# Patient Record
Sex: FEMALE | Race: BLACK OR AFRICAN AMERICAN | Employment: FULL TIME | URBAN - NONMETROPOLITAN AREA
[De-identification: names, ages, dates, MRNs, and addresses within clinical notes are randomized per-mention and may not be internally consistent; named-entity substitution may affect disease eponyms.]

---

## 2017-01-11 ENCOUNTER — TELEPHONE (OUTPATIENT)
Dept: SURGERY | Age: 52
End: 2017-01-11

## 2017-01-11 ENCOUNTER — OFFICE VISIT (OUTPATIENT)
Dept: ONCOLOGY | Facility: CLINIC | Age: 52
End: 2017-01-11

## 2017-01-11 ENCOUNTER — OFFICE VISIT (OUTPATIENT)
Dept: SURGERY | Age: 52
End: 2017-01-11
Payer: COMMERCIAL

## 2017-01-11 ENCOUNTER — INFUSION (OUTPATIENT)
Dept: ONCOLOGY | Facility: HOSPITAL | Age: 52
End: 2017-01-11
Attending: INTERNAL MEDICINE

## 2017-01-11 ENCOUNTER — LAB (OUTPATIENT)
Dept: ONCOLOGY | Facility: CLINIC | Age: 52
End: 2017-01-11

## 2017-01-11 VITALS
OXYGEN SATURATION: 100 % | SYSTOLIC BLOOD PRESSURE: 120 MMHG | TEMPERATURE: 96.9 F | HEIGHT: 63 IN | RESPIRATION RATE: 20 BRPM | HEART RATE: 85 BPM | DIASTOLIC BLOOD PRESSURE: 74 MMHG | BODY MASS INDEX: 30.3 KG/M2 | WEIGHT: 171 LBS

## 2017-01-11 VITALS
TEMPERATURE: 98.2 F | BODY MASS INDEX: 31.01 KG/M2 | HEIGHT: 63 IN | DIASTOLIC BLOOD PRESSURE: 72 MMHG | SYSTOLIC BLOOD PRESSURE: 108 MMHG | WEIGHT: 175 LBS

## 2017-01-11 VITALS
TEMPERATURE: 98 F | DIASTOLIC BLOOD PRESSURE: 74 MMHG | HEART RATE: 74 BPM | BODY MASS INDEX: 30.39 KG/M2 | HEIGHT: 63 IN | SYSTOLIC BLOOD PRESSURE: 124 MMHG | WEIGHT: 171.5 LBS | RESPIRATION RATE: 16 BRPM | OXYGEN SATURATION: 98 %

## 2017-01-11 DIAGNOSIS — Z85.3 PERSONAL HISTORY OF MALIGNANT NEOPLASM OF BREAST: ICD-10-CM

## 2017-01-11 DIAGNOSIS — N63.0 BREAST SWELLING: Primary | ICD-10-CM

## 2017-01-11 DIAGNOSIS — C50.012 MALIGNANT NEOPLASM INVOLVING BOTH NIPPLE AND AREOLA OF LEFT BREAST IN FEMALE (HCC): ICD-10-CM

## 2017-01-11 DIAGNOSIS — C50.012 MALIGNANT NEOPLASM INVOLVING BOTH NIPPLE AND AREOLA OF LEFT BREAST IN FEMALE (HCC): Primary | ICD-10-CM

## 2017-01-11 LAB
ALBUMIN SERPL-MCNC: 4.8 G/DL (ref 3.5–5)
ALBUMIN/GLOB SERPL: 1 G/DL
ALP SERPL-CCNC: 90 U/L (ref 38–126)
ALT SERPL W P-5'-P-CCNC: 36 U/L (ref 9–52)
ANION GAP SERPL CALCULATED.3IONS-SCNC: 16 MMOL/L
AST SERPL-CCNC: 36 U/L (ref 5–40)
AUTO MIXED CELLS #: 0.3 10*3/UL (ref 0.1–1.5)
AUTO MIXED CELLS %: 8.9 % (ref 0.2–15.1)
BILIRUB SERPL-MCNC: 0.6 MG/DL (ref 0.2–1.3)
BUN BLD-MCNC: 10 MG/DL (ref 7–26)
BUN/CREAT SERPL: 11.1 (ref 7–25)
CALCIUM SPEC-SCNC: 10.3 MG/DL (ref 8.4–10.2)
CHLORIDE SERPL-SCNC: 99 MMOL/L (ref 98–107)
CO2 SERPL-SCNC: 30 MMOL/L (ref 22–30)
CREAT BLD-MCNC: 0.9 MG/DL (ref 0.7–1.4)
ERYTHROCYTE [DISTWIDTH] IN BLOOD BY AUTOMATED COUNT: 15.2 % (ref 11.5–14.5)
GFR SERPL CREATININE-BSD FRML MDRD: 80 ML/MIN/1.73
GLOBULIN UR ELPH-MCNC: 4.9 GM/DL
GLUCOSE BLD-MCNC: 91 MG/DL (ref 75–110)
HCT VFR BLD AUTO: 42.5 % (ref 37–47)
HGB BLD-MCNC: 12.6 G/DL (ref 12–16)
LYMPHOCYTES # BLD AUTO: 1.8 10*3/MM3 (ref 0.8–7)
LYMPHOCYTES NFR BLD AUTO: 46.6 % (ref 10–58.5)
MCH RBC QN AUTO: 28.8 PG (ref 27–31)
MCHC RBC AUTO-ENTMCNC: 29.6 G/DL (ref 33–37)
MCV RBC AUTO: 97.3 FL (ref 81–99)
NEUTROPHILS # BLD AUTO: 1.7 10*3/MM3 (ref 2–7.8)
NEUTROPHILS NFR BLD AUTO: 44.5 % (ref 37–92)
PLATELET # BLD AUTO: 269 10*3/MM3 (ref 130–400)
PMV BLD AUTO: 8.3 FL (ref 6–12)
POTASSIUM BLD-SCNC: 4.1 MMOL/L (ref 3.6–5)
PROT SERPL-MCNC: 9.7 G/DL (ref 6.3–8.2)
RBC # BLD AUTO: 4.37 10*6/MM3 (ref 4.2–5.4)
SODIUM BLD-SCNC: 145 MMOL/L (ref 137–145)
WBC NRBC COR # BLD: 3.8 10*3/MM3 (ref 4.8–10.8)

## 2017-01-11 PROCEDURE — 99214 OFFICE O/P EST MOD 30 MIN: CPT | Performed by: INTERNAL MEDICINE

## 2017-01-11 PROCEDURE — 85025 COMPLETE CBC W/AUTO DIFF WBC: CPT | Performed by: INTERNAL MEDICINE

## 2017-01-11 PROCEDURE — 96413 CHEMO IV INFUSION 1 HR: CPT

## 2017-01-11 PROCEDURE — 80053 COMPREHEN METABOLIC PANEL: CPT | Performed by: INTERNAL MEDICINE

## 2017-01-11 PROCEDURE — 25010000002 HEPARIN FLUSH (PORCINE) 100 UNIT/ML SOLUTION: Performed by: INTERNAL MEDICINE

## 2017-01-11 PROCEDURE — 36415 COLL VENOUS BLD VENIPUNCTURE: CPT | Performed by: INTERNAL MEDICINE

## 2017-01-11 PROCEDURE — 25010000002 TRASTUZUMAB PER 10 MG: Performed by: INTERNAL MEDICINE

## 2017-01-11 PROCEDURE — 99212 OFFICE O/P EST SF 10 MIN: CPT | Performed by: PHYSICIAN ASSISTANT

## 2017-01-11 RX ORDER — SODIUM CHLORIDE 0.9 % (FLUSH) 0.9 %
10 SYRINGE (ML) INJECTION AS NEEDED
Status: DISCONTINUED | OUTPATIENT
Start: 2017-01-11 | End: 2017-01-11 | Stop reason: HOSPADM

## 2017-01-11 RX ORDER — SODIUM CHLORIDE 0.9 % (FLUSH) 0.9 %
10 SYRINGE (ML) INJECTION AS NEEDED
Status: CANCELLED | OUTPATIENT
Start: 2017-02-01

## 2017-01-11 RX ORDER — CEPHALEXIN 250 MG/1
CAPSULE ORAL
Qty: 28 CAPSULE | Refills: 0 | Status: SHIPPED | OUTPATIENT
Start: 2017-01-11 | End: 2017-09-12

## 2017-01-11 RX ORDER — PNV NO.95/FERROUS FUM/FOLIC AC 28MG-0.8MG
TABLET ORAL
COMMUNITY
End: 2020-06-12

## 2017-01-11 RX ORDER — HEPARIN SODIUM (PORCINE) LOCK FLUSH IV SOLN 100 UNIT/ML 100 UNIT/ML
500 SOLUTION INTRAVENOUS AS NEEDED
Status: CANCELLED | OUTPATIENT
Start: 2017-02-01

## 2017-01-11 RX ORDER — SODIUM CHLORIDE 9 MG/ML
250 INJECTION, SOLUTION INTRAVENOUS ONCE
Status: COMPLETED | OUTPATIENT
Start: 2017-01-11 | End: 2017-01-11

## 2017-01-11 RX ORDER — SODIUM CHLORIDE 0.9 % (FLUSH) 0.9 %
10 SYRINGE (ML) INJECTION AS NEEDED
Status: CANCELLED | OUTPATIENT
Start: 2017-01-11

## 2017-01-11 RX ADMIN — Medication 500 UNITS: at 15:51

## 2017-01-11 RX ADMIN — TRASTUZUMAB 480 MG: KIT at 14:42

## 2017-01-11 RX ADMIN — SODIUM CHLORIDE, PRESERVATIVE FREE 10 ML: 5 INJECTION INTRAVENOUS at 15:51

## 2017-01-11 RX ADMIN — Medication 10 ML: at 13:30

## 2017-01-11 RX ADMIN — SODIUM CHLORIDE 250 ML: 9 INJECTION, SOLUTION INTRAVENOUS at 14:21

## 2017-01-11 NOTE — MR AVS SNAPSHOT
[facility] HEMATOLOGY & ONCOLOGY  [phone]                    Ayla Echeverria   1/11/2017 1:00 PM   Office Visit    Dept Phone:  [phone]   Encounter #:  [id number]    Provider:  Ruth Robert MD   Department:  [facility] HEMATOLOGY & ONCOLOGY                Your Full Care Plan              Today's Medication Changes          These changes are accurate as of: 1/11/17  1:43 PM.  If you have any questions, ask your nurse or doctor.               New Medication(s)Ordered:     cephalexin 250 MG capsule   Commonly known as:  KEFLEX   One cap po 4 x per day x 2 weeks until finished   Started by:  Katy Ma LPN         Stop taking medication(s)listed here:     ESTROVEN MENOPAUSE RELIEF PO   Stopped by:  Ruth Robert MD                Where to Get Your Medications      These medications were sent to [facility] - [place], [place] - [address]  - [phone]  - [phone] FX  [address] [facility] [address]     Phone:  [phone]     cephalexin 250 MG capsule                  Your Updated Medication List          This list is accurate as of: 1/11/17  1:43 PM.  Always use your most recent med list.                cephalexin 250 MG capsule   Commonly known as:  KEFLEX   One cap po 4 x per day x 2 weeks until finished       fish oil 1000 MG capsule capsule       Iron 325 (65 FE) MG tablet       MULTI VITAMIN DAILY PO       omeprazole 40 MG capsule   Commonly known as:  priLOSEC       OSTEO BI-FLEX TRIPLE STRENGTH PO       tamoxifen 20 MG chemo tablet   Commonly known as:  NOLVADEX   Take 1 tablet by mouth Daily.               We Performed the Following     Clinic Appointment Request       You Were Diagnosed With        Codes Comments    Malignant neoplasm involving both nipple and areola of left breast in female     ICD-10-CM: C50.012  ICD-9-CM: 174.0       Instructions     None    Patient Instructions History      Upcoming Appointments     Visit  "Type Date Time Department    FOLLOW UP 1 UNIT 2017  1:00 PM [facility] [place]    LAB 2017 12:45 PM [facility] [place]    ONCOLOGY TREAT - [place] 2017  2:30 PM [facility] OP INFU ONC    FOLLOW UP 1 UNIT 2017  2:00 PM [facility] [place]    LAB 2017  1:45 PM [facility] [place]      MyChart Signup     [facility] [employer] allows you to send messages to your doctor, view your test results, renew your prescriptions, schedule appointments, and more. To sign up, go to [email or web address] and click on the Sign Up Now link in the New User? box. Enter your [employer] Activation Code exactly as it appears below along with the last four digits of your Social Security Number and your Date of Birth () to complete the sign-up process. If you do not sign up before the expiration date, you must request a new code.    [employer] Activation Code: [email or web address]  Expires: 2017  4:40 AM    If you have questions, you can email [email or web address] or call [phone] to talk to our [employer] staff. Remember, [employer] is NOT to be used for urgent needs. For medical emergencies, dial 911.               Other Info from Your Visit           Your Appointments     2017  2:30 PM CST   Infusion Treatment with -1 [facility] OP INFUS   [facility] OP INFU ONC– ONCOLOGY ([place])    [address] [place] [id number] [phone]            2017  1:45 PM CST   LAB with [facility]   [facility] HEMATOLOGY & ONCOLOGY ([place])    [address] [facility] [address] [phone]            2017  2:00 PM CST   FOLLOW UP with Abrahan Regan MD   [facility] HEMATOLOGY & ONCOLOGY ([place])    [address] [address] [phone]              Allergies     No Known Allergies      Vital Signs     Blood Pressure Pulse Temperature Respirations Height Weight    124/74 74 98 °F (36.7 °C) (Tympanic) 16 63\" (160 cm) 171 lb 8 oz " (77.8 kg)    Oxygen Saturation Body Mass Index Smoking Status             98% 30.38 kg/m2 Never Smoker         Problems and Diagnoses Noted     Malignant neoplasm involving both nipple and areola of left breast in female

## 2017-01-11 NOTE — PROGRESS NOTES
[facility]  HEMATOLOGY & ONCOLOGY        Subjective   Patient presents for follow-up today.  This is the first visit since her surgery.  She has not followed up with radiation oncology and is not interested in receiving radiation.  She is doing well.  No complaints.    VISIT DIAGNOSIS:   Encounter Diagnosis   Name Primary?   • Malignant neoplasm involving both nipple and areola of left breast in female        REASON FOR VISIT:   No chief complaint on file.       HEMATOLOGY / ONCOLOGY HISTORY:    No history exists.   Ms. Fletcher is a 50 year old female with no previous medical history. She was referred to breast surgeon for selfpalpated  left breast mass.Her ultrasound and mammogram are concerning for malignancy. She had a biopsy showing HER2/neupositive  large breast cancer.   She was referred here for consideration of neoadjuvant chemotherapy.  Diagnostic mammogram March 22, 2016 finding abnormal left breast mammogram.  April 18, 2016: MRI of the breast finding 11.6 x 3.5 cm region of nodular signal and suspicious enhancement in the left breast, along with  metastatic lymphadenopathy.  Pathology  April 4, 2016 finding grade 3 invasive ductal carcinoma. Tumor is estrogen receptor 1%, progesterone receptor 6%, and HER2/migel 3+ by  IHC and by FISH, strongly positive.  Patient received neoadjuvant chemotherapy with TCH and Perjeta. Dr. Peres has recommended that patient have mastectomy  with axillary node dissection after completion of neoadjuvant chemotherapy, that should be followed with radiation therapy and adjuvant  Herceptin for 1 year, and then a hormone blocker for 10 years. After cycle 3, ultrasound of the left breast indicated excellent response and  reduction in breast mass size. Ms. Fletcher' last 2D echocardiogram was performed on 05/27/2016 with ejection fraction of 55% to 60%.  After completing 6 cycles of neoadjuvant chemotherapy with TCH and Perjeta.    Patient had lumpectomy and lymph  node biopsy with surgeon Dr. Subhash Phan.  Surgery was on 10/04/2016.  Surgical pathology from the lumpectomy and sentinel lymph node specimen:-  10/04/2016 -  Invasive ductal carcinoma, single focus measuring 0.2 cm, margins were uninvolved.  There was evidence of lymphovascular invasion.  There was evidence of further good response to neoadjuvant therapy.  [place] lymph node biopsy revealed one out of 3 lymph nodes involved by tumor.  Pathological posttreatment staging: ypT1a, pN1a    After Surgery patient has not followed up with radiation oncology      Cancer Staging Information:  No matching staging information was found for the patient.      INTERVAL HISTORY  Patient ID: Ayla Echeverria is a 51 y.o. year old female     Past Medical History:   Past Medical History   Diagnosis Date   • Breast cancer      Past Surgical History:   Past Surgical History   Procedure Laterality Date   • Axillary lymph node biopsy/excision Left    • Breast biopsy Left 03/31/2016   • Breast lumpectomy with sentinel node biopsy Left 10/03/2016     residual invasive carcinoma, grade 3 (0.2cm); margins negative; extensive lymphvascular space invasion; 2 sentinel lymph nodes positive (2/3)     Social History:   Social History     Social History   • Marital status:      Spouse name: N/A   • Number of children: N/A   • Years of education: N/A     Occupational History   • Not on file.     Social History Main Topics   • Smoking status: Never Smoker   • Smokeless tobacco: Never Used   • Alcohol use No   • Drug use: No   • Sexual activity: Defer     Other Topics Concern   • Not on file     Social History Narrative     Family History:   Family History   Problem Relation Age of Onset   • Lung cancer Mother    • Cancer Father      prostate, lung, colon   • Hypertension Sister    • Lupus Sister        Review of Systems   Skin: Positive for color change and rash.   All other systems reviewed and are negative.       Performance  "Status:  Symptomatic; fully ambulatory    Medications:    Current Outpatient Prescriptions   Medication Sig Dispense Refill   • Ferrous Sulfate (IRON) 325 (65 FE) MG tablet Take  by mouth.     • Misc Natural Products (OSTEO BI-FLEX TRIPLE STRENGTH PO) Take  by mouth 2 (Two) Times a Day.     • Multiple Vitamin (MULTI VITAMIN DAILY PO) Take  by mouth.     • Omega-3 Fatty Acids (FISH OIL) 1000 MG capsule capsule Take 1,000 mg by mouth Daily With Breakfast.     • omeprazole (priLOSEC) 40 MG capsule Take 40 mg by mouth Daily.     • tamoxifen (NOLVADEX) 20 MG chemo tablet Take 1 tablet by mouth Daily. 30 tablet 3     No current facility-administered medications for this visit.        ALLERGIES:  No Known Allergies    Objective      Vitals:    01/11/17 1310   BP: 124/74   Pulse: 74   Resp: 16   Temp: 98 °F (36.7 °C)   TempSrc: Tympanic   SpO2: 98%   Weight: 171 lb 8 oz (77.8 kg)   Height: 63\" (160 cm)     Visit Vitals   • /74   • Pulse 74   • Temp 98 °F (36.7 °C) (Tympanic)   • Resp 16   • Ht 63\" (160 cm)   • Wt 171 lb 8 oz (77.8 kg)   • SpO2 98%   • BMI 30.38 kg/m2       Current Status 1/11/2017   ECOG score 0         General Appearance:    Alert, cooperative, no distress, appears stated age   Head:    Normocephalic, without obvious abnormality, atraumatic   Eyes:    PERRL, conjunctiva pink, sclera clear, EOM's intact   Ears:    Not examined   Nose:   Nares normal, septum midline, mucosa normal, no drainage     or sinus tenderness   Throat:   Lips, mucosa, and tongue normal; teeth and gums normal,     mucous membranes moist   Neck:   Supple, Trachea midline   Back:     Symmetric, no curvature, ROM normal, no CVA tenderness   Lungs:     Clear to auscultation bilaterally, respirations unlabored   Chest Wall:    well-healed surgical scar noted on the left breast.  Surgery scar extending to the left axilla.  There is no nipple inversion.  Slight slight induration due to some scar tissue can be felt under the left breast " surgical scar.      Heart:    Regular rate and rhythm, S1 and S2 normal, no murmur, rub    or gallop   Abdomen:     Soft, non-tender, bowel sounds active all four quadrants,     no masses, no organomegaly   Extremities:   Extremities normal, atraumatic, no cyanosis or edema       Skin:   Skin color, texture, turgor normal, no rashes or lesions   Lymph nodes:   Cervical, supraclavicular, and axillary nodes normal   Neurologic:   Grossly nonfocal, gait coordinated and smooth, cognition is   preserved.       RECENT LABS:  Results for orders placed or performed in visit on 01/11/17   CBC Auto Differential   Result Value Ref Range    WBC 3.80 (L) 4.80 - 10.80 10*3/mm3    RBC 4.37 4.20 - 5.40 10*6/mm3    Hemoglobin 12.6 12.0 - 16.0 g/dL    Hematocrit 42.5 37.0 - 47.0 %    MCV 97.3 81.0 - 99.0 fL    MCH 28.8 27.0 - 31.0 pg    MCHC 29.6 (L) 33.0 - 37.0 g/dL    RDW 15.2 (H) 11.5 - 14.5 %    MPV 8.3 6.0 - 12.0 fL    Platelets 269 130 - 400 10*3/mm3    Neutrophil % 44.5 37.0 - 92.0 %    Lymphocyte % 46.6 10.0 - 58.5 %    Auto Mixed Cells % 8.9 0.2 - 15.1 %    Neutrophils, Absolute 1.70 (L) 2.00 - 7.80 10*3/mm3    Lymphocytes, Absolute 1.80 0.80 - 7.00 10*3/mm3    Auto Mixed Cells # 0.30 0.10 - 1.50 10*3/uL             Assessment/Plan     Patient Active Problem List   Diagnosis   • Bilateral acute serous otitis media   • Malignant neoplasm involving both nipple and areola of left breast in female        Diagnoses and all orders for this visit:    Malignant neoplasm involving both nipple and areola of left breast in female  -     Clinic Appointment Request     1. Locally advanced left breast cancer along with left axillary lymphadenopathy. Clinical T3N2, stage III. Tumor strongly positive for  HER2/migel receptor.  Patient was treated with 6 cycles of TCHPerjeta as well as followed by lumpectomy and sentinel lymph node dissection performed by Dr. Delaney on October 4 2016  .  She had an excellent response to the neoadjuvant  chemotherapy.  Surgical pathology revealed only 0.2 cm focus of residual tumor.  Patient did not go to see the radiation oncologist after her surgery, strongly recommended follow-up to receive adjuvant radiation .    2.  Anemia: History of iron deficiency she is on iron supplements, improved.  3.  Perimenopausal status   -   her last menstrual cycle was in early 2016 and she started to have menstrual cycles following 2 cycles of chemotherapy.    Plan    1.  Strongly recommended the patient to receive adjuvant hormonal therapy for at least 5 years and also adjuvant HER-2 blocking therapy..  Recommended her to receive another 34 weeks of Herceptin as she has already received 18 weeks of anti Her 2 therapy. She was very hesitant but eventually convinced her to start post surgery Herceptin maintenance.  2.  Radiation oncology consultation.  I did my best to convince the patient to receive adjuvant breast radiation therapy, in order to decrease the chances of local recurrence.  3.  Ejection fraction 60% by echo on 12/30/16.  Next echocardiogram in April 2016  4.  Patient still perimenopausal and therefore should be on tamoxifen for good 2 years after which can be switched to aromatase inhibitor for a total of 10 years.  She was started tamoxifen today  6.  We will follow patient back in 3 weeks.  7.  Cellulitis of the left breast, clinically.  Keflex 500 mg every 6 hours trials for 2 weeks.  Patient instructed in a few days if rash on the left breast does not improve to come sooner and at which time we will obtain a mammogram and/or ultrasound, doubt this is a malignancy related.    :         Ruth Robert MD    1/11/2017    1:14 PM

## 2017-01-11 NOTE — PROGRESS NOTES
Pt c/o of feeling cold/shivering. Vss. Called md office and spoke with viridiana oneal. No orders received except to watch pt for a few minutes and let pt go home. Instruct pt if symptoms persist at home pt should call md office or go to er. Pt verbalized understanding. Pt will sit in tx room for awhile now.

## 2017-01-24 RX ORDER — OMEPRAZOLE 40 MG/1
CAPSULE, DELAYED RELEASE ORAL
Qty: 30 CAPSULE | Refills: 5 | Status: SHIPPED | OUTPATIENT
Start: 2017-01-24 | End: 2019-12-05

## 2017-01-26 ENCOUNTER — TELEPHONE (OUTPATIENT)
Dept: SURGERY | Age: 52
End: 2017-01-26

## 2017-01-30 ENCOUNTER — OFFICE VISIT (OUTPATIENT)
Dept: SURGERY | Age: 52
End: 2017-01-30
Payer: COMMERCIAL

## 2017-01-30 ENCOUNTER — TELEPHONE (OUTPATIENT)
Dept: SURGERY | Age: 52
End: 2017-01-30

## 2017-01-30 ENCOUNTER — HOSPITAL ENCOUNTER (OUTPATIENT)
Age: 52
Setting detail: SPECIMEN
Discharge: HOME | End: 2017-01-30
Payer: COMMERCIAL

## 2017-01-30 VITALS
HEIGHT: 63 IN | HEART RATE: 68 BPM | RESPIRATION RATE: 18 BRPM | SYSTOLIC BLOOD PRESSURE: 124 MMHG | WEIGHT: 173.2 LBS | DIASTOLIC BLOOD PRESSURE: 68 MMHG | BODY MASS INDEX: 30.69 KG/M2

## 2017-01-30 DIAGNOSIS — Z85.3 PERSONAL HISTORY OF BREAST CANCER: Primary | ICD-10-CM

## 2017-01-30 PROCEDURE — 11100 PR BIOPSY OF SKIN LESION: CPT | Performed by: SURGERY

## 2017-01-30 PROCEDURE — 88305 TISSUE EXAM BY PATHOLOGIST: CPT

## 2017-02-15 ENCOUNTER — OFFICE VISIT (OUTPATIENT)
Dept: SURGERY | Age: 52
End: 2017-02-15
Payer: COMMERCIAL

## 2017-02-15 ENCOUNTER — TELEPHONE (OUTPATIENT)
Dept: SURGERY | Age: 52
End: 2017-02-15

## 2017-02-15 VITALS
HEART RATE: 80 BPM | HEIGHT: 63 IN | DIASTOLIC BLOOD PRESSURE: 80 MMHG | BODY MASS INDEX: 31.01 KG/M2 | SYSTOLIC BLOOD PRESSURE: 138 MMHG | RESPIRATION RATE: 18 BRPM | WEIGHT: 175 LBS

## 2017-02-15 DIAGNOSIS — C77.3 SECONDARY MALIGNANT NEOPLASM OF AXILLARY LYMPH NODES (HCC): ICD-10-CM

## 2017-02-15 DIAGNOSIS — C50.412 BREAST CANCER OF UPPER-OUTER QUADRANT OF LEFT FEMALE BREAST (HCC): Primary | ICD-10-CM

## 2017-02-15 PROCEDURE — 99213 OFFICE O/P EST LOW 20 MIN: CPT | Performed by: SURGERY

## 2017-02-17 ENCOUNTER — TELEPHONE (OUTPATIENT)
Dept: FAMILY MEDICINE CLINIC | Facility: CLINIC | Age: 52
End: 2017-02-17

## 2017-02-17 RX ORDER — AZITHROMYCIN 250 MG/1
TABLET, FILM COATED ORAL
Qty: 6 TABLET | Refills: 0 | Status: SHIPPED | OUTPATIENT
Start: 2017-02-17 | End: 2017-09-12

## 2017-02-17 NOTE — TELEPHONE ENCOUNTER
She has a chest cold. Coughing and sneezing. Has yellow mucous. Wants an abx sent to [facility]

## 2017-04-05 ENCOUNTER — OFFICE VISIT (OUTPATIENT)
Dept: SURGERY | Age: 52
End: 2017-04-05
Payer: COMMERCIAL

## 2017-04-05 VITALS
HEART RATE: 78 BPM | DIASTOLIC BLOOD PRESSURE: 74 MMHG | BODY MASS INDEX: 32.11 KG/M2 | HEIGHT: 63 IN | SYSTOLIC BLOOD PRESSURE: 140 MMHG | WEIGHT: 181.2 LBS | RESPIRATION RATE: 18 BRPM

## 2017-04-05 DIAGNOSIS — C50.412 BREAST CANCER OF UPPER-OUTER QUADRANT OF LEFT FEMALE BREAST (HCC): ICD-10-CM

## 2017-04-05 DIAGNOSIS — C77.3 SECONDARY MALIGNANT NEOPLASM OF AXILLARY LYMPH NODES (HCC): Primary | ICD-10-CM

## 2017-04-05 PROCEDURE — 99213 OFFICE O/P EST LOW 20 MIN: CPT | Performed by: SURGERY

## 2017-04-21 ENCOUNTER — TELEPHONE (OUTPATIENT)
Dept: SURGERY | Age: 52
End: 2017-04-21

## 2017-04-25 DIAGNOSIS — C77.3 SECONDARY MALIGNANT NEOPLASM OF AXILLARY LYMPH NODES (HCC): ICD-10-CM

## 2017-04-25 DIAGNOSIS — C50.412 BREAST CANCER OF UPPER-OUTER QUADRANT OF LEFT FEMALE BREAST (HCC): Primary | ICD-10-CM

## 2017-05-04 ENCOUNTER — HOSPITAL ENCOUNTER (OUTPATIENT)
Dept: NUCLEAR MEDICINE | Age: 52
Discharge: HOME | End: 2017-05-04
Payer: COMMERCIAL

## 2017-05-04 ENCOUNTER — HOSPITAL ENCOUNTER (OUTPATIENT)
Dept: CT IMAGING | Age: 52
Discharge: HOME | End: 2017-05-04
Payer: COMMERCIAL

## 2017-05-04 DIAGNOSIS — C50.412 BREAST CANCER OF UPPER-OUTER QUADRANT OF LEFT FEMALE BREAST (HCC): ICD-10-CM

## 2017-05-04 DIAGNOSIS — C50.412 MALIGNANT NEOPLASM OF UPPER-OUTER QUADRANT OF LEFT FEMALE BREAST (HCC): ICD-10-CM

## 2017-05-04 LAB
GLUCOSE BLD-MCNC: 77 MG/DL (ref 70–99)
PERFORMED ON: NORMAL

## 2017-05-04 PROCEDURE — 6360000004 HC RX CONTRAST MEDICATION: Performed by: INTERNAL MEDICINE

## 2017-05-04 PROCEDURE — 3430000000 HC RX DIAGNOSTIC RADIOPHARMACEUTICAL: Performed by: INTERNAL MEDICINE

## 2017-05-04 PROCEDURE — 71260 CT THORAX DX C+: CPT

## 2017-05-04 PROCEDURE — 82948 REAGENT STRIP/BLOOD GLUCOSE: CPT

## 2017-05-04 PROCEDURE — A9552 F18 FDG: HCPCS | Performed by: INTERNAL MEDICINE

## 2017-05-04 PROCEDURE — 74177 CT ABD & PELVIS W/CONTRAST: CPT

## 2017-05-04 PROCEDURE — 78815 PET IMAGE W/CT SKULL-THIGH: CPT

## 2017-05-04 RX ORDER — FLUDEOXYGLUCOSE F 18 200 MCI/ML
10 INJECTION, SOLUTION INTRAVENOUS
Status: COMPLETED | OUTPATIENT
Start: 2017-05-04 | End: 2017-05-04

## 2017-05-04 RX ADMIN — FLUDEOXYGLUCOSE F 18 10 MILLICURIE: 200 INJECTION, SOLUTION INTRAVENOUS at 14:32

## 2017-05-04 RX ADMIN — IOVERSOL 90 ML: 741 INJECTION INTRA-ARTERIAL; INTRAVENOUS at 10:59

## 2017-05-22 ENCOUNTER — OFFICE VISIT (OUTPATIENT)
Dept: SURGERY | Age: 52
End: 2017-05-22
Payer: COMMERCIAL

## 2017-05-22 VITALS
DIASTOLIC BLOOD PRESSURE: 72 MMHG | SYSTOLIC BLOOD PRESSURE: 120 MMHG | RESPIRATION RATE: 18 BRPM | HEART RATE: 76 BPM | BODY MASS INDEX: 31.89 KG/M2 | WEIGHT: 180 LBS | HEIGHT: 63 IN

## 2017-05-22 DIAGNOSIS — C77.3 SECONDARY MALIGNANT NEOPLASM OF AXILLARY LYMPH NODES (HCC): ICD-10-CM

## 2017-05-22 DIAGNOSIS — C50.412 BREAST CANCER OF UPPER-OUTER QUADRANT OF LEFT FEMALE BREAST (HCC): Primary | ICD-10-CM

## 2017-05-22 PROCEDURE — 99213 OFFICE O/P EST LOW 20 MIN: CPT | Performed by: SURGERY

## 2017-08-23 ENCOUNTER — OFFICE VISIT (OUTPATIENT)
Dept: SURGERY | Age: 52
End: 2017-08-23
Payer: COMMERCIAL

## 2017-08-23 VITALS
SYSTOLIC BLOOD PRESSURE: 132 MMHG | HEIGHT: 63 IN | WEIGHT: 166 LBS | RESPIRATION RATE: 16 BRPM | HEART RATE: 78 BPM | DIASTOLIC BLOOD PRESSURE: 78 MMHG | BODY MASS INDEX: 29.41 KG/M2

## 2017-08-23 DIAGNOSIS — C77.3 SECONDARY MALIGNANT NEOPLASM OF AXILLARY LYMPH NODES (HCC): ICD-10-CM

## 2017-08-23 DIAGNOSIS — C50.412 BREAST CANCER OF UPPER-OUTER QUADRANT OF LEFT FEMALE BREAST (HCC): Primary | ICD-10-CM

## 2017-08-23 PROCEDURE — 99213 OFFICE O/P EST LOW 20 MIN: CPT | Performed by: SURGERY

## 2017-09-11 ENCOUNTER — TELEPHONE (OUTPATIENT)
Dept: FAMILY MEDICINE CLINIC | Facility: CLINIC | Age: 52
End: 2017-09-11

## 2017-09-11 RX ORDER — CLINDAMYCIN HYDROCHLORIDE 150 MG/1
150 CAPSULE ORAL 3 TIMES DAILY
Qty: 21 CAPSULE | Refills: 0 | Status: SHIPPED | OUTPATIENT
Start: 2017-09-11 | End: 2017-09-18 | Stop reason: ALTCHOICE

## 2017-09-11 NOTE — TELEPHONE ENCOUNTER
Says she thinks she has a spider bite under her left breast. It was about dime size last night. Now today its bigger and very painful. Has a pus pocket in it.It's red but no streaks. Wants to know what she needs to do?

## 2017-09-12 ENCOUNTER — OFFICE VISIT (OUTPATIENT)
Dept: FAMILY MEDICINE CLINIC | Facility: CLINIC | Age: 52
End: 2017-09-12

## 2017-09-12 VITALS
DIASTOLIC BLOOD PRESSURE: 78 MMHG | SYSTOLIC BLOOD PRESSURE: 118 MMHG | HEIGHT: 63 IN | RESPIRATION RATE: 16 BRPM | OXYGEN SATURATION: 97 % | WEIGHT: 167 LBS | HEART RATE: 67 BPM | BODY MASS INDEX: 29.59 KG/M2

## 2017-09-12 DIAGNOSIS — L03.311 CELLULITIS OF ABDOMINAL WALL: Primary | ICD-10-CM

## 2017-09-12 PROCEDURE — 99213 OFFICE O/P EST LOW 20 MIN: CPT | Performed by: FAMILY MEDICINE

## 2017-09-12 RX ORDER — MONTELUKAST SODIUM 10 MG/1
TABLET ORAL
Status: ON HOLD | COMMUNITY
Start: 2016-07-13 | End: 2021-01-01

## 2017-09-12 RX ORDER — MUPIROCIN CALCIUM 20 MG/G
CREAM TOPICAL 3 TIMES DAILY
Qty: 15 G | Refills: 0 | Status: SHIPPED | OUTPATIENT
Start: 2017-09-12 | End: 2018-03-20

## 2017-09-12 NOTE — PROGRESS NOTES
"Subjective   Ayla Echeverria is a 51 y.o. female.     Chief Complaint   Patient presents with   • Insect Bite     spider bite  under left breast  x 2 days        History of Present Illness     see phone message from yesterday--notes painful pustular lesion left upper abd--started on clinda yesterday--denies fever or chills      Current Outpatient Prescriptions:   •  montelukast (SINGULAIR) 10 MG tablet, Take  by mouth., Disp: , Rfl:   •  clindamycin (CLEOCIN) 150 MG capsule, Take 1 capsule by mouth 3 (Three) Times a Day., Disp: 21 capsule, Rfl: 0  •  Multiple Vitamin (MULTI VITAMIN DAILY PO), Take  by mouth., Disp: , Rfl:   •  mupirocin (BACTROBAN) 2 % cream, Apply  topically 3 (Three) Times a Day., Disp: 15 g, Rfl: 0  •  omeprazole (priLOSEC) 40 MG capsule, TAKE ONE CAPSULE DAILY, Disp: 30 capsule, Rfl: 5  No Known Allergies    Past Medical History:   Diagnosis Date   • Breast cancer      Past Surgical History:   Procedure Laterality Date   • AXILLARY LYMPH NODE BIOPSY/EXCISION Left    • BREAST BIOPSY Left 03/31/2016   • BREAST LUMPECTOMY WITH SENTINEL NODE BIOPSY Left 10/03/2016    residual invasive carcinoma, grade 3 (0.2cm); margins negative; extensive lymphvascular space invasion; 2 sentinel lymph nodes positive (2/3)       Review of Systems   Constitutional: Negative.    HENT: Negative.    Skin: Positive for rash.       Objective  /78  Pulse 67  Resp 16  Ht 63\" (160 cm)  Wt 167 lb (75.8 kg)  SpO2 97%  BMI 29.58 kg/m2  Physical Exam   Constitutional: She appears well-developed and well-nourished.   Skin: Skin is warm and dry. There is erythema (noted pustular lesion left upper abdomen--we unroofed it with 15 blade and pustular material was expressed--bacitracin and sterile dressing applied).   Nursing note and vitals reviewed.      Assessment/Plan   Ayla was seen today for insect bite.    Diagnoses and all orders for this visit:    Cellulitis of abdominal wall    Other orders  -     mupirocin " (BACTROBAN) 2 % cream; Apply  topically 3 (Three) Times a Day.        Continue clindamycin        No orders of the defined types were placed in this encounter.      Follow up: 3 days

## 2017-09-15 ENCOUNTER — OFFICE VISIT (OUTPATIENT)
Dept: FAMILY MEDICINE CLINIC | Facility: CLINIC | Age: 52
End: 2017-09-15

## 2017-09-15 VITALS
WEIGHT: 167 LBS | RESPIRATION RATE: 16 BRPM | OXYGEN SATURATION: 95 % | HEIGHT: 63 IN | DIASTOLIC BLOOD PRESSURE: 72 MMHG | SYSTOLIC BLOOD PRESSURE: 114 MMHG | HEART RATE: 103 BPM | BODY MASS INDEX: 29.59 KG/M2

## 2017-09-15 DIAGNOSIS — L01.00 IMPETIGO: Primary | ICD-10-CM

## 2017-09-15 PROCEDURE — 99213 OFFICE O/P EST LOW 20 MIN: CPT | Performed by: FAMILY MEDICINE

## 2017-09-15 NOTE — PROGRESS NOTES
"Subjective   Ayla Echeverria is a 51 y.o. female.     Chief Complaint   Patient presents with   • Follow-up     3 days   cellulitis        History of Present Illness     healing lesion on the left chest --no fever or chills--tolerating antx very well      Current Outpatient Prescriptions:   •  clindamycin (CLEOCIN) 150 MG capsule, Take 1 capsule by mouth 3 (Three) Times a Day., Disp: 21 capsule, Rfl: 0  •  montelukast (SINGULAIR) 10 MG tablet, Take  by mouth., Disp: , Rfl:   •  Multiple Vitamin (MULTI VITAMIN DAILY PO), Take  by mouth., Disp: , Rfl:   •  mupirocin (BACTROBAN) 2 % cream, Apply  topically 3 (Three) Times a Day., Disp: 15 g, Rfl: 0  •  omeprazole (priLOSEC) 40 MG capsule, TAKE ONE CAPSULE DAILY, Disp: 30 capsule, Rfl: 5  No Known Allergies    Past Medical History:   Diagnosis Date   • Breast cancer      Past Surgical History:   Procedure Laterality Date   • AXILLARY LYMPH NODE BIOPSY/EXCISION Left    • BREAST BIOPSY Left 03/31/2016   • BREAST LUMPECTOMY WITH SENTINEL NODE BIOPSY Left 10/03/2016    residual invasive carcinoma, grade 3 (0.2cm); margins negative; extensive lymphvascular space invasion; 2 sentinel lymph nodes positive (2/3)       Review of Systems   Constitutional: Negative.    HENT: Negative.    Skin: Positive for wound (healing lesion). Negative for rash.       Objective  /72  Pulse 103  Resp 16  Ht 63\" (160 cm)  Wt 167 lb (75.8 kg)  SpO2 95%  BMI 29.58 kg/m2  Physical Exam   Constitutional: She appears well-developed and well-nourished.   Skin: Skin is warm and dry. There is erythema (healing lesion left abd--no erythema or drainage).   Nursing note and vitals reviewed.      Assessment/Plan   Ayla was seen today for follow-up.    Diagnoses and all orders for this visit:    Impetigo      continyue antbx and ointment        No orders of the defined types were placed in this encounter.      Follow up: prn  "

## 2017-09-18 ENCOUNTER — TELEPHONE (OUTPATIENT)
Dept: FAMILY MEDICINE CLINIC | Facility: CLINIC | Age: 52
End: 2017-09-18

## 2017-09-18 LAB
BACTERIA SPEC AEROBE CULT: ABNORMAL
BACTERIA SPEC ANAEROBE CULT: ABNORMAL
BACTERIA SPEC CULT: ABNORMAL
BACTERIA SPEC CULT: ABNORMAL
OTHER ANTIBIOTIC SUSC ISLT: ABNORMAL

## 2017-09-18 RX ORDER — MINOCYCLINE HYDROCHLORIDE 100 MG/1
100 CAPSULE ORAL 2 TIMES DAILY
Qty: 28 CAPSULE | Refills: 0 | Status: SHIPPED | OUTPATIENT
Start: 2017-09-18 | End: 2017-10-02

## 2017-09-18 NOTE — TELEPHONE ENCOUNTER
Pt states that she was calling regarding staph and has had another spot come up near her breast that is not as big.  Has been applying meds. Has one day of abx left  And she didn't know if she needed more.  [phone]

## 2017-09-21 ENCOUNTER — OFFICE VISIT (OUTPATIENT)
Dept: FAMILY MEDICINE CLINIC | Facility: CLINIC | Age: 52
End: 2017-09-21

## 2017-09-21 VITALS
SYSTOLIC BLOOD PRESSURE: 116 MMHG | HEIGHT: 63 IN | DIASTOLIC BLOOD PRESSURE: 74 MMHG | RESPIRATION RATE: 16 BRPM | OXYGEN SATURATION: 96 % | HEART RATE: 88 BPM | WEIGHT: 167 LBS | BODY MASS INDEX: 29.59 KG/M2

## 2017-09-21 DIAGNOSIS — L73.9 FOLLICULITIS: Primary | ICD-10-CM

## 2017-09-21 PROCEDURE — 99213 OFFICE O/P EST LOW 20 MIN: CPT | Performed by: FAMILY MEDICINE

## 2017-09-21 NOTE — PROGRESS NOTES
"Subjective   Ayla Echeverria is a 51 y.o. female.     Chief Complaint   Patient presents with   • Follow-up       History of Present Illness     Ms Echeverria is feeling much better--no fever or chills--we note her wound culture did return positive for staphy but rsisitent to clindamycin--now donig much bettwer wtih the minocin      Current Outpatient Prescriptions:   •  minocycline (MINOCIN) 100 MG capsule, Take 1 capsule by mouth 2 (Two) Times a Day for 14 days., Disp: 28 capsule, Rfl: 0  •  montelukast (SINGULAIR) 10 MG tablet, Take  by mouth., Disp: , Rfl:   •  Multiple Vitamin (MULTI VITAMIN DAILY PO), Take  by mouth., Disp: , Rfl:   •  mupirocin (BACTROBAN) 2 % cream, Apply  topically 3 (Three) Times a Day., Disp: 15 g, Rfl: 0  •  omeprazole (priLOSEC) 40 MG capsule, TAKE ONE CAPSULE DAILY, Disp: 30 capsule, Rfl: 5  No Known Allergies    Past Medical History:   Diagnosis Date   • Breast cancer      Past Surgical History:   Procedure Laterality Date   • AXILLARY LYMPH NODE BIOPSY/EXCISION Left    • BREAST BIOPSY Left 03/31/2016   • BREAST LUMPECTOMY WITH SENTINEL NODE BIOPSY Left 10/03/2016    residual invasive carcinoma, grade 3 (0.2cm); margins negative; extensive lymphvascular space invasion; 2 sentinel lymph nodes positive (2/3)       Review of Systems   Constitutional: Negative.    HENT: Negative.    Skin: Positive for rash and wound.       Objective  /74  Pulse 88  Resp 16  Ht 63\" (160 cm)  Wt 167 lb (75.8 kg)  SpO2 96%  BMI 29.58 kg/m2  Physical Exam   Constitutional: She appears well-developed and well-nourished.   Skin: Rash (skin lesions on left upper abd and arm healing nicely--no drainage) noted. There is erythema.   Nursing note and vitals reviewed.      Assessment/Plan   Ayla was seen today for follow-up.    Diagnoses and all orders for this visit:    Folliculitis        Finish minocni and ointment--keep me informed       No orders of the defined types were placed in this " encounter.      Follow up: prn

## 2017-11-15 ENCOUNTER — OFFICE VISIT (OUTPATIENT)
Dept: SURGERY | Age: 52
End: 2017-11-15
Payer: COMMERCIAL

## 2017-11-15 VITALS
DIASTOLIC BLOOD PRESSURE: 80 MMHG | HEART RATE: 78 BPM | BODY MASS INDEX: 29.09 KG/M2 | SYSTOLIC BLOOD PRESSURE: 130 MMHG | WEIGHT: 164.2 LBS | RESPIRATION RATE: 18 BRPM | HEIGHT: 63 IN

## 2017-11-15 DIAGNOSIS — C50.412 MALIGNANT NEOPLASM OF UPPER-OUTER QUADRANT OF LEFT FEMALE BREAST, UNSPECIFIED ESTROGEN RECEPTOR STATUS (HCC): ICD-10-CM

## 2017-11-15 DIAGNOSIS — C77.3 SECONDARY MALIGNANT NEOPLASM OF AXILLARY LYMPH NODES (HCC): Primary | ICD-10-CM

## 2017-11-15 PROCEDURE — 99213 OFFICE O/P EST LOW 20 MIN: CPT | Performed by: SURGERY

## 2017-11-16 NOTE — PROGRESS NOTES
HISTORY OF PRESENT ILLNESS:      Ms. Romana App comes in today for 3 month follow-up breast check. José Miguel Peterson performed a punch biopsy of left breast mass on 01/30/17. Pathology of punch biopsy demonstrated positive for metastatic ductal carcinoma. ER Negative, IA Negative       She had an uncomplicated Left lumpectomy with SNB on 10/04/2016. She had a highly HER-2 positive tumor and did not complete her therapy with Herceptin. Pathology showed Residual Invasive Ductal Carcinoma, Grade 3. 2 Wayne Nodes were positive for Metastatic carcinoma.      Patient is seeing Dr. Rogelio Sharp. She has now had 3 months of chemotherapy      PHYSICAL EXAM:  She has stopped improving on her current regimen. She has just started TDM1  She does not look significantly worse than 3 months ago but she certainly is no better      IMPRESSION:   Recurrent breast cancer      PLAN:  I plan to have patient to return in 3 months exam only. She will call back if there are any changes.         Over 50% of this visit was spent counseling the patient. 15 minutes of face to face time was spent with patient.

## 2017-12-01 ENCOUNTER — TRANSCRIBE ORDERS (OUTPATIENT)
Dept: ADMINISTRATIVE | Facility: HOSPITAL | Age: 52
End: 2017-12-01

## 2017-12-01 DIAGNOSIS — Z51.81 ENCOUNTER FOR THERAPEUTIC DRUG MONITORING: Primary | ICD-10-CM

## 2017-12-12 ENCOUNTER — HOSPITAL ENCOUNTER (OUTPATIENT)
Dept: CARDIOLOGY | Facility: HOSPITAL | Age: 52
Discharge: HOME | End: 2017-12-12
Attending: INTERNAL MEDICINE | Admitting: INTERNAL MEDICINE

## 2017-12-12 VITALS
SYSTOLIC BLOOD PRESSURE: 118 MMHG | HEIGHT: 63 IN | BODY MASS INDEX: 29.41 KG/M2 | DIASTOLIC BLOOD PRESSURE: 58 MMHG | WEIGHT: 166 LBS

## 2017-12-12 DIAGNOSIS — Z51.81 ENCOUNTER FOR THERAPEUTIC DRUG MONITORING: ICD-10-CM

## 2017-12-12 LAB
BH CV ECHO MEAS - AO MAX PG (FULL): 4.3 MMHG
BH CV ECHO MEAS - AO MAX PG: 8.2 MMHG
BH CV ECHO MEAS - AO MEAN PG (FULL): 2 MMHG
BH CV ECHO MEAS - AO MEAN PG: 4 MMHG
BH CV ECHO MEAS - AO ROOT AREA (BSA CORRECTED): 1.5
BH CV ECHO MEAS - AO ROOT AREA: 5.7 CM^2
BH CV ECHO MEAS - AO ROOT DIAM: 2.7 CM
BH CV ECHO MEAS - AO V2 MAX: 143 CM/SEC
BH CV ECHO MEAS - AO V2 MEAN: 95.9 CM/SEC
BH CV ECHO MEAS - AO V2 VTI: 29.2 CM
BH CV ECHO MEAS - AVA(I,A): 2.5 CM^2
BH CV ECHO MEAS - AVA(I,D): 2.5 CM^2
BH CV ECHO MEAS - AVA(V,A): 2.2 CM^2
BH CV ECHO MEAS - AVA(V,D): 2.2 CM^2
BH CV ECHO MEAS - BSA(HAYCOCK): 1.9 M^2
BH CV ECHO MEAS - BSA: 1.8 M^2
BH CV ECHO MEAS - BZI_BMI: 29.4 KILOGRAMS/M^2
BH CV ECHO MEAS - BZI_METRIC_HEIGHT: 160 CM
BH CV ECHO MEAS - BZI_METRIC_WEIGHT: 75.3 KG
BH CV ECHO MEAS - CONTRAST EF 4CH: 63.2 ML/M^2
BH CV ECHO MEAS - EDV(CUBED): 46.3 ML
BH CV ECHO MEAS - EDV(MOD-SP4): 42.7 ML
BH CV ECHO MEAS - EDV(TEICH): 54.1 ML
BH CV ECHO MEAS - EF(CUBED): 72.3 %
BH CV ECHO MEAS - EF(MOD-SP4): 63.2 %
BH CV ECHO MEAS - EF(TEICH): 65 %
BH CV ECHO MEAS - ESV(CUBED): 12.8 ML
BH CV ECHO MEAS - ESV(MOD-SP4): 15.7 ML
BH CV ECHO MEAS - ESV(TEICH): 18.9 ML
BH CV ECHO MEAS - FS: 34.8 %
BH CV ECHO MEAS - IVS/LVPW: 0.95
BH CV ECHO MEAS - IVSD: 1.1 CM
BH CV ECHO MEAS - LA DIMENSION: 3.6 CM
BH CV ECHO MEAS - LA/AO: 1.3
BH CV ECHO MEAS - LV DIASTOLIC VOL/BSA (35-75): 23.9 ML/M^2
BH CV ECHO MEAS - LV MASS(C)D: 123.6 GRAMS
BH CV ECHO MEAS - LV MASS(C)DI: 69.2 GRAMS/M^2
BH CV ECHO MEAS - LV MAX PG: 3.9 MMHG
BH CV ECHO MEAS - LV MEAN PG: 2 MMHG
BH CV ECHO MEAS - LV SYSTOLIC VOL/BSA (12-30): 8.8 ML/M^2
BH CV ECHO MEAS - LV V1 MAX: 99 CM/SEC
BH CV ECHO MEAS - LV V1 MEAN: 72.2 CM/SEC
BH CV ECHO MEAS - LV V1 VTI: 23.1 CM
BH CV ECHO MEAS - LVIDD: 3.6 CM
BH CV ECHO MEAS - LVIDS: 2.3 CM
BH CV ECHO MEAS - LVLD AP4: 6.5 CM
BH CV ECHO MEAS - LVLS AP4: 5.8 CM
BH CV ECHO MEAS - LVOT AREA (M): 3.1 CM^2
BH CV ECHO MEAS - LVOT AREA: 3.1 CM^2
BH CV ECHO MEAS - LVOT DIAM: 2 CM
BH CV ECHO MEAS - LVPWD: 1.1 CM
BH CV ECHO MEAS - MV A MAX VEL: 86.8 CM/SEC
BH CV ECHO MEAS - MV DEC TIME: 0.22 SEC
BH CV ECHO MEAS - MV E MAX VEL: 66.9 CM/SEC
BH CV ECHO MEAS - MV E/A: 0.77
BH CV ECHO MEAS - PA MAX PG: 2.1 MMHG
BH CV ECHO MEAS - PA V2 MAX: 71.8 CM/SEC
BH CV ECHO MEAS - SI(AO): 93.6 ML/M^2
BH CV ECHO MEAS - SI(CUBED): 18.7 ML/M^2
BH CV ECHO MEAS - SI(LVOT): 40.6 ML/M^2
BH CV ECHO MEAS - SI(MOD-SP4): 15.1 ML/M^2
BH CV ECHO MEAS - SI(TEICH): 19.7 ML/M^2
BH CV ECHO MEAS - SV(AO): 167.2 ML
BH CV ECHO MEAS - SV(CUBED): 33.5 ML
BH CV ECHO MEAS - SV(LVOT): 72.6 ML
BH CV ECHO MEAS - SV(MOD-SP4): 27 ML
BH CV ECHO MEAS - SV(TEICH): 35.1 ML
MAXIMAL PREDICTED HEART RATE: 168 BPM
STRESS TARGET HR: 143 BPM

## 2017-12-12 PROCEDURE — 93306 TTE W/DOPPLER COMPLETE: CPT | Performed by: INTERNAL MEDICINE

## 2017-12-12 PROCEDURE — 93306 TTE W/DOPPLER COMPLETE: CPT

## 2017-12-12 PROCEDURE — 0399T HC MYOCARDL STRAIN IMAG QUAN ASSMT PER SESS: CPT

## 2017-12-12 PROCEDURE — 0399T ADULT TRANSTHORACIC ECHO COMPLETE W/ CONT IF NECESSARY PER PROTOCOL: CPT | Performed by: INTERNAL MEDICINE

## 2017-12-27 ENCOUNTER — HOSPITAL ENCOUNTER (OUTPATIENT)
Dept: CT IMAGING | Age: 52
Discharge: HOME | End: 2017-12-27
Payer: COMMERCIAL

## 2017-12-27 ENCOUNTER — HOSPITAL ENCOUNTER (OUTPATIENT)
Dept: NUCLEAR MEDICINE | Age: 52
Discharge: HOME | End: 2017-12-27
Payer: COMMERCIAL

## 2017-12-27 DIAGNOSIS — C50.412 BREAST CANCER OF UPPER-OUTER QUADRANT OF LEFT FEMALE BREAST (HCC): ICD-10-CM

## 2017-12-27 DIAGNOSIS — C50.412 MALIGNANT NEOPLASM OF UPPER-OUTER QUADRANT OF LEFT FEMALE BREAST, UNSPECIFIED ESTROGEN RECEPTOR STATUS (HCC): ICD-10-CM

## 2017-12-27 LAB
GFR NON-AFRICAN AMERICAN: >60
PERFORMED ON: NORMAL
POC CREATININE: 0.8 MG/DL (ref 0.3–1.3)
POC SAMPLE TYPE: NORMAL

## 2017-12-27 PROCEDURE — 78306 BONE IMAGING WHOLE BODY: CPT

## 2017-12-27 PROCEDURE — 82565 ASSAY OF CREATININE: CPT

## 2017-12-27 PROCEDURE — A9561 TC99M OXIDRONATE: HCPCS | Performed by: INTERNAL MEDICINE

## 2017-12-27 PROCEDURE — 6360000004 HC RX CONTRAST MEDICATION: Performed by: INTERNAL MEDICINE

## 2017-12-27 PROCEDURE — 3430000000 HC RX DIAGNOSTIC RADIOPHARMACEUTICAL: Performed by: INTERNAL MEDICINE

## 2017-12-27 PROCEDURE — 74177 CT ABD & PELVIS W/CONTRAST: CPT

## 2017-12-27 PROCEDURE — 71260 CT THORAX DX C+: CPT

## 2017-12-27 RX ADMIN — IOPAMIDOL 90 ML: 755 INJECTION, SOLUTION INTRAVENOUS at 10:01

## 2017-12-27 RX ADMIN — TECHNETIUM TC 99M OXIDRONATE 20 MILLICURIE: 3.15 INJECTION, POWDER, LYOPHILIZED, FOR SOLUTION INTRAVENOUS at 12:31

## 2018-03-14 ENCOUNTER — TELEPHONE (OUTPATIENT)
Dept: FAMILY MEDICINE CLINIC | Facility: CLINIC | Age: 53
End: 2018-03-14

## 2018-03-14 NOTE — TELEPHONE ENCOUNTER
Pt called she is needing a referral to dr mckeon at [facility],she has had breast ca for almost 2yrs now and she has been going thru chemo and she wants a 2nd opinion.. [phone]

## 2018-03-20 ENCOUNTER — OFFICE VISIT (OUTPATIENT)
Dept: FAMILY MEDICINE CLINIC | Facility: CLINIC | Age: 53
End: 2018-03-20

## 2018-03-20 VITALS
RESPIRATION RATE: 16 BRPM | SYSTOLIC BLOOD PRESSURE: 116 MMHG | HEIGHT: 63 IN | BODY MASS INDEX: 29.41 KG/M2 | WEIGHT: 166 LBS | OXYGEN SATURATION: 97 % | HEART RATE: 89 BPM | DIASTOLIC BLOOD PRESSURE: 80 MMHG

## 2018-03-20 DIAGNOSIS — C50.012 MALIGNANT NEOPLASM INVOLVING BOTH NIPPLE AND AREOLA OF LEFT BREAST IN FEMALE, UNSPECIFIED ESTROGEN RECEPTOR STATUS (HCC): Primary | ICD-10-CM

## 2018-03-20 PROCEDURE — 99212 OFFICE O/P EST SF 10 MIN: CPT | Performed by: FAMILY MEDICINE

## 2018-03-20 NOTE — PROGRESS NOTES
Subjective   Ayla Echeverria is a 52 y.o. female.     Chief Complaint   Patient presents with   • Consult     pt req referral to different oncologist       History of Present Illness     Ayla has breast cancer and is looking for a 2nd opinion      Current Outpatient Prescriptions:   •  montelukast (SINGULAIR) 10 MG tablet, Take  by mouth., Disp: , Rfl:   •  Multiple Vitamin (MULTI VITAMIN DAILY PO), Take  by mouth., Disp: , Rfl:   •  omeprazole (priLOSEC) 40 MG capsule, TAKE ONE CAPSULE DAILY, Disp: 30 capsule, Rfl: 5  No Known Allergies    Past Medical History:   Diagnosis Date   • Breast cancer      Past Surgical History:   Procedure Laterality Date   • AXILLARY LYMPH NODE BIOPSY/EXCISION Left    • BREAST BIOPSY Left 03/31/2016   • BREAST LUMPECTOMY WITH SENTINEL NODE BIOPSY Left 10/03/2016    residual invasive carcinoma, grade 3 (0.2cm); margins negative; extensive lymphvascular space invasion; 2 sentinel lymph nodes positive (2/3)       Review of Systems   Constitutional: Negative.    HENT: Negative.    Eyes: Negative.    Respiratory: Negative.    Cardiovascular: Negative.    Endocrine: Negative.    Genitourinary: Negative.    Allergic/Immunologic: Negative.    Neurological: Negative.    Hematological: Negative.    Psychiatric/Behavioral: Negative.        Objective   Physical Exam   Constitutional: She is oriented to person, place, and time. She appears well-developed and well-nourished.   HENT:   Head: Normocephalic.   Eyes: Conjunctivae and EOM are normal. Pupils are equal, round, and reactive to light.   Neck: Normal range of motion. Neck supple.   Cardiovascular: Normal rate and regular rhythm.    Pulmonary/Chest: Effort normal and breath sounds normal.   Abdominal: Soft. Bowel sounds are normal.   Musculoskeletal: Normal range of motion.   Neurological: She is alert and oriented to person, place, and time. She has normal reflexes.   Skin: Skin is warm and dry.   Nursing note and vitals  reviewed.      Assessment/Plan   Ayla was seen today for consult.    Diagnoses and all orders for this visit:    Malignant neoplasm involving both nipple and areola of left breast in female, unspecified estrogen receptor status  -     Ambulatory Referral to Oncology                 No orders of the defined types were placed in this encounter.      Follow up:prn

## 2018-03-29 ENCOUNTER — OFFICE VISIT (OUTPATIENT)
Dept: SURGERY | Age: 53
End: 2018-03-29
Payer: COMMERCIAL

## 2018-03-29 VITALS
RESPIRATION RATE: 18 BRPM | WEIGHT: 168.6 LBS | HEART RATE: 74 BPM | SYSTOLIC BLOOD PRESSURE: 130 MMHG | HEIGHT: 63 IN | DIASTOLIC BLOOD PRESSURE: 70 MMHG | BODY MASS INDEX: 29.88 KG/M2

## 2018-03-29 DIAGNOSIS — C77.3 SECONDARY MALIGNANT NEOPLASM OF AXILLARY LYMPH NODES (HCC): ICD-10-CM

## 2018-03-29 DIAGNOSIS — C50.412 MALIGNANT NEOPLASM OF UPPER-OUTER QUADRANT OF LEFT FEMALE BREAST, UNSPECIFIED ESTROGEN RECEPTOR STATUS (HCC): Primary | ICD-10-CM

## 2018-03-29 PROCEDURE — 99213 OFFICE O/P EST LOW 20 MIN: CPT | Performed by: SURGERY

## 2018-05-04 ENCOUNTER — TELEPHONE (OUTPATIENT)
Dept: FAMILY MEDICINE CLINIC | Facility: CLINIC | Age: 53
End: 2018-05-04

## 2018-05-04 RX ORDER — AZITHROMYCIN 250 MG/1
TABLET, FILM COATED ORAL
Qty: 6 TABLET | Refills: 0 | Status: SHIPPED | OUTPATIENT
Start: 2018-05-04 | End: 2019-03-15

## 2018-05-04 NOTE — TELEPHONE ENCOUNTER
Ayla called & c/o flu like symptoms, cough, sore throat and fever x 5 days.   She req med sent to [facility]  [phone]

## 2018-05-16 ENCOUNTER — OFFICE VISIT (OUTPATIENT)
Dept: SURGERY | Age: 53
End: 2018-05-16
Payer: COMMERCIAL

## 2018-05-16 VITALS
WEIGHT: 166 LBS | DIASTOLIC BLOOD PRESSURE: 68 MMHG | SYSTOLIC BLOOD PRESSURE: 110 MMHG | BODY MASS INDEX: 29.41 KG/M2 | HEIGHT: 63 IN | TEMPERATURE: 97.2 F

## 2018-05-16 DIAGNOSIS — C50.412 MALIGNANT NEOPLASM OF UPPER-OUTER QUADRANT OF LEFT FEMALE BREAST, UNSPECIFIED ESTROGEN RECEPTOR STATUS (HCC): Primary | ICD-10-CM

## 2018-05-16 DIAGNOSIS — C77.3 SECONDARY MALIGNANT NEOPLASM OF AXILLARY LYMPH NODES (HCC): ICD-10-CM

## 2018-05-16 DIAGNOSIS — C50.919 HER2-POSITIVE CARCINOMA OF BREAST (HCC): ICD-10-CM

## 2018-05-16 PROCEDURE — 99214 OFFICE O/P EST MOD 30 MIN: CPT | Performed by: SURGERY

## 2018-05-19 PROBLEM — C50.919 HER2-POSITIVE CARCINOMA OF BREAST (HCC): Status: ACTIVE | Noted: 2018-05-19

## 2018-05-31 DIAGNOSIS — C50.412 MALIGNANT NEOPLASM OF UPPER-OUTER QUADRANT OF LEFT FEMALE BREAST, UNSPECIFIED ESTROGEN RECEPTOR STATUS (HCC): Primary | ICD-10-CM

## 2018-06-07 ENCOUNTER — HOSPITAL ENCOUNTER (EMERGENCY)
Age: 53
Discharge: HOME | End: 2018-06-07
Payer: COMMERCIAL

## 2018-06-07 VITALS
HEIGHT: 62 IN | BODY MASS INDEX: 30.55 KG/M2 | SYSTOLIC BLOOD PRESSURE: 140 MMHG | DIASTOLIC BLOOD PRESSURE: 78 MMHG | RESPIRATION RATE: 18 BRPM | WEIGHT: 166 LBS | OXYGEN SATURATION: 98 % | HEART RATE: 63 BPM

## 2018-06-07 DIAGNOSIS — N30.00 ACUTE CYSTITIS WITHOUT HEMATURIA: ICD-10-CM

## 2018-06-07 DIAGNOSIS — K52.9 GASTROENTERITIS: ICD-10-CM

## 2018-06-07 DIAGNOSIS — D64.9 ANEMIA, UNSPECIFIED TYPE: ICD-10-CM

## 2018-06-07 DIAGNOSIS — R42 LIGHTHEADEDNESS: Primary | ICD-10-CM

## 2018-06-07 LAB
ALBUMIN SERPL-MCNC: 4.1 G/DL (ref 3.5–5.2)
ALP BLD-CCNC: 84 U/L (ref 35–104)
ALT SERPL-CCNC: 20 U/L (ref 5–33)
ANION GAP SERPL CALCULATED.3IONS-SCNC: 12 MMOL/L (ref 7–19)
AST SERPL-CCNC: 25 U/L (ref 5–32)
BACTERIA: ABNORMAL /HPF
BASOPHILS ABSOLUTE: 0 K/UL (ref 0–0.2)
BASOPHILS RELATIVE PERCENT: 0.3 % (ref 0–1)
BILIRUB SERPL-MCNC: <0.2 MG/DL (ref 0.2–1.2)
BILIRUBIN URINE: NEGATIVE
BLOOD, URINE: NEGATIVE
BUN BLDV-MCNC: 10 MG/DL (ref 6–20)
CALCIUM SERPL-MCNC: 8.8 MG/DL (ref 8.6–10)
CHLORIDE BLD-SCNC: 104 MMOL/L (ref 98–111)
CLARITY: ABNORMAL
CO2: 26 MMOL/L (ref 22–29)
COLOR: YELLOW
CREAT SERPL-MCNC: 0.8 MG/DL (ref 0.5–0.9)
EOSINOPHILS ABSOLUTE: 0 K/UL (ref 0–0.6)
EOSINOPHILS RELATIVE PERCENT: 1.3 % (ref 0–5)
EPITHELIAL CELLS, UA: ABNORMAL /HPF
GFR NON-AFRICAN AMERICAN: >60
GLUCOSE BLD-MCNC: 88 MG/DL (ref 74–109)
GLUCOSE URINE: NEGATIVE MG/DL
HCT VFR BLD CALC: 30.5 % (ref 37–47)
HEMOGLOBIN: 8.9 G/DL (ref 12–16)
KETONES, URINE: NEGATIVE MG/DL
LEUKOCYTE ESTERASE, URINE: ABNORMAL
LYMPHOCYTES ABSOLUTE: 1.6 K/UL (ref 1.1–4.5)
LYMPHOCYTES RELATIVE PERCENT: 49.4 % (ref 20–40)
MCH RBC QN AUTO: 23 PG (ref 27–31)
MCHC RBC AUTO-ENTMCNC: 29.2 G/DL (ref 33–37)
MCV RBC AUTO: 78.8 FL (ref 81–99)
MONOCYTES ABSOLUTE: 0.3 K/UL (ref 0–0.9)
MONOCYTES RELATIVE PERCENT: 10.4 % (ref 0–10)
NEUTROPHILS ABSOLUTE: 1.2 K/UL (ref 1.5–7.5)
NEUTROPHILS RELATIVE PERCENT: 38.6 % (ref 50–65)
NITRITE, URINE: NEGATIVE
PDW BLD-RTO: 18.6 % (ref 11.5–14.5)
PH UA: 6.5
PLATELET # BLD: 237 K/UL (ref 130–400)
PMV BLD AUTO: 9.3 FL (ref 9.4–12.3)
POTASSIUM SERPL-SCNC: 3.9 MMOL/L (ref 3.5–5)
PROTEIN UA: NEGATIVE MG/DL
RBC # BLD: 3.87 M/UL (ref 4.2–5.4)
RBC UA: ABNORMAL /HPF (ref 0–2)
SODIUM BLD-SCNC: 142 MMOL/L (ref 136–145)
SPECIFIC GRAVITY UA: 1.01
TOTAL PROTEIN: 8 G/DL (ref 6.6–8.7)
URINE REFLEX TO CULTURE: YES
UROBILINOGEN, URINE: 0.2 E.U./DL
WBC # BLD: 3.2 K/UL (ref 4.8–10.8)
WBC UA: ABNORMAL /HPF (ref 0–5)

## 2018-06-07 PROCEDURE — 87186 SC STD MICRODIL/AGAR DIL: CPT

## 2018-06-07 PROCEDURE — 80053 COMPREHEN METABOLIC PANEL: CPT

## 2018-06-07 PROCEDURE — 87086 URINE CULTURE/COLONY COUNT: CPT

## 2018-06-07 PROCEDURE — 87077 CULTURE AEROBIC IDENTIFY: CPT

## 2018-06-07 PROCEDURE — 85025 COMPLETE CBC W/AUTO DIFF WBC: CPT

## 2018-06-07 PROCEDURE — 2580000003 HC RX 258: Performed by: PHYSICIAN ASSISTANT

## 2018-06-07 PROCEDURE — 99283 EMERGENCY DEPT VISIT LOW MDM: CPT | Performed by: PHYSICIAN ASSISTANT

## 2018-06-07 PROCEDURE — 36415 COLL VENOUS BLD VENIPUNCTURE: CPT

## 2018-06-07 PROCEDURE — 93005 ELECTROCARDIOGRAM TRACING: CPT

## 2018-06-07 PROCEDURE — 81001 URINALYSIS AUTO W/SCOPE: CPT

## 2018-06-07 PROCEDURE — 99284 EMERGENCY DEPT VISIT MOD MDM: CPT

## 2018-06-07 RX ORDER — CEPHALEXIN 500 MG/1
500 CAPSULE ORAL 2 TIMES DAILY
Qty: 20 CAPSULE | Refills: 0 | Status: SHIPPED | OUTPATIENT
Start: 2018-06-07 | End: 2018-06-17

## 2018-06-07 RX ORDER — ONDANSETRON 4 MG/1
4 TABLET, ORALLY DISINTEGRATING ORAL EVERY 8 HOURS PRN
Qty: 15 TABLET | Refills: 0 | Status: SHIPPED | OUTPATIENT
Start: 2018-06-07 | End: 2020-01-10 | Stop reason: SDUPTHER

## 2018-06-07 RX ORDER — 0.9 % SODIUM CHLORIDE 0.9 %
1000 INTRAVENOUS SOLUTION INTRAVENOUS ONCE
Status: COMPLETED | OUTPATIENT
Start: 2018-06-07 | End: 2018-06-07

## 2018-06-07 RX ORDER — ONDANSETRON 2 MG/ML
4 INJECTION INTRAMUSCULAR; INTRAVENOUS ONCE
Status: DISCONTINUED | OUTPATIENT
Start: 2018-06-07 | End: 2018-06-07 | Stop reason: HOSPADM

## 2018-06-07 RX ORDER — LOPERAMIDE HYDROCHLORIDE 2 MG/1
2 CAPSULE ORAL 4 TIMES DAILY PRN
Qty: 15 CAPSULE | Refills: 0 | Status: SHIPPED | OUTPATIENT
Start: 2018-06-07 | End: 2018-06-17

## 2018-06-07 RX ADMIN — SODIUM CHLORIDE 1000 ML: 9 INJECTION, SOLUTION INTRAVENOUS at 11:54

## 2018-06-07 ASSESSMENT — ENCOUNTER SYMPTOMS
DIARRHEA: 1
ABDOMINAL DISTENTION: 0
COUGH: 0
EYE PAIN: 0
SORE THROAT: 0
RHINORRHEA: 0
CONSTIPATION: 0
CHEST TIGHTNESS: 0
NAUSEA: 1
WHEEZING: 0
SHORTNESS OF BREATH: 0
SINUS PRESSURE: 0
APNEA: 0
VOMITING: 1
ABDOMINAL PAIN: 0

## 2018-06-09 LAB
EKG P AXIS: 38 DEGREES
EKG P-R INTERVAL: 172 MS
EKG Q-T INTERVAL: 424 MS
EKG QRS DURATION: 70 MS
EKG QTC CALCULATION (BAZETT): 425 MS
EKG T AXIS: 39 DEGREES

## 2018-06-11 LAB
ORGANISM: ABNORMAL
URINE CULTURE, ROUTINE: ABNORMAL
URINE CULTURE, ROUTINE: ABNORMAL

## 2018-07-10 ENCOUNTER — TRANSCRIBE ORDERS (OUTPATIENT)
Dept: ADMINISTRATIVE | Facility: HOSPITAL | Age: 53
End: 2018-07-10

## 2018-07-10 DIAGNOSIS — C50.412 MALIGNANT NEOPLASM OF UPPER-OUTER QUADRANT OF LEFT FEMALE BREAST, UNSPECIFIED ESTROGEN RECEPTOR STATUS (HCC): Primary | ICD-10-CM

## 2018-07-13 ENCOUNTER — HOSPITAL ENCOUNTER (OUTPATIENT)
Dept: CT IMAGING | Facility: HOSPITAL | Age: 53
Discharge: HOME | End: 2018-07-13
Attending: INTERNAL MEDICINE | Admitting: INTERNAL MEDICINE

## 2018-07-13 DIAGNOSIS — C50.412 MALIGNANT NEOPLASM OF UPPER-OUTER QUADRANT OF LEFT FEMALE BREAST, UNSPECIFIED ESTROGEN RECEPTOR STATUS (HCC): ICD-10-CM

## 2018-07-13 LAB — CREAT BLDA-MCNC: 0.8 MG/DL (ref 0.6–1.3)

## 2018-07-13 PROCEDURE — 74177 CT ABD & PELVIS W/CONTRAST: CPT

## 2018-07-13 PROCEDURE — 0 IOHEXOL 300 MG/ML SOLUTION: Performed by: INTERNAL MEDICINE

## 2018-07-13 PROCEDURE — 71260 CT THORAX DX C+: CPT

## 2018-07-13 PROCEDURE — 25010000002 IOPAMIDOL 61 % SOLUTION: Performed by: INTERNAL MEDICINE

## 2018-07-13 PROCEDURE — 82565 ASSAY OF CREATININE: CPT

## 2018-07-13 RX ADMIN — IOHEXOL 50 ML: 300 INJECTION, SOLUTION INTRAVENOUS at 10:15

## 2018-07-13 RX ADMIN — IOPAMIDOL 100 ML: 612 INJECTION, SOLUTION INTRAVENOUS at 10:15

## 2018-07-16 ENCOUNTER — APPOINTMENT (OUTPATIENT)
Dept: CT IMAGING | Facility: HOSPITAL | Age: 53
End: 2018-07-16
Attending: INTERNAL MEDICINE

## 2018-07-16 ENCOUNTER — HOSPITAL ENCOUNTER (OUTPATIENT)
Dept: CT IMAGING | Facility: HOSPITAL | Age: 53
Discharge: HOME | End: 2018-07-16
Attending: INTERNAL MEDICINE | Admitting: INTERNAL MEDICINE

## 2018-07-16 ENCOUNTER — HOSPITAL ENCOUNTER (OUTPATIENT)
Dept: CT IMAGING | Facility: HOSPITAL | Age: 53
End: 2018-07-16
Attending: INTERNAL MEDICINE

## 2018-07-16 DIAGNOSIS — C50.412 MALIGNANT NEOPLASM OF UPPER-OUTER QUADRANT OF LEFT FEMALE BREAST, UNSPECIFIED ESTROGEN RECEPTOR STATUS (HCC): ICD-10-CM

## 2018-07-16 PROCEDURE — 78815 PET IMAGE W/CT SKULL-THIGH: CPT

## 2018-07-16 PROCEDURE — A9552 F18 FDG: HCPCS | Performed by: INTERNAL MEDICINE

## 2018-07-16 PROCEDURE — 0 FLUDEOXYGLUCOSE F18 SOLUTION: Performed by: INTERNAL MEDICINE

## 2018-07-16 RX ADMIN — FLUDEOXYGLUCOSE F18 1 DOSE: 300 INJECTION INTRAVENOUS at 10:30

## 2018-07-26 ENCOUNTER — HOSPITAL ENCOUNTER (OUTPATIENT)
Dept: HOSPITAL 58 - LAB | Age: 53
End: 2018-07-26

## 2018-07-26 DIAGNOSIS — Z02.89: Primary | ICD-10-CM

## 2018-07-26 PROCEDURE — 80061 LIPID PANEL: CPT

## 2018-07-26 PROCEDURE — 36415 COLL VENOUS BLD VENIPUNCTURE: CPT

## 2018-07-26 PROCEDURE — 85008 BL SMEAR W/O DIFF WBC COUNT: CPT

## 2018-07-26 PROCEDURE — 80053 COMPREHEN METABOLIC PANEL: CPT

## 2018-07-26 PROCEDURE — 84550 ASSAY OF BLOOD/URIC ACID: CPT

## 2018-07-26 PROCEDURE — 86140 C-REACTIVE PROTEIN: CPT

## 2018-07-26 PROCEDURE — 85025 COMPLETE CBC W/AUTO DIFF WBC: CPT

## 2018-07-26 PROCEDURE — 84100 ASSAY OF PHOSPHORUS: CPT

## 2018-07-26 PROCEDURE — 83540 ASSAY OF IRON: CPT

## 2018-07-26 PROCEDURE — 83036 HEMOGLOBIN GLYCOSYLATED A1C: CPT

## 2018-10-09 ENCOUNTER — APPOINTMENT (OUTPATIENT)
Dept: GENERAL RADIOLOGY | Facility: HOSPITAL | Age: 53
End: 2018-10-09

## 2018-10-09 ENCOUNTER — HOSPITAL ENCOUNTER (EMERGENCY)
Facility: HOSPITAL | Age: 53
Discharge: HOME | End: 2018-10-09
Attending: EMERGENCY MEDICINE | Admitting: EMERGENCY MEDICINE

## 2018-10-09 ENCOUNTER — TELEPHONE (OUTPATIENT)
Dept: FAMILY MEDICINE CLINIC | Facility: CLINIC | Age: 53
End: 2018-10-09

## 2018-10-09 VITALS
BODY MASS INDEX: 27.29 KG/M2 | WEIGHT: 154 LBS | DIASTOLIC BLOOD PRESSURE: 75 MMHG | SYSTOLIC BLOOD PRESSURE: 131 MMHG | HEART RATE: 75 BPM | RESPIRATION RATE: 14 BRPM | OXYGEN SATURATION: 96 % | HEIGHT: 63 IN | TEMPERATURE: 98.6 F

## 2018-10-09 DIAGNOSIS — B34.9 VIRAL SYNDROME: Primary | ICD-10-CM

## 2018-10-09 LAB
ALBUMIN SERPL-MCNC: 4.8 G/DL (ref 3.5–5)
ALBUMIN/GLOB SERPL: 0.9 G/DL (ref 1.1–2.5)
ALP SERPL-CCNC: 97 U/L (ref 24–120)
ALT SERPL W P-5'-P-CCNC: 38 U/L (ref 0–54)
ANION GAP SERPL CALCULATED.3IONS-SCNC: 11 MMOL/L (ref 4–13)
AST SERPL-CCNC: 79 U/L (ref 7–45)
BACTERIA UR QL AUTO: ABNORMAL /HPF
BASOPHILS # BLD AUTO: 0.03 10*3/MM3 (ref 0–0.2)
BASOPHILS NFR BLD AUTO: 0.2 % (ref 0–2)
BILIRUB SERPL-MCNC: 0.9 MG/DL (ref 0.1–1)
BILIRUB UR QL STRIP: NEGATIVE
BUN BLD-MCNC: 6 MG/DL (ref 5–21)
BUN/CREAT SERPL: 7.3 (ref 7–25)
CALCIUM SPEC-SCNC: 9.5 MG/DL (ref 8.4–10.4)
CHLORIDE SERPL-SCNC: 98 MMOL/L (ref 98–110)
CLARITY UR: CLEAR
CO2 SERPL-SCNC: 27 MMOL/L (ref 24–31)
COLOR UR: YELLOW
CREAT BLD-MCNC: 0.82 MG/DL (ref 0.5–1.4)
D-LACTATE SERPL-SCNC: 1.3 MMOL/L (ref 0.5–2)
DEPRECATED RDW RBC AUTO: 52 FL (ref 40–54)
EOSINOPHIL # BLD AUTO: 0 10*3/MM3 (ref 0–0.7)
EOSINOPHIL NFR BLD AUTO: 0 % (ref 0–4)
ERYTHROCYTE [DISTWIDTH] IN BLOOD BY AUTOMATED COUNT: 19.9 % (ref 12–15)
FLUAV AG NPH QL: NEGATIVE
FLUBV AG NPH QL IA: NEGATIVE
GFR SERPL CREATININE-BSD FRML MDRD: 89 ML/MIN/1.73
GLOBULIN UR ELPH-MCNC: 5.3 GM/DL
GLUCOSE BLD-MCNC: 106 MG/DL (ref 70–100)
GLUCOSE UR STRIP-MCNC: NEGATIVE MG/DL
HCT VFR BLD AUTO: 28.3 % (ref 37–47)
HGB BLD-MCNC: 8.8 G/DL (ref 12–16)
HGB UR QL STRIP.AUTO: ABNORMAL
HOLD SPECIMEN: NORMAL
HOLD SPECIMEN: NORMAL
HYALINE CASTS UR QL AUTO: ABNORMAL /LPF
IMM GRANULOCYTES # BLD: 0.1 10*3/MM3 (ref 0–0.03)
IMM GRANULOCYTES NFR BLD: 0.7 % (ref 0–5)
KETONES UR QL STRIP: ABNORMAL
LEUKOCYTE ESTERASE UR QL STRIP.AUTO: NEGATIVE
LYMPHOCYTES # BLD AUTO: 1.64 10*3/MM3 (ref 0.72–4.86)
LYMPHOCYTES NFR BLD AUTO: 11.7 % (ref 15–45)
MCH RBC QN AUTO: 23.1 PG (ref 28–32)
MCHC RBC AUTO-ENTMCNC: 31.1 G/DL (ref 33–36)
MCV RBC AUTO: 74.3 FL (ref 82–98)
MONOCYTES # BLD AUTO: 1.34 10*3/MM3 (ref 0.19–1.3)
MONOCYTES NFR BLD AUTO: 9.6 % (ref 4–12)
NEUTROPHILS # BLD AUTO: 10.86 10*3/MM3 (ref 1.87–8.4)
NEUTROPHILS NFR BLD AUTO: 77.8 % (ref 39–78)
NITRITE UR QL STRIP: NEGATIVE
NRBC BLD MANUAL-RTO: 0 /100 WBC (ref 0–0)
PH UR STRIP.AUTO: 6 [PH] (ref 5–8)
PLATELET # BLD AUTO: 298 10*3/MM3 (ref 130–400)
PMV BLD AUTO: 10.7 FL (ref 6–12)
POTASSIUM BLD-SCNC: 3.4 MMOL/L (ref 3.5–5.3)
PROT SERPL-MCNC: 10.1 G/DL (ref 6.3–8.7)
PROT UR QL STRIP: ABNORMAL
RBC # BLD AUTO: 3.81 10*6/MM3 (ref 4.2–5.4)
RBC # UR: ABNORMAL /HPF
REF LAB TEST METHOD: ABNORMAL
SODIUM BLD-SCNC: 136 MMOL/L (ref 135–145)
SP GR UR STRIP: 1.02 (ref 1–1.03)
SQUAMOUS #/AREA URNS HPF: ABNORMAL /HPF
UROBILINOGEN UR QL STRIP: ABNORMAL
WBC NRBC COR # BLD: 13.97 10*3/MM3 (ref 4.8–10.8)
WBC UR QL AUTO: ABNORMAL /HPF
WHOLE BLOOD HOLD SPECIMEN: NORMAL
WHOLE BLOOD HOLD SPECIMEN: NORMAL

## 2018-10-09 PROCEDURE — 93005 ELECTROCARDIOGRAM TRACING: CPT | Performed by: EMERGENCY MEDICINE

## 2018-10-09 PROCEDURE — 83605 ASSAY OF LACTIC ACID: CPT | Performed by: EMERGENCY MEDICINE

## 2018-10-09 PROCEDURE — 87804 INFLUENZA ASSAY W/OPTIC: CPT | Performed by: EMERGENCY MEDICINE

## 2018-10-09 PROCEDURE — 87040 BLOOD CULTURE FOR BACTERIA: CPT | Performed by: EMERGENCY MEDICINE

## 2018-10-09 PROCEDURE — 71046 X-RAY EXAM CHEST 2 VIEWS: CPT

## 2018-10-09 PROCEDURE — 81001 URINALYSIS AUTO W/SCOPE: CPT | Performed by: EMERGENCY MEDICINE

## 2018-10-09 PROCEDURE — 93010 ELECTROCARDIOGRAM REPORT: CPT | Performed by: INTERNAL MEDICINE

## 2018-10-09 PROCEDURE — 85025 COMPLETE CBC W/AUTO DIFF WBC: CPT | Performed by: EMERGENCY MEDICINE

## 2018-10-09 PROCEDURE — 99284 EMERGENCY DEPT VISIT MOD MDM: CPT

## 2018-10-09 PROCEDURE — 80053 COMPREHEN METABOLIC PANEL: CPT | Performed by: EMERGENCY MEDICINE

## 2018-10-09 RX ORDER — ACETAMINOPHEN 500 MG
1000 TABLET ORAL ONCE
Status: COMPLETED | OUTPATIENT
Start: 2018-10-09 | End: 2018-10-09

## 2018-10-09 RX ORDER — SODIUM CHLORIDE 0.9 % (FLUSH) 0.9 %
10 SYRINGE (ML) INJECTION AS NEEDED
Status: DISCONTINUED | OUTPATIENT
Start: 2018-10-09 | End: 2018-10-09 | Stop reason: HOSPADM

## 2018-10-09 RX ADMIN — SODIUM CHLORIDE 1000 ML: 9 INJECTION, SOLUTION INTRAVENOUS at 12:46

## 2018-10-09 RX ADMIN — ACETAMINOPHEN 1000 MG: 500 TABLET, FILM COATED ORAL at 12:45

## 2018-10-09 NOTE — TELEPHONE ENCOUNTER
Pt called said that since Sunday she has had sever headache fever nausea she called her oncologist he stated to let u know to see how u wanted to handle this she asked for meds or appt she thinks that she has the flu [phone]

## 2018-10-09 NOTE — DISCHARGE INSTRUCTIONS
Does not appear they have a bacterial infection at this time, this can change.  It is very important that he follow-up with her doctor the next 24-48 hours.  Return to the emergency department for any chest pain, trouble breathing, persistent nausea or vomiting, diarrhea, rash, fainting, or any other concerning symptoms.

## 2018-10-09 NOTE — TELEPHONE ENCOUNTER
Let steff know with her history she will need flu swabs and labs---I hate to do this but I think she needs to go to the er so she can get results right away--I hate to send her to the ER but dont know any way else to handle this as we need the results in a timely manner--can she go to the [facility] walk in ?

## 2018-10-14 LAB
BACTERIA SPEC AEROBE CULT: NORMAL
BACTERIA SPEC AEROBE CULT: NORMAL

## 2018-10-26 ENCOUNTER — HOSPITAL ENCOUNTER (OUTPATIENT)
Dept: CT IMAGING | Age: 53
Discharge: HOME | End: 2018-10-26
Payer: COMMERCIAL

## 2018-10-26 DIAGNOSIS — C50.412 MALIGNANT NEOPLASM OF UPPER-OUTER QUADRANT OF LEFT FEMALE BREAST, UNSPECIFIED ESTROGEN RECEPTOR STATUS (HCC): ICD-10-CM

## 2018-10-26 PROCEDURE — 71260 CT THORAX DX C+: CPT

## 2018-10-26 PROCEDURE — 6360000004 HC RX CONTRAST MEDICATION: Performed by: INTERNAL MEDICINE

## 2018-10-26 PROCEDURE — 74177 CT ABD & PELVIS W/CONTRAST: CPT

## 2018-10-26 RX ADMIN — IOPAMIDOL 75 ML: 755 INJECTION, SOLUTION INTRAVENOUS at 10:02

## 2018-11-02 ENCOUNTER — HOSPITAL ENCOUNTER (OUTPATIENT)
Age: 53
Discharge: HOME | End: 2018-11-02
Attending: INTERNAL MEDICINE | Admitting: INTERNAL MEDICINE
Payer: COMMERCIAL

## 2018-11-02 VITALS
HEART RATE: 83 BPM | TEMPERATURE: 99.8 F | RESPIRATION RATE: 6 BRPM | OXYGEN SATURATION: 100 % | SYSTOLIC BLOOD PRESSURE: 130 MMHG | DIASTOLIC BLOOD PRESSURE: 73 MMHG

## 2018-11-02 LAB
ABO/RH: NORMAL
ANTIBODY SCREEN: NORMAL
BLOOD BANK DISPENSE STATUS: NORMAL
BLOOD BANK PRODUCT CODE: NORMAL
BPU ID: NORMAL
DESCRIPTION BLOOD BANK: NORMAL
HCT VFR BLD CALC: 25.4 % (ref 37–47)
HEMOGLOBIN: 7.6 G/DL (ref 12–16)
MCH RBC QN AUTO: 23.2 PG (ref 27–31)
MCHC RBC AUTO-ENTMCNC: 29.9 G/DL (ref 33–37)
MCV RBC AUTO: 77.4 FL (ref 81–99)
PDW BLD-RTO: 19.5 % (ref 11.5–14.5)
PLATELET # BLD: 209 K/UL (ref 130–400)
PMV BLD AUTO: 9.5 FL (ref 9.4–12.3)
RBC # BLD: 3.28 M/UL (ref 4.2–5.4)
WBC # BLD: 4.7 K/UL (ref 4.8–10.8)

## 2018-11-02 PROCEDURE — 86900 BLOOD TYPING SEROLOGIC ABO: CPT

## 2018-11-02 PROCEDURE — 6360000002 HC RX W HCPCS: Performed by: INTERNAL MEDICINE

## 2018-11-02 PROCEDURE — 86850 RBC ANTIBODY SCREEN: CPT

## 2018-11-02 PROCEDURE — 36430 TRANSFUSION BLD/BLD COMPNT: CPT

## 2018-11-02 PROCEDURE — 86901 BLOOD TYPING SEROLOGIC RH(D): CPT

## 2018-11-02 PROCEDURE — P9016 RBC LEUKOCYTES REDUCED: HCPCS

## 2018-11-02 PROCEDURE — 85027 COMPLETE CBC AUTOMATED: CPT

## 2018-11-02 RX ORDER — 0.9 % SODIUM CHLORIDE 0.9 %
250 INTRAVENOUS SOLUTION INTRAVENOUS ONCE
Status: DISCONTINUED | OUTPATIENT
Start: 2018-11-02 | End: 2018-11-03 | Stop reason: HOSPADM

## 2018-11-02 RX ADMIN — SODIUM CHLORIDE, PRESERVATIVE FREE 300 UNITS: 5 INJECTION INTRAVENOUS at 22:18

## 2018-11-13 ENCOUNTER — ANESTHESIA EVENT (OUTPATIENT)
Dept: OPERATING ROOM | Age: 53
End: 2018-11-13

## 2018-11-14 ENCOUNTER — ANESTHESIA (OUTPATIENT)
Dept: OPERATING ROOM | Age: 53
End: 2018-11-14

## 2018-11-14 ENCOUNTER — HOSPITAL ENCOUNTER (OUTPATIENT)
Age: 53
Setting detail: OUTPATIENT SURGERY
Discharge: HOME | End: 2018-11-14
Attending: INTERNAL MEDICINE | Admitting: INTERNAL MEDICINE

## 2018-11-14 ENCOUNTER — OFFICE VISIT (OUTPATIENT)
Dept: OTOLARYNGOLOGY | Age: 53
End: 2018-11-14
Payer: COMMERCIAL

## 2018-11-14 VITALS
DIASTOLIC BLOOD PRESSURE: 64 MMHG | TEMPERATURE: 98.1 F | WEIGHT: 150 LBS | HEIGHT: 63 IN | SYSTOLIC BLOOD PRESSURE: 100 MMHG | HEART RATE: 84 BPM | OXYGEN SATURATION: 98 % | BODY MASS INDEX: 26.58 KG/M2 | RESPIRATION RATE: 16 BRPM

## 2018-11-14 VITALS — OXYGEN SATURATION: 98 % | SYSTOLIC BLOOD PRESSURE: 84 MMHG | DIASTOLIC BLOOD PRESSURE: 40 MMHG

## 2018-11-14 VITALS
SYSTOLIC BLOOD PRESSURE: 114 MMHG | TEMPERATURE: 98.5 F | HEART RATE: 84 BPM | OXYGEN SATURATION: 96 % | RESPIRATION RATE: 18 BRPM | DIASTOLIC BLOOD PRESSURE: 78 MMHG

## 2018-11-14 DIAGNOSIS — R04.0 LEFT-SIDED EPISTAXIS: Primary | ICD-10-CM

## 2018-11-14 PROCEDURE — G8907 PT DOC NO EVENTS ON DISCHARG: HCPCS

## 2018-11-14 PROCEDURE — 30901 CONTROL OF NOSEBLEED: CPT | Performed by: OTOLARYNGOLOGY

## 2018-11-14 PROCEDURE — 38221 DX BONE MARROW BIOPSIES: CPT

## 2018-11-14 PROCEDURE — 99203 OFFICE O/P NEW LOW 30 MIN: CPT | Performed by: OTOLARYNGOLOGY

## 2018-11-14 PROCEDURE — G8918 PT W/O PREOP ORDER IV AB PRO: HCPCS

## 2018-11-14 RX ORDER — METOCLOPRAMIDE HYDROCHLORIDE 5 MG/ML
10 INJECTION INTRAMUSCULAR; INTRAVENOUS
Status: DISCONTINUED | OUTPATIENT
Start: 2018-11-14 | End: 2018-11-14 | Stop reason: HOSPADM

## 2018-11-14 RX ORDER — LABETALOL HYDROCHLORIDE 5 MG/ML
5 INJECTION, SOLUTION INTRAVENOUS EVERY 10 MIN PRN
Status: DISCONTINUED | OUTPATIENT
Start: 2018-11-14 | End: 2018-11-14 | Stop reason: HOSPADM

## 2018-11-14 RX ORDER — MEPERIDINE HYDROCHLORIDE 25 MG/ML
12.5 INJECTION INTRAMUSCULAR; INTRAVENOUS; SUBCUTANEOUS EVERY 5 MIN PRN
Status: DISCONTINUED | OUTPATIENT
Start: 2018-11-14 | End: 2018-11-14 | Stop reason: HOSPADM

## 2018-11-14 RX ORDER — MORPHINE SULFATE 10 MG/ML
4 INJECTION, SOLUTION INTRAMUSCULAR; INTRAVENOUS EVERY 5 MIN PRN
Status: DISCONTINUED | OUTPATIENT
Start: 2018-11-14 | End: 2018-11-14 | Stop reason: HOSPADM

## 2018-11-14 RX ORDER — HYDROMORPHONE HCL 110MG/55ML
0.5 PATIENT CONTROLLED ANALGESIA SYRINGE INTRAVENOUS EVERY 5 MIN PRN
Status: DISCONTINUED | OUTPATIENT
Start: 2018-11-14 | End: 2018-11-14 | Stop reason: HOSPADM

## 2018-11-14 RX ORDER — LIDOCAINE HYDROCHLORIDE 10 MG/ML
INJECTION, SOLUTION INFILTRATION; PERINEURAL PRN
Status: DISCONTINUED | OUTPATIENT
Start: 2018-11-14 | End: 2018-11-14 | Stop reason: SDUPTHER

## 2018-11-14 RX ORDER — HYDROMORPHONE HCL 110MG/55ML
0.25 PATIENT CONTROLLED ANALGESIA SYRINGE INTRAVENOUS EVERY 5 MIN PRN
Status: DISCONTINUED | OUTPATIENT
Start: 2018-11-14 | End: 2018-11-14 | Stop reason: HOSPADM

## 2018-11-14 RX ORDER — PROPOFOL 10 MG/ML
INJECTION, EMULSION INTRAVENOUS PRN
Status: DISCONTINUED | OUTPATIENT
Start: 2018-11-14 | End: 2018-11-14 | Stop reason: SDUPTHER

## 2018-11-14 RX ORDER — DIPHENHYDRAMINE HYDROCHLORIDE 50 MG/ML
12.5 INJECTION INTRAMUSCULAR; INTRAVENOUS
Status: DISCONTINUED | OUTPATIENT
Start: 2018-11-14 | End: 2018-11-14 | Stop reason: HOSPADM

## 2018-11-14 RX ORDER — HYDRALAZINE HYDROCHLORIDE 20 MG/ML
5 INJECTION INTRAMUSCULAR; INTRAVENOUS EVERY 10 MIN PRN
Status: DISCONTINUED | OUTPATIENT
Start: 2018-11-14 | End: 2018-11-14 | Stop reason: HOSPADM

## 2018-11-14 RX ORDER — ENALAPRILAT 2.5 MG/2ML
1.25 INJECTION INTRAVENOUS
Status: DISCONTINUED | OUTPATIENT
Start: 2018-11-14 | End: 2018-11-14 | Stop reason: HOSPADM

## 2018-11-14 RX ORDER — PROMETHAZINE HYDROCHLORIDE 25 MG/ML
6.25 INJECTION, SOLUTION INTRAMUSCULAR; INTRAVENOUS
Status: DISCONTINUED | OUTPATIENT
Start: 2018-11-14 | End: 2018-11-14 | Stop reason: HOSPADM

## 2018-11-14 RX ORDER — SODIUM CHLORIDE, SODIUM LACTATE, POTASSIUM CHLORIDE, CALCIUM CHLORIDE 600; 310; 30; 20 MG/100ML; MG/100ML; MG/100ML; MG/100ML
INJECTION, SOLUTION INTRAVENOUS CONTINUOUS
Status: DISCONTINUED | OUTPATIENT
Start: 2018-11-14 | End: 2018-11-14 | Stop reason: HOSPADM

## 2018-11-14 RX ORDER — MORPHINE SULFATE 10 MG/ML
2 INJECTION, SOLUTION INTRAMUSCULAR; INTRAVENOUS EVERY 5 MIN PRN
Status: DISCONTINUED | OUTPATIENT
Start: 2018-11-14 | End: 2018-11-14 | Stop reason: HOSPADM

## 2018-11-14 RX ORDER — LIDOCAINE HYDROCHLORIDE 10 MG/ML
1 INJECTION, SOLUTION EPIDURAL; INFILTRATION; INTRACAUDAL; PERINEURAL
Status: DISCONTINUED | OUTPATIENT
Start: 2018-11-14 | End: 2018-11-14 | Stop reason: HOSPADM

## 2018-11-14 RX ADMIN — PROPOFOL 150 MG: 10 INJECTION, EMULSION INTRAVENOUS at 10:38

## 2018-11-14 RX ADMIN — SODIUM CHLORIDE, SODIUM LACTATE, POTASSIUM CHLORIDE, CALCIUM CHLORIDE: 600; 310; 30; 20 INJECTION, SOLUTION INTRAVENOUS at 10:35

## 2018-11-14 RX ADMIN — LIDOCAINE HYDROCHLORIDE 5 ML: 10 INJECTION, SOLUTION INFILTRATION; PERINEURAL at 10:38

## 2018-11-14 NOTE — PROGRESS NOTES
(1.6 m)   Wt 150 lb (68 kg)   SpO2 98%   BMI 26.57 kg/m²     Assessment:      Diagnosis Orders   1. Left-sided epistaxis           Plan:    Impression/plan: epistaxis with cauterization left controlled. RTO if refractory      I, Deya Cutler am scribing for and in the presence of Dr. Shailesh Palomo November 14, 2018/8:31 AM/Berna Palomo. Juanita Tellez MD,  I personally performed the services described in this documentation as scribed by Deya Cutler in my presence and it appears accurate and complete.

## 2018-11-14 NOTE — ANESTHESIA PRE PROCEDURE
Department of Anesthesiology  Preprocedure Note       Name:  Navya Oglesby   Age:  46 y.o.  :  1965                                          MRN:  [id number]         Date:  2018      Surgeon: Pk Smith): Mikal Fung MD    Procedure: BONE MARROW ASPIRATION BIOPSY (N/A )    Medications prior to admission:   Prior to Admission medications    Medication Sig Start Date End Date Taking?  Authorizing Provider   ondansetron (ZOFRAN ODT) 4 MG disintegrating tablet Take 1 tablet by mouth every 8 hours as needed for Nausea or Vomiting 18   SHAINA Alonzo   Ferrous Sulfate (IRON) 325 (65 FE) MG TABS Take by mouth    Historical Provider, MD   omeprazole (PRILOSEC) 40 MG capsule TK 1 C PO QD 16   Historical Provider, MD   cimetidine (TAGAMET) 400 MG tablet Take 400 mg by mouth 2 times daily    Historical Provider, MD   Multiple Vitamin (MULTI VITAMIN DAILY PO) Take by mouth    Historical Provider, MD   Aspirin-Acetaminophen-Caffeine (EXCEDRIN PO) Take by mouth    Historical Provider, MD       Current medications:    Current Facility-Administered Medications   Medication Dose Route Frequency Provider Last Rate Last Dose    lactated ringers infusion   Intravenous Continuous Aquiles Aldridge, APRN - CRNA        lidocaine PF 1 % injection 1 mL  1 mL Intradermal Once PRN Aquiles Aldridge APRN - CRNA        HYDROmorphone (DILAUDID) injection 0.25 mg  0.25 mg Intravenous Q5 Min PRN Sheridan Malling, APRN - CRNA        HYDROmorphone (DILAUDID) injection 0.5 mg  0.5 mg Intravenous Q5 Min PRN Sheridan Malling, APRN - CRNA        morphine injection 2 mg  2 mg Intravenous Q5 Min PRN Evans Malling, APRN - CRNA        morphine injection 4 mg  4 mg Intravenous Q5 Min PRN Evans Malling, APRN - CRNA        diphenhydrAMINE (BENADRYL) injection 12.5 mg  12.5 mg Intravenous Once PRN Sheridan Malling, APRN - CRNA        promethazine [facility]) injection 6.25 mg  6.25 mg Intravenous Once PRN Marvel Gaona liquid consumption: 2300                        Time of last solid consumption: 2300                        Date of last liquid consumption: 11/13/18                        Date of last solid food consumption: 11/13/18    BMI:   Wt Readings from Last 3 Encounters:   11/14/18 150 lb (68 kg)   06/07/18 166 lb (75.3 kg)   05/16/18 166 lb (75.3 kg)     There is no height or weight on file to calculate BMI.    CBC:   Lab Results   Component Value Date    WBC 4.7 11/02/2018    RBC 3.28 11/02/2018    HGB 7.6 11/02/2018    HCT 25.4 11/02/2018    MCV 77.4 11/02/2018    RDW 19.5 11/02/2018     11/02/2018       CMP:   Lab Results   Component Value Date     06/07/2018    K 3.9 06/07/2018     06/07/2018    CO2 26 06/07/2018    BUN 10 06/07/2018    CREATININE 0.8 06/07/2018    LABGLOM >60 06/07/2018    GLUCOSE 88 06/07/2018    PROT 8.0 06/07/2018    CALCIUM 8.8 06/07/2018    BILITOT <0.2 06/07/2018    ALKPHOS 84 06/07/2018    AST 25 06/07/2018    ALT 20 06/07/2018       POC Tests: No results for input(s): POCGLU, POCNA, POCK, POCCL, POCBUN, POCHEMO, POCHCT in the last 72 hours. Coags: No results found for: PROTIME, INR, APTT    HCG (If Applicable):   Lab Results   Component Value Date    PREGTESTUR Negative 10/04/2016        ABGs: No results found for: PHART, PO2ART, YHP7PVI, ORM2WIO, BEART, E5EJQPBM     Type & Screen (If Applicable):  No results found for: [facility]    Anesthesia Evaluation  Patient summary reviewed  Airway: Mallampati: II  TM distance: >3 FB     Mouth opening: > = 3 FB Dental: normal exam         Pulmonary:Negative Pulmonary ROS and normal exam                               Cardiovascular:Negative CV ROS             Beta Blocker:  Not on Beta Blocker         Neuro/Psych:   Negative Neuro/Psych ROS              GI/Hepatic/Renal:   (+) GERD: no interval change,           Endo/Other:    (+) malignancy/cancer.                   ROS comment: History breast CA Abdominal:           Vascular:

## 2018-11-14 NOTE — PROCEDURES
Procedure Note: Bone Marrow Aspirate and Biopsy performed at [facility] with MAC    Indication:  Plasma cell disease, breast cancer    Informed consent was signed by the patient. She was placed in the left lateral decubitus position. The patient was prepped and draped in sterile fashion. Lidocaine 1% was used for local anesthetic of the skin and periosteum. A bone marrow aspirate was obtained and sent for surgical pathology review and flow cytometry analysis. A bone marrow biopsy was then obtained on sent for surgical pathology review. The total blood loss was less than  2 mL. The skin was cleaned and a sterile bandage was placed on the entrance site. The patient tolerated the procedure without complication.

## 2018-12-10 ENCOUNTER — HOSPITAL ENCOUNTER (OUTPATIENT)
Dept: WOMENS IMAGING | Age: 53
Discharge: HOME | End: 2018-12-10
Payer: COMMERCIAL

## 2018-12-10 ENCOUNTER — TELEPHONE (OUTPATIENT)
Dept: WOMENS IMAGING | Age: 53
End: 2018-12-10

## 2018-12-10 DIAGNOSIS — N63.10 BREAST MASS, RIGHT: ICD-10-CM

## 2018-12-10 DIAGNOSIS — Z85.3 PERSONAL HISTORY OF BREAST CANCER: ICD-10-CM

## 2018-12-10 PROCEDURE — G0279 TOMOSYNTHESIS, MAMMO: HCPCS

## 2018-12-10 PROCEDURE — 76642 ULTRASOUND BREAST LIMITED: CPT

## 2018-12-17 ENCOUNTER — HOSPITAL ENCOUNTER (OUTPATIENT)
Dept: WOMENS IMAGING | Age: 53
Discharge: HOME | End: 2018-12-17
Payer: COMMERCIAL

## 2018-12-17 DIAGNOSIS — N63.15 BREAST LUMP ON RIGHT SIDE AT 12 O'CLOCK POSITION: ICD-10-CM

## 2018-12-17 DIAGNOSIS — R22.31 AXILLARY MASS, RIGHT: ICD-10-CM

## 2018-12-17 PROCEDURE — 88173 CYTOPATH EVAL FNA REPORT: CPT

## 2018-12-17 PROCEDURE — 2709999900 US BREAST BIOPSY W LOC DEVICE 1ST LESION RIGHT

## 2018-12-17 PROCEDURE — 88305 TISSUE EXAM BY PATHOLOGIST: CPT

## 2018-12-17 PROCEDURE — 88172 CYTP DX EVAL FNA 1ST EA SITE: CPT

## 2018-12-17 PROCEDURE — 77065 DX MAMMO INCL CAD UNI: CPT

## 2018-12-17 PROCEDURE — 10022 US FINE NEEDLE ASPIRATION: CPT

## 2019-01-10 ENCOUNTER — APPOINTMENT (OUTPATIENT)
Dept: CT IMAGING | Age: 54
End: 2019-01-10
Payer: COMMERCIAL

## 2019-01-10 ENCOUNTER — HOSPITAL ENCOUNTER (EMERGENCY)
Age: 54
Discharge: HOME | End: 2019-01-10
Attending: FAMILY MEDICINE
Payer: COMMERCIAL

## 2019-01-10 VITALS
SYSTOLIC BLOOD PRESSURE: 128 MMHG | RESPIRATION RATE: 17 BRPM | OXYGEN SATURATION: 97 % | TEMPERATURE: 98.1 F | BODY MASS INDEX: 24.99 KG/M2 | WEIGHT: 150 LBS | HEART RATE: 62 BPM | DIASTOLIC BLOOD PRESSURE: 72 MMHG | HEIGHT: 65 IN

## 2019-01-10 DIAGNOSIS — E87.6 HYPOKALEMIA: Primary | ICD-10-CM

## 2019-01-10 DIAGNOSIS — R74.8 LIVER ENZYME ELEVATION: ICD-10-CM

## 2019-01-10 DIAGNOSIS — D69.6 THROMBOCYTOPENIA (HCC): ICD-10-CM

## 2019-01-10 DIAGNOSIS — R10.84 GENERALIZED ABDOMINAL PAIN: ICD-10-CM

## 2019-01-10 DIAGNOSIS — R93.89 ABNORMAL CT SCAN: ICD-10-CM

## 2019-01-10 DIAGNOSIS — D72.819 LEUKOPENIA, UNSPECIFIED TYPE: ICD-10-CM

## 2019-01-10 LAB
ALBUMIN SERPL-MCNC: 4.1 G/DL (ref 3.5–5.2)
ALP BLD-CCNC: 90 U/L (ref 35–104)
ALT SERPL-CCNC: 78 U/L (ref 5–33)
AMYLASE: 42 U/L (ref 28–100)
ANION GAP SERPL CALCULATED.3IONS-SCNC: 9 MMOL/L (ref 7–19)
ANISOCYTOSIS: ABNORMAL
APTT: 51.5 SEC (ref 26–36.2)
AST SERPL-CCNC: 66 U/L (ref 5–32)
BACTERIA: NEGATIVE /HPF
BASOPHILS ABSOLUTE: 0 K/UL (ref 0–0.2)
BASOPHILS MANUAL: 1 %
BASOPHILS RELATIVE PERCENT: 1 % (ref 0–1)
BILIRUB SERPL-MCNC: 0.5 MG/DL (ref 0.2–1.2)
BILIRUBIN URINE: NEGATIVE
BLOOD, URINE: ABNORMAL
BUN BLDV-MCNC: 10 MG/DL (ref 6–20)
C-REACTIVE PROTEIN: 2.35 MG/DL (ref 0–0.5)
CALCIUM SERPL-MCNC: 9.4 MG/DL (ref 8.6–10)
CHLORIDE BLD-SCNC: 97 MMOL/L (ref 98–111)
CLARITY: ABNORMAL
CO2: 28 MMOL/L (ref 22–29)
COLOR: YELLOW
CREAT SERPL-MCNC: 0.6 MG/DL (ref 0.5–0.9)
EKG P AXIS: 41 DEGREES
EKG P-R INTERVAL: 156 MS
EKG Q-T INTERVAL: 422 MS
EKG QRS DURATION: 72 MS
EKG QTC CALCULATION (BAZETT): 432 MS
EKG T AXIS: 17 DEGREES
EOSINOPHILS ABSOLUTE: 0 K/UL (ref 0–0.6)
EOSINOPHILS RELATIVE PERCENT: 0 % (ref 0–5)
EPITHELIAL CELLS, UA: 1 /HPF (ref 0–5)
GFR NON-AFRICAN AMERICAN: >60
GLUCOSE BLD-MCNC: 111 MG/DL (ref 74–109)
GLUCOSE URINE: NEGATIVE MG/DL
HCT VFR BLD CALC: 33.9 % (ref 37–47)
HEMOGLOBIN: 11 G/DL (ref 12–16)
HYALINE CASTS: 1 /HPF (ref 0–8)
INR BLD: 1.04 (ref 0.88–1.18)
KETONES, URINE: NEGATIVE MG/DL
LACTIC ACID: 1.8 MMOL/L (ref 0.5–1.9)
LEUKOCYTE ESTERASE, URINE: NEGATIVE
LIPASE: 42 U/L (ref 13–60)
LYMPHOCYTES ABSOLUTE: 1.3 K/UL (ref 1.1–4.5)
LYMPHOCYTES RELATIVE PERCENT: 60 % (ref 20–40)
MCH RBC QN AUTO: 28.5 PG (ref 27–31)
MCHC RBC AUTO-ENTMCNC: 32.4 G/DL (ref 33–37)
MCV RBC AUTO: 87.8 FL (ref 81–99)
MONOCYTES ABSOLUTE: 0 K/UL (ref 0–0.9)
MONOCYTES RELATIVE PERCENT: 1 % (ref 0–10)
NEUTROPHILS ABSOLUTE: 0.8 K/UL (ref 1.5–7.5)
NEUTROPHILS MANUAL: 38 %
NEUTROPHILS RELATIVE PERCENT: 38 % (ref 50–65)
NITRITE, URINE: NEGATIVE
OVALOCYTES: ABNORMAL
PDW BLD-RTO: 21 % (ref 11.5–14.5)
PH UA: 7.5
PLATELET # BLD: 116 K/UL (ref 130–400)
PLATELET SLIDE REVIEW: ABNORMAL
PMV BLD AUTO: 10 FL (ref 9.4–12.3)
POIKILOCYTES: ABNORMAL
POTASSIUM SERPL-SCNC: 3.1 MMOL/L (ref 3.5–5)
PRO-BNP: 175 PG/ML (ref 0–900)
PROTEIN UA: NEGATIVE MG/DL
PROTHROMBIN TIME: 13 SEC (ref 12–14.6)
RBC # BLD: 3.86 M/UL (ref 4.2–5.4)
RBC UA: 5 /HPF (ref 0–4)
SODIUM BLD-SCNC: 134 MMOL/L (ref 136–145)
SPECIFIC GRAVITY UA: 1.03
TOTAL PROTEIN: 8.6 G/DL (ref 6.6–8.7)
TROPONIN: <0.01 NG/ML (ref 0–0.03)
TSH SERPL DL<=0.05 MIU/L-ACNC: 1.05 UIU/ML (ref 0.27–4.2)
URINE REFLEX TO CULTURE: ABNORMAL
UROBILINOGEN, URINE: 0.2 E.U./DL
WBC # BLD: 2.2 K/UL (ref 4.8–10.8)
WBC UA: 1 /HPF (ref 0–5)

## 2019-01-10 PROCEDURE — 93005 ELECTROCARDIOGRAM TRACING: CPT

## 2019-01-10 PROCEDURE — 6360000004 HC RX CONTRAST MEDICATION: Performed by: FAMILY MEDICINE

## 2019-01-10 PROCEDURE — 99284 EMERGENCY DEPT VISIT MOD MDM: CPT | Performed by: EMERGENCY MEDICINE

## 2019-01-10 PROCEDURE — 84443 ASSAY THYROID STIM HORMONE: CPT

## 2019-01-10 PROCEDURE — 6360000002 HC RX W HCPCS: Performed by: FAMILY MEDICINE

## 2019-01-10 PROCEDURE — 84484 ASSAY OF TROPONIN QUANT: CPT

## 2019-01-10 PROCEDURE — 81001 URINALYSIS AUTO W/SCOPE: CPT

## 2019-01-10 PROCEDURE — 87040 BLOOD CULTURE FOR BACTERIA: CPT

## 2019-01-10 PROCEDURE — 82150 ASSAY OF AMYLASE: CPT

## 2019-01-10 PROCEDURE — 83605 ASSAY OF LACTIC ACID: CPT

## 2019-01-10 PROCEDURE — 80053 COMPREHEN METABOLIC PANEL: CPT

## 2019-01-10 PROCEDURE — 6370000000 HC RX 637 (ALT 250 FOR IP): Performed by: EMERGENCY MEDICINE

## 2019-01-10 PROCEDURE — 2580000003 HC RX 258: Performed by: FAMILY MEDICINE

## 2019-01-10 PROCEDURE — 85730 THROMBOPLASTIN TIME PARTIAL: CPT

## 2019-01-10 PROCEDURE — 99284 EMERGENCY DEPT VISIT MOD MDM: CPT

## 2019-01-10 PROCEDURE — 36415 COLL VENOUS BLD VENIPUNCTURE: CPT

## 2019-01-10 PROCEDURE — 85610 PROTHROMBIN TIME: CPT

## 2019-01-10 PROCEDURE — 83880 ASSAY OF NATRIURETIC PEPTIDE: CPT

## 2019-01-10 PROCEDURE — 74177 CT ABD & PELVIS W/CONTRAST: CPT

## 2019-01-10 PROCEDURE — 96374 THER/PROPH/DIAG INJ IV PUSH: CPT

## 2019-01-10 PROCEDURE — 83690 ASSAY OF LIPASE: CPT

## 2019-01-10 PROCEDURE — 96375 TX/PRO/DX INJ NEW DRUG ADDON: CPT

## 2019-01-10 PROCEDURE — 86140 C-REACTIVE PROTEIN: CPT

## 2019-01-10 PROCEDURE — 85025 COMPLETE CBC W/AUTO DIFF WBC: CPT

## 2019-01-10 RX ORDER — HYDROCODONE BITARTRATE AND ACETAMINOPHEN 10; 325 MG/1; MG/1
1 TABLET ORAL EVERY 4 HOURS PRN
COMMUNITY
End: 2020-06-12

## 2019-01-10 RX ORDER — SODIUM CHLORIDE 9 MG/ML
INJECTION, SOLUTION INTRAVENOUS CONTINUOUS
Status: DISCONTINUED | OUTPATIENT
Start: 2019-01-10 | End: 2019-01-10 | Stop reason: HOSPADM

## 2019-01-10 RX ORDER — POTASSIUM CHLORIDE 20 MEQ/1
20 TABLET, EXTENDED RELEASE ORAL ONCE
Status: COMPLETED | OUTPATIENT
Start: 2019-01-10 | End: 2019-01-10

## 2019-01-10 RX ORDER — ONDANSETRON 2 MG/ML
4 INJECTION INTRAMUSCULAR; INTRAVENOUS ONCE
Status: COMPLETED | OUTPATIENT
Start: 2019-01-10 | End: 2019-01-10

## 2019-01-10 RX ORDER — MORPHINE SULFATE 15 MG/1
30 TABLET ORAL EVERY 4 HOURS PRN
COMMUNITY
End: 2020-01-08 | Stop reason: ALTCHOICE

## 2019-01-10 RX ADMIN — POTASSIUM CHLORIDE 20 MEQ: 20 TABLET, EXTENDED RELEASE ORAL at 08:24

## 2019-01-10 RX ADMIN — Medication 1 MG: at 06:42

## 2019-01-10 RX ADMIN — ONDANSETRON HYDROCHLORIDE 4 MG: 2 INJECTION, SOLUTION INTRAMUSCULAR; INTRAVENOUS at 06:42

## 2019-01-10 RX ADMIN — IOPAMIDOL 90 ML: 755 INJECTION, SOLUTION INTRAVENOUS at 06:57

## 2019-01-10 RX ADMIN — SODIUM CHLORIDE: 9 INJECTION, SOLUTION INTRAVENOUS at 06:42

## 2019-01-10 ASSESSMENT — ENCOUNTER SYMPTOMS
SINUS PAIN: 0
VOMITING: 0
ABDOMINAL PAIN: 1
COLOR CHANGE: 0
BACK PAIN: 0
CHEST TIGHTNESS: 0
WHEEZING: 0
NAUSEA: 1
COUGH: 0
SHORTNESS OF BREATH: 0
DIARRHEA: 0
RHINORRHEA: 0
CONSTIPATION: 0

## 2019-01-10 ASSESSMENT — PAIN SCALES - GENERAL
PAINLEVEL_OUTOF10: 8
PAINLEVEL_OUTOF10: 8
PAINLEVEL_OUTOF10: 2

## 2019-01-15 LAB
BLOOD CULTURE, ROUTINE: NORMAL
CULTURE, BLOOD 2: NORMAL

## 2019-01-30 ENCOUNTER — TELEPHONE (OUTPATIENT)
Dept: OTOLARYNGOLOGY | Age: 54
End: 2019-01-30

## 2019-02-04 ENCOUNTER — OFFICE VISIT (OUTPATIENT)
Dept: OTOLARYNGOLOGY | Age: 54
End: 2019-02-04
Payer: COMMERCIAL

## 2019-02-04 VITALS
BODY MASS INDEX: 26.57 KG/M2 | SYSTOLIC BLOOD PRESSURE: 108 MMHG | HEART RATE: 78 BPM | OXYGEN SATURATION: 100 % | RESPIRATION RATE: 16 BRPM | TEMPERATURE: 97.2 F | DIASTOLIC BLOOD PRESSURE: 64 MMHG | HEIGHT: 63 IN

## 2019-02-04 DIAGNOSIS — R04.0 LEFT-SIDED EPISTAXIS: Primary | ICD-10-CM

## 2019-02-04 PROCEDURE — 99214 OFFICE O/P EST MOD 30 MIN: CPT | Performed by: OTOLARYNGOLOGY

## 2019-02-04 RX ORDER — PANTOPRAZOLE SODIUM 40 MG/1
40 TABLET, DELAYED RELEASE ORAL DAILY
COMMUNITY
End: 2019-12-10 | Stop reason: SDUPTHER

## 2019-03-08 ENCOUNTER — HOSPITAL ENCOUNTER (OUTPATIENT)
Dept: RADIATION ONCOLOGY | Facility: HOSPITAL | Age: 54
Setting detail: RADIATION/ONCOLOGY SERIES
End: 2019-03-08

## 2019-03-14 PROBLEM — Z98.890 S/P LUMPECTOMY, LEFT BREAST: Status: ACTIVE | Noted: 2019-03-14

## 2019-03-14 PROBLEM — Z78.9 NON-SMOKER: Status: ACTIVE | Noted: 2019-03-14

## 2019-03-14 PROBLEM — Z98.890 S/P LYMPH NODE BIOPSY: Status: ACTIVE | Noted: 2019-03-14

## 2019-03-14 PROBLEM — Z71.9 ENCOUNTER FOR CONSULTATION: Status: ACTIVE | Noted: 2019-03-14

## 2019-03-14 NOTE — PROGRESS NOTES
[facility]  MD Gloria Cali BSN, PA-C  ___________________________________________________________  [facility]  Department of Radiation Oncology  [address] [place] [id number]  Office: [phone]  Fax: [phone]    DATE:  03/15/2019    PATIENT: Ayla Echeverria 1965                                   MEDICAL RECORD #: [id number]                                                       REASON FOR CONSULTATION:   Ayla Echeverria is a very pleasant 53 y.o. 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     She is here today in clinic with her family. Reports fatigue, nausea, color change/wound to left breast, and swelling to left breast. Denies appetite change, unexpected weight change, nausea/vomiting, diarrhea, light-headedness, weakness, and headaches. She follows .     History of Present Illness:  03/22/2016 - Bilateral mammogram:  • Palpable mass showed a left breast abnormal finding.    03/31/2016 - BREAST, LEFT 2:00 MASS, BIOPSY:  · INVASIVE DUCTAL CARCINOMA, FAVOR GRADE 3.  · TUMOR SIZE AT LEAST 10 MM IN GREATEST LINEAR DIMENSION.  · ALL SUBMITTED CORE BIOPSIES ARE POSITIVE FOR MALIGNANCY.    03/31/2016 - LYMPH NODE, LEFT AXILLA, FINE NEEDLE ASPIRATION:  · POSITIVE FOR METASTATIC MAMMARY CARCINOMA.    04/14/2016 - MRI Bilateral breast:  · An 11 x 6 x 3.5 cm region of nodular signal and suspicious enhancement in the outer portion of the left breast. This corresponds to the patient's known breast cancer.  · Metastatic lymphadenopathy in the left axillary region.  · No contralateral malignancy is identified.  · BI-RADS 6    04/2016 - 09/2016 - Chemotherapy course:  · TCH-P 6 cycles  • 18 weeks of  Trastuzumab    Declined to complete one year of Herceptin.  Declined radiation therapy.     07/13/2016 - US left breast limited:  · Poorly marginated relatively hypoechoic solid mass in the upper outer left breast is reidentified. It now measures 1 cm x 1.2 x 1.1 cm. It previously measured 1.5 x 1.8 x 2 cm. A hydro-Carmine is seen adjacent to the mass.  · A lymph node in the left axilla now measures 1.7 x 0.7 x 1.6 cm. It previously measured 3.1 x 2.9 x 1.7 cm.    09/15/2016 - Digital Diagnostic unilateral left mammogram:  · There is no significant residual left breast mass seen. However, the tissue is very dense and could obscure residual tumor. The patient has known left breast cancer. BI-RADS 6.  · The patient should have bilateral mammography in March of 2017.    09/15/2016 - US Left breast limited:  · There is no significant residual mass identified.   · On image 6, there may be a small residual area of isoechoic mass or scarring measuring about 9 x 10 mm. In the left axilla,   · the previously abnormal lymph node now measures 1.4 cm in length and previously measured 1.7 cm.    09/15/2016 - MRI bilateral breast:  · Dramatic response to neoadjuvant chemotherapy with no abnormal enhancement seen within the left breast on today's study. A biopsy clip is present within the mid depth of the left breast at the 9:00 to 10:00 position.   · Dramatic improvement in the left axillary lymphadenopathy. A 1.5 cm left axillary lymph node persists on today's exam.  · No evidence of contralateral malignancy.  · ACR BI-RADS category 6-biopsy-proven malignancy.     10/03/2016 - BREAST, LEFT, LUMPECTOMY WITH LEFT SENTINEL NODE BIOPSY:  · RESIDUAL INVASIVE DUCTAL CARCINOMA, GRADE 3 (0.2CM)  · MARGINS CLEAR OF INVASIVE CARCINOMA (1.1 CM FROM LATERAL)  · EXTENSIVE LYMPHVASCULAR SPACE INVASION.  · TWO SENTINEL NODES POSITIVE FOR METASTATIC CARCINOMA (2/3)    12/21/2016 - Hormonal Therapy:  · Tamoxifen    RECURRANCE:  01/30/2017 -  Appointment with  due to new complaints of erythema in the left breast.     01/30/2017 - Skin, left breast, biopsy:  · Positive for metastatic ductal carcinoma  · Extensive dermal lymphovascular invasion present  · ER -- AL --  · Her2 by FISH  Positive ratio: 4.5  · Ki-67  32%  · Foundation ONE: ERBB2 amplification   · AURKA amplification  · TP-53  ·     05/04/2017 - CT Chest with contrast:  • The lungs are clear.  • Skin thickening is present over the left breast, the left breast breast parenchyma is very dense most likely associated with patient's known neoplasm left axillary adenopathy is present.    05/04/2017 - CT Abdomen/Pelvis with contrast:  • There is no evidence of intra-abdominal or pelvic metastasis.    05/04/2017 - PET Scan:  • On this examination symmetric increased metabolic activity is present in the tonsils. The SUV is 5.6. This is symmetric.  • In the left supraclavicular region a cluster of lymph nodes is present one is an SUV of 3, one is an SUV of 2.9 a third has an SUV of 2.5.   • An additional axillary lymph node is present with an SUV of 2.8.  • Surgical clips are present in the upper-outer quadrant of the left breast is marked abnormal increased metabolic activity present there are multiple foci of abnormal metabolic activity throughout the left breast with SUVs of 8.9, 7.4 7.3. The skin of the left breast is thickened with an SUV of 3.3 and  • There is extensive abnormal metabolic activity throughout the left breast consistent with extensive multifocal disease. The SUV in the skin inferiorly is 4.    05/11/2017 - 05/11/2017 - Chemotherapy course:  · Weekly Paclitaxel/Carboplatin   · Herceptin Pertuzumab every 3 weeks  · Allergic reaction to Carboplatin/ so med stopped    07/21/2017 - 09/01/2017 -Chemotherapy course:  • Taxotere/ Herceptin/ Pertuzumab  • Discontinued due to progression of disease.     09/22/2017 - 03/02/2018 - Chemotherapy course:  · Kadcyla every 3 weeks    Completed 8 cycles    12/27/2017 - CT Abdomen/Pelvis with contrast:  • No evidence of intra-abdominal or pelvic metastasis.  • Superior left renal cyst.  • Apparent wall thickening of the rectosigmoid colon may be artifactual due to underdistention. No free air or abscess. No inflammatory changes of the mesentery.  • Mild hepatic steatosis.    12/27/2017 - CT Chest with contrast:  • No evidence of intrathoracic metastasis.  • Post therapeutic changes of the left breast and axilla.    12/27/2017 - Bone scan:  • No evidence of bony metastatic disease.    05/21/2018 - [facility] Path Review:  BREAST, LEFT, 2:00, CORE NEEDLE BIOPSY:   · INVASIVE MAMMARY CARCINOMA, NO SPECIAL TYPE WITH INVASIVE MICROPAPILLARY FEATURES, HIGH COMBINED HISTOLOGIC GRADE, HIGH PROLIFERATIVE RATE, MEASURING AT LEAST 10 MM AND INVOLVING 4 OF 4 CORES; LYMPHOVASCULAR INVASION; ASSOCIATED HIGH-GRADE DUCTAL CARCINOMA IN SITU, MICROPAPILLARY AND CRIBRIFORM TYPES WITH NECROSIS; SEE COMMENT #1.  BREAST, LEFT, LUMPECTOMY (A4, A9):   · RESIDUAL INVASIVE MAMMARY CARCINOMA PRESENT AS INTRALYMPHATIC CARCINOMA; BACKGROUND BREAST WITH POST NEOADJUVANT THERAPY TUMOR BED CHANGES AND NO DEFINITIVE RESIDUAL STROMALLY INVASIVE CARCINOMA IN EXAMINED SLIDES; FOCAL RESIDUAL HIGH-GRADE DUCTAL CARCINOMA IN SITU, MICROPAPILLARY TYPE PRESENT 2 MM FROM MEDIAL MARGIN; SEE COMMENT #2.  LYMPH NODE, LEFT SENTINEL, EXCISION (B):   · TWO OF THREE LYMPH NODES INVOLVED BY METASTATIC CARCINOMA, LARGEST DEPOSIT MEASURING 2.5 MM (1 MACROMETASTASIS, 1 MICROMETASTASIS), NEGATIVE FOR EXTRANODAL EXTENSION (2/3); TREATMENT EFFECT PRESENT.    Per report, HER-2 FISH performed on a left breast skin biopsy from 1/30/2017 (outside accession number [id number]; not received for review) is positive for amplification of HER-2 (average HER-2 signals/nucleus: 16.9; HER-2/CEN17 ratio: 4.5).    Comment #2: Representative slides were received for review which shows multifocal lymphovascular invasion and  focal ductal carcinoma in situ. Per report, a single 2.0 mm focus of parenchymal invasion was also present.  We did not receive that slide for review.    07/10/2018 - 11/30/2018 - Chemotherapy course:  · Kadcyla    07/13/2018 - CT Abdomen/Pelvis with contrast:  • No CT evidence of metastatic disease    07/13/2018 - CT Chest with contrast:  • History of left breast carcinoma with post therapy changes with skin thickening and postsurgical changes in the upper outer quadrant of the left breast and axilla.  • Right axillary lymph adenopathy.  • Otherwise, no CT evidence of thoracic metastatic disease.    07/16/2018 - PET Scan:  • Diffuse uptake throughout the left breast extending to the left chest wall, which may be related to radiation treatment or residual disease. Correlate clinically.  • Abnormal uptake within left axillary lymph nodes as well as a left internal mammary lymph node, concerning for metastatic disease.  • Enlarged hypermetabolic right axillary lymph node concerning for metastatic disease.  • Asymmetric uptake in the tonsils, with greater uptake in the right tonsil, etiology unclear but possibly inflammatory. Correlate clinically.  • Abnormal uptake within a right cervical lymph node, etiology unclear but possibly reactive.    10/26/2018 - CT Chest with contrast:  · No intrathoracic neoplastic process/metastatic disease.  · An increase in size of an abnormal lymph node in the right axilla. This may represent reactive or metastatic lymph node. This needs to be further evaluated.  · A small ill-defined nodule in the left axilla/left lateral breast.  · Increased infiltration of the left axillary fat. Increase thickening of the skin of the left breast. These may be postsurgical, post radiation evolving changes. This may be further evaluated.  ADDENDUM: The hypermetabolic enlarged right axillary lymph node seen on the PET exam is very similar in size compared to 10/26/2018 CT chest study. This lymph node  has increased in size compared to a CT chest of 12/27/2017. Percutaneous sampling should be considered as the finding is contralateral to the inflammatory left breast cancer.     10/26/2018 - CT Abdomen/Pelvis with contrast:  • No evidence of intra-abdominal or pelvic metastasis.  • Small left renal cyst.  • Skin thickening associated the left breast is again identified.    12/10/2018 - Bilateral Diagnostic Mammogram:  · New area of palpable concern in the right breast may be cystic in nature. Aspiration should be attempted. If aspiration is impossible, this should be converted to ultrasound-guided biopsy.   · Abnormal lymph node in the right axilla for which tissue sampling is recommended.  · Changes of the left breast compatible with known inflammatory breast cancer. BI-RADS Category 6-known biopsy-proven malignancy.    12/10/2018 - Right Breast Ultrasound:  · New area of palpable concern in the right breast may be cystic in nature. Aspiration should be attempted. If aspiration is impossible, this should be converted to ultrasound-guided biopsy.   · Abnormal lymph node in the right axilla for which tissue sampling is recommended.  · Changes of the left breast compatible with known inflammatory breast cancer.   · BI-RADS Category 6-known biopsy-proven malignancy.    12/17/2018 - Breast, right 12 o'clock, biopsy:  · Invasive ductal carcinoma, high grade  · Tumor measures at least 6 mm in greatest linear dimension    12/17/2018 - Lymph node, right axillary, fine needle aspiration:  · Positive for metastatic ductal carcinoma    01/04/2019 - 01/04/2019 - Chemotherapy Course:  · Navelbine x 1 cycle (poor tolerance with pain)    01/10/2019 - CT Abdomen/Pelvis:  · Extensive skin thickening and induration of the left breast with evidence of lumpectomy. This could represent an inflammatory type breast carcinoma but may also simply represent post therapy changes.  · Diffuse steatosis of the liver.  · There is some prominence  and enhancement of the mucosa of the body and antrum of the stomach suggesting that there may be an ongoing gastritis. No evidence of gastric outlet obstruction or focal mass.  · Moderate constipation. There is no obstruction or free air.  · Small fat-containing periumbilical hernia.  · Tiny cortical cyst upper pole left kidney.    01/25/2019 - Chemotherapy course:  • Gemzar/Herceptin    03/01/2019 - Appointment with :  • Proceed with cycle 2 day 1 Gemzar/Herceptin.  • Follow up in 3 weeks     History obtained from  PATIENT, FAMILY and CHART    PAST MEDICAL HISTORY   Past Medical History:   Diagnosis Date   • Breast cancer (CMS/HCC)       PAST SURGICAL HISTORY   Past Surgical History:   Procedure Laterality Date   • AXILLARY LYMPH NODE BIOPSY/EXCISION Left    • BREAST BIOPSY Left 03/31/2016   • BREAST LUMPECTOMY WITH SENTINEL NODE BIOPSY Left 10/03/2016    residual invasive carcinoma, grade 3 (0.2cm); margins negative; extensive lymphvascular space invasion; 2 sentinel lymph nodes positive (2/3)      FAMILY HISTORY  family history includes Colon cancer in her father; Diabetes in her sister; Hypertension in her sister; Lung cancer in her mother; Lupus in her sister; Prostate cancer in her father.     SOCIAL HISTORY   Social History     Socioeconomic History   • Marital status:      Spouse name: Not on file   • Number of children: Not on file   • Years of education: Not on file   • Highest education level: Not on file   Tobacco Use   • Smoking status: Never Smoker   • Smokeless tobacco: Never Used   Substance and Sexual Activity   • Alcohol use: No   • Drug use: No   • Sexual activity: Defer      ALLERGIES  Patient has no known allergies.     MEDICATIONS   Current Outpatient Medications   Medication Sig Dispense Refill   • montelukast (SINGULAIR) 10 MG tablet Take  by mouth.     • Multiple Vitamin (MULTI VITAMIN DAILY PO) Take  by mouth.     • omeprazole (priLOSEC) 40 MG capsule TAKE ONE CAPSULE  "DAILY 30 capsule 5     No current facility-administered medications for this visit.       The following portions of the patient's history were reviewed and updated as appropriate: allergies, current medications, past family history, past medical history, past social history, past surgical history and problem list.    REVIEW OF SYSTEMS  Review of Systems   Constitutional: Positive for fatigue. Negative for appetite change and unexpected weight change.   HENT: Negative.    Eyes: Negative.         Glasses   Respiratory: Negative.    Cardiovascular: Negative.    Gastrointestinal: Positive for nausea (after chemo).   Endocrine: Negative.    Genitourinary: Negative.    Musculoskeletal: Negative.    Skin: Positive for color change and wound (left breast, oozing/bleeding wound to top of left breast).        Left breast swollen     Allergic/Immunologic: Negative.    Neurological: Negative.    Hematological: Negative for adenopathy.   Psychiatric/Behavioral: Negative.      PHY  VITAL SIGNS:   Vitals:    03/15/19 1006   Weight: 67.1 kg (148 lb)   Height: 160 cm (63\")   PainSc:   5   PainLoc: Breast  Comment: left     General:  Alert and oriented, no acute distress, well developed, Vitals reviewed.  Head/Neck:  Normocephalic, without obvious abnormality, atraumatic.  The trachea is midline.   Nose/Sinuses:  Nares normal. Septum midline. Mucosa normal. No drainage or sinus tenderness.  Mouth/Throat:  Mucosa moist, no lesions; pharynx without erythema, edema or exudate.  Neck:  supple, no mass, non-tender  Eyes: No gross abnormalities,  no scleral jaundice or erythema.    Ears: Ears appear intact with no abnormalities noted, hearing grossly normal  Chest:  Respiratory efforts are normal and unlabored, chest is clear to auscultation. There are no wheezes, rhonchi, rales.  Cardiovascular: Regular rate and rhythm without murmurs, rubs, or gallops.   Abdomen:  Soft, non-tender, normal bowel sounds; no noted organomegaly or masses. no " CVA tenderness   Breast: right breast normal without mass, skin lesions noted. left breast with skin lesions oozing, requires a dressing, swollen,  risk and benefit of breast self-exam was discussed.    Extremities:  WONG well, warm to touch, no evidence of cyanosis or edema.  Lymphatics:  No evidence of adenopathy in the cervical or supraclavicular areas.  Skin: No suspicious lesions or rashes of concern, skin color, texture, turgor are normal  Neurologic:  Alert and oriented, gait normal, strength and sensation grossly normal  Psych: Mood and affect are appropriate for circumstance. Eye contact is appropriate.     Performance Status: ECOG (0) Fully active, able to carry on all predisease performance without restriction    Clinical Quality Measures  -Pain Documented by Standardized Tool, FPS  Pain severity quantified; pain present, followup plan documented.5 Plan: Pain medication per other physicians.   -Advanced Care Planning Care plan discussed, no care plan provided  -Body Mass Index Screening and Follow-Up Plan Patient's Body mass index is 26.22 kg/m². BMI is above normal parameters. Recommendations include: educational material.  -Tobacco Use: Screening and Cessation Intervention Social History    Tobacco Use      Smoking status: Never Smoker      Smokeless tobacco: Never Used    ASSESSMENT AND PLAN   1. Malignant neoplasm involving both nipple and areola of left breast in female, estrogen receptor positive (CMS/HCC)    2. Regional lymph node metastasis present (CMS/HCC)    3. S/P lumpectomy, left breast    4. S/P lymph node biopsy    5. On antineoplastic chemotherapy    6. Non-smoker    7. Encounter for consultation      Orders Placed This Encounter   Procedures   • Ambulatory Referral to Physical Therapy Evaluate and treat, Lymphedema, Bioimpedance     RECOMMENDATIONS:  Ayla Echeverria is a very pleasant 53 y.o. female with bilateral breast inflammatory carcinoma being treated with chemotherapy, the left  breast has local progression with weeping skin lesions and has been referred to our clinic by Donald Ott MD to discuss radiotherapy recommendations.  Indications and rationale of radiation therapy to maximize local tumor control according to the left breast has been discussed with the patient today. I have extensively reviewed the risks, benefits and alternatives of therapy and possible progression of disease in spite of therapy with either local or systemic failure. Side effects include but not limited to sunburn-type skin irritation of the targeted area which may range from mild to  Intense, red, dry, tender, or itchy skin, breast heaviness, discoloration, redness, or a bruised appearance and general fatigue.     Since the patient is at risk for lymphedema post surgery and radiation, I will ask physical therapy for lymphedema baseline evaluation.     The patient has verbalized understanding of the risk of radiation and agrees to proceed with therapy as recommended.  We will simulate treatment fields at this time, with plans to begin adjuvant radiation therapy in the near future.     Thank you for allowing me to assist in her care.    Todays appointment time was spent in counseling and coordination of care as follows: diagnosis, intent of treatment discussing radiation therapy specifics: logistics, possible and probable side effects and after effects, staging of cancer, standard of care in for this stage of this cancer and treatment options   Pipo Darby III, MD  03/15/2019

## 2019-03-15 ENCOUNTER — CONSULT (OUTPATIENT)
Dept: RADIATION ONCOLOGY | Facility: HOSPITAL | Age: 54
End: 2019-03-15

## 2019-03-15 ENCOUNTER — HOSPITAL ENCOUNTER (OUTPATIENT)
Dept: RADIATION ONCOLOGY | Facility: HOSPITAL | Age: 54
Discharge: HOME | End: 2019-03-15

## 2019-03-15 VITALS — BODY MASS INDEX: 26.22 KG/M2 | WEIGHT: 148 LBS | HEIGHT: 63 IN

## 2019-03-15 DIAGNOSIS — Z78.9 NON-SMOKER: ICD-10-CM

## 2019-03-15 DIAGNOSIS — Z71.9 ENCOUNTER FOR CONSULTATION: ICD-10-CM

## 2019-03-15 DIAGNOSIS — Z98.890 S/P LYMPH NODE BIOPSY: ICD-10-CM

## 2019-03-15 DIAGNOSIS — C77.9 REGIONAL LYMPH NODE METASTASIS PRESENT (HCC): ICD-10-CM

## 2019-03-15 DIAGNOSIS — C50.012 MALIGNANT NEOPLASM INVOLVING BOTH NIPPLE AND AREOLA OF LEFT BREAST IN FEMALE, ESTROGEN RECEPTOR POSITIVE (HCC): Primary | ICD-10-CM

## 2019-03-15 DIAGNOSIS — Z98.890 S/P LUMPECTOMY, LEFT BREAST: ICD-10-CM

## 2019-03-15 DIAGNOSIS — Z79.899 ON ANTINEOPLASTIC CHEMOTHERAPY: ICD-10-CM

## 2019-03-15 DIAGNOSIS — Z17.0 MALIGNANT NEOPLASM INVOLVING BOTH NIPPLE AND AREOLA OF LEFT BREAST IN FEMALE, ESTROGEN RECEPTOR POSITIVE (HCC): Primary | ICD-10-CM

## 2019-03-15 PROCEDURE — 77334 RADIATION TREATMENT AID(S): CPT | Performed by: RADIOLOGY

## 2019-03-15 PROCEDURE — 77290 THER RAD SIMULAJ FIELD CPLX: CPT | Performed by: RADIOLOGY

## 2019-03-15 NOTE — PATIENT INSTRUCTIONS
Breast Cancer, Female  Breast cancer is a malignant growth of tissue (tumor) in the breast. Unlike noncancerous (benign) tumors, malignant tumors are cancerous and can spread to other parts of the body. The two most common types of breast cancer start in the milk ducts (ductal carcinoma) or in the lobules where milk is made in the breast (lobular carcinoma). Breast cancer is one of the most common types of cancer in women.  What are the causes?  The exact cause of female breast cancer is unknown.  What increases the risk?  The following factors may make you more likely to develop this condition:  · Being older than 55 years of age.  · Race and ethnicity.  women generally have an increased risk, but -American women are more likely to develop the disease before age 45.  · Having a family history of breast cancer.  · Having had breast cancer in the past.  · Having certain noncancerous conditions of the breast, such as dense breast tissue.  · Having the BRCA1 and BRCA2 genes.  · Having a history of radiation exposure.  · Obesity.  · Starting menopause after age 55.  · Starting your menstrual periods before age 12.  · Having never been pregnant or having your first child after age 30.  · Having never .  · Using hormone therapy after menopause.  · Using birth control pills.  · Drinking more than one alcoholic drink a day.  · Exposure to the drug ERICA, which was given to pregnant women from the 1940s to the 1970s.    What are the signs or symptoms?  Symptoms of this condition include:  · A painless lump or thickening in your breast.  · Changes in the size or shape of your breast.  · Breast skin changes, such as puckering or dimpling.  · Nipple abnormalities, such as scaling, crustiness, redness, or pulling in (retraction).  · Nipple discharge that is bloody or clear.    How is this diagnosed?  This condition may be diagnosed by:  · Taking your medical history and doing a physical exam. During the  exam, your health care provider will feel the tissue around your breast and under your arms.  · Taking a sample of nipple discharge. The sample will be examined under a microscope.  · Performing imaging tests, such as breast X-rays (mammogram), breast ultrasound exams, or an MRI.  · Taking a tissue sample (biopsy) from the breast. The sample will be examined under a microscope to look for cancer cells.  · Taking a sample from the lymph nodes near the affected breast (sentinel node biopsy).    Your cancer will be staged to determine its severity and extent. Staging is a careful attempt to find out the size of the tumor, whether the cancer has spread, and if so, to what parts of the body. Staging also includes testing your tumor for certain receptors, such as estrogen, progesterone, and human epidermal growth factor receptor 2 (HER2). This will help your cancer care team decide on a treatment that will work best for you. You may need to have more tests to determine the stage of your cancer. Stages include the following:  · Stage 0--The tumor has not spread to other breast tissue.  · Stage I--The cancer is only found in the breast or may be in the lymph nodes. The tumor may be up to ¾ in (2 cm) wide.  · Stage II--The cancer has spread to nearby lymph nodes. The tumor may be up to 2 in (5 cm) wide.  · Stage III--The cancer has spread to more distant lymph nodes. The tumor may be larger than 2 in (5 cm) wide.  · Stage IV--The cancer has spread to other parts of the body, such as the bones, brain, liver, or lungs.    How is this treated?  Treatment for this condition depends on the type and stage of the breast cancer. It may be treated with:  · Surgery. This may involve breast-conserving surgery (lumpectomy or partial mastectomy) in which only the part of the breast containing the cancer is removed. Some normal tissue surrounding this area may also be removed. In some cases, surgery may be done to remove the entire breast  (mastectomy) and nipple. Lymph nodes may also be removed.  · Radiation therapy, which uses high-energy rays to kill cancer cells.  · Chemotherapy, which is the use of drugs to kill cancer cells.  · Hormone therapy, which involves taking medicine to adjust the hormone levels in your body. You may take medicine to decrease your estrogen levels. This can help stop cancer cells from growing.  · Targeted therapy, in which drugs are used to block the growth and spread of cancer cells. These drugs target a specific part of the cancer cell and usually cause fewer side effects than chemotherapy. Targeted therapy may be used alone or in combination with chemotherapy.  · A combination of surgery, radiation, chemotherapy, or hormone therapy may be needed to treat breast cancer.    Follow these instructions at home:  · Take over-the-counter and prescription medicines only as told by your health care provider.  · Eat a healthy diet. A healthy diet includes lots of fruits and vegetables, low-fat dairy products, lean meats, and fiber.  ? Make sure half your plate is filled with fruits or vegetables.  ? Choose high-fiber foods such as whole-grain breads and cereals.  · Consider joining a support group. This may help you learn to cope with the stress of having breast cancer.  · Talk to your health care team about exercise and physical activity. The right exercise program can:  ? Help prevent or reduce symptoms such as fatigue or depression.  ? Improve overall health and survival rates.  · Keep all follow-up visits as told by your health care provider. This is important.  Where to find more information  · American Cancer Society: www.cancer.org  · National Cancer [place]: www.cancer.gov  Contact a health care provider if:  · You have a sudden increase in pain.  · You have any symptoms or changes that concern you.  · You lose weight without trying.  · You notice a new lump in either breast or under your arm.  · You develop swelling  in either arm or hand.  · You have a fever.  · You notice new fatigue or weakness.  Get help right away if:  · You have chest pain or trouble breathing.  · You faint.  Summary  · Breast cancer is a malignant growth of tissue (tumor) in the breast.  · Your cancer will be staged to determine its severity and extent.  · Treatment for this condition depends on the type and stage of the breast cancer.  This information is not intended to replace advice given to you by your health care provider. Make sure you discuss any questions you have with your health care provider.  Document Released: 03/28/2007 Document Revised: 08/13/2018 Document Reviewed: 08/13/2018  [employer] Interactive Patient Education © 2019 [employer]  External Beam Radiation Therapy  External beam radiation therapy is a type of radiation treatment. This type of radiation therapy can deliver radiation to a fairly large area. This is the most common type of radiation therapy for cancer. This therapy may be done to:  · Treat cancer by:  ? Destroying cancer cells. Radiation delivered during the treatment damages cancer cells. It may also damage normal cells, but normal cells have the DNA to repair themselves while cancer cells do not.  ? Helping with symptoms of your cancer.  ? Stopping the growth of any remaining cancer cells after surgery.  ? Preventing cancer cells from growing in areas that do not have cancer (prophylactic radiation therapy).  · Treat or shrink a tumor.  · Reduce pain (palliative therapy).    The amount of radiation you will receive and the length of therapy depend on your medical condition. You should not feel the radiation being delivered or any pain during your therapy.  Let your health care provider know about:  · Any allergies you have.  · All medicines you are taking, including vitamins, herbs, eye drops, creams, and over-the-counter medicines.  · Any problems you or family members have had with anesthetic medicines.  · Any blood  disorders you have.  · Any surgeries you have had.  · Any medical conditions you have.  · Whether you are pregnant or may be pregnant.  What are the risks?  Generally, this is a safe procedure. However, radiation therapy can place a person at a higher risk for a second cancer later in life.  Most people experience side effects from the therapy. Side effects depend on the amount of radiation and the part of the body that was exposed to radiation. The most common side effects include:  · Skin changes.  · Hair loss.  · Fatigue.  · Nausea and vomiting.    What happens before the procedure?  · There will be a planning session (simulation). During the session:  ? Your health care provider will plan exactly where the radiation will be delivered (treatment field).  ? You will be positioned for your therapy. The goal is to have a position that can be reproduced for each therapy session.  ? Temporary marks may be drawn on your body. Permanent marks may also be drawn on your body in order for you to be positioned the same way for each therapy session.  ? A tool that holds a body part in place (immobilization device) may be used to keep the area of treatment in the correct position.  · Follow instructions from your health care provider about eating or drinking restrictions.  · Ask your health care provider about changing or stopping your regular medicines. This is especially important if you are taking diabetes medicines or blood thinners.  What happens during the procedure?  · You will either lie on a table or sit in a chair in the position determined for your therapy.  · You may have a heavy shield placed on you to protect tissues and organs that are not being treated.  · The radiation machine (linear accelerator) will move around you to deliver the radiation in exact doses from different angles. The machine will not touch you.  The procedure may vary among health care providers and hospitals.  What happens after the  procedure?  · You may return to your normal routine including diet, activities, and medicines as told by your health care provider.  · You may want to have someone take you home from the hospital or clinic.  Summary  · External beam radiation therapy is a type of radiation treatment for cancer.  · The amount of radiation you will receive and the length of therapy depend on your medical condition.  · Most people experience side effects from the therapy. Side effects depend on the amount of radiation and the part of the body that was exposed to radiation.  This information is not intended to replace advice given to you by your health care provider. Make sure you discuss any questions you have with your health care provider.  Document Released: 05/06/2010 Document Revised: 12/18/2017 Document Reviewed: 12/18/2017  [employer] Interactive Patient Education © 2019 [employer]

## 2019-03-25 PROBLEM — Z79.899 ON ANTINEOPLASTIC CHEMOTHERAPY: Status: ACTIVE | Noted: 2019-03-25

## 2019-03-25 PROBLEM — C77.9 REGIONAL LYMPH NODE STAGING CATEGORY N3 (HCC): Status: ACTIVE | Noted: 2019-03-25

## 2019-03-25 PROBLEM — C77.9 REGIONAL LYMPH NODE METASTASIS PRESENT (HCC): Status: ACTIVE | Noted: 2019-03-25

## 2019-03-29 ENCOUNTER — HOSPITAL ENCOUNTER (OUTPATIENT)
Dept: PHYSICAL THERAPY | Facility: HOSPITAL | Age: 54
Setting detail: THERAPIES SERIES
Discharge: HOME | End: 2019-03-29

## 2019-03-29 DIAGNOSIS — I89.0 LYMPHEDEMA: Primary | ICD-10-CM

## 2019-03-29 PROCEDURE — 97163 PT EVAL HIGH COMPLEX 45 MIN: CPT | Performed by: PHYSICAL THERAPIST

## 2019-03-29 NOTE — THERAPY EVALUATION
Physical Therapy Lymphedema Initial Evaluation  [facility]     Patient Name: Ayla Echeverria  : 1965  MRN: [id number]  Today's Date: 3/29/2019      Visit Date: 2019    Visit Dx:    ICD-10-CM ICD-9-CM   1. Lymphedema I89.0 457.1       Patient Active Problem List   Diagnosis   • Bilateral acute serous otitis media   • Malignant neoplasm involving both nipple and areola of left breast in female (CMS/HCC)   • Cellulitis of abdominal wall   • Impetigo   • Folliculitis   • Encounter for consultation   • Non-smoker   • S/P lumpectomy, left breast   • S/P lymph node biopsy   • Regional lymph node staging category N3 (CMS/HCC)   • On antineoplastic chemotherapy   • Regional lymph node metastasis present (CMS/HCC)        Past Medical History:   Diagnosis Date   • Breast cancer (CMS/HCC)         Past Surgical History:   Procedure Laterality Date   • AXILLARY LYMPH NODE BIOPSY/EXCISION Left    • BREAST BIOPSY Left 2016   • BREAST LUMPECTOMY WITH SENTINEL NODE BIOPSY Left 10/03/2016    residual invasive carcinoma, grade 3 (0.2cm); margins negative; extensive lymphvascular space invasion; 2 sentinel lymph nodes positive (2/3)       Visit Dx:    ICD-10-CM ICD-9-CM   1. Lymphedema I89.0 457.1       Patient History     Row Name 19 0800             History    Chief Complaint  Pain;Swelling;Tightness  -HR      Type of Pain  Shoulder pain;Upper Extremity / Arm L breast  -HR      Date Current Problem(s) Began  16  -HR      Brief Description of Current Complaint  Complicated history since original L breast cancer in . Currently has pain and swelling in L breast and shoulder but has active inflammatory breast cancer on large amount of external breast tissues.   -HR      Hand Dominance  right-handed  -HR         Pain     Pain Location  Breast;Arm;Shoulder  -HR      Pain at Present  6;7  -HR      Pain at Best  6  -HR      Pain at Worst  10  -HR      Pain Frequency  Constant/continuous  -HR      Pain  Description  Burning;Heaviness;Tightness;Sore  -HR         Fall Risk Assessment    Any falls in the past year:  No  -HR         Services    Prior Rehab/Home Health Experiences  No  -HR      Are you currently receiving Home Health services  No  -HR      Do you plan to receive Home Health services in the near future  No  -HR         Daily Activities    Primary Language  English  -HR      Are you able to read  Yes  -HR      Are you able to write  Yes  -HR         Safety    Are you being hurt, hit, or frightened by anyone at home or in your life?  No  -HR      Are you being neglected by a caregiver  No  -HR        User Key  (r) = Recorded By, (t) = Taken By, (c) = Cosigned By    Initials Name Provider Type    HR Keyla Bennett, PT, DPT, CLT-EVANGELINA Physical Therapist          Lymphedema     Row Name 03/29/19 0800             Subjective Pain    Able to rate subjective pain?  yes  -HR      Pre-Treatment Pain Level  7  -HR         Subjective Comments    Subjective Comments  She has pain all the time. She is about to get radiation on her R breast but her L breast is the one that is so angry and swollen.   -HR         Lymphedema Assessment    Lymphedema Classification  Trunk:;LUE:  -HR      Lymphedema Cancer Related Sx  left;lumpectomy;sentinel node biopsy  -HR      Lymphedema Surgery Comments  L lumpectomy and SNB by Dr. Delaney in October 2016.  -HR      Lymph Nodes Removed #  3  -HR      Positive Lymph Nodes #  2  -HR      Cancer Comments  3 months after her surgery, she developed lesions on her L breast that are still present and cover nearly the entire breast now which has been diagnosed as inflammatory breast cancer. She now has a lump in the R breast and axilla that is not responding to chemo and is going to have radiation for that.   -HR      Chemo Received  yes  -HR      Chemo Treatments #/Timeframe  mulitple. difficult for me to include all   -HR      Adverse Chemo Reactions/Complication  She had a bad reaction  to one earlier this year and had to stop.   -HR      Radiation Therapy Received  no  -HR      Lymphedema Assessment Comments  She doesn't have any edema noted in the arm. Her L breast is so consumed with external lesions that it is impossible to assess what all is going on with her tissues.  -HR         General ROM    GENERAL ROM COMMENTS  She can reach overhead but does have pain.  -HR         MMT (Manual Muscle Testing)    General MMT Comments  weakness in L elbow extension with resistance.   -HR         Hand  Strength     Strength Affected Side  Left weakness noted  -HR         Lymphedema Sensation    Lymphedema Sensation Reports  numbness  -HR      Lymphedema Sensation Comments  numbness along posterior upper arm  -HR         Lymphedema Measurements    Measurement Type(s)  Quick Girth  -HR      Quick Girth Areas  Upper extremities  -HR         LUE Quick Girth (cm)    Axilla  30.1 cm  -HR      Mid upper arm  27 cm  -HR      Elbow  25.2 cm  -HR      Mid forearm  17.7 cm  -HR      Wrist crease  15.2 cm  -HR      Web space  17.8 cm  -HR      LUE Quick Girth Total  133  -HR         RUE Quick Girth (cm)    Axilla  31 cm  -HR      Mid upper arm  27.7 cm  -HR      Elbow  24.2 cm  -HR      Mid forearm  17 cm  -HR      Wrist crease  14.7 cm  -HR      Web space  18.5 cm  -HR      RUE Quick Girth Total  133.1  -HR         Manual Lymphatic Drainage    Manual Lymphatic Drainage Comments  I am not comfortable with starting MLD due to her bilateral cancers that are not currently responding to treatment.   -HR         Compression/Skin Care    Compression/Skin Care Comments  Discussed options for compression bra or tank as she needs a garment that will support and contain the L breast  -HR         L-Dex Bioimpedence Screening    L-Dex Measurement Extremity  LUE The machine would not test.   -HR        User Key  (r) = Recorded By, (t) = Taken By, (c) = Cosigned By    Initials Name Provider Type    HR Keyla Bennett,  PT, SHANNON, IDALIA Physical Therapist                    [unfilled]      Therapy Education  Education Details: Gave lymphedema education folder. Attempted L-Dex but equipment wouldn't work. Will perform at a later date.   Given: HEP  Program: New  How Provided: Verbal, Written  Provided to: Patient, Caregiver  Level of Understanding: Verbalized      Exercises     Row Name 03/29/19 0800             Subjective Comments    Subjective Comments  She has pain all the time. She is about to get radiation on her R breast but her L breast is the one that is so angry and swollen.   -HR         Subjective Pain    Able to rate subjective pain?  yes  -HR      Pre-Treatment Pain Level  7  -HR        User Key  (r) = Recorded By, (t) = Taken By, (c) = Cosigned By    Initials Name Provider Type    HR Keyla Bennett, PT, DPT, IDALIA Physical Therapist                          PT Assessment/Plan     Row Name 03/29/19 0800          PT Assessment    Functional Limitations  Limitations in functional capacity and performance;Performance in leisure activities;Decreased safety during functional activities  -HR     Impairments  Integumentary integrity;Edema;Impaired lymphatic circulation  -HR     Assessment Comments  Ms. Echeverria was evaluated for her swelling and pain in the L breast. This is caused by the inflammatory breast cancer that she has of the L breast. I am not comfortable trying to reroute the lymph to other parts of her body due to the large amount of tumor load and the speed with which the cancer returned in the first place after her lumpectomy in 2016. She did not seem to be under the impression that the goal of her treatment was no longer curative. If this is verified, then for pain relief, we could do some MLD. I did attempt to get an L-Dex reading on her and the equipment was not working properly. I will try again when she begins radiation treatments. Other plans will be made after further MD information can be  obtained by me.   -HR     Rehab Potential  Other (comment)  -HR     Patient/caregiver participated in establishment of treatment plan and goals  Yes  -HR     Patient would benefit from skilled therapy intervention  -- Not appropriate at this point due to active cancer  -HR        PT Plan    PT Frequency  Other (comment) will attempt L-Dex again  -HR       User Key  (r) = Recorded By, (t) = Taken By, (c) = Cosigned By    Initials Name Provider Type    HR Keyla Benentt, PT, DPT, IDALIA Physical Therapist                       Time Calculation:       Therapy Charges for Today     Code Description Service Date Service Provider Modifiers Qty    [id number]  PT EVAL HIGH COMPLEXITY 4 3/29/2019 Keyla Bennett, PT, DPT, IDALIA GP 1                    Keyla Bennett PT, DPT, IDALIA  3/29/2019

## 2019-04-01 ENCOUNTER — DOCUMENTATION (OUTPATIENT)
Dept: RADIATION ONCOLOGY | Facility: HOSPITAL | Age: 54
End: 2019-04-01

## 2019-04-01 PROCEDURE — 77300 RADIATION THERAPY DOSE PLAN: CPT | Performed by: RADIOLOGY

## 2019-04-01 PROCEDURE — 77295 3-D RADIOTHERAPY PLAN: CPT | Performed by: RADIOLOGY

## 2019-04-01 PROCEDURE — 77334 RADIATION TREATMENT AID(S): CPT | Performed by: RADIOLOGY

## 2019-04-01 NOTE — PROGRESS NOTES
This patient is ready to start radiation to the breast and regional lymph nodes for inflammatory breast carcinoma.    However, she is on Gemzar based chemotherapy along with cis-platinum and Herceptin.  I have placed a call to Dr. Ott to see if it is possible to withhold the Gemzar chemotherapy during the time of radiation.    Combination of Gemzar and radiation is most frequently known for GI toxicity, but I have also seen significant soft tissue reaction particularly when treating a large portals which will be necessary in her case.    However, I also understand that she does require aggressive chemotherapy with this very aggressive malignancy.  We will discuss this with Dr. Ott and we will come to agreement whether to withhold the Gemzar or to observe for reaction and manage any increase in toxicity with his combined modality therapy.

## 2019-04-12 ENCOUNTER — HOSPITAL ENCOUNTER (OUTPATIENT)
Dept: RADIATION ONCOLOGY | Facility: HOSPITAL | Age: 54
Setting detail: RADIATION/ONCOLOGY SERIES
End: 2019-04-12

## 2019-04-16 ENCOUNTER — HOSPITAL ENCOUNTER (OUTPATIENT)
Dept: RADIATION ONCOLOGY | Facility: HOSPITAL | Age: 54
Discharge: HOME | End: 2019-04-16

## 2019-04-16 PROCEDURE — 77417 THER RADIOLOGY PORT IMAGE(S): CPT | Performed by: RADIOLOGY

## 2019-04-16 PROCEDURE — 77412 RADIATION TX DELIVERY LVL 3: CPT | Performed by: RADIOLOGY

## 2019-04-17 ENCOUNTER — HOSPITAL ENCOUNTER (OUTPATIENT)
Dept: RADIATION ONCOLOGY | Facility: HOSPITAL | Age: 54
Discharge: HOME | End: 2019-04-17

## 2019-04-17 PROCEDURE — 77412 RADIATION TX DELIVERY LVL 3: CPT | Performed by: RADIOLOGY

## 2019-04-18 ENCOUNTER — HOSPITAL ENCOUNTER (OUTPATIENT)
Dept: RADIATION ONCOLOGY | Facility: HOSPITAL | Age: 54
Discharge: HOME | End: 2019-04-18

## 2019-04-18 PROCEDURE — 77412 RADIATION TX DELIVERY LVL 3: CPT | Performed by: RADIOLOGY

## 2019-04-18 PROCEDURE — 77336 RADIATION PHYSICS CONSULT: CPT | Performed by: RADIOLOGY

## 2019-04-19 ENCOUNTER — DOCUMENTATION (OUTPATIENT)
Dept: ONCOLOGY | Facility: HOSPITAL | Age: 54
End: 2019-04-19

## 2019-04-19 ENCOUNTER — HOSPITAL ENCOUNTER (OUTPATIENT)
Dept: RADIATION ONCOLOGY | Facility: HOSPITAL | Age: 54
Discharge: HOME | End: 2019-04-19

## 2019-04-19 PROCEDURE — 77412 RADIATION TX DELIVERY LVL 3: CPT | Performed by: RADIOLOGY

## 2019-04-19 NOTE — PROGRESS NOTES
Social work consultation requested due to high distress score.  Patient is present for radiation therapy to treat reoccuring breast cancer.  She will also be doing chemotherapy as well.  She continues to work due to finances and is undecided if she should continue working or apply for disability.  Finances are difficult for her due to medical bills and discussed financial counseling program available here at hospital.   She has outside stressors such as work, adult children/conflict and divorce within the last three years that affect her.  She has a good support system through her sister, other family members and [employer].  She doesn't like to take medication but does at times for pain.  She has difficulty sleeping and her appetite has been affected.  No other needs noted at this time.  Patient to contact this worker for further needs.

## 2019-04-22 ENCOUNTER — HOSPITAL ENCOUNTER (OUTPATIENT)
Dept: RADIATION ONCOLOGY | Facility: HOSPITAL | Age: 54
Discharge: HOME | End: 2019-04-22

## 2019-04-22 PROCEDURE — 77412 RADIATION TX DELIVERY LVL 3: CPT | Performed by: RADIOLOGY

## 2019-04-23 ENCOUNTER — HOSPITAL ENCOUNTER (OUTPATIENT)
Dept: RADIATION ONCOLOGY | Facility: HOSPITAL | Age: 54
Discharge: HOME | End: 2019-04-23

## 2019-04-23 PROCEDURE — 77412 RADIATION TX DELIVERY LVL 3: CPT | Performed by: RADIOLOGY

## 2019-04-23 PROCEDURE — 77417 THER RADIOLOGY PORT IMAGE(S): CPT | Performed by: RADIOLOGY

## 2019-04-23 RX ORDER — HYDROCODONE BITARTRATE AND ACETAMINOPHEN 10; 325 MG/1; MG/1
1 TABLET ORAL EVERY 4 HOURS PRN
Qty: 40 TABLET | Refills: 0 | OUTPATIENT
Start: 2019-04-23 | End: 2020-01-05

## 2019-04-24 ENCOUNTER — HOSPITAL ENCOUNTER (OUTPATIENT)
Dept: RADIATION ONCOLOGY | Facility: HOSPITAL | Age: 54
Discharge: HOME | End: 2019-04-24

## 2019-04-24 PROCEDURE — 77412 RADIATION TX DELIVERY LVL 3: CPT | Performed by: RADIOLOGY

## 2019-04-25 ENCOUNTER — HOSPITAL ENCOUNTER (OUTPATIENT)
Dept: RADIATION ONCOLOGY | Facility: HOSPITAL | Age: 54
Discharge: HOME | End: 2019-04-25

## 2019-04-25 PROCEDURE — 77412 RADIATION TX DELIVERY LVL 3: CPT | Performed by: RADIOLOGY

## 2019-04-25 PROCEDURE — 77336 RADIATION PHYSICS CONSULT: CPT | Performed by: RADIOLOGY

## 2019-04-26 ENCOUNTER — HOSPITAL ENCOUNTER (OUTPATIENT)
Dept: RADIATION ONCOLOGY | Facility: HOSPITAL | Age: 54
Discharge: HOME | End: 2019-04-26

## 2019-04-26 PROCEDURE — 77412 RADIATION TX DELIVERY LVL 3: CPT | Performed by: RADIOLOGY

## 2019-04-29 ENCOUNTER — HOSPITAL ENCOUNTER (OUTPATIENT)
Dept: RADIATION ONCOLOGY | Facility: HOSPITAL | Age: 54
Discharge: HOME | End: 2019-04-29

## 2019-04-29 PROCEDURE — 77412 RADIATION TX DELIVERY LVL 3: CPT | Performed by: RADIOLOGY

## 2019-04-30 ENCOUNTER — HOSPITAL ENCOUNTER (OUTPATIENT)
Dept: RADIATION ONCOLOGY | Facility: HOSPITAL | Age: 54
Discharge: HOME | End: 2019-04-30

## 2019-04-30 PROCEDURE — 77417 THER RADIOLOGY PORT IMAGE(S): CPT | Performed by: RADIOLOGY

## 2019-04-30 PROCEDURE — 77412 RADIATION TX DELIVERY LVL 3: CPT | Performed by: RADIOLOGY

## 2019-05-01 ENCOUNTER — HOSPITAL ENCOUNTER (OUTPATIENT)
Dept: WOUND CARE | Age: 54
Discharge: HOME | End: 2019-05-01
Payer: COMMERCIAL

## 2019-05-01 ENCOUNTER — HOSPITAL ENCOUNTER (OUTPATIENT)
Dept: RADIATION ONCOLOGY | Facility: HOSPITAL | Age: 54
Setting detail: RADIATION/ONCOLOGY SERIES
End: 2019-05-01

## 2019-05-01 ENCOUNTER — HOSPITAL ENCOUNTER (OUTPATIENT)
Dept: RADIATION ONCOLOGY | Facility: HOSPITAL | Age: 54
Discharge: HOME | End: 2019-05-01

## 2019-05-01 VITALS
SYSTOLIC BLOOD PRESSURE: 112 MMHG | HEART RATE: 78 BPM | RESPIRATION RATE: 18 BRPM | DIASTOLIC BLOOD PRESSURE: 78 MMHG | TEMPERATURE: 97.5 F

## 2019-05-01 DIAGNOSIS — S21.002A BREAST WOUND, LEFT, INITIAL ENCOUNTER: ICD-10-CM

## 2019-05-01 PROCEDURE — 77412 RADIATION TX DELIVERY LVL 3: CPT | Performed by: RADIOLOGY

## 2019-05-01 PROCEDURE — 99214 OFFICE O/P EST MOD 30 MIN: CPT | Performed by: NURSE PRACTITIONER

## 2019-05-01 PROCEDURE — 99213 OFFICE O/P EST LOW 20 MIN: CPT

## 2019-05-01 NOTE — PROGRESS NOTES
Patient Care Team:  Marlan Galeazzi, MD as PCP - General (Family Medicine)  Hugo Astudillo MD as Consulting Physician (Hematology and Oncology)  Scott Thurman MD as Consulting Physician (Otolaryngology)    TODAY'S DATE:  5/1/2019     HISTORY of PRESENT ILLNESS HPI   Ramsey Holstein is a 48 y.o. female who presents today for wound evaluation. Patient has a history of left breast cancer. Patient had palpable mass to left breast in March 2016 and bx 4/1/2017 that was consistent with invasive ductal carcinoma. She received chemo therapy April 2016 - Sept 2016. October 2016 she had lumpectomy with sentinel lymph node to reveal invasive carcinoma grade 3, at that time patient declined radiation therapy and herceptin completion. Patient had new complaints of breast erythema and Dr. Dayanara Hinojosa performed skin bx that was consistent with metastatic ductal carcinoma and extensive lymphovascular invasion. She was followed up in February 2017 with Dr. Dayanara Hinojosa and patient declined further interventions. April 2017 she was referred to Dr. Moreno Hoskins for further evaluation. PET Scan 5/4/2017 showed abnormal metabolic activity present in multiple regions throughout the left breast and skin. Dr. Moreno Hoskins has been treating patient closely related to left breast cancer with consultation from [facility]. Patient last PET CT 7/18/2018 showed signs of metastatic disease. Patient has since had worsening of skin metastasis to left breast and diffuse erythema with new nodules. Patient is still under radiation treatment therapy daily at [facility] being followed by Dr. Whitley Nieves. She also received chemo therapy every 2 weeks under the supervision of Dr. Moreno Hoskins. Patient is here for left breast nodules to prevent further decline in skin. Patient states that \"Dr. Whitley Nieves wants to be notified before any debridements\".     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 This started 3 year(s) ago. She believes this is not healing. She has been applying nothing. She has not had  fever or chills. She has a history of left breast cancer. Sanna Winter is a 48 y.o. female with the following history reviewed and [facility] in [facility]:  Patient Active Problem List    Diagnosis Date Noted    Breast wound, left, initial encounter 05/01/2019    HER2-positive carcinoma of breast ([facility] 05/19/2018    Breast cancer of upper-outer quadrant of left female breast ([facility] 04/08/2016    Secondary malignant neoplasm of axillary lymph nodes (HCC) 04/08/2016     Current Outpatient Medications   Medication Sig Dispense Refill    pantoprazole (PROTONIX) 40 MG tablet Take 40 mg by mouth daily      HYDROcodone-acetaminophen (NORCO)  MG per tablet Take 1 tablet by mouth every 4 hours as needed for Pain. Chales Haste morphine (MSIR) 15 MG tablet Take 15 mg by mouth every 12 hours as needed for Pain. .      ondansetron (ZOFRAN ODT) 4 MG disintegrating tablet Take 1 tablet by mouth every 8 hours as needed for Nausea or Vomiting 15 tablet 0    Ferrous Sulfate (IRON) 325 (65 FE) MG TABS Take by mouth      Multiple Vitamin (MULTI VITAMIN DAILY PO) Take by mouth       No current facility-administered medications for this encounter. Allergies: Patient has no known allergies.     Past Medical History:   Diagnosis Date    Cancer [facility])     breast    GERD (gastroesophageal reflux disease)      Past Surgical History:   Procedure Laterality Date    BREAST LUMPECTOMY Left 10/4/2016    LUMPECTOMY WITH SENTINAL NODE BIOPSY AND INTRA OP US GUIDED NEEDLE LOCALIZATION performed by Marlin Welsh MD at [address] [place] [address]      biopsy left breast    COLONOSCOPY  2008    INSERTION / REMOVAL / REPLACEMENT VENOUS ACCESS CATHETER N/A 5/12/2016    SINGLE LUMEN PORT INSERTION performed by Marlin Welsh MD at [address] [facility] DIAGNOSTIC BONE MARROW BIOPSIES Right 11/14/2018    BONE MARROW ASPIRATION BIOPSY performed by Mi Patterson MD at [facility]     Family History   Problem Relation Age of Onset    Lung Cancer Father     Colon Cancer Father     Prostate Cancer Father     Hypertension Mother      Social History     Tobacco Use    Smoking status: Never Smoker    Smokeless tobacco: Never Used   Substance Use Topics    Alcohol use: No     Alcohol/week: 0.0 oz       Review of Systems  Constitutional / general:  No fever / chills / sweats  Head:  No headache / neck stiffness  Eyes:  No blurry vision / itching / redness  ENT: No sore throat / ear pain  CV:  No chest pain / palpitations   Respiratory:  No cough / shortness of breath  GI: No nausea / vomiting   Neuro: No seizure / headache  Musculoskeletal:  No muscle pain  Vascular: No thrombosis  Heme / endocrine: No easy bruising / bleeding / heat or cold intolerance  Psychiatric:  No depression / anxiety / insomnia / mood changes  Skin:  No skin hair or nail changes  Wound: left breast cancer  All other review of systems are negative.     Physical Exam    /78   Pulse 78   Temp 97.5 °F (36.4 °C) (Temporal)   Resp 18     General appearance: Appears stated age, cooperative and no distress  Head: Normocephalic, atraumatic  Eyes: conjunctivae/corneas clear  Ears: normal external ears, nares patent  Neck: no JVD,  trachea midline   Lungs: clear to auscultation bilaterally with symmetric expansion  Heart: regular rate rhythm   Abdomen: soft, non-tender; non-distended   Extremities: no sign of DVT  Lymphatic: No palpable lymph node enlargment  Neurologic: Alert and oriented X 3,  Psychiatric:  Thought content appropriate, normal insight, mood appropriate  Skin: left breast lesions    Post Debridement Measurements and Assessment:    Incision 05/12/16 Chest Left (Active)   Number of days: 1084       Incision 10/04/16 Breast Left (Active)   Number of days: 939       Incision 11/14/18 Hip Right (Active)   Number of days: 168       Wound 05/01/19 Breast Left;Mid Wound 1 - Left breast - radiation  (Active)   Wound Image   5/1/2019  2:12 PM   Wound Other 5/1/2019  2:12 PM   Dressing Status Old drainage 5/1/2019  2:12 PM   Dressing Changed Changed/New 5/1/2019  2:12 PM   Dressing/Treatment Xeroform 5/1/2019  2:12 PM   Wound Cleansed Rinsed/Irrigated with saline 5/1/2019  2:12 PM   Wound Length (cm) 9 cm 5/1/2019  2:12 PM   Wound Width (cm) 8 cm 5/1/2019  2:12 PM   Wound Depth (cm) 0.1 cm 5/1/2019  2:12 PM   Wound Surface Area (cm^2) 72 cm^2 5/1/2019  2:12 PM   Wound Volume (cm^3) 7.2 cm^3 5/1/2019  2:12 PM   Distance Tunneling (cm) 0 cm 5/1/2019  2:12 PM   Tunneling Position ___ O'Clock 0 5/1/2019  2:12 PM   Undermining Starts ___ O'Clock 0 5/1/2019  2:12 PM   Undermining Ends___ O'Clock 0 5/1/2019  2:12 PM   Undermining Maxium Distance (cm) 0 5/1/2019  2:12 PM   Wound Assessment Red;Pink 5/1/2019  2:12 PM   Drainage Amount Large 5/1/2019  2:12 PM   Drainage Description Clear 5/1/2019  2:12 PM   Odor None 5/1/2019  2:12 PM   Margins Attached edges 5/1/2019  2:12 PM   Exposed structure Fascia 5/1/2019  2:12 PM   Isabelle-wound Assessment Pink;Red 5/1/2019  2:12 PM   Non-staged Wound Description Full thickness 5/1/2019  2:12 PM   [place]%Wound Bed 80 5/1/2019  2:12 PM   Red%Wound Bed 20 5/1/2019  2:12 PM   Yellow%Wound Bed 0 5/1/2019  2:12 PM   Black%Wound Bed 0 5/1/2019  2:12 PM   Purple%Wound Bed 0 5/1/2019  2:12 PM   Other%Wound Bed 0 5/1/2019  2:12 PM   Culture Taken No 5/1/2019  2:12 PM   Number of days: 0      Please refer to nursing measurements and assessment regarding wound pre and post routine medical management with PCP     Discharge Instructions       Visit Discharge/Physician Orders    Discharge condition: Stable    Discharge to: Home    Left via:Private automobile    Accompanied by:  Self     ECF/HHA:     Dressing Orders:    Left breast wound:   Wash with soap and water. Apply Xeroform to wound bed. Cover with silicone border gauze. Change every 2-3 daily depending on drainage. If there is excessive drainage and soaking through silicone border gauze:  Wash with soap and water. Apply Xeroform to wound bed. Cover with ABD pad. Secure with medipore tape. Change twice daily . Treatment Orders: Follow up with Dr. Eric Lunsford and Dr. Lorena Forbes as directed     [facility] followup visit:      Monday May 13th at 3:45 pm Dr. Gina Napoles   (Please note your next appointment above and if you are unable to keep, kindly give a 24 hour notice. Thank you.)      If you experience any of the following, please call the [facility] during business hours:    * Increase in Pain  * Temperature over 101  * Increase in drainage from your wound  * Drainage with a foul odor  * Bleeding  * Increase in swelling  * Need for compression bandage changes due to slippage, breakthrough drainage. If you need medical attention outside of the business hours of the [facility] please contact your PCP or go to the nearest emergency room.

## 2019-05-02 ENCOUNTER — HOSPITAL ENCOUNTER (OUTPATIENT)
Dept: RADIATION ONCOLOGY | Facility: HOSPITAL | Age: 54
Discharge: HOME | End: 2019-05-02

## 2019-05-02 PROCEDURE — 77412 RADIATION TX DELIVERY LVL 3: CPT | Performed by: RADIOLOGY

## 2019-05-02 PROCEDURE — 77336 RADIATION PHYSICS CONSULT: CPT | Performed by: RADIOLOGY

## 2019-05-03 ENCOUNTER — HOSPITAL ENCOUNTER (OUTPATIENT)
Dept: RADIATION ONCOLOGY | Facility: HOSPITAL | Age: 54
Discharge: HOME | End: 2019-05-03

## 2019-05-03 PROCEDURE — 77412 RADIATION TX DELIVERY LVL 3: CPT | Performed by: RADIOLOGY

## 2019-05-06 ENCOUNTER — HOSPITAL ENCOUNTER (OUTPATIENT)
Dept: RADIATION ONCOLOGY | Facility: HOSPITAL | Age: 54
Discharge: HOME | End: 2019-05-06

## 2019-05-06 PROCEDURE — 77412 RADIATION TX DELIVERY LVL 3: CPT | Performed by: RADIOLOGY

## 2019-05-06 NOTE — PLAN OF CARE
Problem: Wound:  Goal: Will show signs of wound healing; wound closure and no evidence of infection  Description  Will show signs of wound healing; wound closure and no evidence of infection  Outcome: Ongoing

## 2019-05-07 ENCOUNTER — HOSPITAL ENCOUNTER (OUTPATIENT)
Dept: RADIATION ONCOLOGY | Facility: HOSPITAL | Age: 54
Discharge: HOME | End: 2019-05-07

## 2019-05-07 PROCEDURE — 77417 THER RADIOLOGY PORT IMAGE(S): CPT | Performed by: RADIOLOGY

## 2019-05-07 PROCEDURE — 77412 RADIATION TX DELIVERY LVL 3: CPT | Performed by: RADIOLOGY

## 2019-05-08 ENCOUNTER — HOSPITAL ENCOUNTER (OUTPATIENT)
Dept: RADIATION ONCOLOGY | Facility: HOSPITAL | Age: 54
Discharge: HOME | End: 2019-05-08

## 2019-05-08 ENCOUNTER — HOSPITAL ENCOUNTER (OUTPATIENT)
Dept: WOUND CARE | Age: 54
Discharge: HOME | End: 2019-05-08
Payer: COMMERCIAL

## 2019-05-08 VITALS
SYSTOLIC BLOOD PRESSURE: 121 MMHG | HEART RATE: 88 BPM | HEIGHT: 63 IN | RESPIRATION RATE: 16 BRPM | DIASTOLIC BLOOD PRESSURE: 81 MMHG | BODY MASS INDEX: 27.11 KG/M2 | TEMPERATURE: 98.1 F | WEIGHT: 153 LBS

## 2019-05-08 DIAGNOSIS — S21.002D BREAST WOUND, LEFT, SUBSEQUENT ENCOUNTER: Chronic | ICD-10-CM

## 2019-05-08 PROCEDURE — 99212 OFFICE O/P EST SF 10 MIN: CPT

## 2019-05-08 PROCEDURE — 99213 OFFICE O/P EST LOW 20 MIN: CPT | Performed by: SURGERY

## 2019-05-08 PROCEDURE — 77412 RADIATION TX DELIVERY LVL 3: CPT | Performed by: RADIOLOGY

## 2019-05-08 ASSESSMENT — PAIN SCALES - GENERAL: PAINLEVEL_OUTOF10: 5

## 2019-05-08 ASSESSMENT — PAIN DESCRIPTION - LOCATION: LOCATION: BREAST

## 2019-05-08 ASSESSMENT — PAIN DESCRIPTION - FREQUENCY: FREQUENCY: CONTINUOUS

## 2019-05-08 ASSESSMENT — PAIN DESCRIPTION - ORIENTATION: ORIENTATION: LEFT

## 2019-05-08 ASSESSMENT — PAIN DESCRIPTION - DESCRIPTORS: DESCRIPTORS: ACHING

## 2019-05-08 ASSESSMENT — PAIN DESCRIPTION - PAIN TYPE: TYPE: CHRONIC PAIN

## 2019-05-08 NOTE — PROGRESS NOTES
[facility] [address]   Progress Note and Procedure Note      Yenifer Hollingsworth RECORD NUMBER:  [id number]  AGE: 48 y.o. GENDER: female  : 1965  EPISODE DATE:  2019    Subjective:     Chief Complaint   Patient presents with    Wound Check     Wound Check         HISTORY of PRESENT ILLNESS CALISTA Angulo is a 48 y.o. female who presents today for wound/ulcer evaluation.    History of Wound Context: Pt with draining L breast cancer wounds here for eval/treat  Wound/Ulcer Pain Timing/Severity: none  Quality of pain: N/A  Severity:  0 / 10   Modifying Factors: None  Associated Signs/Symptoms: edema and drainage    Ulcer Identification:  Ulcer Type: non-healing/non-surgical and malignant wound  Contributing Factors: edema and lymphedema    Wound: malignancy wound        PAST MEDICAL HISTORY        Diagnosis Date    Cancer [facility])     breast    GERD (gastroesophageal reflux disease)        PAST SURGICAL HISTORY    Past Surgical History:   Procedure Laterality Date    BREAST LUMPECTOMY Left 10/4/2016    LUMPECTOMY WITH SENTINAL NODE BIOPSY AND INTRA OP US GUIDED NEEDLE LOCALIZATION performed by Floyd Evans MD at [address] [place] [address]      biopsy left breast    COLONOSCOPY      INSERTION / REMOVAL / REPLACEMENT VENOUS ACCESS CATHETER N/A 2016    SINGLE LUMEN PORT INSERTION performed by Floyd [facility] MD at [facility] Right 2018    BONE MARROW ASPIRATION BIOPSY performed by Yo Javed MD at [facility] OR       FAMILY HISTORY    Family History   Problem Relation Age of Onset    Lung Cancer Father     Colon Cancer Father     Prostate Cancer Father     Hypertension Mother        SOCIAL HISTORY    Social History     Tobacco Use    Smoking status: Never Smoker    Smokeless tobacco: Never Used   Substance Use Topics    Alcohol use: No     Alcohol/week: 0.0 oz    Drug use: No       ALLERGIES    No Known Allergies    MEDICATIONS    Current Outpatient Medications on File Prior to Encounter   Medication Sig Dispense Refill    pantoprazole (PROTONIX) 40 MG tablet Take 40 mg by mouth daily      HYDROcodone-acetaminophen (NORCO)  MG per tablet Take 1 tablet by mouth every 4 hours as needed for Pain. [place] Lung morphine (MSIR) 15 MG tablet Take 15 mg by mouth every 12 hours as needed for Pain. .      ondansetron (ZOFRAN ODT) 4 MG disintegrating tablet Take 1 tablet by mouth every 8 hours as needed for Nausea or Vomiting 15 tablet 0    Ferrous Sulfate (IRON) 325 (65 FE) MG TABS Take by mouth      Multiple Vitamin (MULTI VITAMIN DAILY PO) Take by mouth       No current facility-administered medications on file prior to encounter. REVIEW OF SYSTEMS    Pertinent items are noted in HPI.     Objective:      /81   Pulse 88   Temp 98.1 °F (36.7 °C) (Temporal)   Resp 16   Ht 5' 3\" (1.6 m)   Wt 153 lb (69.4 kg)   BMI 27.10 kg/m²     Wt Readings from Last 3 Encounters:   05/08/19 153 lb (69.4 kg)   01/10/19 150 lb (68 kg)   11/14/18 150 lb (68 kg)       PHYSICAL EXAM    General Appearance: alert and oriented to person, place and time, well developed and well- nourished, in no acute distress  Skin: warm and dry, no rash or erythema  Head: normocephalic and atraumatic  Eyes: pupils equal, round, and reactive to light, extraocular eye movements intact, conjunctivae normal  ENT: tympanic membrane, external ear and ear canal normal bilaterally, nose without deformity, nasal mucosa and turbinates normal without polyps  Neck: supple and non-tender without mass, no thyromegaly or thyroid nodules, no cervical lymphadenopathy  Pulmonary/Chest: clear to auscultation bilaterally- no wheezes, rales or rhonchi, normal air movement, no respiratory distress  Cardiovascular: normal rate, regular rhythm, normal S1 and S2, no murmurs, rubs, clicks, or gallops, distal pulses intact, no carotid bruits  Abdomen: soft, non-tender, non-distended, normal bowel sounds, no masses or organomegaly  Extremities: no cyanosis, clubbing or edema  Musculoskeletal: normal range of motion, no joint swelling, deformity or tenderness  Neurologic: reflexes normal and symmetric, no cranial nerve deficit, gait, coordination and speech normal      Assessment:      Patient Active Problem List   Diagnosis Code    Breast cancer of upper-outer quadrant of left female breast ([facility] C50.412    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    HER2-positive carcinoma of breast ([facility] C50.919    Breast wound, left, subsequent encounter S21.002D                 Incision 05/12/16 Chest Left (Active)   Number of days: 1091       Incision 10/04/16 Breast Left (Active)   Number of days: 946       Incision 11/14/18 Hip Right (Active)   Number of days: 174       Wound 05/01/19 Breast Left;Mid Wound 1 - Left breast - radiation  (Active)   Wound Image   5/1/2019  2:12 PM   Wound Other 5/8/2019 10:11 AM   Dressing Status Old drainage 5/8/2019 10:11 AM   Dressing Changed Changed/New 5/1/2019  2:12 PM   Dressing/Treatment Xeroform 5/1/2019  2:12 PM   Wound Cleansed Rinsed/Irrigated with saline 5/8/2019 10:11 AM   Wound Length (cm) 9 cm 5/8/2019 10:11 AM   Wound Width (cm) 8 cm 5/8/2019 10:11 AM   Wound Depth (cm) 0.1 cm 5/8/2019 10:11 AM   Wound Surface Area (cm^2) 72 cm^2 5/8/2019 10:11 AM   Change in Wound Size % (l*w) 0 5/8/2019 10:11 AM   Wound Volume (cm^3) 7.2 cm^3 5/8/2019 10:11 AM   Wound Healing % 0 5/8/2019 10:11 AM   Distance Tunneling (cm) 0 cm 5/1/2019  2:12 PM   Tunneling Position ___ O'Clock 0 5/1/2019  2:12 PM   Undermining Starts ___ O'Clock 0 5/1/2019  2:12 PM   Undermining Ends___ O'Clock 0 5/1/2019  2:12 PM   Undermining Maxium Distance (cm) 0 5/1/2019  2:12 PM   Wound Assessment Red;Pink 5/8/2019 10:11 AM   Drainage Amount Large 5/8/2019 10:11 AM   Drainage Description Serous 5/8/2019 10:11 AM   Odor None 5/8/2019 10:11 AM   Margins Attached edges 5/8/2019 10:11 AM   Exposed structure Fascia 5/1/2019  2:12 PM   Isabelle-wound Assessment Pink 5/8/2019 10:11 AM   Non-staged Wound Description Full thickness 5/8/2019 10:11 AM   [place]%Wound Bed 80 5/8/2019 10:11 AM   Red%Wound Bed 20 5/8/2019 10:11 AM   Yellow%Wound Bed 0 5/1/2019  2:12 PM   Black%Wound Bed 0 5/1/2019  2:12 PM   Purple%Wound Bed 0 5/1/2019  2:12 PM   Other%Wound Bed 0 5/1/2019  2:12 PM   Culture Taken No 5/1/2019  2:12 PM   Number of days: 6       Plan:     Problem List Items Addressed This Visit     * (Principal) Breast wound, left, subsequent encounter (Chronic)        Pt wanting drainage control at this point of breast lesion      Treatment Note please see attached Discharge Instructions    In my professional opinion this patient would benefit from HBO Therapy: No    Written patient dismissal instructions given to patient and signed by patient or POA. Discharge Instructions       Visit Discharge/Physician Orders    Discharge condition: Stable    Discharge to: Home    Left via:Private automobile    Accompanied by: Self    [facility]    Dressing Orders: Left breast wound:   Wash with soap and water. Use optilock dressing daily, Hold in place with Bra    Alternative dressing    If there is excessive drainage and soaking through silicone border gauze:  Wash with soap and water. Apply Xeroform to wound bed. Cover with ABD pad. Secure with medipore tape. Change twice daily .     Treatment Orders: Follow up with Dr. Gisel Adkins and Dr. Corrie Bloom as directed     [facility] followup visit ____________4 weeks________________  (Please note your next appointment above and if you are unable to keep, kindly give a 24 hour notice.  Thank you.)    If you experience any of the following, please call the [address] [facility] [address] during business hours:    * Increase in Pain  * Temperature over 101  * Increase in drainage from your wound  * Drainage with a foul odor  * Bleeding  * Increase in swelling  * Need for compression bandage changes due to slippage, breakthrough drainage. If you need medical attention outside of the business hours of the [address] [place] [address] please contact your PCP or go to the nearest emergency room.         Electronically signed by Pavel Pepe MD on 5/8/2019 at 10:43 AM

## 2019-05-09 ENCOUNTER — HOSPITAL ENCOUNTER (OUTPATIENT)
Dept: RADIATION ONCOLOGY | Facility: HOSPITAL | Age: 54
Discharge: HOME | End: 2019-05-09

## 2019-05-09 PROCEDURE — 77412 RADIATION TX DELIVERY LVL 3: CPT | Performed by: RADIOLOGY

## 2019-05-09 PROCEDURE — 77336 RADIATION PHYSICS CONSULT: CPT | Performed by: RADIOLOGY

## 2019-05-10 ENCOUNTER — HOSPITAL ENCOUNTER (OUTPATIENT)
Dept: RADIATION ONCOLOGY | Facility: HOSPITAL | Age: 54
Discharge: HOME | End: 2019-05-10

## 2019-05-10 PROCEDURE — 77412 RADIATION TX DELIVERY LVL 3: CPT | Performed by: RADIOLOGY

## 2019-05-13 ENCOUNTER — HOSPITAL ENCOUNTER (OUTPATIENT)
Dept: RADIATION ONCOLOGY | Facility: HOSPITAL | Age: 54
Discharge: HOME | End: 2019-05-13

## 2019-05-13 PROCEDURE — 77412 RADIATION TX DELIVERY LVL 3: CPT | Performed by: RADIOLOGY

## 2019-05-14 ENCOUNTER — HOSPITAL ENCOUNTER (OUTPATIENT)
Dept: RADIATION ONCOLOGY | Facility: HOSPITAL | Age: 54
Discharge: HOME | End: 2019-05-14

## 2019-05-14 PROCEDURE — 77412 RADIATION TX DELIVERY LVL 3: CPT | Performed by: RADIOLOGY

## 2019-05-14 PROCEDURE — 77417 THER RADIOLOGY PORT IMAGE(S): CPT | Performed by: RADIOLOGY

## 2019-05-15 ENCOUNTER — HOSPITAL ENCOUNTER (OUTPATIENT)
Dept: RADIATION ONCOLOGY | Facility: HOSPITAL | Age: 54
Discharge: HOME | End: 2019-05-15

## 2019-05-15 PROCEDURE — 77412 RADIATION TX DELIVERY LVL 3: CPT | Performed by: RADIOLOGY

## 2019-05-16 ENCOUNTER — HOSPITAL ENCOUNTER (OUTPATIENT)
Dept: RADIATION ONCOLOGY | Facility: HOSPITAL | Age: 54
Discharge: HOME | End: 2019-05-16

## 2019-05-16 PROCEDURE — 77412 RADIATION TX DELIVERY LVL 3: CPT | Performed by: RADIOLOGY

## 2019-05-16 PROCEDURE — 77336 RADIATION PHYSICS CONSULT: CPT | Performed by: RADIOLOGY

## 2019-05-17 ENCOUNTER — HOSPITAL ENCOUNTER (OUTPATIENT)
Dept: RADIATION ONCOLOGY | Facility: HOSPITAL | Age: 54
Discharge: HOME | End: 2019-05-17

## 2019-05-17 PROCEDURE — 77412 RADIATION TX DELIVERY LVL 3: CPT | Performed by: RADIOLOGY

## 2019-05-20 ENCOUNTER — HOSPITAL ENCOUNTER (OUTPATIENT)
Dept: RADIATION ONCOLOGY | Facility: HOSPITAL | Age: 54
Discharge: HOME | End: 2019-05-20

## 2019-05-20 PROCEDURE — 77412 RADIATION TX DELIVERY LVL 3: CPT | Performed by: RADIOLOGY

## 2019-05-21 ENCOUNTER — HOSPITAL ENCOUNTER (OUTPATIENT)
Dept: RADIATION ONCOLOGY | Facility: HOSPITAL | Age: 54
Discharge: HOME | End: 2019-05-21

## 2019-05-21 PROCEDURE — 77417 THER RADIOLOGY PORT IMAGE(S): CPT | Performed by: RADIOLOGY

## 2019-05-21 PROCEDURE — 77412 RADIATION TX DELIVERY LVL 3: CPT | Performed by: RADIOLOGY

## 2019-05-22 ENCOUNTER — HOSPITAL ENCOUNTER (OUTPATIENT)
Dept: RADIATION ONCOLOGY | Facility: HOSPITAL | Age: 54
Discharge: HOME | End: 2019-05-22

## 2019-05-22 PROCEDURE — 77412 RADIATION TX DELIVERY LVL 3: CPT | Performed by: RADIOLOGY

## 2019-05-23 ENCOUNTER — HOSPITAL ENCOUNTER (OUTPATIENT)
Dept: RADIATION ONCOLOGY | Facility: HOSPITAL | Age: 54
Discharge: HOME | End: 2019-05-23

## 2019-05-23 PROCEDURE — 77412 RADIATION TX DELIVERY LVL 3: CPT | Performed by: RADIOLOGY

## 2019-05-23 PROCEDURE — 77336 RADIATION PHYSICS CONSULT: CPT | Performed by: RADIOLOGY

## 2019-05-30 ENCOUNTER — HOSPITAL ENCOUNTER (OUTPATIENT)
Dept: NUCLEAR MEDICINE | Age: 54
Discharge: HOME | End: 2019-06-01
Payer: COMMERCIAL

## 2019-05-30 ENCOUNTER — HOSPITAL ENCOUNTER (OUTPATIENT)
Dept: CT IMAGING | Age: 54
Discharge: HOME | End: 2019-05-30
Payer: COMMERCIAL

## 2019-05-30 DIAGNOSIS — C50.412 MALIGNANT NEOPLASM OF UPPER-OUTER QUADRANT OF LEFT FEMALE BREAST, UNSPECIFIED ESTROGEN RECEPTOR STATUS (HCC): ICD-10-CM

## 2019-05-30 DIAGNOSIS — C50.912 MALIGNANT NEOPLASM OF LEFT FEMALE BREAST, UNSPECIFIED ESTROGEN RECEPTOR STATUS, UNSPECIFIED SITE OF BREAST (HCC): ICD-10-CM

## 2019-05-30 PROCEDURE — 3430000000 HC RX DIAGNOSTIC RADIOPHARMACEUTICAL: Performed by: INTERNAL MEDICINE

## 2019-05-30 PROCEDURE — 74177 CT ABD & PELVIS W/CONTRAST: CPT

## 2019-05-30 PROCEDURE — 6360000004 HC RX CONTRAST MEDICATION: Performed by: INTERNAL MEDICINE

## 2019-05-30 PROCEDURE — A9561 TC99M OXIDRONATE: HCPCS | Performed by: INTERNAL MEDICINE

## 2019-05-30 PROCEDURE — 71260 CT THORAX DX C+: CPT

## 2019-05-30 PROCEDURE — 78306 BONE IMAGING WHOLE BODY: CPT

## 2019-05-30 RX ADMIN — TECHNETIUM TC 99M OXIDRONATE 20 MILLICURIE: 3.15 INJECTION, POWDER, LYOPHILIZED, FOR SOLUTION INTRAVENOUS at 13:11

## 2019-05-30 RX ADMIN — IOPAMIDOL 75 ML: 755 INJECTION, SOLUTION INTRAVENOUS at 10:25

## 2019-05-31 ENCOUNTER — HOSPITAL ENCOUNTER (OUTPATIENT)
Dept: INFUSION THERAPY | Age: 54
Setting detail: INFUSION SERIES
End: 2019-05-31
Payer: COMMERCIAL

## 2019-06-04 ENCOUNTER — OFFICE VISIT (OUTPATIENT)
Dept: RADIATION ONCOLOGY | Facility: HOSPITAL | Age: 54
End: 2019-06-04

## 2019-06-04 ENCOUNTER — HOSPITAL ENCOUNTER (OUTPATIENT)
Dept: RADIATION ONCOLOGY | Facility: HOSPITAL | Age: 54
Setting detail: RADIATION/ONCOLOGY SERIES
End: 2019-06-04

## 2019-06-04 VITALS — WEIGHT: 149 LBS | BODY MASS INDEX: 26.4 KG/M2 | HEIGHT: 63 IN

## 2019-06-04 DIAGNOSIS — C50.012 MALIGNANT NEOPLASM INVOLVING BOTH NIPPLE AND AREOLA OF LEFT BREAST IN FEMALE, ESTROGEN RECEPTOR POSITIVE (HCC): Primary | ICD-10-CM

## 2019-06-04 DIAGNOSIS — Z17.0 MALIGNANT NEOPLASM INVOLVING BOTH NIPPLE AND AREOLA OF LEFT BREAST IN FEMALE, ESTROGEN RECEPTOR POSITIVE (HCC): Primary | ICD-10-CM

## 2019-06-04 DIAGNOSIS — Z78.9 NON-SMOKER: ICD-10-CM

## 2019-06-04 DIAGNOSIS — C77.9 REGIONAL LYMPH NODE STAGING CATEGORY N3 (HCC): ICD-10-CM

## 2019-06-04 DIAGNOSIS — Z98.890 S/P LUMPECTOMY, LEFT BREAST: ICD-10-CM

## 2019-06-04 DIAGNOSIS — Z98.890 S/P LYMPH NODE BIOPSY: ICD-10-CM

## 2019-06-07 ENCOUNTER — HOSPITAL ENCOUNTER (OUTPATIENT)
Dept: INFUSION THERAPY | Age: 54
Setting detail: INFUSION SERIES
End: 2019-06-07
Payer: COMMERCIAL

## 2019-06-07 ENCOUNTER — HOSPITAL ENCOUNTER (OUTPATIENT)
Dept: INFUSION THERAPY | Age: 54
Discharge: HOME | End: 2019-06-07
Payer: COMMERCIAL

## 2019-06-07 ENCOUNTER — HOSPITAL ENCOUNTER (OUTPATIENT)
Dept: PHYSICAL THERAPY | Facility: HOSPITAL | Age: 54
Setting detail: THERAPIES SERIES
Discharge: HOME | End: 2019-06-07

## 2019-06-07 DIAGNOSIS — D70.9 NEUTROPENIA, UNSPECIFIED TYPE (HCC): ICD-10-CM

## 2019-06-07 DIAGNOSIS — I89.0 LYMPHEDEMA: Primary | ICD-10-CM

## 2019-06-07 PROCEDURE — 97140 MANUAL THERAPY 1/> REGIONS: CPT

## 2019-06-07 PROCEDURE — 97110 THERAPEUTIC EXERCISES: CPT

## 2019-06-07 PROCEDURE — 85025 COMPLETE CBC W/AUTO DIFF WBC: CPT

## 2019-06-07 NOTE — THERAPY TREATMENT NOTE
Outpatient Physical Therapy Lymphedema Treatment Note  [facility]     Patient Name: Ayla Echeverria  : 1965  MRN: [id number]  Today's Date: 2019        Visit Date: 2019    Visit Dx:    ICD-10-CM ICD-9-CM   1. Lymphedema I89.0 457.1       Patient Active Problem List   Diagnosis   • Bilateral acute serous otitis media   • Malignant neoplasm involving both nipple and areola of left breast in female (CMS/HCC)   • Cellulitis of abdominal wall   • Impetigo   • Folliculitis   • Encounter for consultation   • Non-smoker   • S/P lumpectomy, left breast   • S/P lymph node biopsy   • Regional lymph node staging category N3 (CMS/HCC)   • On antineoplastic chemotherapy   • Regional lymph node metastasis present (CMS/[facility])        Lymphedema     Row Name 19 1146             Subjective Pain    Able to rate subjective pain?  yes  -AL      Pre-Treatment Pain Level  5  -AL         Subjective Comments    Subjective Comments  She has not been able to do the exercises much because she was having so much pain under the L arm from radiation.   She states she has an appointment for her chemo in 20 min, but the Dr's office knows she will be late.   -AL         Manual Lymphatic Drainage    Manual Lymphatic Drainage  --  -AL      Manual Therapy  Spoke with Keyla Bennett PT, DPT, IDALIA and wants to start the MLD.  Since patient had to leave for her chemo, I only had time to instruct patient on self MLD and the lymphedema HEP.  -AL        User Key  (r) = Recorded By, (t) = Taken By, (c) = Cosigned By    Initials Name Provider Type    Jaycee Farris PTA, IDALIA Physical Therapy Assistant                        PT Assessment/Plan     Row Name 19 [id number]          PT Assessment    Assessment Comments  Patient has completed radiation and is not as sore now.  She will start working on the MLD and HEP.  She will also start working on her stretchtes.  Will have Keyla Bennett PT, DPT, IDALIA update the goals and  POC.   -AL        PT Plan    PT Plan Comments  Patient will call us if she needs to schedule more appointments.  -AL       User Key  (r) = Recorded By, (t) = Taken By, (c) = Cosigned By    Initials Name Provider Type    Jaycee Farris PTA, CLT-LANA Physical Therapy Assistant             Exercises     Row Name 06/07/19 1140             Subjective Comments    Subjective Comments  She has not been able to do the exercises much because she was having so much pain under the L arm from radiation.   She states she has an appointment for her chemo in 20 min, but the Dr's office knows she will be late.   -AL         Subjective Pain    Able to rate subjective pain?  yes  -AL      Pre-Treatment Pain Level  5  -AL        User Key  (r) = Recorded By, (t) = Taken By, (c) = Cosigned By    Initials Name Provider Type    Jaycee Farris PTA, CLT-LANA Physical Therapy Assistant                          Therapy Education  Education Details: HEP and MLD  Given: Edema management  Program: New  How Provided: Verbal, Demonstration, Written  Provided to: Patient  Level of Understanding: Teach back education performed, Verbalized, Demonstrated              Time Calculation:   Start Time: 1140  Stop Time: 1207  Time Calculation (min): 27 min  Total Timed Code Minutes- PT: 27 minute(s)   Therapy Charges for Today     Code Description Service Date Service Provider Modifiers Qty    [id number]  PT THER PROC EA 15 MIN 6/7/2019 Jaycee Mohan PTA, CLT-LANA GP 1    [id number]  PT MANUAL THERAPY EA 15 MIN 6/7/2019 Jaycee Mohan PTA, CLT-LANA GP 1                    Jaycee Mohan PTA, CLT-LANA  6/7/2019

## 2019-06-10 ENCOUNTER — HOSPITAL ENCOUNTER (OUTPATIENT)
Dept: INFUSION THERAPY | Age: 54
Discharge: HOME | End: 2019-06-10
Payer: COMMERCIAL

## 2019-06-10 PROCEDURE — 96372 THER/PROPH/DIAG INJ SC/IM: CPT

## 2019-06-10 PROCEDURE — 6360000002 HC RX W HCPCS

## 2019-06-10 PROCEDURE — 85025 COMPLETE CBC W/AUTO DIFF WBC: CPT

## 2019-06-11 ENCOUNTER — HOSPITAL ENCOUNTER (OUTPATIENT)
Dept: INFUSION THERAPY | Age: 54
Discharge: HOME | End: 2019-06-11
Payer: COMMERCIAL

## 2019-06-11 PROCEDURE — 96372 THER/PROPH/DIAG INJ SC/IM: CPT

## 2019-06-11 PROCEDURE — 6360000002 HC RX W HCPCS

## 2019-06-12 ENCOUNTER — HOSPITAL ENCOUNTER (OUTPATIENT)
Dept: INFUSION THERAPY | Age: 54
Discharge: HOME | End: 2019-06-12
Payer: COMMERCIAL

## 2019-06-12 PROCEDURE — 85025 COMPLETE CBC W/AUTO DIFF WBC: CPT

## 2019-06-13 ENCOUNTER — HOSPITAL ENCOUNTER (OUTPATIENT)
Dept: WOUND CARE | Age: 54
Discharge: HOME | End: 2019-06-13
Payer: COMMERCIAL

## 2019-06-13 VITALS
WEIGHT: 153 LBS | RESPIRATION RATE: 16 BRPM | HEIGHT: 63 IN | DIASTOLIC BLOOD PRESSURE: 71 MMHG | BODY MASS INDEX: 27.11 KG/M2 | TEMPERATURE: 97.2 F | HEART RATE: 72 BPM | SYSTOLIC BLOOD PRESSURE: 114 MMHG

## 2019-06-13 DIAGNOSIS — S21.002D BREAST WOUND, LEFT, SUBSEQUENT ENCOUNTER: ICD-10-CM

## 2019-06-13 PROCEDURE — 99213 OFFICE O/P EST LOW 20 MIN: CPT | Performed by: SURGERY

## 2019-06-13 PROCEDURE — 99212 OFFICE O/P EST SF 10 MIN: CPT

## 2019-06-13 ASSESSMENT — PAIN DESCRIPTION - LOCATION: LOCATION: BREAST

## 2019-06-13 ASSESSMENT — PAIN SCALES - GENERAL: PAINLEVEL_OUTOF10: 5

## 2019-06-13 ASSESSMENT — PAIN DESCRIPTION - DESCRIPTORS: DESCRIPTORS: ACHING

## 2019-06-13 ASSESSMENT — PAIN DESCRIPTION - PAIN TYPE: TYPE: CHRONIC PAIN

## 2019-06-13 ASSESSMENT — PAIN DESCRIPTION - FREQUENCY: FREQUENCY: INTERMITTENT

## 2019-06-13 ASSESSMENT — PAIN DESCRIPTION - ORIENTATION: ORIENTATION: LEFT

## 2019-06-13 NOTE — PROGRESS NOTES
[address]   Progress Note and Procedure Note      Ottoniel Mueller  MEDICAL RECORD NUMBER:  [id number]  AGE: 48 y.o. GENDER: female  : 1965  EPISODE DATE:  2019    Subjective:     No chief complaint on file. HISTORY of PRESENT ILLNESS HPI     Ottoniel Mueller is a 48 y.o. female who presents today for wound/ulcer evaluation.    History of Wound Context: Pt with L breast cancer here for eval/treat  Wound/Ulcer Pain Timing/Severity: none  Quality of pain: N/A  Severity:  0 / 10   Modifying Factors: None  Associated Signs/Symptoms: none    Ulcer Identification:  Ulcer Type: malignant wound  Contributing Factors: immunosuppression    Wound: malignant wound        PAST MEDICAL HISTORY        Diagnosis Date    Cancer [facility])     breast    GERD (gastroesophageal reflux disease)        PAST SURGICAL HISTORY    Past Surgical History:   Procedure Laterality Date    BREAST LUMPECTOMY Left 10/4/2016    LUMPECTOMY WITH SENTINAL NODE BIOPSY AND INTRA OP US GUIDED NEEDLE LOCALIZATION performed by Anuj Albarran MD at [address] [place] [address]      biopsy left breast    COLONOSCOPY      INSERTION / REMOVAL / REPLACEMENT VENOUS ACCESS CATHETER N/A 2016    SINGLE LUMEN PORT INSERTION performed by Anuj Albarran MD at [address] [place] [address] DIAGNOSTIC BONE MARROW BIOPSIES Right 2018    BONE MARROW ASPIRATION BIOPSY performed by Jayden Patton MD at [facility] OR       FAMILY HISTORY    Family History   Problem Relation Age of Onset    Lung Cancer Father     Colon Cancer Father     Prostate Cancer Father     Hypertension Mother        SOCIAL HISTORY    Social History     Tobacco Use    Smoking status: Never Smoker    Smokeless tobacco: Never Used   Substance Use Topics    Alcohol use: No     Alcohol/week: 0.0 oz    Drug use: No       ALLERGIES    No Known Allergies    MEDICATIONS    Current Outpatient Medications on File Prior to Encounter   Medication structure Fascia 5/1/2019  2:12 PM   Isabelle-wound Assessment Pink 6/13/2019  4:06 PM   Non-staged Wound Description Full thickness 6/13/2019  4:06 PM   [place]%Wound Bed 80 6/13/2019  4:06 PM   Red%Wound Bed 20 6/13/2019  4:06 PM   Yellow%Wound Bed 0 5/1/2019  2:12 PM   Black%Wound Bed 0 5/1/2019  2:12 PM   Purple%Wound Bed 0 5/1/2019  2:12 PM   Other%Wound Bed 0 5/1/2019  2:12 PM   Culture Taken No 5/1/2019  2:12 PM   Number of days: 43       Plan:     Problem List Items Addressed This Visit     * (Principal) Breast wound, left, subsequent encounter (Chronic)        Cont current care with radiation. Working well      Treatment Note please see attached Discharge Instructions    In my professional opinion this patient would benefit from HBO Therapy: No    Written patient dismissal instructions given to patient and signed by patient or POA. Discharge Instructions       Visit Discharge/Physician Orders    Discharge condition: Stable    Discharge to: Home    Left via:Private automobile    Accompanied by: Self     Dressing Orders: Left breast wound:   Wash with soap and water. Use optilock dressing daily to collect drainage  Hold in place with Bra     Alternative dressing    If there is excessive drainage and soaking through silicone border gauze:  Wash with soap and water. Apply Xeroform to wound bed. Cover with ABD pad. Secure with medipore tape. Change twice daily .     Treatment Orders: Follow up with Dr. Dara Mendosa and Dr. Juana Ortega as directed     [facility] followup visit _____________4 weeks________________  (Please note your next appointment above and if you are unable to keep, kindly give a 24 hour notice.  Thank you.)    If you experience any of the following, please call the [address] during business hours:    * Increase in Pain  * Temperature over 101  * Increase in drainage from your wound  * Drainage with a foul odor  * Bleeding  * Increase in swelling  * Need for compression bandage changes due to slippage, breakthrough drainage. If you need medical attention outside of the business hours of the [address] [place] [address] please contact your PCP or go to the nearest emergency room.         Electronically signed by Jolly Smith MD on 6/13/2019 at 4:18 PM

## 2019-06-21 ENCOUNTER — HOSPITAL ENCOUNTER (OUTPATIENT)
Dept: INFUSION THERAPY | Age: 54
Discharge: HOME | End: 2019-06-21
Payer: COMMERCIAL

## 2019-06-21 PROCEDURE — 85025 COMPLETE CBC W/AUTO DIFF WBC: CPT

## 2019-06-24 ENCOUNTER — TELEPHONE (OUTPATIENT)
Dept: FAMILY MEDICINE CLINIC | Facility: CLINIC | Age: 54
End: 2019-06-24

## 2019-06-24 DIAGNOSIS — R79.9 ABNORMAL BLOOD CHEMISTRY: Primary | ICD-10-CM

## 2019-06-24 NOTE — TELEPHONE ENCOUNTER
Pt called and wants to know if u will order a cbc and then when we get it back send to dr hilario so she will know if she needs to cont treatment or not [phone]

## 2019-06-28 LAB
BASOPHILS # BLD AUTO: 0.02 10*3/MM3 (ref 0–0.2)
BASOPHILS NFR BLD AUTO: 1.1 % (ref 0–1.5)
EOSINOPHIL # BLD AUTO: 0.06 10*3/MM3 (ref 0–0.4)
EOSINOPHIL NFR BLD AUTO: 3.4 % (ref 0.3–6.2)
ERYTHROCYTE [DISTWIDTH] IN BLOOD BY AUTOMATED COUNT: 17.3 % (ref 12.3–15.4)
HCT VFR BLD AUTO: 35.8 % (ref 34–46.6)
HGB BLD-MCNC: 10.9 G/DL (ref 12–15.9)
IMM GRANULOCYTES # BLD AUTO: 0 10*3/MM3 (ref 0–0.05)
IMM GRANULOCYTES NFR BLD AUTO: 0 % (ref 0–0.5)
LYMPHOCYTES # BLD AUTO: 0.77 10*3/MM3 (ref 0.7–3.1)
LYMPHOCYTES NFR BLD AUTO: 43.3 % (ref 19.6–45.3)
MCH RBC QN AUTO: 28.8 PG (ref 26.6–33)
MCHC RBC AUTO-ENTMCNC: 30.4 G/DL (ref 31.5–35.7)
MCV RBC AUTO: 94.7 FL (ref 79–97)
MONOCYTES # BLD AUTO: 0.34 10*3/MM3 (ref 0.1–0.9)
MONOCYTES NFR BLD AUTO: 19.1 % (ref 5–12)
NEUTROPHILS # BLD AUTO: 0.59 10*3/MM3 (ref 1.7–7)
NEUTROPHILS NFR BLD AUTO: 33.1 % (ref 42.7–76)
NRBC BLD AUTO-RTO: 0.6 /100 WBC (ref 0–0.2)
PLATELET # BLD AUTO: 200 10*3/MM3 (ref 140–450)
RBC # BLD AUTO: 3.78 10*6/MM3 (ref 3.77–5.28)
WBC # BLD AUTO: 1.78 10*3/MM3 (ref 3.4–10.8)

## 2019-07-05 ENCOUNTER — HOSPITAL ENCOUNTER (OUTPATIENT)
Dept: INFUSION THERAPY | Age: 54
Discharge: HOME | End: 2019-07-05
Payer: COMMERCIAL

## 2019-07-05 PROCEDURE — 85025 COMPLETE CBC W/AUTO DIFF WBC: CPT

## 2019-07-07 PROBLEM — Z92.3 HISTORY OF RADIATION THERAPY: Status: ACTIVE | Noted: 2019-07-07

## 2019-07-08 ENCOUNTER — HOSPITAL ENCOUNTER (OUTPATIENT)
Dept: RADIATION ONCOLOGY | Facility: HOSPITAL | Age: 54
Setting detail: RADIATION/ONCOLOGY SERIES
End: 2019-07-08

## 2019-07-08 ENCOUNTER — HOSPITAL ENCOUNTER (OUTPATIENT)
Dept: INFUSION THERAPY | Age: 54
Discharge: HOME | End: 2019-07-08
Payer: COMMERCIAL

## 2019-07-08 PROCEDURE — 85025 COMPLETE CBC W/AUTO DIFF WBC: CPT

## 2019-07-12 ENCOUNTER — HOSPITAL ENCOUNTER (OUTPATIENT)
Dept: INFUSION THERAPY | Age: 54
Discharge: HOME | End: 2019-07-12
Payer: COMMERCIAL

## 2019-07-12 PROCEDURE — 85025 COMPLETE CBC W/AUTO DIFF WBC: CPT

## 2019-07-17 ENCOUNTER — HOSPITAL ENCOUNTER (OUTPATIENT)
Dept: INFUSION THERAPY | Age: 54
Discharge: HOME | End: 2019-07-17
Payer: COMMERCIAL

## 2019-07-17 PROCEDURE — 85025 COMPLETE CBC W/AUTO DIFF WBC: CPT

## 2019-07-20 VITALS
HEART RATE: 81 BPM | SYSTOLIC BLOOD PRESSURE: 126 MMHG | BODY MASS INDEX: 27.11 KG/M2 | HEIGHT: 63 IN | WEIGHT: 153 LBS | DIASTOLIC BLOOD PRESSURE: 70 MMHG

## 2019-07-20 RX ORDER — HYDROXYZINE HYDROCHLORIDE 25 MG/1
25 TABLET, FILM COATED ORAL 3 TIMES DAILY PRN
COMMUNITY
End: 2019-11-08 | Stop reason: SDUPTHER

## 2019-07-20 RX ORDER — MINOCYCLINE HYDROCHLORIDE 100 MG/1
100 CAPSULE ORAL DAILY
COMMUNITY
End: 2020-06-12

## 2019-07-20 RX ORDER — MONTELUKAST SODIUM 10 MG/1
10 TABLET ORAL NIGHTLY
COMMUNITY
End: 2020-06-12

## 2019-07-20 RX ORDER — LEVOFLOXACIN 500 MG/1
500 TABLET, FILM COATED ORAL DAILY
COMMUNITY
End: 2020-06-12

## 2019-07-20 RX ORDER — LAPATINIB 250 MG/1
250 TABLET ORAL DAILY
COMMUNITY
End: 2019-10-14 | Stop reason: SDUPTHER

## 2019-07-20 RX ORDER — GABAPENTIN 300 MG/1
300 CAPSULE ORAL 2 TIMES DAILY
COMMUNITY
End: 2020-06-12

## 2019-07-20 RX ORDER — TRAMADOL HYDROCHLORIDE 50 MG/1
50 TABLET ORAL EVERY 6 HOURS PRN
COMMUNITY
End: 2020-06-12

## 2019-07-26 ENCOUNTER — HOSPITAL ENCOUNTER (OUTPATIENT)
Dept: INFUSION THERAPY | Age: 54
Discharge: HOME | End: 2019-07-26
Payer: COMMERCIAL

## 2019-07-26 PROCEDURE — 85025 COMPLETE CBC W/AUTO DIFF WBC: CPT

## 2019-07-29 ENCOUNTER — HOSPITAL ENCOUNTER (OUTPATIENT)
Dept: PHYSICAL THERAPY | Facility: HOSPITAL | Age: 54
Setting detail: THERAPIES SERIES
Discharge: HOME | End: 2019-07-29

## 2019-07-29 DIAGNOSIS — I89.0 LYMPHEDEMA: Primary | ICD-10-CM

## 2019-07-29 PROCEDURE — 93702 BIS XTRACELL FLUID ANALYSIS: CPT | Performed by: PHYSICAL THERAPIST

## 2019-07-29 PROCEDURE — 97110 THERAPEUTIC EXERCISES: CPT | Performed by: PHYSICAL THERAPIST

## 2019-07-29 NOTE — THERAPY RE-EVALUATION
Physical Therapy Lymphedema Re-Assessment/L-Dex testing  [facility]     Patient Name: Ayla Echeverria  : 1965  MRN: [id number]  Today's Date: 2019      Visit Date: 2019    Visit Dx:    ICD-10-CM ICD-9-CM   1. Lymphedema I89.0 457.1       Patient Active Problem List   Diagnosis   • Bilateral acute serous otitis media   • Malignant neoplasm involving both nipple and areola of left breast in female (CMS/HCC)   • Cellulitis of abdominal wall   • Impetigo   • Folliculitis   • Encounter for consultation   • Non-smoker   • S/P lumpectomy, left breast   • S/P lymph node biopsy   • Regional lymph node staging category N3 (CMS/HCC)   • On antineoplastic chemotherapy   • Regional lymph node metastasis present (CMS/HCC)   • History of radiation therapy        Past Medical History:   Diagnosis Date   • Breast cancer (CMS/HCC)         Past Surgical History:   Procedure Laterality Date   • AXILLARY LYMPH NODE BIOPSY/EXCISION Left    • BREAST BIOPSY Left 2016   • BREAST LUMPECTOMY WITH SENTINEL NODE BIOPSY Left 10/03/2016    residual invasive carcinoma, grade 3 (0.2cm); margins negative; extensive lymphvascular space invasion; 2 sentinel lymph nodes positive (2/3)       Visit Dx:    ICD-10-CM ICD-9-CM   1. Lymphedema I89.0 457.1           Lymphedema     Row Name 19 1600             L-Dex Bioimpedence Screening    L-Dex Measurement Extremity  LUE;RUE  -HR      L-Dex Patient Position  Standing  -HR      L-Dex UE Dominate Side  Right  -HR      L-Dex UE At Risk Side  -- bilateral  -HR      L-Dex UE Pre Surgical Value  No  -HR      L-Dex UE Score  -3.3 0.2 for RUE  -HR      L-Dex UE Baseline Score  -2.4 -1.4 on RUE  -HR      L-Dex UE Value Change  -0.9  -HR      $ L-Dex Charge  yes  -HR        User Key  (r) = Recorded By, (t) = Taken By, (c) = Cosigned By    Initials Name Provider Type    HR Keyla Bennett, PT, DPT, CLT-EVANGELINA Physical Therapist                          Therapy  "Education  Education Details: report any signs/symptoms of lymphedema in either UE or breast. L-Dex score can be tracked to allow early intervention before clinical signs are present which gives us a better chance to reverse and return to baseline.                       PT OP Goals     Row Name 07/29/19 1600          PT Short Term Goals    STG 1  Patient will be independent with remedial LUE HEP for lymphedema.  -HR     STG 1 Progress  Ongoing  -HR     STG 2  Patient will understand self MLD for lymphedema and be able to perform on her own.   -HR     STG 2 Progress  Ongoing  -HR     STG 2 Progress Comments  Not performing at this time.  -HR     STG 3  Patient will have a basic HEP for gradual strengthening of UEs.   -HR     STG 3 Progress  Ongoing  -HR     STG 3 Progress Comments  She verbalized understanding of gradual progression of activity and any strengthening activities.   -HR        Time Calculation    PT Goal Re-Cert Due Date  10/27/19  -HR       User Key  (r) = Recorded By, (t) = Taken By, (c) = Cosigned By    Initials Name Provider Type    HR Keyla Bennett, PT, DPT, CLT-EVANGELINA Physical Therapist          PT Assessment/Plan     Row Name 07/29/19 1600          PT Assessment    Assessment Comments  She had R breast edema while undergoing radiation to the L breast and does have known malignancies to the R ALN so I tested her as having Bilateral UEs \"at risk\" of lymphedema on the SOZO and the scores were WNL. Questioned her about any symptoms which she denies. She shows good ROM and no longer having the pain in the L breast. May follow up again in about 3 months.   -HR        PT Plan    Planned CPT's?  PT BIS XTRACELL FLUID ANALYSIS: 32801  -HR     PT Plan Comments  Quarterly screening for lymphedema.   -HR       User Key  (r) = Recorded By, (t) = Taken By, (c) = Cosigned By    Initials Name Provider Type    Keyla Bianchi, PT, DPT, CLT-EVANGELINA Physical Therapist                       Time " Calculation:       Therapy Charges for Today     Code Description Service Date Service Provider Modifiers Qty    67425196714 HC PT BIS XTRACELL FLUID ANALYSIS 7/29/2019 Keyla Bennett, PT, DPT, CLT-EVANGELINA  1    64259960770 HC PT THER PROC EA 15 MIN 7/29/2019 Keyla Bennett, PT, DPT, CLT-EVANGELINA GP 1                    Keyla Bennett, PT, DPT, CLT-EVANGELINA  7/29/2019

## 2019-08-01 ENCOUNTER — HOSPITAL ENCOUNTER (OUTPATIENT)
Dept: INFUSION THERAPY | Age: 54
Discharge: HOME | End: 2019-08-01
Payer: COMMERCIAL

## 2019-08-01 PROCEDURE — 85025 COMPLETE CBC W/AUTO DIFF WBC: CPT

## 2019-08-09 ENCOUNTER — HOSPITAL ENCOUNTER (OUTPATIENT)
Dept: INFUSION THERAPY | Age: 54
Discharge: HOME | End: 2019-08-09
Payer: COMMERCIAL

## 2019-08-09 ENCOUNTER — OFFICE VISIT (OUTPATIENT)
Dept: HEMATOLOGY | Age: 54
End: 2019-08-09
Payer: COMMERCIAL

## 2019-08-09 VITALS
HEIGHT: 63 IN | OXYGEN SATURATION: 99 % | BODY MASS INDEX: 27.12 KG/M2 | DIASTOLIC BLOOD PRESSURE: 66 MMHG | WEIGHT: 153.1 LBS | SYSTOLIC BLOOD PRESSURE: 114 MMHG | HEART RATE: 72 BPM

## 2019-08-09 DIAGNOSIS — C50.412 MALIGNANT NEOPLASM OF UPPER-OUTER QUADRANT OF LEFT FEMALE BREAST, UNSPECIFIED ESTROGEN RECEPTOR STATUS (HCC): Primary | ICD-10-CM

## 2019-08-09 DIAGNOSIS — Z51.11 CHEMOTHERAPY MANAGEMENT, ENCOUNTER FOR: ICD-10-CM

## 2019-08-09 DIAGNOSIS — T45.1X5D ADVERSE EFFECT OF CHEMOTHERAPY, SUBSEQUENT ENCOUNTER: ICD-10-CM

## 2019-08-09 PROCEDURE — 80053 COMPREHEN METABOLIC PANEL: CPT

## 2019-08-09 PROCEDURE — 85025 COMPLETE CBC W/AUTO DIFF WBC: CPT

## 2019-08-09 PROCEDURE — 96523 IRRIG DRUG DELIVERY DEVICE: CPT

## 2019-08-09 PROCEDURE — 99214 OFFICE O/P EST MOD 30 MIN: CPT | Performed by: INTERNAL MEDICINE

## 2019-08-09 PROCEDURE — 82378 CARCINOEMBRYONIC ANTIGEN: CPT

## 2019-08-09 PROCEDURE — 86300 IMMUNOASSAY TUMOR CA 15-3: CPT

## 2019-08-09 NOTE — PROGRESS NOTES
10/26/2018 - CT scan of the chest was completed at [facility] and compared to CT scan of 2017 versus her last CT scan completed at \[facility]\"" on 2018. The CT scan identified a right axillary lymph node measuring 1.2 cm, otherwise no intrathoracic neoplastic process/metastatic disease. 10/26/2018 -CT scan of the abdomen and pelvis was completed at [facility] and compared to CT scan of 2017 versus her last CT scan completed at \[facility]\"" on 2018. No evidence of intra-abdominal or pelvic metastasis was identified. 2018-interval worsening of skin metastasis in the left breast and diffuse erythema with new nodules. 2018-skin biopsy of overlying right breast consistent invasive ductal carcinoma, high-grade. ER 20%, KY 0%, HER-2/oseas 3+.  2018-right axillary FNA biopsy consistent invasive ductal carcinoma. 2018-recommended clinical trial at [facility]. 2019-started on Navelbine/Herceptin, due to delays on the clinical trial at [facility]. Unfortunately, she had severe chest wall pain and generalized pain related to Navelbine  2019-2019 she was switched to cisplatin/Gemzar/Herceptin, but had disease progression  2019-CT chest, abdomen, pelvis and bone scan was unremarkable for right axillary adenopathy measuring 2 cm. Lymph node located in the upper chest wall under the pectoralis muscle measuring 1.2 cm. Right breast with 2 areas with masslike enhancement. No evidence of metastatic disease in the abdomen pelvis. Bone scan was unremarkable for metastatic bone disease. 2019-very poor tolerance to cisplatin, Gemzar/Herceptin. She was recommended Xeloda/ Lapatinib  2019-she was recommended and started on Xeloda 850 mg/m2 PO BID days 1 through 14 every 21 days and Lapatinib 1250 mg daily continuously.       ECO    Treatment related toxicity: Bone marrow toxicity, fatigue diarrhea 1    Past medical history:  Past Medical History:   Diagnosis Date breath, no wheeze;   CVS: no palpitation, no chest pain, no shortness of breath;  GI: no abdominal pain, no nausea , no vomiting, no constipation; diarrhea grade 1  ORLIN: no dysuria, frequency and urgency, no hematuria, no kidney stones;   Musculoskeletal: no joint pain, swelling , stiffness;   Endocrine: no polyuria, polydypsia, no cold or heat intolerence; Hematology/lymphatic: no easy brusing or bleeding, no hx of clotting disorder; no peripheral adenopathy. Dermatology: no skin rash, no eczema, no pruritis;   Psychiatry: no depression, no anxiety,no panic attacks, no suicide ideation; Neurology: no syncope, no seizures, no numbness or tingling of hands, no numbness or tingling of feet, no paresis;     PHYSICAL EXAM:    Vitals signs:  /66   Pulse 72   Ht 5' 3\" (1.6 m)   Wt 153 lb 1.6 oz (69.4 kg)   SpO2 99%   BMI 27.12 kg/m²    Pain scale:  Pain Score:   2 mild    CONSTITUTIONAL: Alert, appropriate, no acute distress,   EYES: Non icteric, EOM intact, pupils equal round and reactive to light and accommodation. ENT: Oral mucus membranes moist, no oral pharyngeal lesions. External inspection of ears and nose are normal.   NECK: Supple, no masses. No palpable thyroid mass    CHEST/LUNGS: CTA bilaterally, normal respiratory effort   CARDIOVASCULAR: RRR, no murmurs. No lower extremity edema   ABDOMEN: soft non-tender, active bowel sounds, no hepatosplenomegaly. No palpable masses. EXTREMITIES: warm, Full ROM of all fours extremities. No focal weakness. SKIN: warm, dry with no rashes or lesions  LYMPH: No cervical, clavicular, axillary, or inguinal lymphadenopathy  NEUROLOGIC: follows commands, non focal.   PSYCH: mood and affect appropriate. Alert and oriented to time and place and person. Relevant Lab findings: Labs reviewed with the patient.   CBC 2.9, ANC 1.26, hemoglobin 12.8, platelets 148,857    Relevant Imaging studies: Not applicable      ASSESSMENT:    Orders Placed This Encounter

## 2019-08-16 ENCOUNTER — TELEPHONE (OUTPATIENT)
Dept: HEMATOLOGY | Age: 54
End: 2019-08-16

## 2019-08-16 ENCOUNTER — HOSPITAL ENCOUNTER (OUTPATIENT)
Dept: INFUSION THERAPY | Age: 54
Discharge: HOME | End: 2019-08-16
Payer: COMMERCIAL

## 2019-08-16 DIAGNOSIS — C50.412 MALIGNANT NEOPLASM OF UPPER-OUTER QUADRANT OF LEFT FEMALE BREAST, UNSPECIFIED ESTROGEN RECEPTOR STATUS (HCC): ICD-10-CM

## 2019-08-16 DIAGNOSIS — C50.412 MALIGNANT NEOPLASM OF UPPER-OUTER QUADRANT OF LEFT FEMALE BREAST, UNSPECIFIED ESTROGEN RECEPTOR STATUS (HCC): Primary | ICD-10-CM

## 2019-08-16 PROCEDURE — 85025 COMPLETE CBC W/AUTO DIFF WBC: CPT

## 2019-08-21 ENCOUNTER — APPOINTMENT (OUTPATIENT)
Dept: PHYSICAL THERAPY | Facility: HOSPITAL | Age: 54
End: 2019-08-21

## 2019-08-22 ENCOUNTER — HOSPITAL ENCOUNTER (OUTPATIENT)
Dept: PHYSICAL THERAPY | Facility: HOSPITAL | Age: 54
Setting detail: THERAPIES SERIES
Discharge: HOME | End: 2019-08-22

## 2019-08-22 DIAGNOSIS — I89.0 LYMPHEDEMA: Primary | ICD-10-CM

## 2019-08-22 PROCEDURE — 97140 MANUAL THERAPY 1/> REGIONS: CPT

## 2019-08-22 NOTE — THERAPY PROGRESS REPORT/RE-CERT
Outpatient Physical Therapy Lymphedema Progress Note   [place]     Patient Name: Ayla Echeverria  : 1965  MRN: [id number]  Today's Date: 2019        Visit Date: 2019    Visit Dx:    ICD-10-CM ICD-9-CM   1. Lymphedema I89.0 457.1       Patient Active Problem List   Diagnosis   • Bilateral acute serous otitis media   • Malignant neoplasm involving both nipple and areola of left breast in female (CMS/HCC)   • Cellulitis of abdominal wall   • Impetigo   • Folliculitis   • Encounter for consultation   • Non-smoker   • S/P lumpectomy, left breast   • S/P lymph node biopsy   • Regional lymph node staging category N3 (CMS/HCC)   • On antineoplastic chemotherapy   • Regional lymph node metastasis present (CMS/HCC)   • History of radiation therapy        Lymphedema     Row Name 19 1420             Subjective Pain    Able to rate subjective pain?  yes  -AL      Pre-Treatment Pain Level  1  -AL      Subjective Pain Comment  R breast and chest  -AL         Subjective Comments    Subjective Comments  She has a little tenderness under the L arm, she is trying to stretch the L arm more.  She has some discomfort R chest and breast.  Her bra rubs the R chest area.  She wants to know how to re-direct the fluid so it's not going to the R side.   -AL         LUE Quick Girth (cm)    Axilla  31.4 cm  -AL      Mid upper arm  28.1 cm  -AL      Elbow  25 cm  -AL      Mid forearm  17.7 cm  -AL      Wrist crease  15.1 cm  -AL      Web space  17.5 cm  -AL      LUE Quick Girth Total  134.8  -AL         RUE Quick Girth (cm)    Axilla  32.1 cm  -AL      Mid upper arm  28.1 cm  -AL      Elbow  24.1 cm  -AL      Mid forearm  17.2 cm  -AL      Wrist crease  14.4 cm  -AL      Web space  18.2 cm  -AL      RUE Quick Girth Total  134.1  -AL         Manual Lymphatic Drainage    Manual Lymphatic Drainage  initial sequence;opened regional lymph nodes;extremity treatment  -AL      Initial Sequence  short neck;diaphragmatic  breathing  -AL      Diaphragmatic Breathing  x5  -AL      Opened Regional Lymph Nodes  inguinal  -AL      Inguinal  right;left  -AL      Extremity Treatment  simple/brief MLD  -AL      Simple/Brief MLD  L UE  -AL      Manual Lymphatic Drainage Comments  Also did brief MLD to the L breast  -AL      Manual Therapy  Went over self MLD to both UE's.   -AL         Compression/Skin Care    Compression/Skin Care Comments  Made a chip bag to go over the L lateral truncal edema, alternate putting the chip bag also over L breast where dense tissue is present.  Also used stockinette to wrap around the R bra strap so it would not rub on her skin.  Instructed patient to wear her compression bra with the chip bag, and to wear bras that are not tight under the breast.   -AL         L-Dex Bioimpedence Screening    L-Dex Measurement Extremity  LUE;RUE  -AL      L-Dex Patient Position  Standing  -AL      L-Dex UE Dominate Side  Right  -AL      L-Dex UE At Risk Side  -- Bilateral  -AL      L-Dex UE Pre Surgical Value  No  -AL      L-Dex UE Score  -2.9  -AL      L-Dex UE Baseline Score  -2.4  -AL      L-Dex UE Value Change  -0.5  -AL        User Key  (r) = Recorded By, (t) = Taken By, (c) = Cosigned By    Initials Name Provider Type    Jaycee Farris, VAUGHN, IDALIA Physical Therapy Assistant                        PT Assessment/Plan     Row Name 08/22/19 8246          PT Assessment    Assessment Comments  She comes in today with a complaint of incrase L lateral truncal edema and L breast swelling.  Went over MLD for both UE's, redirect fluid to each groin.  She is going to work on this as well as the HEP and using the chip bag over the L lateral trunk and L breast. Patient is still in normal range for her L-Dex score.   -AL        PT Plan    PT Plan Comments  Will plan to see in October for lymphedema screening.   -AL       User Key  (r) = Recorded By, (t) = Taken By, (c) = Cosigned By    Initials Name Provider Type    GWYN Mohan  Jaycee DICK PTA, CLT-EVANGELINA Physical Therapy Assistant             Exercises     Row Name 08/22/19 1420             Subjective Comments    Subjective Comments  She has a little tenderness under the L arm, she is trying to stretch the L arm more.  She has some discomfort R chest and breast.  Her bra rubs the R chest area.  She wants to know how to re-direct the fluid so it's not going to the R side.   -AL         Subjective Pain    Able to rate subjective pain?  yes  -AL      Pre-Treatment Pain Level  1  -AL      Subjective Pain Comment  R breast and chest  -AL         Total Minutes    60642 - PT Manual Therapy Minutes  60  -AL        User Key  (r) = Recorded By, (t) = Taken By, (c) = Cosigned By    Initials Name Provider Type    Jaycee Farris PTA, IDALIA Physical Therapy Assistant                     Manual Rx (last 36 hours)      Manual Treatments     Row Name 08/22/19 1420             Total Minutes    65877 - PT Manual Therapy Minutes  60  -AL        User Key  (r) = Recorded By, (t) = Taken By, (c) = Cosigned By    Initials Name Provider Type    Jaycee Farris PTA, CLJUDSON-EVANGELINA Physical Therapy Assistant          PT OP Goals     Row Name 08/22/19 1420          PT Short Term Goals    STG 1  Patient will be independent with remedial LUE HEP for lymphedema.  -AL     STG 1 Progress  Ongoing  -AL     STG 1 Progress Comments  She will work on the L UE HEP  -AL     STG 2  Patient will understand self MLD for lymphedema and be able to perform on her own.   -AL     STG 2 Progress  Ongoing  -AL     STG 2 Progress Comments  Went over MLD  -AL     STG 3  Patient will have a basic HEP for gradual strengthening of UEs.   -AL     STG 3 Progress  Ongoing  -AL     STG 3 Progress Comments  She will work on the HEP for the L UE.  -AL        Time Calculation    PT Goal Re-Cert Due Date  10/27/19  -AL       User Key  (r) = Recorded By, (t) = Taken By, (c) = Cosigned By    Initials Name Provider Type    Jaycee Farris PTA,  IDALIA Physical Therapy Assistant          Therapy Education  Education Details: Work on MLD and use chip bag over L truncal edema and L breast.  Given: Edema management  Program: Reinforced, Progressed  How Provided: Verbal, Demonstration, Written  Provided to: Patient, Caregiver  Level of Understanding: Teach back education performed, Verbalized, Demonstrated              Time Calculation:   Start Time: 1420  Stop Time: 1520  Time Calculation (min): 60 min  Total Timed Code Minutes- PT: 60 minute(s)   Therapy Charges for Today     Code Description Service Date Service Provider Modifiers Qty    85727459861 HC PT MANUAL THERAPY EA 15 MIN 8/22/2019 Jaycee Mohan PTA, CLT-LANA GP 4                    Jaycee Mohan PTA, CLT-LANA  8/22/2019

## 2019-08-23 ENCOUNTER — HOSPITAL ENCOUNTER (OUTPATIENT)
Dept: INFUSION THERAPY | Age: 54
Discharge: HOME | End: 2019-08-23
Payer: COMMERCIAL

## 2019-08-23 DIAGNOSIS — C50.412 MALIGNANT NEOPLASM OF UPPER-OUTER QUADRANT OF LEFT FEMALE BREAST, UNSPECIFIED ESTROGEN RECEPTOR STATUS (HCC): Primary | ICD-10-CM

## 2019-08-23 PROCEDURE — 85025 COMPLETE CBC W/AUTO DIFF WBC: CPT

## 2019-08-30 ENCOUNTER — HOSPITAL ENCOUNTER (OUTPATIENT)
Dept: INFUSION THERAPY | Age: 54
Discharge: HOME | End: 2019-08-30
Payer: COMMERCIAL

## 2019-08-30 DIAGNOSIS — C50.412 MALIGNANT NEOPLASM OF UPPER-OUTER QUADRANT OF LEFT FEMALE BREAST, UNSPECIFIED ESTROGEN RECEPTOR STATUS (HCC): ICD-10-CM

## 2019-08-30 DIAGNOSIS — C50.412 MALIGNANT NEOPLASM OF UPPER-OUTER QUADRANT OF LEFT FEMALE BREAST, UNSPECIFIED ESTROGEN RECEPTOR STATUS (HCC): Primary | ICD-10-CM

## 2019-08-30 PROCEDURE — 85025 COMPLETE CBC W/AUTO DIFF WBC: CPT

## 2019-09-06 ENCOUNTER — HOSPITAL ENCOUNTER (OUTPATIENT)
Dept: INFUSION THERAPY | Age: 54
Discharge: HOME | End: 2019-09-06
Payer: COMMERCIAL

## 2019-09-06 DIAGNOSIS — C50.412 MALIGNANT NEOPLASM OF UPPER-OUTER QUADRANT OF LEFT FEMALE BREAST, UNSPECIFIED ESTROGEN RECEPTOR STATUS (HCC): Primary | ICD-10-CM

## 2019-09-12 NOTE — PROGRESS NOTES
lesions in the left breast and new onset diffuse skin thickening in the right breast and a right axillary lymphadenopathy, biopsy-proven breast cancer HER-2 positive. She has been seen by medical oncology at [facility] for consideration of clinical trials with anti-HER-2 therapy combined with chemotherapy. Unfortunately, the clinical trial was not readily available and therefore she was started on Navelbine/Herceptin. She had one dose of Navelbine/herceptin with complains of excruciating chest wall pain. Therefore, she was switched to low-dose cisplatin, Gemzar and Herceptin for which she had no response. She she was referred to palliative radiation therapy to the left breast initiated on 4/17/2019 and received full dose treatment with excellent local response. She was then started on xeloda/Tykerb on 6/22/2019. She tolerated treatment with severe hematologic toxicity and therefore Xeloda was dose reduced. She is now taking Tykerb 1250 mg daily and Xeloda 1150 mg twice daily with good tolerance. She has mild diarrhea. She has mild asymptomatic neutropenia. She reports improvement of the skin lesions in the right breast also. Cancer history- Left breast recurrence with Inflammatory Breast Cancer ER 8%/MS negative &HER-2 positive  [facility] ANNELIESE was seen in initial oncology consultation on 4/20/2017 referred for a diagnosis of a left breast recurrence with inflammatory breast cancer. She was initially treated by Dr. Mallika Celis at [facility]. Prior breast cancer history is as follows:  3/22/2016-a diagnostic mammogram for a palpable mass showed a left breast abnormal findings. 4/1/2017- ultrasound-guided core needle biopsy was consistent with invasive ductal carcinoma, grade 2. ER 1%, MS 6%, HER-2/oseas IHC 3+/FISH amplified ratio 6.7., AR 95%. She underwent 6 cycles of TCH-P from April 2016 through September 2016 with G-CSF support. She had a total of 18 weeks of trastuzumab treatment. 10/3/2016-she underwent a left lumpectomy with left sentinel lymph node revealing an invasive ductal carcinoma, grade 3. Margins clear of invasive carcinoma (1.1 cm from the lateral margin), extensive lymphovascular space invasion. 2 out of 3 sentinel lymph nodes positive for metastatic carcinoma. Final pathologic staging [id number](sn)M0. She declined adjuvant radiation therapy and adjuvant Herceptin completion.   -------------------In-breast Recurrence [id number]---------------------------  1/30/2017- she was seen by Dr. Jose Elias Ballard with new complaints of erythema in the left breast. A skin punch biopsy was consistent with metastatic ductal carcinoma with extensive dermal lymphovascular invasion. ER negative, SC negative, HER-2/oseas IHC 3+/FISH amplified ratio 4.5, Ki-67 32%. Foundation one showed ERBB2 amplification, AURKA amplification, TP53 and .   2/15/2017- she was again seen by Dr. Ja Keyes and explained about her tumor recurrence. Unfortunately, she declined any further intervention at that time. 4/21/2017- she asked Dr. Ja Keyes for a referral to us.   4/28/2017-referred to me for further evaluation. I recommended a PET scan and neoadjuvant chemotherapy with carboplatin/paclitaxel and Perjeta and Herceptin. 5/4/2017-PET/CT scan revealed abnormal metabolic activity present in multiple regions throughout the left breast and in the skin( SUV 8.9, 7.4, 7.3). In the left supraclavicular region a cluster of lymph nodes present (SUV of 3). Axillary lymph nodes with SUV of 2.8. Tonsils bilaterally with SUV 5.6 (symmetric). No abnormal metabolic activity in the intrathoracic or intra-abdominal pelvic region. No abnormal skeletal metabolic activity. I recommended weekly carboplatin/weekly Taxol/Herceptin and Pertuzumab.   5/26/2017-she had a severe allergic reaction after 2 doses of carboplatin and therefore this was discontinued. She was continued only on Taxol weekly/Herceptin and Pertuzumab. diarrhea grade 1    Past medical history:  Past Medical History:   Diagnosis Date    Cancer ([address]     breast    GERD (gastroesophageal reflux disease)         Past surgical history:  Past Surgical History:   Procedure Laterality Date    BREAST LUMPECTOMY Left 10/4/2016    LUMPECTOMY WITH SENTINAL NODE BIOPSY AND INTRA OP US GUIDED NEEDLE LOCALIZATION performed by Lenora Salas MD at [address] [place] [address]      biopsy left breast    COLONOSCOPY  2008    INSERTION / REMOVAL / REPLACEMENT VENOUS ACCESS CATHETER N/A 5/12/2016    SINGLE LUMEN PORT INSERTION performed by Lenora Salas MD at [address] [place] [address] DIAGNOSTIC BONE MARROW BIOPSIES Right 11/14/2018    BONE MARROW ASPIRATION BIOPSY performed by Maxi Montalvo MD at [facility]        Social history:  Social History     Socioeconomic History    Marital status:      Spouse name: Not on file    Number of children: Not on file    Years of education: Not on file    Highest education level: Not on file   Occupational History    Not on file   Social Needs    Financial resource strain: Not on file    Food insecurity:     Worry: Not on file     Inability: Not on file    Transportation needs:     Medical: Not on file     Non-medical: Not on file   Tobacco Use    Smoking status: Never Smoker    Smokeless tobacco: Never Used   Substance and Sexual Activity    Alcohol use: No     Alcohol/week: 0.0 standard drinks    Drug use: No    Sexual activity: Not on file   Lifestyle    Physical activity:     Days per week: Not on file     Minutes per session: Not on file    Stress: Not on file   Relationships    Social connections:     Talks on phone: Not on file     Gets together: Not on file     Attends [employer] service: Not on file     Active member of club or organization: Not on file     Attends meetings of clubs or organizations: Not on file     Relationship status: Not on file    Intimate partner violence:     Fear of current or ex

## 2019-09-13 ENCOUNTER — OFFICE VISIT (OUTPATIENT)
Dept: HEMATOLOGY | Age: 54
End: 2019-09-13
Payer: COMMERCIAL

## 2019-09-13 ENCOUNTER — HOSPITAL ENCOUNTER (OUTPATIENT)
Dept: INFUSION THERAPY | Age: 54
Discharge: HOME | End: 2019-09-13
Payer: COMMERCIAL

## 2019-09-13 VITALS
OXYGEN SATURATION: 99 % | WEIGHT: 156.5 LBS | HEART RATE: 77 BPM | SYSTOLIC BLOOD PRESSURE: 125 MMHG | BODY MASS INDEX: 27.73 KG/M2 | DIASTOLIC BLOOD PRESSURE: 70 MMHG | HEIGHT: 63 IN

## 2019-09-13 DIAGNOSIS — C50.412 MALIGNANT NEOPLASM OF UPPER-OUTER QUADRANT OF LEFT FEMALE BREAST, UNSPECIFIED ESTROGEN RECEPTOR STATUS (HCC): Primary | ICD-10-CM

## 2019-09-13 DIAGNOSIS — Z51.11 CHEMOTHERAPY MANAGEMENT, ENCOUNTER FOR: ICD-10-CM

## 2019-09-13 DIAGNOSIS — C50.412 MALIGNANT NEOPLASM OF UPPER-OUTER QUADRANT OF LEFT FEMALE BREAST, UNSPECIFIED ESTROGEN RECEPTOR STATUS (HCC): ICD-10-CM

## 2019-09-13 DIAGNOSIS — T50.905S TOXICITY, LATE EFFECT, DUE TO DRUG, MEDICINE, OR BIOLOGICAL SUBSTANCE: ICD-10-CM

## 2019-09-13 DIAGNOSIS — T45.1X5D ADVERSE EFFECT OF CHEMOTHERAPY, SUBSEQUENT ENCOUNTER: ICD-10-CM

## 2019-09-13 PROCEDURE — 99214 OFFICE O/P EST MOD 30 MIN: CPT | Performed by: INTERNAL MEDICINE

## 2019-09-13 PROCEDURE — 6360000002 HC RX W HCPCS: Performed by: PHYSICIAN ASSISTANT

## 2019-09-13 PROCEDURE — 85025 COMPLETE CBC W/AUTO DIFF WBC: CPT

## 2019-09-13 PROCEDURE — 2580000003 HC RX 258: Performed by: PHYSICIAN ASSISTANT

## 2019-09-13 PROCEDURE — 96523 IRRIG DRUG DELIVERY DEVICE: CPT

## 2019-09-13 RX ORDER — 0.9 % SODIUM CHLORIDE 0.9 %
10 VIAL (ML) INJECTION ONCE
Status: DISCONTINUED | OUTPATIENT
Start: 2019-09-13 | End: 2019-09-15 | Stop reason: HOSPADM

## 2019-09-13 RX ORDER — HEPARIN SODIUM (PORCINE) LOCK FLUSH IV SOLN 100 UNIT/ML 100 UNIT/ML
500 SOLUTION INTRAVENOUS ONCE
Status: DISCONTINUED | OUTPATIENT
Start: 2019-09-13 | End: 2019-09-15 | Stop reason: HOSPADM

## 2019-09-27 ENCOUNTER — HOSPITAL ENCOUNTER (OUTPATIENT)
Dept: INFUSION THERAPY | Age: 54
Discharge: HOME | End: 2019-09-27
Payer: COMMERCIAL

## 2019-09-27 DIAGNOSIS — C50.412 MALIGNANT NEOPLASM OF UPPER-OUTER QUADRANT OF LEFT FEMALE BREAST, UNSPECIFIED ESTROGEN RECEPTOR STATUS (HCC): ICD-10-CM

## 2019-09-27 DIAGNOSIS — C50.412 MALIGNANT NEOPLASM OF UPPER-OUTER QUADRANT OF LEFT FEMALE BREAST, UNSPECIFIED ESTROGEN RECEPTOR STATUS (HCC): Primary | ICD-10-CM

## 2019-09-27 PROCEDURE — 85025 COMPLETE CBC W/AUTO DIFF WBC: CPT

## 2019-09-27 RX ORDER — CAPECITABINE 500 MG/1
TABLET, FILM COATED ORAL
Qty: 56 TABLET | Refills: 5 | Status: SHIPPED | OUTPATIENT
Start: 2019-09-27 | End: 2019-10-15 | Stop reason: SDUPTHER

## 2019-09-30 RX ORDER — CAPECITABINE 150 MG/1
TABLET, FILM COATED ORAL
Qty: 28 TABLET | Refills: 5 | Status: SHIPPED | OUTPATIENT
Start: 2019-09-30 | End: 2019-11-23 | Stop reason: ALTCHOICE

## 2019-10-11 ENCOUNTER — HOSPITAL ENCOUNTER (OUTPATIENT)
Dept: INFUSION THERAPY | Age: 54
Discharge: HOME | End: 2019-10-11
Payer: COMMERCIAL

## 2019-10-11 DIAGNOSIS — C50.412 MALIGNANT NEOPLASM OF UPPER-OUTER QUADRANT OF LEFT FEMALE BREAST, UNSPECIFIED ESTROGEN RECEPTOR STATUS (HCC): ICD-10-CM

## 2019-10-11 DIAGNOSIS — C50.412 MALIGNANT NEOPLASM OF UPPER-OUTER QUADRANT OF LEFT FEMALE BREAST, UNSPECIFIED ESTROGEN RECEPTOR STATUS (HCC): Primary | ICD-10-CM

## 2019-10-11 PROCEDURE — 85025 COMPLETE CBC W/AUTO DIFF WBC: CPT

## 2019-10-12 RX ORDER — LAPATINIB 250 MG/1
1250 TABLET ORAL DAILY
Qty: 150 TABLET | Refills: 5 | Status: CANCELLED | OUTPATIENT
Start: 2019-10-12

## 2019-10-14 RX ORDER — LAPATINIB 250 MG/1
1250 TABLET ORAL DAILY
Qty: 150 TABLET | Refills: 5 | Status: SHIPPED | OUTPATIENT
Start: 2019-10-14 | End: 2019-11-23 | Stop reason: ALTCHOICE

## 2019-10-15 RX ORDER — CAPECITABINE 500 MG/1
TABLET, FILM COATED ORAL
Qty: 56 TABLET | Refills: 5 | Status: SHIPPED | OUTPATIENT
Start: 2019-10-15 | End: 2019-11-23 | Stop reason: ALTCHOICE

## 2019-10-15 RX ORDER — CAPECITABINE 150 MG/1
TABLET, FILM COATED ORAL
Qty: 28 TABLET | Refills: 5 | Status: SHIPPED | OUTPATIENT
Start: 2019-10-15 | End: 2020-06-12

## 2019-10-25 ENCOUNTER — HOSPITAL ENCOUNTER (OUTPATIENT)
Dept: INFUSION THERAPY | Age: 54
Discharge: HOME | End: 2019-10-25
Payer: COMMERCIAL

## 2019-10-25 PROCEDURE — 85025 COMPLETE CBC W/AUTO DIFF WBC: CPT

## 2019-11-08 ENCOUNTER — HOSPITAL ENCOUNTER (OUTPATIENT)
Dept: INFUSION THERAPY | Age: 54
Discharge: HOME | End: 2019-11-08
Payer: COMMERCIAL

## 2019-11-08 ENCOUNTER — OFFICE VISIT (OUTPATIENT)
Dept: HEMATOLOGY | Age: 54
End: 2019-11-08
Payer: COMMERCIAL

## 2019-11-08 VITALS
HEART RATE: 72 BPM | HEIGHT: 63 IN | SYSTOLIC BLOOD PRESSURE: 138 MMHG | DIASTOLIC BLOOD PRESSURE: 74 MMHG | WEIGHT: 160.9 LBS | OXYGEN SATURATION: 97 % | BODY MASS INDEX: 28.51 KG/M2

## 2019-11-08 DIAGNOSIS — C50.412 MALIGNANT NEOPLASM OF UPPER-OUTER QUADRANT OF LEFT FEMALE BREAST, UNSPECIFIED ESTROGEN RECEPTOR STATUS (HCC): ICD-10-CM

## 2019-11-08 DIAGNOSIS — L29.9 ITCHING: ICD-10-CM

## 2019-11-08 DIAGNOSIS — T45.1X5D ADVERSE EFFECT OF CHEMOTHERAPY, SUBSEQUENT ENCOUNTER: ICD-10-CM

## 2019-11-08 DIAGNOSIS — Z51.11 CHEMOTHERAPY MANAGEMENT, ENCOUNTER FOR: ICD-10-CM

## 2019-11-08 DIAGNOSIS — T50.905S TOXICITY, LATE EFFECT, DUE TO DRUG, MEDICINE, OR BIOLOGICAL SUBSTANCE: ICD-10-CM

## 2019-11-08 DIAGNOSIS — C50.412 MALIGNANT NEOPLASM OF UPPER-OUTER QUADRANT OF LEFT FEMALE BREAST, UNSPECIFIED ESTROGEN RECEPTOR STATUS (HCC): Primary | ICD-10-CM

## 2019-11-08 PROCEDURE — 99214 OFFICE O/P EST MOD 30 MIN: CPT | Performed by: INTERNAL MEDICINE

## 2019-11-08 PROCEDURE — 85025 COMPLETE CBC W/AUTO DIFF WBC: CPT

## 2019-11-08 RX ORDER — VIT C/B6/B5/MAGNESIUM/HERB 173 50-5-6-5MG
CAPSULE ORAL
COMMUNITY
End: 2021-08-30

## 2019-11-08 RX ORDER — HYDROXYZINE HYDROCHLORIDE 25 MG/1
25 TABLET, FILM COATED ORAL 3 TIMES DAILY PRN
Qty: 90 TABLET | Refills: 3 | Status: SHIPPED | OUTPATIENT
Start: 2019-11-08 | End: 2020-06-12

## 2019-11-22 ENCOUNTER — HOSPITAL ENCOUNTER (OUTPATIENT)
Dept: INFUSION THERAPY | Age: 54
Discharge: HOME | End: 2019-11-22
Payer: COMMERCIAL

## 2019-11-22 ENCOUNTER — OFFICE VISIT (OUTPATIENT)
Dept: HEMATOLOGY | Age: 54
End: 2019-11-22
Payer: COMMERCIAL

## 2019-11-22 VITALS
BODY MASS INDEX: 28.17 KG/M2 | WEIGHT: 159 LBS | OXYGEN SATURATION: 99 % | SYSTOLIC BLOOD PRESSURE: 122 MMHG | DIASTOLIC BLOOD PRESSURE: 70 MMHG | HEIGHT: 63 IN | HEART RATE: 76 BPM

## 2019-11-22 DIAGNOSIS — Z71.89 COORDINATION OF COMPLEX CARE: ICD-10-CM

## 2019-11-22 DIAGNOSIS — T50.905S TOXICITY, LATE EFFECT, DUE TO DRUG, MEDICINE, OR BIOLOGICAL SUBSTANCE: ICD-10-CM

## 2019-11-22 DIAGNOSIS — L29.9 ITCHING: ICD-10-CM

## 2019-11-22 DIAGNOSIS — C50.412 MALIGNANT NEOPLASM OF UPPER-OUTER QUADRANT OF LEFT FEMALE BREAST, UNSPECIFIED ESTROGEN RECEPTOR STATUS (HCC): Primary | ICD-10-CM

## 2019-11-22 DIAGNOSIS — T45.1X5D ADVERSE EFFECT OF CHEMOTHERAPY, SUBSEQUENT ENCOUNTER: ICD-10-CM

## 2019-11-22 DIAGNOSIS — G89.3 CANCER-RELATED PAIN: ICD-10-CM

## 2019-11-22 PROCEDURE — 99214 OFFICE O/P EST MOD 30 MIN: CPT | Performed by: INTERNAL MEDICINE

## 2019-11-22 PROCEDURE — 85025 COMPLETE CBC W/AUTO DIFF WBC: CPT

## 2019-11-22 RX ORDER — MORPHINE SULFATE 15 MG/1
15 TABLET ORAL EVERY 4 HOURS PRN
Qty: 120 TABLET | Refills: 0 | Status: SHIPPED | OUTPATIENT
Start: 2019-11-22 | End: 2019-12-22

## 2019-11-25 ENCOUNTER — TELEPHONE (OUTPATIENT)
Dept: HEMATOLOGY | Age: 54
End: 2019-11-25

## 2019-12-02 ENCOUNTER — HOSPITAL ENCOUNTER (OUTPATIENT)
Dept: RADIATION ONCOLOGY | Facility: HOSPITAL | Age: 54
Setting detail: RADIATION/ONCOLOGY SERIES
End: 2019-12-02

## 2019-12-05 ENCOUNTER — HOSPITAL ENCOUNTER (OUTPATIENT)
Dept: RADIATION ONCOLOGY | Facility: HOSPITAL | Age: 54
Discharge: HOME | End: 2019-12-05

## 2019-12-05 ENCOUNTER — CONSULT (OUTPATIENT)
Dept: RADIATION ONCOLOGY | Facility: HOSPITAL | Age: 54
End: 2019-12-05

## 2019-12-05 VITALS
WEIGHT: 158 LBS | DIASTOLIC BLOOD PRESSURE: 79 MMHG | SYSTOLIC BLOOD PRESSURE: 149 MMHG | HEIGHT: 63 IN | BODY MASS INDEX: 28 KG/M2

## 2019-12-05 DIAGNOSIS — L98.9 PAINFUL SKIN LESION: ICD-10-CM

## 2019-12-05 DIAGNOSIS — Z92.3 HISTORY OF RADIATION THERAPY: ICD-10-CM

## 2019-12-05 DIAGNOSIS — C77.9 REGIONAL LYMPH NODE STAGING CATEGORY N3 (HCC): ICD-10-CM

## 2019-12-05 DIAGNOSIS — C50.012 MALIGNANT NEOPLASM INVOLVING BOTH NIPPLE AND AREOLA OF LEFT BREAST IN FEMALE, ESTROGEN RECEPTOR POSITIVE (HCC): Primary | ICD-10-CM

## 2019-12-05 DIAGNOSIS — G89.3 CANCER RELATED PAIN: ICD-10-CM

## 2019-12-05 DIAGNOSIS — Z17.0 MALIGNANT NEOPLASM INVOLVING BOTH NIPPLE AND AREOLA OF LEFT BREAST IN FEMALE, ESTROGEN RECEPTOR POSITIVE (HCC): Primary | ICD-10-CM

## 2019-12-05 DIAGNOSIS — Z78.9 NON-SMOKER: ICD-10-CM

## 2019-12-05 DIAGNOSIS — Z71.9 ENCOUNTER FOR CONSULTATION: ICD-10-CM

## 2019-12-05 PROCEDURE — 77290 THER RAD SIMULAJ FIELD CPLX: CPT | Performed by: RADIOLOGY

## 2019-12-05 PROCEDURE — 77334 RADIATION TREATMENT AID(S): CPT | Performed by: RADIOLOGY

## 2019-12-05 RX ORDER — MORPHINE SULFATE 30 MG/1
30 TABLET ORAL EVERY 8 HOURS PRN
Status: ON HOLD | COMMUNITY
End: 2021-01-01

## 2019-12-05 RX ORDER — PANTOPRAZOLE SODIUM 40 MG/1
40 TABLET, DELAYED RELEASE ORAL DAILY
Status: ON HOLD | COMMUNITY
End: 2021-01-01

## 2019-12-05 RX ORDER — MORPHINE SULFATE 15 MG/1
30 TABLET, FILM COATED, EXTENDED RELEASE ORAL 3 TIMES DAILY
COMMUNITY
End: 2020-01-16

## 2019-12-05 NOTE — PATIENT INSTRUCTIONS
Breast Cancer, Female    Breast cancer is a malignant growth of tissue (tumor) in the breast. Unlike noncancerous (benign) tumors, malignant tumors are cancerous and can spread to other parts of the body. The two most common types of breast cancer start in the milk ducts (ductal carcinoma) or in the lobules where milk is made in the breast (lobular carcinoma). Breast cancer is one of the most common types of cancer in women.  What are the causes?  The exact cause of female breast cancer is unknown.  What increases the risk?  The following factors may make you more likely to develop this condition:  · Being older than 55 years of age.  · Race and ethnicity.  women generally have an increased risk, but -American women are more likely to develop the disease before age 45.  · Having a family history of breast cancer.  · Having had breast cancer in the past.  · Having certain noncancerous conditions of the breast, such as dense breast tissue.  · Having the BRCA1 and BRCA2 genes.  · Having a history of radiation exposure.  · Obesity.  · Starting menopause after age 55.  · Starting your menstrual periods before age 12.  · Having never been pregnant or having your first child after age 30.  · Having never .  · Using hormone therapy after menopause.  · Using birth control pills.  · Drinking more than one alcoholic drink a day.  · Exposure to the drug ERICA, which was given to pregnant women from the 1940s to the 1970s.  What are the signs or symptoms?  Symptoms of this condition include:  · A painless lump or thickening in your breast.  · Changes in the size or shape of your breast.  · Breast skin changes, such as puckering or dimpling.  · Nipple abnormalities, such as scaling, crustiness, redness, or pulling in (retraction).  · Nipple discharge that is bloody or clear.  How is this diagnosed?  This condition may be diagnosed by:  · Taking your medical history and doing a physical exam. During the  exam, your health care provider will feel the tissue around your breast and under your arms.  · Taking a sample of nipple discharge. The sample will be examined under a microscope.  · Performing imaging tests, such as breast X-rays (mammogram), breast ultrasound exams, or an MRI.  · Taking a tissue sample (biopsy) from the breast. The sample will be examined under a microscope to look for cancer cells.  · Taking a sample from the lymph nodes near the affected breast (sentinel node biopsy).  Your cancer will be staged to determine its severity and extent. Staging is a careful attempt to find out the size of the tumor, whether the cancer has spread, and if so, to what parts of the body. Staging also includes testing your tumor for certain receptors, such as estrogen, progesterone, and human epidermal growth factor receptor 2 (HER2). This will help your cancer care team decide on a treatment that will work best for you. You may need to have more tests to determine the stage of your cancer. Stages include the following:  · Stage 0--The tumor has not spread to other breast tissue.  · Stage I--The cancer is only found in the breast or may be in the lymph nodes. The tumor may be up to ¾ in (2 cm) wide.  · Stage II--The cancer has spread to nearby lymph nodes. The tumor may be up to 2 in (5 cm) wide.  · Stage III--The cancer has spread to more distant lymph nodes. The tumor may be larger than 2 in (5 cm) wide.  · Stage IV--The cancer has spread to other parts of the body, such as the bones, brain, liver, or lungs.  How is this treated?  Treatment for this condition depends on the type and stage of the breast cancer. It may be treated with:  · Surgery. This may involve breast-conserving surgery (lumpectomy or partial mastectomy) in which only the part of the breast containing the cancer is removed. Some normal tissue surrounding this area may also be removed. In some cases, surgery may be done to remove the entire breast  (mastectomy) and nipple. Lymph nodes may also be removed.  · Radiation therapy, which uses high-energy rays to kill cancer cells.  · Chemotherapy, which is the use of drugs to kill cancer cells.  · Hormone therapy, which involves taking medicine to adjust the hormone levels in your body. You may take medicine to decrease your estrogen levels. This can help stop cancer cells from growing.  · Targeted therapy, in which drugs are used to block the growth and spread of cancer cells. These drugs target a specific part of the cancer cell and usually cause fewer side effects than chemotherapy. Targeted therapy may be used alone or in combination with chemotherapy.  · A combination of surgery, radiation, chemotherapy, or hormone therapy may be needed to treat breast cancer.  Follow these instructions at home:  · Take over-the-counter and prescription medicines only as told by your health care provider.  · Eat a healthy diet. A healthy diet includes lots of fruits and vegetables, low-fat dairy products, lean meats, and fiber.  ? Make sure half your plate is filled with fruits or vegetables.  ? Choose high-fiber foods such as whole-grain breads and cereals.  · Consider joining a support group. This may help you learn to cope with the stress of having breast cancer.  · Talk to your health care team about exercise and physical activity. The right exercise program can:  ? Help prevent or reduce symptoms such as fatigue or depression.  ? Improve overall health and survival rates.  · Keep all follow-up visits as told by your health care provider. This is important.  Where to find more information  · American Cancer Society: www.cancer.org  · National Cancer [place]: www.cancer.gov  Contact a health care provider if:  · You have a sudden increase in pain.  · You have any symptoms or changes that concern you.  · You lose weight without trying.  · You notice a new lump in either breast or under your arm.  · You develop swelling in  either arm or hand.  · You have a fever.  · You notice new fatigue or weakness.  Get help right away if:  · You have chest pain or trouble breathing.  · You faint.  Summary  · Breast cancer is a malignant growth of tissue (tumor) in the breast.  · Your cancer will be staged to determine its severity and extent.  · Treatment for this condition depends on the type and stage of the breast cancer.  This information is not intended to replace advice given to you by your health care provider. Make sure you discuss any questions you have with your health care provider.  Document Released: 03/28/2007 Document Revised: 08/13/2018 Document Reviewed: 08/13/2018  [employer] Interactive Patient Education © 2019 [employer]    External Beam Radiation Therapy  External beam radiation therapy is a type of radiation treatment. This type of radiation therapy can deliver radiation to a fairly large area. This is the most common type of radiation therapy for cancer. This therapy may be done to:  · Treat cancer by:  ? Destroying cancer cells. Radiation delivered during the treatment damages cancer cells. It may also damage normal cells, but normal cells have the DNA to repair themselves while cancer cells do not.  ? Helping with symptoms of your cancer.  ? Stopping the growth of any remaining cancer cells after surgery.  ? Preventing cancer cells from growing in areas that do not have cancer (prophylactic radiation therapy).  · Treat or shrink a tumor.  · Reduce pain (palliative therapy).  The amount of radiation you will receive and the length of therapy depend on your medical condition. You should not feel the radiation being delivered or any pain during your therapy.  Let your health care provider know about:  · Any allergies you have.  · All medicines you are taking, including vitamins, herbs, eye drops, creams, and over-the-counter medicines.  · Any problems you or family members have had with anesthetic medicines.  · Any blood  disorders you have.  · Any surgeries you have had.  · Any medical conditions you have.  · Whether you are pregnant or may be pregnant.  What are the risks?  Generally, this is a safe procedure. However, radiation therapy can place a person at a higher risk for a second cancer later in life.  Most people experience side effects from the therapy. Side effects depend on the amount of radiation and the part of the body that was exposed to radiation. The most common side effects include:  · Skin changes.  · Hair loss.  · Fatigue.  · Nausea and vomiting.  What happens before the procedure?  · There will be a planning session (simulation). During the session:  ? Your health care provider will plan exactly where the radiation will be delivered (treatment field).  ? You will be positioned for your therapy. The goal is to have a position that can be reproduced for each therapy session.  ? Temporary marks may be drawn on your body. Permanent marks may also be drawn on your body in order for you to be positioned the same way for each therapy session.  ? A tool that holds a body part in place (immobilization device) may be used to keep the area of treatment in the correct position.  · Follow instructions from your health care provider about eating or drinking restrictions.  · Ask your health care provider about changing or stopping your regular medicines. This is especially important if you are taking diabetes medicines or blood thinners.  What happens during the procedure?    · You will either lie on a table or sit in a chair in the position determined for your therapy.  · You may have a heavy shield placed on you to protect tissues and organs that are not being treated.  · The radiation machine (linear accelerator) will move around you to deliver the radiation in exact doses from different angles. The machine will not touch you.  The procedure may vary among health care providers and hospitals.  What happens after the  procedure?  · You may return to your normal routine including diet, activities, and medicines as told by your health care provider.  · You may want to have someone take you home from the hospital or clinic.  Summary  · External beam radiation therapy is a type of radiation treatment for cancer.  · The amount of radiation you will receive and the length of therapy depend on your medical condition.  · Most people experience side effects from the therapy. Side effects depend on the amount of radiation and the part of the body that was exposed to radiation.  This information is not intended to replace advice given to you by your health care provider. Make sure you discuss any questions you have with your health care provider.  Document Released: 05/06/2010 Document Revised: 12/18/2017 Document Reviewed: 12/18/2017  [employer] Interactive Patient Education © 2019 [employer]

## 2019-12-05 NOTE — PROGRESS NOTES
[facility]  MD Gloria Cali BSN, PA-C  ___________________________________________________________  [facility]  Department of Radiation Oncology  [address] [place] [id number]  Office: [phone]  Fax: [phone]    DATE:  12/05/2019    PATIENT:   Ayla Echeverria 1965                                   MEDICAL RECORD #:  [id number]                                                       REASON FOR CONSULTATION:   Ayla Echeverria is a very pleasant 54 y.o. female history of metastatic left breast inflammatory breast cancer that has been referred to our clinic by Donald Ott MD to discuss radiotherapy recommendations for symptomatic right breast spread of inflammatory breast cancer. Reports pain to right breast. Denies activity change, appetite change, unexpected weight change, nausea/vomiting, diarrhea, light-headedness, weakness, and headaches. She follows .     History of Present Illness:  03/22/2016 - Bilateral mammogram:  • Palpable mass showed a left breast abnormal finding.    03/31/2016 - BREAST, LEFT 2:00 MASS, BIOPSY:  • INVASIVE DUCTAL CARCINOMA, FAVOR GRADE 3.  • TUMOR SIZE AT LEAST 10 MM IN GREATEST LINEAR DIMENSION.  • ALL SUBMITTED CORE BIOPSIES ARE POSITIVE FOR MALIGNANCY.    03/31/2016 - LYMPH NODE, LEFT AXILLA, FINE NEEDLE ASPIRATION:  • POSITIVE FOR METASTATIC MAMMARY CARCINOMA.    04/14/2016 - MRI Bilateral breast:  • An 11 x 6 x 3.5 cm region of nodular signal and suspicious enhancement in the outer portion of the left breast. This corresponds to the patient's known breast cancer.  • Metastatic lymphadenopathy in the left axillary region.  • No contralateral malignancy is identified.  • BI-RADS 6    04/2016 - 09/2016 - Chemotherapy course:  • TCH-P 6 cycles  • 18 weeks of Trastuzumab    Declined to complete one year of Herceptin.  Declined radiation therapy.     07/13/2016 - US left breast  limited:  • Poorly marginated relatively hypoechoic solid mass in the upper outer left breast is reidentified. It now measures 1 cm x 1.2 x 1.1 cm. It previously measured 1.5 x 1.8 x 2 cm. A hydro-Carmine is seen adjacent to the mass.  • A lymph node in the left axilla now measures 1.7 x 0.7 x 1.6 cm. It previously measured 3.1 x 2.9 x 1.7 cm.    09/15/2016 - Digital Diagnostic unilateral left mammogram:  • There is no significant residual left breast mass seen. However, the tissue is very dense and could obscure residual tumor. The patient has known left breast cancer. BI-RADS 6.  • The patient should have bilateral mammography in March of 2017.    09/15/2016 - US Left breast limited:  • There is no significant residual mass identified.   • On image 6, there may be a small residual area of isoechoic mass or scarring measuring about 9 x 10 mm. In the left axilla,   • the previously abnormal lymph node now measures 1.4 cm in length and previously measured 1.7 cm.    09/15/2016 - MRI bilateral breast:  • Dramatic response to neoadjuvant chemotherapy with no abnormal enhancement seen within the left breast on today's study. A biopsy clip is present within the mid depth of the left breast at the 9:00 to 10:00 position.   • Dramatic improvement in the left axillary lymphadenopathy. A 1.5 cm left axillary lymph node persists on today's exam.  • No evidence of contralateral malignancy.  • ACR BI-RADS category 6-biopsy-proven malignancy.     10/03/2016 - BREAST, LEFT, LUMPECTOMY WITH LEFT SENTINEL NODE BIOPSY:  • RESIDUAL INVASIVE DUCTAL CARCINOMA, GRADE 3 (0.2CM)  • MARGINS CLEAR OF INVASIVE CARCINOMA (1.1 CM FROM LATERAL)  • EXTENSIVE LYMPHVASCULAR SPACE INVASION.  • TWO SENTINEL NODES POSITIVE FOR METASTATIC CARCINOMA (2/3)    12/21/2016 - Hormonal Therapy:  • Tamoxifen    RECURRANCE:  01/30/2017 - Appointment with  due to new complaints of erythema in the left breast.     01/30/2017 - Skin, left breast,  biopsy:  • Positive for metastatic ductal carcinoma  • Extensive dermal lymphovascular invasion present  • ER -- [place] --  • Her2 by FISH  Positive ratio: 4.5  • Ki-67  32%  • Foundation ONE: ERBB2 amplification   • AURKA amplification  • TP-53  •     05/04/2017 - CT Chest with contrast:  • The lungs are clear.  • Skin thickening is present over the left breast, the left breast breast parenchyma is very dense most likely associated with patient's known neoplasm left axillary adenopathy is present.    05/04/2017 - CT Abdomen/Pelvis with contrast:  • There is no evidence of intra-abdominal or pelvic metastasis.    05/04/2017 - PET Scan:  • On this examination symmetric increased metabolic activity is present in the tonsils. The SUV is 5.6. This is symmetric.  • In the left supraclavicular region a cluster of lymph nodes is present one is an SUV of 3, one is an SUV of 2.9 a third has an SUV of 2.5.   • An additional axillary lymph node is present with an SUV of 2.8.  • Surgical clips are present in the upper-outer quadrant of the left breast is marked abnormal increased metabolic activity present there are multiple foci of abnormal metabolic activity throughout the left breast with SUVs of 8.9, 7.4 7.3. The skin of the left breast is thickened with an SUV of 3.3 and  • There is extensive abnormal metabolic activity throughout the left breast consistent with extensive multifocal disease. The SUV in the skin inferiorly is 4.    05/11/2017 - 05/11/2017 - Chemotherapy course:  • Weekly Paclitaxel/Carboplatin   • Herceptin Pertuzumab every 3 weeks  • Allergic reaction to Carboplatin/ so med stopped    07/21/2017 - 09/01/2017 -Chemotherapy course:  • Taxotere/ Herceptin/ Pertuzumab  • Discontinued due to progression of disease.     09/22/2017 - 03/02/2018 - Chemotherapy course:  • Kadcyla every 3 weeks   Completed 8 cycles    12/27/2017 - CT Abdomen/Pelvis with contrast:  • No evidence of intra-abdominal or pelvic  metastasis.  • Superior left renal cyst.  • Apparent wall thickening of the rectosigmoid colon may be artifactual due to underdistention. No free air or abscess. No inflammatory changes of the mesentery.  • Mild hepatic steatosis.    12/27/2017 - CT Chest with contrast:  • No evidence of intrathoracic metastasis.  • Post therapeutic changes of the left breast and axilla.    12/27/2017 - Bone scan:  • No evidence of bony metastatic disease.    05/21/2018 - [facility] Path Review:  BREAST, LEFT, 2:00, CORE NEEDLE BIOPSY:   • INVASIVE MAMMARY CARCINOMA, NO SPECIAL TYPE WITH INVASIVE MICROPAPILLARY FEATURES, HIGH COMBINED HISTOLOGIC GRADE, HIGH PROLIFERATIVE RATE, MEASURING AT LEAST 10 MM AND INVOLVING 4 OF 4 CORES; LYMPHOVASCULAR INVASION; ASSOCIATED HIGH-GRADE DUCTAL CARCINOMA IN SITU, MICROPAPILLARY AND CRIBRIFORM TYPES WITH NECROSIS; SEE COMMENT #1.  BREAST, LEFT, LUMPECTOMY (A4, A9):   • RESIDUAL INVASIVE MAMMARY CARCINOMA PRESENT AS INTRALYMPHATIC CARCINOMA; BACKGROUND BREAST WITH POST NEOADJUVANT THERAPY TUMOR BED CHANGES AND NO DEFINITIVE RESIDUAL STROMALLY INVASIVE CARCINOMA IN EXAMINED SLIDES; FOCAL RESIDUAL HIGH-GRADE DUCTAL CARCINOMA IN SITU, MICROPAPILLARY TYPE PRESENT 2 MM FROM MEDIAL MARGIN; SEE COMMENT #2.  LYMPH NODE, LEFT SENTINEL, EXCISION (B):   • TWO OF THREE LYMPH NODES INVOLVED BY METASTATIC CARCINOMA, LARGEST DEPOSIT MEASURING 2.5 MM (1 MACROMETASTASIS, 1 MICROMETASTASIS), NEGATIVE FOR EXTRANODAL EXTENSION (2/3); TREATMENT EFFECT PRESENT.    Per report, HER-2 FISH performed on a left breast skin biopsy from 1/30/2017 (outside accession number [id number]; not received for review) is positive for amplification of HER-2 (average HER-2 signals/nucleus: 16.9; HER-2/CEN17 ratio: 4.5).    Comment #2: Representative slides were received for review which shows multifocal lymphovascular invasion and focal ductal carcinoma in situ. Per report, a single 2.0 mm focus of parenchymal invasion was also present.  We  did not receive that slide for review.    07/10/2018 - 11/30/2018 - Chemotherapy course:  • Kadcyla    07/13/2018 - CT Abdomen/Pelvis with contrast:  • No CT evidence of metastatic disease    07/13/2018 - CT Chest with contrast:  • History of left breast carcinoma with post therapy changes with skin thickening and postsurgical changes in the upper outer quadrant of the left breast and axilla.  • Right axillary lymph adenopathy.  • Otherwise, no CT evidence of thoracic metastatic disease.    07/16/2018 - PET Scan:  • Diffuse uptake throughout the left breast extending to the left chest wall, which may be related to radiation treatment or residual disease. Correlate clinically.  • Abnormal uptake within left axillary lymph nodes as well as a left internal mammary lymph node, concerning for metastatic disease.  • Enlarged hypermetabolic right axillary lymph node concerning for metastatic disease.  • Asymmetric uptake in the tonsils, with greater uptake in the right tonsil, etiology unclear but possibly inflammatory. Correlate clinically.  • Abnormal uptake within a right cervical lymph node, etiology unclear but possibly reactive.    10/26/2018 - CT Chest with contrast:  • No intrathoracic neoplastic process/metastatic disease.  • An increase in size of an abnormal lymph node in the right axilla. This may represent reactive or metastatic lymph node. This needs to be further evaluated.  • A small ill-defined nodule in the left axilla/left lateral breast.  • Increased infiltration of the left axillary fat. Increase thickening of the skin of the left breast. These may be postsurgical, post radiation evolving changes. This may be further evaluated.  ADDENDUM: The hypermetabolic enlarged right axillary lymph node seen on the PET exam is very similar in size compared to 10/26/2018 CT chest study. This lymph node has increased in size compared to a CT chest of 12/27/2017. Percutaneous sampling should be considered as the  finding is contralateral to the inflammatory left breast cancer.     10/26/2018 - CT Abdomen/Pelvis with contrast:  • No evidence of intra-abdominal or pelvic metastasis.  • Small left renal cyst.  • Skin thickening associated the left breast is again identified.    12/10/2018 - Bilateral Diagnostic Mammogram:  • New area of palpable concern in the right breast may be cystic in nature. Aspiration should be attempted. If aspiration is impossible, this should be converted to ultrasound-guided biopsy.   • Abnormal lymph node in the right axilla for which tissue sampling is recommended.  • Changes of the left breast compatible with known inflammatory breast cancer. BI-RADS Category 6-known biopsy-proven malignancy.    12/10/2018 - Right Breast Ultrasound:  • New area of palpable concern in the right breast may be cystic in nature. Aspiration should be attempted. If aspiration is impossible, this should be converted to ultrasound-guided biopsy.   • Abnormal lymph node in the right axilla for which tissue sampling is recommended.  • Changes of the left breast compatible with known inflammatory breast cancer.   • BI-RADS Category 6-known biopsy-proven malignancy.    12/17/2018 - Breast, right 12 o'clock, biopsy:  • Invasive ductal carcinoma, high grade  • Tumor measures at least 6 mm in greatest linear dimension    12/17/2018 - Lymph node, right axillary, fine needle aspiration:  • Positive for metastatic ductal carcinoma    01/04/2019 - 01/04/2019 - Chemotherapy Course:  • Navelbine x 1 cycle (poor tolerance with pain)    01/10/2019 - CT Abdomen/Pelvis:  • Extensive skin thickening and induration of the left breast with evidence of lumpectomy. This could represent an inflammatory type breast carcinoma but may also simply represent post therapy changes.  • Diffuse steatosis of the liver.  • There is some prominence and enhancement of the mucosa of the body and antrum of the stomach suggesting that there may be an ongoing  gastritis. No evidence of gastric outlet obstruction or focal mass.  • Moderate constipation. There is no obstruction or free air.  • Small fat-containing periumbilical hernia.  • Tiny cortical cyst upper pole left kidney.    01/25/2019 - Chemotherapy course:  • Gemzar/Herceptin    03/01/2019 - Appointment with :  • Proceed with cycle 2 day 1 Gemzar/Herceptin.  • Follow up in 3 weeks     03/15/2019 - Consult with :  • The patient has verbalized understanding of the risk of radiation and agrees to proceed with therapy as recommended.  We will simulate treatment fields at this time, with plans to begin adjuvant radiation therapy in the near future.     04/01/2019 - Documentation per :  • This patient is ready to start radiation to the breast and regional lymph nodes for inflammatory breast carcinoma.  • However, she is on Gemzar based chemotherapy along with cis-platinum and Herceptin.  I have placed a call to Dr. Ott to see if it is possible to withhold the Gemzar chemotherapy during the time of radiation.  • Combination of Gemzar and radiation is most frequently known for GI toxicity, but I have also seen significant soft tissue reaction particularly when treating a large portals which will be necessary in her case.  • However, I also understand that she does require aggressive chemotherapy with this very aggressive malignancy.  We will discuss this with Dr. Ott and we will come to agreement whether to withhold the Gemzar or to observe for reaction and manage any increase in toxicity with his combined modality therapy.    05/23/2019 --completed palliative left breast/general lymphatics lesion therapy to a dose of 5040 cGy in 28 fractions.    05/30/2019 - CT Abdomen/Pelvis with contrast:  • Right breast with 2 areas which more masslike and enhancing when compared to the prior CT exams. Recommend diagnostic mammogram and possible ultrasound for further  evaluation.  • Posttreatment changes to the left breast.  • No evidence of metastatic disease within abdomen or pelvis.  • Fatty liver.    05/30/2019 - CT Chest with contrast:  • Enlarged abnormal lymph node in the right axilla and the upper chest wall as detailed above.  • There is a dramatic difference in the right breast parenchyma with nodularity, increased density and skin thickening which was not noted in the previous study. This may be correlated with history and mammography.    05/30/2019 - Bone Scan:  • No evidence of metastatic bone disease.  • Bilateral breast activity could be inflammatory/post radiation or related to malignancy. Correlate clinically.    06/04/2019 - Appointment with :  • Start chemotherapy with Dr. Ott on 06/07/2019 as scheduled  • Return to Dr. Darby in one month  • Call anytime if lesions on breast are progressing    06/22/2019 - Appointment with :  • Very poor tolerance to cisplatin, Gemzar/Herceptin.  • Recommended and started on Xeloda 850 mg/m2 PO BID days 1 through 14 every 21 days and Lapatinib 1250 mg daily continuously.    06/22/2019 - November 2019- Chemotherapy course:  • Xeloda/Lapatinib - disease progression in November, 2019.     June 2019-per Dr. Ott note: very poor tolerance to cisplatin, Gemzar/Herceptin. She was recommended Xeloda/ Lapatinib, she was recommended and started on Xeloda 850 mg/m2 PO BID days 1 through 14 every 21 days and Lapatinib 1250 mg daily continuously.    November 2019- per Dr. Ott note: progression on prior Xeloda and lapatinib.    11/22/2019 - Appointment with :  • Discussed with [facility] regarding clinical trial today.   • Unfortunately, she was not eligible due to had received one-time dose of Navelbine.  • I contacted [facility] and I am waiting for a call back regarding availability of clinical trials.  • If she is not a candidate for clinical trial we will offer Navelbine and  Herceptin.  • Painful skin metastasis - referred to palliative RT. We will prescribe analgesic cream compound from [facility] pharmacy  • Follow up in 2 weeks     11/23/2019-per Dr. Ott note:  recommended rechallenge with Herceptin and Navelbine.    History obtained from  PATIENT, FAMILY and CHART    PAST MEDICAL HISTORY   Past Medical History:   Diagnosis Date   • Breast cancer (CMS/HCC)       PAST SURGICAL HISTORY  Past Surgical History:   Procedure Laterality Date   • AXILLARY LYMPH NODE BIOPSY/EXCISION Left    • BREAST BIOPSY Left 03/31/2016   • BREAST LUMPECTOMY WITH SENTINEL NODE BIOPSY Left 10/03/2016    residual invasive carcinoma, grade 3 (0.2cm); margins negative; extensive lymphvascular space invasion; 2 sentinel lymph nodes positive (2/3)      FAMILY HISTORY  family history includes Colon cancer in her father; Diabetes in her sister; Hypertension in her sister; Lung cancer in her mother; Lupus in her sister; Prostate cancer in her father.     SOCIAL HISTORY   Social History     Socioeconomic History   • Marital status:      Spouse name: Not on file   • Number of children: Not on file   • Years of education: Not on file   • Highest education level: Not on file   Tobacco Use   • Smoking status: Never Smoker   • Smokeless tobacco: Never Used   Substance and Sexual Activity   • Alcohol use: No   • Drug use: No   • Sexual activity: Defer      ALLERGIES  Patient has no known allergies.     MEDICATIONS   Current Outpatient Medications   Medication Sig Dispense Refill   • HYDROcodone-acetaminophen (NORCO)  MG per tablet Take 1 tablet by mouth Every 4 (Four) Hours As Needed for Moderate Pain . 40 tablet 0   • Morphine (MS CONTIN) 15 MG 12 hr tablet Take 15 mg by mouth 3 (Three) Times a Day.     • Morphine (MSIR) 15 MG tablet Take 15 mg by mouth Every 4 (Four) Hours As Needed for Severe Pain .     • Multiple Vitamin (MULTI VITAMIN DAILY PO) Take  by mouth.     • pantoprazole (PROTONIX) 40  "MG EC tablet Take 40 mg by mouth Daily.     • montelukast (SINGULAIR) 10 MG tablet Take  by mouth.       No current facility-administered medications for this visit.       The following portions of the patient's history were reviewed and updated as appropriate: allergies, current medications, past family history, past medical history, past social history, past surgical history and problem list.    REVIEW OF SYSTEMS  Review of Systems  Constitutional: no fever, no night sweats, fatigue; weight loss  HEENT: no blurring of vision, no double vision, no hearing difficulty, no tinnitus,no ulceration, no dental caries, no dysphagia  Lungs: no cough, no shortness of breath, no wheeze;   CVS: no palpitation, no chest pain, no shortness of breath;  GI: no abdominal pain, no nausea , no vomiting, no constipation; diarrhea grade 1  STIVEN: no dysuria, frequency and urgency,   Musculoskeletal: no joint pain, swelling , stiffness;  Breast/chest wall pain requiring continued pain meds  Endocrine: no polyuria, polydypsia, no cold or heat intolerence;   Hematology/lymphatic: no easy brusing or bleeding  Dermatology: skin lesions right and left breast, no eczema, no pruritis;   Psychiatry: no depression, no anxiety,no panic attacks, no suicide ideation;   Neurology: no syncope, no seizures, no numbness or tingling of hands, numbness and tingling of feet, no paresis;     PHYSICAL EXAM  VITAL SIGNS:   Vitals:    12/05/19 1009   BP: 149/79   Weight: 71.7 kg (158 lb)   Height: 160 cm (63\")   PainSc: 10-Worst pain ever   PainLoc: Breast  Comment: Right     General:  Alert and oriented, no acute distress, well developed, Vitals reviewed.  Head/Neck:  Normocephalic, without obvious abnormality, atraumatic.  The trachea is midline.   Nose/Sinuses:  Nares normal. Septum midline. Mucosa normal. No drainage or sinus tenderness.  Mouth/Throat:  Mucosa moist, no lesions; pharynx without erythema, edema or exudate.  Neck:  supple, no mass, " non-tender  Eyes: No gross abnormalities,  no scleral jaundice or erythema.    Ears: Ears appear intact with no abnormalities noted, hearing grossly normal  Chest:  Respiratory efforts are normal and unlabored, chest is clear to auscultation. There are no wheezes, rhonchi, rales.  Cardiovascular: Regular rate and rhythm without murmurs, rubs, or gallops.   Abdomen:  Soft, non-tender, normal bowel sounds; no noted organomegaly or masses. no CVA tenderness   Breast: bilateral multiple skin nodules, largest 4 cm, not painful to touch, left lateral chest wall nodules.  In the right breast, there is significant induration of the majority of the breast with overlying red/purplish shiny nodules of varying sizes without skin ulceration or oozing.  A right axillary mass is also palpated.    Extremities:  WONG well, warm to touch, no evidence of cyanosis or edema.  Lymphatics:  No evidence of adenopathy in the cervical or supraclavicular areas.  Skin: No suspicious lesions or rashes of concern, skin color, texture, turgor are normal  Neurologic:  Alert and oriented, gait normal, strength and sensation grossly normal  Psych: Mood and affect are appropriate for circumstance. Eye contact is appropriate.     Performance Status: ECOG (0) Fully active, able to carry on all predisease performance without restriction    Clinical Quality Measures  -Pain Documented by Standardized Tool, FPS Ayla J Juwan reports a pain score of 10. Given her pain assessment as noted, treatment options were discussed and the following options were decided upon as a follow-up plan to address the patient's pain: continuation of current treatment plan for pain and use of non-medical modalities (ice, heat, stretching and/or behavior modifications). Pain managed by Dr. Ott    Pain Medications             HYDROcodone-acetaminophen (NORCO)  MG per tablet Take 1 tablet by mouth Every 4 (Four) Hours As Needed for Moderate Pain .    Morphine (MS  CONTIN) 15 MG 12 hr tablet Take 15 mg by mouth 3 (Three) Times a Day.    Morphine (MSIR) 15 MG tablet Take 15 mg by mouth Every 4 (Four) Hours As Needed for Severe Pain .        -Advanced Care Planning Care plan discussed, no care plan provided  -Body Mass Index Screening and Follow-Up Plan Patient's Body mass index is 27.99 kg/m². BMI is above normal parameters. Recommendations include: educational material.  -Tobacco Use: Screening and Cessation Intervention Social History    Tobacco Use      Smoking status: Never Smoker      Smokeless tobacco: Never Used    ASSESSMENT AND PLAN   1. Malignant neoplasm involving both nipple and areola of left breast in female, estrogen receptor positive (CMS/HCC)    2. Regional lymph node staging category N3 (CMS/HCC)    3. Cancer related pain    4. Painful skin lesion    5. History of radiation therapy    6. Non-smoker    7. Encounter for consultation        RECOMMENDATIONS: Ayla Echeverria is a very pleasant 54 y.o. female that has been referred to our clinic by Donald Ott MD to discuss palliative radiotherapy recommendations for painful bilateral chest wall and breast skin nodules, metastatic inflammatory breast cancer. Follows Dr. Ott who plans to change her course of systemic therapy.     We have discussed the role of radiation therapy with this diagnosis as well as the iIndications and rationale of adjuvant radiation therapy according to the NCCN Guidelines. I have extensively reviewed the risks, benefits and alternatives of therapy for cancer of the breast.  Risks of radiation therapy includes but is not limited to radiation induced red, dry, tender, or itchy sunburn-type skin irritation of the targeted area, which may range from mild to intense, skin, breast heaviness,  redness, bruised appearance or discoloration of the skin, cumulative general fatigue and the risk of progression of disease in spite of therapy with either local or systemic failure.      I  have seen, examined and reviewed this patient's medication list, appropriate labs and imaging studies as well as other physician notes. We discussed the goals and plans of care with the patient and family and answered all questions.     Since the patient is at risk for lymphedema post surgery and radiation, I will ask physical therapy for lymphedema baseline evaluation.     She has received the left breast area radiation therapy in the past with moderately good results and resolution of most aggressive appearing skin nodules.  She does still have left breast area nodules and some that are developing in the left chest wall laterally (likely outside of treatment field) but these are not symptomatic at this time and did not appear at risk for eminent skin ulceration.  I believe they can be watched for the time being particularly as new systemic therapy is being planned for.  However, her right breast disease is quite tender and painful with appearance of eminent likelihood of skin ulceration.  I believe a course of palliative right breast and draining lymphatics radiation therapy may help with alleviating her pain and hopefully provide local disease control in hopes of averting fungating skin ulcerations.  Discussed the increased risk of overlapping treatment fields due to her prior radiation therapy and resulting side effects from it.  The patient has verbalized understanding of the risk of therapy and agrees to the treatment recommendations.  We will simulate treatment fields with dose and final number of treatment fractions to be determined during the treatment planning process, however, I anticipate a dose of 5040 cGy in 28 daily fractions similar to her previous palliative dose that she had a good response to.  We will also stay vigilant for the possibility of treating additional sites that may become symptomatic or at eminent risk of skin ulceration.    Todays appointment time was spent in counseling and  coordination of care as follows: diagnosis, intent of treatment discussing radiation therapy specifics: logistics, possible and probable side effects and after effects, staging of cancer, standard of care in for this stage of this cancer and treatment options.  I saw this patient in consultation with Gloria Parikh PA-C while covering for Dr. Pipo Darby, radiation oncologist.  Saeid Lew MD  12/05/2019

## 2019-12-06 ENCOUNTER — OFFICE VISIT (OUTPATIENT)
Dept: HEMATOLOGY | Age: 54
End: 2019-12-06
Payer: COMMERCIAL

## 2019-12-06 ENCOUNTER — HOSPITAL ENCOUNTER (OUTPATIENT)
Dept: INFUSION THERAPY | Age: 54
Discharge: HOME | End: 2019-12-06
Payer: COMMERCIAL

## 2019-12-06 VITALS
HEART RATE: 81 BPM | DIASTOLIC BLOOD PRESSURE: 76 MMHG | HEIGHT: 63 IN | WEIGHT: 156.4 LBS | SYSTOLIC BLOOD PRESSURE: 122 MMHG | OXYGEN SATURATION: 98 % | BODY MASS INDEX: 27.71 KG/M2

## 2019-12-06 DIAGNOSIS — C50.412 MALIGNANT NEOPLASM OF UPPER-OUTER QUADRANT OF LEFT FEMALE BREAST, UNSPECIFIED ESTROGEN RECEPTOR STATUS (HCC): ICD-10-CM

## 2019-12-06 DIAGNOSIS — Z71.89 CARE PLAN DISCUSSED WITH PATIENT: ICD-10-CM

## 2019-12-06 DIAGNOSIS — C50.412 MALIGNANT NEOPLASM OF UPPER-OUTER QUADRANT OF LEFT FEMALE BREAST, UNSPECIFIED ESTROGEN RECEPTOR STATUS (HCC): Primary | ICD-10-CM

## 2019-12-06 DIAGNOSIS — Z71.89 COORDINATION OF COMPLEX CARE: ICD-10-CM

## 2019-12-06 DIAGNOSIS — T50.905S TOXICITY, LATE EFFECT, DUE TO DRUG, MEDICINE, OR BIOLOGICAL SUBSTANCE: ICD-10-CM

## 2019-12-06 PROCEDURE — 99214 OFFICE O/P EST MOD 30 MIN: CPT | Performed by: INTERNAL MEDICINE

## 2019-12-06 PROCEDURE — 77300 RADIATION THERAPY DOSE PLAN: CPT | Performed by: RADIOLOGY

## 2019-12-06 PROCEDURE — 77338 DESIGN MLC DEVICE FOR IMRT: CPT | Performed by: RADIOLOGY

## 2019-12-06 PROCEDURE — 85025 COMPLETE CBC W/AUTO DIFF WBC: CPT

## 2019-12-06 PROCEDURE — 77301 RADIOTHERAPY DOSE PLAN IMRT: CPT | Performed by: RADIOLOGY

## 2019-12-10 RX ORDER — PANTOPRAZOLE SODIUM 40 MG/1
40 TABLET, DELAYED RELEASE ORAL DAILY
Qty: 30 TABLET | Refills: 2 | Status: SHIPPED | OUTPATIENT
Start: 2019-12-10 | End: 2020-06-12

## 2019-12-20 DIAGNOSIS — C50.412 MALIGNANT NEOPLASM OF UPPER-OUTER QUADRANT OF LEFT FEMALE BREAST, UNSPECIFIED ESTROGEN RECEPTOR STATUS (HCC): Primary | ICD-10-CM

## 2019-12-27 ENCOUNTER — HOSPITAL ENCOUNTER (OUTPATIENT)
Dept: RADIATION ONCOLOGY | Facility: HOSPITAL | Age: 54
Setting detail: RADIATION/ONCOLOGY SERIES
End: 2019-12-27

## 2019-12-27 ENCOUNTER — DOCUMENTATION (OUTPATIENT)
Dept: RADIATION ONCOLOGY | Facility: HOSPITAL | Age: 54
End: 2019-12-27

## 2019-12-27 PROCEDURE — 77385: CPT | Performed by: RADIOLOGY

## 2019-12-27 PROCEDURE — 77332 RADIATION TREATMENT AID(S): CPT | Performed by: RADIOLOGY

## 2019-12-27 NOTE — PROGRESS NOTES
Dr. Ott called the me this morning about this patient.  She was last seen in our department on 12/5/2019 for consultation.  At that time she was scheduled go to [facility] and we were to defer any plans locally until their evaluation recommendations.    At this time Dr. Ott notes that the patient called him last night approximately 10 PM quite tearful and anxious to proceed with radiation as the lesions on her chest wall have burst and are oozing and ruptured.    He reports Danielle Yogi will not be necessary as there is new drug released by the FDA just 4 days ago that she is a prime candidate to receive with a good chance of response.    He asked how quickly we can get the patient in and I advised him we would see her today and expedite initiation of therapy which will likely start today as I have obtained approval for her plan from insurance authorization.    He will send the patient and we will see her today and initiate therapy for palliation of her symptoms.

## 2019-12-28 ENCOUNTER — HOSPITAL ENCOUNTER (OUTPATIENT)
Dept: RADIATION ONCOLOGY | Facility: HOSPITAL | Age: 54
Discharge: HOME | End: 2019-12-28

## 2019-12-28 PROCEDURE — 77412 RADIATION TX DELIVERY LVL 3: CPT | Performed by: RADIOLOGY

## 2019-12-28 PROCEDURE — 77385: CPT | Performed by: RADIOLOGY

## 2019-12-30 ENCOUNTER — LAB REQUISITION (OUTPATIENT)
Dept: LAB | Facility: HOSPITAL | Age: 54
End: 2019-12-30

## 2019-12-30 ENCOUNTER — OFFICE VISIT (OUTPATIENT)
Dept: WOUND CARE | Facility: HOSPITAL | Age: 54
End: 2019-12-30

## 2019-12-30 ENCOUNTER — HOSPITAL ENCOUNTER (OUTPATIENT)
Dept: RADIATION ONCOLOGY | Facility: HOSPITAL | Age: 54
Discharge: HOME | End: 2019-12-30

## 2019-12-30 DIAGNOSIS — Z00.00 ENCOUNTER FOR GENERAL ADULT MEDICAL EXAMINATION WITHOUT ABNORMAL FINDINGS: ICD-10-CM

## 2019-12-30 PROCEDURE — G0463 HOSPITAL OUTPT CLINIC VISIT: HCPCS

## 2019-12-30 PROCEDURE — 87070 CULTURE OTHR SPECIMN AEROBIC: CPT | Performed by: NURSE PRACTITIONER

## 2019-12-30 PROCEDURE — 87205 SMEAR GRAM STAIN: CPT | Performed by: NURSE PRACTITIONER

## 2019-12-30 PROCEDURE — 77385: CPT | Performed by: RADIOLOGY

## 2019-12-30 PROCEDURE — 97602 WOUND(S) CARE NON-SELECTIVE: CPT

## 2019-12-31 ENCOUNTER — HOSPITAL ENCOUNTER (OUTPATIENT)
Dept: RADIATION ONCOLOGY | Facility: HOSPITAL | Age: 54
Setting detail: RADIATION/ONCOLOGY SERIES
Discharge: HOME | End: 2019-12-31

## 2019-12-31 PROCEDURE — 77385: CPT | Performed by: RADIOLOGY

## 2020-01-01 ENCOUNTER — TREATMENT (OUTPATIENT)
Dept: PHYSICAL THERAPY | Facility: CLINIC | Age: 55
End: 2020-01-01

## 2020-01-01 DIAGNOSIS — I89.0 LYMPHEDEMA: ICD-10-CM

## 2020-01-01 DIAGNOSIS — M25.612 DECREASED ROM OF LEFT SHOULDER: Primary | ICD-10-CM

## 2020-01-01 DIAGNOSIS — M25.611 DECREASED ROM OF RIGHT SHOULDER: ICD-10-CM

## 2020-01-01 PROCEDURE — 97140 MANUAL THERAPY 1/> REGIONS: CPT | Performed by: PHYSICAL THERAPIST

## 2020-01-01 PROCEDURE — 97110 THERAPEUTIC EXERCISES: CPT | Performed by: PHYSICAL THERAPIST

## 2020-01-02 ENCOUNTER — HOSPITAL ENCOUNTER (OUTPATIENT)
Dept: RADIATION ONCOLOGY | Facility: HOSPITAL | Age: 55
Setting detail: RADIATION/ONCOLOGY SERIES
End: 2020-01-02

## 2020-01-02 ENCOUNTER — HOSPITAL ENCOUNTER (OUTPATIENT)
Dept: RADIATION ONCOLOGY | Facility: HOSPITAL | Age: 55
Setting detail: RADIATION/ONCOLOGY SERIES
Discharge: HOME | End: 2020-01-02

## 2020-01-02 PROCEDURE — 77385: CPT | Performed by: RADIOLOGY

## 2020-01-02 RX ORDER — AMOXICILLIN AND CLAVULANATE POTASSIUM 875; 125 MG/1; MG/1
1 TABLET, FILM COATED ORAL 2 TIMES DAILY
Qty: 20 TABLET | Refills: 0 | Status: SHIPPED | OUTPATIENT
Start: 2020-01-02 | End: 2020-01-05

## 2020-01-02 NOTE — PROGRESS NOTES
She has been exposed to several anti-HER-2 therapies in the past.  The most recent regiment was Xeloda and lapatinib. She has been at [facility] and screnned for clinical trials. Tivis Meigs has tried treatment with Navelbine/Herceptin the past but had severe pain related to this treatment and therefore quit. He had CT is scan chest abdomen pelvis that showed disease primarily localized in the right axillary lymph nodes and diffuse skin involvement in both breasts. 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 She was initially treated by Dr. Elba Dunbar at [facility]. Prior breast cancer history is as follows:  3/22/2016-a diagnostic mammogram for a palpable mass showed a left breast abnormal findings. 4/1/2017- ultrasound-guided core needle biopsy was consistent with invasive ductal carcinoma, grade 2. ER 1%, MA 6%, HER-2/oseas IHC 3+/FISH amplified ratio 6.7., AR 95%. She underwent 6 cycles of TCH-P from April 2016 through September 2016 with G-CSF support. She had a total of 18 weeks of trastuzumab treatment. 10/3/2016-she underwent a left lumpectomy with left sentinel lymph node revealing an invasive ductal carcinoma, grade 3. Margins clear of invasive carcinoma (1.1 cm from the lateral margin), extensive lymphovascular space invasion. 2 out of 3 sentinel lymph nodes positive for metastatic carcinoma. Final pathologic staging [id number])M0. She declined adjuvant radiation therapy and adjuvant Herceptin completion.   -------------------In-breast Recurrence [id number]---------------------------  1/30/2017- she was seen by Dr. Michelle Torres with new complaints of erythema in the left breast. A skin punch biopsy was consistent with metastatic ductal carcinoma with extensive dermal lymphovascular invasion. hours as needed for Nausea or Vomiting (Patient not taking: Reported on 1/3/2020) 15 tablet 0    Ferrous Sulfate (IRON) 325 (65 FE) MG TABS Take by mouth       No current facility-administered medications for this visit. REVIEW OF SYSTEMS:    Constitutional: no fever, no night sweats, fatigue; weight loss  HEENT: no blurring of vision, no double vision, no hearing difficulty, no tinnitus,no ulceration, no dental caries, no dysphagia  Lungs: no cough, no shortness of breath, no wheeze;   CVS: no palpitation, no chest pain, no shortness of breath;  GI: no abdominal pain, no nausea , no vomiting, no constipation; diarrhea grade 1  ORLIN: no dysuria, frequency and urgency, no hematuria, no kidney stones;   Musculoskeletal: no joint pain, swelling , stiffness;   Endocrine: no polyuria, polydypsia, no cold or heat intolerence; Hematology/lymphatic: no easy brusing or bleeding, no hx of clotting disorder; no peripheral adenopathy. Dermatology: skin lesions right and left breast, no eczema, no pruritis;   Psychiatry: no depression, no anxiety,no panic attacks, no suicide ideation; Neurology: no syncope, no seizures, no numbness or tingling of hands,  numbness and tingling of feet, no paresis;     PHYSICAL EXAM:    Vitals signs:  BP (!) 102/58   Pulse 95   Ht 5' 3\" (1.6 m)   Wt 153 lb 4.8 oz (69.5 kg)   SpO2 98%   BMI 27.16 kg/m²    Pain scale:  Pain Score:   5 mild    CONSTITUTIONAL: Alert, appropriate, no acute distress, chronically ill-appearing  EYES: Non icteric, EOM intact, pupils equal round and reactive to light and accommodation. ENT: Oral mucus membranes moist, no oral pharyngeal lesions. External inspection of ears and nose are normal.   NECK: Supple, no masses. No palpable thyroid mass    CHEST/LUNGS: CTA bilaterally, normal respiratory effort   CARDIOVASCULAR: RRR, no murmurs. No lower extremity edema   ABDOMEN: soft non-tender, active bowel sounds, no hepatosplenomegaly. No palpable masses. radiation oncology for palliative right breast radiation  2. CBC today  3. Increase morphine ER 60 mg every 12 hours        I have seen, examined and reviewed this patient medication list, appropriate labs and imaging studies. I reviewed relevant medical records and others physicians notes. I discussed the plans of care with the patient. I answered all the questions to the patients satisfaction  The selection, dosing administration of anticancer agents in the management of associated toxicities are complex. Modifications of drug dose and schedule and the initiation of supportive care interventions are often necessary because of expected toxicities individual patient variability, prior treatment and comorbidity. The optimal delivery of anticancer agents therefore requires a healthcare delivery team experienced in the use of anticancer agents and the management of associated toxicities in patients with cancer. Follow Up:     No follow-ups on file. Data Robert Dad Shanice Prieto am scribing for Ranjeet Pérez MD. Electronically signed by Shanice Prieto on 1/4/2020 at 8:30 AM.     I, Dr Irvin Garcia, personally performed the services described in this documentation as scribed by Shanice Prieto MA in my presence and is both accurate and complete. Over 50% of the total visit time of 25 minutes in face to face encounter with the patient, out of which more than 50% of the time was spent in counseling patient or family and coordination of care. Counseling included but was not limited to time spent reviewing labs, imaging studies/ treatment plan and answering questions.

## 2020-01-03 ENCOUNTER — HOSPITAL ENCOUNTER (OUTPATIENT)
Dept: INFUSION THERAPY | Age: 55
Discharge: HOME | End: 2020-01-03
Payer: COMMERCIAL

## 2020-01-03 ENCOUNTER — OFFICE VISIT (OUTPATIENT)
Dept: HEMATOLOGY | Age: 55
End: 2020-01-03
Payer: COMMERCIAL

## 2020-01-03 ENCOUNTER — HOSPITAL ENCOUNTER (OUTPATIENT)
Dept: RADIATION ONCOLOGY | Facility: HOSPITAL | Age: 55
Setting detail: RADIATION/ONCOLOGY SERIES
Discharge: HOME | End: 2020-01-03

## 2020-01-03 VITALS
HEIGHT: 63 IN | DIASTOLIC BLOOD PRESSURE: 58 MMHG | OXYGEN SATURATION: 98 % | HEART RATE: 95 BPM | SYSTOLIC BLOOD PRESSURE: 102 MMHG | BODY MASS INDEX: 27.16 KG/M2 | WEIGHT: 153.3 LBS

## 2020-01-03 DIAGNOSIS — C50.412 MALIGNANT NEOPLASM OF UPPER-OUTER QUADRANT OF LEFT FEMALE BREAST, UNSPECIFIED ESTROGEN RECEPTOR STATUS (HCC): ICD-10-CM

## 2020-01-03 LAB
BACTERIA SPEC AEROBE CULT: NORMAL
GRAM STN SPEC: NORMAL
GRAM STN SPEC: NORMAL

## 2020-01-03 PROCEDURE — 77336 RADIATION PHYSICS CONSULT: CPT | Performed by: RADIOLOGY

## 2020-01-03 PROCEDURE — 85025 COMPLETE CBC W/AUTO DIFF WBC: CPT

## 2020-01-03 PROCEDURE — 99214 OFFICE O/P EST MOD 30 MIN: CPT | Performed by: INTERNAL MEDICINE

## 2020-01-03 PROCEDURE — 99211 OFF/OP EST MAY X REQ PHY/QHP: CPT

## 2020-01-03 PROCEDURE — 77385: CPT | Performed by: RADIOLOGY

## 2020-01-05 ENCOUNTER — HOSPITAL ENCOUNTER (EMERGENCY)
Facility: HOSPITAL | Age: 55
Discharge: HOME | End: 2020-01-05
Attending: EMERGENCY MEDICINE | Admitting: EMERGENCY MEDICINE

## 2020-01-05 ENCOUNTER — APPOINTMENT (OUTPATIENT)
Dept: CT IMAGING | Facility: HOSPITAL | Age: 55
End: 2020-01-05

## 2020-01-05 VITALS
RESPIRATION RATE: 16 BRPM | WEIGHT: 148 LBS | HEART RATE: 86 BPM | DIASTOLIC BLOOD PRESSURE: 75 MMHG | HEIGHT: 63 IN | OXYGEN SATURATION: 96 % | SYSTOLIC BLOOD PRESSURE: 132 MMHG | TEMPERATURE: 98.4 F | BODY MASS INDEX: 26.22 KG/M2

## 2020-01-05 DIAGNOSIS — C50.911 BILATERAL MALIGNANT NEOPLASM OF BREAST IN FEMALE, UNSPECIFIED ESTROGEN RECEPTOR STATUS, UNSPECIFIED SITE OF BREAST (HCC): Primary | ICD-10-CM

## 2020-01-05 DIAGNOSIS — C50.912 BILATERAL MALIGNANT NEOPLASM OF BREAST IN FEMALE, UNSPECIFIED ESTROGEN RECEPTOR STATUS, UNSPECIFIED SITE OF BREAST (HCC): Primary | ICD-10-CM

## 2020-01-05 DIAGNOSIS — N61.0 CELLULITIS OF BREAST: ICD-10-CM

## 2020-01-05 LAB
ALBUMIN SERPL-MCNC: 3.8 G/DL (ref 3.5–5.2)
ALBUMIN/GLOB SERPL: 1.1 G/DL
ALP SERPL-CCNC: 43 U/L (ref 39–117)
ALT SERPL W P-5'-P-CCNC: 7 U/L (ref 1–33)
ANION GAP SERPL CALCULATED.3IONS-SCNC: 11 MMOL/L (ref 5–15)
APTT PPP: 27.1 SECONDS (ref 24.1–35)
AST SERPL-CCNC: 30 U/L (ref 1–32)
BASOPHILS # BLD AUTO: 0.02 10*3/MM3 (ref 0–0.2)
BASOPHILS NFR BLD AUTO: 0.6 % (ref 0–1.5)
BILIRUB SERPL-MCNC: 0.4 MG/DL (ref 0.2–1.2)
BUN BLD-MCNC: 6 MG/DL (ref 6–20)
BUN/CREAT SERPL: 11.1 (ref 7–25)
CALCIUM SPEC-SCNC: 9 MG/DL (ref 8.6–10.5)
CHLORIDE SERPL-SCNC: 98 MMOL/L (ref 98–107)
CO2 SERPL-SCNC: 28 MMOL/L (ref 22–29)
CREAT BLD-MCNC: 0.54 MG/DL (ref 0.57–1)
D-LACTATE SERPL-SCNC: 1.2 MMOL/L (ref 0.5–2)
DEPRECATED RDW RBC AUTO: 46.7 FL (ref 37–54)
EOSINOPHIL # BLD AUTO: 0.01 10*3/MM3 (ref 0–0.4)
EOSINOPHIL NFR BLD AUTO: 0.3 % (ref 0.3–6.2)
ERYTHROCYTE [DISTWIDTH] IN BLOOD BY AUTOMATED COUNT: 13.7 % (ref 12.3–15.4)
GFR SERPL CREATININE-BSD FRML MDRD: 143 ML/MIN/1.73
GLOBULIN UR ELPH-MCNC: 3.6 GM/DL
GLUCOSE BLD-MCNC: 98 MG/DL (ref 65–99)
HCT VFR BLD AUTO: 36.6 % (ref 34–46.6)
HGB BLD-MCNC: 12.1 G/DL (ref 12–15.9)
IMM GRANULOCYTES # BLD AUTO: 0.01 10*3/MM3 (ref 0–0.05)
IMM GRANULOCYTES NFR BLD AUTO: 0.3 % (ref 0–0.5)
INR PPP: 0.97 (ref 0.91–1.09)
LYMPHOCYTES # BLD AUTO: 0.23 10*3/MM3 (ref 0.7–3.1)
LYMPHOCYTES NFR BLD AUTO: 6.6 % (ref 19.6–45.3)
MCH RBC QN AUTO: 30.6 PG (ref 26.6–33)
MCHC RBC AUTO-ENTMCNC: 33.1 G/DL (ref 31.5–35.7)
MCV RBC AUTO: 92.7 FL (ref 79–97)
MONOCYTES # BLD AUTO: 0.37 10*3/MM3 (ref 0.1–0.9)
MONOCYTES NFR BLD AUTO: 10.6 % (ref 5–12)
NEUTROPHILS # BLD AUTO: 2.84 10*3/MM3 (ref 1.7–7)
NEUTROPHILS NFR BLD AUTO: 81.6 % (ref 42.7–76)
NRBC BLD AUTO-RTO: 0 /100 WBC (ref 0–0.2)
PLATELET # BLD AUTO: 274 10*3/MM3 (ref 140–450)
PMV BLD AUTO: 10.1 FL (ref 6–12)
POTASSIUM BLD-SCNC: 4 MMOL/L (ref 3.5–5.2)
PROT SERPL-MCNC: 7.4 G/DL (ref 6–8.5)
PROTHROMBIN TIME: 13.2 SECONDS (ref 11.9–14.6)
RBC # BLD AUTO: 3.95 10*6/MM3 (ref 3.77–5.28)
SODIUM BLD-SCNC: 137 MMOL/L (ref 136–145)
WBC NRBC COR # BLD: 3.48 10*3/MM3 (ref 3.4–10.8)

## 2020-01-05 PROCEDURE — 85730 THROMBOPLASTIN TIME PARTIAL: CPT | Performed by: EMERGENCY MEDICINE

## 2020-01-05 PROCEDURE — 85025 COMPLETE CBC W/AUTO DIFF WBC: CPT | Performed by: EMERGENCY MEDICINE

## 2020-01-05 PROCEDURE — 96375 TX/PRO/DX INJ NEW DRUG ADDON: CPT

## 2020-01-05 PROCEDURE — 85610 PROTHROMBIN TIME: CPT | Performed by: EMERGENCY MEDICINE

## 2020-01-05 PROCEDURE — 87205 SMEAR GRAM STAIN: CPT | Performed by: EMERGENCY MEDICINE

## 2020-01-05 PROCEDURE — 99283 EMERGENCY DEPT VISIT LOW MDM: CPT

## 2020-01-05 PROCEDURE — 96376 TX/PRO/DX INJ SAME DRUG ADON: CPT

## 2020-01-05 PROCEDURE — 87040 BLOOD CULTURE FOR BACTERIA: CPT | Performed by: EMERGENCY MEDICINE

## 2020-01-05 PROCEDURE — 71260 CT THORAX DX C+: CPT

## 2020-01-05 PROCEDURE — 25010000002 ONDANSETRON PER 1 MG: Performed by: EMERGENCY MEDICINE

## 2020-01-05 PROCEDURE — 25010000002 IOPAMIDOL 61 % SOLUTION: Performed by: EMERGENCY MEDICINE

## 2020-01-05 PROCEDURE — 80053 COMPREHEN METABOLIC PANEL: CPT | Performed by: EMERGENCY MEDICINE

## 2020-01-05 PROCEDURE — 83605 ASSAY OF LACTIC ACID: CPT | Performed by: EMERGENCY MEDICINE

## 2020-01-05 PROCEDURE — 87070 CULTURE OTHR SPECIMN AEROBIC: CPT | Performed by: EMERGENCY MEDICINE

## 2020-01-05 PROCEDURE — 25010000002 MORPHINE PER 10 MG: Performed by: EMERGENCY MEDICINE

## 2020-01-05 PROCEDURE — 96365 THER/PROPH/DIAG IV INF INIT: CPT

## 2020-01-05 RX ORDER — CLINDAMYCIN PHOSPHATE 600 MG/50ML
600 INJECTION INTRAVENOUS ONCE
Status: COMPLETED | OUTPATIENT
Start: 2020-01-05 | End: 2020-01-05

## 2020-01-05 RX ORDER — HYDROCODONE BITARTRATE AND ACETAMINOPHEN 10; 325 MG/1; MG/1
1 TABLET ORAL EVERY 6 HOURS PRN
Qty: 16 TABLET | Refills: 0 | Status: SHIPPED | OUTPATIENT
Start: 2020-01-05 | End: 2021-01-01 | Stop reason: HOSPADM

## 2020-01-05 RX ORDER — SODIUM CHLORIDE 0.9 % (FLUSH) 0.9 %
10 SYRINGE (ML) INJECTION AS NEEDED
Status: DISCONTINUED | OUTPATIENT
Start: 2020-01-05 | End: 2020-01-05 | Stop reason: HOSPADM

## 2020-01-05 RX ORDER — ONDANSETRON 2 MG/ML
4 INJECTION INTRAMUSCULAR; INTRAVENOUS ONCE
Status: COMPLETED | OUTPATIENT
Start: 2020-01-05 | End: 2020-01-05

## 2020-01-05 RX ORDER — CLINDAMYCIN HYDROCHLORIDE 150 MG/1
150 CAPSULE ORAL 3 TIMES DAILY
Qty: 30 CAPSULE | Refills: 0 | Status: SHIPPED | OUTPATIENT
Start: 2020-01-05 | End: 2020-07-29

## 2020-01-05 RX ADMIN — MORPHINE SULFATE 4 MG: 4 INJECTION, SOLUTION INTRAMUSCULAR; INTRAVENOUS at 19:35

## 2020-01-05 RX ADMIN — IOPAMIDOL 100 ML: 612 INJECTION, SOLUTION INTRAVENOUS at 18:15

## 2020-01-05 RX ADMIN — MORPHINE SULFATE 4 MG: 4 INJECTION, SOLUTION INTRAMUSCULAR; INTRAVENOUS at 17:21

## 2020-01-05 RX ADMIN — CLINDAMYCIN PHOSPHATE 600 MG: 600 INJECTION, SOLUTION INTRAVENOUS at 18:54

## 2020-01-05 RX ADMIN — ONDANSETRON HYDROCHLORIDE 4 MG: 2 SOLUTION INTRAMUSCULAR; INTRAVENOUS at 17:21

## 2020-01-06 ENCOUNTER — HOSPITAL ENCOUNTER (OUTPATIENT)
Dept: INFUSION THERAPY | Age: 55
Discharge: HOME | End: 2020-01-06
Payer: COMMERCIAL

## 2020-01-06 ENCOUNTER — HOSPITAL ENCOUNTER (OUTPATIENT)
Dept: RADIATION ONCOLOGY | Facility: HOSPITAL | Age: 55
Setting detail: RADIATION/ONCOLOGY SERIES
Discharge: HOME | End: 2020-01-06

## 2020-01-06 VITALS — WEIGHT: 158.3 LBS | HEART RATE: 87 BPM | BODY MASS INDEX: 28.05 KG/M2 | HEIGHT: 63 IN | OXYGEN SATURATION: 93 %

## 2020-01-06 DIAGNOSIS — C50.412 MALIGNANT NEOPLASM OF UPPER-OUTER QUADRANT OF LEFT FEMALE BREAST, UNSPECIFIED ESTROGEN RECEPTOR STATUS (HCC): ICD-10-CM

## 2020-01-06 PROCEDURE — 77385: CPT | Performed by: RADIOLOGY

## 2020-01-06 PROCEDURE — 85025 COMPLETE CBC W/AUTO DIFF WBC: CPT

## 2020-01-06 NOTE — ED NOTES
Pt is a 54 year old female who presents with right breast bleeding and oozing. Pt reports that she has been receiving radiation tx for her breast cancer. She reports her last tx was Friday. She states her oncologist Dr. Campoverde is aware of the condition of her right breast and did place her on Amoxicillin. Upon assessment of patients breast the left breast is hard on palpation but all skin is intact. Pt states she does have scarring present but no open areas. Patients right breast is very hard on palpation and has three areas that appear to be open and serosanguineous drainage is present.      Lolis Erickson, RN  01/05/20 [id number]

## 2020-01-06 NOTE — ED PROVIDER NOTES
Subjective   This 54-year-old female patient has been under care for breast cancer since 2016.  Originally she had a diagnosis in the left breast and a lumpectomy was treated with chemo and radiation in her liver that seemed to be fine but then came back in 2017 and she had lumps on the and she now she has been having problems with the right side and is she also has been under treatment with radiation recently.  She had radiation every day last week as is beginning of her current radiation regimen.  Her breasts are very swollen and tender in the skin has areas of breakdown and on the right breast there is areas where it has bled slightly.  She feels like her breasts are basically very very tense.  Her primary care doctor started her on amoxicillin yesterday for possible secondary infection.          Review of Systems   Constitutional: Positive for appetite change. Negative for chills and fever.   HENT: Negative.    Respiratory: Negative for cough, chest tightness and shortness of breath.    Cardiovascular: Negative for chest pain.   Gastrointestinal: Negative for diarrhea, nausea and vomiting.   Genitourinary: Negative.    Musculoskeletal: Negative.    Neurological: Negative.        Past Medical History:   Diagnosis Date   • Breast cancer (CMS/HCC)        No Known Allergies    Past Surgical History:   Procedure Laterality Date   • AXILLARY LYMPH NODE BIOPSY/EXCISION Left    • BREAST BIOPSY Left 03/31/2016   • BREAST LUMPECTOMY WITH SENTINEL NODE BIOPSY Left 10/03/2016    residual invasive carcinoma, grade 3 (0.2cm); margins negative; extensive lymphvascular space invasion; 2 sentinel lymph nodes positive (2/3)       Family History   Problem Relation Age of Onset   • Lung cancer Mother    • Prostate cancer Father    • Colon cancer Father    • Lupus Sister    • Diabetes Sister    • Hypertension Sister        Social History     Socioeconomic History   • Marital status:      Spouse name: Not on file   • Number  of children: Not on file   • Years of education: Not on file   • Highest education level: Not on file   Tobacco Use   • Smoking status: Never Smoker   • Smokeless tobacco: Never Used   Substance and Sexual Activity   • Alcohol use: No   • Drug use: No   • Sexual activity: Defer           Objective   Physical Exam   Constitutional: She appears well-developed and well-nourished. She appears distressed.   HENT:   Head: Normocephalic and atraumatic.   Eyes: Pupils are equal, round, and reactive to light. EOM are normal.   Neck: Normal range of motion. Neck supple.   Cardiovascular: Normal rate and normal heart sounds.   Pulmonary/Chest: Effort normal and breath sounds normal.   Abdominal: Soft. Bowel sounds are normal.   Musculoskeletal: Normal range of motion.   Skin:   The patient's breasts were examined with the nurse as a chaperone.  Both of her breasts are basically consumed with cancerous changes.  There is thickening and there are areas of breakdown in the skin especially on the right breast and some areas of a little bit of oozing of blood.  They feel very very hard in this areas of the skin are thickened consistent with [place] changes.  There is some redness going into the right shoulder.  The area of skin thickening seems to wrap around the patient's thorax.   Psychiatric: She has a normal mood and affect. Her behavior is normal.   Nursing note and vitals reviewed.      Procedures           ED Course                      Paula Coma Scale Score: 15                          MDM  Number of Diagnoses or Management Options  Diagnosis management comments: IMPRESSION:  1. CT findings most suspicious for extensive breast cancer involving the  right breast with skin involvement and bulky right-sided  lymphadenopathy.  2. Diffuse left-sided skin thickening extending into the posterior  lateral body wall and with nodular enhancement of the anterior lateral  latissimus muscle group, worrisome for neoplastic  breast cancer  involvement  3. No mediastinal or hilar lymphadenopathy or pulmonary lesions.  This report was finalized on 01/05/2020 18:44 by Dr. Ion Ramos MD.      Likely the patient's lab work does not reflect any acute sepsis.  It does appear likely that she does have secondary infection especially where some areas of skin breakdown has occurred.  Patient is to resume radiation treatments tomorrow.  We will go ahead and treat her with IV clindamycin here and also put her on oral clindamycin to take at home as well as pain medications.  I do not see any reason to keep her admitted to the hospital.      Final diagnoses:   Bilateral malignant neoplasm of breast in female, unspecified estrogen receptor status, unspecified site of breast (CMS/[facility])   Cellulitis of breast            Jack De La Cruz DO  01/05/20 1917

## 2020-01-07 ENCOUNTER — HOSPITAL ENCOUNTER (OUTPATIENT)
Dept: RADIATION ONCOLOGY | Facility: HOSPITAL | Age: 55
Setting detail: RADIATION/ONCOLOGY SERIES
Discharge: HOME | End: 2020-01-07

## 2020-01-07 LAB
BACTERIA SPEC AEROBE CULT: ABNORMAL
GRAM STN SPEC: ABNORMAL
GRAM STN SPEC: ABNORMAL

## 2020-01-07 PROCEDURE — 77385: CPT | Performed by: RADIOLOGY

## 2020-01-08 ENCOUNTER — HOSPITAL ENCOUNTER (OUTPATIENT)
Dept: RADIATION ONCOLOGY | Facility: HOSPITAL | Age: 55
Setting detail: RADIATION/ONCOLOGY SERIES
Discharge: HOME | End: 2020-01-08

## 2020-01-08 ENCOUNTER — CLINICAL DOCUMENTATION (OUTPATIENT)
Dept: HEMATOLOGY | Age: 55
End: 2020-01-08

## 2020-01-08 ENCOUNTER — TELEPHONE (OUTPATIENT)
Dept: INFUSION THERAPY | Age: 55
End: 2020-01-08

## 2020-01-08 PROCEDURE — 77336 RADIATION PHYSICS CONSULT: CPT | Performed by: RADIOLOGY

## 2020-01-08 PROCEDURE — 77385: CPT | Performed by: RADIOLOGY

## 2020-01-08 RX ORDER — MORPHINE SULFATE 30 MG/1
30 TABLET ORAL EVERY 4 HOURS PRN
Qty: 180 TABLET | Refills: 0 | Status: SHIPPED | OUTPATIENT
Start: 2020-01-08 | End: 2020-02-17 | Stop reason: SDUPTHER

## 2020-01-08 NOTE — TELEPHONE ENCOUNTER
Pt presents for refill on her MSIR. Went over the directions for MSIR and MSER. She will take MSER 60mg every 8 hours.   And MSIR 30mg every 4 hours prn pain,

## 2020-01-08 NOTE — PROGRESS NOTES
Recommended palliative treatment with:    Fam-trastuzumab deruxtecan-nxki (Enhertu)    5.4 mg/kg of fam-trastuzumab deruxtecan-nxki by intravenous infusion every 3 weeks until unacceptable toxicity or disease progression

## 2020-01-09 ENCOUNTER — HOSPITAL ENCOUNTER (OUTPATIENT)
Dept: RADIATION ONCOLOGY | Facility: HOSPITAL | Age: 55
Setting detail: RADIATION/ONCOLOGY SERIES
Discharge: HOME | End: 2020-01-09

## 2020-01-09 ENCOUNTER — TELEPHONE (OUTPATIENT)
Dept: INFUSION THERAPY | Age: 55
End: 2020-01-09

## 2020-01-09 NOTE — TELEPHONE ENCOUNTER
Pt called, I spoke with patient and daughter. She woke up late today and had missed her 5:00am dose of MSER 60 extended release. At about 8:30am she took 2 of the 60mg ER and is now calling concerned. Advised per Dr. Cris Mijares to watch very closely and to go to ER with any change in mental status or respiratory changes.

## 2020-01-10 ENCOUNTER — HOSPITAL ENCOUNTER (OUTPATIENT)
Dept: INFUSION THERAPY | Age: 55
Discharge: HOME | End: 2020-01-10
Payer: COMMERCIAL

## 2020-01-10 ENCOUNTER — APPOINTMENT (OUTPATIENT)
Dept: RADIATION ONCOLOGY | Facility: HOSPITAL | Age: 55
End: 2020-01-10

## 2020-01-10 VITALS
HEART RATE: 80 BPM | HEIGHT: 63 IN | TEMPERATURE: 99 F | SYSTOLIC BLOOD PRESSURE: 110 MMHG | DIASTOLIC BLOOD PRESSURE: 76 MMHG | BODY MASS INDEX: 27.29 KG/M2 | OXYGEN SATURATION: 98 % | WEIGHT: 154 LBS

## 2020-01-10 DIAGNOSIS — C50.919 HER2-POSITIVE CARCINOMA OF BREAST (HCC): Primary | ICD-10-CM

## 2020-01-10 DIAGNOSIS — C50.412 MALIGNANT NEOPLASM OF UPPER-OUTER QUADRANT OF LEFT FEMALE BREAST, UNSPECIFIED ESTROGEN RECEPTOR STATUS (HCC): ICD-10-CM

## 2020-01-10 LAB
BACTERIA SPEC AEROBE CULT: NORMAL
BACTERIA SPEC AEROBE CULT: NORMAL

## 2020-01-10 PROCEDURE — 6360000002 HC RX W HCPCS: Performed by: INTERNAL MEDICINE

## 2020-01-10 PROCEDURE — 2580000003 HC RX 258: Performed by: INTERNAL MEDICINE

## 2020-01-10 PROCEDURE — 36415 COLL VENOUS BLD VENIPUNCTURE: CPT

## 2020-01-10 PROCEDURE — 80053 COMPREHEN METABOLIC PANEL: CPT

## 2020-01-10 PROCEDURE — 86301 IMMUNOASSAY TUMOR CA 19-9: CPT

## 2020-01-10 PROCEDURE — 96415 CHEMO IV INFUSION ADDL HR: CPT

## 2020-01-10 PROCEDURE — 86300 IMMUNOASSAY TUMOR CA 15-3: CPT

## 2020-01-10 PROCEDURE — 85025 COMPLETE CBC W/AUTO DIFF WBC: CPT

## 2020-01-10 PROCEDURE — 96413 CHEMO IV INFUSION 1 HR: CPT

## 2020-01-10 PROCEDURE — 6370000000 HC RX 637 (ALT 250 FOR IP): Performed by: INTERNAL MEDICINE

## 2020-01-10 PROCEDURE — 96375 TX/PRO/DX INJ NEW DRUG ADDON: CPT

## 2020-01-10 PROCEDURE — 82378 CARCINOEMBRYONIC ANTIGEN: CPT

## 2020-01-10 RX ORDER — DIPHENHYDRAMINE HYDROCHLORIDE 50 MG/ML
25 INJECTION INTRAMUSCULAR; INTRAVENOUS ONCE
Status: COMPLETED | OUTPATIENT
Start: 2020-01-10 | End: 2020-01-10

## 2020-01-10 RX ORDER — EPINEPHRINE 1 MG/ML
0.3 INJECTION, SOLUTION, CONCENTRATE INTRAVENOUS PRN
Status: CANCELLED | OUTPATIENT
Start: 2020-01-10

## 2020-01-10 RX ORDER — SODIUM CHLORIDE 0.9 % (FLUSH) 0.9 %
10 SYRINGE (ML) INJECTION PRN
Status: CANCELLED | OUTPATIENT
Start: 2020-01-10

## 2020-01-10 RX ORDER — ACETAMINOPHEN 500 MG
1000 TABLET ORAL ONCE
Status: COMPLETED | OUTPATIENT
Start: 2020-01-10 | End: 2020-01-10

## 2020-01-10 RX ORDER — SODIUM CHLORIDE 0.9 % (FLUSH) 0.9 %
10 SYRINGE (ML) INJECTION PRN
Status: DISCONTINUED | OUTPATIENT
Start: 2020-01-10 | End: 2020-01-11 | Stop reason: HOSPADM

## 2020-01-10 RX ORDER — DIPHENHYDRAMINE HYDROCHLORIDE 50 MG/ML
50 INJECTION INTRAMUSCULAR; INTRAVENOUS ONCE
Status: CANCELLED | OUTPATIENT
Start: 2020-01-10

## 2020-01-10 RX ORDER — SODIUM CHLORIDE 9 MG/ML
INJECTION, SOLUTION INTRAVENOUS CONTINUOUS
Status: CANCELLED | OUTPATIENT
Start: 2020-01-10

## 2020-01-10 RX ORDER — METHYLPREDNISOLONE SODIUM SUCCINATE 125 MG/2ML
125 INJECTION, POWDER, LYOPHILIZED, FOR SOLUTION INTRAMUSCULAR; INTRAVENOUS ONCE
Status: CANCELLED | OUTPATIENT
Start: 2020-01-10

## 2020-01-10 RX ORDER — HEPARIN SODIUM (PORCINE) LOCK FLUSH IV SOLN 100 UNIT/ML 100 UNIT/ML
300 SOLUTION INTRAVENOUS PRN
Status: DISCONTINUED | OUTPATIENT
Start: 2020-01-10 | End: 2020-01-11 | Stop reason: HOSPADM

## 2020-01-10 RX ORDER — SODIUM CHLORIDE 9 MG/ML
INJECTION, SOLUTION INTRAVENOUS ONCE
Status: CANCELLED | OUTPATIENT
Start: 2020-01-10

## 2020-01-10 RX ORDER — DEXAMETHASONE SODIUM PHOSPHATE 10 MG/ML
10 INJECTION, SOLUTION INTRAMUSCULAR; INTRAVENOUS ONCE
Status: COMPLETED | OUTPATIENT
Start: 2020-01-10 | End: 2020-01-10

## 2020-01-10 RX ORDER — SODIUM CHLORIDE 0.9 % (FLUSH) 0.9 %
5 SYRINGE (ML) INJECTION PRN
Status: CANCELLED | OUTPATIENT
Start: 2020-01-10

## 2020-01-10 RX ORDER — PALONOSETRON 0.05 MG/ML
0.25 INJECTION, SOLUTION INTRAVENOUS ONCE
Status: COMPLETED | OUTPATIENT
Start: 2020-01-10 | End: 2020-01-10

## 2020-01-10 RX ORDER — ONDANSETRON 4 MG/1
8 TABLET, ORALLY DISINTEGRATING ORAL EVERY 8 HOURS PRN
Qty: 30 TABLET | Refills: 3 | Status: SHIPPED | OUTPATIENT
Start: 2020-01-10 | End: 2020-06-12

## 2020-01-10 RX ORDER — HEPARIN SODIUM (PORCINE) LOCK FLUSH IV SOLN 100 UNIT/ML 100 UNIT/ML
300 SOLUTION INTRAVENOUS PRN
Status: CANCELLED | OUTPATIENT
Start: 2020-01-10

## 2020-01-10 RX ADMIN — HEPARIN 300 UNITS: 100 SYRINGE at 17:36

## 2020-01-10 RX ADMIN — Medication 10 ML: at 17:36

## 2020-01-10 RX ADMIN — DEXAMETHASONE SODIUM PHOSPHATE 10 MG: 10 INJECTION, SOLUTION INTRAMUSCULAR; INTRAVENOUS at 14:47

## 2020-01-10 RX ADMIN — ACETAMINOPHEN 1000 MG: 500 TABLET ORAL at 14:48

## 2020-01-10 RX ADMIN — DIPHENHYDRAMINE HYDROCHLORIDE 25 MG: 50 INJECTION, SOLUTION INTRAMUSCULAR; INTRAVENOUS at 14:48

## 2020-01-10 RX ADMIN — DEXTROSE MONOHYDRATE: 50 INJECTION, SOLUTION INTRAVENOUS at 15:46

## 2020-01-10 RX ADMIN — PALONOSETRON HYDROCHLORIDE 0.25 MG: 0.25 INJECTION, SOLUTION INTRAVENOUS at 14:47

## 2020-01-10 ASSESSMENT — PAIN SCALES - GENERAL
PAINLEVEL_OUTOF10: 10
PAINLEVEL_OUTOF10: 10

## 2020-01-10 ASSESSMENT — PAIN DESCRIPTION - LOCATION: LOCATION: BREAST

## 2020-01-16 ENCOUNTER — HOSPITAL ENCOUNTER (OUTPATIENT)
Dept: RADIATION ONCOLOGY | Facility: HOSPITAL | Age: 55
Setting detail: RADIATION/ONCOLOGY SERIES
Discharge: HOME | End: 2020-01-16

## 2020-01-16 DIAGNOSIS — Z17.0 MALIGNANT NEOPLASM INVOLVING BOTH NIPPLE AND AREOLA OF LEFT BREAST IN FEMALE, ESTROGEN RECEPTOR POSITIVE (HCC): Primary | ICD-10-CM

## 2020-01-16 DIAGNOSIS — C50.012 MALIGNANT NEOPLASM INVOLVING BOTH NIPPLE AND AREOLA OF LEFT BREAST IN FEMALE, ESTROGEN RECEPTOR POSITIVE (HCC): Primary | ICD-10-CM

## 2020-01-16 DIAGNOSIS — G89.3 CANCER RELATED PAIN: ICD-10-CM

## 2020-01-16 DIAGNOSIS — L98.9 PAINFUL SKIN LESION: ICD-10-CM

## 2020-01-16 PROCEDURE — 77385: CPT | Performed by: RADIOLOGY

## 2020-01-16 RX ORDER — FENTANYL 75 UG/H
1 PATCH TRANSDERMAL
Qty: 5 PATCH | Refills: 0 | Status: SHIPPED | OUTPATIENT
Start: 2020-01-16 | End: 2020-01-27 | Stop reason: SDUPTHER

## 2020-01-16 RX ORDER — CEPHALEXIN 500 MG/1
500 CAPSULE ORAL 2 TIMES DAILY
Qty: 20 CAPSULE | Refills: 0 | Status: SHIPPED | OUTPATIENT
Start: 2020-01-16 | End: 2020-07-29

## 2020-01-17 ENCOUNTER — HOSPITAL ENCOUNTER (OUTPATIENT)
Dept: RADIATION ONCOLOGY | Facility: HOSPITAL | Age: 55
Setting detail: RADIATION/ONCOLOGY SERIES
Discharge: HOME | End: 2020-01-17

## 2020-01-17 PROCEDURE — 77385: CPT | Performed by: RADIOLOGY

## 2020-01-20 ENCOUNTER — HOSPITAL ENCOUNTER (OUTPATIENT)
Dept: RADIATION ONCOLOGY | Facility: HOSPITAL | Age: 55
Setting detail: RADIATION/ONCOLOGY SERIES
Discharge: HOME | End: 2020-01-20

## 2020-01-20 ENCOUNTER — TELEPHONE (OUTPATIENT)
Dept: RADIATION ONCOLOGY | Facility: HOSPITAL | Age: 55
End: 2020-01-20

## 2020-01-20 DIAGNOSIS — G89.3 CANCER RELATED PAIN: ICD-10-CM

## 2020-01-20 DIAGNOSIS — L98.9 PAINFUL SKIN LESION: Primary | ICD-10-CM

## 2020-01-20 PROCEDURE — 77385: CPT | Performed by: RADIOLOGY

## 2020-01-20 RX ORDER — FENTANYL 50 UG/H
1 PATCH TRANSDERMAL
Qty: 5 PATCH | Refills: 0 | Status: SHIPPED | OUTPATIENT
Start: 2020-01-20 | End: 2020-01-27 | Stop reason: SDUPTHER

## 2020-01-20 NOTE — TELEPHONE ENCOUNTER
"Patient called and left message that pain patch was not working.    I called patient to check on patient.  She said \"the pain patch is not helping.  I wanted to use that so that I don't have to take the morphine so much but it is not working.\"    She said she is going to be here this afternoon for treatment.  She will discuss with Dr. Darby.  "

## 2020-01-21 ENCOUNTER — HOSPITAL ENCOUNTER (OUTPATIENT)
Dept: RADIATION ONCOLOGY | Facility: HOSPITAL | Age: 55
Setting detail: RADIATION/ONCOLOGY SERIES
Discharge: HOME | End: 2020-01-21

## 2020-01-21 PROCEDURE — 77385: CPT | Performed by: RADIOLOGY

## 2020-01-22 ENCOUNTER — HOSPITAL ENCOUNTER (OUTPATIENT)
Dept: RADIATION ONCOLOGY | Facility: HOSPITAL | Age: 55
Setting detail: RADIATION/ONCOLOGY SERIES
Discharge: HOME | End: 2020-01-22

## 2020-01-22 PROCEDURE — 77385: CPT | Performed by: RADIOLOGY

## 2020-01-22 PROCEDURE — 77336 RADIATION PHYSICS CONSULT: CPT | Performed by: RADIOLOGY

## 2020-01-23 ENCOUNTER — HOSPITAL ENCOUNTER (OUTPATIENT)
Dept: RADIATION ONCOLOGY | Facility: HOSPITAL | Age: 55
Setting detail: RADIATION/ONCOLOGY SERIES
Discharge: HOME | End: 2020-01-23

## 2020-01-23 PROCEDURE — 77385: CPT | Performed by: RADIOLOGY

## 2020-01-24 ENCOUNTER — TELEPHONE (OUTPATIENT)
Dept: INFUSION THERAPY | Age: 55
End: 2020-01-24

## 2020-01-24 ENCOUNTER — HOSPITAL ENCOUNTER (OUTPATIENT)
Dept: RADIATION ONCOLOGY | Facility: HOSPITAL | Age: 55
Setting detail: RADIATION/ONCOLOGY SERIES
Discharge: HOME | End: 2020-01-24

## 2020-01-24 PROCEDURE — 77385: CPT | Performed by: RADIOLOGY

## 2020-01-27 ENCOUNTER — HOSPITAL ENCOUNTER (OUTPATIENT)
Dept: RADIATION ONCOLOGY | Facility: HOSPITAL | Age: 55
Setting detail: RADIATION/ONCOLOGY SERIES
Discharge: HOME | End: 2020-01-27

## 2020-01-27 ENCOUNTER — APPOINTMENT (OUTPATIENT)
Dept: WOUND CARE | Facility: HOSPITAL | Age: 55
End: 2020-01-27

## 2020-01-27 DIAGNOSIS — G89.3 CANCER RELATED PAIN: ICD-10-CM

## 2020-01-27 DIAGNOSIS — C50.012 MALIGNANT NEOPLASM INVOLVING BOTH NIPPLE AND AREOLA OF LEFT BREAST IN FEMALE, ESTROGEN RECEPTOR POSITIVE (HCC): ICD-10-CM

## 2020-01-27 DIAGNOSIS — Z17.0 MALIGNANT NEOPLASM INVOLVING BOTH NIPPLE AND AREOLA OF LEFT BREAST IN FEMALE, ESTROGEN RECEPTOR POSITIVE (HCC): ICD-10-CM

## 2020-01-27 DIAGNOSIS — L98.9 PAINFUL SKIN LESION: ICD-10-CM

## 2020-01-27 PROCEDURE — 77385: CPT | Performed by: RADIOLOGY

## 2020-01-27 RX ORDER — FENTANYL 50 UG/H
1 PATCH TRANSDERMAL
Qty: 5 PATCH | Refills: 0 | Status: SHIPPED | OUTPATIENT
Start: 2020-01-27 | End: 2020-02-14 | Stop reason: SDUPTHER

## 2020-01-27 RX ORDER — FENTANYL 75 UG/H
1 PATCH TRANSDERMAL
Qty: 5 PATCH | Refills: 0 | Status: SHIPPED | OUTPATIENT
Start: 2020-01-27 | End: 2020-02-14 | Stop reason: SDUPTHER

## 2020-01-28 ENCOUNTER — HOSPITAL ENCOUNTER (OUTPATIENT)
Dept: RADIATION ONCOLOGY | Facility: HOSPITAL | Age: 55
Setting detail: RADIATION/ONCOLOGY SERIES
Discharge: HOME | End: 2020-01-28

## 2020-01-29 ENCOUNTER — APPOINTMENT (OUTPATIENT)
Dept: RADIATION ONCOLOGY | Facility: HOSPITAL | Age: 55
End: 2020-01-29

## 2020-01-29 ENCOUNTER — OFFICE VISIT (OUTPATIENT)
Dept: WOUND CARE | Facility: HOSPITAL | Age: 55
End: 2020-01-29

## 2020-01-29 DIAGNOSIS — Z17.0 MALIGNANT NEOPLASM INVOLVING BOTH NIPPLE AND AREOLA OF LEFT BREAST IN FEMALE, ESTROGEN RECEPTOR POSITIVE (HCC): ICD-10-CM

## 2020-01-29 DIAGNOSIS — G89.3 CANCER RELATED PAIN: Primary | ICD-10-CM

## 2020-01-29 DIAGNOSIS — C50.012 MALIGNANT NEOPLASM INVOLVING BOTH NIPPLE AND AREOLA OF LEFT BREAST IN FEMALE, ESTROGEN RECEPTOR POSITIVE (HCC): ICD-10-CM

## 2020-01-29 DIAGNOSIS — L98.9 PAINFUL SKIN LESION: ICD-10-CM

## 2020-01-29 PROCEDURE — G0463 HOSPITAL OUTPT CLINIC VISIT: HCPCS

## 2020-01-29 RX ORDER — FENTANYL 75 UG/H
1 PATCH TRANSDERMAL
Qty: 4 PATCH | Refills: 0 | Status: SHIPPED | OUTPATIENT
Start: 2020-01-29 | End: 2020-02-14 | Stop reason: SDUPTHER

## 2020-01-30 RX ORDER — MEPERIDINE HYDROCHLORIDE 50 MG/ML
12.5 INJECTION INTRAMUSCULAR; INTRAVENOUS; SUBCUTANEOUS ONCE
Status: CANCELLED | OUTPATIENT
Start: 2020-02-21

## 2020-01-30 RX ORDER — EPINEPHRINE 1 MG/ML
0.3 INJECTION, SOLUTION, CONCENTRATE INTRAVENOUS PRN
Status: CANCELLED | OUTPATIENT
Start: 2020-02-21

## 2020-01-30 RX ORDER — PALONOSETRON 0.05 MG/ML
0.25 INJECTION, SOLUTION INTRAVENOUS ONCE
Status: CANCELLED | OUTPATIENT
Start: 2020-02-21

## 2020-01-30 RX ORDER — SODIUM CHLORIDE 0.9 % (FLUSH) 0.9 %
10 SYRINGE (ML) INJECTION PRN
Status: CANCELLED | OUTPATIENT
Start: 2020-02-21

## 2020-01-30 RX ORDER — METHYLPREDNISOLONE SODIUM SUCCINATE 125 MG/2ML
125 INJECTION, POWDER, LYOPHILIZED, FOR SOLUTION INTRAMUSCULAR; INTRAVENOUS ONCE
Status: CANCELLED | OUTPATIENT
Start: 2020-02-21

## 2020-01-30 RX ORDER — SODIUM CHLORIDE 0.9 % (FLUSH) 0.9 %
5 SYRINGE (ML) INJECTION PRN
Status: CANCELLED | OUTPATIENT
Start: 2020-02-21

## 2020-01-30 RX ORDER — DIPHENHYDRAMINE HYDROCHLORIDE 50 MG/ML
50 INJECTION INTRAMUSCULAR; INTRAVENOUS ONCE
Status: CANCELLED | OUTPATIENT
Start: 2020-02-21

## 2020-01-30 RX ORDER — HEPARIN SODIUM (PORCINE) LOCK FLUSH IV SOLN 100 UNIT/ML 100 UNIT/ML
300 SOLUTION INTRAVENOUS PRN
Status: CANCELLED | OUTPATIENT
Start: 2020-02-14

## 2020-01-30 RX ORDER — SODIUM CHLORIDE 9 MG/ML
INJECTION, SOLUTION INTRAVENOUS CONTINUOUS
Status: CANCELLED | OUTPATIENT
Start: 2020-02-21

## 2020-01-30 RX ORDER — SODIUM CHLORIDE 9 MG/ML
20 INJECTION, SOLUTION INTRAVENOUS ONCE
Status: CANCELLED | OUTPATIENT
Start: 2020-02-21

## 2020-01-31 ENCOUNTER — HOSPITAL ENCOUNTER (OUTPATIENT)
Dept: INFUSION THERAPY | Age: 55
Discharge: HOME | End: 2020-01-31
Payer: COMMERCIAL

## 2020-01-31 DIAGNOSIS — C50.412 MALIGNANT NEOPLASM OF UPPER-OUTER QUADRANT OF LEFT FEMALE BREAST, UNSPECIFIED ESTROGEN RECEPTOR STATUS (HCC): ICD-10-CM

## 2020-01-31 PROCEDURE — 85025 COMPLETE CBC W/AUTO DIFF WBC: CPT

## 2020-02-03 ENCOUNTER — HOSPITAL ENCOUNTER (OUTPATIENT)
Dept: RADIATION ONCOLOGY | Facility: HOSPITAL | Age: 55
Setting detail: RADIATION/ONCOLOGY SERIES
End: 2020-02-03

## 2020-02-06 NOTE — PROGRESS NOTES
Jie Mueller   1965 2/7/2020     Chief Complaint   Patient presents with    Breast Cancer      Interval history/history of present illness:  Diagnosis   IDC left breast, March 2016   Grade 2. ER 1%, DE 6%, HER-2/oseas IHC 3+/FISH amplified ratio 6.7, AR 95%. 01/30/2017- Biopsy-proven in breast relapse/Inflammatory breast cancer. ER negative, DE negative, HER-2/oseas IHC 3+/FISH amplified ratio 4.5, Ki-67 32%. [facility] - Revealed no clinically significant mutation. PTEN VUS. Treatment summary  Neoadjuvant adjuvant chemotherapy TCH-P ×6 cycles. She declined to complete 1 year of Herceptin. She declined radiation therapy. 10/3/2016-left lumpectomy/sentinel lymph node   01/30/2017- Biopsy-proven in breast relapse/Inflammatory breast cancer. 5/11/2017-Chemotherapy with paclitaxel/carboplatin weekly. Herceptin/Pertuzumab every 3 weeks started 05/11/2017. She had a allergic reaction to carboplatin and this was discontinued. 7/21/2017- 9/1/2017 Neuropathy grade 2 with Taxol. Therefore, switched to Taxotere/Herceptin/Pertuzumab. Discontinued due to progression of disease. 9/22/2017 through 3/2/2018Hassie Sauger every 3 weeks. (stopped as per patient request, neuropathy grade 1)   7/10/2018 through 11/30/2018- Kadcyla. 1/4/2019-Navelbine x 1 cycle with very poor tolerance (pain)   1/25/2018-Gemzar/Herceptin   3/15/2019- low-dose Cisplatin added to Gemzar and Herceptin regimen, regimen changed to every other week   4/17/2019- palliative radiation to left clavicle/chest wall for anticipate total dosing of 5040 cGy   6/22/2019- Xeloda/apatinib  January 2020-palliative radiation to the right breast.  Tumor target  Skin chest wall right upper chest/right breast       Interval history  Festus Oquendo is a pleasant 47years old female who has a diagnosis of recurrent HER-2 positive/DE negative breast cancer with extensive skin involvement/chest wall involvement.   She has been exposed to several anti-HER-2 see her soon. Examination of her chest wall showed recurrent disease in the left upper chest. Residual neuropathy grade 1. She could not tolerate gabapentin, and therefore has stopped it. She was not interested in any other medication for neuropathy at this time. 7/10/2018-after discussion at [facility] she was recommended to reinitiate Kadcyla. 7/13/2018-CT of chest, abdomen, pelvis showed no evidence of intra-abdominal disease. There is an enlarged right-sided level 1 lymph node that was not appreciated previously, measuring 2.5 x 1.4 cm, metastatic disease considered. 7/16/2018-PET CT scan showed diffuse uptake throughout the left breast extending to the left chest wall. Abnormal uptake within the left axilla lymph nodes and left internal mammary lymph nodes concerning for metastatic disease. Enlarged hypermetabolic right axilla lymph node concerning for metastatic disease with SUV 8.0. Abnormal uptake within a right cervical lymph node, etiology unclear, but could be reactive. 10/26/2018 - CT scan of the chest was completed at [facility] and compared to CT scan of 12/27/2017 versus her last CT scan completed at [facility] on 7/13/2018. The CT scan identified a right axillary lymph node measuring 1.2 cm, otherwise no intrathoracic neoplastic process/metastatic disease. 10/26/2018 -CT scan of the abdomen and pelvis was completed at [facility] and compared to CT scan of 12/27/2017 versus her last CT scan completed at [facility] on 7/13/2018. No evidence of intra-abdominal or pelvic metastasis was identified. December 2018-interval worsening of skin metastasis in the left breast and diffuse erythema with new nodules. 12/17/2018-skin biopsy of overlying right breast consistent invasive ductal carcinoma, high-grade. ER 20%, MT 0%, HER-2/oseas 3+.  12/17/2018-right axillary FNA biopsy consistent invasive ductal carcinoma. 12/26/2018-recommended clinical trial at [facility].   1/4/2019-started on REMOVAL / REPLACEMENT VENOUS ACCESS CATHETER N/A 5/12/2016    SINGLE LUMEN PORT INSERTION performed by Joceline Guardado MD at [address] DIAGNOSTIC BONE MARROW BIOPSIES Right 11/14/2018    BONE MARROW ASPIRATION BIOPSY performed by Dilma Smith MD at [address] history:  Social History     Socioeconomic History    Marital status:      Spouse name: Not on file    Number of children: Not on file    Years of education: Not on file    Highest education level: Not on file   Occupational History    Not on file   Social Needs    Financial resource strain: Not on file    Food insecurity:     Worry: Not on file     Inability: Not on file    Transportation needs:     Medical: Not on file     Non-medical: Not on file   Tobacco Use    Smoking status: Never Smoker    Smokeless tobacco: Never Used   Substance and Sexual Activity    Alcohol use: No     Alcohol/week: 0.0 standard drinks    Drug use: No    Sexual activity: Not on file   Lifestyle    Physical activity:     Days per week: Not on file     Minutes per session: Not on file    Stress: Not on file   Relationships    Social connections:     Talks on phone: Not on file     Gets together: Not on file     Attends Voodoo service: Not on file     Active member of club or organization: Not on file     Attends meetings of clubs or organizations: Not on file     Relationship status: Not on file    Intimate partner violence:     Fear of current or ex partner: Not on file     Emotionally abused: Not on file     Physically abused: Not on file     Forced sexual activity: Not on file   Other Topics Concern    Not on file   Social History Narrative    Not on file        Family history:   Family History   Problem Relation Age of Onset    Lung Cancer Father     Colon Cancer Father     Prostate Cancer Father     Hypertension Mother         Current Outpatient Medications   Medication Sig Dispense Refill    fluconazole (DIFLUCAN) fever, no night sweats, fatigue; weight loss  HEENT: no blurring of vision, no double vision, no hearing difficulty, no tinnitus,no ulceration, no dental caries, no dysphagia  Lungs: no cough, no shortness of breath, no wheeze;   CVS: no palpitation, no chest pain, no shortness of breath;  GI: no abdominal pain, no nausea , no vomiting, no constipation; diarrhea grade 1  ORLIN: no dysuria, frequency and urgency, no hematuria, no kidney stones;   Musculoskeletal: no joint pain, swelling , stiffness;   Endocrine: no polyuria, polydypsia, no cold or heat intolerence; Hematology/lymphatic: no easy brusing or bleeding, no hx of clotting disorder; no peripheral adenopathy. Dermatology: skin lesions right and left breast, no eczema, no pruritis;   Psychiatry: no depression, no anxiety,no panic attacks, no suicide ideation; Neurology: no syncope, no seizures, no numbness or tingling of hands,  numbness and tingling of feet, no paresis;     PHYSICAL EXAM:    Vitals signs:  /64   Pulse 64   Temp 98.3 °F (36.8 °C) (Oral)   Wt 141 lb (64 kg)   SpO2 98%   BMI 24.98 kg/m²    Pain scale:    mild    CONSTITUTIONAL: Alert, appropriate, no acute distress, chronically ill-appearing  EYES: Non icteric, EOM intact, pupils equal round and reactive to light and accommodation. ENT: Oral mucus membranes moist, no oral pharyngeal lesions. External inspection of ears and nose are normal.   NECK: Supple, no masses. No palpable thyroid mass    CHEST/LUNGS: CTA bilaterally, normal respiratory effort   CARDIOVASCULAR: RRR, no murmurs. No lower extremity edema   ABDOMEN: soft non-tender, active bowel sounds, no hepatosplenomegaly. No palpable masses. EXTREMITIES: warm, Full ROM of all fours extremities. No focal weakness.   SKIN: warm, widespread cutaneous metastasis right breast and left upper chest  LYMPH: No cervical, clavicular, axillary, or inguinal lymphadenopathy  NEUROLOGIC: follows commands, non focal.   PSYCH: mood and affect appropriate. Alert and oriented to time and place and person. Relevant Lab findings: Labs reviewed with the patient. CBC:2/7/2020  WBC-2.21  HGB-13.4  PLT-93,000  Neut-0.76    Relevant Imaging studies: Not applicable  No results found. ASSESSMENT:    No orders of the defined types were placed in this encounter. Starr Sierra was seen today for breast cancer. Diagnoses and all orders for this visit:    Vaginal candidiasis  -     fluconazole (DIFLUCAN) 150 MG tablet; Take 1 tablet every 72 hours x 3 doses    HER2-positive carcinoma of breast [facility])    Care plan discussed with patient    Adverse effect of chemotherapy, subsequent encounter    Chemotherapy induced neutropenia ([address]         Metastatic HER-2 oseas breast cancer   Essentially disease progression on Xeloda and [place]. Fam-trastuzumab deruxtecan 5.4 mg/kg every 3 weeks until disease progression or intolerable side effects. Hold treatment today due to neutropenia. ANC 0 75    Chemotherapy-induced neutropenia-we will hold treatment for 1 week. Painful skin metastasis - continue with palliative RT    Cancer related pain- continue morphine extended release 60 mg every 12hours. Morphine IR every 4 hours as needed. 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 She denies any significant change from previous evaluation. Summary of plan   1. Hold chemotherapy for 1 week until neutropenia resolves  2. RTC next week  3. Fluconazole 150 mg p.o. every 72 hours x3 doses  4. Continue opioids for pain control      I have seen, examined and reviewed this patient medication list, appropriate labs and imaging studies. I reviewed relevant medical records and others physicians notes. I discussed the plans of care with the patient. I answered all the questions to the patients satisfaction  The selection, dosing administration of anticancer agents in the management of associated toxicities are complex.  Modifications of drug

## 2020-02-07 ENCOUNTER — HOSPITAL ENCOUNTER (OUTPATIENT)
Dept: INFUSION THERAPY | Age: 55
Discharge: HOME | End: 2020-02-07
Payer: COMMERCIAL

## 2020-02-07 ENCOUNTER — OFFICE VISIT (OUTPATIENT)
Dept: HEMATOLOGY | Age: 55
End: 2020-02-07
Payer: COMMERCIAL

## 2020-02-07 VITALS
OXYGEN SATURATION: 98 % | BODY MASS INDEX: 24.98 KG/M2 | TEMPERATURE: 98.3 F | HEART RATE: 64 BPM | WEIGHT: 141 LBS | SYSTOLIC BLOOD PRESSURE: 120 MMHG | DIASTOLIC BLOOD PRESSURE: 64 MMHG

## 2020-02-07 DIAGNOSIS — C50.412 MALIGNANT NEOPLASM OF UPPER-OUTER QUADRANT OF LEFT FEMALE BREAST, UNSPECIFIED ESTROGEN RECEPTOR STATUS (HCC): ICD-10-CM

## 2020-02-07 PROCEDURE — 77338 DESIGN MLC DEVICE FOR IMRT: CPT | Performed by: RADIOLOGY

## 2020-02-07 PROCEDURE — 85025 COMPLETE CBC W/AUTO DIFF WBC: CPT

## 2020-02-07 PROCEDURE — 99214 OFFICE O/P EST MOD 30 MIN: CPT | Performed by: INTERNAL MEDICINE

## 2020-02-07 PROCEDURE — 77300 RADIATION THERAPY DOSE PLAN: CPT | Performed by: RADIOLOGY

## 2020-02-07 PROCEDURE — 77301 RADIOTHERAPY DOSE PLAN IMRT: CPT | Performed by: RADIOLOGY

## 2020-02-07 RX ORDER — FLUCONAZOLE 150 MG/1
TABLET ORAL
Qty: 3 TABLET | Refills: 0 | Status: SHIPPED | OUTPATIENT
Start: 2020-02-07 | End: 2020-04-20

## 2020-02-07 ASSESSMENT — PAIN DESCRIPTION - LOCATION: LOCATION: BREAST

## 2020-02-07 ASSESSMENT — PAIN SCALES - GENERAL: PAINLEVEL_OUTOF10: 7

## 2020-02-10 ENCOUNTER — HOSPITAL ENCOUNTER (OUTPATIENT)
Dept: RADIATION ONCOLOGY | Facility: HOSPITAL | Age: 55
Setting detail: RADIATION/ONCOLOGY SERIES
Discharge: HOME | End: 2020-02-10

## 2020-02-10 PROCEDURE — 77385: CPT | Performed by: RADIOLOGY

## 2020-02-11 ENCOUNTER — HOSPITAL ENCOUNTER (OUTPATIENT)
Dept: RADIATION ONCOLOGY | Facility: HOSPITAL | Age: 55
Setting detail: RADIATION/ONCOLOGY SERIES
Discharge: HOME | End: 2020-02-11

## 2020-02-11 PROCEDURE — 77385: CPT | Performed by: RADIOLOGY

## 2020-02-13 DIAGNOSIS — C50.012 MALIGNANT NEOPLASM INVOLVING BOTH NIPPLE AND AREOLA OF LEFT BREAST IN FEMALE, ESTROGEN RECEPTOR POSITIVE (HCC): ICD-10-CM

## 2020-02-13 DIAGNOSIS — Z17.0 MALIGNANT NEOPLASM INVOLVING BOTH NIPPLE AND AREOLA OF LEFT BREAST IN FEMALE, ESTROGEN RECEPTOR POSITIVE (HCC): ICD-10-CM

## 2020-02-13 DIAGNOSIS — G89.3 CANCER RELATED PAIN: ICD-10-CM

## 2020-02-13 DIAGNOSIS — L98.9 PAINFUL SKIN LESION: ICD-10-CM

## 2020-02-14 ENCOUNTER — TELEPHONE (OUTPATIENT)
Dept: RADIATION ONCOLOGY | Facility: HOSPITAL | Age: 55
End: 2020-02-14

## 2020-02-14 ENCOUNTER — HOSPITAL ENCOUNTER (OUTPATIENT)
Dept: INFUSION THERAPY | Age: 55
Discharge: HOME | End: 2020-02-14
Payer: COMMERCIAL

## 2020-02-14 VITALS
WEIGHT: 142.3 LBS | DIASTOLIC BLOOD PRESSURE: 90 MMHG | HEART RATE: 78 BPM | TEMPERATURE: 97.8 F | OXYGEN SATURATION: 96 % | BODY MASS INDEX: 25.21 KG/M2 | SYSTOLIC BLOOD PRESSURE: 142 MMHG

## 2020-02-14 DIAGNOSIS — C50.412 MALIGNANT NEOPLASM OF UPPER-OUTER QUADRANT OF LEFT FEMALE BREAST, UNSPECIFIED ESTROGEN RECEPTOR STATUS (HCC): ICD-10-CM

## 2020-02-14 DIAGNOSIS — Z17.0 MALIGNANT NEOPLASM INVOLVING BOTH NIPPLE AND AREOLA OF LEFT BREAST IN FEMALE, ESTROGEN RECEPTOR POSITIVE (HCC): ICD-10-CM

## 2020-02-14 DIAGNOSIS — G89.3 CANCER RELATED PAIN: ICD-10-CM

## 2020-02-14 DIAGNOSIS — C50.012 MALIGNANT NEOPLASM INVOLVING BOTH NIPPLE AND AREOLA OF LEFT BREAST IN FEMALE, ESTROGEN RECEPTOR POSITIVE (HCC): ICD-10-CM

## 2020-02-14 DIAGNOSIS — L98.9 PAINFUL SKIN LESION: ICD-10-CM

## 2020-02-14 PROCEDURE — 85025 COMPLETE CBC W/AUTO DIFF WBC: CPT

## 2020-02-14 RX ORDER — FENTANYL 75 UG/H
PATCH TRANSDERMAL
Qty: 4 EACH | OUTPATIENT
Start: 2020-02-14

## 2020-02-14 RX ORDER — FENTANYL 75 UG/H
1 PATCH TRANSDERMAL
Qty: 5 PATCH | Refills: 0 | Status: SHIPPED | OUTPATIENT
Start: 2020-02-14 | End: 2020-07-29 | Stop reason: ALTCHOICE

## 2020-02-14 NOTE — TELEPHONE ENCOUNTER
Called patient at home to inform her that her Fentanyl 75 mcg patch prescription was sent to her pharmacy by Dr. Darby. Also instructed her that she would need to get her future prescriptions from Dr. Ott.  She verbalized understanding.  Also spoke with nurse at Dr. Ott's office re: need for patient to be getting pain meds prescriptions from Dr. Ott.        ----- Message from Pipo Darby III, MD sent at 2/14/2020  1:09 PM CST -----  Regarding: RE: Fentanyl patch 75 mcg refill request  Done, please let her know she will need to get pain meds from Dr. Ott since he will be seeing her.  ----- Message -----  From: Edith Fraser RN  Sent: 2/14/2020  11:20 AM CST  To: Pipo Darby III, MD  Subject: RE: Fentanyl patch 75 mcg refill request         75 mcg  ----- Message -----  From: Pipo Darby III, MD  Sent: 2/14/2020  10:56 AM CST  To: Edith Fraser RN  Subject: RE: Fentanyl patch 75 mcg refill request         The 50 or 75 mcg?  ----- Message -----  From: Edith Fraser RN  Sent: 2/14/2020  10:20 AM CST  To: Pipo Darby III, MD  Subject: Fentanyl patch 75 mcg refill request             There has been a request sent electronically to Clinical pool  I do not think these are visible to you.    So it was last filled on 01- for 4 patches to offset the box opened when she cut one of her patches in half.    It was filled on 1/27/20 for 5 patches

## 2020-02-17 ENCOUNTER — HOSPITAL ENCOUNTER (OUTPATIENT)
Dept: INFUSION THERAPY | Age: 55
Setting detail: INFUSION SERIES
Discharge: HOME | End: 2020-02-17
Payer: COMMERCIAL

## 2020-02-17 VITALS
OXYGEN SATURATION: 96 % | DIASTOLIC BLOOD PRESSURE: 82 MMHG | SYSTOLIC BLOOD PRESSURE: 131 MMHG | HEART RATE: 78 BPM | TEMPERATURE: 97.6 F | RESPIRATION RATE: 17 BRPM

## 2020-02-17 DIAGNOSIS — D70.9 NEUTROPENIA, UNSPECIFIED TYPE (HCC): Primary | ICD-10-CM

## 2020-02-17 PROCEDURE — 96372 THER/PROPH/DIAG INJ SC/IM: CPT

## 2020-02-17 PROCEDURE — 6360000002 HC RX W HCPCS: Performed by: INTERNAL MEDICINE

## 2020-02-17 RX ORDER — MORPHINE SULFATE 30 MG/1
30 TABLET ORAL EVERY 4 HOURS PRN
Qty: 180 TABLET | Refills: 0 | Status: SHIPPED | OUTPATIENT
Start: 2020-02-17 | End: 2020-03-18

## 2020-02-17 RX ADMIN — TBO-FILGRASTIM 480 MCG: 480 INJECTION, SOLUTION SUBCUTANEOUS at 13:42

## 2020-02-20 RX ORDER — FENTANYL 50 UG/H
1 PATCH TRANSDERMAL
Qty: 10 PATCH | Refills: 0 | Status: SHIPPED | OUTPATIENT
Start: 2020-02-20 | End: 2020-03-21

## 2020-02-20 RX ORDER — FENTANYL 75 UG/H
1 PATCH TRANSDERMAL
Qty: 10 PATCH | Refills: 0 | Status: SHIPPED | OUTPATIENT
Start: 2020-02-20 | End: 2020-03-21

## 2020-02-21 ENCOUNTER — HOSPITAL ENCOUNTER (OUTPATIENT)
Dept: INFUSION THERAPY | Age: 55
Discharge: HOME | End: 2020-02-21
Payer: COMMERCIAL

## 2020-02-21 VITALS
HEART RATE: 89 BPM | BODY MASS INDEX: 23.9 KG/M2 | SYSTOLIC BLOOD PRESSURE: 122 MMHG | WEIGHT: 134.9 LBS | OXYGEN SATURATION: 97 % | DIASTOLIC BLOOD PRESSURE: 74 MMHG | HEIGHT: 63 IN

## 2020-02-21 DIAGNOSIS — C50.919 HER2-POSITIVE CARCINOMA OF BREAST (HCC): Primary | ICD-10-CM

## 2020-02-21 DIAGNOSIS — C50.412 MALIGNANT NEOPLASM OF UPPER-OUTER QUADRANT OF LEFT FEMALE BREAST, UNSPECIFIED ESTROGEN RECEPTOR STATUS (HCC): ICD-10-CM

## 2020-02-21 PROCEDURE — 96413 CHEMO IV INFUSION 1 HR: CPT

## 2020-02-21 PROCEDURE — 86300 IMMUNOASSAY TUMOR CA 15-3: CPT

## 2020-02-21 PROCEDURE — 2580000003 HC RX 258: Performed by: NURSE PRACTITIONER

## 2020-02-21 PROCEDURE — 6360000002 HC RX W HCPCS

## 2020-02-21 PROCEDURE — 86301 IMMUNOASSAY TUMOR CA 19-9: CPT

## 2020-02-21 PROCEDURE — 6360000002 HC RX W HCPCS: Performed by: NURSE PRACTITIONER

## 2020-02-21 PROCEDURE — 36415 COLL VENOUS BLD VENIPUNCTURE: CPT

## 2020-02-21 PROCEDURE — 96375 TX/PRO/DX INJ NEW DRUG ADDON: CPT

## 2020-02-21 PROCEDURE — 2580000003 HC RX 258

## 2020-02-21 PROCEDURE — 2500000003 HC RX 250 WO HCPCS: Performed by: NURSE PRACTITIONER

## 2020-02-21 PROCEDURE — 82378 CARCINOEMBRYONIC ANTIGEN: CPT

## 2020-02-21 PROCEDURE — 85025 COMPLETE CBC W/AUTO DIFF WBC: CPT

## 2020-02-21 PROCEDURE — 80053 COMPREHEN METABOLIC PANEL: CPT

## 2020-02-21 PROCEDURE — 6360000002 HC RX W HCPCS: Performed by: INTERNAL MEDICINE

## 2020-02-21 RX ORDER — PALONOSETRON 0.05 MG/ML
0.25 INJECTION, SOLUTION INTRAVENOUS ONCE
Status: COMPLETED | OUTPATIENT
Start: 2020-02-21 | End: 2020-02-21

## 2020-02-21 RX ORDER — DEXAMETHASONE SODIUM PHOSPHATE 10 MG/ML
10 INJECTION, SOLUTION INTRAMUSCULAR; INTRAVENOUS ONCE
Status: COMPLETED | OUTPATIENT
Start: 2020-02-21 | End: 2020-02-21

## 2020-02-21 RX ORDER — SODIUM CHLORIDE 0.9 % (FLUSH) 0.9 %
10 SYRINGE (ML) INJECTION PRN
Status: DISCONTINUED | OUTPATIENT
Start: 2020-02-21 | End: 2020-02-22 | Stop reason: HOSPADM

## 2020-02-21 RX ORDER — HEPARIN SODIUM (PORCINE) LOCK FLUSH IV SOLN 100 UNIT/ML 100 UNIT/ML
500 SOLUTION INTRAVENOUS PRN
Status: DISCONTINUED | OUTPATIENT
Start: 2020-02-21 | End: 2020-02-23 | Stop reason: HOSPADM

## 2020-02-21 RX ADMIN — FAM-TRASTUZUMAB DERUXTECAN-NXKI 330 MG: 100 INJECTION, POWDER, LYOPHILIZED, FOR SOLUTION INTRAVENOUS at 15:20

## 2020-02-21 RX ADMIN — HEPARIN 500 UNITS: 100 SYRINGE at 15:55

## 2020-02-21 RX ADMIN — SODIUM CHLORIDE, PRESERVATIVE FREE 10 ML: 5 INJECTION INTRAVENOUS at 15:55

## 2020-02-21 RX ADMIN — DEXAMETHASONE SODIUM PHOSPHATE 10 MG: 10 INJECTION, SOLUTION INTRAMUSCULAR; INTRAVENOUS at 14:42

## 2020-02-21 RX ADMIN — PALONOSETRON HYDROCHLORIDE 0.25 MG: 0.25 INJECTION, SOLUTION INTRAVENOUS at 14:42

## 2020-02-26 ENCOUNTER — OFFICE VISIT (OUTPATIENT)
Dept: WOUND CARE | Facility: HOSPITAL | Age: 55
End: 2020-02-26

## 2020-02-26 PROCEDURE — G0463 HOSPITAL OUTPT CLINIC VISIT: HCPCS

## 2020-03-06 ENCOUNTER — HOSPITAL ENCOUNTER (EMERGENCY)
Age: 55
Discharge: HOME | End: 2020-03-06
Payer: COMMERCIAL

## 2020-03-06 VITALS
RESPIRATION RATE: 18 BRPM | WEIGHT: 140 LBS | DIASTOLIC BLOOD PRESSURE: 88 MMHG | OXYGEN SATURATION: 95 % | TEMPERATURE: 99.2 F | HEART RATE: 122 BPM | BODY MASS INDEX: 24.8 KG/M2 | HEIGHT: 63 IN | SYSTOLIC BLOOD PRESSURE: 139 MMHG

## 2020-03-06 PROCEDURE — 6370000000 HC RX 637 (ALT 250 FOR IP): Performed by: PHYSICIAN ASSISTANT

## 2020-03-06 PROCEDURE — 99283 EMERGENCY DEPT VISIT LOW MDM: CPT

## 2020-03-06 RX ORDER — OXYMETAZOLINE HYDROCHLORIDE 0.05 G/100ML
2 SPRAY NASAL ONCE
Status: COMPLETED | OUTPATIENT
Start: 2020-03-06 | End: 2020-03-06

## 2020-03-06 RX ADMIN — OXYMETAZOLINE HCL 2 SPRAY: 0.05 SPRAY NASAL at 13:40

## 2020-03-06 ASSESSMENT — ENCOUNTER SYMPTOMS
EYE PAIN: 0
BACK PAIN: 0
NAUSEA: 0
EYE DISCHARGE: 0
ABDOMINAL DISTENTION: 0
COLOR CHANGE: 0
SORE THROAT: 0
COUGH: 0
ABDOMINAL PAIN: 0
APNEA: 0
RHINORRHEA: 0
PHOTOPHOBIA: 0
SHORTNESS OF BREATH: 0

## 2020-03-06 NOTE — ED PROVIDER NOTES
[facility]  eMERGENCYdEPARTMENT eNCOUnter      Pt Name: Elaina Gar  MRN: [id number]  [place] 1965  Date of evaluation: 3/6/2020  Provider:SHAINA Milner    CHIEF COMPLAINT       Chief Complaint   Patient presents with    Epistaxis     x30 minutes, hx of nosebleeds due to chemo         HISTORY OF PRESENT ILLNESS  (Location/Symptom, Timing/Onset, Context/Setting, Quality, Duration, Modifying Factors, Severity.)   Elaina Gar is a 47 y.o. female who presents to the emergency department with complaints of anterior nosebleed left side ongoing now for 30 minutes she and her daughter think it may correlate with recent chemo treatment she is being treated by Dr. Kathleen Sheets for known breast mass in her left breast.  Patient denies any trouble swallowing she has had a little bit go down the back of her throat she denies any injury. She is not on blood thinners. HPI    Nursing Notes were reviewed and I agree. REVIEW OF SYSTEMS    (2-9 systems for level 4, 10 or more for level 5)     Review of Systems   Constitutional: Negative for activity change, appetite change, chills and fever. HENT: Positive for nosebleeds. Negative for congestion, postnasal drip, rhinorrhea and sore throat. Eyes: Negative for photophobia, pain, discharge and visual disturbance. Respiratory: Negative for apnea, cough and shortness of breath. Cardiovascular: Negative for chest pain and leg swelling. Gastrointestinal: Negative for abdominal distention, abdominal pain and nausea. Genitourinary: Negative for vaginal bleeding. Musculoskeletal: Negative for arthralgias, back pain, joint swelling, neck pain and neck stiffness. Skin: Negative for color change and rash. Neurological: Negative for dizziness, syncope, facial asymmetry and headaches. Hematological: Negative for adenopathy. Does not bruise/bleed easily.    Psychiatric/Behavioral: Negative for agitation, behavioral problems and resource strain: None    Food insecurity:     Worry: None     Inability: None    Transportation needs:     Medical: None     Non-medical: None   Tobacco Use    Smoking status: Never Smoker    Smokeless tobacco: Never Used   Substance and Sexual Activity    Alcohol use: No     Alcohol/week: 0.0 standard drinks    Drug use: No    Sexual activity: None   Lifestyle    Physical activity:     Days per week: None     Minutes per session: None    Stress: None   Relationships    Social connections:     Talks on phone: None     Gets together: None     Attends Baptism service: None     Active member of club or organization: None     Attends meetings of clubs or organizations: None     Relationship status: None    Intimate partner violence:     Fear of current or ex partner: None     Emotionally abused: None     Physically abused: None     Forced sexual activity: None   Other Topics Concern    None   Social History Narrative    None       SCREENINGS           PHYSICAL EXAM    (up to 7 forlevel 4, 8 or more for level 5)     ED Triage Vitals [03/06/20 1326]   BP Temp Temp Source Pulse Resp SpO2 Height Weight   139/88 99.2 °F (37.3 °C) Tympanic 122 18 95 % 5' 3\" (1.6 m) 140 lb (63.5 kg)       Physical Exam  Vitals signs and nursing note reviewed. Constitutional:       General: She is not in acute distress. Appearance: She is well-developed. She is not diaphoretic. HENT:      Head: Normocephalic and atraumatic. Right Ear: Tympanic membrane, ear canal and external ear normal.      Left Ear: Ear canal and external ear normal.      Nose: Nose normal.      Comments: Left-sided anterior bleed cannot isolate source does not appear posterior when assessing oropharynx. Mouth/Throat:      Mouth: Mucous membranes are moist.      Pharynx: No oropharyngeal exudate. Eyes:      General:         Right eye: No discharge. Left eye: No discharge. Pupils: Pupils are equal, round, and reactive to light. Neck:      Musculoskeletal: Normal range of motion and neck supple. Thyroid: No thyromegaly. Cardiovascular:      Rate and Rhythm: Normal rate and regular rhythm. Pulses: Normal pulses. Heart sounds: Normal heart sounds. No murmur. No friction rub. Pulmonary:      Effort: Pulmonary effort is normal. No respiratory distress. Breath sounds: Normal breath sounds. No stridor. No wheezing. Abdominal:      General: Bowel sounds are normal. There is no distension. Palpations: Abdomen is soft. Tenderness: There is no abdominal tenderness. Musculoskeletal: Normal range of motion. Skin:     General: Skin is warm and dry. Capillary Refill: Capillary refill takes less than 2 seconds. Findings: No rash. Neurological:      Mental Status: She is alert and oriented to person, place, and time. Cranial Nerves: No cranial nerve deficit. Sensory: No sensory deficit. Coordination: Coordination normal.   Psychiatric:         Mood and Affect: Mood normal.         Behavior: Behavior normal.         Thought Content: Thought content normal.         Judgment: Judgment normal.           DIAGNOSTIC RESULTS     RADIOLOGY:   Non-plain film images such as CT, Ultrasound and MRI are read by the radiologist. Plain radiographic images are visualized and preliminarilyinterpreted by No att. providers found with the below findings:      Interpretation per the Radiologist below, if available at the time of this note:    No orders to display       LABS:  Labs Reviewed - No data to display    All other labs were within normal range or notreturned as of this dictation.     RE-ASSESSMENT        EMERGENCY DEPARTMENT COURSE and DIFFERENTIAL DIAGNOSIS/MDM:   Vitals:    Vitals:    03/06/20 1326   BP: 139/88   Pulse: 122   Resp: 18   Temp: 99.2 °F (37.3 °C)   TempSrc: Tympanic   SpO2: 95%   Weight: 140 lb (63.5 kg)   Height: 5' 3\" (1.6 m)       MDM  This patient is overall asymptomatic I did not

## 2020-03-06 NOTE — ED NOTES
Pt nose has stopped bleeding and maintaining without any clamps. Pt ambulatory independently at this time approximately 200 feet and return to chair without any bleeding. Notified Josefina MERRITT of pt status.      Abrazo Arrowhead Campusedia , [facility]  03/06/20 [id number]

## 2020-03-08 ENCOUNTER — HOSPITAL ENCOUNTER (EMERGENCY)
Age: 55
Discharge: HOME | End: 2020-03-08
Payer: COMMERCIAL

## 2020-03-08 VITALS
HEART RATE: 93 BPM | SYSTOLIC BLOOD PRESSURE: 160 MMHG | WEIGHT: 140 LBS | BODY MASS INDEX: 24.8 KG/M2 | RESPIRATION RATE: 17 BRPM | TEMPERATURE: 97 F | HEIGHT: 63 IN | OXYGEN SATURATION: 98 % | DIASTOLIC BLOOD PRESSURE: 88 MMHG

## 2020-03-08 PROCEDURE — 99283 EMERGENCY DEPT VISIT LOW MDM: CPT

## 2020-03-08 RX ORDER — OXYMETAZOLINE HYDROCHLORIDE 5 G/100ML
2 SPRAY NASAL 2 TIMES DAILY PRN
Qty: 1 BOTTLE | Refills: 3 | Status: SHIPPED | OUTPATIENT
Start: 2020-03-08 | End: 2020-04-07

## 2020-03-09 NOTE — ED PROVIDER NOTES
Lido-Capsaicin-Men-Methyl Sal (MEDI-PATCH RX) 0.5-0.035-5-20 % PTCH Apply 1 patch topically daily, Disp-5 patch, R-0Normal      capecitabine (XELODA) 150 MG chemo tablet Take 1 tablet twice a day for 14 days, 7 days off as directed with 500 mg. A total of 1150 mg twice a day, Disp-28 tablet, R-5Normal      gabapentin (NEURONTIN) 300 MG capsule Take 300 mg by mouth 2 times daily. Historical Med      levofloxacin (LEVAQUIN) 500 MG tablet Take 500 mg by mouth dailyHistorical Med      minocycline (MINOCIN;DYNACIN) 100 MG capsule Take 100 mg by mouth dailyHistorical Med      montelukast (SINGULAIR) 10 MG tablet Take 10 mg by mouth nightlyHistorical Med      traMADol (ULTRAM) 50 MG tablet Take 50 mg by mouth every 6 hours as needed for Pain. Historical Med      HYDROcodone-acetaminophen (NORCO)  MG per tablet Take 1 tablet by mouth every 4 hours as needed for Pain. Kojo Burrows Historical Med      Ferrous Sulfate (IRON) 325 (65 FE) MG TABS Take by mouth      Multiple Vitamin (MULTI VITAMIN DAILY PO) Take by mouth             ALLERGIES     Carboplatin and Platinum-containing compounds    FAMILY HISTORY       Family History   Problem Relation Age of Onset    Lung Cancer Father     Colon Cancer Father     Prostate Cancer Father     Hypertension Mother           SOCIAL HISTORY       Social History     Socioeconomic History    Marital status:      Spouse name: Not on file    Number of children: Not on file    Years of education: Not on file    Highest education level: Not on file   Occupational History    Not on file   Social Needs    Financial resource strain: Not on file    Food insecurity     Worry: Not on file     Inability: Not on file    Transportation needs     Medical: Not on file     Non-medical: Not on file   Tobacco Use    Smoking status: Never Smoker    Smokeless tobacco: Never Used   Substance and Sexual Activity    Alcohol use: No     Alcohol/week: 0.0 standard drinks    Drug use: No    Sexual activity: Not on file   Lifestyle    Physical activity     Days per week: Not on file     Minutes per session: Not on file    Stress: Not on file   Relationships    Social connections     Talks on phone: Not on file     Gets together: Not on file     Attends Advent service: Not on file     Active member of club or organization: Not on file     Attends meetings of clubs or organizations: Not on file     Relationship status: Not on file    Intimate partner violence     Fear of current or ex partner: Not on file     Emotionally abused: Not on file     Physically abused: Not on file     Forced sexual activity: Not on file   Other Topics Concern    Not on file   Social History Narrative    Not on file       SCREENINGS    @[facility]([id number])@        PHYSICAL EXAM  (up to 7 for level 4, 8 or more for level 5)     ED Triage Vitals   BP Temp Temp Source Pulse Resp SpO2 Height Weight   03/08/20 1825 03/08/20 1825 03/08/20 1825 03/08/20 1824 03/08/20 1824 03/08/20 1824 03/08/20 1824 03/08/20 1824   (!) 160/88 97 °F (36.1 °C) Oral 93 17 98 % 5' 3\" (1.6 m) 140 lb (63.5 kg)       Physical Exam  Vitals signs and nursing note reviewed. Constitutional:       Appearance: She is well-developed. HENT:      Head: Normocephalic and atraumatic. Nose: Mucosal edema present. No nasal deformity or septal deviation. Right Nostril: No septal hematoma. Left Nostril: No septal hematoma. Eyes:      General: No scleral icterus. Right eye: No discharge. Left eye: No discharge. Neck:      Musculoskeletal: Normal range of motion and neck supple. Cardiovascular:      Rate and Rhythm: Normal rate. Pulmonary:      Effort: No respiratory distress. Neurological:      Mental Status: She is alert.    Psychiatric:         Behavior: Behavior normal.         DIAGNOSTIC RESULTS     No orders to display        Labs Reviewed - No data to display      [address] [place] [address] and DIFFERENTIAL DIAGNOSIS/MDM:   Vitals:

## 2020-03-12 NOTE — PROGRESS NOTES
involvement. She has been exposed to several anti-HER-2 therapies in the past.  She is currently receiving treatment with Fam-trastuzumab. She has noticed  some improvement of her lesions. They have become darker and seems that they are drying up. Unfortunately, she has had delays with treatment due to prolonged chemotherapy-induced neutropenia. She has received G-CSF in the past with prompt recovery of her counts after 1 dose of Granix. Cancer history- Left breast recurrence with Inflammatory Breast Cancer ER 8%/VT negative &HER-2 positive  Ms Vera Kasper was seen in initial oncology consultation on 4/20/2017 referred for a diagnosis of a left breast recurrence with inflammatory breast cancer. She was initially treated by Dr. Ryan Dsouza at [facility]. Prior breast cancer history is as follows:  3/22/2016-a diagnostic mammogram for a palpable mass showed a left breast abnormal findings. 4/1/2017- ultrasound-guided core needle biopsy was consistent with invasive ductal carcinoma, grade 2. ER 1%, VT 6%, HER-2/oseas IHC 3+/FISH amplified ratio 6.7., AR 95%. She underwent 6 cycles of TCH-P from April 2016 through September 2016 with G-CSF support. She had a total of 18 weeks of trastuzumab treatment. 10/3/2016-she underwent a left lumpectomy with left sentinel lymph node revealing an invasive ductal carcinoma, grade 3. Margins clear of invasive carcinoma (1.1 cm from the lateral margin), extensive lymphovascular space invasion. 2 out of 3 sentinel lymph nodes positive for metastatic carcinoma. Final pathologic staging [id number])M0. She declined adjuvant radiation therapy and adjuvant Herceptin completion.   -------------------In-breast Recurrence [id number]---------------------------  1/30/2017- she was seen by Dr. Ector Allen with new complaints of erythema in the left breast. A skin punch biopsy was consistent with metastatic ductal carcinoma with extensive dermal lymphovascular invasion.  ER negative, VT hold. She has requested her PCP a referral to [facility] for a second opinion. She has neuropathy grade 1.   5/2/2018- 2 months off treatment (Kadcyla). She has not yet been seen at [facility]. Apparently, [facility] is gathering her records and will see her soon. Examination of her chest wall showed recurrent disease in the left upper chest. Residual neuropathy grade 1. She could not tolerate gabapentin, and therefore has stopped it. She was not interested in any other medication for neuropathy at this time. 7/10/2018-after discussion at [facility] she was recommended to reinitiate Kadcyla. 7/13/2018-CT of chest, abdomen, pelvis showed no evidence of intra-abdominal disease. There is an enlarged right-sided level 1 lymph node that was not appreciated previously, measuring 2.5 x 1.4 cm, metastatic disease considered. 7/16/2018-PET CT scan showed diffuse uptake throughout the left breast extending to the left chest wall. Abnormal uptake within the left axilla lymph nodes and left internal mammary lymph nodes concerning for metastatic disease. Enlarged hypermetabolic right axilla lymph node concerning for metastatic disease with SUV 8.0. Abnormal uptake within a right cervical lymph node, etiology unclear, but could be reactive. 10/26/2018 - CT scan of the chest was completed at [facility] and compared to CT scan of 12/27/2017 versus her last CT scan completed at [facility] on 7/13/2018. The CT scan identified a right axillary lymph node measuring 1.2 cm, otherwise no intrathoracic neoplastic process/metastatic disease. 10/26/2018 -CT scan of the abdomen and pelvis was completed at [facility] and compared to CT scan of 12/27/2017 versus her last CT scan completed at [facility] on 7/13/2018. No evidence of intra-abdominal or pelvic metastasis was identified. December 2018-interval worsening of skin metastasis in the left breast and diffuse erythema with new nodules.   12/17/2018-skin biopsy of overlying right breast consistent invasive ductal carcinoma, high-grade. ER 20%, ME 0%, HER-2/oseas 3+.  12/17/2018-right axillary FNA biopsy consistent invasive ductal carcinoma. 12/26/2018-recommended clinical trial at [facility]. 1/4/2019-started on Navelbine/Herceptin, due to delays on the clinical trial at [facility]. Unfortunately, she had severe chest wall pain and generalized pain related to Navelbine. 1/25/2019-4/19/2019 she was switched to cisplatin/Gemzar/Herceptin, but had disease progression. 5/30/2019-CT chest, abdomen, pelvis and bone scan was unremarkable for right axillary adenopathy measuring 2 cm. Lymph node located in the upper chest wall under the pectoralis muscle measuring 1.2 cm. Right breast with 2 areas with masslike enhancement. No evidence of metastatic disease in the abdomen pelvis. Bone scan was unremarkable for metastatic bone disease. June 2019-very poor tolerance to cisplatin, Gemzar/Herceptin. She was recommended Xeloda/ Lapatinib.   6/22/2019-she was recommended and started on Xeloda 850 mg/m2 PO BID days 1 through 14 every 21 days and Lapatinib 1250 mg daily continuously. November 2019- progression on prior Xeloda and lapatinib.  11/23/2019- recommended rechallenge with Herceptin and Navelbine vs clinical trial at [facility]. 12/23/2019-CT chest abdomen pelvis showed metastatic disease in the right axillary lymph node and diffuse bilateral skin involvement of both breasts with several nodules. No evidence of pulmonary, liver or bone metastasis. CT of the head with contrast was unremarkable. 1/3/2020- the patient was unable to follow-up at [facility] for clinical trial.  Therefore recommended Fam-trastuzumab deruxtecan every 3 weeks. 1/10/2020-she was started on palliative treatment with Fam-trastuzumab.         Past medical history:  Past Medical History:   Diagnosis Date    Cancer [facility])     breast    GERD (gastroesophageal reflux disease) file   Social History Narrative    Not on file        Family history:   Family History   Problem Relation Age of Onset    Lung Cancer Father     Colon Cancer Father     Prostate Cancer Father     Hypertension Mother         Current Outpatient Medications   Medication Sig Dispense Refill    oxymetazoline (12 HOUR NASAL SPRAY) 0.05 % nasal spray 2 sprays by Nasal route 2 times daily as needed for Congestion 1 Bottle 3    fentaNYL (DURAGESIC) 75 MCG/HR Place 1 patch onto the skin every 72 hours for 30 days. 10 patch 0    fentaNYL (DURAGESIC) 50 MCG/HR Place 1 patch onto the skin every 72 hours for 30 days. 10 patch 0    morphine (MSIR) 30 MG tablet Take 1 tablet by mouth every 4 hours as needed for Pain for up to 30 days. 180 tablet 0    fluconazole (DIFLUCAN) 150 MG tablet Take 1 tablet every 72 hours x 3 doses 3 tablet 0    Magic Mouthwash (MIRACLE MOUTHWASH) Swish and spit 5 mLs 4 times daily as needed for Irritation 240 mL 3    ondansetron (ZOFRAN ODT) 4 MG disintegrating tablet Take 2 tablets by mouth every 8 hours as needed for Nausea or Vomiting 30 tablet 3    pantoprazole (PROTONIX) 40 MG tablet Take 1 tablet by mouth daily 30 tablet 2    APPLE CIDER VINEGAR PO Take by mouth      Probiotic Product (PROBIOTIC-10 PO) Take by mouth      Turmeric 500 MG CAPS Take by mouth      SELENIUM ER PO Take by mouth      Misc Natural Products (OSTEO BI-FLEX JOINT SHIELD PO) Take by mouth      hydrOXYzine (ATARAX) 25 MG tablet Take 1 tablet by mouth 3 times daily as needed for Itching 90 tablet 3    Lido-Capsaicin-Men-Methyl Sal (MEDI-PATCH RX) 0.5-0.035-5-20 % PTCH Apply 1 patch topically daily (Patient not taking: Reported on 11/22/2019) 5 patch 0    capecitabine (XELODA) 150 MG chemo tablet Take 1 tablet twice a day for 14 days, 7 days off as directed with 500 mg. A total of 1150 mg twice a day 28 tablet 5    gabapentin (NEURONTIN) 300 MG capsule Take 300 mg by mouth 2 times daily.       levofloxacin (LEVAQUIN) 500 MG tablet Take 500 mg by mouth daily      minocycline (MINOCIN;DYNACIN) 100 MG capsule Take 100 mg by mouth daily      montelukast (SINGULAIR) 10 MG tablet Take 10 mg by mouth nightly      traMADol (ULTRAM) 50 MG tablet Take 50 mg by mouth every 6 hours as needed for Pain.  HYDROcodone-acetaminophen (NORCO)  MG per tablet Take 1 tablet by mouth every 4 hours as needed for Pain. Kenyetta Arnt Ferrous Sulfate (IRON) 325 (65 FE) MG TABS Take by mouth      Multiple Vitamin (MULTI VITAMIN DAILY PO) Take by mouth       No current facility-administered medications for this visit. Facility-Administered Medications Ordered in Other Visits   Medication Dose Route Frequency Provider Last Rate Last Dose    sodium chloride flush 0.9 % injection 10 mL  10 mL Intravenous PRN Christo Sellers MD   10 mL at 03/13/20 1704    sodium chloride flush 0.9 % injection 20 mL  20 mL Intravenous PRN Christo Sellers MD        heparin flush 100 UNIT/ML injection 500 Units  500 Units Intracatheter PRN Christo Sellers MD   500 Units at 03/13/20 1704        REVIEW OF SYSTEMS:    Constitutional: no fever, no night sweats, fatigue; weight loss  HEENT: no blurring of vision, no double vision, no hearing difficulty, no tinnitus,no ulceration, no dental caries, no dysphagia  Lungs: no cough, no shortness of breath, no wheeze;   CVS: no palpitation, no chest pain, no shortness of breath;  GI: no abdominal pain, no nausea , no vomiting, no constipation; diarrhea grade 1  ORLIN: no dysuria, frequency and urgency, no hematuria, no kidney stones;   Musculoskeletal: no joint pain, swelling , stiffness;   Endocrine: no polyuria, polydypsia, no cold or heat intolerence; Hematology/lymphatic: no easy brusing or bleeding, no hx of clotting disorder; no peripheral adenopathy.   Dermatology: skin lesions right and left breast, no eczema, no pruritis;   Psychiatry: no depression, no anxiety,no panic attacks, no suicide

## 2020-03-13 ENCOUNTER — OFFICE VISIT (OUTPATIENT)
Dept: HEMATOLOGY | Age: 55
End: 2020-03-13
Payer: COMMERCIAL

## 2020-03-13 ENCOUNTER — HOSPITAL ENCOUNTER (OUTPATIENT)
Dept: INFUSION THERAPY | Age: 55
Discharge: HOME | End: 2020-03-13
Payer: COMMERCIAL

## 2020-03-13 VITALS
RESPIRATION RATE: 16 BRPM | BODY MASS INDEX: 24.76 KG/M2 | DIASTOLIC BLOOD PRESSURE: 68 MMHG | WEIGHT: 139.8 LBS | SYSTOLIC BLOOD PRESSURE: 122 MMHG | TEMPERATURE: 97.8 F | OXYGEN SATURATION: 96 %

## 2020-03-13 DIAGNOSIS — C50.412 MALIGNANT NEOPLASM OF UPPER-OUTER QUADRANT OF LEFT FEMALE BREAST, UNSPECIFIED ESTROGEN RECEPTOR STATUS (HCC): Primary | ICD-10-CM

## 2020-03-13 DIAGNOSIS — Z45.2 ENCOUNTER FOR CENTRAL LINE CARE: ICD-10-CM

## 2020-03-13 PROCEDURE — 36415 COLL VENOUS BLD VENIPUNCTURE: CPT

## 2020-03-13 PROCEDURE — 86300 IMMUNOASSAY TUMOR CA 15-3: CPT

## 2020-03-13 PROCEDURE — 6360000002 HC RX W HCPCS: Performed by: INTERNAL MEDICINE

## 2020-03-13 PROCEDURE — 96523 IRRIG DRUG DELIVERY DEVICE: CPT

## 2020-03-13 PROCEDURE — 86301 IMMUNOASSAY TUMOR CA 19-9: CPT

## 2020-03-13 PROCEDURE — 80053 COMPREHEN METABOLIC PANEL: CPT

## 2020-03-13 PROCEDURE — 2580000003 HC RX 258: Performed by: INTERNAL MEDICINE

## 2020-03-13 PROCEDURE — 99214 OFFICE O/P EST MOD 30 MIN: CPT | Performed by: INTERNAL MEDICINE

## 2020-03-13 PROCEDURE — 85025 COMPLETE CBC W/AUTO DIFF WBC: CPT

## 2020-03-13 PROCEDURE — 82378 CARCINOEMBRYONIC ANTIGEN: CPT

## 2020-03-13 RX ORDER — SODIUM CHLORIDE 0.9 % (FLUSH) 0.9 %
20 SYRINGE (ML) INJECTION PRN
Status: DISCONTINUED | OUTPATIENT
Start: 2020-03-13 | End: 2020-03-14 | Stop reason: HOSPADM

## 2020-03-13 RX ORDER — SODIUM CHLORIDE 0.9 % (FLUSH) 0.9 %
20 SYRINGE (ML) INJECTION PRN
Status: CANCELLED | OUTPATIENT
Start: 2020-03-13

## 2020-03-13 RX ORDER — SODIUM CHLORIDE 0.9 % (FLUSH) 0.9 %
10 SYRINGE (ML) INJECTION PRN
Status: CANCELLED | OUTPATIENT
Start: 2020-03-13

## 2020-03-13 RX ORDER — SODIUM CHLORIDE 0.9 % (FLUSH) 0.9 %
10 SYRINGE (ML) INJECTION PRN
Status: DISCONTINUED | OUTPATIENT
Start: 2020-03-13 | End: 2020-03-14 | Stop reason: HOSPADM

## 2020-03-13 RX ORDER — HEPARIN SODIUM (PORCINE) LOCK FLUSH IV SOLN 100 UNIT/ML 100 UNIT/ML
500 SOLUTION INTRAVENOUS PRN
Status: CANCELLED | OUTPATIENT
Start: 2020-03-13

## 2020-03-13 RX ORDER — HEPARIN SODIUM (PORCINE) LOCK FLUSH IV SOLN 100 UNIT/ML 100 UNIT/ML
500 SOLUTION INTRAVENOUS PRN
Status: DISCONTINUED | OUTPATIENT
Start: 2020-03-13 | End: 2020-03-14 | Stop reason: HOSPADM

## 2020-03-13 RX ADMIN — SODIUM CHLORIDE, PRESERVATIVE FREE 10 ML: 5 INJECTION INTRAVENOUS at 17:04

## 2020-03-13 RX ADMIN — HEPARIN 500 UNITS: 100 SYRINGE at 17:04

## 2020-03-16 ENCOUNTER — HOSPITAL ENCOUNTER (OUTPATIENT)
Dept: INFUSION THERAPY | Age: 55
Discharge: HOME | End: 2020-03-16
Payer: COMMERCIAL

## 2020-03-16 VITALS
HEART RATE: 85 BPM | OXYGEN SATURATION: 96 % | WEIGHT: 137.1 LBS | SYSTOLIC BLOOD PRESSURE: 120 MMHG | TEMPERATURE: 98.1 F | DIASTOLIC BLOOD PRESSURE: 76 MMHG | BODY MASS INDEX: 24.29 KG/M2 | HEIGHT: 63 IN

## 2020-03-16 PROCEDURE — 85025 COMPLETE CBC W/AUTO DIFF WBC: CPT

## 2020-03-17 ENCOUNTER — TELEPHONE (OUTPATIENT)
Dept: RADIATION ONCOLOGY | Facility: HOSPITAL | Age: 55
End: 2020-03-17

## 2020-03-17 NOTE — TELEPHONE ENCOUNTER
Called patient to check on her.  She is currently receiving immunotherapy.  She is seeing Dr. Ott frequently.  Does not want to see us right now.  I asked her to call us if she needed to see us.  She verbalized understanding.

## 2020-03-18 ENCOUNTER — OFFICE VISIT (OUTPATIENT)
Dept: HEMATOLOGY | Age: 55
End: 2020-03-18
Payer: COMMERCIAL

## 2020-03-18 ENCOUNTER — CLINICAL DOCUMENTATION (OUTPATIENT)
Dept: HEMATOLOGY | Age: 55
End: 2020-03-18

## 2020-03-18 ENCOUNTER — HOSPITAL ENCOUNTER (OUTPATIENT)
Dept: INFUSION THERAPY | Age: 55
Discharge: HOME | End: 2020-03-18
Payer: COMMERCIAL

## 2020-03-18 VITALS
TEMPERATURE: 97.8 F | HEIGHT: 63 IN | WEIGHT: 135.6 LBS | DIASTOLIC BLOOD PRESSURE: 66 MMHG | HEART RATE: 96 BPM | OXYGEN SATURATION: 99 % | BODY MASS INDEX: 24.03 KG/M2 | SYSTOLIC BLOOD PRESSURE: 120 MMHG

## 2020-03-18 DIAGNOSIS — C50.412 MALIGNANT NEOPLASM OF UPPER-OUTER QUADRANT OF LEFT FEMALE BREAST, UNSPECIFIED ESTROGEN RECEPTOR STATUS (HCC): ICD-10-CM

## 2020-03-18 PROCEDURE — 85025 COMPLETE CBC W/AUTO DIFF WBC: CPT

## 2020-03-18 PROCEDURE — 99214 OFFICE O/P EST MOD 30 MIN: CPT | Performed by: INTERNAL MEDICINE

## 2020-03-18 PROCEDURE — 99212 OFFICE O/P EST SF 10 MIN: CPT

## 2020-03-18 NOTE — PROGRESS NOTES
Brittany Mueller   1965   3/18/2020     Chief Complaint   Patient presents with    Cancer      Interval history/history of present illness:  Diagnosis   IDC left breast, March 2016   Grade 2. ER 1%, WV 6%, HER-2/oseas IHC 3+/FISH amplified ratio 6.7, AR 95%. 01/30/2017- Biopsy-proven in breast relapse/Inflammatory breast cancer. ER negative, WV negative, HER-2/oseas IHC 3+/FISH amplified ratio 4.5, Ki-67 32%. [facility] - Revealed no clinically significant mutation. PTEN VUS. Treatment summary  Neoadjuvant adjuvant chemotherapy TCH-P ×6 cycles. She declined to complete 1 year of Herceptin. She declined radiation therapy. 10/3/2016-left lumpectomy/sentinel lymph node   01/30/2017- Biopsy-proven in breast relapse/Inflammatory breast cancer. 5/11/2017-Chemotherapy with paclitaxel/carboplatin weekly. Herceptin/Pertuzumab every 3 weeks started 05/11/2017. She had a allergic reaction to carboplatin and this was discontinued. 7/21/2017- 9/1/2017 Neuropathy grade 2 with Taxol. Therefore, switched to Taxotere/Herceptin/Pertuzumab. Discontinued due to progression of disease. 9/22/2017 through 3/2/2018Zonia Silk every 3 weeks. (stopped as per patient request, neuropathy grade 1)   7/10/2018 through 11/30/2018- Kadcyla. 1/4/2019-Navelbine x 1 cycle with very poor tolerance (pain)   1/25/2018-Gemzar/Herceptin   3/15/2019- low-dose Cisplatin added to Gemzar and Herceptin regimen, regimen changed to every other week   4/17/2019- palliative radiation to left clavicle/chest wall for anticipate total dosing of 5040 cGy   6/22/2019- Xeloda/apatinib  January 2020-palliative radiation to the right breast.  1/10/2020- palliative treatment with Fam-Trastuzumab  Tumor target  Skin chest wall right upper chest/right breast       Interval history  Claudell Drain is a pleasant 47years old female who has a diagnosis of recurrent HER-2 positive/WV negative breast cancer with extensive skin involvement/chest wall involvement.   She CT scan completed at [facility] on 7/13/2018. No evidence of intra-abdominal or pelvic metastasis was identified. December 2018-interval worsening of skin metastasis in the left breast and diffuse erythema with new nodules. 12/17/2018-skin biopsy of overlying right breast consistent invasive ductal carcinoma, high-grade. ER 20%, AL 0%, HER-2/oseas 3+.  12/17/2018-right axillary FNA biopsy consistent invasive ductal carcinoma. 12/26/2018-recommended clinical trial at [facility]. 1/4/2019-started on Navelbine/Herceptin, due to delays on the clinical trial at [facility]. Unfortunately, she had severe chest wall pain and generalized pain related to Navelbine. 1/25/2019-4/19/2019 she was switched to cisplatin/Gemzar/Herceptin, but had disease progression. 5/30/2019-CT chest, abdomen, pelvis and bone scan was unremarkable for right axillary adenopathy measuring 2 cm. Lymph node located in the upper chest wall under the pectoralis muscle measuring 1.2 cm. Right breast with 2 areas with masslike enhancement. No evidence of metastatic disease in the abdomen pelvis. Bone scan was unremarkable for metastatic bone disease. June 2019-very poor tolerance to cisplatin, Gemzar/Herceptin. She was recommended Xeloda/ Lapatinib.   6/22/2019-she was recommended and started on Xeloda 850 mg/m2 PO BID days 1 through 14 every 21 days and Lapatinib 1250 mg daily continuously. November 2019- progression on prior Xeloda and lapatinib.  11/23/2019- recommended rechallenge with Herceptin and Navelbine vs clinical trial at [facility]. 12/23/2019-CT chest abdomen pelvis showed metastatic disease in the right axillary lymph node and diffuse bilateral skin involvement of both breasts with several nodules. No evidence of pulmonary, liver or bone metastasis. CT of the head with contrast was unremarkable.   1/3/2020- the patient was unable to follow-up at [facility] for clinical trial.  Therefore recommended Fam-trastuzumab deruxtecan every 3 weeks. 1/10/2020-she was started on palliative treatment with Fam-trastuzumab.         Past medical history:  Past Medical History:   Diagnosis Date    Cancer ([address]     breast    GERD (gastroesophageal reflux disease)         Past surgical history:  Past Surgical History:   Procedure Laterality Date    BREAST LUMPECTOMY Left 10/4/2016    LUMPECTOMY WITH SENTINAL NODE BIOPSY AND INTRA OP US GUIDED NEEDLE LOCALIZATION performed by Shashank Leonard MD at [address] [place] [address]      biopsy left breast    COLONOSCOPY  2008    INSERTION / REMOVAL / REPLACEMENT VENOUS ACCESS CATHETER N/A 5/12/2016    SINGLE LUMEN PORT INSERTION performed by Shashank Leonard MD at [address] [place] [address] DIAGNOSTIC BONE MARROW BIOPSIES Right 11/14/2018    BONE MARROW ASPIRATION BIOPSY performed by Olaf Oneal MD at [facility]        Social history:  Social History     Socioeconomic History    Marital status:      Spouse name: Not on file    Number of children: Not on file    Years of education: Not on file    Highest education level: Not on file   Occupational History    Not on file   Social Needs    Financial resource strain: Not on file    Food insecurity     Worry: Not on file     Inability: Not on file    Transportation needs     Medical: Not on file     Non-medical: Not on file   Tobacco Use    Smoking status: Never Smoker    Smokeless tobacco: Never Used   Substance and Sexual Activity    Alcohol use: No     Alcohol/week: 0.0 standard drinks    Drug use: No    Sexual activity: Not on file   Lifestyle    Physical activity     Days per week: Not on file     Minutes per session: Not on file    Stress: Not on file   Relationships    Social connections     Talks on phone: Not on file     Gets together: Not on file     Attends [employer] service: Not on file     Active member of club or organization: Not on file     Attends meetings of clubs or organizations: Not on file Relationship status: Not on file    Intimate partner violence     Fear of current or ex partner: Not on file     Emotionally abused: Not on file     Physically abused: Not on file     Forced sexual activity: Not on file   Other Topics Concern    Not on file   Social History Narrative    Not on file        Family history:   Family History   Problem Relation Age of Onset    Lung Cancer Father     Colon Cancer Father     Prostate Cancer Father     Hypertension Mother         Current Outpatient Medications   Medication Sig Dispense Refill    oxymetazoline (12 HOUR NASAL SPRAY) 0.05 % nasal spray 2 sprays by Nasal route 2 times daily as needed for Congestion 1 Bottle 3    fentaNYL (DURAGESIC) 75 MCG/HR Place 1 patch onto the skin every 72 hours for 30 days. 10 patch 0    fentaNYL (DURAGESIC) 50 MCG/HR Place 1 patch onto the skin every 72 hours for 30 days. 10 patch 0    morphine (MSIR) 30 MG tablet Take 1 tablet by mouth every 4 hours as needed for Pain for up to 30 days.  180 tablet 0    fluconazole (DIFLUCAN) 150 MG tablet Take 1 tablet every 72 hours x 3 doses 3 tablet 0    Magic Mouthwash (MIRACLE MOUTHWASH) Swish and spit 5 mLs 4 times daily as needed for Irritation 240 mL 3    ondansetron (ZOFRAN ODT) 4 MG disintegrating tablet Take 2 tablets by mouth every 8 hours as needed for Nausea or Vomiting 30 tablet 3    pantoprazole (PROTONIX) 40 MG tablet Take 1 tablet by mouth daily 30 tablet 2    APPLE CIDER VINEGAR PO Take by mouth      Probiotic Product (PROBIOTIC-10 PO) Take by mouth      Turmeric 500 MG CAPS Take by mouth      SELENIUM ER PO Take by mouth      Misc Natural Products (OSTEO BI-FLEX JOINT SHIELD PO) Take by mouth      hydrOXYzine (ATARAX) 25 MG tablet Take 1 tablet by mouth 3 times daily as needed for Itching 90 tablet 3    Lido-Capsaicin-Men-Methyl Sal (MEDI-PATCH RX) 0.5-0.035-5-20 % PTCH Apply 1 patch topically daily (Patient not taking: Reported on 11/22/2019) 5 patch 0    capecitabine (XELODA) 150 MG chemo tablet Take 1 tablet twice a day for 14 days, 7 days off as directed with 500 mg. A total of 1150 mg twice a day 28 tablet 5    gabapentin (NEURONTIN) 300 MG capsule Take 300 mg by mouth 2 times daily.  levofloxacin (LEVAQUIN) 500 MG tablet Take 500 mg by mouth daily      minocycline (MINOCIN;DYNACIN) 100 MG capsule Take 100 mg by mouth daily      montelukast (SINGULAIR) 10 MG tablet Take 10 mg by mouth nightly      traMADol (ULTRAM) 50 MG tablet Take 50 mg by mouth every 6 hours as needed for Pain.  HYDROcodone-acetaminophen (NORCO)  MG per tablet Take 1 tablet by mouth every 4 hours as needed for Pain. Doloris Chanute Ferrous Sulfate (IRON) 325 (65 FE) MG TABS Take by mouth      Multiple Vitamin (MULTI VITAMIN DAILY PO) Take by mouth       No current facility-administered medications for this visit. REVIEW OF SYSTEMS:    Constitutional: no fever, no night sweats, fatigue; weight loss  HEENT: no blurring of vision, no double vision, no hearing difficulty, no tinnitus,no ulceration, no dental caries, no dysphagia, epistaxis  Lungs: no cough, no shortness of breath, no wheeze;   CVS: no palpitation, no chest pain, no shortness of breath;  GI: no abdominal pain, no nausea , no vomiting, no constipation; diarrhea grade 1  ORLIN: no dysuria, frequency and urgency, no hematuria, no kidney stones;   Musculoskeletal: no joint pain, swelling , stiffness;   Endocrine: no polyuria, polydypsia, no cold or heat intolerence; Hematology/lymphatic: no easy brusing or bleeding, no hx of clotting disorder; no peripheral adenopathy. Dermatology: skin lesions right and left breast, no eczema, no pruritis;   Psychiatry: no depression, no anxiety,no panic attacks, no suicide ideation;    Neurology: no syncope, no seizures, no numbness or tingling of hands,  numbness and tingling of feet, no paresis;     PHYSICAL EXAM:    [facility] 02/29/2016 (Approximate)   BP: 120/66 mmhg, heart rate 96, temperature 97.8    Pain scale:    CONSTITUTIONAL: Alert, appropriate, no acute distress, chronically ill-appearing  EYES: Non icteric, EOM intact, pupils equal round and reactive to light and accommodation. ENT: Oral mucus membranes moist, no oral pharyngeal lesions. External inspection of ears and nose are normal.   NECK: Supple, no masses. No palpable thyroid mass    CHEST/LUNGS: CTA bilaterally, normal respiratory effort   CARDIOVASCULAR: RRR, no murmurs. No lower extremity edema   ABDOMEN: soft non-tender, active bowel sounds, no hepatosplenomegaly. No palpable masses. EXTREMITIES: warm, Full ROM of all fours extremities. No focal weakness. SKIN: warm, widespread cutaneous metastasis right breast and left upper chest  LYMPH: No cervical, clavicular, axillary, or inguinal lymphadenopathy  NEUROLOGIC: follows commands, non focal.   PSYCH: mood and affect appropriate. Alert and oriented to time and place and person. Relevant Lab findings: Labs reviewed with the patient. CBC:  WBC-1.47  HGB-8.8  PLT-194k  Neut-0.69      3/13/2020  CEA- 3.2  CA 19.9- 13.3  CA 15.3-189.1      Relevant Imaging studies: Not applicable  No results found. ASSESSMENT:    No orders of the defined types were placed in this encounter. [employer] was seen today for cancer. Diagnoses and all orders for this visit:    Malignant neoplasm of upper-outer quadrant of left female breast, unspecified estrogen receptor status ([address]    Cancer associated pain    HER2-positive carcinoma of breast [facility])    Care plan discussed with patient    Adverse effect of chemotherapy, subsequent encounter    Chemotherapy induced neutropenia ([address]    Toxicity, late effect, due to drug, medicine, or biological substance    Encounter for antineoplastic immunotherapy    Epistaxis         Metastatic HER-2 oseas breast cancer   Essentially disease progression on Xeloda and [place].     Fam-trastuzumab deruxtecan 5.4 mg/kg every 3 weeks until disease this documentation as scribed by Rheba Habermann, MA in my presence and is both accurate and complete. Over 50% of the total visit time of 25 minutes in face to face encounter with the patient, out of which more than 50% of the time was spent in counseling patient or family and coordination of care. Counseling included but was not limited to time spent reviewing labs, imaging studies/ treatment plan and answering questions.

## 2020-03-23 RX ORDER — FENTANYL 100 UG/H
1 PATCH TRANSDERMAL
Qty: 10 PATCH | Refills: 0 | Status: SHIPPED | OUTPATIENT
Start: 2020-03-23 | End: 2020-04-20

## 2020-03-23 RX ORDER — FENTANYL 50 UG/H
1 PATCH TRANSDERMAL
Qty: 10 PATCH | Refills: 0 | Status: SHIPPED | OUTPATIENT
Start: 2020-03-23 | End: 2020-04-20

## 2020-03-26 NOTE — PROGRESS NOTES
Laura Mueller   1965   3/27/2020     Chief Complaint   Patient presents with    Cancer     breast cancer      Interval history/history of present illness:  Diagnosis   IDC left breast, March 2016   Grade 2. ER 1%, TN 6%, HER-2/oseas IHC 3+/FISH amplified ratio 6.7, AR 95%. 01/30/2017- Biopsy-proven in breast relapse/Inflammatory breast cancer. ER negative, TN negative, HER-2/oseas IHC 3+/FISH amplified ratio 4.5, Ki-67 32%. [facility] - Revealed no clinically significant mutation. PTEN VUS. Treatment summary  Neoadjuvant adjuvant chemotherapy TCH-P ×6 cycles. She declined to complete 1 year of Herceptin. She declined radiation therapy. 10/3/2016-left lumpectomy/sentinel lymph node   01/30/2017- Biopsy-proven in breast relapse/Inflammatory breast cancer. 5/11/2017-Chemotherapy with paclitaxel/carboplatin weekly. Herceptin/Pertuzumab every 3 weeks started 05/11/2017. She had a allergic reaction to carboplatin and this was discontinued. 7/21/2017- 9/1/2017 Neuropathy grade 2 with Taxol. Therefore, switched to Taxotere/Herceptin/Pertuzumab. Discontinued due to progression of disease. 9/22/2017 through 3/2/2018Bennye Lola every 3 weeks. (stopped as per patient request, neuropathy grade 1)   7/10/2018 through 11/30/2018- Kadcyla.    1/4/2019-Navelbine x 1 cycle with very poor tolerance (pain)   1/25/2018-Gemzar/Herceptin   3/15/2019- low-dose Cisplatin added to Gemzar and Herceptin regimen, regimen changed to every other week   4/17/2019- palliative radiation to left clavicle/chest wall for anticipate total dosing of 5040 cGy   6/22/2019- Xeloda/apatinib  January 2020-palliative radiation to the right breast.  1/10/2020- palliative treatment with Fam-Trastuzumab  Tumor target  Skin chest wall right upper chest/right breast       Interval history  Kari Casas is a pleasant 47years old female who has a diagnosis of recurrent HER-2 positive/TN negative breast cancer with extensive skin involvement/chest wall Alexandr Carlos and explained about her tumor recurrence. Unfortunately, she declined any further intervention at that time. 4/21/2017- she asked Dr. Alexandr Carlos for a referral to us.   4/28/2017-referred to me for further evaluation. I recommended a PET scan and neoadjuvant chemotherapy with carboplatin/paclitaxel and Perjeta and Herceptin. 5/4/2017-PET/CT scan revealed abnormal metabolic activity present in multiple regions throughout the left breast and in the skin( SUV 8.9, 7.4, 7.3). In the left supraclavicular region a cluster of lymph nodes present (SUV of 3). Axillary lymph nodes with SUV of 2.8. Tonsils bilaterally with SUV 5.6 (symmetric). No abnormal metabolic activity in the intrathoracic or intra-abdominal pelvic region. No abnormal skeletal metabolic activity. I recommended weekly carboplatin/weekly Taxol/Herceptin and Pertuzumab.   5/26/2017-she had a severe allergic reaction after 2 doses of carboplatin and therefore this was discontinued. She was continued only on Taxol weekly/Herceptin and Pertuzumab.   7/21/2017-after cycle #2 and 8 doses of Taxol she developed neuropathy related to chemotherapy and therefore chemotherapy was switched from Taxol to Taxotere. Genetic assessment- [facility] - revealed no clinically significant mutation. PTEN VUS.   9/1/2017-local in breast disease progression with increasing breast erythema. Treatment switched to Kadcyla. 12/27/2017- repeat re-staging CT scan of the abdomen and pelvis documented no evidence of intra-abdominal pelvic metastasis. Superior left renal cyst. Mild hepatic steatosis. CT scan of the chest documented no evidence of intrathoracic metastasis. Bone scan documented no evidence of bony metastatic disease. 3/2/2018-after 8 cycles of Kadcyla she requested treatment to be on hold. She has requested her PCP a referral to [facility] for a second opinion. She has neuropathy grade 1.   5/2/2018- 2 months off treatment (Kadcyla).  She has not yet been seen at Father     Hypertension Mother         Current Outpatient Medications   Medication Sig Dispense Refill    fentaNYL (DURAGESIC) 50 MCG/HR Place 1 patch onto the skin every 72 hours for 30 days. 10 patch 0    fentaNYL (DURAGESIC) 100 MCG/HR Place 1 patch onto the skin every 72 hours for 30 days. 10 patch 0    oxymetazoline (12 HOUR NASAL SPRAY) 0.05 % nasal spray 2 sprays by Nasal route 2 times daily as needed for Congestion 1 Bottle 3    fluconazole (DIFLUCAN) 150 MG tablet Take 1 tablet every 72 hours x 3 doses 3 tablet 0    Magic Mouthwash (MIRACLE MOUTHWASH) Swish and spit 5 mLs 4 times daily as needed for Irritation 240 mL 3    ondansetron (ZOFRAN ODT) 4 MG disintegrating tablet Take 2 tablets by mouth every 8 hours as needed for Nausea or Vomiting 30 tablet 3    pantoprazole (PROTONIX) 40 MG tablet Take 1 tablet by mouth daily 30 tablet 2    APPLE CIDER VINEGAR PO Take by mouth      Probiotic Product (PROBIOTIC-10 PO) Take by mouth      Turmeric 500 MG CAPS Take by mouth      SELENIUM ER PO Take by mouth      Misc Natural Products (OSTEO BI-FLEX JOINT SHIELD PO) Take by mouth      hydrOXYzine (ATARAX) 25 MG tablet Take 1 tablet by mouth 3 times daily as needed for Itching 90 tablet 3    Lido-Capsaicin-Men-Methyl Sal (MEDI-PATCH RX) 0.5-0.035-5-20 % PTCH Apply 1 patch topically daily (Patient not taking: Reported on 11/22/2019) 5 patch 0    capecitabine (XELODA) 150 MG chemo tablet Take 1 tablet twice a day for 14 days, 7 days off as directed with 500 mg. A total of 1150 mg twice a day 28 tablet 5    gabapentin (NEURONTIN) 300 MG capsule Take 300 mg by mouth 2 times daily.  levofloxacin (LEVAQUIN) 500 MG tablet Take 500 mg by mouth daily      minocycline (MINOCIN;DYNACIN) 100 MG capsule Take 100 mg by mouth daily      montelukast (SINGULAIR) 10 MG tablet Take 10 mg by mouth nightly      traMADol (ULTRAM) 50 MG tablet Take 50 mg by mouth every 6 hours as needed for Pain.       HYDROcodone-acetaminophen (NORCO)  MG per tablet Take 1 tablet by mouth every 4 hours as needed for Pain. [place] [employer] Ferrous Sulfate (IRON) 325 (65 FE) MG TABS Take by mouth      Multiple Vitamin (MULTI VITAMIN DAILY PO) Take by mouth       No current facility-administered medications for this visit. REVIEW OF SYSTEMS:    Constitutional: no fever, no night sweats, fatigue; weight loss, pain chest wall. HEENT: no blurring of vision, no double vision, no hearing difficulty, no tinnitus,no ulceration, no dental caries, no dysphagia, epistaxis  Lungs: no cough, no shortness of breath, no wheeze;   CVS: no palpitation, no chest pain, no shortness of breath;  GI: no abdominal pain, no nausea , no vomiting, no constipation; diarrhea grade 1  ORLIN: no dysuria, frequency and urgency, no hematuria, no kidney stones;   Musculoskeletal: no joint pain, swelling , stiffness;   Endocrine: no polyuria, polydypsia, no cold or heat intolerence; Hematology/lymphatic: no easy brusing or bleeding, no hx of clotting disorder; no peripheral adenopathy. Dermatology: skin lesions right and left breast, no eczema, no pruritis;   Psychiatry: no depression, no anxiety,no panic attacks, no suicide ideation; Neurology: no syncope, no seizures, no numbness or tingling of hands,  numbness and tingling of feet, no paresis;     PHYSICAL EXAM:    LMP 02/29/2016 (Approximate)   BP: 120/66 mmhg, heart rate 96, temperature 97.8  LMP 02/29/2016 (Approximate)    BP (!) 163/94   Pulse 78   Temp 98.4 °F (36.9 °C)   Wt 131 lb (59.4 kg)   LMP 02/29/2016 (Approximate)   BMI 23.21 kg/m²     Pain scale:4    CONSTITUTIONAL: Alert, appropriate, no acute distress, chronically ill-appearing  EYES: Non icteric, EOM intact, pupils equal round and reactive to light and accommodation. ENT: Oral mucus membranes moist, no oral pharyngeal lesions. External inspection of ears and nose are normal.   NECK: Supple, no masses.  No palpable thyroid mass CHEST/LUNGS: CTA bilaterally, normal respiratory effort   CARDIOVASCULAR: RRR, no murmurs. No lower extremity edema   ABDOMEN: soft non-tender, active bowel sounds, no hepatosplenomegaly. No palpable masses. EXTREMITIES: warm, Full ROM of all fours extremities. No focal weakness. SKIN: warm, widespread cutaneous metastasis right breast and left upper chest  LYMPH: No cervical, clavicular, axillary, or inguinal lymphadenopathy  NEUROLOGIC: follows commands, non focal.   PSYCH: mood and affect appropriate. Alert and oriented to time and place and person. Relevant Lab findings: Labs reviewed with the patient. CBC:  WBC-2.03  HGB-9.8  PLT-194K  Neut-1.15      Relevant Imaging studies: Not applicable  No results found. ASSESSMENT:    No orders of the defined types were placed in this encounter. Gab Canseco was seen today for cancer. Diagnoses and all orders for this visit:    Malignant neoplasm of upper-outer quadrant of left female breast, unspecified estrogen receptor status ([address]    Cancer associated pain    HER2-positive carcinoma of breast ([address] [address] [address]    Care plan discussed with patient    Adverse effect of chemotherapy, subsequent encounter    Chemotherapy induced neutropenia ([address]    Toxicity, late effect, due to drug, medicine, or biological substance         Metastatic HER-2 oseas breast cancer   Essentially disease progression on Xeloda and Tykerb. Fam-trastuzumab deruxtecan 5.4 mg/kg every 3 weeks until disease progression or intolerable side effects. 20% dose reduction Fam-trastuzumab deruxtecan 4.3 mg/kg today. Chemotherapy-induced neutropenia-ANC 1.15. Proceed with treatement today. Painful skin metastasis - status post palliative RT with significant improvement. Cancer related pain-  Continue Morphine IR prn and Fentanyl 150mcg q 72h. 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 She denies any significant change from previous evaluation.     Summary of

## 2020-03-27 ENCOUNTER — HOSPITAL ENCOUNTER (OUTPATIENT)
Dept: INFUSION THERAPY | Age: 55
Discharge: HOME | End: 2020-03-27
Payer: COMMERCIAL

## 2020-03-27 ENCOUNTER — OFFICE VISIT (OUTPATIENT)
Dept: HEMATOLOGY | Age: 55
End: 2020-03-27
Payer: COMMERCIAL

## 2020-03-27 VITALS
DIASTOLIC BLOOD PRESSURE: 94 MMHG | SYSTOLIC BLOOD PRESSURE: 163 MMHG | WEIGHT: 131 LBS | TEMPERATURE: 98.4 F | BODY MASS INDEX: 23.21 KG/M2 | HEART RATE: 78 BPM

## 2020-03-27 VITALS — WEIGHT: 131.3 LBS | BODY MASS INDEX: 23.27 KG/M2 | HEART RATE: 83 BPM | OXYGEN SATURATION: 98 % | HEIGHT: 63 IN

## 2020-03-27 DIAGNOSIS — C50.412 MALIGNANT NEOPLASM OF UPPER-OUTER QUADRANT OF LEFT FEMALE BREAST, UNSPECIFIED ESTROGEN RECEPTOR STATUS (HCC): ICD-10-CM

## 2020-03-27 DIAGNOSIS — C50.919 HER2-POSITIVE CARCINOMA OF BREAST (HCC): Primary | ICD-10-CM

## 2020-03-27 PROCEDURE — 99211 OFF/OP EST MAY X REQ PHY/QHP: CPT

## 2020-03-27 PROCEDURE — 2580000003 HC RX 258

## 2020-03-27 PROCEDURE — 99214 OFFICE O/P EST MOD 30 MIN: CPT | Performed by: INTERNAL MEDICINE

## 2020-03-27 PROCEDURE — 2580000003 HC RX 258: Performed by: INTERNAL MEDICINE

## 2020-03-27 PROCEDURE — 2500000003 HC RX 250 WO HCPCS: Performed by: INTERNAL MEDICINE

## 2020-03-27 PROCEDURE — 96375 TX/PRO/DX INJ NEW DRUG ADDON: CPT

## 2020-03-27 PROCEDURE — 96374 THER/PROPH/DIAG INJ IV PUSH: CPT

## 2020-03-27 PROCEDURE — 85025 COMPLETE CBC W/AUTO DIFF WBC: CPT

## 2020-03-27 PROCEDURE — 6360000002 HC RX W HCPCS: Performed by: INTERNAL MEDICINE

## 2020-03-27 PROCEDURE — 6360000002 HC RX W HCPCS

## 2020-03-27 PROCEDURE — 96413 CHEMO IV INFUSION 1 HR: CPT

## 2020-03-27 RX ORDER — PALONOSETRON 0.05 MG/ML
0.25 INJECTION, SOLUTION INTRAVENOUS ONCE
Status: COMPLETED | OUTPATIENT
Start: 2020-03-27 | End: 2020-03-27

## 2020-03-27 RX ORDER — MEPERIDINE HYDROCHLORIDE 50 MG/ML
12.5 INJECTION INTRAMUSCULAR; INTRAVENOUS; SUBCUTANEOUS ONCE
Status: CANCELLED | OUTPATIENT
Start: 2020-03-27

## 2020-03-27 RX ORDER — HEPARIN SODIUM (PORCINE) LOCK FLUSH IV SOLN 100 UNIT/ML 100 UNIT/ML
500 SOLUTION INTRAVENOUS PRN
Status: CANCELLED
Start: 2020-03-27

## 2020-03-27 RX ORDER — SODIUM CHLORIDE 0.9 % (FLUSH) 0.9 %
10 SYRINGE (ML) INJECTION PRN
Status: DISCONTINUED | OUTPATIENT
Start: 2020-03-27 | End: 2020-03-28 | Stop reason: HOSPADM

## 2020-03-27 RX ORDER — SODIUM CHLORIDE 9 MG/ML
20 INJECTION, SOLUTION INTRAVENOUS ONCE
Status: CANCELLED | OUTPATIENT
Start: 2020-03-27

## 2020-03-27 RX ORDER — DEXAMETHASONE SODIUM PHOSPHATE 10 MG/ML
10 INJECTION, SOLUTION INTRAMUSCULAR; INTRAVENOUS ONCE
Status: COMPLETED | OUTPATIENT
Start: 2020-03-27 | End: 2020-03-27

## 2020-03-27 RX ORDER — SODIUM CHLORIDE 9 MG/ML
INJECTION, SOLUTION INTRAVENOUS CONTINUOUS
Status: CANCELLED | OUTPATIENT
Start: 2020-03-27

## 2020-03-27 RX ORDER — SODIUM CHLORIDE 0.9 % (FLUSH) 0.9 %
5 SYRINGE (ML) INJECTION PRN
Status: CANCELLED | OUTPATIENT
Start: 2020-03-27

## 2020-03-27 RX ORDER — HEPARIN SODIUM (PORCINE) LOCK FLUSH IV SOLN 100 UNIT/ML 100 UNIT/ML
500 SOLUTION INTRAVENOUS PRN
Status: DISCONTINUED | OUTPATIENT
Start: 2020-03-27 | End: 2020-03-29 | Stop reason: HOSPADM

## 2020-03-27 RX ORDER — PALONOSETRON 0.05 MG/ML
0.25 INJECTION, SOLUTION INTRAVENOUS ONCE
Status: CANCELLED | OUTPATIENT
Start: 2020-03-27

## 2020-03-27 RX ORDER — DIPHENHYDRAMINE HYDROCHLORIDE 50 MG/ML
50 INJECTION INTRAMUSCULAR; INTRAVENOUS ONCE
Status: CANCELLED | OUTPATIENT
Start: 2020-03-27

## 2020-03-27 RX ORDER — METHYLPREDNISOLONE SODIUM SUCCINATE 125 MG/2ML
125 INJECTION, POWDER, LYOPHILIZED, FOR SOLUTION INTRAMUSCULAR; INTRAVENOUS ONCE
Status: CANCELLED | OUTPATIENT
Start: 2020-03-27

## 2020-03-27 RX ORDER — SODIUM CHLORIDE 0.9 % (FLUSH) 0.9 %
5 SYRINGE (ML) INJECTION PRN
Status: DISCONTINUED | OUTPATIENT
Start: 2020-03-27 | End: 2020-03-28 | Stop reason: HOSPADM

## 2020-03-27 RX ORDER — EPINEPHRINE 1 MG/ML
0.3 INJECTION, SOLUTION, CONCENTRATE INTRAVENOUS PRN
Status: CANCELLED | OUTPATIENT
Start: 2020-03-27

## 2020-03-27 RX ORDER — SODIUM CHLORIDE 0.9 % (FLUSH) 0.9 %
10 SYRINGE (ML) INJECTION PRN
Status: CANCELLED | OUTPATIENT
Start: 2020-03-27

## 2020-03-27 RX ADMIN — DEXAMETHASONE SODIUM PHOSPHATE 10 MG: 10 INJECTION, SOLUTION INTRAMUSCULAR; INTRAVENOUS at 13:50

## 2020-03-27 RX ADMIN — PALONOSETRON 0.25 MG: 0.05 INJECTION, SOLUTION INTRAVENOUS at 13:50

## 2020-03-27 RX ADMIN — FAM-TRASTUZUMAB DERUXTECAN-NXKI 256 MG: 100 INJECTION, POWDER, LYOPHILIZED, FOR SOLUTION INTRAVENOUS at 14:31

## 2020-03-27 RX ADMIN — SODIUM CHLORIDE, PRESERVATIVE FREE 10 ML: 5 INJECTION INTRAVENOUS at 15:03

## 2020-03-27 RX ADMIN — HEPARIN 500 UNITS: 100 SYRINGE at 15:03

## 2020-04-06 ENCOUNTER — HOSPITAL ENCOUNTER (OUTPATIENT)
Dept: INFUSION THERAPY | Age: 55
End: 2020-04-06

## 2020-04-10 ENCOUNTER — HOSPITAL ENCOUNTER (OUTPATIENT)
Dept: INFUSION THERAPY | Age: 55
Discharge: HOME | End: 2020-04-10
Payer: COMMERCIAL

## 2020-04-10 DIAGNOSIS — C50.412 MALIGNANT NEOPLASM OF UPPER-OUTER QUADRANT OF LEFT FEMALE BREAST, UNSPECIFIED ESTROGEN RECEPTOR STATUS (HCC): ICD-10-CM

## 2020-04-10 PROCEDURE — 85025 COMPLETE CBC W/AUTO DIFF WBC: CPT

## 2020-04-17 ENCOUNTER — HOSPITAL ENCOUNTER (OUTPATIENT)
Dept: INFUSION THERAPY | Age: 55
Discharge: HOME | End: 2020-04-17
Payer: COMMERCIAL

## 2020-04-17 PROCEDURE — 85025 COMPLETE CBC W/AUTO DIFF WBC: CPT

## 2020-04-20 RX ORDER — FENTANYL 25 UG/H
1 PATCH TRANSDERMAL
Qty: 10 PATCH | Refills: 0 | Status: SHIPPED | OUTPATIENT
Start: 2020-04-20 | End: 2020-04-28 | Stop reason: ALTCHOICE

## 2020-04-23 NOTE — PROGRESS NOTES
Rodrigo Mueller   1965 4/28/2020     Chief Complaint   Patient presents with    Breast Cancer     Malignant neoplasm of upper-outer quadrant of left female breast, unspecified estrogen receptor status      Interval history/history of present illness:  Diagnosis   IDC left breast, March 2016   Grade 2. ER 1%, WV 6%, HER-2/oseas IHC 3+/FISH amplified ratio 6.7, AR 95%. 01/30/2017- Biopsy-proven in breast relapse/Inflammatory breast cancer. ER negative, WV negative, HER-2/oseas IHC 3+/FISH amplified ratio 4.5, Ki-67 32%. [facility] - Revealed no clinically significant mutation. PTEN VUS. Treatment summary  Neoadjuvant adjuvant chemotherapy TCH-P ×6 cycles. She declined to complete 1 year of Herceptin. She declined radiation therapy. 10/3/2016-left lumpectomy/sentinel lymph node   01/30/2017- Biopsy-proven in breast relapse/Inflammatory breast cancer. 5/11/2017-Chemotherapy with paclitaxel/carboplatin weekly. Herceptin/Pertuzumab every 3 weeks started 05/11/2017. She had a allergic reaction to carboplatin and this was discontinued. 7/21/2017- 9/1/2017 Neuropathy grade 2 with Taxol. Therefore, switched to Taxotere/Herceptin/Pertuzumab. Discontinued due to progression of disease. 9/22/2017 through 3/2/2018Idelia Aspen every 3 weeks. (stopped as per patient request, neuropathy grade 1)   7/10/2018 through 11/30/2018- Kadcyla.    1/4/2019-Navelbine x 1 cycle with very poor tolerance (pain)   1/25/2018-Gemzar/Herceptin   3/15/2019- low-dose Cisplatin added to Gemzar and Herceptin regimen, regimen changed to every other week   4/17/2019- palliative radiation to left clavicle/chest wall for anticipate total dosing of 5040 cGy   6/22/2019- Xeloda/apatinib  January 2020-palliative radiation to the right breast.  1/10/2020- palliative treatment with Fam-Trastuzumab  Tumor target  Skin chest wall right upper chest/right breast     Interval history  The patient is a 47years old female who has a diagnosis of recurrent HER-2 positive/ID negative breast cancer with extensive skin involvement/chest wall involvement. The patient is currently receiving therapy with Fam-trastuzumab with poor tolerance due to chemotherapy-induced neutropenia and severe fatigue that lasted for about 2 weeks. Her dose was reduced by 20% last visit. The patient she has noticed a significant improvement of her skin lesions. She is overall very upset and frustrated with treatment. She says today that \"her body cannot take another hit from chemotherapy \". Cancer history- Left breast recurrence with Inflammatory Breast Cancer ER 8%/ID negative &HER-2 positive  Ms Pratt was seen in initial oncology consultation on 4/20/2017 referred for a diagnosis of a left breast recurrence with inflammatory breast cancer. She was initially treated by Dr. John Hardy at [facility]. Prior breast cancer history is as follows:  3/22/2016-a diagnostic mammogram for a palpable mass showed a left breast abnormal findings. 4/1/2017- ultrasound-guided core needle biopsy was consistent with invasive ductal carcinoma, grade 2. ER 1%, ID 6%, HER-2/oseas IHC 3+/FISH amplified ratio 6.7., AR 95%. She underwent 6 cycles of TCH-P from April 2016 through September 2016 with G-CSF support. She had a total of 18 weeks of trastuzumab treatment. 10/3/2016-she underwent a left lumpectomy with left sentinel lymph node revealing an invasive ductal carcinoma, grade 3. Margins clear of invasive carcinoma (1.1 cm from the lateral margin), extensive lymphovascular space invasion. 2 out of 3 sentinel lymph nodes positive for metastatic carcinoma. Final pathologic staging [id number])M0.    She declined adjuvant radiation therapy and adjuvant Herceptin completion.   -------------------In-breast Recurrence [id number]---------------------------  1/30/2017- she was seen by Dr. Charli Mora with new complaints of erythema in the left breast. A skin punch biopsy was consistent with Diagnosis Date    Cancer [facility])     breast    GERD (gastroesophageal reflux disease)         Past surgical history:  Past Surgical History:   Procedure Laterality Date    BREAST LUMPECTOMY Left 10/4/2016    LUMPECTOMY WITH SENTINAL NODE BIOPSY AND INTRA OP US GUIDED NEEDLE LOCALIZATION performed by Rhianna Quintero MD at [address] [place] [address]      biopsy left breast    COLONOSCOPY  2008    INSERTION / REMOVAL / REPLACEMENT VENOUS ACCESS CATHETER N/A 5/12/2016    SINGLE LUMEN PORT INSERTION performed by Rhianna Quintero MD at [address] [place] [address] DIAGNOSTIC BONE MARROW BIOPSIES Right 11/14/2018    BONE MARROW ASPIRATION BIOPSY performed by Deedee Preston MD at [facility]        Social history:  Social History     Socioeconomic History    Marital status:      Spouse name: Not on file    Number of children: Not on file    Years of education: Not on file    Highest education level: Not on file   Occupational History    Not on file   Social Needs    Financial resource strain: Not on file    Food insecurity     Worry: Not on file     Inability: Not on file    Transportation needs     Medical: Not on file     Non-medical: Not on file   Tobacco Use    Smoking status: Never Smoker    Smokeless tobacco: Never Used   Substance and Sexual Activity    Alcohol use: No     Alcohol/week: 0.0 standard drinks    Drug use: No    Sexual activity: Not on file   Lifestyle    Physical activity     Days per week: Not on file     Minutes per session: Not on file    Stress: Not on file   Relationships    Social connections     Talks on phone: Not on file     Gets together: Not on file     Attends Scientologist service: Not on file     Active member of club or organization: Not on file     Attends meetings of clubs or organizations: Not on file     Relationship status: Not on file    Intimate partner violence     Fear of current or ex partner: Not on file     Emotionally abused: Not on file     Physically abused: Not on file     Forced sexual activity: Not on file   Other Topics Concern    Not on file   Social History Narrative    Not on file        Family history:   Family History   Problem Relation Age of Onset    Lung Cancer Father     Colon Cancer Father     Prostate Cancer Father     Hypertension Mother         Current Outpatient Medications   Medication Sig Dispense Refill    Magic Mouthwash (MIRACLE MOUTHWASH) Swish and spit 5 mLs 4 times daily as needed for Irritation 240 mL 3    ondansetron (ZOFRAN ODT) 4 MG disintegrating tablet Take 2 tablets by mouth every 8 hours as needed for Nausea or Vomiting 30 tablet 3    pantoprazole (PROTONIX) 40 MG tablet Take 1 tablet by mouth daily 30 tablet 2    APPLE CIDER VINEGAR PO Take by mouth      Probiotic Product (PROBIOTIC-10 PO) Take by mouth      Turmeric 500 MG CAPS Take by mouth      SELENIUM ER PO Take by mouth      Misc Natural Products (OSTEO BI-FLEX JOINT SHIELD PO) Take by mouth      hydrOXYzine (ATARAX) 25 MG tablet Take 1 tablet by mouth 3 times daily as needed for Itching 90 tablet 3    capecitabine (XELODA) 150 MG chemo tablet Take 1 tablet twice a day for 14 days, 7 days off as directed with 500 mg. A total of 1150 mg twice a day 28 tablet 5    gabapentin (NEURONTIN) 300 MG capsule Take 300 mg by mouth 2 times daily.  levofloxacin (LEVAQUIN) 500 MG tablet Take 500 mg by mouth daily      minocycline (MINOCIN;DYNACIN) 100 MG capsule Take 100 mg by mouth daily      montelukast (SINGULAIR) 10 MG tablet Take 10 mg by mouth nightly      traMADol (ULTRAM) 50 MG tablet Take 50 mg by mouth every 6 hours as needed for Pain.  HYDROcodone-acetaminophen (NORCO)  MG per tablet Take 1 tablet by mouth every 4 hours as needed for Pain. Marleni Zarco Ferrous Sulfate (IRON) 325 (65 FE) MG TABS Take by mouth      Multiple Vitamin (MULTI VITAMIN DAILY PO) Take by mouth       No current facility-administered medications for this visit. status ([facility] [address]  -     Cancer Antigen 15-3; Future  -     Cancer Antigen 27.29; Future  -     CEA; Future  -     Comprehensive Metabolic Panel; Future  -     CT ABDOMEN W CONTRAST; Future  -     CT Chest W Contrast; Future    Cancer associated pain    HER2-positive carcinoma of breast [facility])    Care plan discussed with patient    Adverse effect of chemotherapy, subsequent encounter    Chemotherapy induced neutropenia (HCC)    Toxicity, late effect, due to drug, medicine, or biological substance    Encounter for antineoplastic immunotherapy    Chemotherapy-induced neutropenia (HCC)    Chronic fatigue         Metastatic HER-2 oseas breast cancer   Essentially disease progression on Xeloda and Tykerb. Fam-trastuzumab deruxtecan 5.4 mg/kg every 3 weeks until disease progression or intolerable side effects. 20% dose reduction Fam-trastuzumab deruxtecan 4.3 mg/kg today. Due to increased toxicity and poor quality of life the patient request to stop treatment at this time. We will repeat CT scans in 5 weeks and see her back in 6 weeks. Chemotherapy-induced neutropenia-ANC 1.5. Painful skin metastasis - status post palliative RT with significant improvement. Fatigue-related to chemotherapy. Cancer related pain-  Continue Morphine IR prn and Fentanyl 150mcg q 72h. 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 She denies any significant change from previous evaluation. Summary of plan   1. Will hold treatment as per patient request.  2. RTC MD in 6 weeks chest abdomen pelvis. 3. CT chest abdomen pelvis. 4. Baseline cancer markers today  5. CMP  6. Continue opioids for pain control  7. Continue Fentanyl patch  8. [place]      I have seen, examined and reviewed this patient medication list, appropriate labs and imaging studies. I reviewed relevant medical records and others physicians notes. I discussed the plans of care with the patient.  I answered all the questions to

## 2020-04-24 ENCOUNTER — TELEPHONE (OUTPATIENT)
Dept: HEMATOLOGY | Age: 55
End: 2020-04-24

## 2020-04-24 NOTE — TELEPHONE ENCOUNTER
HAD TO RESCHEDULE APPOINTMENT DID NOT HAVE A RIDE TO COME IN TODAY.  RESCHEDULED FOR 04/28/2020 @ 1:00 FOR TX

## 2020-04-28 ENCOUNTER — OFFICE VISIT (OUTPATIENT)
Dept: HEMATOLOGY | Age: 55
End: 2020-04-28
Payer: COMMERCIAL

## 2020-04-28 ENCOUNTER — HOSPITAL ENCOUNTER (OUTPATIENT)
Dept: INFUSION THERAPY | Age: 55
Discharge: HOME | End: 2020-04-28
Payer: COMMERCIAL

## 2020-04-28 VITALS
DIASTOLIC BLOOD PRESSURE: 74 MMHG | WEIGHT: 125 LBS | BODY MASS INDEX: 22.14 KG/M2 | OXYGEN SATURATION: 99 % | TEMPERATURE: 98.6 F | SYSTOLIC BLOOD PRESSURE: 126 MMHG | HEART RATE: 86 BPM

## 2020-04-28 DIAGNOSIS — Z45.2 ENCOUNTER FOR CENTRAL LINE CARE: Primary | ICD-10-CM

## 2020-04-28 DIAGNOSIS — C50.412 MALIGNANT NEOPLASM OF UPPER-OUTER QUADRANT OF LEFT FEMALE BREAST, UNSPECIFIED ESTROGEN RECEPTOR STATUS (HCC): ICD-10-CM

## 2020-04-28 PROCEDURE — 6360000002 HC RX W HCPCS: Performed by: INTERNAL MEDICINE

## 2020-04-28 PROCEDURE — 96523 IRRIG DRUG DELIVERY DEVICE: CPT

## 2020-04-28 PROCEDURE — 2580000003 HC RX 258: Performed by: INTERNAL MEDICINE

## 2020-04-28 PROCEDURE — 99215 OFFICE O/P EST HI 40 MIN: CPT | Performed by: INTERNAL MEDICINE

## 2020-04-28 RX ORDER — HEPARIN SODIUM (PORCINE) LOCK FLUSH IV SOLN 100 UNIT/ML 100 UNIT/ML
500 SOLUTION INTRAVENOUS PRN
Status: CANCELLED | OUTPATIENT
Start: 2020-04-28

## 2020-04-28 RX ORDER — SODIUM CHLORIDE 0.9 % (FLUSH) 0.9 %
20 SYRINGE (ML) INJECTION PRN
Status: CANCELLED | OUTPATIENT
Start: 2020-04-28

## 2020-04-28 RX ORDER — SODIUM CHLORIDE 0.9 % (FLUSH) 0.9 %
10 SYRINGE (ML) INJECTION PRN
Status: DISCONTINUED | OUTPATIENT
Start: 2020-04-28 | End: 2020-04-29 | Stop reason: HOSPADM

## 2020-04-28 RX ORDER — SODIUM CHLORIDE 0.9 % (FLUSH) 0.9 %
10 SYRINGE (ML) INJECTION PRN
Status: CANCELLED | OUTPATIENT
Start: 2020-04-28

## 2020-04-28 RX ORDER — HEPARIN SODIUM (PORCINE) LOCK FLUSH IV SOLN 100 UNIT/ML 100 UNIT/ML
500 SOLUTION INTRAVENOUS PRN
Status: DISCONTINUED | OUTPATIENT
Start: 2020-04-28 | End: 2020-04-29 | Stop reason: HOSPADM

## 2020-04-28 RX ADMIN — SODIUM CHLORIDE, PRESERVATIVE FREE 10 ML: 5 INJECTION INTRAVENOUS at 14:58

## 2020-04-28 RX ADMIN — HEPARIN 500 UNITS: 100 SYRINGE at 14:58

## 2020-06-01 ENCOUNTER — TELEPHONE (OUTPATIENT)
Dept: INFUSION THERAPY | Age: 55
End: 2020-06-01

## 2020-06-01 ENCOUNTER — HOSPITAL ENCOUNTER (OUTPATIENT)
Dept: INFUSION THERAPY | Age: 55
Discharge: HOME | End: 2020-06-01
Payer: COMMERCIAL

## 2020-06-01 DIAGNOSIS — C50.412 MALIGNANT NEOPLASM OF UPPER-OUTER QUADRANT OF LEFT FEMALE BREAST, UNSPECIFIED ESTROGEN RECEPTOR STATUS (HCC): ICD-10-CM

## 2020-06-01 LAB
BASOPHILS ABSOLUTE: 0.02 K/UL (ref 0.01–0.08)
BASOPHILS RELATIVE PERCENT: 0.6 % (ref 0.1–1.2)
EOSINOPHILS ABSOLUTE: 0.05 K/UL (ref 0.04–0.54)
EOSINOPHILS RELATIVE PERCENT: 1.6 % (ref 0.7–7)
HCT VFR BLD CALC: 36.7 % (ref 34.1–44.9)
HEMOGLOBIN: 11.1 G/DL (ref 11.2–15.7)
LYMPHOCYTES ABSOLUTE: 0.83 K/UL (ref 1.18–3.74)
LYMPHOCYTES RELATIVE PERCENT: 26.2 % (ref 19.3–53.1)
MCH RBC QN AUTO: 26.6 PG (ref 25.6–32.2)
MCHC RBC AUTO-ENTMCNC: 30.2 G/DL (ref 32.3–35.5)
MCV RBC AUTO: 88 FL (ref 79.4–94.8)
MONOCYTES ABSOLUTE: 0.77 K/UL (ref 0.24–0.82)
MONOCYTES RELATIVE PERCENT: 24.3 % (ref 4.7–12.5)
NEUTROPHILS ABSOLUTE: 1.5 K/UL (ref 1.56–6.13)
NEUTROPHILS RELATIVE PERCENT: 47.3 % (ref 34–71.1)
PDW BLD-RTO: 18.2 % (ref 11.7–14.4)
PLATELET # BLD: 224 K/UL (ref 182–369)
PMV BLD AUTO: 9.8 FL (ref 7.4–10.4)
RBC # BLD: 4.17 M/UL (ref 3.93–5.22)
WBC # BLD: 3.17 K/UL (ref 3.98–10.04)

## 2020-06-01 PROCEDURE — 85025 COMPLETE CBC W/AUTO DIFF WBC: CPT

## 2020-06-05 ENCOUNTER — HOSPITAL ENCOUNTER (OUTPATIENT)
Dept: CT IMAGING | Age: 55
Discharge: HOME | End: 2020-06-05
Payer: COMMERCIAL

## 2020-06-05 PROCEDURE — 71260 CT THORAX DX C+: CPT

## 2020-06-05 PROCEDURE — 6360000004 HC RX CONTRAST MEDICATION: Performed by: INTERNAL MEDICINE

## 2020-06-05 PROCEDURE — 74177 CT ABD & PELVIS W/CONTRAST: CPT

## 2020-06-05 RX ADMIN — IOPAMIDOL 90 ML: 755 INJECTION, SOLUTION INTRAVENOUS at 11:55

## 2020-06-12 ENCOUNTER — OFFICE VISIT (OUTPATIENT)
Dept: HEMATOLOGY | Age: 55
End: 2020-06-12
Payer: COMMERCIAL

## 2020-06-12 ENCOUNTER — HOSPITAL ENCOUNTER (OUTPATIENT)
Dept: INFUSION THERAPY | Age: 55
Discharge: HOME | End: 2020-06-12
Payer: COMMERCIAL

## 2020-06-12 VITALS
SYSTOLIC BLOOD PRESSURE: 102 MMHG | BODY MASS INDEX: 22.39 KG/M2 | OXYGEN SATURATION: 98 % | DIASTOLIC BLOOD PRESSURE: 68 MMHG | HEART RATE: 74 BPM | TEMPERATURE: 98.9 F | WEIGHT: 126.4 LBS | HEIGHT: 63 IN

## 2020-06-12 DIAGNOSIS — C50.412 MALIGNANT NEOPLASM OF UPPER-OUTER QUADRANT OF LEFT FEMALE BREAST, UNSPECIFIED ESTROGEN RECEPTOR STATUS (HCC): ICD-10-CM

## 2020-06-12 LAB
BASOPHILS ABSOLUTE: 0.01 K/UL (ref 0.01–0.08)
BASOPHILS RELATIVE PERCENT: 0.4 % (ref 0.1–1.2)
EOSINOPHILS ABSOLUTE: 0.05 K/UL (ref 0.04–0.54)
EOSINOPHILS RELATIVE PERCENT: 1.8 % (ref 0.7–7)
HCT VFR BLD CALC: 33 % (ref 34.1–44.9)
HEMOGLOBIN: 9.9 G/DL (ref 11.2–15.7)
LYMPHOCYTES ABSOLUTE: 0.65 K/UL (ref 1.18–3.74)
LYMPHOCYTES RELATIVE PERCENT: 23.8 % (ref 19.3–53.1)
MCH RBC QN AUTO: 26.3 PG (ref 25.6–32.2)
MCHC RBC AUTO-ENTMCNC: 30 G/DL (ref 32.3–35.5)
MCV RBC AUTO: 87.8 FL (ref 79.4–94.8)
MONOCYTES ABSOLUTE: 0.5 K/UL (ref 0.24–0.82)
MONOCYTES RELATIVE PERCENT: 18.3 % (ref 4.7–12.5)
NEUTROPHILS ABSOLUTE: 1.52 K/UL (ref 1.56–6.13)
NEUTROPHILS RELATIVE PERCENT: 55.7 % (ref 34–71.1)
PDW BLD-RTO: 19.1 % (ref 11.7–14.4)
PLATELET # BLD: 207 K/UL (ref 182–369)
PMV BLD AUTO: 10.3 FL (ref 7.4–10.4)
RBC # BLD: 3.76 M/UL (ref 3.93–5.22)
WBC # BLD: 2.73 K/UL (ref 3.98–10.04)

## 2020-06-12 PROCEDURE — 99211 OFF/OP EST MAY X REQ PHY/QHP: CPT

## 2020-06-12 PROCEDURE — 99214 OFFICE O/P EST MOD 30 MIN: CPT | Performed by: INTERNAL MEDICINE

## 2020-06-12 PROCEDURE — 85025 COMPLETE CBC W/AUTO DIFF WBC: CPT

## 2020-06-12 RX ORDER — CHOLECALCIFEROL (VITAMIN D3) 1250 MCG
CAPSULE ORAL DAILY
COMMUNITY
End: 2021-08-30

## 2020-06-12 NOTE — LETTER
[facility] [place], [address]  [address] [address]  Phone: [phone]  Fax: [phone]    Jadon Del Rosario MD        June 12, 2020     Patient: Betty Mueller   YOB: 1965   Date of Visit: 6/12/2020       To Whom It May Concern: It is my medical opinion that [address] [place] [address] may return to full duty immediately with no restrictions. If you have any questions or concerns, please don't hesitate to call.     Sincerely,        Jadon Del Rosario MD

## 2020-06-12 NOTE — PROGRESS NOTES
metastatic carcinoma. Final pathologic staging [id number])M0. She declined adjuvant radiation therapy and adjuvant Herceptin completion.   -------------------In-breast Recurrence [id number]---------------------------  1/30/2017- she was seen by Dr. Andriy Castillo with new complaints of erythema in the left breast. A skin punch biopsy was consistent with metastatic ductal carcinoma with extensive dermal lymphovascular invasion. ER negative, NE negative, HER-2/oseas IHC 3+/FISH amplified ratio 4.5, Ki-67 32%. Foundation one showed ERBB2 amplification, AURKA amplification, TP53 and .   2/15/2017- she was again seen by Dr. Hanna Wagoner and explained about her tumor recurrence. Unfortunately, she declined any further intervention at that time. 4/21/2017- she asked Dr. Hanna Wagoner for a referral to us.   4/28/2017-referred to me for further evaluation. I recommended a PET scan and neoadjuvant chemotherapy with carboplatin/paclitaxel and Perjeta and Herceptin. 5/4/2017-PET/CT scan revealed abnormal metabolic activity present in multiple regions throughout the left breast and in the skin( SUV 8.9, 7.4, 7.3). In the left supraclavicular region a cluster of lymph nodes present (SUV of 3). Axillary lymph nodes with SUV of 2.8. Tonsils bilaterally with SUV 5.6 (symmetric). No abnormal metabolic activity in the intrathoracic or intra-abdominal pelvic region. No abnormal skeletal metabolic activity. I recommended weekly carboplatin/weekly Taxol/Herceptin and Pertuzumab.   5/26/2017-she had a severe allergic reaction after 2 doses of carboplatin and therefore this was discontinued. She was continued only on Taxol weekly/Herceptin and Pertuzumab.   7/21/2017-after cycle #2 and 8 doses of Taxol she developed neuropathy related to chemotherapy and therefore chemotherapy was switched from Taxol to Taxotere. Genetic assessment- [place] [id number] revealed no clinically significant mutation.  PTEN VUS.   9/1/2017-local in breast disease progression with increasing breast erythema. Treatment switched to Kadcyla. 12/27/2017- repeat re-staging CT scan of the abdomen and pelvis documented no evidence of intra-abdominal pelvic metastasis. Superior left renal cyst. Mild hepatic steatosis. CT scan of the chest documented no evidence of intrathoracic metastasis. Bone scan documented no evidence of bony metastatic disease. 3/2/2018-after 8 cycles of Kadcyla she requested treatment to be on hold. She has requested her PCP a referral to [address] [place] [address] for a second opinion. She has neuropathy grade 1.   5/2/2018- 2 months off treatment (Kadcyla). She has not yet been seen at [address] [place] [address]. Apparently, [facility] is gathering her records and will see her soon. Examination of her chest wall showed recurrent disease in the left upper chest. Residual neuropathy grade 1. She could not tolerate gabapentin, and therefore has stopped it. She was not interested in any other medication for neuropathy at this time. 7/10/2018-after discussion at [facility] she was recommended to reinitiate Kadcyla. 7/13/2018-CT of chest, abdomen, pelvis showed no evidence of intra-abdominal disease. There is an enlarged right-sided level 1 lymph node that was not appreciated previously, measuring 2.5 x 1.4 cm, metastatic disease considered. 7/16/2018-PET CT scan showed diffuse uptake throughout the left breast extending to the left chest wall. Abnormal uptake within the left axilla lymph nodes and left internal mammary lymph nodes concerning for metastatic disease. Enlarged hypermetabolic right axilla lymph node concerning for metastatic disease with SUV 8.0. Abnormal uptake within a right cervical lymph node, etiology unclear, but could be reactive. 10/26/2018 - CT scan of the chest was completed at [facility] and compared to CT scan of 12/27/2017 versus her last CT scan completed at [facility] on 7/13/2018.  The CT scan identified a right axillary lymph node measuring 1.2 cm, otherwise no liver or bone metastasis. CT of the head with contrast was unremarkable. 1/3/2020- the patient was unable to follow-up at [facility] for clinical trial.  Therefore recommended Fam-trastuzumab deruxtecan every 3 weeks. 1/10/2020-she was started on palliative treatment with Fam-trastuzumab.  6/5/2020- Ct Chest/Abdomen/Pelvis W Contrast No lymphadenopathy. A previously seen enlarged right axillary lymph node is now small in size. There is some stranding of the fat around the lymph node. Bilateral breast skin thickening left greater than right. Prior lumpectomy on the left with left axillary lymph node sampling. Previously seen nodularity in the right breast on CT is not visualized today. There may be minimal radiation change in the periphery of the left lung. There is no dense consolidation or effusion. No definite metastatic lung nodules. No evidence of abdominal or pelvic neoplastic process or metastatic disease. The previously seen right breast nodule is not visualized in this study. Postsurgical changes of the left breast. No significant change in the previous study. Lobulated skin thickening of the left lower lateral chest and upper abdominal wall which was not noted in the previous study. The etiology is not certain. This data be clinically correlated and further evaluated.       Past medical history:  Past Medical History:   Diagnosis Date    Cancer [facility])     breast    GERD (gastroesophageal reflux disease)         Past surgical history:  Past Surgical History:   Procedure Laterality Date    BREAST LUMPECTOMY Left 10/4/2016    LUMPECTOMY WITH SENTINAL NODE BIOPSY AND INTRA OP US GUIDED NEEDLE LOCALIZATION performed by Zachariah Tenorio MD at [address] [place] [address]      biopsy left breast    COLONOSCOPY  2008    INSERTION / REMOVAL / REPLACEMENT VENOUS ACCESS CATHETER N/A 5/12/2016    SINGLE LUMEN PORT INSERTION performed by Zachariah Tenorio MD at [facility] Right discussed with patient    Adverse effect of chemotherapy, subsequent encounter    Toxicity, late effect, due to drug, medicine, or biological substance         Metastatic HER-2 oseas breast cancer   Essentially disease progression on Xeloda and Tykerb. Fam-trastuzumab deruxtecan 5.4 mg/kg every 3 weeks until disease progression or intolerable side effects. 20% dose reduction Fam-trastuzumab deruxtecan 4.3 mg/kg today. Due to increased toxicity and poor quality of life the patient request to stop treatment at this time. 4/28/2020  CEA- 2.2  CA 15-3- 35.4  CA 27.29- 87.6    CT chest abdomen pelvis showed no evidence of intrathoracic or intra-abdominal metastatic disease    Her skin lesions are much worse. There is increase in size and number of prior skin lesions in the left lateral chest wall. This was compared to prior picture from March 2020. I discussed at length with her the need for treatment. At this time, she has refused. She is praying that God will make her lesions go away. I offered her referral to radiation oncology for palliative RT but she refused as well. We discussed about palliative care and hospice but she would like to continue to follow-up with me at this time. Chemotherapy-induced neutropenia-ANC 1.52. Painful skin metastasis - status post palliative RT with significant improvement. However, new lesions the left chest wall. Fatigue-related to chemotherapy. Cancer related pain-  Continue Morphine IR prn and Fentanyl patch. 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 She denies any significant change from previous evaluation. Summary of plan   1. Will hold treatment as per patient request.  2. RTC MD in 8 weeks  3. CBC   4. continue opioids for pain control  5. Continue Fentanyl patch  6. [place]      I have seen, examined and reviewed this patient medication list, appropriate labs and imaging studies.  I reviewed relevant medical

## 2020-07-10 RX ORDER — HYDROCODONE BITARTRATE AND ACETAMINOPHEN 10; 325 MG/1; MG/1
1 TABLET ORAL EVERY 6 HOURS PRN
Qty: 120 TABLET | Refills: 0 | Status: SHIPPED | OUTPATIENT
Start: 2020-07-10 | End: 2020-07-21 | Stop reason: SDUPTHER

## 2020-07-10 NOTE — TELEPHONE ENCOUNTER
Clara Mehta phoned to report worsening pain. She is requesting to have previous Rx Norco refilled. She does not wish to take any long acting pain control, but tylenol is not helping at this time. She also requests referral to Dr Gilbert Riley for possible palliative xrt as previously recommended by Dr Prabhakar Arthur. Both prescription and referral provided as requested.

## 2020-07-14 ENCOUNTER — HOSPITAL ENCOUNTER (OUTPATIENT)
Dept: INFUSION THERAPY | Age: 55
Discharge: HOME | End: 2020-07-14
Payer: COMMERCIAL

## 2020-07-14 ENCOUNTER — OFFICE VISIT (OUTPATIENT)
Dept: HEMATOLOGY | Age: 55
End: 2020-07-14
Payer: COMMERCIAL

## 2020-07-14 ENCOUNTER — TELEPHONE (OUTPATIENT)
Dept: INFUSION THERAPY | Age: 55
End: 2020-07-14

## 2020-07-14 ENCOUNTER — TELEPHONE (OUTPATIENT)
Dept: RADIATION ONCOLOGY | Facility: HOSPITAL | Age: 55
End: 2020-07-14

## 2020-07-14 PROCEDURE — 99211 OFF/OP EST MAY X REQ PHY/QHP: CPT

## 2020-07-14 PROCEDURE — 99214 OFFICE O/P EST MOD 30 MIN: CPT | Performed by: INTERNAL MEDICINE

## 2020-07-14 NOTE — TELEPHONE ENCOUNTER
Rcv'd referral from Dr. Ott.  I called patient to schedule a consult with Dr. Pipo Darby.  Patient states she will check her work schedule and call us back to schedule.

## 2020-07-14 NOTE — PROGRESS NOTES
Patrick Mueller   1965 7/14/2020     Chief Complaint   Patient presents with    Other     skin rash      Interval history/history of present illness:  Diagnosis   IDC left breast, March 2016   Grade 2. ER 1%, MT 6%, HER-2/oseas IHC 3+/FISH amplified ratio 6.7, AR 95%. 01/30/2017- Biopsy-proven in breast relapse/Inflammatory breast cancer. ER negative, MT negative, HER-2/oseas IHC 3+/FISH amplified ratio 4.5, Ki-67 32%. MyRisk - Revealed no clinically significant mutation. PTEN VUS. Treatment summary  Neoadjuvant adjuvant chemotherapy TCH-P ×6 cycles. She declined to complete 1 year of Herceptin. She declined radiation therapy. 10/3/2016-left lumpectomy/sentinel lymph node   01/30/2017- Biopsy-proven in breast relapse/Inflammatory breast cancer. 5/11/2017-Chemotherapy with paclitaxel/carboplatin weekly. Herceptin/Pertuzumab every 3 weeks started 05/11/2017. She had a allergic reaction to carboplatin and this was discontinued. 7/21/2017- 9/1/2017 Neuropathy grade 2 with Taxol. Therefore, switched to Taxotere/Herceptin/Pertuzumab. Discontinued due to progression of disease. 9/22/2017 through 3/2/2018[place] every 3 weeks. (stopped as per patient request, neuropathy grade 1)   7/10/2018 through 11/30/2018- Kadcyla.    1/4/2019-Navelbine x 1 cycle with very poor tolerance (pain)   1/25/2018-Gemzar/Herceptin   3/15/2019- low-dose Cisplatin added to Gemzar and Herceptin regimen, regimen changed to every other week   4/17/2019- palliative radiation to left clavicle/chest wall for anticipate total dosing of 5040 cGy   6/22/2019- Xeloda/apatinib  January 2020-palliative radiation to the right breast.  1/10/2020- palliative treatment with Fam-Trastuzumab- stopped Feb due to severe toxicity  Tumor target  Skin chest wall left  upper chest/left breast     Interval history  The patient is a 47years old female who has a diagnosis of recurrent HER-2 positive/MT negative breast cancer with extensive skin involvement/chest wall involvement. She received treatment with Fam-trastuzumab but had poor tolerance due to chemotherapy-induced neutropenia and severe fatigue. Therefore she elects to discontinue treatment. She also opted for no further treatment and hoping that \"Paul will cure me\". She presents today with concerns of a heat rash versus shingles. She had a small erythematous papular nodules close to the left chest wall and extending into the abdomen. It is extremely itchy. She denies any fever chills. Cancer history- Left breast recurrence with Inflammatory Breast Cancer ER 8%/AR negative &HER-2 positive. Ms Rahcel Hernandez was seen in initial oncology consultation on 4/20/2017 referred for a diagnosis of a left breast recurrence with inflammatory breast cancer. She was initially treated by Dr. Aria Phelps at [facility]. Prior breast cancer history is as follows:  3/22/2016-a diagnostic mammogram for a palpable mass showed a left breast abnormal findings. 4/1/2017- ultrasound-guided core needle biopsy was consistent with invasive ductal carcinoma, grade 2. ER 1%, AR 6%, HER-2/oseas IHC 3+/FISH amplified ratio 6.7., AR 95%. She underwent 6 cycles of TCH-P from April 2016 through September 2016 with G-CSF support. She had a total of 18 weeks of trastuzumab treatment. 10/3/2016-she underwent a left lumpectomy with left sentinel lymph node revealing an invasive ductal carcinoma, grade 3. Margins clear of invasive carcinoma (1.1 cm from the lateral margin), extensive lymphovascular space invasion. 2 out of 3 sentinel lymph nodes positive for metastatic carcinoma. Final pathologic staging [id number])M0.    She declined adjuvant radiation therapy and adjuvant Herceptin completion.   -------------------In-breast Recurrence [id number]---------------------------  1/30/2017- she was seen by Dr. Dixon Saini with new complaints of erythema in the left breast. A skin punch biopsy was consistent with metastatic ductal carcinoma with extensive dermal lymphovascular invasion. ER negative, WI negative, HER-2/oseas IHC 3+/FISH amplified ratio 4.5, Ki-67 32%. Foundation one showed ERBB2 amplification, AURKA amplification, TP53 and .   2/15/2017- she was again seen by Dr. Katie Zapien and explained about her tumor recurrence. Unfortunately, she declined any further intervention at that time. 4/21/2017- she asked Dr. Katie Zapien for a referral to us.   4/28/2017-referred to me for further evaluation. I recommended a PET scan and neoadjuvant chemotherapy with carboplatin/paclitaxel and Perjeta and Herceptin. 5/4/2017-PET/CT scan revealed abnormal metabolic activity present in multiple regions throughout the left breast and in the skin( SUV 8.9, 7.4, 7.3). In the left supraclavicular region a cluster of lymph nodes present (SUV of 3). Axillary lymph nodes with SUV of 2.8. Tonsils bilaterally with SUV 5.6 (symmetric). No abnormal metabolic activity in the intrathoracic or intra-abdominal pelvic region. No abnormal skeletal metabolic activity. I recommended weekly carboplatin/weekly Taxol/Herceptin and Pertuzumab.   5/26/2017-she had a severe allergic reaction after 2 doses of carboplatin and therefore this was discontinued. She was continued only on Taxol weekly/Herceptin and Pertuzumab.   7/21/2017-after cycle #2 and 8 doses of Taxol she developed neuropathy related to chemotherapy and therefore chemotherapy was switched from Taxol to Taxotere. Genetic assessment- [facility] -25 revealed no clinically significant mutation. PTEN VUS.   9/1/2017-local in breast disease progression with increasing breast erythema. Treatment switched to Kadcyla. 12/27/2017- repeat re-staging CT scan of the abdomen and pelvis documented no evidence of intra-abdominal pelvic metastasis. Superior left renal cyst. Mild hepatic steatosis. CT scan of the chest documented no evidence of intrathoracic metastasis.  Bone scan documented no evidence of bony metastatic diffuse erythema with new nodules. 12/17/2018-skin biopsy of overlying right breast consistent invasive ductal carcinoma, high-grade. ER 20%, FL 0%, HER-2/oseas 3+.  12/17/2018-right axillary FNA biopsy consistent invasive ductal carcinoma. 12/26/2018-recommended clinical trial at [facility]. 1/4/2019-started on Navelbine/Herceptin, due to delays on the clinical trial at [facility]. Unfortunately, she had severe chest wall pain and generalized pain related to Navelbine. 1/25/2019-4/19/2019 she was switched to cisplatin/Gemzar/Herceptin, but had disease progression. 5/30/2019-CT chest, abdomen, pelvis and bone scan was unremarkable for right axillary adenopathy measuring 2 cm. Lymph node located in the upper chest wall under the pectoralis muscle measuring 1.2 cm. Right breast with 2 areas with masslike enhancement. No evidence of metastatic disease in the abdomen pelvis. Bone scan was unremarkable for metastatic bone disease. June 2019-very poor tolerance to cisplatin, Gemzar/Herceptin. She was recommended Xeloda/ Lapatinib.   6/22/2019-she was recommended and started on Xeloda 850 mg/m2 PO BID days 1 through 14 every 21 days and Lapatinib 1250 mg daily continuously. November 2019- progression on prior Xeloda and lapatinib.  11/23/2019- recommended rechallenge with Herceptin and Navelbine vs clinical trial at [facility]. 12/23/2019-CT chest abdomen pelvis showed metastatic disease in the right axillary lymph node and diffuse bilateral skin involvement of both breasts with several nodules. No evidence of pulmonary, liver or bone metastasis. CT of the head with contrast was unremarkable. 1/3/2020- the patient was unable to follow-up at [facility] for clinical trial.  Therefore recommended Fam-trastuzumab deruxtecan every 3 weeks. 1/10/2020-she was started on palliative treatment with Fam-trastuzumab.  6/5/2020- Ct Chest/Abdomen/Pelvis W Contrast No lymphadenopathy.  A previously seen enlarged right axillary lymph node is now small in size. There is some stranding of the fat around the lymph node. Bilateral breast skin thickening left greater than right. Prior lumpectomy on the left with left axillary lymph node sampling. Previously seen nodularity in the right breast on CT is not visualized today. There may be minimal radiation change in the periphery of the left lung. There is no dense consolidation or effusion. No definite metastatic lung nodules. No evidence of abdominal or pelvic neoplastic process or metastatic disease. The previously seen right breast nodule is not visualized in this study. Postsurgical changes of the left breast. No significant change in the previous study. Lobulated skin thickening of the left lower lateral chest and upper abdominal wall which was not noted in the previous study. The etiology is not certain. This data be clinically correlated and further evaluated.       Past medical history:  Past Medical History:   Diagnosis Date    Cancer [facility])     breast    GERD (gastroesophageal reflux disease)         Past surgical history:  Past Surgical History:   Procedure Laterality Date    BREAST LUMPECTOMY Left 10/4/2016    LUMPECTOMY WITH SENTINAL NODE BIOPSY AND INTRA OP US GUIDED NEEDLE LOCALIZATION performed by Ramonita Rios MD at [address] [place] [address]      biopsy left breast    COLONOSCOPY  2008    INSERTION / REMOVAL / REPLACEMENT VENOUS ACCESS CATHETER N/A 5/12/2016    SINGLE LUMEN PORT INSERTION performed by Ramonita Rios MD at [facility] DIAGNOSTIC BONE MARROW BIOPSIES Right 11/14/2018    BONE MARROW ASPIRATION BIOPSY performed by Niki Stevens MD at [facility]        Social history:  Social History     Socioeconomic History    Marital status:      Spouse name: Not on file    Number of children: Not on file    Years of education: Not on file    Highest education level: Not on file   Occupational History    Not on file   Social Needs    Financial resource strain: Not on file    Food insecurity     Worry: Not on file     Inability: Not on file    Transportation needs     Medical: Not on file     Non-medical: Not on file   Tobacco Use    Smoking status: Never Smoker    Smokeless tobacco: Never Used   Substance and Sexual Activity    Alcohol use: No     Alcohol/week: 0.0 standard drinks    Drug use: No    Sexual activity: Not on file   Lifestyle    Physical activity     Days per week: Not on file     Minutes per session: Not on file    Stress: Not on file   Relationships    Social connections     Talks on phone: Not on file     Gets together: Not on file     Attends [employer]ism service: Not on file     Active member of club or organization: Not on file     Attends meetings of clubs or organizations: Not on file     Relationship status: Not on file    Intimate partner violence     Fear of current or ex partner: Not on file     Emotionally abused: Not on file     Physically abused: Not on file     Forced sexual activity: Not on file   Other Topics Concern    Not on file   Social History Narrative    Not on file        Family history:   Family History   Problem Relation Age of Onset    Lung Cancer Father     Colon Cancer Father     Prostate Cancer Father     Hypertension Mother         Current Outpatient Medications   Medication Sig Dispense Refill    HYDROcodone-acetaminophen (NORCO)  MG per tablet Take 1 tablet by mouth every 6 hours as needed for Pain for up to 30 days.  120 tablet 0    Zinc Sulfate (ZINC 15 PO) Take by mouth daily      Cholecalciferol (VITAMIN D3) 1.25 MG (77704 UT) CAPS Take by mouth daily Unknown amount of Vitamins D3      APPLE CIDER VINEGAR PO Take by mouth      Probiotic Product (PROBIOTIC-10 PO) Take by mouth      Turmeric 500 MG CAPS Take by mouth      SELENIUM ER PO Take by mouth      Multiple Vitamin (MULTI VITAMIN DAILY PO) Take by mouth       No current facility-administered medications for this visit. REVIEW OF SYSTEMS:    Constitutional: no fever, no night sweats, no fatigue;   HEENT: no blurring of vision, no double vision, no hearing difficulty, no tinnitus,no ulceration, no dental caries, no dysphagia, no epistaxis  Lungs: no cough, no shortness of breath, no wheeze;   CVS: no palpitation, no chest pain, no shortness of breath;  GI: no abdominal pain, no nausea , no vomiting, no constipation; diarrhea grade 1  ORLIN: no dysuria, frequency and urgency, no hematuria, no kidney stones;   Musculoskeletal: no joint pain, swelling , stiffness;   Endocrine: no polyuria, polydypsia, no cold or heat intolerence; Hematology/lymphatic: no easy brusing or bleeding, no hx of clotting disorder; no peripheral adenopathy. Dermatology: Skin rashes, no eczema, no pruritis;   Psychiatry: no depression, no anxiety,no panic attacks, no suicide ideation; Neurology: no syncope, no seizures, no numbness or tingling of hands, no numbness and tingling of feet, no paresis;     PHYSICAL EXAM:    [facility] 02/29/2016 (Approximate)   Heart rate 86, respiratory rate 13, O2 sat 96%. Afebrile. Pain scale:4    CONSTITUTIONAL: Alert, appropriate, no acute distress,   EYES: Non icteric, EOM intact, pupils equal round and reactive to light and accommodation. ENT: Oral mucus membranes moist, no oral pharyngeal lesions. External inspection of ears and nose are normal.   NECK: Supple, no masses. No palpable thyroid mass    CHEST/LUNGS: CTA bilaterally, normal respiratory effort   CARDIOVASCULAR: RRR, no murmurs. No lower extremity edema   ABDOMEN: soft non-tender, active bowel sounds, no hepatosplenomegaly. No palpable masses. EXTREMITIES: warm, Full ROM of all fours extremities. No focal weakness. SKIN: Skin rash  LYMPH: No cervical, clavicular, axillary, or inguinal lymphadenopathy  NEUROLOGIC: follows commands, non focal.   PSYCH: mood and affect appropriate.  Alert and oriented to time and place and person. Relevant Lab findings: Labs reviewed with the patient. None applicable- patient here for wound assessment    Relevant Imaging studies: Not applicable  No results found. ASSESSMENT:    No orders of the defined types were placed in this encounter. June Nielson was seen today for other. Diagnoses and all orders for this visit:    Care plan discussed with patient    Skin rash    Malignant neoplasm of upper-outer quadrant of left female breast, unspecified estrogen receptor status (HCC)    Cancer associated pain    HER2-positive carcinoma of breast (HCC)    Toxicity, late effect, due to drug, medicine, or biological substance         Metastatic HER-2 oseas breast cancer   Essentially disease progression on Xeloda and [place]. She received 2 cycles of Fam-trastuzumab deruxtecan 5.4 mg/kg every 3 weeks until disease progression or intolerable side effects. She was going to receive third cycle with 20% dose reduction Fam-trastuzumab deruxtecan 4.3 mg/kg but the patient opted to discontinue chemotherapy. Due to increased toxicity and poor quality of life the patient request to stop treatment at this time. 4/28/2020  CEA- 2.2  CA 15-3- 35.4  CA 27.29- 87.6    CT chest abdomen pelvis showed no evidence of intrathoracic or intra-abdominal metastatic disease    The skin lesions are much worse. There are new skin rashes that are mostly consistent with progression of her metastatic HER-2 positive lesions in the skin. Recommended Herceptin, Xeloda and Tucatinib. Patient is contemplative. Painful skin metastasis - status post palliative RT with significant improvement. However, new lesions the left chest wall. Recommended further RT to the skin. Fatigue-related to chemotherapy. Cancer related pain-  Continue Morphine IR prn and Fentanyl patch. 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  She denies any significant change from previous evaluation. Summary of plan   1. Discussed at length and recommended Herceptin, Xeloda and Tucatinib. The patient is contemplative. She was referred to radiation oncology and will follow-up next week. I have seen, examined and reviewed this patient medication list, appropriate labs and imaging studies. I reviewed relevant medical records and others physicians notes. I discussed the plans of care with the patient. I answered all the questions to the patients satisfaction  The selection, dosing administration of anticancer agents in the management of associated toxicities are complex. Modifications of drug dose and schedule and the initiation of supportive care interventions are often necessary because of expected toxicities individual patient variability, prior treatment and comorbidity. The optimal delivery of anticancer agents therefore requires a healthcare delivery team experienced in the use of anticancer agents and the management of associated toxicities in patients with cancer. Follow Up:     No follow-ups on file. Data Baldo Valdez am scribing for Danielle Graham MD. Electronically signed by Pola Valdez on 7/14/2020 at 8:30 AM.     I, Dr Charlotte Barraza, personally performed the services described in this documentation as scribed by Pola Valdez MA in my presence and is both accurate and complete. Over 50% of the total visit time of 25 minutes in face to face encounter with the patient, out of which more than 50% of the time was spent in counseling patient or family and coordination of care. Counseling included but was not limited to time spent reviewing labs, imaging studies/ treatment plan and answering questions.

## 2020-07-14 NOTE — TELEPHONE ENCOUNTER
Pt called stating she has a rash around her port area and around her waist all on the left side that she thinks may be shingles. She says it looks like blisters, that itch, burn and hurt. She says her Norco pain pills don't seem to be helping. Discussed with Dr. Richar Monreal who recommends for pt to come in for a nurse to look at the rash to determine if this is shingles or poss cutaneous mets. Return call to pt to explain above. Pt voiced understanding and agreed to come in after work.

## 2020-07-21 ENCOUNTER — TELEPHONE (OUTPATIENT)
Dept: INFUSION THERAPY | Age: 55
End: 2020-07-21

## 2020-07-21 ENCOUNTER — OFFICE VISIT (OUTPATIENT)
Dept: WOUND CARE | Facility: HOSPITAL | Age: 55
End: 2020-07-21

## 2020-07-21 PROCEDURE — G0463 HOSPITAL OUTPT CLINIC VISIT: HCPCS

## 2020-07-21 PROCEDURE — 99214 OFFICE O/P EST MOD 30 MIN: CPT | Performed by: NURSE PRACTITIONER

## 2020-07-21 NOTE — TELEPHONE ENCOUNTER
Pt called stating her pharmacy only filled 7 days for her script for Hydrocodone and she needs another script. Pharmacy called stating they need PA for any more pain meds as well as a new script due to insurance only allowing 7 days to be filled. Pt informed she will be notified when new script is available. Per Andrew Zhang, [place] has been obtained for Hydrocodone.

## 2020-07-22 RX ORDER — HYDROCODONE BITARTRATE AND ACETAMINOPHEN 10; 325 MG/1; MG/1
1 TABLET ORAL EVERY 6 HOURS PRN
Qty: 120 TABLET | Refills: 0 | Status: SHIPPED | OUTPATIENT
Start: 2020-07-22 | End: 2020-08-07 | Stop reason: ALTCHOICE

## 2020-07-28 ENCOUNTER — HOSPITAL ENCOUNTER (OUTPATIENT)
Dept: RADIATION ONCOLOGY | Facility: HOSPITAL | Age: 55
Setting detail: RADIATION/ONCOLOGY SERIES
End: 2020-07-28

## 2020-07-29 ENCOUNTER — CONSULT (OUTPATIENT)
Dept: RADIATION ONCOLOGY | Facility: HOSPITAL | Age: 55
End: 2020-07-29

## 2020-07-29 ENCOUNTER — TELEPHONE (OUTPATIENT)
Dept: INFUSION THERAPY | Age: 55
End: 2020-07-29

## 2020-07-29 ENCOUNTER — APPOINTMENT (OUTPATIENT)
Dept: RADIATION ONCOLOGY | Facility: HOSPITAL | Age: 55
End: 2020-07-29

## 2020-07-29 VITALS
BODY MASS INDEX: 23.57 KG/M2 | HEIGHT: 63 IN | WEIGHT: 133 LBS | SYSTOLIC BLOOD PRESSURE: 119 MMHG | DIASTOLIC BLOOD PRESSURE: 66 MMHG

## 2020-07-29 DIAGNOSIS — C77.9 REGIONAL LYMPH NODE STAGING CATEGORY N3 (HCC): ICD-10-CM

## 2020-07-29 DIAGNOSIS — Z98.890 S/P LUMPECTOMY, LEFT BREAST: ICD-10-CM

## 2020-07-29 DIAGNOSIS — C50.012 MALIGNANT NEOPLASM INVOLVING BOTH NIPPLE AND AREOLA OF LEFT BREAST IN FEMALE, ESTROGEN RECEPTOR POSITIVE (HCC): Primary | ICD-10-CM

## 2020-07-29 DIAGNOSIS — Z17.0 MALIGNANT NEOPLASM INVOLVING BOTH NIPPLE AND AREOLA OF LEFT BREAST IN FEMALE, ESTROGEN RECEPTOR POSITIVE (HCC): Primary | ICD-10-CM

## 2020-07-29 DIAGNOSIS — Z92.3 HISTORY OF RADIATION THERAPY: ICD-10-CM

## 2020-07-29 DIAGNOSIS — Z98.890 S/P LYMPH NODE BIOPSY: ICD-10-CM

## 2020-07-29 DIAGNOSIS — Z78.9 NON-SMOKER: ICD-10-CM

## 2020-07-29 PROCEDURE — 77334 RADIATION TREATMENT AID(S): CPT | Performed by: RADIOLOGY

## 2020-07-29 RX ORDER — OXYCODONE AND ACETAMINOPHEN 7.5; 325 MG/1; MG/1
1 TABLET ORAL EVERY 6 HOURS PRN
Qty: 120 TABLET | Refills: 0 | Status: SHIPPED | OUTPATIENT
Start: 2020-07-29 | End: 2020-09-03 | Stop reason: SDUPTHER

## 2020-07-29 RX ORDER — HYDROXYZINE 50 MG/1
50 TABLET, FILM COATED ORAL EVERY 6 HOURS PRN
Status: ON HOLD | COMMUNITY
End: 2021-01-01

## 2020-07-29 NOTE — TELEPHONE ENCOUNTER
Pt called stating she is itching all over and it started after she had taken the Hydrocodone every 6 hrs. She is requesting something different for pain. Discussed with Dr. Yuli Kapadia who recommended to try Percocet 7.5 Q6 p.r.n. Return call to pt with above info who agreed.

## 2020-07-30 NOTE — PROGRESS NOTES
Cheyanne Mueller   1965 8/7/2020     Chief Complaint   Patient presents with    Follow-up     Care plan discussed with patient       Interval history/history of present illness:  Diagnosis   IDC left breast, March 2016   Grade 2. ER 1%, KS 6%, HER-2/oseas IHC 3+/FISH amplified ratio 6.7, AR 95%. 01/30/2017- Biopsy-proven in breast relapse/Inflammatory breast cancer. ER negative, KS negative, HER-2/oseas IHC 3+/FISH amplified ratio 4.5, Ki-67 32%. MyRisk - Revealed no clinically significant mutation. PTEN VUS. Treatment summary  Neoadjuvant adjuvant chemotherapy TCH-P ×6 cycles. She declined to complete 1 year of Herceptin. She declined radiation therapy. 10/3/2016-left lumpectomy/sentinel lymph node   01/30/2017- Biopsy-proven in breast relapse/Inflammatory breast cancer. 5/11/2017-Chemotherapy with paclitaxel/carboplatin weekly. Herceptin/Pertuzumab every 3 weeks started 05/11/2017. She had a allergic reaction to carboplatin and this was discontinued. 7/21/2017- 9/1/2017 Neuropathy grade 2 with Taxol. Therefore, switched to Taxotere/Herceptin/Pertuzumab. Discontinued due to progression of disease. 9/22/2017 through 3/2/2018Ventura Neve every 3 weeks. (stopped as per patient request, neuropathy grade 1)   7/10/2018 through 11/30/2018- Kadcyla.    1/4/2019-Navelbine x 1 cycle with very poor tolerance (pain)   1/25/2018-Gemzar/Herceptin   3/15/2019- low-dose Cisplatin added to Gemzar and Herceptin regimen, regimen changed to every other week   4/17/2019- palliative radiation to left clavicle/chest wall for anticipate total dosing of 5040 cGy   6/22/2019- Xeloda/apatinib  January 2020-palliative radiation to the right breast.  1/10/2020- palliative treatment with Fam-Trastuzumab- stopped Feb due to severe toxicity  Anticipated Herceptin, Xeloda and Tucatinib  Tumor target  Skin chest wall left  upper chest/left breast     Interval history  The patient is a 47years old female who has a diagnosis of recurrent HER-2 positive/DC negative breast cancer with extensive skin involvement/chest wall involvement. She received treatment with Fam-trastuzumab but had poor tolerance due to chemotherapy-induced neutropenia and severe fatigue. Therefore she elected to discontinue treatment. Unfortunately, she had significant disease recurrence in the left chest wall with a several nodules in the left lateral chest.  She was referred to radiation for consideration of palliative radiation. This has worked very well for her in the past.  We have also discussed about a new treatment option with Herceptin, Xeloda and Tucatinib. The patient is interested in starting this treatment. Cancer history- Left breast recurrence with Inflammatory Breast Cancer ER 8%/DC negative &HER-2 positive. Ms Vero Marquez was seen in initial oncology consultation on 4/20/2017 referred for a diagnosis of a left breast recurrence with inflammatory breast cancer. She was initially treated by Dr. Raquel Canas at [facility]. Prior breast cancer history is as follows:  3/22/2016-a diagnostic mammogram for a palpable mass showed a left breast abnormal findings. 4/1/2017- ultrasound-guided core needle biopsy was consistent with invasive ductal carcinoma, grade 2. ER 1%, DC 6%, HER-2/oseas IHC 3+/FISH amplified ratio 6.7., AR 95%. She underwent 6 cycles of TCH-P from April 2016 through September 2016 with G-CSF support. She had a total of 18 weeks of trastuzumab treatment. 10/3/2016-she underwent a left lumpectomy with left sentinel lymph node revealing an invasive ductal carcinoma, grade 3. Margins clear of invasive carcinoma (1.1 cm from the lateral margin), extensive lymphovascular space invasion. 2 out of 3 sentinel lymph nodes positive for metastatic carcinoma. Final pathologic staging [id number].    She declined adjuvant radiation therapy and adjuvant Herceptin completion.   -------------------In-breast Recurrence 11/14/2018    BONE MARROW ASPIRATION BIOPSY performed by Kelley Morales MD at [address] history:  Social History     Socioeconomic History    Marital status:      Spouse name: Not on file    Number of children: Not on file    Years of education: Not on file    Highest education level: Not on file   Occupational History    Not on file   Social Needs    Financial resource strain: Not on file    Food insecurity     Worry: Not on file     Inability: Not on file    Transportation needs     Medical: Not on file     Non-medical: Not on file   Tobacco Use    Smoking status: Never Smoker    Smokeless tobacco: Never Used   Substance and Sexual Activity    Alcohol use: No     Alcohol/week: 0.0 standard drinks    Drug use: No    Sexual activity: Not on file   Lifestyle    Physical activity     Days per week: Not on file     Minutes per session: Not on file    Stress: Not on file   Relationships    Social connections     Talks on phone: Not on file     Gets together: Not on file     Attends Christianity service: Not on file     Active member of club or organization: Not on file     Attends meetings of clubs or organizations: Not on file     Relationship status: Not on file    Intimate partner violence     Fear of current or ex partner: Not on file     Emotionally abused: Not on file     Physically abused: Not on file     Forced sexual activity: Not on file   Other Topics Concern    Not on file   Social History Narrative    Not on file        Family history:   Family History   Problem Relation Age of Onset    Lung Cancer Father     Colon Cancer Father     Prostate Cancer Father     Hypertension Mother         Current Outpatient Medications   Medication Sig Dispense Refill    oxyCODONE-acetaminophen (PERCOCET) 7.5-325 MG per tablet Take 1 tablet by mouth every 6 hours as needed for Pain for up to 30 days.  Intended supply: 30 days 120 tablet 0    Zinc Sulfate (ZINC 15 PO) Take by mouth daily      Cholecalciferol (VITAMIN D3) 1.25 MG (71436 UT) CAPS Take by mouth daily Unknown amount of Vitamins D3      APPLE CIDER VINEGAR PO Take by mouth      Probiotic Product (PROBIOTIC-10 PO) Take by mouth      Turmeric 500 MG CAPS Take by mouth      SELENIUM ER PO Take by mouth      Multiple Vitamin (MULTI VITAMIN DAILY PO) Take by mouth       No current facility-administered medications for this visit. REVIEW OF SYSTEMS:    Constitutional: no fever, no night sweats,  fatigue;   HEENT: no blurring of vision, no double vision, no hearing difficulty, no tinnitus,no ulceration, no dental caries, no dysphagia, no epistaxis  Lungs: no cough, no shortness of breath, no wheeze;   CVS: no palpitation, no chest pain, no shortness of breath;  GI: no abdominal pain, no nausea , no vomiting, no constipation; no diarrhea  ORLIN: no dysuria, frequency and urgency, no hematuria, no kidney stones;   Musculoskeletal: no joint pain, swelling , stiffness;   Endocrine: no polyuria, polydypsia, no cold or heat intolerence; Hematology/lymphatic: no easy brusing or bleeding, no hx of clotting disorder; no peripheral adenopathy. Dermatology: Skin nodules consistent with metastatic disease, no eczema, no pruritis;   Psychiatry: no depression, no anxiety,no panic attacks, no suicide ideation; Neurology: no syncope, no seizures, no numbness or tingling of hands, no numbness and tingling of feet, no paresis;     PHYSICAL EXAM:    /70   Pulse 68   Temp 97.3 °F (36.3 °C)   Ht 5' 3\" (1.6 m)   Wt 132 lb 1.6 oz (59.9 kg)   LMP 02/29/2016 (Approximate)   SpO2 98%   BMI 23.40 kg/m²   Heart rate 86, respiratory rate 13, O2 sat 96%. Afebrile. Pain scale:4    CONSTITUTIONAL: Alert, appropriate, no acute distress,   EYES: Non icteric, EOM intact, pupils equal round and reactive to light and accommodation. ENT: Oral mucus membranes moist, no oral pharyngeal lesions.  External inspection of ears and nose are normal.   NECK: Supple, no masses. No palpable thyroid mass    CHEST/LUNGS: CTA bilaterally, normal respiratory effort   CARDIOVASCULAR: RRR, no murmurs. No lower extremity edema   ABDOMEN: soft non-tender, active bowel sounds, no hepatosplenomegaly. No palpable masses. EXTREMITIES: warm, Full ROM of all fours extremities. No focal weakness. SKIN: Skin nodules his metastatic disease in the chest wall  LYMPH: No cervical, clavicular, axillary, or inguinal lymphadenopathy  NEUROLOGIC: follows commands, non focal.   PSYCH: mood and affect appropriate. Alert and oriented to time and place and person. Relevant Lab findings: Labs reviewed with the patient. CBC: 8/7/2020  WBC-2.55  Hgb-9.9  Plt-209,000  Neut-1.13    Relevant Imaging studies:   No results found. ASSESSMENT:    No orders of the defined types were placed in this encounter. Lorenzo Suero was seen today for follow-up. Diagnoses and all orders for this visit:    Malignant neoplasm of upper-outer quadrant of left female breast, unspecified estrogen receptor status [facility])    Care plan discussed with patient    Secondary malignant neoplasm of axillary lymph nodes ([facility] [address]    Chemotherapy management, encounter for         Metastatic HER-2 oseas breast cancer   Essentially disease progression on Xeloda and [place]ker[place]. She received 2 cycles of Fam-trastuzumab deruxtecan 5.4 mg/kg every 3 weeks She was going to receive third cycle with 20% dose reduction Fam-trastuzumab deruxtecan 4.3 mg/kg but the patient opted to discontinue chemotherapy. Due to increased toxicity and poor quality of life the patient request to stop treatment at this time. 4/28/2020  CEA- 2.2  CA 15-3- 35.4  CA 27.29- 87.6    CT chest abdomen pelvis showed no evidence of intrathoracic or intra-abdominal metastatic disease    The skin lesions are much worse. There are new skin rashes that are mostly consistent with progression of her metastatic HER-2 positive lesions in the skin.     She is currently awaiting palliative RT initiation. Recommended Herceptin, Xeloda and Tucatinib. Painful skin metastasis - status post palliative RT with significant improvement. However, new lesions the left chest wall. Recommended further RT to the skin. Fatigue-related to chemotherapy. Cancer related pain-  Continue Morphine IR prn and Fentanyl patch. 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 She denies any significant change from previous evaluation. Summary of plan   1. Start Herceptin, Xeloda and Tucatinib. She was referred to radiation oncology and will follow-up next week. I have seen, examined and reviewed this patient medication list, appropriate labs and imaging studies. I reviewed relevant medical records and others physicians notes. I discussed the plans of care with the patient. I answered all the questions to the patients satisfaction  The selection, dosing administration of anticancer agents in the management of associated toxicities are complex. Modifications of drug dose and schedule and the initiation of supportive care interventions are often necessary because of expected toxicities individual patient variability, prior treatment and comorbidity. The optimal delivery of anticancer agents therefore requires a healthcare delivery team experienced in the use of anticancer agents and the management of associated toxicities in patients with cancer. Follow Up:     Return in about 2 weeks (around 8/21/2020) for treatment and see [facility] Espinal in [address] [place] [address].   heceptin     Donavan MCARTHUR am scribing for Jolly Bernabe MD. Electronically signed by Donavan Ackerman on 8/7/2020 at 8:30 AM.     I, Dr Mauri Soto, personally performed the services described in this documentation as scribed by Donavan Ackerman MA in my presence and is both accurate and complete.   Over 50% of the total visit time of 25 minutes in face to face encounter with the patient, out of which more than 50% of the time was spent in counseling patient or family and coordination of care. Counseling included but was not limited to time spent reviewing labs, imaging studies/ treatment plan and answering questions.

## 2020-08-03 ENCOUNTER — HOSPITAL ENCOUNTER (OUTPATIENT)
Dept: RADIATION ONCOLOGY | Facility: HOSPITAL | Age: 55
Setting detail: RADIATION/ONCOLOGY SERIES
End: 2020-08-03

## 2020-08-07 ENCOUNTER — CLINICAL DOCUMENTATION (OUTPATIENT)
Dept: HEMATOLOGY | Age: 55
End: 2020-08-07

## 2020-08-07 ENCOUNTER — HOSPITAL ENCOUNTER (OUTPATIENT)
Dept: INFUSION THERAPY | Age: 55
Discharge: HOME | End: 2020-08-07
Payer: COMMERCIAL

## 2020-08-07 ENCOUNTER — OFFICE VISIT (OUTPATIENT)
Dept: HEMATOLOGY | Age: 55
End: 2020-08-07
Payer: COMMERCIAL

## 2020-08-07 VITALS
OXYGEN SATURATION: 98 % | HEART RATE: 68 BPM | WEIGHT: 132.1 LBS | TEMPERATURE: 97.3 F | DIASTOLIC BLOOD PRESSURE: 70 MMHG | SYSTOLIC BLOOD PRESSURE: 122 MMHG | HEIGHT: 63 IN | BODY MASS INDEX: 23.41 KG/M2

## 2020-08-07 DIAGNOSIS — C50.412 MALIGNANT NEOPLASM OF UPPER-OUTER QUADRANT OF LEFT FEMALE BREAST, UNSPECIFIED ESTROGEN RECEPTOR STATUS (HCC): ICD-10-CM

## 2020-08-07 LAB
BASOPHILS ABSOLUTE: 0.02 K/UL (ref 0.01–0.08)
BASOPHILS RELATIVE PERCENT: 0.8 % (ref 0.1–1.2)
EOSINOPHILS ABSOLUTE: 0.11 K/UL (ref 0.04–0.54)
EOSINOPHILS RELATIVE PERCENT: 4.3 % (ref 0.7–7)
HCT VFR BLD CALC: 32.8 % (ref 34.1–44.9)
HEMOGLOBIN: 9.9 G/DL (ref 11.2–15.7)
LYMPHOCYTES ABSOLUTE: 0.75 K/UL (ref 1.18–3.74)
LYMPHOCYTES RELATIVE PERCENT: 29.4 % (ref 19.3–53.1)
MCH RBC QN AUTO: 26.4 PG (ref 25.6–32.2)
MCHC RBC AUTO-ENTMCNC: 30.2 G/DL (ref 32.3–35.5)
MCV RBC AUTO: 87.5 FL (ref 79.4–94.8)
MONOCYTES ABSOLUTE: 0.54 K/UL (ref 0.24–0.82)
MONOCYTES RELATIVE PERCENT: 21.2 % (ref 4.7–12.5)
NEUTROPHILS ABSOLUTE: 1.13 K/UL (ref 1.56–6.13)
NEUTROPHILS RELATIVE PERCENT: 44.3 % (ref 34–71.1)
PDW BLD-RTO: 20.9 % (ref 11.7–14.4)
PLATELET # BLD: 209 K/UL (ref 182–369)
PMV BLD AUTO: 9.8 FL (ref 7.4–10.4)
RBC # BLD: 3.75 M/UL (ref 3.93–5.22)
WBC # BLD: 2.55 K/UL (ref 3.98–10.04)

## 2020-08-07 PROCEDURE — 99214 OFFICE O/P EST MOD 30 MIN: CPT | Performed by: INTERNAL MEDICINE

## 2020-08-07 PROCEDURE — 99211 OFF/OP EST MAY X REQ PHY/QHP: CPT

## 2020-08-07 PROCEDURE — 85025 COMPLETE CBC W/AUTO DIFF WBC: CPT

## 2020-08-10 RX ORDER — CAPECITABINE 500 MG/1
1000 TABLET, FILM COATED ORAL 2 TIMES DAILY
Qty: 56 TABLET | Refills: 0 | Status: SHIPPED | OUTPATIENT
Start: 2020-08-10 | End: 2020-08-24

## 2020-08-10 RX ORDER — TUCATINIB 150 MG/1
300 TABLET ORAL 2 TIMES DAILY
Qty: 120 TABLET | Refills: 5 | Status: SHIPPED | OUTPATIENT
Start: 2020-08-10 | End: 2020-08-17 | Stop reason: SDUPTHER

## 2020-08-10 RX ORDER — CAPECITABINE 150 MG/1
300 TABLET, FILM COATED ORAL 2 TIMES DAILY
Qty: 56 TABLET | Refills: 0 | Status: SHIPPED | OUTPATIENT
Start: 2020-08-10 | End: 2020-08-24

## 2020-08-12 PROCEDURE — 77301 RADIOTHERAPY DOSE PLAN IMRT: CPT | Performed by: RADIOLOGY

## 2020-08-12 PROCEDURE — 77300 RADIATION THERAPY DOSE PLAN: CPT | Performed by: RADIOLOGY

## 2020-08-12 PROCEDURE — 77338 DESIGN MLC DEVICE FOR IMRT: CPT | Performed by: RADIOLOGY

## 2020-08-17 RX ORDER — TUCATINIB 150 MG/1
300 TABLET ORAL 2 TIMES DAILY
Qty: 120 TABLET | Refills: 5 | Status: SHIPPED | OUTPATIENT
Start: 2020-08-17 | End: 2020-10-20 | Stop reason: SDUPTHER

## 2020-08-18 ENCOUNTER — OFFICE VISIT (OUTPATIENT)
Dept: WOUND CARE | Facility: HOSPITAL | Age: 55
End: 2020-08-18

## 2020-08-18 PROCEDURE — 99213 OFFICE O/P EST LOW 20 MIN: CPT | Performed by: NURSE PRACTITIONER

## 2020-08-18 PROCEDURE — G0463 HOSPITAL OUTPT CLINIC VISIT: HCPCS

## 2020-08-19 ENCOUNTER — TELEPHONE (OUTPATIENT)
Dept: INFUSION THERAPY | Age: 55
End: 2020-08-19

## 2020-08-19 NOTE — TELEPHONE ENCOUNTER
Rec'd call from [address] [place] [address] stating they have found a potential interaction between [facility] and want to clarify if Dr. Berny Alvarez wants to keep both meds or make adjustments to treatment plan. Discussed with Dr. Berny Alvarez who stated to continue tx as ordered and he will adjust pain meds according to pt's pain. Return call to [id number] to inform of above. They stated they will send med to pt as ordered.

## 2020-08-20 ENCOUNTER — TELEPHONE (OUTPATIENT)
Dept: INFUSION THERAPY | Age: 55
End: 2020-08-20

## 2020-08-21 ENCOUNTER — HOSPITAL ENCOUNTER (OUTPATIENT)
Dept: INFUSION THERAPY | Age: 55
Discharge: HOME | End: 2020-08-21
Payer: COMMERCIAL

## 2020-08-21 ENCOUNTER — OFFICE VISIT (OUTPATIENT)
Dept: HEMATOLOGY | Age: 55
End: 2020-08-21
Payer: COMMERCIAL

## 2020-08-21 VITALS
BODY MASS INDEX: 23.57 KG/M2 | TEMPERATURE: 97.5 F | HEART RATE: 90 BPM | DIASTOLIC BLOOD PRESSURE: 62 MMHG | SYSTOLIC BLOOD PRESSURE: 118 MMHG | HEIGHT: 63 IN | OXYGEN SATURATION: 98 % | WEIGHT: 133 LBS

## 2020-08-21 VITALS
DIASTOLIC BLOOD PRESSURE: 62 MMHG | HEART RATE: 90 BPM | SYSTOLIC BLOOD PRESSURE: 118 MMHG | OXYGEN SATURATION: 98 % | TEMPERATURE: 97.5 F | BODY MASS INDEX: 23.73 KG/M2 | WEIGHT: 133.9 LBS | HEIGHT: 63 IN

## 2020-08-21 DIAGNOSIS — C50.919 HER2-POSITIVE CARCINOMA OF BREAST (HCC): ICD-10-CM

## 2020-08-21 DIAGNOSIS — C50.412 MALIGNANT NEOPLASM OF UPPER-OUTER QUADRANT OF LEFT FEMALE BREAST, UNSPECIFIED ESTROGEN RECEPTOR STATUS (HCC): Primary | ICD-10-CM

## 2020-08-21 LAB
ALBUMIN SERPL-MCNC: 4.2 G/DL (ref 3.5–5.2)
ALP BLD-CCNC: 91 U/L (ref 35–104)
ALT SERPL-CCNC: 35 U/L (ref 9–52)
ANION GAP SERPL CALCULATED.3IONS-SCNC: 10 MMOL/L (ref 7–19)
AST SERPL-CCNC: 50 U/L (ref 14–36)
BASOPHILS ABSOLUTE: 0.02 K/UL (ref 0.01–0.08)
BASOPHILS RELATIVE PERCENT: 0.7 % (ref 0.1–1.2)
BILIRUB SERPL-MCNC: 0.3 MG/DL (ref 0.2–1.3)
BUN BLDV-MCNC: 13 MG/DL (ref 7–17)
CA 15-3: 32.19 U/ML (ref 0–35)
CALCIUM SERPL-MCNC: 9.1 MG/DL (ref 8.4–10.2)
CHLORIDE BLD-SCNC: 102 MMOL/L (ref 98–111)
CO2: 30 MMOL/L (ref 22–29)
CREAT SERPL-MCNC: 0.8 MG/DL (ref 0.5–1)
EOSINOPHILS ABSOLUTE: 0.1 K/UL (ref 0.04–0.54)
EOSINOPHILS RELATIVE PERCENT: 3.3 % (ref 0.7–7)
GFR NON-AFRICAN AMERICAN: >60
GLOBULIN: 4 G/DL
GLUCOSE BLD-MCNC: 115 MG/DL (ref 74–106)
HCT VFR BLD CALC: 34.1 % (ref 34.1–44.9)
HEMOGLOBIN: 10.3 G/DL (ref 11.2–15.7)
LYMPHOCYTES ABSOLUTE: 0.87 K/UL (ref 1.18–3.74)
LYMPHOCYTES RELATIVE PERCENT: 28.3 % (ref 19.3–53.1)
MCH RBC QN AUTO: 26.8 PG (ref 25.6–32.2)
MCHC RBC AUTO-ENTMCNC: 30.2 G/DL (ref 32.3–35.5)
MCV RBC AUTO: 88.6 FL (ref 79.4–94.8)
MONOCYTES ABSOLUTE: 0.61 K/UL (ref 0.24–0.82)
MONOCYTES RELATIVE PERCENT: 19.9 % (ref 4.7–12.5)
NEUTROPHILS ABSOLUTE: 1.47 K/UL (ref 1.56–6.13)
NEUTROPHILS RELATIVE PERCENT: 47.8 % (ref 34–71.1)
PDW BLD-RTO: 20.8 % (ref 11.7–14.4)
PLATELET # BLD: 199 K/UL (ref 182–369)
PMV BLD AUTO: 9.4 FL (ref 7.4–10.4)
POTASSIUM SERPL-SCNC: 3.8 MMOL/L (ref 3.5–5.1)
RBC # BLD: 3.85 M/UL (ref 3.93–5.22)
SODIUM BLD-SCNC: 142 MMOL/L (ref 137–145)
TOTAL PROTEIN: 8.2 G/DL (ref 6.3–8.2)
WBC # BLD: 3.07 K/UL (ref 3.98–10.04)

## 2020-08-21 PROCEDURE — 85025 COMPLETE CBC W/AUTO DIFF WBC: CPT

## 2020-08-21 PROCEDURE — 6360000002 HC RX W HCPCS: Performed by: INTERNAL MEDICINE

## 2020-08-21 PROCEDURE — 2580000003 HC RX 258: Performed by: INTERNAL MEDICINE

## 2020-08-21 PROCEDURE — 99214 OFFICE O/P EST MOD 30 MIN: CPT | Performed by: INTERNAL MEDICINE

## 2020-08-21 PROCEDURE — 96413 CHEMO IV INFUSION 1 HR: CPT

## 2020-08-21 PROCEDURE — 86300 IMMUNOASSAY TUMOR CA 15-3: CPT

## 2020-08-21 PROCEDURE — 80053 COMPREHEN METABOLIC PANEL: CPT

## 2020-08-21 PROCEDURE — 96375 TX/PRO/DX INJ NEW DRUG ADDON: CPT

## 2020-08-21 RX ORDER — HEPARIN SODIUM (PORCINE) LOCK FLUSH IV SOLN 100 UNIT/ML 100 UNIT/ML
500 SOLUTION INTRAVENOUS PRN
Status: DISCONTINUED | OUTPATIENT
Start: 2020-08-21 | End: 2020-08-21 | Stop reason: HOSPADM

## 2020-08-21 RX ORDER — EPINEPHRINE 1 MG/ML
0.3 INJECTION, SOLUTION, CONCENTRATE INTRAVENOUS PRN
Status: CANCELLED | OUTPATIENT
Start: 2020-08-21

## 2020-08-21 RX ORDER — METHYLPREDNISOLONE SODIUM SUCCINATE 125 MG/2ML
125 INJECTION, POWDER, LYOPHILIZED, FOR SOLUTION INTRAMUSCULAR; INTRAVENOUS ONCE
Status: CANCELLED | OUTPATIENT
Start: 2020-08-21

## 2020-08-21 RX ORDER — ACETAMINOPHEN 325 MG/1
1000 TABLET ORAL ONCE
Status: DISCONTINUED | OUTPATIENT
Start: 2020-08-21 | End: 2020-08-21 | Stop reason: HOSPADM

## 2020-08-21 RX ORDER — DIPHENHYDRAMINE HYDROCHLORIDE 50 MG/ML
50 INJECTION INTRAMUSCULAR; INTRAVENOUS ONCE
Status: CANCELLED | OUTPATIENT
Start: 2020-08-21

## 2020-08-21 RX ORDER — HEPARIN SODIUM (PORCINE) LOCK FLUSH IV SOLN 100 UNIT/ML 100 UNIT/ML
500 SOLUTION INTRAVENOUS PRN
Status: DISCONTINUED | OUTPATIENT
Start: 2020-08-21 | End: 2020-08-22 | Stop reason: HOSPADM

## 2020-08-21 RX ORDER — SODIUM CHLORIDE 0.9 % (FLUSH) 0.9 %
10 SYRINGE (ML) INJECTION PRN
Status: DISCONTINUED | OUTPATIENT
Start: 2020-08-21 | End: 2020-08-22 | Stop reason: HOSPADM

## 2020-08-21 RX ORDER — SODIUM CHLORIDE 0.9 % (FLUSH) 0.9 %
5 SYRINGE (ML) INJECTION PRN
Status: CANCELLED | OUTPATIENT
Start: 2020-08-21

## 2020-08-21 RX ORDER — MEPERIDINE HYDROCHLORIDE 50 MG/ML
12.5 INJECTION INTRAMUSCULAR; INTRAVENOUS; SUBCUTANEOUS ONCE
Status: CANCELLED | OUTPATIENT
Start: 2020-08-21

## 2020-08-21 RX ORDER — ACETAMINOPHEN 500 MG
1000 TABLET ORAL ONCE
Status: DISCONTINUED | OUTPATIENT
Start: 2020-08-21 | End: 2020-08-23 | Stop reason: HOSPADM

## 2020-08-21 RX ORDER — SODIUM CHLORIDE 9 MG/ML
20 INJECTION, SOLUTION INTRAVENOUS ONCE
Status: CANCELLED | OUTPATIENT
Start: 2020-08-21

## 2020-08-21 RX ORDER — DIPHENHYDRAMINE HYDROCHLORIDE 50 MG/ML
25 INJECTION INTRAMUSCULAR; INTRAVENOUS ONCE
Status: DISCONTINUED | OUTPATIENT
Start: 2020-08-21 | End: 2020-08-21 | Stop reason: HOSPADM

## 2020-08-21 RX ORDER — SODIUM CHLORIDE 9 MG/ML
INJECTION, SOLUTION INTRAVENOUS CONTINUOUS
Status: CANCELLED | OUTPATIENT
Start: 2020-08-21

## 2020-08-21 RX ORDER — SODIUM CHLORIDE 0.9 % (FLUSH) 0.9 %
10 SYRINGE (ML) INJECTION PRN
Status: DISCONTINUED | OUTPATIENT
Start: 2020-08-21 | End: 2020-08-21 | Stop reason: HOSPADM

## 2020-08-21 RX ORDER — DIPHENHYDRAMINE HYDROCHLORIDE 50 MG/ML
25 INJECTION INTRAMUSCULAR; INTRAVENOUS ONCE
Status: COMPLETED | OUTPATIENT
Start: 2020-08-21 | End: 2020-08-21

## 2020-08-21 RX ADMIN — TRASTUZUMAB 359 MG: 150 INJECTION, POWDER, LYOPHILIZED, FOR SOLUTION INTRAVENOUS at 15:39

## 2020-08-21 RX ADMIN — SODIUM CHLORIDE, PRESERVATIVE FREE 10 ML: 5 INJECTION INTRAVENOUS at 16:12

## 2020-08-21 RX ADMIN — DIPHENHYDRAMINE HYDROCHLORIDE 25 MG: 50 INJECTION, SOLUTION INTRAMUSCULAR; INTRAVENOUS at 15:24

## 2020-08-21 RX ADMIN — HEPARIN 500 UNITS: 100 SYRINGE at 16:13

## 2020-08-21 NOTE — PROGRESS NOTES
Keren Mueller   1965 8/21/2020     Chief Complaint   Patient presents with    Cancer      Interval history/history of present illness:  Diagnosis   IDC left breast, March 2016   Grade 2. ER 1%, IL 6%, HER-2/oseas IHC 3+/FISH amplified ratio 6.7, AR 95%. 01/30/2017- Biopsy-proven in breast relapse/Inflammatory breast cancer. ER negative, IL negative, HER-2/oseas IHC 3+/FISH amplified ratio 4.5, Ki-67 32%. MyRisk - Revealed no clinically significant mutation. PTEN VUS. Treatment summary  Neoadjuvant adjuvant chemotherapy TCH-P ×6 cycles. She declined to complete 1 year of Herceptin. She declined radiation therapy. 10/3/2016-left lumpectomy/sentinel lymph node   01/30/2017- Biopsy-proven in breast relapse/Inflammatory breast cancer. 5/11/2017-Chemotherapy with paclitaxel/carboplatin weekly. Herceptin/Pertuzumab every 3 weeks started 05/11/2017. She had a allergic reaction to carboplatin and this was discontinued. 7/21/2017- 9/1/2017 Neuropathy grade 2 with Taxol. Therefore, switched to Taxotere/Herceptin/Pertuzumab. Discontinued due to progression of disease. 9/22/2017 through 3/2/2018Gorge Abdias every 3 weeks. (stopped as per patient request, neuropathy grade 1)   7/10/2018 through 11/30/2018- Kadcyla.    1/4/2019-Navelbine x 1 cycle with very poor tolerance (pain)   1/25/2018-Gemzar/Herceptin   3/15/2019- low-dose Cisplatin added to Gemzar and Herceptin regimen, regimen changed to every other week   4/17/2019- palliative radiation to left clavicle/chest wall for anticipate total dosing of 5040 cGy   6/22/2019- Xeloda/apatinib  January 2020-palliative radiation to the right breast.  1/10/2020- palliative treatment with Fam-Trastuzumab- stopped Feb due to severe toxicity  Anticipated Herceptin, Xeloda and Tucatinib  Tumor target  Skin chest wall left  upper chest/left breast     Interval history  The patient is a 47years old female who has a diagnosis of recurrent HER-2 positive/IL negative breast cancer with extensive skin involvement/chest wall involvement. She received treatment with Fam-trastuzumab but had poor tolerance due to chemotherapy-induced neutropenia and severe fatigue. Therefore she elected to discontinue treatment. Unfortunately, she had significant disease recurrence in the left chest wall with a several nodules in the left lateral chest.  She was referred to radiation for consideration of palliative radiation and will start treatment next week. .  Him earlierWe have also discussed about a new treatment option with Herceptin, Xeloda and Tucatinib. The patient is interested in starting this treatment. Cancer history- Left breast recurrence with Inflammatory Breast Cancer ER 8%/NC negative &HER-2 positive. Ms Pratt was seen in initial oncology consultation on 4/20/2017 referred for a diagnosis of a left breast recurrence with inflammatory breast cancer. She was initially treated by Dr. Aria Phelps at [facility]. Prior breast cancer history is as follows:  3/22/2016-a diagnostic mammogram for a palpable mass showed a left breast abnormal findings. 4/1/2017- ultrasound-guided core needle biopsy was consistent with invasive ductal carcinoma, grade 2. ER 1%, NC 6%, HER-2/oseas IHC 3+/FISH amplified ratio 6.7., AR 95%. She underwent 6 cycles of TCH-P from April 2016 through September 2016 with G-CSF support. She had a total of 18 weeks of trastuzumab treatment. 10/3/2016-she underwent a left lumpectomy with left sentinel lymph node revealing an invasive ductal carcinoma, grade 3. Margins clear of invasive carcinoma (1.1 cm from the lateral margin), extensive lymphovascular space invasion. 2 out of 3 sentinel lymph nodes positive for metastatic carcinoma. Final pathologic staging [id number])M0.    She declined adjuvant radiation therapy and adjuvant Herceptin completion.   -------------------In-breast Recurrence [id number]---------------------------  1/30/2017- she was seen by  Jose Ash with new complaints of erythema in the left breast. A skin punch biopsy was consistent with metastatic ductal carcinoma with extensive dermal lymphovascular invasion. ER negative, OR negative, HER-2/oseas IHC 3+/FISH amplified ratio 4.5, Ki-67 32%. Foundation one showed ERBB2 amplification, AURKA amplification, TP53 and .   2/15/2017- she was again seen by Dr. Octavia Persaud and explained about her tumor recurrence. Unfortunately, she declined any further intervention at that time. 4/21/2017- she asked Dr. Octavia Persaud for a referral to us.   4/28/2017-referred to me for further evaluation. I recommended a PET scan and neoadjuvant chemotherapy with carboplatin/paclitaxel and Perjeta and Herceptin. 5/4/2017-PET/CT scan revealed abnormal metabolic activity present in multiple regions throughout the left breast and in the skin( SUV 8.9, 7.4, 7.3). In the left supraclavicular region a cluster of lymph nodes present (SUV of 3). Axillary lymph nodes with SUV of 2.8. Tonsils bilaterally with SUV 5.6 (symmetric). No abnormal metabolic activity in the intrathoracic or intra-abdominal pelvic region. No abnormal skeletal metabolic activity. I recommended weekly carboplatin/weekly Taxol/Herceptin and Pertuzumab.   5/26/2017-she had a severe allergic reaction after 2 doses of carboplatin and therefore this was discontinued. She was continued only on Taxol weekly/Herceptin and Pertuzumab.   7/21/2017-after cycle #2 and 8 doses of Taxol she developed neuropathy related to chemotherapy and therefore chemotherapy was switched from Taxol to Taxotere. Genetic assessment- [facility] - revealed no clinically significant mutation. PTEN VUS.   9/1/2017-local in breast disease progression with increasing breast erythema. Treatment switched to Kadcyla. 12/27/2017- repeat re-staging CT scan of the abdomen and pelvis documented no evidence of intra-abdominal pelvic metastasis. Superior left renal cyst. Mild hepatic steatosis.  CT scan of the chest documented no evidence of intrathoracic metastasis. Bone scan documented no evidence of bony metastatic disease. 3/2/2018-after 8 cycles of Kadcyla she requested treatment to be on hold. She has requested her PCP a referral to [facility] for a second opinion. She has neuropathy grade 1.   5/2/2018- 2 months off treatment (Kadcyla). She has not yet been seen at [facility]. Apparently, [place] is gathering her records and will see her soon. Examination of her chest wall showed recurrent disease in the left upper chest. Residual neuropathy grade 1. She could not tolerate gabapentin, and therefore has stopped it. She was not interested in any other medication for neuropathy at this time. 7/10/2018-after discussion at [facility] she was recommended to reinitiate Kadcyla. 7/13/2018-CT of chest, abdomen, pelvis showed no evidence of intra-abdominal disease. There is an enlarged right-sided level 1 lymph node that was not appreciated previously, measuring 2.5 x 1.4 cm, metastatic disease considered. 7/16/2018-PET CT scan showed diffuse uptake throughout the left breast extending to the left chest wall. Abnormal uptake within the left axilla lymph nodes and left internal mammary lymph nodes concerning for metastatic disease. Enlarged hypermetabolic right axilla lymph node concerning for metastatic disease with SUV 8.0. Abnormal uptake within a right cervical lymph node, etiology unclear, but could be reactive. 10/26/2018 - CT scan of the chest was completed at [facility] and compared to CT scan of 12/27/2017 versus her last CT scan completed at [facility] on 7/13/2018. The CT scan identified a right axillary lymph node measuring 1.2 cm, otherwise no intrathoracic neoplastic process/metastatic disease. 10/26/2018 -CT scan of the abdomen and pelvis was completed at [facility] and compared to CT scan of 12/27/2017 versus her last CT scan completed at [facility] on 7/13/2018.  No evidence of intra-abdominal or pelvic metastasis was identified. December 2018-interval worsening of skin metastasis in the left breast and diffuse erythema with new nodules. 12/17/2018-skin biopsy of overlying right breast consistent invasive ductal carcinoma, high-grade. ER 20%, AR 0%, HER-2/oseas 3+.  12/17/2018-right axillary FNA biopsy consistent invasive ductal carcinoma. 12/26/2018-recommended clinical trial at [facility]. 1/4/2019-started on Navelbine/Herceptin, due to delays on the clinical trial at [facility]. Unfortunately, she had severe chest wall pain and generalized pain related to Navelbine. 1/25/2019-4/19/2019 she was switched to cisplatin/Gemzar/Herceptin, but had disease progression. 5/30/2019-CT chest, abdomen, pelvis and bone scan was unremarkable for right axillary adenopathy measuring 2 cm. Lymph node located in the upper chest wall under the pectoralis muscle measuring 1.2 cm. Right breast with 2 areas with masslike enhancement. No evidence of metastatic disease in the abdomen pelvis. Bone scan was unremarkable for metastatic bone disease. June 2019-very poor tolerance to cisplatin, Gemzar/Herceptin. She was recommended Xeloda/ Lapatinib.   6/22/2019-she was recommended and started on Xeloda 850 mg/m2 PO BID days 1 through 14 every 21 days and Lapatinib 1250 mg daily continuously. November 2019- progression on prior Xeloda and lapatinib.  11/23/2019- recommended rechallenge with Herceptin and Navelbine vs clinical trial at [facility]. 12/23/2019-CT chest abdomen pelvis showed metastatic disease in the right axillary lymph node and diffuse bilateral skin involvement of both breasts with several nodules. No evidence of pulmonary, liver or bone metastasis. CT of the head with contrast was unremarkable. 1/3/2020- the patient was unable to follow-up at [facility] for clinical trial.  Therefore recommended Fam-trastuzumab deruxtecan every 3 weeks.   1/10/2020-she was started Meryle Sat, MD at [place] [id number] history:  Social History     Socioeconomic History    Marital status:      Spouse name: Not on file    Number of children: Not on file    Years of education: Not on file    Highest education level: Not on file   Occupational History    Not on file   Social Needs    Financial resource strain: Not on file    Food insecurity     Worry: Not on file     Inability: Not on file    Transportation needs     Medical: Not on file     Non-medical: Not on file   Tobacco Use    Smoking status: Never Smoker    Smokeless tobacco: Never Used   Substance and Sexual Activity    Alcohol use: No     Alcohol/week: 0.0 standard drinks    Drug use: No    Sexual activity: Not on file   Lifestyle    Physical activity     Days per week: Not on file     Minutes per session: Not on file    Stress: Not on file   Relationships    Social connections     Talks on phone: Not on file     Gets together: Not on file     Attends Zoroastrianism service: Not on file     Active member of club or organization: Not on file     Attends meetings of clubs or organizations: Not on file     Relationship status: Not on file    Intimate partner violence     Fear of current or ex partner: Not on file     Emotionally abused: Not on file     Physically abused: Not on file     Forced sexual activity: Not on file   Other Topics Concern    Not on file   Social History Narrative    Not on file        Family history:   Family History   Problem Relation Age of Onset    Lung Cancer Father     Colon Cancer Father     Prostate Cancer Father     Hypertension Mother         Current Outpatient Medications   Medication Sig Dispense Refill    Morphine Sulfate ER 30 MG T12A Take 30 mg by mouth 2 times daily for 30 days.  60 tablet 0    Tucatinib (TUKYSA) 150 MG TABS Take 300 mg by mouth 2 times daily 120 tablet 5    capecitabine (XELODA) 150 MG chemo tablet Take 2 tablets by mouth 2 times daily for 14 days Of a  21 day cycle 56 tablet 0    capecitabine (XELODA) 500 MG chemo tablet Take 2 tablets by mouth 2 times daily for 14 days Of a  21 day cycle 56 tablet 0    oxyCODONE-acetaminophen (PERCOCET) 7.5-325 MG per tablet Take 1 tablet by mouth every 6 hours as needed for Pain for up to 30 days.  Intended supply: 30 days 120 tablet 0    Zinc Sulfate (ZINC 15 PO) Take by mouth daily      Cholecalciferol (VITAMIN D3) 1.25 MG (69240 UT) CAPS Take by mouth daily Unknown amount of Vitamins D3      APPLE CIDER VINEGAR PO Take by mouth      Probiotic Product (PROBIOTIC-10 PO) Take by mouth      Turmeric 500 MG CAPS Take by mouth      SELENIUM ER PO Take by mouth      Multiple Vitamin (MULTI VITAMIN DAILY PO) Take by mouth       Current Facility-Administered Medications   Medication Dose Route Frequency Provider Last Rate Last Dose    acetaminophen (TYLENOL) tablet 975 mg  975 mg Oral Once Khurram Bailey MD        diphenhydrAMINE (BENADRYL) injection 25 mg  25 mg Intravenous Once Khurram Bailey MD        trastuzumab (HERCEPTIN) 359 mg in sodium chloride 0.9 % 250 mL chemo IVPB  6 mg/kg (Treatment Plan Recorded) Intravenous Once Khurram Bailey MD        sodium chloride flush 0.9 % injection 10 mL  10 mL Intravenous PRN Khurram Bailey MD        heparin flush 100 UNIT/ML injection 500 Units  500 Units Intracatheter PRN Khurram Bailey MD            REVIEW OF SYSTEMS:    Constitutional: no fever, no night sweats, fatigue;   HEENT: no blurring of vision, no double vision, no hearing difficulty, no tinnitus,no ulceration, no dental caries, no dysphagia, no epistaxis  Lungs: no cough, no shortness of breath, no wheeze;   CVS: no palpitation, no chest pain, no shortness of breath;  GI: no abdominal pain, no nausea , no vomiting, no constipation; no diarrhea  ORLIN: no dysuria, frequency and urgency, no hematuria, no kidney stones;   Musculoskeletal: no joint pain, swelling , stiffness;   Endocrine: no polyuria, polydypsia, no cold or heat intolerence; Hematology/lymphatic: no easy brusing or bleeding, no hx of clotting disorder; no peripheral adenopathy. Dermatology: Skin nodules consistent with metastatic disease, no eczema, no pruritis;   Psychiatry: no depression, no anxiety,no panic attacks, no suicide ideation; Neurology: no syncope, no seizures, no numbness or tingling of hands, no numbness and tingling of feet, no paresis;     PHYSICAL EXAM:    /62   Pulse 90   Temp 97.5 °F (36.4 °C)   Ht 5' 3\" (1.6 m)   Wt 133 lb (60.3 kg)   LMP 02/29/2016 (Approximate)   SpO2 98%   BMI 23.56 kg/m²   /62   Pulse 90   Temp 97.5 °F (36.4 °C)   Ht 5' 3\" (1.6 m)   Wt 133 lb (60.3 kg)   LMP 02/29/2016 (Approximate)   SpO2 98%   BMI 23.56 kg/m²     Pain scale:5    CONSTITUTIONAL: Alert, appropriate, no acute distress,   EYES: Non icteric, EOM intact, pupils equal round and reactive to light and accommodation. ENT: Oral mucus membranes moist, no oral pharyngeal lesions. External inspection of ears and nose are normal.   NECK: Supple, no masses. No palpable thyroid mass    CHEST/LUNGS: CTA bilaterally, normal respiratory effort   CARDIOVASCULAR: RRR, no murmurs. No lower extremity edema   ABDOMEN: soft non-tender, active bowel sounds, no hepatosplenomegaly. No palpable masses. EXTREMITIES: warm, Full ROM of all fours extremities. No focal weakness. SKIN: Skin nodules his metastatic disease in the chest wall  LYMPH: No cervical, clavicular, axillary, or inguinal lymphadenopathy  NEUROLOGIC: follows commands, non focal.   PSYCH: mood and affect appropriate. Alert and oriented to time and place and person. Relevant Lab findings: Labs reviewed with the patient. Relevant Imaging studies:   No results found.     ASSESSMENT:    Orders Placed This Encounter   Procedures    Comprehensive Metabolic Panel     Standing Status:   Future     Number of Occurrences:   1     Standing Expiration Date: 8/21/2021    Cancer Antigen 15-3     Standing Status:   Future     Number of Occurrences:   1     Standing Expiration Date:   8/21/2021    Cancer Antigen 27.29     Standing Status:   Future     Number of Occurrences:   1     Standing Expiration Date:   8/21/2021   [facility] Provider Information     21-day or Weekly cycle until disease progression or unacceptable toxicity. For trastuzumab: Ejection fraction should be assessed prior to initiation of therapy, approximately every 3 months during treatment, and upon completion of therapy. This drug is considered a teratogen and severe life-threatening human birth defects (including fetal death) may occur. Standing Status:   Standing     Number of Occurrences:   1    Care order/instruction     For trastuzumab: This agent may cause pulmonary toxicity. Monitor for nonspecific respiratory signs and symptoms, including hypoxia, pleural effusion, cough, or dyspnea. Standing Status:   Standing     Number of Occurrences:   1    Treatment conditions     Notify provider prior to releasing chemotherapy if any of the following are true:      LVEF is LESS than 50%     Standing Status:   Standing     Number of Occurrences:   1    Proceed with chemo administration     I have verified that:    1) The Cycle/Course/Week/Day are correct;    2) All treatment conditions have been met;    3) Lab values are within specified parameters;    4) Treatment start time has been adjusted on the [facility] to account for treatment timing, if necessary     Standing Status:   Standing     Number of Occurrences:   1      Elia Funk was seen today for cancer.     Diagnoses and all orders for this visit:    Chemotherapy management, encounter for    Encounter for antineoplastic immunotherapy    Adverse effect of chemotherapy, initial encounter    Care plan discussed with patient    Malignant neoplasm of upper-outer quadrant of left female breast, unspecified estrogen receptor status ([facility] [address]  - 35.4  CA 27.29- 87.6    CT chest abdomen pelvis showed no evidence of intrathoracic or intra-abdominal metastatic disease    The skin lesions are much worse. There are new skin rashes that are mostly consistent with progression of her metastatic HER-2 positive lesions in the skin. She is currently awaiting palliative RT initiation. Apparently, this will initiate next week. Recommended Herceptin, Xeloda and Tucatinib started 8/21/2020    Painful skin metastasis - status post palliative RT with significant improvement. However, new lesions the left chest wall. Recommended further RT to the skin. Fatigue-related to chemotherapy. Cancer related pain- start morphine extended release 30 mg every 12 hours and continue Percocet. 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 She denies any significant change from previous evaluation. Plan:  1. Start Herceptin, Xeloda and Tucatinib. 2. She was referred to radiation oncology and will follow-up next week. 3. RTC 3 weeks MD      I have seen, examined and reviewed this patient medication list, appropriate labs and imaging studies. I reviewed relevant medical records and others physicians notes. I discussed the plans of care with the patient. I answered all the questions to the patients satisfaction  The selection, dosing administration of anticancer agents in the management of associated toxicities are complex. Modifications of drug dose and schedule and the initiation of supportive care interventions are often necessary because of expected toxicities individual patient variability, prior treatment and comorbidity. The optimal delivery of anticancer agents therefore requires a healthcare delivery team experienced in the use of anticancer agents and the management of associated toxicities in patients with cancer. Follow Up:     Return in about 3 weeks (around 9/11/2020) for treatment and see Dr. Priyanka Gillette.    Data Unavailable Svitlana Card am scribing for Marizol Morgan MD. Electronically signed by Crescencio Garcia on 8/21/2020 at 8:30 AM.     I, Dr Alen Howell, personally performed the services described in this documentation as scribed by Crescencio Garcia MA in my presence and is both accurate and complete. Over 50% of the total visit time of 25 minutes in face to face encounter with the patient, out of which more than 50% of the time was spent in counseling patient or family and coordination of care. Counseling included but was not limited to time spent reviewing labs, imaging studies/ treatment plan and answering questions.

## 2020-08-24 ENCOUNTER — HOSPITAL ENCOUNTER (OUTPATIENT)
Dept: RADIATION ONCOLOGY | Facility: HOSPITAL | Age: 55
Setting detail: RADIATION/ONCOLOGY SERIES
Discharge: HOME | End: 2020-08-24

## 2020-08-24 PROCEDURE — 77385: CPT | Performed by: RADIOLOGY

## 2020-08-24 PROCEDURE — 77332 RADIATION TREATMENT AID(S): CPT | Performed by: RADIOLOGY

## 2020-08-25 ENCOUNTER — HOSPITAL ENCOUNTER (OUTPATIENT)
Dept: RADIATION ONCOLOGY | Facility: HOSPITAL | Age: 55
Setting detail: RADIATION/ONCOLOGY SERIES
Discharge: HOME | End: 2020-08-25

## 2020-08-25 PROCEDURE — 77385: CPT | Performed by: RADIOLOGY

## 2020-08-26 ENCOUNTER — HOSPITAL ENCOUNTER (OUTPATIENT)
Dept: RADIATION ONCOLOGY | Facility: HOSPITAL | Age: 55
Setting detail: RADIATION/ONCOLOGY SERIES
Discharge: HOME | End: 2020-08-26

## 2020-08-26 PROCEDURE — 77385: CPT | Performed by: RADIOLOGY

## 2020-08-27 ENCOUNTER — HOSPITAL ENCOUNTER (OUTPATIENT)
Dept: RADIATION ONCOLOGY | Facility: HOSPITAL | Age: 55
Setting detail: RADIATION/ONCOLOGY SERIES
Discharge: HOME | End: 2020-08-27

## 2020-08-27 PROCEDURE — 77385: CPT | Performed by: RADIOLOGY

## 2020-08-27 PROCEDURE — 77336 RADIATION PHYSICS CONSULT: CPT | Performed by: RADIOLOGY

## 2020-08-28 ENCOUNTER — HOSPITAL ENCOUNTER (OUTPATIENT)
Dept: RADIATION ONCOLOGY | Facility: HOSPITAL | Age: 55
Setting detail: RADIATION/ONCOLOGY SERIES
Discharge: HOME | End: 2020-08-28

## 2020-08-28 ENCOUNTER — HOSPITAL ENCOUNTER (OUTPATIENT)
Dept: INFUSION THERAPY | Age: 55
End: 2020-08-28
Payer: COMMERCIAL

## 2020-08-28 PROCEDURE — 77385: CPT | Performed by: RADIOLOGY

## 2020-08-31 ENCOUNTER — HOSPITAL ENCOUNTER (OUTPATIENT)
Dept: RADIATION ONCOLOGY | Facility: HOSPITAL | Age: 55
Setting detail: RADIATION/ONCOLOGY SERIES
Discharge: HOME | End: 2020-08-31

## 2020-08-31 ENCOUNTER — HOSPITAL ENCOUNTER (OUTPATIENT)
Dept: INFUSION THERAPY | Age: 55
Discharge: HOME | End: 2020-08-31
Payer: COMMERCIAL

## 2020-08-31 DIAGNOSIS — C50.412 MALIGNANT NEOPLASM OF UPPER-OUTER QUADRANT OF LEFT FEMALE BREAST, UNSPECIFIED ESTROGEN RECEPTOR STATUS (HCC): ICD-10-CM

## 2020-08-31 PROCEDURE — 85025 COMPLETE CBC W/AUTO DIFF WBC: CPT

## 2020-08-31 PROCEDURE — 77385: CPT | Performed by: RADIOLOGY

## 2020-09-01 ENCOUNTER — HOSPITAL ENCOUNTER (OUTPATIENT)
Dept: RADIATION ONCOLOGY | Facility: HOSPITAL | Age: 55
Setting detail: RADIATION/ONCOLOGY SERIES
End: 2020-09-01

## 2020-09-01 ENCOUNTER — HOSPITAL ENCOUNTER (OUTPATIENT)
Dept: RADIATION ONCOLOGY | Facility: HOSPITAL | Age: 55
Setting detail: RADIATION/ONCOLOGY SERIES
Discharge: HOME | End: 2020-09-01

## 2020-09-01 LAB
BASOPHILS ABSOLUTE: 0.01 K/UL (ref 0.01–0.08)
BASOPHILS RELATIVE PERCENT: 0.5 % (ref 0.1–1.2)
EOSINOPHILS ABSOLUTE: 0.05 K/UL (ref 0.04–0.54)
EOSINOPHILS RELATIVE PERCENT: 2.6 % (ref 0.7–7)
HCT VFR BLD CALC: 33.6 % (ref 34.1–44.9)
HEMOGLOBIN: 10.4 G/DL (ref 11.2–15.7)
LYMPHOCYTES ABSOLUTE: 0.21 K/UL (ref 1.18–3.74)
LYMPHOCYTES RELATIVE PERCENT: 10.9 % (ref 19.3–53.1)
MCH RBC QN AUTO: 28 PG (ref 25.6–32.2)
MCHC RBC AUTO-ENTMCNC: 31 G/DL (ref 32.3–35.5)
MCV RBC AUTO: 90.3 FL (ref 79.4–94.8)
MONOCYTES ABSOLUTE: 0.59 K/UL (ref 0.24–0.82)
MONOCYTES RELATIVE PERCENT: 30.6 % (ref 4.7–12.5)
NEUTROPHILS ABSOLUTE: 1.07 K/UL (ref 1.56–6.13)
NEUTROPHILS RELATIVE PERCENT: 55.4 % (ref 34–71.1)
PDW BLD-RTO: 20.2 % (ref 11.7–14.4)
PLATELET # BLD: 277 K/UL (ref 182–369)
PMV BLD AUTO: 9 FL (ref 7.4–10.4)
RBC # BLD: 3.72 M/UL (ref 3.93–5.22)
WBC # BLD: 1.93 K/UL (ref 3.98–10.04)

## 2020-09-01 PROCEDURE — 77385: CPT | Performed by: RADIOLOGY

## 2020-09-03 RX ORDER — OXYCODONE AND ACETAMINOPHEN 7.5; 325 MG/1; MG/1
1 TABLET ORAL EVERY 6 HOURS PRN
Qty: 120 TABLET | Refills: 0 | Status: SHIPPED | OUTPATIENT
Start: 2020-09-03 | End: 2020-10-30 | Stop reason: SDUPTHER

## 2020-09-04 ENCOUNTER — HOSPITAL ENCOUNTER (OUTPATIENT)
Dept: INFUSION THERAPY | Age: 55
Discharge: HOME | End: 2020-09-04
Payer: COMMERCIAL

## 2020-09-04 DIAGNOSIS — C50.412 MALIGNANT NEOPLASM OF UPPER-OUTER QUADRANT OF LEFT FEMALE BREAST, UNSPECIFIED ESTROGEN RECEPTOR STATUS (HCC): ICD-10-CM

## 2020-09-04 PROCEDURE — 85025 COMPLETE CBC W/AUTO DIFF WBC: CPT

## 2020-09-08 ENCOUNTER — HOSPITAL ENCOUNTER (OUTPATIENT)
Dept: RADIATION ONCOLOGY | Facility: HOSPITAL | Age: 55
Setting detail: RADIATION/ONCOLOGY SERIES
Discharge: HOME | End: 2020-09-08

## 2020-09-08 LAB
BASOPHILS ABSOLUTE: 0.01 K/UL (ref 0.01–0.08)
BASOPHILS RELATIVE PERCENT: 0.5 % (ref 0.1–1.2)
EOSINOPHILS ABSOLUTE: 0.06 K/UL (ref 0.04–0.54)
EOSINOPHILS RELATIVE PERCENT: 3.3 % (ref 0.7–7)
HCT VFR BLD CALC: 33.9 % (ref 34.1–44.9)
HEMOGLOBIN: 10.5 G/DL (ref 11.2–15.7)
LYMPHOCYTES ABSOLUTE: 0.21 K/UL (ref 1.18–3.74)
LYMPHOCYTES RELATIVE PERCENT: 11.5 % (ref 19.3–53.1)
MCH RBC QN AUTO: 27.7 PG (ref 25.6–32.2)
MCHC RBC AUTO-ENTMCNC: 31 G/DL (ref 32.3–35.5)
MCV RBC AUTO: 89.4 FL (ref 79.4–94.8)
MONOCYTES ABSOLUTE: 0.41 K/UL (ref 0.24–0.82)
MONOCYTES RELATIVE PERCENT: 22.4 % (ref 4.7–12.5)
NEUTROPHILS ABSOLUTE: 1.14 K/UL (ref 1.56–6.13)
NEUTROPHILS RELATIVE PERCENT: 62.3 % (ref 34–71.1)
PDW BLD-RTO: 20.6 % (ref 11.7–14.4)
PLATELET # BLD: 177 K/UL (ref 182–369)
PMV BLD AUTO: 9.6 FL (ref 7.4–10.4)
RBC # BLD: 3.79 M/UL (ref 3.93–5.22)
WBC # BLD: 1.83 K/UL (ref 3.98–10.04)

## 2020-09-08 PROCEDURE — 77385: CPT | Performed by: RADIOLOGY

## 2020-09-09 ENCOUNTER — HOSPITAL ENCOUNTER (OUTPATIENT)
Dept: RADIATION ONCOLOGY | Facility: HOSPITAL | Age: 55
Setting detail: RADIATION/ONCOLOGY SERIES
Discharge: HOME | End: 2020-09-09

## 2020-09-09 PROCEDURE — 77385: CPT | Performed by: RADIOLOGY

## 2020-09-10 ENCOUNTER — HOSPITAL ENCOUNTER (OUTPATIENT)
Dept: RADIATION ONCOLOGY | Facility: HOSPITAL | Age: 55
Setting detail: RADIATION/ONCOLOGY SERIES
Discharge: HOME | End: 2020-09-10

## 2020-09-10 PROCEDURE — 77336 RADIATION PHYSICS CONSULT: CPT | Performed by: RADIOLOGY

## 2020-09-10 PROCEDURE — 77385: CPT | Performed by: RADIOLOGY

## 2020-09-10 RX ORDER — CAPECITABINE 150 MG/1
TABLET, FILM COATED ORAL
Qty: 56 TABLET | Refills: 5 | Status: SHIPPED | OUTPATIENT
Start: 2020-09-10 | End: 2021-02-19 | Stop reason: SDUPTHER

## 2020-09-11 ENCOUNTER — HOSPITAL ENCOUNTER (OUTPATIENT)
Dept: INFUSION THERAPY | Age: 55
Discharge: HOME | End: 2020-09-11
Payer: COMMERCIAL

## 2020-09-11 ENCOUNTER — HOSPITAL ENCOUNTER (OUTPATIENT)
Dept: RADIATION ONCOLOGY | Facility: HOSPITAL | Age: 55
Setting detail: RADIATION/ONCOLOGY SERIES
Discharge: HOME | End: 2020-09-11

## 2020-09-11 DIAGNOSIS — C50.412 MALIGNANT NEOPLASM OF UPPER-OUTER QUADRANT OF LEFT FEMALE BREAST, UNSPECIFIED ESTROGEN RECEPTOR STATUS (HCC): ICD-10-CM

## 2020-09-11 LAB
BASOPHILS ABSOLUTE: 0.01 K/UL (ref 0.01–0.08)
BASOPHILS RELATIVE PERCENT: 0.6 % (ref 0.1–1.2)
EOSINOPHILS ABSOLUTE: 0.04 K/UL (ref 0.04–0.54)
EOSINOPHILS RELATIVE PERCENT: 2.4 % (ref 0.7–7)
HCT VFR BLD CALC: 35 % (ref 34.1–44.9)
HEMOGLOBIN: 10.8 G/DL (ref 11.2–15.7)
LYMPHOCYTES ABSOLUTE: 0.31 K/UL (ref 1.18–3.74)
LYMPHOCYTES RELATIVE PERCENT: 18.6 % (ref 19.3–53.1)
MCH RBC QN AUTO: 28.2 PG (ref 25.6–32.2)
MCHC RBC AUTO-ENTMCNC: 30.9 G/DL (ref 32.3–35.5)
MCV RBC AUTO: 91.4 FL (ref 79.4–94.8)
MONOCYTES ABSOLUTE: 0.59 K/UL (ref 0.24–0.82)
MONOCYTES RELATIVE PERCENT: 35.3 % (ref 4.7–12.5)
NEUTROPHILS ABSOLUTE: 0.72 K/UL (ref 1.56–6.13)
NEUTROPHILS RELATIVE PERCENT: 43.1 % (ref 34–71.1)
PDW BLD-RTO: 21.5 % (ref 11.7–14.4)
PLATELET # BLD: 81 K/UL (ref 182–369)
PMV BLD AUTO: 10 FL (ref 7.4–10.4)
RBC # BLD: 3.83 M/UL (ref 3.93–5.22)
WBC # BLD: 1.67 K/UL (ref 3.98–10.04)

## 2020-09-11 PROCEDURE — 77385: CPT | Performed by: RADIOLOGY

## 2020-09-11 PROCEDURE — 85025 COMPLETE CBC W/AUTO DIFF WBC: CPT

## 2020-09-15 ENCOUNTER — OFFICE VISIT (OUTPATIENT)
Dept: WOUND CARE | Facility: HOSPITAL | Age: 55
End: 2020-09-15

## 2020-09-15 ENCOUNTER — APPOINTMENT (OUTPATIENT)
Dept: RADIATION ONCOLOGY | Facility: HOSPITAL | Age: 55
End: 2020-09-15

## 2020-09-15 PROCEDURE — G0463 HOSPITAL OUTPT CLINIC VISIT: HCPCS

## 2020-09-15 RX ORDER — CAPECITABINE 500 MG/1
TABLET, FILM COATED ORAL
Qty: 56 TABLET | Refills: 5 | Status: SHIPPED | OUTPATIENT
Start: 2020-09-15 | End: 2021-02-19 | Stop reason: SDUPTHER

## 2020-09-16 ENCOUNTER — TELEPHONE (OUTPATIENT)
Dept: RADIATION ONCOLOGY | Facility: HOSPITAL | Age: 55
End: 2020-09-16

## 2020-09-16 ENCOUNTER — APPOINTMENT (OUTPATIENT)
Dept: RADIATION ONCOLOGY | Facility: HOSPITAL | Age: 55
End: 2020-09-16

## 2020-09-16 NOTE — TELEPHONE ENCOUNTER
Patient called and states that she is in too much pain to come in for radiation treatment today.  She states that she doesn't want to complete her last 2 treatments.  She states that she has some skin breakdown r/t her dressings and tape.  She states that she saw wound care yesterday and they gave her 2 different creams to put on these areas.  I offered for her to come in tomorrow to be evaluated by Dr. Lew prior to her treatment, but she states that she would prefer to wait until Monday to see Dr. Darby when he comes back.  She states that she has been really tired and that she is due to see Dr. Ott on Friday for possible chemo treatment.  Patient states that she will notify us if anything new comes up.

## 2020-09-18 ENCOUNTER — OFFICE VISIT (OUTPATIENT)
Dept: HEMATOLOGY | Age: 55
End: 2020-09-18
Payer: COMMERCIAL

## 2020-09-18 ENCOUNTER — HOSPITAL ENCOUNTER (OUTPATIENT)
Dept: INFUSION THERAPY | Age: 55
Discharge: HOME | End: 2020-09-18
Payer: COMMERCIAL

## 2020-09-18 VITALS
SYSTOLIC BLOOD PRESSURE: 120 MMHG | DIASTOLIC BLOOD PRESSURE: 70 MMHG | BODY MASS INDEX: 22.32 KG/M2 | TEMPERATURE: 97.1 F | HEART RATE: 79 BPM | OXYGEN SATURATION: 97 % | HEIGHT: 63 IN | WEIGHT: 126 LBS

## 2020-09-18 DIAGNOSIS — C50.412 MALIGNANT NEOPLASM OF UPPER-OUTER QUADRANT OF LEFT FEMALE BREAST, UNSPECIFIED ESTROGEN RECEPTOR STATUS (HCC): Primary | ICD-10-CM

## 2020-09-18 DIAGNOSIS — C50.919 HER2-POSITIVE CARCINOMA OF BREAST (HCC): ICD-10-CM

## 2020-09-18 LAB
ALBUMIN SERPL-MCNC: 4.2 G/DL (ref 3.5–5.2)
ALP BLD-CCNC: 90 U/L (ref 35–104)
ALT SERPL-CCNC: 44 U/L (ref 9–52)
ANION GAP SERPL CALCULATED.3IONS-SCNC: 8 MMOL/L (ref 7–19)
AST SERPL-CCNC: 69 U/L (ref 14–36)
BASOPHILS ABSOLUTE: 0.02 K/UL (ref 0.01–0.08)
BASOPHILS RELATIVE PERCENT: 1.2 % (ref 0.1–1.2)
BILIRUB SERPL-MCNC: 0.3 MG/DL (ref 0.2–1.3)
BUN BLDV-MCNC: 17 MG/DL (ref 7–17)
CA 15-3: 81.7 U/ML (ref 0–35)
CALCIUM SERPL-MCNC: 9.3 MG/DL (ref 8.4–10.2)
CHLORIDE BLD-SCNC: 101 MMOL/L (ref 98–111)
CO2: 30 MMOL/L (ref 22–29)
CREAT SERPL-MCNC: 0.7 MG/DL (ref 0.5–1)
EOSINOPHILS ABSOLUTE: 0.08 K/UL (ref 0.04–0.54)
EOSINOPHILS RELATIVE PERCENT: 4.8 % (ref 0.7–7)
GFR NON-AFRICAN AMERICAN: >60
GLOBULIN: 3.9 G/DL
GLUCOSE BLD-MCNC: 92 MG/DL (ref 74–106)
HCT VFR BLD CALC: 34.7 % (ref 34.1–44.9)
HEMOGLOBIN: 10.9 G/DL (ref 11.2–15.7)
LYMPHOCYTES ABSOLUTE: 0.35 K/UL (ref 1.18–3.74)
LYMPHOCYTES RELATIVE PERCENT: 21.2 % (ref 19.3–53.1)
MCH RBC QN AUTO: 29 PG (ref 25.6–32.2)
MCHC RBC AUTO-ENTMCNC: 31.4 G/DL (ref 32.3–35.5)
MCV RBC AUTO: 92.3 FL (ref 79.4–94.8)
MONOCYTES ABSOLUTE: 0.51 K/UL (ref 0.24–0.82)
MONOCYTES RELATIVE PERCENT: 30.9 % (ref 4.7–12.5)
NEUTROPHILS ABSOLUTE: 0.69 K/UL (ref 1.56–6.13)
NEUTROPHILS RELATIVE PERCENT: 41.9 % (ref 34–71.1)
PDW BLD-RTO: 21.6 % (ref 11.7–14.4)
PLATELET # BLD: 92 K/UL (ref 182–369)
PMV BLD AUTO: 9.7 FL (ref 7.4–10.4)
POTASSIUM SERPL-SCNC: 4.3 MMOL/L (ref 3.5–5.1)
RBC # BLD: 3.76 M/UL (ref 3.93–5.22)
SODIUM BLD-SCNC: 139 MMOL/L (ref 137–145)
TOTAL PROTEIN: 8.2 G/DL (ref 6.3–8.2)
WBC # BLD: 1.65 K/UL (ref 3.98–10.04)

## 2020-09-18 PROCEDURE — 86300 IMMUNOASSAY TUMOR CA 15-3: CPT

## 2020-09-18 PROCEDURE — 2580000003 HC RX 258: Performed by: INTERNAL MEDICINE

## 2020-09-18 PROCEDURE — 96375 TX/PRO/DX INJ NEW DRUG ADDON: CPT

## 2020-09-18 PROCEDURE — 85025 COMPLETE CBC W/AUTO DIFF WBC: CPT

## 2020-09-18 PROCEDURE — 6360000002 HC RX W HCPCS: Performed by: INTERNAL MEDICINE

## 2020-09-18 PROCEDURE — 99214 OFFICE O/P EST MOD 30 MIN: CPT | Performed by: INTERNAL MEDICINE

## 2020-09-18 PROCEDURE — 80053 COMPREHEN METABOLIC PANEL: CPT

## 2020-09-18 PROCEDURE — 96413 CHEMO IV INFUSION 1 HR: CPT

## 2020-09-18 RX ORDER — SODIUM CHLORIDE 0.9 % (FLUSH) 0.9 %
10 SYRINGE (ML) INJECTION PRN
Status: DISCONTINUED | OUTPATIENT
Start: 2020-09-18 | End: 2020-09-19 | Stop reason: HOSPADM

## 2020-09-18 RX ORDER — ACETAMINOPHEN 500 MG
1000 TABLET ORAL ONCE
Status: DISCONTINUED | OUTPATIENT
Start: 2020-09-18 | End: 2020-09-20 | Stop reason: HOSPADM

## 2020-09-18 RX ORDER — HEPARIN SODIUM (PORCINE) LOCK FLUSH IV SOLN 100 UNIT/ML 100 UNIT/ML
500 SOLUTION INTRAVENOUS PRN
Status: DISCONTINUED | OUTPATIENT
Start: 2020-09-18 | End: 2020-09-19 | Stop reason: HOSPADM

## 2020-09-18 RX ORDER — DIPHENHYDRAMINE HYDROCHLORIDE 50 MG/ML
25 INJECTION INTRAMUSCULAR; INTRAVENOUS ONCE
Status: COMPLETED | OUTPATIENT
Start: 2020-09-18 | End: 2020-09-18

## 2020-09-18 RX ADMIN — TRASTUZUMAB 359 MG: 150 INJECTION, POWDER, LYOPHILIZED, FOR SOLUTION INTRAVENOUS at 14:42

## 2020-09-18 RX ADMIN — DIPHENHYDRAMINE HYDROCHLORIDE 25 MG: 50 INJECTION, SOLUTION INTRAMUSCULAR; INTRAVENOUS at 14:21

## 2020-09-18 NOTE — PROGRESS NOTES
Luis Mueller   1965 9/18/2020     Chief Complaint   Patient presents with    Follow-up     Chemotherapy management, encounter for       Interval history/history of present illness:  Diagnosis   IDC left breast, March 2016   Grade 2. ER 1%, NE 6%, HER-2/oseas IHC 3+/FISH amplified ratio 6.7, AR 95%. 01/30/2017- Biopsy-proven in breast relapse/Inflammatory breast cancer. ER negative, NE negative, HER-2/oseas IHC 3+/FISH amplified ratio 4.5, Ki-67 32%. MyRisk - Revealed no clinically significant mutation. PTEN VUS. Treatment summary  Neoadjuvant adjuvant chemotherapy TCH-P ×6 cycles. She declined to complete 1 year of Herceptin. She declined radiation therapy. 10/3/2016-left lumpectomy/sentinel lymph node   01/30/2017- Biopsy-proven in breast relapse/Inflammatory breast cancer. 5/11/2017-Chemotherapy with paclitaxel/carboplatin weekly. Herceptin/Pertuzumab every 3 weeks started 05/11/2017. She had a allergic reaction to carboplatin and this was discontinued. 7/21/2017- 9/1/2017 Neuropathy grade 2 with Taxol. Therefore, switched to Taxotere/Herceptin/Pertuzumab. Discontinued due to progression of disease. 9/22/2017 through 3/2/2018Salley Jerry every 3 weeks. (stopped as per patient request, neuropathy grade 1)   7/10/2018 through 11/30/2018- Kadcyla.    1/4/2019-Navelbine x 1 cycle with very poor tolerance (pain)   1/25/2018-Gemzar/Herceptin   3/15/2019- low-dose Cisplatin added to Gemzar and Herceptin regimen, regimen changed to every other week   4/17/2019- palliative radiation to left clavicle/chest wall for anticipate total dosing of 5040 cGy   6/22/2019- Xeloda/apatinib  January 2020-palliative radiation to the right breast.  1/10/2020- palliative treatment with Fam-Trastuzumab- stopped Feb due to severe toxicity  Anticipated Herceptin, Xeloda and Tucatinib  Tumor target  Skin chest wall left  upper chest/left breast     Interval history  The patient is a 47years old female who has a with new complaints of erythema in the left breast. A skin punch biopsy was consistent with metastatic ductal carcinoma with extensive dermal lymphovascular invasion. ER negative, SC negative, HER-2/oseas IHC 3+/FISH amplified ratio 4.5, Ki-67 32%. Foundation one showed ERBB2 amplification, AURKA amplification, TP53 and .   2/15/2017- she was again seen by Dr. Zen Aguirre and explained about her tumor recurrence. Unfortunately, she declined any further intervention at that time. 4/21/2017- she asked Dr. Zen Aguirre for a referral to us.   4/28/2017-referred to me for further evaluation. I recommended a PET scan and neoadjuvant chemotherapy with carboplatin/paclitaxel and Perjeta and Herceptin. 5/4/2017-PET/CT scan revealed abnormal metabolic activity present in multiple regions throughout the left breast and in the skin( SUV 8.9, 7.4, 7.3). In the left supraclavicular region a cluster of lymph nodes present (SUV of 3). Axillary lymph nodes with SUV of 2.8. Tonsils bilaterally with SUV 5.6 (symmetric). No abnormal metabolic activity in the intrathoracic or intra-abdominal pelvic region. No abnormal skeletal metabolic activity. I recommended weekly carboplatin/weekly Taxol/Herceptin and Pertuzumab.   5/26/2017-she had a severe allergic reaction after 2 doses of carboplatin and therefore this was discontinued. She was continued only on Taxol weekly/Herceptin and Pertuzumab.   7/21/2017-after cycle #2 and 8 doses of Taxol she developed neuropathy related to chemotherapy and therefore chemotherapy was switched from Taxol to Taxotere. Genetic assessment- [place] [id number] revealed no clinically significant mutation. PTEN VUS.   9/1/2017-local in breast disease progression with increasing breast erythema. Treatment switched to Kadcyla. 12/27/2017- repeat re-staging CT scan of the abdomen and pelvis documented no evidence of intra-abdominal pelvic metastasis. Superior left renal cyst. Mild hepatic steatosis.  CT scan of the chest documented no evidence of intrathoracic metastasis. Bone scan documented no evidence of bony metastatic disease. 3/2/2018-after 8 cycles of Kadcyla she requested treatment to be on hold. She has requested her PCP a referral to [place] for a second opinion. She has neuropathy grade 1.   5/2/2018- 2 months off treatment (Kadcyla). She has not yet been seen at [place]. Apparently, [place] is gathering her records and will see her soon. Examination of her chest wall showed recurrent disease in the left upper chest. Residual neuropathy grade 1. She could not tolerate gabapentin, and therefore has stopped it. She was not interested in any other medication for neuropathy at this time. 7/10/2018-after discussion at [facility] she was recommended to reinitiate Kadcyla. 7/13/2018-CT of chest, abdomen, pelvis showed no evidence of intra-abdominal disease. There is an enlarged right-sided level 1 lymph node that was not appreciated previously, measuring 2.5 x 1.4 cm, metastatic disease considered. 7/16/2018-PET CT scan showed diffuse uptake throughout the left breast extending to the left chest wall. Abnormal uptake within the left axilla lymph nodes and left internal mammary lymph nodes concerning for metastatic disease. Enlarged hypermetabolic right axilla lymph node concerning for metastatic disease with SUV 8.0. Abnormal uptake within a right cervical lymph node, etiology unclear, but could be reactive. 10/26/2018 - CT scan of the chest was completed at [facility] and compared to CT scan of 12/27/2017 versus her last CT scan completed at [facility] on 7/13/2018. The CT scan identified a right axillary lymph node measuring 1.2 cm, otherwise no intrathoracic neoplastic process/metastatic disease. 10/26/2018 -CT scan of the abdomen and pelvis was completed at [facility] and compared to CT scan of 12/27/2017 versus her last CT scan completed at [facility] on 7/13/2018.  No evidence of intra-abdominal or pelvic metastasis was identified. December 2018-interval worsening of skin metastasis in the left breast and diffuse erythema with new nodules. 12/17/2018-skin biopsy of overlying right breast consistent invasive ductal carcinoma, high-grade. ER 20%, SD 0%, HER-2/oseas 3+.  12/17/2018-right axillary FNA biopsy consistent invasive ductal carcinoma. 12/26/2018-recommended clinical trial at [facility]. 1/4/2019-started on Navelbine/Herceptin, due to delays on the clinical trial at [facility]. Unfortunately, she had severe chest wall pain and generalized pain related to Navelbine. 1/25/2019-4/19/2019 she was switched to cisplatin/Gemzar/Herceptin, but had disease progression. 5/30/2019-CT chest, abdomen, pelvis and bone scan was unremarkable for right axillary adenopathy measuring 2 cm. Lymph node located in the upper chest wall under the pectoralis muscle measuring 1.2 cm. Right breast with 2 areas with masslike enhancement. No evidence of metastatic disease in the abdomen pelvis. Bone scan was unremarkable for metastatic bone disease. June 2019-very poor tolerance to cisplatin, Gemzar/Herceptin. She was recommended Xeloda/ Lapatinib.   6/22/2019-she was recommended and started on Xeloda 850 mg/m2 PO BID days 1 through 14 every 21 days and Lapatinib 1250 mg daily continuously. November 2019- progression on prior Xeloda and lapatinib.  11/23/2019- recommended rechallenge with Herceptin and Navelbine vs clinical trial at "[facility]. 12/23/2019-CT chest abdomen pelvis showed metastatic disease in the right axillary lymph node and diffuse bilateral skin involvement of both breasts with several nodules. No evidence of pulmonary, liver or bone metastasis. CT of the head with contrast was unremarkable. 1/3/2020- the patient was unable to follow-up at [facility] for clinical trial.  Therefore recommended Fam-trastuzumab deruxtecan every 3 weeks.   1/10/2020-she was started on palliative treatment with Fam-trastuzumab.  6/5/2020- Ct Chest/Abdomen/Pelvis W Contrast No lymphadenopathy. A previously seen enlarged right axillary lymph node is now small in size. There is some stranding of the fat around the lymph node. Bilateral breast skin thickening left greater than right. Prior lumpectomy on the left with left axillary lymph node sampling. Previously seen nodularity in the right breast on CT is not visualized today. There may be minimal radiation change in the periphery of the left lung. There is no dense consolidation or effusion. No definite metastatic lung nodules. No evidence of abdominal or pelvic neoplastic process or metastatic disease. The previously seen right breast nodule is not visualized in this study. Postsurgical changes of the left breast. No significant change in the previous study. Lobulated skin thickening of the left lower lateral chest and upper abdominal wall which was not noted in the previous study. The etiology is not certain. This data be clinically correlated and further evaluated. 8/7/2020- the patient is currently awaiting for palliative radiation to the chest wall for the skin margins.   She has agreed and want to start on Herceptin, Xeloda and Tucatinib.  8/21/2020 Tumor Marker- CA 15.3- 32.19      Past medical history:  Past Medical History:   Diagnosis Date    Cancer ([address]     breast    GERD (gastroesophageal reflux disease)         Past surgical history:  Past Surgical History:   Procedure Laterality Date    BREAST LUMPECTOMY Left 10/4/2016    LUMPECTOMY WITH SENTINAL NODE BIOPSY AND INTRA OP US GUIDED NEEDLE LOCALIZATION performed by Deven Eng MD at [address] [place] [address]      biopsy left breast    COLONOSCOPY  2008    INSERTION / REMOVAL / REPLACEMENT VENOUS ACCESS CATHETER N/A 5/12/2016    SINGLE LUMEN PORT INSERTION performed by Deven Eng MD at [address] [facility] Right 11/14/2018    BONE MARROW ASPIRATION BIOPSY performed by Susan Wheat MD at [place] [address] history:  Social History     Socioeconomic History    Marital status:      Spouse name: Not on file    Number of children: Not on file    Years of education: Not on file    Highest education level: Not on file   Occupational History    Not on file   Social Needs    Financial resource strain: Not on file    Food insecurity     Worry: Not on file     Inability: Not on file    Transportation needs     Medical: Not on file     Non-medical: Not on file   Tobacco Use    Smoking status: Never Smoker    Smokeless tobacco: Never Used   Substance and Sexual Activity    Alcohol use: No     Alcohol/week: 0.0 standard drinks    Drug use: No    Sexual activity: Not on file   Lifestyle    Physical activity     Days per week: Not on file     Minutes per session: Not on file    Stress: Not on file   Relationships    Social connections     Talks on phone: Not on file     Gets together: Not on file     Attends Evangelical service: Not on file     Active member of club or organization: Not on file     Attends meetings of clubs or organizations: Not on file     Relationship status: Not on file    Intimate partner violence     Fear of current or ex partner: Not on file     Emotionally abused: Not on file     Physically abused: Not on file     Forced sexual activity: Not on file   Other Topics Concern    Not on file   Social History Narrative    Not on file        Family history:   Family History   Problem Relation Age of Onset    Lung Cancer Father     Colon Cancer Father     Prostate Cancer Father     Hypertension Mother         Current Outpatient Medications   Medication Sig Dispense Refill    capecitabine (XELODA) 500 MG chemo tablet Take 2 tablets by mouth twice daily for 14 days of a 21 day cycle.  56 tablet 5    capecitabine (XELODA) 150 MG chemo tablet Take 2 tablets by mouth 2 times a daily for 14 days of a 21 day cycle 56 tablet 5    oxyCODONE-acetaminophen (PERCOCET) 7.5-325 MG per tablet Take 1 tablet by mouth every 6 hours as needed for Pain for up to 30 days. Intended supply: 30 days 120 tablet 0    Morphine Sulfate ER 30 MG T12A Take 30 mg by mouth 2 times daily for 30 days. 60 tablet 0    Tucatinib (TUKYSA) 150 MG TABS Take 300 mg by mouth 2 times daily 120 tablet 5    Zinc Sulfate (ZINC 15 PO) Take by mouth daily      Cholecalciferol (VITAMIN D3) 1.25 MG (52554 UT) CAPS Take by mouth daily Unknown amount of Vitamins D3      APPLE CIDER VINEGAR PO Take by mouth      Probiotic Product (PROBIOTIC-10 PO) Take by mouth      Turmeric 500 MG CAPS Take by mouth      SELENIUM ER PO Take by mouth      Multiple Vitamin (MULTI VITAMIN DAILY PO) Take by mouth       No current facility-administered medications for this visit. Facility-Administered Medications Ordered in Other Visits   Medication Dose Route Frequency Provider Last Rate Last Dose    acetaminophen (TYLENOL) tablet 1,000 mg  1,000 mg Oral Once Cherrie Peabody, MD            REVIEW OF SYSTEMS:    Constitutional: no fever, no night sweats, fatigue;   HEENT: no blurring of vision, no double vision, no hearing difficulty, no tinnitus,no ulceration, no dental caries, no dysphagia, no epistaxis  Lungs: no cough, no shortness of breath, no wheeze;   CVS: no palpitation, no chest pain, no shortness of breath;  GI: no abdominal pain, no nausea , no vomiting, no constipation; no diarrhea  ORLIN: no dysuria, frequency and urgency, no hematuria, no kidney stones;   Musculoskeletal: no joint pain, swelling , stiffness;   Endocrine: no polyuria, polydypsia, no cold or heat intolerence; Hematology/lymphatic: no easy brusing or bleeding, no hx of clotting disorder; no peripheral adenopathy. Dermatology: Skin nodules consistent with metastatic disease, no eczema, no pruritis;   Psychiatry: no depression, no anxiety,no panic attacks, no suicide ideation;    Neurology: no of Occurrences:   1     Standing Expiration Date:   9/18/2021      Carter Caballero was seen today for follow-up. 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 She received 2 cycles of Fam-trastuzumab deruxtecan 5.4 mg/kg every 3 weeks She was going to receive third cycle with 20% dose reduction Fam-trastuzumab deruxtecan 4.3 mg/kg but the patient opted to discontinue chemotherapy. Due to increased toxicity and poor quality of life the patient request to stop treatment at this time. 4/28/2020  CEA- 2.2  CA 15-3- 35.4  CA 27.29- 87.6    CT chest abdomen pelvis showed no evidence of intrathoracic or intra-abdominal metastatic disease    The skin lesions are much worse. There are new skin rashes that are mostly consistent with progression of her metastatic HER-2 positive lesions in the skin. She is currently awaiting palliative RT initiation. Apparently, this will initiate next week. Recommended Herceptin, Xeloda and Tucatinib started 8/21/2020    Painful skin metastasis - status post palliative RT with significant improvement. However, new lesions the left chest wall. Complete palliative radiation.     Fatigue-related to chemotherapy. Cancer related pain- start morphine extended release 30 mg every 12 hours and continue Percocet. 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 She denies any significant change from previous evaluation. Plan:  1. Restart Tucatinib  2. Continue Herceptin  3. Continue to hold Xeloda. 4. She was referred to radiation oncology and will follow-up next week. 5. RTC 3 weeks MD      I have seen, examined and reviewed this patient medication list, appropriate labs and imaging studies. I reviewed relevant medical records and others physicians notes. I discussed the plans of care with the patient. I answered all the questions to the patients satisfaction  The selection, dosing administration of anticancer agents in the management of associated toxicities are complex. Modifications of drug dose and schedule and the initiation of supportive care interventions are often necessary because of expected toxicities individual patient variability, prior treatment and comorbidity. The optimal delivery of anticancer agents therefore requires a healthcare delivery team experienced in the use of anticancer agents and the management of associated toxicities in patients with cancer. Follow Up:     Return in about 3 weeks (around 10/9/2020) for see Dr. Isacc Kumar in tx room for Herceptin. Data Hemant Loo am scribing for Arelis Matthew MD. Electronically signed by Evy Loo on 9/19/2020 at 8:30 AM.     I, Dr Mary Kate David, personally performed the services described in this documentation as scribed by Evy Loo MA in my presence and is both accurate and complete. Over 50% of the total visit time of 25 minutes in face to face encounter with the patient, out of which more than 50% of the time was spent in counseling patient or family and coordination of care.  Counseling included but was not limited to time spent reviewing labs, imaging studies/ treatment plan and answering questions.

## 2020-09-21 ENCOUNTER — HOSPITAL ENCOUNTER (OUTPATIENT)
Dept: RADIATION ONCOLOGY | Facility: HOSPITAL | Age: 55
Setting detail: RADIATION/ONCOLOGY SERIES
Discharge: HOME | End: 2020-09-21

## 2020-09-22 ENCOUNTER — HOSPITAL ENCOUNTER (OUTPATIENT)
Dept: RADIATION ONCOLOGY | Facility: HOSPITAL | Age: 55
Setting detail: RADIATION/ONCOLOGY SERIES
Discharge: HOME | End: 2020-09-22

## 2020-09-22 PROCEDURE — 77385: CPT | Performed by: RADIOLOGY

## 2020-09-23 ENCOUNTER — HOSPITAL ENCOUNTER (OUTPATIENT)
Dept: RADIATION ONCOLOGY | Facility: HOSPITAL | Age: 55
Setting detail: RADIATION/ONCOLOGY SERIES
Discharge: HOME | End: 2020-09-23

## 2020-09-23 PROCEDURE — 77385: CPT | Performed by: RADIOLOGY

## 2020-09-23 PROCEDURE — 77336 RADIATION PHYSICS CONSULT: CPT | Performed by: RADIOLOGY

## 2020-09-25 ENCOUNTER — HOSPITAL ENCOUNTER (OUTPATIENT)
Dept: INFUSION THERAPY | Age: 55
Discharge: HOME | End: 2020-09-25
Payer: COMMERCIAL

## 2020-09-25 DIAGNOSIS — C50.412 MALIGNANT NEOPLASM OF UPPER-OUTER QUADRANT OF LEFT FEMALE BREAST, UNSPECIFIED ESTROGEN RECEPTOR STATUS (HCC): ICD-10-CM

## 2020-09-25 LAB
BASOPHILS ABSOLUTE: 0.02 K/UL (ref 0.01–0.08)
BASOPHILS RELATIVE PERCENT: 1.1 % (ref 0.1–1.2)
EOSINOPHILS ABSOLUTE: 0.07 K/UL (ref 0.04–0.54)
EOSINOPHILS RELATIVE PERCENT: 3.8 % (ref 0.7–7)
HCT VFR BLD CALC: 34.7 % (ref 34.1–44.9)
HEMOGLOBIN: 10.9 G/DL (ref 11.2–15.7)
LYMPHOCYTES ABSOLUTE: 0.28 K/UL (ref 1.18–3.74)
LYMPHOCYTES RELATIVE PERCENT: 15.4 % (ref 19.3–53.1)
MCH RBC QN AUTO: 28.8 PG (ref 25.6–32.2)
MCHC RBC AUTO-ENTMCNC: 31.4 G/DL (ref 32.3–35.5)
MCV RBC AUTO: 91.8 FL (ref 79.4–94.8)
MONOCYTES ABSOLUTE: 0.57 K/UL (ref 0.24–0.82)
MONOCYTES RELATIVE PERCENT: 31.3 % (ref 4.7–12.5)
NEUTROPHILS ABSOLUTE: 0.88 K/UL (ref 1.56–6.13)
NEUTROPHILS RELATIVE PERCENT: 48.4 % (ref 34–71.1)
PDW BLD-RTO: 21.1 % (ref 11.7–14.4)
PLATELET # BLD: 121 K/UL (ref 182–369)
PMV BLD AUTO: 10.3 FL (ref 7.4–10.4)
RBC # BLD: 3.78 M/UL (ref 3.93–5.22)
WBC # BLD: 1.82 K/UL (ref 3.98–10.04)

## 2020-09-25 PROCEDURE — 85025 COMPLETE CBC W/AUTO DIFF WBC: CPT

## 2020-10-02 ENCOUNTER — HOSPITAL ENCOUNTER (OUTPATIENT)
Dept: INFUSION THERAPY | Age: 55
Discharge: HOME | End: 2020-10-02
Payer: COMMERCIAL

## 2020-10-02 DIAGNOSIS — C50.412 MALIGNANT NEOPLASM OF UPPER-OUTER QUADRANT OF LEFT FEMALE BREAST, UNSPECIFIED ESTROGEN RECEPTOR STATUS (HCC): ICD-10-CM

## 2020-10-05 DIAGNOSIS — C77.9 REGIONAL LYMPH NODE STAGING CATEGORY N3 (HCC): ICD-10-CM

## 2020-10-05 DIAGNOSIS — M25.619 DECREASED RANGE OF MOTION OF SHOULDER, UNSPECIFIED LATERALITY: ICD-10-CM

## 2020-10-05 DIAGNOSIS — Z17.0 MALIGNANT NEOPLASM INVOLVING BOTH NIPPLE AND AREOLA OF LEFT BREAST IN FEMALE, ESTROGEN RECEPTOR POSITIVE (HCC): ICD-10-CM

## 2020-10-05 DIAGNOSIS — C50.012 MALIGNANT NEOPLASM INVOLVING BOTH NIPPLE AND AREOLA OF LEFT BREAST IN FEMALE, ESTROGEN RECEPTOR POSITIVE (HCC): ICD-10-CM

## 2020-10-05 DIAGNOSIS — C77.9 REGIONAL LYMPH NODE METASTASIS PRESENT (HCC): ICD-10-CM

## 2020-10-05 DIAGNOSIS — Z91.89 AT RISK FOR LYMPHEDEMA: Primary | ICD-10-CM

## 2020-10-09 ENCOUNTER — HOSPITAL ENCOUNTER (OUTPATIENT)
Dept: INFUSION THERAPY | Age: 55
Discharge: HOME | End: 2020-10-09
Payer: COMMERCIAL

## 2020-10-09 ENCOUNTER — OFFICE VISIT (OUTPATIENT)
Dept: HEMATOLOGY | Age: 55
End: 2020-10-09
Payer: COMMERCIAL

## 2020-10-09 VITALS
TEMPERATURE: 98 F | SYSTOLIC BLOOD PRESSURE: 110 MMHG | HEART RATE: 73 BPM | WEIGHT: 126.3 LBS | DIASTOLIC BLOOD PRESSURE: 62 MMHG | OXYGEN SATURATION: 94 % | BODY MASS INDEX: 22.37 KG/M2 | RESPIRATION RATE: 16 BRPM

## 2020-10-09 DIAGNOSIS — C50.919 HER2-POSITIVE CARCINOMA OF BREAST (HCC): ICD-10-CM

## 2020-10-09 DIAGNOSIS — C50.412 MALIGNANT NEOPLASM OF UPPER-OUTER QUADRANT OF LEFT FEMALE BREAST, UNSPECIFIED ESTROGEN RECEPTOR STATUS (HCC): Primary | ICD-10-CM

## 2020-10-09 LAB
ALBUMIN SERPL-MCNC: 4.2 G/DL (ref 3.5–5.2)
ALP BLD-CCNC: 98 U/L (ref 35–104)
ALT SERPL-CCNC: 35 U/L (ref 9–52)
ANION GAP SERPL CALCULATED.3IONS-SCNC: 6 MMOL/L (ref 7–19)
AST SERPL-CCNC: 58 U/L (ref 14–36)
BASOPHILS ABSOLUTE: 0.01 K/UL (ref 0.01–0.08)
BASOPHILS RELATIVE PERCENT: 0.5 % (ref 0.1–1.2)
BILIRUB SERPL-MCNC: 0.3 MG/DL (ref 0.2–1.3)
BUN BLDV-MCNC: 8 MG/DL (ref 7–17)
CALCIUM SERPL-MCNC: 9.3 MG/DL (ref 8.4–10.2)
CHLORIDE BLD-SCNC: 103 MMOL/L (ref 98–111)
CO2: 31 MMOL/L (ref 22–29)
CREAT SERPL-MCNC: 0.6 MG/DL (ref 0.5–1)
EOSINOPHILS ABSOLUTE: 0.14 K/UL (ref 0.04–0.54)
EOSINOPHILS RELATIVE PERCENT: 7.7 % (ref 0.7–7)
GFR NON-AFRICAN AMERICAN: >60
GLOBULIN: 3.3 G/DL
GLUCOSE BLD-MCNC: 104 MG/DL (ref 74–106)
HCT VFR BLD CALC: 28.5 % (ref 34.1–44.9)
HEMOGLOBIN: 9.6 G/DL (ref 11.2–15.7)
LYMPHOCYTES ABSOLUTE: 0.45 K/UL (ref 1.18–3.74)
LYMPHOCYTES RELATIVE PERCENT: 24.6 % (ref 19.3–53.1)
MCH RBC QN AUTO: 29.2 PG (ref 25.6–32.2)
MCHC RBC AUTO-ENTMCNC: 33.7 G/DL (ref 32.3–35.5)
MCV RBC AUTO: 86.6 FL (ref 79.4–94.8)
MONOCYTES ABSOLUTE: 0.41 K/UL (ref 0.24–0.82)
MONOCYTES RELATIVE PERCENT: 22.4 % (ref 4.7–12.5)
NEUTROPHILS ABSOLUTE: 0.82 K/UL (ref 1.56–6.13)
NEUTROPHILS RELATIVE PERCENT: 44.8 % (ref 34–71.1)
PDW BLD-RTO: 19.6 % (ref 11.7–14.4)
PLATELET # BLD: 114 K/UL (ref 182–369)
PMV BLD AUTO: 9.4 FL (ref 7.4–10.4)
POTASSIUM SERPL-SCNC: 4 MMOL/L (ref 3.5–5.1)
RBC # BLD: 3.29 M/UL (ref 3.93–5.22)
SODIUM BLD-SCNC: 140 MMOL/L (ref 137–145)
TOTAL PROTEIN: 7.6 G/DL (ref 6.3–8.2)
WBC # BLD: 1.83 K/UL (ref 3.98–10.04)

## 2020-10-09 PROCEDURE — 96413 CHEMO IV INFUSION 1 HR: CPT

## 2020-10-09 PROCEDURE — 6360000002 HC RX W HCPCS: Performed by: INTERNAL MEDICINE

## 2020-10-09 PROCEDURE — 99214 OFFICE O/P EST MOD 30 MIN: CPT | Performed by: INTERNAL MEDICINE

## 2020-10-09 PROCEDURE — 85025 COMPLETE CBC W/AUTO DIFF WBC: CPT

## 2020-10-09 PROCEDURE — 80053 COMPREHEN METABOLIC PANEL: CPT

## 2020-10-09 PROCEDURE — 96375 TX/PRO/DX INJ NEW DRUG ADDON: CPT

## 2020-10-09 PROCEDURE — 6370000000 HC RX 637 (ALT 250 FOR IP): Performed by: INTERNAL MEDICINE

## 2020-10-09 PROCEDURE — 2580000003 HC RX 258: Performed by: INTERNAL MEDICINE

## 2020-10-09 RX ORDER — SODIUM CHLORIDE 0.9 % (FLUSH) 0.9 %
10 SYRINGE (ML) INJECTION PRN
Status: DISCONTINUED | OUTPATIENT
Start: 2020-10-09 | End: 2020-10-10 | Stop reason: HOSPADM

## 2020-10-09 RX ORDER — SODIUM CHLORIDE 9 MG/ML
INJECTION, SOLUTION INTRAVENOUS CONTINUOUS
Status: CANCELLED | OUTPATIENT
Start: 2020-10-09

## 2020-10-09 RX ORDER — MEPERIDINE HYDROCHLORIDE 50 MG/ML
12.5 INJECTION INTRAMUSCULAR; INTRAVENOUS; SUBCUTANEOUS ONCE
Status: CANCELLED | OUTPATIENT
Start: 2020-10-09

## 2020-10-09 RX ORDER — SODIUM CHLORIDE 9 MG/ML
20 INJECTION, SOLUTION INTRAVENOUS ONCE
Status: CANCELLED | OUTPATIENT
Start: 2020-10-09

## 2020-10-09 RX ORDER — ACETAMINOPHEN 500 MG
1000 TABLET ORAL ONCE
Status: COMPLETED | OUTPATIENT
Start: 2020-10-09 | End: 2020-10-09

## 2020-10-09 RX ORDER — SODIUM CHLORIDE 0.9 % (FLUSH) 0.9 %
10 SYRINGE (ML) INJECTION PRN
Status: CANCELLED | OUTPATIENT
Start: 2020-10-09

## 2020-10-09 RX ORDER — DIPHENHYDRAMINE HYDROCHLORIDE 50 MG/ML
25 INJECTION INTRAMUSCULAR; INTRAVENOUS ONCE
Status: COMPLETED | OUTPATIENT
Start: 2020-10-09 | End: 2020-10-09

## 2020-10-09 RX ORDER — HEPARIN SODIUM (PORCINE) LOCK FLUSH IV SOLN 100 UNIT/ML 100 UNIT/ML
500 SOLUTION INTRAVENOUS PRN
Status: CANCELLED | OUTPATIENT
Start: 2020-10-09

## 2020-10-09 RX ORDER — DIPHENHYDRAMINE HYDROCHLORIDE 50 MG/ML
25 INJECTION INTRAMUSCULAR; INTRAVENOUS ONCE
Status: CANCELLED
Start: 2020-10-09

## 2020-10-09 RX ORDER — EPINEPHRINE 1 MG/ML
0.3 INJECTION, SOLUTION, CONCENTRATE INTRAVENOUS PRN
Status: CANCELLED | OUTPATIENT
Start: 2020-10-09

## 2020-10-09 RX ORDER — ACETAMINOPHEN 325 MG/1
1000 TABLET ORAL ONCE
Status: CANCELLED
Start: 2020-10-09

## 2020-10-09 RX ORDER — DIPHENHYDRAMINE HYDROCHLORIDE 50 MG/ML
50 INJECTION INTRAMUSCULAR; INTRAVENOUS ONCE
Status: CANCELLED | OUTPATIENT
Start: 2020-10-09

## 2020-10-09 RX ORDER — HEPARIN SODIUM (PORCINE) LOCK FLUSH IV SOLN 100 UNIT/ML 100 UNIT/ML
500 SOLUTION INTRAVENOUS PRN
Status: DISCONTINUED | OUTPATIENT
Start: 2020-10-09 | End: 2020-10-10 | Stop reason: HOSPADM

## 2020-10-09 RX ORDER — SODIUM CHLORIDE 0.9 % (FLUSH) 0.9 %
5 SYRINGE (ML) INJECTION PRN
Status: CANCELLED | OUTPATIENT
Start: 2020-10-09

## 2020-10-09 RX ORDER — METHYLPREDNISOLONE SODIUM SUCCINATE 125 MG/2ML
125 INJECTION, POWDER, LYOPHILIZED, FOR SOLUTION INTRAMUSCULAR; INTRAVENOUS ONCE
Status: CANCELLED | OUTPATIENT
Start: 2020-10-09

## 2020-10-09 RX ADMIN — ACETAMINOPHEN 1000 MG: 500 TABLET ORAL at 15:08

## 2020-10-09 RX ADMIN — HEPARIN 500 UNITS: 100 SYRINGE at 16:05

## 2020-10-09 RX ADMIN — DIPHENHYDRAMINE HYDROCHLORIDE 25 MG: 50 INJECTION, SOLUTION INTRAMUSCULAR; INTRAVENOUS at 15:08

## 2020-10-09 RX ADMIN — SODIUM CHLORIDE, PRESERVATIVE FREE 10 ML: 5 INJECTION INTRAVENOUS at 16:05

## 2020-10-09 RX ADMIN — TRASTUZUMAB 359 MG: 150 INJECTION, POWDER, LYOPHILIZED, FOR SOLUTION INTRAVENOUS at 15:29

## 2020-10-09 ASSESSMENT — PAIN SCALES - GENERAL: PAINLEVEL_OUTOF10: 0

## 2020-10-09 NOTE — PROGRESS NOTES
Abelardo Abiliokimberly Mueller   1965   10/9/2020     Chief Complaint   Patient presents with    Follow-up    Breast Cancer      Interval history/history of present illness:  Diagnosis   IDC left breast, March 2016   Grade 2. ER 1%, SD 6%, HER-2/oseas IHC 3+/FISH amplified ratio 6.7, AR 95%. 01/30/2017- Biopsy-proven in breast relapse/Inflammatory breast cancer. ER negative, SD negative, HER-2/oseas IHC 3+/FISH amplified ratio 4.5, Ki-67 32%. MyRisk - Revealed no clinically significant mutation. PTEN VUS. Treatment summary  Neoadjuvant adjuvant chemotherapy TCH-P ×6 cycles. She declined to complete 1 year of Herceptin. She declined radiation therapy. 10/3/2016-left lumpectomy/sentinel lymph node   01/30/2017- Biopsy-proven in breast relapse/Inflammatory breast cancer. 5/11/2017-Chemotherapy with paclitaxel/carboplatin weekly. Herceptin/Pertuzumab every 3 weeks started 05/11/2017. She had a allergic reaction to carboplatin and this was discontinued. 7/21/2017- 9/1/2017 Neuropathy grade 2 with Taxol. Therefore, switched to Taxotere/Herceptin/Pertuzumab. Discontinued due to progression of disease. 9/22/2017 through 3/2/2018[facility] Meet every 3 weeks. (stopped as per patient request, neuropathy grade 1)   7/10/2018 through 11/30/2018- Kadcyla. 1/4/2019-Navelbine x 1 cycle with very poor tolerance (pain)   1/25/2018-Gemzar/Herceptin   3/15/2019- low-dose Cisplatin added to Gemzar and Herceptin regimen, regimen changed to every other week   4/17/2019- palliative radiation to left clavicle/chest wall for anticipate total dosing of 5040 cGy   6/22/2019- Xeloda/apatinib  January 2020-palliative radiation to the right breast.  1/10/2020- palliative treatment with Fam-Trastuzumab- stopped Feb due to severe toxicity  September 2020- palliative radiation left chest wall skin metastasis.   8/21/2020-Herceptin, Xeloda and Tucatinib  Tumor target  Skin chest wall left  upper chest/left breast     Interval history  The patient is a 47years old female who has a diagnosis of recurrent HER-2 positive/IA negative breast cancer with extensive skin involvement/chest wall involvement. She received treatment with Fam-trastuzumab but had poor tolerance due to chemotherapy-induced neutropenia and severe fatigue. Therefore she elected to discontinue treatment. Unfortunately, she had significant disease recurrence in the left chest wall with a several nodules in the left lateral chest.  She is currently post completion of receiving palliative radiation to the skin lesions with significant and impressive improvement. This was completed September 2020. She is here today for treatment with Herceptin. Xeloda has been on hold due to neutropenia. CBC reviewed today she continues to be neutropenic. In general, she has reported a significant improvement of the skin metastasis. She complains of itching and that the right armpit. Cancer history- Left breast recurrence with Inflammatory Breast Cancer ER 8%/IA negative &HER-2 positive. Ms Shannan Shelton was seen in initial oncology consultation on 4/20/2017 referred for a diagnosis of a left breast recurrence with inflammatory breast cancer. She was initially treated by Dr. Geeta Edmondson at [facility]. Prior breast cancer history is as follows:  3/22/2016-a diagnostic mammogram for a palpable mass showed a left breast abnormal findings. 4/1/2017- ultrasound-guided core needle biopsy was consistent with invasive ductal carcinoma, grade 2. ER 1%, IA 6%, HER-2/oseas IHC 3+/FISH amplified ratio 6.7., AR 95%. She underwent 6 cycles of TCH-P from April 2016 through September 2016 with G-CSF support. She had a total of 18 weeks of trastuzumab treatment. 10/3/2016-she underwent a left lumpectomy with left sentinel lymph node revealing an invasive ductal carcinoma, grade 3. Margins clear of invasive carcinoma (1.1 cm from the lateral margin), extensive lymphovascular space invasion.  2 out of 3 sentinel lymph nodes positive for metastatic carcinoma. Final pathologic staging [id number])M0. She declined adjuvant radiation therapy and adjuvant Herceptin completion.   -------------------In-breast Recurrence [id number]---------------------------  1/30/2017- she was seen by Dr. Eladio Cote with new complaints of erythema in the left breast. A skin punch biopsy was consistent with metastatic ductal carcinoma with extensive dermal lymphovascular invasion. ER negative, KY negative, HER-2/oseas IHC 3+/FISH amplified ratio 4.5, Ki-67 32%. Foundation one showed ERBB2 amplification, AURKA amplification, TP53 and .   2/15/2017- she was again seen by Dr. Kana Chaney and explained about her tumor recurrence. Unfortunately, she declined any further intervention at that time. 4/21/2017- she asked Dr. Kana Chaney for a referral to us.   4/28/2017-referred to me for further evaluation. I recommended a PET scan and neoadjuvant chemotherapy with carboplatin/paclitaxel and Perjeta and Herceptin. 5/4/2017-PET/CT scan revealed abnormal metabolic activity present in multiple regions throughout the left breast and in the skin( SUV 8.9, 7.4, 7.3). In the left supraclavicular region a cluster of lymph nodes present (SUV of 3). Axillary lymph nodes with SUV of 2.8. Tonsils bilaterally with SUV 5.6 (symmetric). No abnormal metabolic activity in the intrathoracic or intra-abdominal pelvic region. No abnormal skeletal metabolic activity. I recommended weekly carboplatin/weekly Taxol/Herceptin and Pertuzumab.   5/26/2017-she had a severe allergic reaction after 2 doses of carboplatin and therefore this was discontinued. She was continued only on Taxol weekly/Herceptin and Pertuzumab.   7/21/2017-after cycle #2 and 8 doses of Taxol she developed neuropathy related to chemotherapy and therefore chemotherapy was switched from Taxol to Taxotere. Genetic assessment- [place] [id number] revealed no clinically significant mutation.  PTEN VUS.   9/1/2017-local in measuring 1.2 cm, otherwise no intrathoracic neoplastic process/metastatic disease. 10/26/2018 -CT scan of the abdomen and pelvis was completed at [facility] and compared to CT scan of 12/27/2017 versus her last CT scan [facility] 7[facility]13[facility]2018. No evidence of intra-abdominal or pelvic metastasis was identified. December 2018-interval worsening of skin metastasis in the left breast and diffuse erythema with new nodules. 12/17/2018-skin biopsy of overlying right breast consistent invasive ductal carcinoma, high-grade. ER 20%, NJ 0%, HER-2/oseas 3+.  12/17/2018-right axillary FNA biopsy consistent invasive ductal carcinoma. 12/26/2018-recommended clinical trial at [facility]. 1/4/2019-started on Navelbine/Herceptin, due to delays on the clinical trial at [facility]. Unfortunately, she had severe chest wall pain and generalized pain related to Navelbine. 1/25/2019-4/19/2019 she was switched to cisplatin/Gemzar/Herceptin, but had disease progression. 5/30/2019-CT chest, abdomen, pelvis and bone scan was unremarkable for right axillary adenopathy measuring 2 cm. Lymph node located in the upper chest wall under the pectoralis muscle measuring 1.2 cm. Right breast with 2 areas with masslike enhancement. No evidence of metastatic disease in the abdomen pelvis. Bone scan was unremarkable for metastatic bone disease. June 2019-very poor tolerance to cisplatin, Gemzar/Herceptin. She was recommended Xeloda/ Lapatinib.   6/22/2019-she was recommended and started on Xeloda 850 mg/m2 PO BID days 1 through 14 every 21 days and Lapatinib 1250 mg daily continuously. November 2019- progression on prior Xeloda and lapatinib.  11/23/2019- recommended rechallenge with Herceptin and Navelbine vs clinical trial at [facility].   12/23/2019-CT chest abdomen pelvis showed metastatic disease in the right axillary lymph node and diffuse bilateral skin involvement of both breasts with several nodules. No evidence of pulmonary, liver or bone metastasis. CT of the head with contrast was unremarkable. 1/3/2020- the patient was unable to follow-up at [facility] for clinical trial.  Therefore recommended Fam-trastuzumab deruxtecan every 3 weeks. 1/10/2020-she was started on palliative treatment with Fam-trastuzumab.  6/5/2020- Ct Chest/Abdomen/Pelvis W Contrast No lymphadenopathy. A previously seen enlarged right axillary lymph node is now small in size. There is some stranding of the fat around the lymph node. Bilateral breast skin thickening left greater than right. Prior lumpectomy on the left with left axillary lymph node sampling. Previously seen nodularity in the right breast on CT is not visualized today. There may be minimal radiation change in the periphery of the left lung. There is no dense consolidation or effusion. No definite metastatic lung nodules. No evidence of abdominal or pelvic neoplastic process or metastatic disease. The previously seen right breast nodule is not visualized in this study. Postsurgical changes of the left breast. No significant change in the previous study. Lobulated skin thickening of the left lower lateral chest and upper abdominal wall which was not noted in the previous study. The etiology is not certain. This data be clinically correlated and further evaluated. 8/7/2020- the patient is currently awaiting for palliative radiation to the chest wall for the skin margins. She has agreed and want to start on Herceptin, Xeloda and Tucatinib.  8/21/2020 Tumor Marker- CA 15.3- 32.19  10/9/2020-continue Herceptin and tucatinib.   Hold Xeloda due to neutropenia      Past medical history:  Past Medical History:   Diagnosis Date    Cancer [facility])     breast    GERD (gastroesophageal reflux disease)         Past surgical history:  Past Surgical History:   Procedure Laterality Date    BREAST LUMPECTOMY Left 10/4/2016    LUMPECTOMY WITH SENTINAL NODE BIOPSY AND INTRA OP US GUIDED NEEDLE LOCALIZATION performed by Joshua Mejía MD at [address] [place] [address]      biopsy left breast    COLONOSCOPY  2008    INSERTION / REMOVAL / REPLACEMENT VENOUS ACCESS CATHETER N/A 5/12/2016    SINGLE LUMEN PORT INSERTION performed by Joshua Mejía MD at [facility] DIAGNOSTIC BONE MARROW BIOPSIES Right 11/14/2018    BONE MARROW ASPIRATION BIOPSY performed by Deepa Du MD at [facility]      Social history:  Social History     Socioeconomic History    Marital status:      Spouse name: Not on file    Number of children: Not on file    Years of education: Not on file    Highest education level: Not on file   Occupational History    Not on file   Social Needs    Financial resource strain: Not on file    Food insecurity     Worry: Not on file     Inability: Not on file    Transportation needs     Medical: Not on file     Non-medical: Not on file   Tobacco Use    Smoking status: Never Smoker    Smokeless tobacco: Never Used   Substance and Sexual Activity    Alcohol use: No     Alcohol/week: 0.0 standard drinks    Drug use: No    Sexual activity: Not on file   Lifestyle    Physical activity     Days per week: Not on file     Minutes per session: Not on file    Stress: Not on file   Relationships    Social connections     Talks on phone: Not on file     Gets together: Not on file     Attends Protestant service: Not on file     Active member of club or organization: Not on file     Attends meetings of clubs or organizations: Not on file     Relationship status: Not on file    Intimate partner violence     Fear of current or ex partner: Not on file     Emotionally abused: Not on file     Physically abused: Not on file     Forced sexual activity: Not on file   Other Topics Concern    Not on file   Social History Narrative    Not on file        Family history:   Family History   Problem Relation Age of Onset    Lung Cancer Father     Colon Cancer Father     Prostate Cancer Father     Hypertension Mother         Current Outpatient Medications   Medication Sig Dispense Refill    Morphine Sulfate ER 30 MG T12A Take 30 mg by mouth 2 times daily for 30 days. 60 tablet 0    capecitabine (XELODA) 500 MG chemo tablet Take 2 tablets by mouth twice daily for 14 days of a 21 day cycle. 56 tablet 5    capecitabine (XELODA) 150 MG chemo tablet Take 2 tablets by mouth 2 times a daily for 14 days of a 21 day cycle 56 tablet 5    Tucatinib (TUKYSA) 150 MG TABS Take 300 mg by mouth 2 times daily 120 tablet 5    Zinc Sulfate (ZINC 15 PO) Take by mouth daily      Cholecalciferol (VITAMIN D3) 1.25 MG (05808 UT) CAPS Take by mouth daily Unknown amount of Vitamins D3      APPLE CIDER VINEGAR PO Take by mouth      Probiotic Product (PROBIOTIC-10 PO) Take by mouth      Turmeric 500 MG CAPS Take by mouth      SELENIUM ER PO Take by mouth      Multiple Vitamin (MULTI VITAMIN DAILY PO) Take by mouth       No current facility-administered medications for this visit.       Facility-Administered Medications Ordered in Other Visits   Medication Dose Route Frequency Provider Last Rate Last Dose    sodium chloride flush 0.9 % injection 10 mL  10 mL Intravenous PRN Kamala Arnett MD   10 mL at 10/09/20 1605    heparin flush 100 UNIT/ML injection 500 Units  500 Units Intracatheter PRN Kamala Arnett MD   500 Units at 10/09/20 1605        REVIEW OF SYSTEMS:    Constitutional: no fever, no night sweats, fatigue;   HEENT: no blurring of vision, no double vision, no hearing difficulty, no tinnitus,no ulceration, no dental caries, no dysphagia, no epistaxis  Lungs: no cough, no shortness of breath, no wheeze;   CVS: no palpitation, no chest pain, no shortness of breath;  GI: no abdominal pain, no nausea , no vomiting, no constipation; no diarrhea  ORLIN: no dysuria, frequency and urgency, no hematuria, no kidney stones;   Musculoskeletal: no joint pain, swelling , stiffness; RDW 19.6 (H) 10/09/2020       Relevant Imaging studies:   No results found. ASSESSMENT:    No orders of the defined types were placed in this encounter. William Franklin was seen today for follow-up and breast cancer. Diagnoses and all orders for this visit:    Malignant neoplasm of upper-outer quadrant of left female breast, unspecified estrogen receptor status ([address]    Carcinomatous metastasis in skin ([address]    HER2-positive carcinoma of breast ([facility]    Chemotherapy management, encounter for    Adverse effect of chemotherapy, subsequent encounter    Care plan discussed with patient    Dry skin dermatitis       Metastatic HER-2 oseas breast cancer   She received 2 cycles of Fam-trastuzumab deruxtecan 5.4 mg/kg every 3 weeks She was going to receive third cycle with 20% dose reduction Fam-trastuzumab deruxtecan 4.3 mg/kg but the patient opted to discontinue chemotherapy. Due to increased toxicity and poor quality of life the patient request to stop treatment at this time. CT chest abdomen pelvis showed no evidence of intrathoracic or intra-abdominal metastatic disease  Status post completion of palliative radiation to the skin. Recommended Xeloda, Herceptin and Tucatinib. She has prolonged neutropenia with Xeloda. Recommend to continue treatment without Xeloda at this time. Continue Herceptin and Tucatinib    Skin metastasis - status post palliative RT with significant improvement. Fatigue-related to chemotherapy. Cancer related pain- start morphine extended release 30 mg every 12 hours and continue Percocet. 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 She denies any significant change from previous evaluation. Prolonged neutropenia- the patient had a bone marrow in 2018 that showed an unremarkable bone marrow. She has had prolonged neutropenia in the past.  ANC 0.8 today. No fever chills. Continue to watch. We will continue to hold Xeloda.   Continue Herceptin/Tukyza only    Plan:  1. Restart Tucatinib  2. Restart Herceptin  3. Continue to hold Xeloda. 4. RTC 3 weeks MD  5. Continue clinical/laboratory monitoring        I have seen, examined and reviewed this patient medication list, appropriate labs and imaging studies. I reviewed relevant medical records and others physicians notes. I discussed the plans of care with the patient. I answered all the questions to the patients satisfaction  The selection, dosing administration of anticancer agents in the management of associated toxicities are complex. Modifications of drug dose and schedule and the initiation of supportive care interventions are often necessary because of expected toxicities individual patient variability, prior treatment and comorbidity. The optimal delivery of anticancer agents therefore requires a healthcare delivery team experienced in the use of anticancer agents and the management of associated toxicities in patients with cancer. Follow Up:     Return in about 3 weeks (around 10/30/2020) for Appointent with Dr. Derek Perez in treatment room. RTC 3 weeks in treatment room for Herceptin     I, Dr Jenna Teran, personally performed the services described in this documentation as scribed by Michael Javier RN in my presence and is both accurate and complete. Over 50% of the total visit time of 25 minutes in face to face encounter with the patient, out of which more than 50% of the time was spent in counseling patient or family and coordination of care. Counseling included but was not limited to time spent reviewing labs, imaging studies/ treatment plan and answering questions.

## 2020-10-13 ENCOUNTER — OFFICE VISIT (OUTPATIENT)
Dept: WOUND CARE | Facility: HOSPITAL | Age: 55
End: 2020-10-13

## 2020-10-13 PROCEDURE — 99213 OFFICE O/P EST LOW 20 MIN: CPT | Performed by: NURSE PRACTITIONER

## 2020-10-13 PROCEDURE — G0463 HOSPITAL OUTPT CLINIC VISIT: HCPCS

## 2020-10-15 ENCOUNTER — TREATMENT (OUTPATIENT)
Dept: PHYSICAL THERAPY | Facility: CLINIC | Age: 55
End: 2020-10-15

## 2020-10-15 DIAGNOSIS — C77.9 REGIONAL LYMPH NODE METASTASIS PRESENT (HCC): ICD-10-CM

## 2020-10-15 DIAGNOSIS — M25.612 DECREASED ROM OF LEFT SHOULDER: ICD-10-CM

## 2020-10-15 DIAGNOSIS — M25.611 DECREASED ROM OF RIGHT SHOULDER: ICD-10-CM

## 2020-10-15 DIAGNOSIS — Z91.89 AT RISK FOR LYMPHEDEMA: ICD-10-CM

## 2020-10-15 DIAGNOSIS — C50.012 MALIGNANT NEOPLASM INVOLVING BOTH NIPPLE AND AREOLA OF LEFT BREAST IN FEMALE, ESTROGEN RECEPTOR POSITIVE (HCC): Primary | ICD-10-CM

## 2020-10-15 DIAGNOSIS — Z17.0 MALIGNANT NEOPLASM INVOLVING BOTH NIPPLE AND AREOLA OF LEFT BREAST IN FEMALE, ESTROGEN RECEPTOR POSITIVE (HCC): Primary | ICD-10-CM

## 2020-10-15 PROCEDURE — 97161 PT EVAL LOW COMPLEX 20 MIN: CPT | Performed by: PHYSICAL THERAPIST

## 2020-10-15 PROCEDURE — 97110 THERAPEUTIC EXERCISES: CPT | Performed by: PHYSICAL THERAPIST

## 2020-10-15 NOTE — PROGRESS NOTES
Physical Therapy Lymphedema Initial Evaluation       Patient Name: Ayla Echeverria  : 1965  MRN: [id number]  Today's Date: 10/15/2020      Visit Date: 10/15/2020    Visit Dx:    ICD-10-CM ICD-9-CM   1. 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 Regional lymph node metastasis present (CMS/HCC)  C77.9 196.9   3. Decreased ROM of left shoulder  M25.612 719.51   4. At risk for lymphedema  Z91.89 V49.89   5. Decreased ROM of right shoulder  M25.611 719.51       Patient Active Problem List   Diagnosis   • Bilateral acute serous otitis media   • Malignant neoplasm involving both nipple and areola of left breast in female (CMS/HCC)   • Cellulitis of abdominal wall   • Impetigo   • Folliculitis   • Encounter for consultation   • Non-smoker   • S/P lumpectomy, left breast   • S/P lymph node biopsy   • Regional lymph node staging category N3 (CMS/HCC)   • On antineoplastic chemotherapy   • Regional lymph node metastasis present (CMS/HCC)   • History of radiation therapy   • Painful skin lesion        Past Medical History:   Diagnosis Date   • Breast cancer (CMS/HCC)         Past Surgical History:   Procedure Laterality Date   • AXILLARY LYMPH NODE BIOPSY/EXCISION Left    • BREAST BIOPSY Left 2016   • BREAST LUMPECTOMY WITH SENTINEL NODE BIOPSY Left 10/03/2016    residual invasive carcinoma, grade 3 (0.2cm); margins negative; extensive lymphvascular space invasion; 2 sentinel lymph nodes positive (2/3)       Visit Dx:    ICD-10-CM ICD-9-CM   1. 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 Regional lymph node metastasis present (CMS/HCC)  C77.9 196.9   3. Decreased ROM of left shoulder  M25.612 719.51   4. At risk for lymphedema  Z91.89 V49.89   5. Decreased ROM of right shoulder  M25.611 719.51        Patient History     Row Name 10/15/20 1300             History    Chief Complaint  Pain;Tightness;Fatigue/poor endurance;Other 1 (comment) decreased ROM both shoulders  -KR      Type of Pain  Chest pain;Shoulder pain;Other pain thoracic  -KR      Date Current Problem(s) Began  12/15/19  -KR      Brief Description of Current Complaint  Patient was originally diagnosed with breast cancer in 2016 undergoing a lumpectomy of left breast with sentinel node biopsy.  She has undergone several rounds of radiation and has been on various forms of chemotherapy reporting use of  Herceptin presently.  She has inflammatory breast cancer which has covered both breasts.  -KR      Previous treatment for THIS PROBLEM  Rehabilitation;Medication;Surgery Chemo and radiation  -KR      Surgery Date:  10/04/16  -KR      Patient/Caregiver Goals  Relieve pain;Improve mobility;Improve strength;Know what to do to help the symptoms  -KR      Current Tobacco Use  No  -KR      Smoking Status  Never  -KR      Patient's Rating of General Health  Good  -KR      Hand Dominance  right-handed  -KR      Occupation/sports/leisure activities  [employer] as  full time  -KR      Patient seeing anyone else for problem(s)?  Yes  -KR      Related/Recent Hospitalizations  No  -KR         Pain     Pain at Present  5  -KR      Pain at Best  5  -KR      Pain at Worst  9  -KR      Pain Frequency  Constant/continuous  -KR      Pain Description  Tightness;Pressure  -KR      What Performance Factors Make the Current Problem(s) WORSE?  movement after a night in bed  -KR      What Performance Factors Make the Current Problem(s) BETTER?  movement  -KR      Is your sleep disturbed?  Yes  -KR      Is medication used to assist with sleep?  No  -KR      Total hours of sleep per night  5   -KR      What position do you sleep in?  Supine  -KR      Difficulties at work?  Fatigue  -KR      Difficulties with ADL's?  Yes  -KR      Difficulties with  recreational activities?  YEs  -KR         Fall Risk Assessment    Any falls in the past year:  Yes  -KR      Number of falls reported in the last 12 months  1  -KR      Factors that contributed to the fall:  Tripped  -KR         Services    Prior Rehab/Home Health Experiences  No  -KR      Are you currently receiving Home Health services  No  -KR      Do you plan to receive Home Health services in the near future  No  -KR         Daily Activities    Primary Language  English  -KR      Are you able to read  Yes  -KR      Are you able to write  Yes  -KR      How does patient learn best?  Listening;Reading;Demonstration;Pictures/Video  -KR      Teaching needs identified  Home Exercise Program;Management of Condition  -KR      Does patient have problems with the following?  Depression;Anxiety;Panic Attack  -KR      Barriers to learning  None  -KR      Pt Participated in POC and Goals  Yes  -KR         Safety    Are you being hurt, hit, or frightened by anyone at home or in your life?  No  -KR      Are you being neglected by a caregiver  No  -KR        User Key  (r) = Recorded By, (t) = Taken By, (c) = Cosigned By    Initials Name Provider Type    KR Jo Quinonez, PT DPT Physical Therapist          Lymphedema     Row Name 10/15/20 1300             Lymphedema Assessment    Lymphedema Classification  at risk/stage 0  -KR      Lymphedema Surgery Comments  L lumpectomy 2016 w/ sentinel node biopsy  -KR      Lymph Nodes Removed #  1  -KR      Stage of Cancer  Stage II  -KR      Chemo Received  yes  -KR      Chemo Treatments #/Timeframe  7 treatments/4 months oral chemo now  -KR      Adverse Chemo Reactions/Complication  yes very ill  -KR      Radiation Therapy Received  yes  -KR      Radiation Treatments #/Timeframe  4/19 16 L breast; 12/19 8 R breast; 9.20 13 L lateral trunk  -KR      Adverse Radiation Reactions/Complication  painful, excessive tightness across chest and both shoulders   -KR      Infections or Cellulitis?   "yes  -KR      Infection/Cellulitis Treatment  antibiotics  -KR      Lymphedema Precautions  anemia  -KR         Lymphedema Edema Assessment    Edema Assessment Comment  no edema  -KR         Skin Changes/Observations    Skin Observations Comment  Breasts are dry with cracking skin into chest. Scarring from blisters are present along left lateral trunk wall and breast. Right breast is [place]  -KR         Lymphedema Measurements    Measurement Type(s)  Circumferential  -KR      Circumferential Areas  Upper extremities  -KR         BUE Circumferential (cm)    Measurement Location 1  MCP's  -KR      Left 1  16.5 cm  -KR      Right 1  17 cm  -KR      Measurement Location 2  WEb space  -KR      Left 2  17.2 cm  -KR      Right 2  17.1 cm  -KR      Measurement Location 3  Wrist  -KR      Left 3  14 cm  -KR      Right 3  13 cm  -KR      Measurement Location 4  10 cm  -KR      Left 4  15.6 cm  -KR      Right 4  16.5 cm  -KR      Measurement Location 5  10cm  -KR      Left 5  21 cm  -KR      Right 5  21.2 cm  -KR      Measurement Location 6  10 cm  -KR      Left 6  24.3 cm  -KR      Right 6  25.3 cm  -KR      Measurement Location 7  10 cm  -KR      Left 7  26.7 cm  -KR      Right 7  26.5 cm  -KR      LUE Circumferential Total  135.3 cm  -KR      RUE Circumferential Total  136.6 cm  -KR        User Key  (r) = Recorded By, (t) = Taken By, (c) = Cosigned By    Initials Name Provider Type    Jo Boles PT DPT Physical Therapist            PT Ortho     Row Name 10/15/20 1300       Subjective Pain    Able to rate subjective pain?  yes  -KR    Pre-Treatment Pain Level  5  -KR       Posture/Observations    Posture/Observations Comments  MIld forward head, symmetrical scapula and sacral base, posterior tilt of pelivis with slight extension in thoracolumbar region.  -KR       MMT (Manual Muscle Testing)    General MMT Comments  Symmetrical UE strength bilaterally demonstrating a 4+/5 throughout   -KR      User Key  (r) = " Recorded By, (t) = Taken By, (c) = Cosigned By    Initials Name Provider Type    Jo Boles, PT DPT Physical Therapist                    Therapy Education  Education Details: Lymphedema risk factors; supine wand flexion and wall walks  Given: HEP, Symptoms/condition management, Edema management  Program: New  How Provided: Verbal, Demonstration, Written  Provided to: Patient  Level of Understanding: Verbalized, Demonstrated      OP Exercises     Row Name 10/15/20 1300             Subjective Pain    Able to rate subjective pain?  yes  -KR      Pre-Treatment Pain Level  5  -KR         Total Minutes    22502 - PT Therapeutic Exercise Minutes  15  -KR         Exercise 1    Exercise Name 1  Supine flex w/wand  -KR      Cueing 1  Verbal  -KR      Sets 1  1  -KR      Reps 1  10  -KR      Time 1  10 sec hold  -KR         Exercise 2    Exercise Name 2  Wall walks  -KR      Cueing 2  Verbal;Demo  -KR      Sets 2  1  -KR      Reps 2  3  -KR      Time 2  30 sec holds each  -KR         Exercise 3    Exercise Name 3  Education Lymphedema risk factors  -KR        User Key  (r) = Recorded By, (t) = Taken By, (c) = Cosigned By    Initials Name Provider Type    Jo Boles, PT DPT Physical Therapist                      PT OP Goals     Row Name 10/15/20 1320          PT Short Term Goals    STG Date to Achieve  11/14/20  -KR     STG 1  Patient will begin a home exercise program to accelerate the recovery process  -KR     STG 1 Progress  New  -KR     STG 2  Patient will have an increase in bilateral shoulder flexion to 160 deg in supine  -KR     STG 2 Progress  New  -KR     STG 3  Patient will have a 25% reduction in chest and shoulder pain  -KR     STG 3 Progress  New  -KR     STG 4  Patient will gain an understanding of Lymphedema and risk factors  -KR     STG 4 Progress  New  -KR        Long Term Goals    LTG Date to Achieve  12/14/20  -KR     LTG 1  Patient will be able to reach up into an [employer] cabinet to retrieve a 5  lb dish safely  -KR     LTG 1 Progress  New  -KR     LTG 2  Patient will regain ROM to reach behind her back as if she was undoing a bra   -KR     LTG 2 Progress  New  -KR     LTG 3  Patient will have a 75 - 90% reduction in tightness/pain across chest and shoulders  -KR     LTG 3 Progress  New  -KR     LTG 4  Patient will be able to perform household chores without exacerbation of symptoms  -KR     LTG 4 Progress  New  -KR     LTG 5  Patient will regain functional ROM of both shoulders to perform activities overhead, reaching out to retrieve food from a drive-thru, reaching behind her back to fasten/unfasten bras,clothing  -KR     LTG 5 Progress  New  -KR     LTG 6  Patient will be instructed in a long term exercise program to continue to make gains following discharge  -KR     LTG 6 Progress  New  -KR        Time Calculation    PT Goal Re-Cert Due Date  11/14/20  -KR       User Key  (r) = Recorded By, (t) = Taken By, (c) = Cosigned By    Initials Name Provider Type    Jo Boles, PT DPT Physical Therapist          PT Assessment/Plan     Row Name 10/15/20 1320          PT Assessment    Functional Limitations  Limitation in home management;Limitations in community activities;Limitations in functional capacity and performance;Performance in leisure activities;Performance in self-care ADL  -KR     Impairments  Endurance;Impaired postural alignment;Muscle strength;Pain;Range of motion  -KR     Assessment Comments  Mrs. Echeverria was diagnosed with malignant neoplasm involving both nipple and areola of left breast in 2016.  She underwent a lumpectomy of left breast with SNB.  She has undergone radiation on both breasts with tightening of chest wall, fibrosis of breast tissue and ongoing problems with blister formations left breast and left lateral trunk wall.  She has received several forms of Chemotherapy and is continuing Chemotherapy presently.  Today she presents with decreased ROM of both shoulders, radiation  skin across chest and both breasts and ongoing pain from the resticted tisse elasticity.  Circumferential measurements were taken of both UE's today and she does not have lymphedema at this time.  She was educated on Lymphedema, risk factors and skin care and provided written materials to take home. She will continued to be monitored for any abnormal swelling and skin condition while in our care.  As the elasticity of chest and shoulder tissue improves, ROM should increase and pain decrease.  Thank you for the referral!  -KR     Please refer to paper survey for additional self-reported information  Yes  -KR     Rehab Potential  Good  -KR     Patient/caregiver participated in establishment of treatment plan and goals  Yes  -KR     Patient would benefit from skilled therapy intervention  Yes  -KR        PT Plan    PT Frequency  2x/week  -KR     Predicted Duration of Therapy Intervention (PT)  8 - 10 weeks  -KR     Planned CPT's?  PT EVAL LOW COMPLEXITY: 13025;PT RE-EVAL: 14985;PT THER PROC EA 15 MIN: 55693;PT THER ACT EA 15 MIN: 62266;PT MANUAL THERAPY EA 15 MIN: 72867;PT NEUROMUSC RE-EDUCATION EA 15 MIN: 53908  -KR     PT Plan Comments  Will provide education and monitoring of lymphedema throughout this episode of care.  Manual therapy including soft tissue mobilization, joint mobilization and stretching jacque be performed initially then progressed  to strengthening to ensure function within full ROM.  -KR       User Key  (r) = Recorded By, (t) = Taken By, (c) = Cosigned By    Initials Name Provider Type    Jo Boles, PT DPT Physical Therapist                       Time Calculation:                     Jo Quinonez PT DPT  10/15/2020

## 2020-10-16 ENCOUNTER — HOSPITAL ENCOUNTER (OUTPATIENT)
Dept: INFUSION THERAPY | Age: 55
Discharge: HOME | End: 2020-10-16
Payer: COMMERCIAL

## 2020-10-16 DIAGNOSIS — C50.412 MALIGNANT NEOPLASM OF UPPER-OUTER QUADRANT OF LEFT FEMALE BREAST, UNSPECIFIED ESTROGEN RECEPTOR STATUS (HCC): ICD-10-CM

## 2020-10-19 RX ORDER — ONDANSETRON HYDROCHLORIDE 8 MG/1
8 TABLET, FILM COATED ORAL EVERY 8 HOURS PRN
Qty: 30 TABLET | Refills: 5 | Status: SHIPPED | OUTPATIENT
Start: 2020-10-19

## 2020-10-20 RX ORDER — TUCATINIB 150 MG/1
300 TABLET ORAL 2 TIMES DAILY
Qty: 120 TABLET | Refills: 5 | Status: SHIPPED | OUTPATIENT
Start: 2020-10-20 | End: 2021-07-09 | Stop reason: ALTCHOICE

## 2020-10-21 ENCOUNTER — TREATMENT (OUTPATIENT)
Dept: PHYSICAL THERAPY | Facility: CLINIC | Age: 55
End: 2020-10-21

## 2020-10-21 DIAGNOSIS — M25.612 DECREASED ROM OF LEFT SHOULDER: Primary | ICD-10-CM

## 2020-10-21 PROCEDURE — 97110 THERAPEUTIC EXERCISES: CPT | Performed by: PHYSICAL THERAPIST

## 2020-10-21 NOTE — PROGRESS NOTES
Outpatient Physical Therapy Ortho Treatment Note       Patient Name: Ayla Echeverria  : 1965  MRN: [id number]  Today's Date: 10/21/2020      Visit Date: 10/21/2020    Visit Dx:    ICD-10-CM ICD-9-CM   1. Decreased ROM of left shoulder  M25.612 719.51       Patient Active Problem List   Diagnosis   • Bilateral acute serous otitis media   • Malignant neoplasm involving both nipple and areola of left breast in female (CMS/HCC)   • Cellulitis of abdominal wall   • Impetigo   • Folliculitis   • Encounter for consultation   • Non-smoker   • S/P lumpectomy, left breast   • S/P lymph node biopsy   • Regional lymph node staging category N3 (CMS/HCC)   • On antineoplastic chemotherapy   • Regional lymph node metastasis present (CMS/HCC)   • History of radiation therapy   • Painful skin lesion        Past Medical History:   Diagnosis Date   • Breast cancer (CMS/HCC)         Past Surgical History:   Procedure Laterality Date   • AXILLARY LYMPH NODE BIOPSY/EXCISION Left    • BREAST BIOPSY Left 2016   • BREAST LUMPECTOMY WITH SENTINEL NODE BIOPSY Left 10/03/2016    residual invasive carcinoma, grade 3 (0.2cm); margins negative; extensive lymphvascular space invasion; 2 sentinel lymph nodes positive (2/3)               Lymphedema     Row Name 10/21/20 1610             Subjective Pain    Able to rate subjective pain?  yes  -AL        User Key  (r) = Recorded By, (t) = Taken By, (c) = Cosigned By    Initials Name Provider Type    Jaycee Farris, PTA, CLT-EVANGELINA Physical Therapy Assistant              PT Assessment/Plan     Row Name 10/21/20 1617          PT Assessment    Assessment Comments  Patient was 25 min late for her appointment so I was not able to work on stretching today.  I did give her an HEP for stetching and posture.  She will work on these at home, will go over them again next treatment if needed.  Will plan to work on stretching next visit.   -AL        PT Plan    PT Plan Comments  Continue to wok on  increasing ROM both shoulders.   -AL       User Key  (r) = Recorded By, (t) = Taken By, (c) = Cosigned By    Initials Name Provider Type    Jaycee Farris, VAUGHN, ÁNGELA-EVANGELINA Physical Therapy Assistant            OP Exercises     Row Name 10/21/20 1610             Subjective Comments    Subjective Comments  Patient states she has a lot of tightness both shoulders, more so on the L.  She was late for her appointment becuase she missed the turn to our building.   -AL         Subjective Pain    Able to rate subjective pain?  yes  -AL      Pre-Treatment Pain Level  5  -AL      Subjective Pain Comment  Under the L arm   -AL         Total Minutes    37640 - PT Therapeutic Exercise Minutes  30  -AL         Exercise 1    Exercise Name 1  Diaphragmatic breathing  -AL      Cueing 1  Verbal  -AL      Sets 1  1  -AL      Reps 1  5  -AL      Additional Comments  Added to HEP  -AL         Exercise 2    Exercise Name 2  Supine flex w/wand  -AL      Cueing 2  Verbal  -AL      Sets 2  1  -AL      Reps 2  5  -AL      Time 2  10 Second hold  -AL         Exercise 3    Exercise Name 3  Supine B shoulder abduction with want  -AL      Cueing 3  Verbal  -AL      Sets 3  1  -AL      Reps 3  5  -AL      Additional Comments  Added to HEP  -AL         Exercise 4    Exercise Name 4  Scapular retraction   -AL      Cueing 4  Verbal  -AL      Sets 4  1  -AL      Reps 4  5  -AL      Additional Comments  Added to HEP  -AL         Exercise 5    Exercise Name 5  Shoulder rolls  -AL      Cueing 5  Verbal  -AL      Sets 5  1  -AL      Reps 5  5  -AL      Time 5  Added to HEP  -AL         Exercise 6    Exercise Name 6  Wall walks  -AL      Cueing 6  Verbal  -AL      Sets 6  1  -AL      Reps 6  3  -AL      Additional Comments  Added to HEP  -AL         Exercise 7    Exercise Name 7  shoulder IR with hand behind back  -AL      Cueing 7  Verbal  -AL      Sets 7  1  -AL      Reps 7  3  -AL      Additional Comments  Added to HEP  -AL         Exercise 8     Exercise Name 8  Shoulder Er with abduction hands behind head  -AL      Cueing 8  Verbal  -AL      Sets 8  1  -AL      Reps 8  3  -AL      Additional Comments  Added to HEP  -AL         Exercise 9    Exercise Name 9  Stressed posture  -AL      Cueing 9  Verbal  -AL      Additional Comments  Added to HEP  -AL        User Key  (r) = Recorded By, (t) = Taken By, (c) = Cosigned By    Initials Name Provider Type    Jaycee Farris, PTA, CLT-EVANGELINA Physical Therapy Assistant                       PT OP Goals     Row Name 10/21/20 1610          PT Short Term Goals    STG Date to Achieve  11/14/20  -AL     STG 1  Patient will begin a home exercise program to accelerate the recovery process  -AL     STG 1 Progress  Ongoing  -AL     STG 1 Progress Comments  Patient was given HEP today  -AL     STG 2  Patient will have an increase in bilateral shoulder flexion to 160 deg in supine  -AL     STG 2 Progress  New  -AL     STG 3  Patient will have a 25% reduction in chest and shoulder pain  -AL     STG 3 Progress  New  -AL     STG 4  Patient will gain an understanding of Lymphedema and risk factors  -AL     STG 4 Progress  New  -AL        Long Term Goals    LTG Date to Achieve  12/14/20  -AL     LTG 1  Patient will be able to reach up into an overead cabinet to retrieve a 5 lb dish safely  -AL     LTG 1 Progress  New  -AL     LTG 2  Patient will regain ROM to reach behind her back as if she was undoing a bra   -AL     LTG 2 Progress  New  -AL     LTG 3  Patient will have a 75 - 90% reduction in tightness/pain across chest and shoulders  -AL     LTG 3 Progress  New  -AL     LTG 4  Patient will be able to perform household chores without exacerbation of symptoms  -AL     LTG 4 Progress  New  -AL     LTG 5  Patient will regain functional ROM of both shoulders to perform activities overhead, reaching out to retrieve food from a drive-thru, reaching behind her back to fasten/unfasten bras,clothing  -AL     LTG 5 Progress  New  -AL      LTG 6  Patient will be instructed in a long term exercise program to continue to make gains following discharge  -AL     LTG 6 Progress  New  -AL        Time Calculation    PT Goal Re-Cert Due Date  11/14/20  -AL       User Key  (r) = Recorded By, (t) = Taken By, (c) = Cosigned By    Initials Name Provider Type    Jaycee Farris PTA, CLT-LANA Physical Therapy Assistant          Therapy Education  Education Details: See exercise flowsheet for HEP  Given: HEP, Symptoms/condition management, Posture/body mechanics  Program: New  How Provided: Verbal, Demonstration, Written  Provided to: Patient  Level of Understanding: Verbalized, Demonstrated              Time Calculation:                    Jaycee Mohan PTA, CLT-LANA  10/21/2020

## 2020-10-26 ENCOUNTER — TREATMENT (OUTPATIENT)
Dept: PHYSICAL THERAPY | Facility: CLINIC | Age: 55
End: 2020-10-26

## 2020-10-26 DIAGNOSIS — C50.012 MALIGNANT NEOPLASM INVOLVING BOTH NIPPLE AND AREOLA OF LEFT BREAST IN FEMALE, ESTROGEN RECEPTOR POSITIVE (HCC): ICD-10-CM

## 2020-10-26 DIAGNOSIS — M25.612 DECREASED ROM OF LEFT SHOULDER: Primary | ICD-10-CM

## 2020-10-26 DIAGNOSIS — C77.9 REGIONAL LYMPH NODE METASTASIS PRESENT (HCC): ICD-10-CM

## 2020-10-26 DIAGNOSIS — Z17.0 MALIGNANT NEOPLASM INVOLVING BOTH NIPPLE AND AREOLA OF LEFT BREAST IN FEMALE, ESTROGEN RECEPTOR POSITIVE (HCC): ICD-10-CM

## 2020-10-26 PROCEDURE — 97530 THERAPEUTIC ACTIVITIES: CPT | Performed by: PHYSICAL THERAPIST

## 2020-10-26 PROCEDURE — 97110 THERAPEUTIC EXERCISES: CPT | Performed by: PHYSICAL THERAPIST

## 2020-10-26 NOTE — PROGRESS NOTES
Outpatient Physical Therapy Ortho Treatment Note       Patient Name: Ayla Echeverria  : 1965  MRN: [id number]  Today's Date: 10/26/2020      Visit Date: 10/26/2020    Visit Dx:    ICD-10-CM ICD-9-CM   1. Decreased ROM of left shoulder  M25.612 719.51   2. Malignant neoplasm involving both nipple and areola of left breast in female, estrogen receptor positive (CMS/HCC)  C50.012 174.0    Z17.0 V86.0   3. Regional lymph node metastasis present (CMS/HCC)  C77.9 196.9       Patient Active Problem List   Diagnosis   • Bilateral acute serous otitis media   • Malignant neoplasm involving both nipple and areola of left breast in female (CMS/HCC)   • Cellulitis of abdominal wall   • Impetigo   • Folliculitis   • Encounter for consultation   • Non-smoker   • S/P lumpectomy, left breast   • S/P lymph node biopsy   • Regional lymph node staging category N3 (CMS/HCC)   • On antineoplastic chemotherapy   • Regional lymph node metastasis present (CMS/HCC)   • History of radiation therapy   • Painful skin lesion        Past Medical History:   Diagnosis Date   • Breast cancer (CMS/HCC)         Past Surgical History:   Procedure Laterality Date   • AXILLARY LYMPH NODE BIOPSY/EXCISION Left    • BREAST BIOPSY Left 2016   • BREAST LUMPECTOMY WITH SENTINEL NODE BIOPSY Left 10/03/2016    residual invasive carcinoma, grade 3 (0.2cm); margins negative; extensive lymphvascular space invasion; 2 sentinel lymph nodes positive (2/3)               Lymphedema     Row Name 10/26/20 5052             Subjective Pain    Able to rate subjective pain?  yes  -AL      Pre-Treatment Pain Level  4  -AL      Subjective Pain Comment  Under both arms  -AL        User Key  (r) = Recorded By, (t) = Taken By, (c) = Cosigned By    Initials Name Provider Type    AL Jaycee Mohan P, PTA, CLT-EVANGELINA Physical Therapy Assistant              PT Assessment/Plan     Row Name 10/26/20 8754          PT Assessment    Assessment Comments  Patient has less  tightness in both shoulders with shoulder flexion after stretching.  She is having to take less pain pills.  She exhibits weakness in the scapular area.  Patient needs cues for good posture, she will work on posture awareness at home, work, and when driving.   -AL        PT Plan    PT Plan Comments  Continue to work on increasing ROM for both shoulders as well as improving posture.   -AL       User Key  (r) = Recorded By, (t) = Taken By, (c) = Cosigned By    Initials Name Provider Type    AL Jaycee Mohan, PTA, IDALIA Physical Therapy Assistant            OP Exercises     Row Name 10/26/20 0979             Subjective Comments    Subjective Comments  She states she has been working on the exercises, she did not have to take as many pain pills.  She was not able to do the stretch with the bar.   -AL         Subjective Pain    Able to rate subjective pain?  yes  -AL      Pre-Treatment Pain Level  4  -AL      Subjective Pain Comment  Under both arms  -AL         Total Minutes    06036 - PT Therapeutic Exercise Minutes  30  -AL      68861 - PT Therapeutic Activity Minutes  15  -AL         Exercise 1    Exercise Name 1  Supine with towel roll along spine, also do in sitting along spine or to lumbar area  -AL      Cueing 1  Verbal  -AL      Sets 1  1  -AL      Time 1  10min  -AL      Additional Comments  Add to HEP  -AL         Exercise 2    Exercise Name 2  Supine flex w/wand  -AL      Cueing 2  Verbal  -AL      Sets 2  2  -AL      Reps 2  5  -AL      Time 2  10 Second hold  -AL         Exercise 3    Exercise Name 3  B posterior shoulder mobs  -AL      Cueing 3  Verbal  -AL      Sets 3  1  -AL      Reps 3  6  -AL         Exercise 4    Exercise Name 4  PROM stretch B shoulders into flexion  -AL      Cueing 4  Verbal  -AL      Sets 4  1  -AL      Reps 4  10  -AL         Exercise 5    Exercise Name 5  PROM stretch B shoulder abduction  -AL      Cueing 5  Verbal  -AL      Sets 5  1  -AL      Reps 5  5  -AL         Exercise  6    Exercise Name 6  UE phasic  -AL      Cueing 6  Verbal  -AL      Sets 6  2  -AL      Reps 6  10  -AL         Exercise 7    Exercise Name 7  Posture awareness when at work, driving, and when in sitting  -AL      Cueing 7  Verbal  -AL      Additional Comments  Added to HEP  -AL        User Key  (r) = Recorded By, (t) = Taken By, (c) = Cosigned By    Initials Name Provider Type    Jaycee Farris, PTA, CLT-EVANGELINA Physical Therapy Assistant                       PT OP Goals     Row Name 10/26/20 1550          PT Short Term Goals    STG Date to Achieve  11/14/20  -AL     STG 1  Patient will begin a home exercise program to accelerate the recovery process  -AL     STG 1 Progress  Ongoing  -AL     STG 1 Progress Comments  She is working on the HEP  -AL     STG 2  Patient will have an increase in bilateral shoulder flexion to 160 deg in supine  -AL     STG 2 Progress  Ongoing  -AL     STG 2 Progress Comments  Increased after stretching  -AL     STG 3  Patient will have a 25% reduction in chest and shoulder pain  -AL     STG 3 Progress  New  -AL     STG 4  Patient will gain an understanding of Lymphedema and risk factors  -AL     STG 4 Progress  New  -AL        Long Term Goals    LTG Date to Achieve  12/14/20  -AL     LTG 1  Patient will be able to reach up into an overead cabinet to retrieve a 5 lb dish safely  -AL     LTG 1 Progress  New  -AL     LTG 2  Patient will regain ROM to reach behind her back as if she was undoing a bra   -AL     LTG 2 Progress  New  -AL     LTG 3  Patient will have a 75 - 90% reduction in tightness/pain across chest and shoulders  -AL     LTG 3 Progress  New  -AL     LTG 4  Patient will be able to perform household chores without exacerbation of symptoms  -AL     LTG 4 Progress  New  -AL     LTG 5  Patient will regain functional ROM of both shoulders to perform activities overhead, reaching out to retrieve food from a drive-thru, reaching behind her back to fasten/unfasten bras,clothing  -AL      LTG 5 Progress  New  -AL     LTG 6  Patient will be instructed in a long term exercise program to continue to make gains following discharge  -AL     LTG 6 Progress  New  -AL        Time Calculation    PT Goal Re-Cert Due Date  11/14/20  -AL       User Key  (r) = Recorded By, (t) = Taken By, (c) = Cosigned By    Initials Name Provider Type    Jaycee Farris PTA, IDALIA Physical Therapy Assistant          Therapy Education  Education Details: Supine with towel roll along spine, also do in sitting along spine or to lumbar area.  Posture awareness  Given: HEP, Posture/body mechanics  Program: New  How Provided: Verbal, Demonstration  Provided to: Patient  Level of Understanding: Verbalized, Demonstrated              Time Calculation:                    Jaycee Mohan PTA, CLT-LANA  10/26/2020

## 2020-10-27 ENCOUNTER — TREATMENT (OUTPATIENT)
Dept: PHYSICAL THERAPY | Facility: CLINIC | Age: 55
End: 2020-10-27

## 2020-10-27 DIAGNOSIS — M25.611 DECREASED ROM OF RIGHT SHOULDER: ICD-10-CM

## 2020-10-27 DIAGNOSIS — M25.612 DECREASED ROM OF LEFT SHOULDER: Primary | ICD-10-CM

## 2020-10-27 DIAGNOSIS — I89.0 LYMPHEDEMA: ICD-10-CM

## 2020-10-27 PROCEDURE — 97110 THERAPEUTIC EXERCISES: CPT | Performed by: PHYSICAL THERAPIST

## 2020-10-27 PROCEDURE — 97530 THERAPEUTIC ACTIVITIES: CPT | Performed by: PHYSICAL THERAPIST

## 2020-10-27 PROCEDURE — 97140 MANUAL THERAPY 1/> REGIONS: CPT | Performed by: PHYSICAL THERAPIST

## 2020-10-27 NOTE — PROGRESS NOTES
Outpatient Physical Therapy Ortho Treatment Note       Patient Name: Ayla Echeverria  : 1965  MRN: [id number]  Today's Date: 10/27/2020      Visit Date: 10/27/2020    Visit Dx:    ICD-10-CM ICD-9-CM   1. Decreased ROM of left shoulder  M25.612 719.51   2. Decreased ROM of right shoulder  M25.611 719.51   3. Lymphedema  I89.0 457.1       Patient Active Problem List   Diagnosis   • Bilateral acute serous otitis media   • Malignant neoplasm involving both nipple and areola of left breast in female (CMS/HCC)   • Cellulitis of abdominal wall   • Impetigo   • Folliculitis   • Encounter for consultation   • Non-smoker   • S/P lumpectomy, left breast   • S/P lymph node biopsy   • Regional lymph node staging category N3 (CMS/HCC)   • On antineoplastic chemotherapy   • Regional lymph node metastasis present (CMS/HCC)   • History of radiation therapy   • Painful skin lesion        Past Medical History:   Diagnosis Date   • Breast cancer (CMS/HCC)         Past Surgical History:   Procedure Laterality Date   • AXILLARY LYMPH NODE BIOPSY/EXCISION Left    • BREAST BIOPSY Left 2016   • BREAST LUMPECTOMY WITH SENTINEL NODE BIOPSY Left 10/03/2016    residual invasive carcinoma, grade 3 (0.2cm); margins negative; extensive lymphvascular space invasion; 2 sentinel lymph nodes positive (2/3)               Lymphedema     Row Name 10/27/20 3993             Subjective Pain    Able to rate subjective pain?  yes  -AL      Pre-Treatment Pain Level  2  -AL      Subjective Pain Comment  across the chest  -AL         Subjective Comments    Subjective Comments  She woke up early in the morning with pain across the chest and under both arms down her chest.  She rates the pain she had this morning an 8/10.  She was busy at work and was not able to work on her HEP today.   -AL         Manual Lymphatic Drainage    Manual Lymphatic Drainage  initial sequence;opened regional lymph nodes;opened anastamoses  -AL      Initial Sequence   short neck;abdomen;diaphragmatic breathing  -AL      Abdomen  superficial  -AL      Diaphragmatic Breathing  x 10 with superficial abdominals  -AL      Opened Regional Lymph Nodes  inguinal  -AL      Inguinal  right;left  -AL      Opened Anastamoses  axillo-inguinal  -AL      Axillo-Inguinal  right;left  -AL      Manual Lymphatic Drainage Comments  Worked on B lateral trunk and chest.  -AL      Manual Therapy  Went over MLD  -AL        User Key  (r) = Recorded By, (t) = Taken By, (c) = Cosigned By    Initials Name Provider Type    Jaycee Farris PTA, CLT-LANA Physical Therapy Assistant              PT Assessment/Plan     Row Name 10/27/20 1555          PT Assessment    Assessment Comments  Patient had increase tightness early this am that woke her, but she is better today.  She had no pain after treatment.  She will continue to work on the HEP as well as working on the MLD.  We did go over self MLD today.  -AL        PT Plan    PT Plan Comments  Continue to work on increasing ROM both shoulders as well as decreasing pain.   -AL       User Key  (r) = Recorded By, (t) = Taken By, (c) = Cosigned By    Initials Name Provider Type    Jaycee Farris PTA, CLT-LANA Physical Therapy Assistant            OP Exercises     Row Name 10/27/20 1555 10/27/20 1500          Subjective Comments    Subjective Comments  She woke up early in the morning with pain across the chest and under both arms down her chest.  She rates the pain she had this morning an 8/10.  She was busy at work and was not able to work on her HEP today.   -AL  --        Subjective Pain    Able to rate subjective pain?  yes  -AL  --     Pre-Treatment Pain Level  2  -AL  --     Post-Treatment Pain Level  0  -AL  --     Subjective Pain Comment  across the chest  -AL  --        Total Minutes    90745 - PT Therapeutic Exercise Minutes  15  -AL  --     27863 - PT Therapeutic Activity Minutes  10  -AL  --     27086 - PT Manual Therapy Minutes  20  -AL  --  -AL         Exercise 1    Exercise Name 1  Supine B shoulder flexion PROM  -AL  --        Exercise 2    Exercise Name 2  Supine B shoulder abduction PROM  -AL  --        Exercise 3    Exercise Name 3  Inferior and posterior joint mobs both shoulders  -AL  --       User Key  (r) = Recorded By, (t) = Taken By, (c) = Cosigned By    Initials Name Provider Type    AL MohanJaycee, PTA, CLT-EVANGELINA Physical Therapy Assistant                      Manual Rx (last 36 hours)      Manual Treatments     Row Name 10/27/20 1555 10/27/20 1500          Total Minutes    40062 - PT Manual Therapy Minutes  20  -AL  --  -AL        Manual Rx 1    Manual Rx 1 Location  MLD to the upper chest and trunk, see lymphedema flow sheet  -AL  --  -AL       User Key  (r) = Recorded By, (t) = Taken By, (c) = Cosigned By    Initials Name Provider Type    Jaycee Farris, PTA, CLT-EVANGELINA Physical Therapy Assistant          PT OP Goals     Row Name 10/27/20 1555          PT Short Term Goals    STG Date to Achieve  11/14/20  -AL     STG 1  Patient will begin a home exercise program to accelerate the recovery process  -AL     STG 1 Progress  Ongoing  -AL     STG 1 Progress Comments  She is working on the HPE  -AL     STG 2  Patient will have an increase in bilateral shoulder flexion to 160 deg in supine  -AL     STG 2 Progress  Ongoing  -AL     STG 2 Progress Comments  Improving  -AL     STG 3  Patient will have a 25% reduction in chest and shoulder pain  -AL     STG 3 Progress  New  -AL     STG 4  Patient will gain an understanding of Lymphedema and risk factors  -AL     STG 4 Progress  New  -AL        Long Term Goals    LTG Date to Achieve  12/14/20  -AL     LTG 1  Patient will be able to reach up into an overead cabinet to retrieve a 5 lb dish safely  -AL     LTG 1 Progress  New  -AL     LTG 2  Patient will regain ROM to reach behind her back as if she was undoing a bra   -AL     LTG 2 Progress  New  -AL     LTG 3  Patient will have a 75 - 90% reduction in  tightness/pain across chest and shoulders  -AL     LTG 3 Progress  New  -AL     LTG 4  Patient will be able to perform household chores without exacerbation of symptoms  -AL     LTG 4 Progress  New  -AL     LTG 5  Patient will regain functional ROM of both shoulders to perform activities overhead, reaching out to retrieve food from a drive-thru, reaching behind her back to fasten/unfasten bras,clothing  -AL     LTG 5 Progress  New  -AL     LTG 6  Patient will be instructed in a long term exercise program to continue to make gains following discharge  -AL     LTG 6 Progress  New  -AL        Time Calculation    PT Goal Re-Cert Due Date  11/14/20  -AL       User Key  (r) = Recorded By, (t) = Taken By, (c) = Cosigned By    Initials Name Provider Type    Jaycee Farris PTA, CLT-LANA Physical Therapy Assistant          Therapy Education  Education Details: MLD  Given: Edema management  Program: New  How Provided: Verbal, Demonstration  Provided to: Patient  Level of Understanding: Verbalized, Demonstrated              Time Calculation:                    Jaycee Mohan PTA, CLT-LANA  10/27/2020

## 2020-10-28 NOTE — PROGRESS NOTES
years old female who has a diagnosis of recurrent HER-2 positive/NV negative breast cancer with extensive skin involvement/chest wall involvement. She received treatment with Fam-trastuzumab but had poor tolerance due to chemotherapy-induced neutropenia and severe fatigue. Therefore she elected to discontinue treatment. Unfortunately, she had significant disease recurrence in the left chest wall with a several nodules in the left lateral chest.  She is currently post completion of receiving palliative radiation to the skin lesions with significant and impressive improvement. She is here today for treatment with Herceptin. Xeloda has been on hold due to neutropenia. CBC reviewed today she continues to be neutropenic, though somewhat improved today. In general, she has reported a significant improvement of the skin metastasis. Cancer history- Left breast recurrence with Inflammatory Breast Cancer ER 8%/NV negative &HER-2 positive. Ms Jose Castillo was seen in initial oncology consultation on 4/20/2017 referred for a diagnosis of a left breast recurrence with inflammatory breast cancer. She was initially treated by Dr. Long Smiley at [facility]. Prior breast cancer history is as follows:  3/22/2016-a diagnostic mammogram for a palpable mass showed a left breast abnormal findings. 4/1/2017- ultrasound-guided core needle biopsy was consistent with invasive ductal carcinoma, grade 2. ER 1%, NV 6%, HER-2/oseas IHC 3+/FISH amplified ratio 6.7., AR 95%. She underwent 6 cycles of TCH-P from April 2016 through September 2016 with G-CSF support. She had a total of 18 weeks of trastuzumab treatment. 10/3/2016-she underwent a left lumpectomy with left sentinel lymph node revealing an invasive ductal carcinoma, grade 3. Margins clear of invasive carcinoma (1.1 cm from the lateral margin), extensive lymphovascular space invasion. 2 out of 3 sentinel lymph nodes positive for metastatic carcinoma.  Final pathologic staging switched to Kadcyla. 12/27/2017- repeat re-staging CT scan of the abdomen and pelvis documented no evidence of intra-abdominal pelvic metastasis. Superior left renal cyst. Mild hepatic steatosis. CT scan of the chest documented no evidence of intrathoracic metastasis. Bone scan documented no evidence of bony metastatic disease. 3/2/2018-after 8 cycles of Kadcyla she requested treatment to be on hold. She has requested her PCP a referral to [facility] for a second opinion. She has neuropathy grade 1.   5/2/2018- 2 months off treatment (Kadcyla). She has not yet been seen at [facility]. Apparently, [place] is gathering her records and will see her soon. Examination of her chest wall showed recurrent disease in the left upper chest. Residual neuropathy grade 1. She could not tolerate gabapentin, and therefore has stopped it. She was not interested in any other medication for neuropathy at this time. 7/10/2018-after discussion at [facility] she was recommended to reinitiate Kadcyla. 7/13/2018-CT of chest, abdomen, pelvis showed no evidence of intra-abdominal disease. There is an enlarged right-sided level 1 lymph node that was not appreciated previously, measuring 2.5 x 1.4 cm, metastatic disease considered. 7/16/2018-PET CT scan showed diffuse uptake throughout the left breast extending to the left chest wall. Abnormal uptake within the left axilla lymph nodes and left internal mammary lymph nodes concerning for metastatic disease. Enlarged hypermetabolic right axilla lymph node concerning for metastatic disease with SUV 8.0. Abnormal uptake within a right cervical lymph node, etiology unclear, but could be reactive. 10/26/2018 - CT scan of the chest was completed at [facility] and compared to CT scan of 12/27/2017 versus her last CT scan completed at [facility] on 7/13/2018.  The CT scan identified a right axillary lymph node measuring 1.2 cm, otherwise no intrathoracic neoplastic process/metastatic disease. 10/26/2018 -CT scan of the abdomen and pelvis was completed at [facility] and compared to CT scan of 12/27/2017 versus her last CT scan completed at [facility] on 7/13/2018. No evidence of intra-abdominal or pelvic metastasis was identified. December 2018-interval worsening of skin metastasis in the left breast and diffuse erythema with new nodules. 12/17/2018-skin biopsy of overlying right breast consistent invasive ductal carcinoma, high-grade. ER 20%, ME 0%, HER-2/oseas 3+.  12/17/2018-right axillary FNA biopsy consistent invasive ductal carcinoma. 12/26/2018-recommended clinical trial at [facility]. 1/4/2019-started on Navelbine/Herceptin, due to delays on the clinical trial at [facility]. Unfortunately, she had severe chest wall pain and generalized pain related to Navelbine. 1/25/2019-4/19/2019 she was switched to cisplatin/Gemzar/Herceptin, but had disease progression. 5/30/2019-CT chest, abdomen, pelvis and bone scan was unremarkable for right axillary adenopathy measuring 2 cm. Lymph node located in the upper chest wall under the pectoralis muscle measuring 1.2 cm. Right breast with 2 areas with masslike enhancement. No evidence of metastatic disease in the abdomen pelvis. Bone scan was unremarkable for metastatic bone disease. June 2019-very poor tolerance to cisplatin, Gemzar/Herceptin. She was recommended Xeloda/ Lapatinib.   6/22/2019-she was recommended and started on Xeloda 850 mg/m2 PO BID days 1 through 14 every 21 days and Lapatinib 1250 mg daily continuously. November 2019- progression on prior Xeloda and lapatinib.  11/23/2019- recommended rechallenge with Herceptin and Navelbine vs clinical trial at [facility]. 12/23/2019-CT chest abdomen pelvis showed metastatic disease in the right axillary lymph node and diffuse bilateral skin involvement of both breasts with several nodules. No evidence of pulmonary, liver or bone metastasis.   CT of the head with contrast was unremarkable. 1/3/2020- the patient was unable to follow-up at [facility] for clinical trial.  Therefore recommended Fam-trastuzumab deruxtecan every 3 weeks. 1/10/2020-she was started on palliative treatment with Fam-trastuzumab.  6/5/2020- Ct Chest/Abdomen/Pelvis W Contrast No lymphadenopathy. A previously seen enlarged right axillary lymph node is now small in size. There is some stranding of the fat around the lymph node. Bilateral breast skin thickening left greater than right. Prior lumpectomy on the left with left axillary lymph node sampling. Previously seen nodularity in the right breast on CT is not visualized today. There may be minimal radiation change in the periphery of the left lung. There is no dense consolidation or effusion. No definite metastatic lung nodules. No evidence of abdominal or pelvic neoplastic process or metastatic disease. The previously seen right breast nodule is not visualized in this study. Postsurgical changes of the left breast. No significant change in the previous study. Lobulated skin thickening of the left lower lateral chest and upper abdominal wall which was not noted in the previous study. The etiology is not certain. This data be clinically correlated and further evaluated. 8/7/2020- the patient is currently awaiting for palliative radiation to the chest wall for the skin margins. She has agreed and want to start on Herceptin, Xeloda and Tucatinib.  8/21/2020 Tumor Marker- CA 15.3- 32.19  10/9/2020-continue Herceptin and tucatinib.   Hold Xeloda due to neutropenia      Past medical history:  Past Medical History:   Diagnosis Date    Cancer ([address]     breast    GERD (gastroesophageal reflux disease)         Past surgical history:  Past Surgical History:   Procedure Laterality Date    BREAST LUMPECTOMY Left 10/4/2016    LUMPECTOMY WITH SENTINAL NODE BIOPSY AND INTRA OP US GUIDED NEEDLE LOCALIZATION performed by Esther Stallings MD at [address] [place] [address] BREAST SURGERY      biopsy left breast    COLONOSCOPY  2008    INSERTION / REMOVAL / REPLACEMENT VENOUS ACCESS CATHETER N/A 5/12/2016    SINGLE LUMEN PORT INSERTION performed by Julia Aviles MD at [facility] DIAGNOSTIC BONE MARROW BIOPSIES Right 11/14/2018    BONE MARROW ASPIRATION BIOPSY performed by Breanne Titus MD at [facility]      Social history:  Social History     Socioeconomic History    Marital status:      Spouse name: Not on file    Number of children: Not on file    Years of education: Not on file    Highest education level: Not on file   Occupational History    Not on file   Social Needs    Financial resource strain: Not on file    Food insecurity     Worry: Not on file     Inability: Not on file    Transportation needs     Medical: Not on file     Non-medical: Not on file   Tobacco Use    Smoking status: Never Smoker    Smokeless tobacco: Never Used   Substance and Sexual Activity    Alcohol use: No     Alcohol/week: 0.0 standard drinks    Drug use: No    Sexual activity: Not on file   Lifestyle    Physical activity     Days per week: Not on file     Minutes per session: Not on file    Stress: Not on file   Relationships    Social connections     Talks on phone: Not on file     Gets together: Not on file     Attends Advent service: Not on file     Active member of club or organization: Not on file     Attends meetings of clubs or organizations: Not on file     Relationship status: Not on file    Intimate partner violence     Fear of current or ex partner: Not on file     Emotionally abused: Not on file     Physically abused: Not on file     Forced sexual activity: Not on file   Other Topics Concern    Not on file   Social History Narrative    Not on file        Family history:   Family History   Problem Relation Age of Onset    Lung Cancer Father     Colon Cancer Father     Prostate Cancer Father     Hypertension Mother         Current Outpatient Medications   Medication Sig Dispense Refill    oxyCODONE-acetaminophen (PERCOCET) 7.5-325 MG per tablet Take 1 tablet by mouth every 6 hours as needed for Pain for up to 30 days. Intended supply: 30 days 120 tablet 0    Tucatinib (TUKYSA) 150 MG TABS Take 300 mg by mouth 2 times daily 120 tablet 5    ondansetron (ZOFRAN) 8 MG tablet Take 1 tablet by mouth every 8 hours as needed for Nausea or Vomiting 30 tablet 5    Morphine Sulfate ER 30 MG T12A Take 30 mg by mouth 2 times daily for 30 days. 60 tablet 0    capecitabine (XELODA) 500 MG chemo tablet Take 2 tablets by mouth twice daily for 14 days of a 21 day cycle. 56 tablet 5    capecitabine (XELODA) 150 MG chemo tablet Take 2 tablets by mouth 2 times a daily for 14 days of a 21 day cycle 56 tablet 5    Zinc Sulfate (ZINC 15 PO) Take by mouth daily      Cholecalciferol (VITAMIN D3) 1.25 MG (99871 UT) CAPS Take by mouth daily Unknown amount of Vitamins D3      APPLE CIDER VINEGAR PO Take by mouth      Probiotic Product (PROBIOTIC-10 PO) Take by mouth      Turmeric 500 MG CAPS Take by mouth      SELENIUM ER PO Take by mouth      Multiple Vitamin (MULTI VITAMIN DAILY PO) Take by mouth       No current facility-administered medications for this visit.       Facility-Administered Medications Ordered in Other Visits   Medication Dose Route Frequency Provider Last Rate Last Dose    trastuzumab (HERCEPTIN) 359 mg in sodium chloride 0.9 % 250 mL chemo IVPB  6 mg/kg (Treatment Plan Recorded) Intravenous Once Ambrose Mays .2 mL/hr at 10/30/20 1429 359 mg at 10/30/20 1429    heparin flush 100 UNIT/ML injection 500 Units  500 Units Intracatheter PRN Ambrose Mays MD        sodium chloride flush 0.9 % injection 10 mL  10 mL Intravenous PRN Ambrose Mays MD            REVIEW OF SYSTEMS:    Constitutional: no fever, no night sweats, fatigue;   HEENT: no blurring of vision, no double vision, no hearing difficulty, no tinnitus,no ulceration, no dental caries, no dysphagia, no epistaxis  Lungs: no cough, no shortness of breath, no wheeze;   CVS: no palpitation, no chest pain, no shortness of breath;  GI: no abdominal pain, no nausea , no vomiting, no constipation; no diarrhea  ORLIN: no dysuria, frequency and urgency, no hematuria, no kidney stones;   Musculoskeletal: no joint pain, swelling , stiffness;   Endocrine: no polyuria, polydypsia, no cold or heat intolerence; Hematology/lymphatic: no easy brusing or bleeding, no hx of clotting disorder; no peripheral adenopathy. Dermatology: Skin nodules resolving, dry skin, itching ,no eczema, no pruritis;   Psychiatry: no depression, no anxiety,no panic attacks, no suicide ideation; Neurology: no syncope, no seizures, no numbness or tingling of hands, no numbness and tingling of feet, no paresis;     PHYSICAL EXAM:    /60   Pulse 69   Temp 97.7 °F (36.5 °C)   Resp 18   Wt 121 lb 3.2 oz (55 kg)   LMP 02/29/2016 (Approximate)   SpO2 94%   BMI 21.47 kg/m²   /60   Pulse 69   Temp 97.7 °F (36.5 °C)   Resp 18   Wt 121 lb 3.2 oz (55 kg)   LMP 02/29/2016 (Approximate)   SpO2 94%   BMI 21.47 kg/m²     Pain scale:5    CONSTITUTIONAL: Alert, appropriate, no acute distress,   EYES: Non icteric, EOM intact, pupils equal round and reactive to light and accommodation. ENT: Oral mucus membranes moist, no oral pharyngeal lesions. External inspection of ears and nose are normal.   NECK: Supple, no masses. No palpable thyroid mass    CHEST/LUNGS: CTA bilaterally, normal respiratory effort   CARDIOVASCULAR: RRR, no murmurs. No lower extremity edema   ABDOMEN: soft non-tender, active bowel sounds, no hepatosplenomegaly. No palpable masses. EXTREMITIES: warm, Full ROM of all fours extremities. No focal weakness. SKIN: Dry skin on the right armpit. Mild erythema.   LYMPH: No cervical, clavicular, axillary, or inguinal lymphadenopathy  NEUROLOGIC: follows commands, non focal.   PSYCH: mood and affect appropriate. Alert and oriented to time and place and person. Relevant Lab findings:   Lab Results   Component Value Date    WBC 2.10 (L) 10/30/2020    HGB 10.1 (L) 10/30/2020    HCT 30.4 (L) 10/30/2020    MCV 88.4 10/30/2020     (L) 10/30/2020     Lab Results   Component Value Date    NEUTROABS 1.06 (L) 10/30/2020     Lab Results   Component Value Date     10/30/2020    K 3.9 10/30/2020     10/30/2020    CO2 28 10/30/2020    BUN 11 10/30/2020    CREATININE 0.7 10/30/2020    GLUCOSE 117 (H) 10/30/2020    CALCIUM 9.4 10/30/2020    PROT 8.4 (H) 10/30/2020    LABALBU 4.4 10/30/2020    BILITOT 0.3 10/30/2020    ALKPHOS 79 10/30/2020    AST 64 (H) 10/30/2020    ALT 50 10/30/2020    LABGLOM >60 10/30/2020    GFRAA 114 04/28/2020    AGRATIO 1.3 04/28/2020    GLOB 4.0 10/30/2020       Relevant Imaging studies:   No results found    ASSESSMENT:    Orders Placed This Encounter   Procedures    Cancer Antigen 15-3     Standing Status:   Future     Number of Occurrences:   1     Standing Expiration Date:   10/30/2021    Cancer Antigen 27.29     Standing Status:   Future     Number of Occurrences:   1     Standing Expiration Date:   10/30/2021    CEA     Standing Status:   Future     Number of Occurrences:   1     Standing Expiration Date:   10/30/2021      Fazal Griffin was seen today for breast cancer. Diagnoses and all orders for this visit:    Carcinomatous metastasis in skin ([facility] [address]  -     Cancer Antigen 15-3; Future  -     Cancer Antigen 27.29; Future  -     CEA; Future    HER2-positive carcinoma of breast (HCC)  -     Cancer Antigen 15-3; Future  -     Cancer Antigen 27.29; Future  -     CEA;  Future    Care plan discussed with patient    Encounter for antineoplastic immunotherapy    Adverse effect of antineoplastic and immunosuppressive drugs, subsequent encounter       Metastatic HER-2 oseas breast cancer   She received 2 cycles of Fam-trastuzumab deruxtecan 5.4 mg/kg every 3 weeks She was patient variability, prior treatment and comorbidity. The optimal delivery of anticancer agents therefore requires a healthcare delivery team experienced in the use of anticancer agents and the management of associated toxicities in patients with cancer. Follow Up:     No follow-ups on file. Data Unavailable     I, Dr Catalina Betancur, personally performed the services described in this documentation as scribed by Gamal Farrar RN in my presence and is both accurate and complete. Over 50% of the total visit time of 25 minutes in face to face encounter with the patient, out of which more than 50% of the time was spent in counseling patient or family and coordination of care. Counseling included but was not limited to time spent reviewing labs, imaging studies/ treatment plan and answering questions.

## 2020-10-30 ENCOUNTER — OFFICE VISIT (OUTPATIENT)
Dept: HEMATOLOGY | Age: 55
End: 2020-10-30
Payer: COMMERCIAL

## 2020-10-30 ENCOUNTER — HOSPITAL ENCOUNTER (OUTPATIENT)
Dept: INFUSION THERAPY | Age: 55
Discharge: HOME | End: 2020-10-30
Payer: COMMERCIAL

## 2020-10-30 VITALS
TEMPERATURE: 97.7 F | BODY MASS INDEX: 21.47 KG/M2 | SYSTOLIC BLOOD PRESSURE: 128 MMHG | HEART RATE: 69 BPM | DIASTOLIC BLOOD PRESSURE: 60 MMHG | WEIGHT: 121.2 LBS | OXYGEN SATURATION: 94 % | RESPIRATION RATE: 18 BRPM

## 2020-10-30 DIAGNOSIS — C50.919 HER2-POSITIVE CARCINOMA OF BREAST (HCC): ICD-10-CM

## 2020-10-30 DIAGNOSIS — C79.2 CARCINOMATOUS METASTASIS IN SKIN (HCC): ICD-10-CM

## 2020-10-30 DIAGNOSIS — C50.412 MALIGNANT NEOPLASM OF UPPER-OUTER QUADRANT OF LEFT FEMALE BREAST, UNSPECIFIED ESTROGEN RECEPTOR STATUS (HCC): Primary | ICD-10-CM

## 2020-10-30 LAB
ALBUMIN SERPL-MCNC: 4.4 G/DL (ref 3.5–5.2)
ALP BLD-CCNC: 79 U/L (ref 35–104)
ALT SERPL-CCNC: 50 U/L (ref 9–52)
ANION GAP SERPL CALCULATED.3IONS-SCNC: 9 MMOL/L (ref 7–19)
AST SERPL-CCNC: 64 U/L (ref 14–36)
BASOPHILS ABSOLUTE: 0.03 K/UL (ref 0.01–0.08)
BASOPHILS RELATIVE PERCENT: 1.4 % (ref 0.1–1.2)
BILIRUB SERPL-MCNC: 0.3 MG/DL (ref 0.2–1.3)
BUN BLDV-MCNC: 11 MG/DL (ref 7–17)
CA 15-3: 25 U/ML (ref 0–35)
CALCIUM SERPL-MCNC: 9.4 MG/DL (ref 8.4–10.2)
CEA: 1.9 NG/ML (ref 0–3)
CHLORIDE BLD-SCNC: 107 MMOL/L (ref 98–111)
CO2: 28 MMOL/L (ref 22–29)
CREAT SERPL-MCNC: 0.7 MG/DL (ref 0.5–1)
EOSINOPHILS ABSOLUTE: 0.19 K/UL (ref 0.04–0.54)
EOSINOPHILS RELATIVE PERCENT: 9 % (ref 0.7–7)
GFR NON-AFRICAN AMERICAN: >60
GLOBULIN: 4 G/DL
GLUCOSE BLD-MCNC: 117 MG/DL (ref 74–106)
HCT VFR BLD CALC: 30.4 % (ref 34.1–44.9)
HEMOGLOBIN: 10.1 G/DL (ref 11.2–15.7)
LYMPHOCYTES ABSOLUTE: 0.41 K/UL (ref 1.18–3.74)
LYMPHOCYTES RELATIVE PERCENT: 19.5 % (ref 19.3–53.1)
MCH RBC QN AUTO: 29.4 PG (ref 25.6–32.2)
MCHC RBC AUTO-ENTMCNC: 33.2 G/DL (ref 32.3–35.5)
MCV RBC AUTO: 88.4 FL (ref 79.4–94.8)
MONOCYTES ABSOLUTE: 0.41 K/UL (ref 0.24–0.82)
MONOCYTES RELATIVE PERCENT: 19.5 % (ref 4.7–12.5)
NEUTROPHILS ABSOLUTE: 1.06 K/UL (ref 1.56–6.13)
NEUTROPHILS RELATIVE PERCENT: 50.6 % (ref 34–71.1)
PDW BLD-RTO: 18.8 % (ref 11.7–14.4)
PLATELET # BLD: 161 K/UL (ref 182–369)
PMV BLD AUTO: 9.9 FL (ref 7.4–10.4)
POTASSIUM SERPL-SCNC: 3.9 MMOL/L (ref 3.5–5.1)
RBC # BLD: 3.44 M/UL (ref 3.93–5.22)
SODIUM BLD-SCNC: 144 MMOL/L (ref 137–145)
TOTAL PROTEIN: 8.4 G/DL (ref 6.3–8.2)
WBC # BLD: 2.1 K/UL (ref 3.98–10.04)

## 2020-10-30 PROCEDURE — 2580000003 HC RX 258: Performed by: INTERNAL MEDICINE

## 2020-10-30 PROCEDURE — 96375 TX/PRO/DX INJ NEW DRUG ADDON: CPT

## 2020-10-30 PROCEDURE — 96413 CHEMO IV INFUSION 1 HR: CPT

## 2020-10-30 PROCEDURE — 82378 CARCINOEMBRYONIC ANTIGEN: CPT

## 2020-10-30 PROCEDURE — 85025 COMPLETE CBC W/AUTO DIFF WBC: CPT

## 2020-10-30 PROCEDURE — 99214 OFFICE O/P EST MOD 30 MIN: CPT | Performed by: INTERNAL MEDICINE

## 2020-10-30 PROCEDURE — 80053 COMPREHEN METABOLIC PANEL: CPT

## 2020-10-30 PROCEDURE — 6370000000 HC RX 637 (ALT 250 FOR IP): Performed by: INTERNAL MEDICINE

## 2020-10-30 PROCEDURE — 86300 IMMUNOASSAY TUMOR CA 15-3: CPT

## 2020-10-30 PROCEDURE — 6360000002 HC RX W HCPCS: Performed by: INTERNAL MEDICINE

## 2020-10-30 RX ORDER — METHYLPREDNISOLONE SODIUM SUCCINATE 125 MG/2ML
125 INJECTION, POWDER, LYOPHILIZED, FOR SOLUTION INTRAMUSCULAR; INTRAVENOUS ONCE
Status: CANCELLED | OUTPATIENT
Start: 2020-10-30

## 2020-10-30 RX ORDER — DIPHENHYDRAMINE HYDROCHLORIDE 50 MG/ML
50 INJECTION INTRAMUSCULAR; INTRAVENOUS ONCE
Status: CANCELLED | OUTPATIENT
Start: 2020-10-30

## 2020-10-30 RX ORDER — ACETAMINOPHEN 325 MG/1
1000 TABLET ORAL ONCE
Status: CANCELLED
Start: 2020-10-30

## 2020-10-30 RX ORDER — HEPARIN SODIUM (PORCINE) LOCK FLUSH IV SOLN 100 UNIT/ML 100 UNIT/ML
500 SOLUTION INTRAVENOUS PRN
Status: DISCONTINUED | OUTPATIENT
Start: 2020-10-30 | End: 2020-10-31 | Stop reason: HOSPADM

## 2020-10-30 RX ORDER — DIPHENHYDRAMINE HYDROCHLORIDE 50 MG/ML
25 INJECTION INTRAMUSCULAR; INTRAVENOUS ONCE
Status: COMPLETED | OUTPATIENT
Start: 2020-10-30 | End: 2020-10-30

## 2020-10-30 RX ORDER — SODIUM CHLORIDE 9 MG/ML
INJECTION, SOLUTION INTRAVENOUS CONTINUOUS
Status: CANCELLED | OUTPATIENT
Start: 2020-10-30

## 2020-10-30 RX ORDER — EPINEPHRINE 1 MG/ML
0.3 INJECTION, SOLUTION, CONCENTRATE INTRAVENOUS PRN
Status: CANCELLED | OUTPATIENT
Start: 2020-10-30

## 2020-10-30 RX ORDER — ACETAMINOPHEN 500 MG
1000 TABLET ORAL ONCE
Status: COMPLETED | OUTPATIENT
Start: 2020-10-30 | End: 2020-10-30

## 2020-10-30 RX ORDER — SODIUM CHLORIDE 9 MG/ML
20 INJECTION, SOLUTION INTRAVENOUS ONCE
Status: CANCELLED | OUTPATIENT
Start: 2020-10-30

## 2020-10-30 RX ORDER — MEPERIDINE HYDROCHLORIDE 50 MG/ML
12.5 INJECTION INTRAMUSCULAR; INTRAVENOUS; SUBCUTANEOUS ONCE
Status: CANCELLED | OUTPATIENT
Start: 2020-10-30

## 2020-10-30 RX ORDER — OXYCODONE AND ACETAMINOPHEN 7.5; 325 MG/1; MG/1
1 TABLET ORAL EVERY 6 HOURS PRN
Qty: 120 TABLET | Refills: 0 | Status: SHIPPED | OUTPATIENT
Start: 2020-10-30 | End: 2021-01-12 | Stop reason: SDUPTHER

## 2020-10-30 RX ORDER — SODIUM CHLORIDE 0.9 % (FLUSH) 0.9 %
5 SYRINGE (ML) INJECTION PRN
Status: CANCELLED | OUTPATIENT
Start: 2020-10-30

## 2020-10-30 RX ORDER — DIPHENHYDRAMINE HYDROCHLORIDE 50 MG/ML
25 INJECTION INTRAMUSCULAR; INTRAVENOUS ONCE
Status: CANCELLED
Start: 2020-10-30

## 2020-10-30 RX ORDER — HEPARIN SODIUM (PORCINE) LOCK FLUSH IV SOLN 100 UNIT/ML 100 UNIT/ML
500 SOLUTION INTRAVENOUS PRN
Status: CANCELLED | OUTPATIENT
Start: 2020-10-30

## 2020-10-30 RX ORDER — SODIUM CHLORIDE 0.9 % (FLUSH) 0.9 %
10 SYRINGE (ML) INJECTION PRN
Status: DISCONTINUED | OUTPATIENT
Start: 2020-10-30 | End: 2020-10-31 | Stop reason: HOSPADM

## 2020-10-30 RX ORDER — SODIUM CHLORIDE 0.9 % (FLUSH) 0.9 %
10 SYRINGE (ML) INJECTION PRN
Status: CANCELLED | OUTPATIENT
Start: 2020-10-30

## 2020-10-30 RX ADMIN — TRASTUZUMAB 359 MG: 150 INJECTION, POWDER, LYOPHILIZED, FOR SOLUTION INTRAVENOUS at 14:29

## 2020-10-30 RX ADMIN — HEPARIN 500 UNITS: 100 SYRINGE at 15:02

## 2020-10-30 RX ADMIN — DIPHENHYDRAMINE HYDROCHLORIDE 25 MG: 50 INJECTION, SOLUTION INTRAMUSCULAR; INTRAVENOUS at 13:50

## 2020-10-30 RX ADMIN — SODIUM CHLORIDE, PRESERVATIVE FREE 10 ML: 5 INJECTION INTRAVENOUS at 15:02

## 2020-10-30 RX ADMIN — ACETAMINOPHEN 1000 MG: 500 TABLET, FILM COATED ORAL at 13:50

## 2020-10-30 ASSESSMENT — PAIN SCALES - GENERAL: PAINLEVEL_OUTOF10: 0

## 2020-11-05 ENCOUNTER — TREATMENT (OUTPATIENT)
Dept: PHYSICAL THERAPY | Facility: CLINIC | Age: 55
End: 2020-11-05

## 2020-11-05 DIAGNOSIS — Z91.89 AT RISK FOR LYMPHEDEMA: ICD-10-CM

## 2020-11-05 DIAGNOSIS — C77.9 REGIONAL LYMPH NODE METASTASIS PRESENT (HCC): ICD-10-CM

## 2020-11-05 DIAGNOSIS — C50.012 MALIGNANT NEOPLASM INVOLVING BOTH NIPPLE AND AREOLA OF LEFT BREAST IN FEMALE, ESTROGEN RECEPTOR POSITIVE (HCC): ICD-10-CM

## 2020-11-05 DIAGNOSIS — Z17.0 MALIGNANT NEOPLASM INVOLVING BOTH NIPPLE AND AREOLA OF LEFT BREAST IN FEMALE, ESTROGEN RECEPTOR POSITIVE (HCC): ICD-10-CM

## 2020-11-05 DIAGNOSIS — M25.612 DECREASED ROM OF LEFT SHOULDER: Primary | ICD-10-CM

## 2020-11-05 DIAGNOSIS — M25.611 DECREASED ROM OF RIGHT SHOULDER: ICD-10-CM

## 2020-11-05 DIAGNOSIS — I89.0 LYMPHEDEMA: ICD-10-CM

## 2020-11-05 PROCEDURE — 97530 THERAPEUTIC ACTIVITIES: CPT | Performed by: PHYSICAL THERAPIST

## 2020-11-05 PROCEDURE — 97110 THERAPEUTIC EXERCISES: CPT | Performed by: PHYSICAL THERAPIST

## 2020-11-05 RX ORDER — PANTOPRAZOLE SODIUM 40 MG/1
TABLET, DELAYED RELEASE ORAL
Qty: 30 TABLET | Refills: 2 | Status: SHIPPED | OUTPATIENT
Start: 2020-11-05 | End: 2021-08-30

## 2020-11-05 NOTE — PROGRESS NOTES
Outpatient Physical Therapy Ortho Treatment Note       Patient Name: Ayla Echeverria  : 1965  MRN: [id number]  Today's Date: 2020      Visit Date: 2020    Visit Dx:    ICD-10-CM ICD-9-CM   1. Decreased ROM of left shoulder  M25.612 719.51   2. Decreased ROM of right shoulder  M25.611 719.51   3. Lymphedema  I89.0 457.1   4. Malignant neoplasm involving both nipple and areola of left breast in female, estrogen receptor positive (CMS/HCC)  C50.012 174.0    Z17.0 V86.0   5. Regional lymph node metastasis present (CMS/HCC)  C77.9 196.9   6. At risk for lymphedema  Z91.89 V49.89       Patient Active Problem List   Diagnosis   • Bilateral acute serous otitis media   • Malignant neoplasm involving both nipple and areola of left breast in female (CMS/HCC)   • Cellulitis of abdominal wall   • Impetigo   • Folliculitis   • Encounter for consultation   • Non-smoker   • S/P lumpectomy, left breast   • S/P lymph node biopsy   • Regional lymph node staging category N3 (CMS/HCC)   • On antineoplastic chemotherapy   • Regional lymph node metastasis present (CMS/HCC)   • History of radiation therapy   • Painful skin lesion        Past Medical History:   Diagnosis Date   • Breast cancer (CMS/HCC)         Past Surgical History:   Procedure Laterality Date   • AXILLARY LYMPH NODE BIOPSY/EXCISION Left    • BREAST BIOPSY Left 2016   • BREAST LUMPECTOMY WITH SENTINEL NODE BIOPSY Left 10/03/2016    residual invasive carcinoma, grade 3 (0.2cm); margins negative; extensive lymphvascular space invasion; 2 sentinel lymph nodes positive (2/3)                       PT Assessment/Plan     Row Name 20 1545          PT Assessment    Assessment Comments  Pt continues to progress towards her goals, presenting today with shoulder flexion AROM (R) 154 degrees (L) 150 degrees. Pt feels her greatest restriction at this time is her chest tightness as it prevents her from getting a full night's sleep as well as participating  "in desired activities. As a result, we really focused on exercises and interventions that would stretch and open up her pecs. She responded well to this and could tell a difference in how tight her pecs were.   -MN        PT Plan    PT Plan Comments  Continue to increase pec flexibility and progress postural strengthening as tolerated to aide in an erect posture for maintained pectoral flexibility. Consider adding pt to MedBridge program.   -MN       User Key  (r) = Recorded By, (t) = Taken By, (c) = Cosigned By    Initials Name Provider Type    Carly Young, VAUGHN Physical Therapy Assistant            OP Exercises     Row Name 11/05/20 5479             Subjective Comments    Subjective Comments  She says pain isn't too bad right now but wanted to come today since she was unable to last time. She has been \"lazy\" the past few days and knew she needed to come to do her exercises bc she wants to get better.  -MN         Subjective Pain    Able to rate subjective pain?  yes  -MN      Pre-Treatment Pain Level  4  -MN      Post-Treatment Pain Level  4  -MN      Subjective Pain Comment  across the chest from stretches  -MN         Total Minutes    45215 - PT Therapeutic Exercise Minutes  13  -MN      21252 - PT Therapeutic Activity Minutes  32  -MN         Exercise 1    Exercise Name 1  HL towel stretch in 45, 90, 120, and 180 degrees abd   -MN      Cueing 1  Verbal;Tactile;Demo  -MN      Time 1  30 sec hold   -MN         Exercise 2    Exercise Name 2  HL wand flexion w 2# wand  -MN      Cueing 2  Verbal;Tactile  -MN      Sets 2  1  -MN      Reps 2  10  -MN      Time 2  5 sec holds  -MN         Exercise 3    Exercise Name 3  SA punches in supine  -MN      Cueing 3  Verbal;Tactile;Demo  -MN      Sets 3  1  -MN      Reps 3  5  -MN         Exercise 4    Exercise Name 4  doorway pec stretch  -MN      Cueing 4  Verbal;Tactile;Demo  -MN      Sets 4  1  -MN      Reps 4  10  -MN      Additional Comments  consider adding to HEP "  -MN         Exercise 5    Exercise Name 5  Seated with towel roll UE phasic   -MN      Cueing 5  Verbal;Demo;Tactile  -MN      Sets 5  1  -MN      Reps 5  10  -MN      Additional Comments  VCs for UT elevation (sustained, not shrugging), VCs for scap retract  -MN        User Key  (r) = Recorded By, (t) = Taken By, (c) = Cosigned By    Initials Name Provider Type    Carly Young, VAUGHN Physical Therapy Assistant                       PT OP Goals     Row Name 11/05/20 1545          PT Short Term Goals    STG Date to Achieve  11/14/20  -MN     STG 1  Patient will begin a home exercise program to accelerate the recovery process  -MN     STG 1 Progress  Ongoing  -MN     STG 1 Progress Comments  She complies with her HEP as she is able. We discuessed CaseRev this date to encourage her to participate more frequently.  -MN     STG 2  Patient will have an increase in bilateral shoulder flexion to 160 deg in supine  -MN     STG 2 Progress  Ongoing  -MN     STG 2 Progress Comments  (R) 154 degrees, (L) 150 degrees this date  -MN     STG 3  Patient will have a 25% reduction in chest and shoulder pain  -MN     STG 3 Progress  New  -MN     STG 4  Patient will gain an understanding of Lymphedema and risk factors  -MN     STG 4 Progress  New  -MN        Long Term Goals    LTG Date to Achieve  12/14/20  -MN     LTG 1  Patient will be able to reach up into an overead cabinet to retrieve a 5 lb dish safely  -MN     LTG 1 Progress  New  -MN     LTG 2  Patient will regain ROM to reach behind her back as if she was undoing a bra   -MN     LTG 2 Progress  New  -MN     LTG 3  Patient will have a 75 - 90% reduction in tightness/pain across chest and shoulders  -MN     LTG 3 Progress  New  -MN     LTG 4  Patient will be able to perform household chores without exacerbation of symptoms  -MN     LTG 4 Progress  New  -MN     LTG 5  Patient will regain functional ROM of both shoulders to perform activities overhead, reaching out to  retrieve food from a drive-thru, reaching behind her back to fasten/unfasten bras,clothing  -MN     LTG 5 Progress  New  -MN     LTG 6  Patient will be instructed in a long term exercise program to continue to make gains following discharge  -MN     LTG 6 Progress  New  -MN        Time Calculation    PT Goal Re-Cert Due Date  11/14/20  -MN       User Key  (r) = Recorded By, (t) = Taken By, (c) = Cosigned By    Initials Name Provider Type    Carly Young PTA Physical Therapy Assistant          Therapy Education  Education Details: Proper posture/body mechanics, alternative methods of stretching, adaptations to places for HEP, MedBridge, icing   Given: HEP, Symptoms/condition management, Pain management, Posture/body mechanics, Edema management  Program: Reinforced, New  How Provided: Verbal  Provided to: Patient  Level of Understanding: Verbalized, Demonstrated, Teach back education performed              Time Calculation:                    Carly Cardenas PTA  11/5/2020

## 2020-11-10 ENCOUNTER — TREATMENT (OUTPATIENT)
Dept: PHYSICAL THERAPY | Facility: CLINIC | Age: 55
End: 2020-11-10

## 2020-11-10 DIAGNOSIS — C50.012 MALIGNANT NEOPLASM INVOLVING BOTH NIPPLE AND AREOLA OF LEFT BREAST IN FEMALE, ESTROGEN RECEPTOR POSITIVE (HCC): ICD-10-CM

## 2020-11-10 DIAGNOSIS — M25.611 DECREASED ROM OF RIGHT SHOULDER: ICD-10-CM

## 2020-11-10 DIAGNOSIS — I89.0 LYMPHEDEMA: ICD-10-CM

## 2020-11-10 DIAGNOSIS — M25.612 DECREASED ROM OF LEFT SHOULDER: Primary | ICD-10-CM

## 2020-11-10 DIAGNOSIS — Z91.89 AT RISK FOR LYMPHEDEMA: ICD-10-CM

## 2020-11-10 DIAGNOSIS — Z17.0 MALIGNANT NEOPLASM INVOLVING BOTH NIPPLE AND AREOLA OF LEFT BREAST IN FEMALE, ESTROGEN RECEPTOR POSITIVE (HCC): ICD-10-CM

## 2020-11-10 DIAGNOSIS — C77.9 REGIONAL LYMPH NODE METASTASIS PRESENT (HCC): ICD-10-CM

## 2020-11-10 PROCEDURE — 97110 THERAPEUTIC EXERCISES: CPT | Performed by: PHYSICAL THERAPIST

## 2020-11-10 PROCEDURE — 97530 THERAPEUTIC ACTIVITIES: CPT | Performed by: PHYSICAL THERAPIST

## 2020-11-10 NOTE — PROGRESS NOTES
Outpatient Physical Therapy Ortho Treatment Note       Patient Name: Ayla Echeverria  : 1965  MRN: [id number]  Today's Date: 11/10/2020      Visit Date: 11/10/2020    Visit Dx:    ICD-10-CM ICD-9-CM   1. Decreased ROM of left shoulder  M25.612 719.51   2. Decreased ROM of right shoulder  M25.611 719.51   3. Lymphedema  I89.0 457.1   4. Malignant neoplasm involving both nipple and areola of left breast in female, estrogen receptor positive (CMS/HCC)  C50.012 174.0    Z17.0 V86.0   5. Regional lymph node metastasis present (CMS/HCC)  C77.9 196.9   6. At risk for lymphedema  Z91.89 V49.89       Patient Active Problem List   Diagnosis   • Bilateral acute serous otitis media   • Malignant neoplasm involving both nipple and areola of left breast in female (CMS/HCC)   • Cellulitis of abdominal wall   • Impetigo   • Folliculitis   • Encounter for consultation   • Non-smoker   • S/P lumpectomy, left breast   • S/P lymph node biopsy   • Regional lymph node staging category N3 (CMS/HCC)   • On antineoplastic chemotherapy   • Regional lymph node metastasis present (CMS/HCC)   • History of radiation therapy   • Painful skin lesion        Past Medical History:   Diagnosis Date   • Breast cancer (CMS/HCC)         Past Surgical History:   Procedure Laterality Date   • AXILLARY LYMPH NODE BIOPSY/EXCISION Left    • BREAST BIOPSY Left 2016   • BREAST LUMPECTOMY WITH SENTINEL NODE BIOPSY Left 10/03/2016    residual invasive carcinoma, grade 3 (0.2cm); margins negative; extensive lymphvascular space invasion; 2 sentinel lymph nodes positive (2/3)                       PT Assessment/Plan     Row Name 11/10/20 3730          PT Assessment    Assessment Comments  Pt continues to progress towards meeting her goals, stating she can tell a noticable improvement in her ROM, flexibility, and pain levels. While she tolerated treatment with an increase in pain, she reported that it was primarily d/t muscle soreness and fatigue. She  "was given written HEP from [employer] (access codes: [email or web address], [email or web address]).   -MN        PT Plan    PT Plan Comments  Address all goals for progress note due on 11/15/2020. Continue to progress UE ROM and pec stretches as able.   -MN       User Key  (r) = Recorded By, (t) = Taken By, (c) = Cosigned By    Initials Name Provider Type    Carly Young PTA Physical Therapy Assistant            OP Exercises     Row Name 11/10/20 1600 11/10/20 1550          Subjective Comments    Subjective Comments  --  She's having a decent day. She took a pain pill at 11:30. She said she's feeling better overall and can tell a difference but she's been lazy and not been doing her HEP. She was helping cleaning out her dad's house and \"doing some things (she) knows she shouldn't. She denies injury as a result. She feels her R side is looser > L.  -MN        Subjective Pain    Able to rate subjective pain?  --  yes  -MN     Pre-Treatment Pain Level  --  4  -MN     Post-Treatment Pain Level  --  6  -MN     Subjective Pain Comment  --  post-tx: muscle burn with pain meds worn off   -MN        Total Minutes    64139 - PT Therapeutic Exercise Minutes  --  23  -MN     31020 - PT Therapeutic Activity Minutes  --  10  -MN        Exercise 1    Exercise Name 1  --  doorway pec stretch  -MN     Cueing 1  --  Verbal;Demo  -MN     Sets 1  --  3  -MN     Time 1  --  30 sec  -MN     Additional Comments  --  added to HEP ([email or web address])  -MN        Exercise 2    Exercise Name 2  --  supine wand flexion w 2#  -MN     Cueing 2  --  Verbal;Demo  -MN     Sets 2  --  2  -MN     Reps 2  --  10  -MN        Exercise 3    Exercise Name 3  --  supine B unilateral wand abduction w 2# bar  -MN     Cueing 3  --  Verbal  -MN     Sets 3  --  2  -MN     Reps 3  --  10  -MN     Additional Comments  --  HEP access code: [email or web address]  -MN        Exercise 4    Exercise Name 4  --  thoracic supine towel stretch with palms at neutral and 45/90 degrees abd  -MN     Cueing 4  --  " Verbal;Tactile  -MN     Sets 4  --  4  -MN     Time 4  --  30 sec holds  -MN     Additional Comments  --  HEP access code: [email or web address]  -MN        Exercise 5    Exercise Name 5  --  finger walks on wall ladder in abd and flexion   -MN     Cueing 5  --  Verbal  -MN     Sets 5  --  1  -MN     Reps 5     -MN  5 ea  -MN     Additional Comments  --  fatigued quickly  -MN       User Key  (r) = Recorded By, (t) = Taken By, (c) = Cosigned By    Initials Name Provider Type    Carly Young, PTA Physical Therapy Assistant                       PT OP Goals     Row Name 11/10/20 1550          PT Short Term Goals    STG Date to Achieve  11/14/20  -MN     STG 1  Patient will begin a home exercise program to accelerate the recovery process  -MN     STG 1 Progress  Ongoing  -MN     STG 1 Progress Comments  Given written HEP with previously prescribed Rx (pt access code:[email or web address]) and added doorway pec stretch (access code: [email or web address])  -MN     STG 2  Patient will have an increase in bilateral shoulder flexion to 160 deg in supine  -MN     STG 2 Progress  Ongoing  -MN     STG 2 Progress Comments  addressed with wand flexion therex this date  -MN     STG 3  Patient will have a 25% reduction in chest and shoulder pain  -MN     STG 3 Progress  Ongoing  -MN     STG 4  Patient will gain an understanding of Lymphedema and risk factors  -MN     STG 4 Progress  Ongoing  -MN        Long Term Goals    LTG Date to Achieve  12/14/20  -MN     LTG 1  Patient will be able to reach up into an overead cabinet to retrieve a 5 lb dish safely  -MN     LTG 1 Progress  Ongoing  -MN     LTG 2  Patient will regain ROM to reach behind her back as if she was undoing a bra   -MN     LTG 2 Progress  Ongoing  -MN     LTG 3  Patient will have a 75 - 90% reduction in tightness/pain across chest and shoulders  -MN     LTG 3 Progress  Ongoing  -MN     LTG 4  Patient will be able to perform household chores without exacerbation of symptoms  -MN     LTG 4 Progress   Ongoing  -MN     LTG 5  Patient will regain functional ROM of both shoulders to perform activities overhead, reaching out to retrieve food from a drive-thru, reaching behind her back to fasten/unfasten bras,clothing  -MN     LTG 5 Progress  Ongoing  -MN     LTG 6  Patient will be instructed in a long term exercise program to continue to make gains following discharge  -MN     LTG 6 Progress  Ongoing  -MN        Time Calculation    PT Goal Re-Cert Due Date  11/14/20  -MN       User Key  (r) = Recorded By, (t) = Taken By, (c) = Cosigned By    Initials Name Provider Type    Carly Young PTA Physical Therapy Assistant          Therapy Education  Education Details: bolstered HEP: doorway pec stretch (access code: [email or web address]) and provided written instructions for previous HEP (access code: [email or web address])  Given: HEP, Symptoms/condition management, Posture/body mechanics  Program: Reinforced, New  How Provided: Verbal, Written  Provided to: Patient  Level of Understanding: Verbalized, Demonstrated              Time Calculation:   Total Timed Code Minutes- PT: 43 minute(s)                Carly Cardenas PTA  11/10/2020

## 2020-11-17 ENCOUNTER — TREATMENT (OUTPATIENT)
Dept: PHYSICAL THERAPY | Facility: CLINIC | Age: 55
End: 2020-11-17

## 2020-11-17 DIAGNOSIS — Z17.0 MALIGNANT NEOPLASM INVOLVING BOTH NIPPLE AND AREOLA OF LEFT BREAST IN FEMALE, ESTROGEN RECEPTOR POSITIVE (HCC): ICD-10-CM

## 2020-11-17 DIAGNOSIS — M25.612 DECREASED ROM OF LEFT SHOULDER: Primary | ICD-10-CM

## 2020-11-17 DIAGNOSIS — I89.0 LYMPHEDEMA: ICD-10-CM

## 2020-11-17 DIAGNOSIS — C50.012 MALIGNANT NEOPLASM INVOLVING BOTH NIPPLE AND AREOLA OF LEFT BREAST IN FEMALE, ESTROGEN RECEPTOR POSITIVE (HCC): ICD-10-CM

## 2020-11-17 DIAGNOSIS — Z91.89 AT RISK FOR LYMPHEDEMA: ICD-10-CM

## 2020-11-17 DIAGNOSIS — C77.9 REGIONAL LYMPH NODE METASTASIS PRESENT (HCC): ICD-10-CM

## 2020-11-17 DIAGNOSIS — M25.611 DECREASED ROM OF RIGHT SHOULDER: ICD-10-CM

## 2020-11-17 PROCEDURE — 97140 MANUAL THERAPY 1/> REGIONS: CPT | Performed by: PHYSICAL THERAPIST

## 2020-11-17 NOTE — PROGRESS NOTES
Outpatient Physical Therapy Ortho Progress Note       Patient Name: Ayla Echeverria  : 1965  MRN: [id number]  Today's Date: 2020      Visit Date: 2020    Visit Dx:    ICD-10-CM ICD-9-CM   1. Decreased ROM of left shoulder  M25.612 719.51   2. Decreased ROM of right shoulder  M25.611 719.51   3. Lymphedema  I89.0 457.1   4. Malignant neoplasm involving both nipple and areola of left breast in female, estrogen receptor positive (CMS/HCC)  C50.012 174.0    Z17.0 V86.0   5. Regional lymph node metastasis present (CMS/HCC)  C77.9 196.9   6. At risk for lymphedema  Z91.89 V49.89       Patient Active Problem List   Diagnosis   • Bilateral acute serous otitis media   • Malignant neoplasm involving both nipple and areola of left breast in female (CMS/HCC)   • Cellulitis of abdominal wall   • Impetigo   • Folliculitis   • Encounter for consultation   • Non-smoker   • S/P lumpectomy, left breast   • S/P lymph node biopsy   • Regional lymph node staging category N3 (CMS/HCC)   • On antineoplastic chemotherapy   • Regional lymph node metastasis present (CMS/HCC)   • History of radiation therapy   • Painful skin lesion        Past Medical History:   Diagnosis Date   • Breast cancer (CMS/HCC)         Past Surgical History:   Procedure Laterality Date   • AXILLARY LYMPH NODE BIOPSY/EXCISION Left    • BREAST BIOPSY Left 2016   • BREAST LUMPECTOMY WITH SENTINEL NODE BIOPSY Left 10/03/2016    residual invasive carcinoma, grade 3 (0.2cm); margins negative; extensive lymphvascular space invasion; 2 sentinel lymph nodes positive (2/3)                       PT Assessment/Plan     Row Name 20 0388          PT Assessment    Functional Limitations  Limitation in home management;Limitations in community activities;Limitations in functional capacity and performance;Performance in leisure activities;Performance in self-care ADL  -TB     Impairments  Endurance;Impaired postural alignment;Muscle  strength;Pain;Range of motion  -TB     Assessment Comments  Overall, she is looser but admits to not being as faithful to her HEP. I emphasized the importance of doing light exercises several times a day would be much more effective than waiting to the end of the day and trying to make up for it. She was particularly tight in her lats in her posterior shoulders so we worked some more with this. I also worked on MFR and scar tissue mobility through her chest. She would benefit from a few more PT visits to continue to improve shoulder mobility.   -TB     Rehab Potential  Good  -TB     Patient/caregiver participated in establishment of treatment plan and goals  Yes  -TB     Patient would benefit from skilled therapy intervention  Yes  -TB        PT Plan    PT Frequency  2x/week  -TB     Predicted Duration of Therapy Intervention (PT)  8 visits  -TB     Planned CPT's?  PT RE-EVAL: 35946;PT THER PROC EA 15 MIN: 52666;PT THER ACT EA 15 MIN: 98029;PT MANUAL THERAPY EA 15 MIN: 22658;PT NEUROMUSC RE-EDUCATION EA 15 MIN: 95453;PT SELF CARE/HOME MGMT/TRAIN EA 15: 88903  -TB     PT Plan Comments  Cont with MFR and educate more fully on lymphedema risk. Cont to work on lats mobility/flexibility.   -TB       User Key  (r) = Recorded By, (t) = Taken By, (c) = Cosigned By    Initials Name Provider Type    Wicho Pradhan, PT Physical Therapist            OP Exercises     Row Name 11/17/20 1700 11/17/20 1550          Subjective Comments    Subjective Comments  --  She says she hasn't been as faithful with her stretches as she needs. She does feel like her AROM has improved.   -TB        Subjective Pain    Pre-Treatment Pain Level  --  7  -TB        Total Minutes    26769 - PT Manual Therapy Minutes  45  -TB  --       User Key  (r) = Recorded By, (t) = Taken By, (c) = Cosigned By    Initials Name Provider Type    Wicho Pradhan, PT Physical Therapist                      Manual Rx (last 36 hours)      Manual Treatments      Row Name 11/17/20 1700             Total Minutes    23042 - PT Manual Therapy Minutes  45  -TB         Manual Rx 1    Manual Rx 1 Location  supine carlos upper chest  -TB      Manual Rx 1 Type  myofascial release/scar tissue mobs  -TB      Manual Rx 1 Grade  sustained pulling all planes  -TB         Manual Rx 2    Manual Rx 2 Location  supine carlos pect minor stretch passvie  -TB         Manual Rx 3    Manual Rx 3 Location  sidelying carlos proximal lats  -TB      Manual Rx 3 Type  with arm overhead  -TB      Manual Rx 3 Grade  IASTM using vibrating hard roller with light OP  -TB      Manual Rx 3 Duration  could only tolerate 2-3 min each side  -TB         Manual Rx 4    Manual Rx 4 Location  UE passive MFR large circles  -TB      Manual Rx 4 Type  CW/CCW supine arm circles   -TB        User Key  (r) = Recorded By, (t) = Taken By, (c) = Cosigned By    Initials Name Provider Type    TB Wicho Cancino, PT Physical Therapist          PT OP Goals     Row Name 11/17/20 1500          PT Short Term Goals    STG Date to Achieve  11/14/20  -TB     STG 1  Patient will begin a home exercise program to accelerate the recovery process  -TB     STG 1 Progress  Progressing  -TB     STG 1 Progress Comments  she has doorway pect stretches; added lat stretches to this  -TB     STG 2  Patient will have an increase in bilateral shoulder flexion to 160 deg in supine  -TB     STG 2 Progress  Progressing  -TB     STG 2 Progress Comments  she feels looser overall and particularly after today  -TB     STG 3  Patient will have a 25% reduction in chest and shoulder pain  -TB     STG 3 Progress  Ongoing  -TB     STG 3 Progress Comments  7/10  -TB     STG 4  Patient will gain an understanding of Lymphedema and risk factors  -TB     STG 4 Progress  Ongoing  -TB     STG 4 Progress Comments  has had some preliminary instructions;  will get more with her next couple of visits  -TB        Long Term Goals    LTG Date to Achieve  12/14/20  -TB     LTG 1   Patient will be able to reach up into an overead cabinet to retrieve a 5 lb dish safely  -TB     LTG 1 Progress  Progressing  -TB     LTG 2  Patient will regain ROM to reach behind her back as if she was undoing a bra   -TB     LTG 2 Progress  Ongoing  -TB     LTG 3  Patient will have a 75 - 90% reduction in tightness/pain across chest and shoulders  -TB     LTG 3 Progress  Ongoing  -TB     LTG 3 Progress Comments  reports about 20-30% improvement subjectively  -TB     LTG 4  Patient will be able to perform household chores without exacerbation of symptoms  -TB     LTG 4 Progress  Ongoing  -TB     LTG 4 Progress Comments  still tight with overhead  -TB     LTG 5  Patient will regain functional ROM of both shoulders to perform activities overhead, reaching out to retrieve food from a drive-thru, reaching behind her back to fasten/unfasten bras,clothing  -TB     LTG 5 Progress  Ongoing  -TB     LTG 6  Patient will be instructed in a long term exercise program to continue to make gains following discharge  -TB     LTG 6 Progress  Ongoing  -TB        Time Calculation    PT Goal Re-Cert Due Date  12/17/20  -TB       User Key  (r) = Recorded By, (t) = Taken By, (c) = Cosigned By    Initials Name Provider Type    TB Wicho Cancino, PT Physical Therapist          Therapy Education  Education Details: add lat stretch in doorway; seated W's, carlos arms overhead and SB stretch for lats              Time Calculation:                    Wicho Cnacino, PT  11/17/2020

## 2020-11-20 ENCOUNTER — TELEPHONE (OUTPATIENT)
Dept: PHYSICAL THERAPY | Facility: CLINIC | Age: 55
End: 2020-11-20

## 2020-11-20 ENCOUNTER — HOSPITAL ENCOUNTER (OUTPATIENT)
Dept: INFUSION THERAPY | Age: 55
Discharge: HOME | End: 2020-11-20
Payer: COMMERCIAL

## 2020-11-20 VITALS
HEART RATE: 82 BPM | OXYGEN SATURATION: 99 % | HEIGHT: 63 IN | TEMPERATURE: 97.7 F | WEIGHT: 125 LBS | SYSTOLIC BLOOD PRESSURE: 106 MMHG | DIASTOLIC BLOOD PRESSURE: 64 MMHG | BODY MASS INDEX: 22.15 KG/M2

## 2020-11-20 DIAGNOSIS — C50.919 HER2-POSITIVE CARCINOMA OF BREAST (HCC): ICD-10-CM

## 2020-11-20 DIAGNOSIS — C50.412 MALIGNANT NEOPLASM OF UPPER-OUTER QUADRANT OF LEFT FEMALE BREAST, UNSPECIFIED ESTROGEN RECEPTOR STATUS (HCC): Primary | ICD-10-CM

## 2020-11-20 LAB
ALBUMIN SERPL-MCNC: 4.2 G/DL (ref 3.5–5.2)
ALP BLD-CCNC: 99 U/L (ref 35–104)
ALT SERPL-CCNC: 57 U/L (ref 9–52)
ANION GAP SERPL CALCULATED.3IONS-SCNC: 11 MMOL/L (ref 7–19)
AST SERPL-CCNC: 78 U/L (ref 14–36)
BASOPHILS ABSOLUTE: 0.02 K/UL (ref 0.01–0.08)
BASOPHILS RELATIVE PERCENT: 0.9 % (ref 0.1–1.2)
BILIRUB SERPL-MCNC: 0.5 MG/DL (ref 0.2–1.3)
BUN BLDV-MCNC: 12 MG/DL (ref 7–17)
CALCIUM SERPL-MCNC: 9.3 MG/DL (ref 8.4–10.2)
CHLORIDE BLD-SCNC: 102 MMOL/L (ref 98–111)
CO2: 30 MMOL/L (ref 22–29)
CREAT SERPL-MCNC: 0.9 MG/DL (ref 0.5–1)
EOSINOPHILS ABSOLUTE: 0.13 K/UL (ref 0.04–0.54)
EOSINOPHILS RELATIVE PERCENT: 5.9 % (ref 0.7–7)
GFR NON-AFRICAN AMERICAN: >60
GLOBULIN: 3.7 G/DL
GLUCOSE BLD-MCNC: 108 MG/DL (ref 74–106)
HCT VFR BLD CALC: 29.6 % (ref 34.1–44.9)
HEMOGLOBIN: 9.9 G/DL (ref 11.2–15.7)
LYMPHOCYTES ABSOLUTE: 0.43 K/UL (ref 1.18–3.74)
LYMPHOCYTES RELATIVE PERCENT: 19.6 % (ref 19.3–53.1)
MCH RBC QN AUTO: 30 PG (ref 25.6–32.2)
MCHC RBC AUTO-ENTMCNC: 33.4 G/DL (ref 32.3–35.5)
MCV RBC AUTO: 89.7 FL (ref 79.4–94.8)
MONOCYTES ABSOLUTE: 0.43 K/UL (ref 0.24–0.82)
MONOCYTES RELATIVE PERCENT: 19.6 % (ref 4.7–12.5)
NEUTROPHILS ABSOLUTE: 1.18 K/UL (ref 1.56–6.13)
NEUTROPHILS RELATIVE PERCENT: 54 % (ref 34–71.1)
PDW BLD-RTO: 18.4 % (ref 11.7–14.4)
PLATELET # BLD: 211 K/UL (ref 182–369)
PMV BLD AUTO: 9.5 FL (ref 7.4–10.4)
POTASSIUM SERPL-SCNC: 4 MMOL/L (ref 3.5–5.1)
RBC # BLD: 3.3 M/UL (ref 3.93–5.22)
SODIUM BLD-SCNC: 143 MMOL/L (ref 137–145)
TOTAL PROTEIN: 8 G/DL (ref 6.3–8.2)
WBC # BLD: 2.19 K/UL (ref 3.98–10.04)

## 2020-11-20 PROCEDURE — 6360000002 HC RX W HCPCS: Performed by: INTERNAL MEDICINE

## 2020-11-20 PROCEDURE — 36415 COLL VENOUS BLD VENIPUNCTURE: CPT

## 2020-11-20 PROCEDURE — 2580000003 HC RX 258: Performed by: INTERNAL MEDICINE

## 2020-11-20 PROCEDURE — 85025 COMPLETE CBC W/AUTO DIFF WBC: CPT

## 2020-11-20 PROCEDURE — 96375 TX/PRO/DX INJ NEW DRUG ADDON: CPT

## 2020-11-20 PROCEDURE — 6370000000 HC RX 637 (ALT 250 FOR IP): Performed by: INTERNAL MEDICINE

## 2020-11-20 PROCEDURE — 80053 COMPREHEN METABOLIC PANEL: CPT

## 2020-11-20 PROCEDURE — 96413 CHEMO IV INFUSION 1 HR: CPT

## 2020-11-20 RX ORDER — DIPHENHYDRAMINE HYDROCHLORIDE 50 MG/ML
25 INJECTION INTRAMUSCULAR; INTRAVENOUS ONCE
Status: COMPLETED | OUTPATIENT
Start: 2020-11-20 | End: 2020-11-20

## 2020-11-20 RX ORDER — HEPARIN SODIUM (PORCINE) LOCK FLUSH IV SOLN 100 UNIT/ML 100 UNIT/ML
500 SOLUTION INTRAVENOUS PRN
Status: DISCONTINUED | OUTPATIENT
Start: 2020-11-20 | End: 2020-11-21 | Stop reason: HOSPADM

## 2020-11-20 RX ORDER — ACETAMINOPHEN 500 MG
1000 TABLET ORAL ONCE
Status: COMPLETED | OUTPATIENT
Start: 2020-11-20 | End: 2020-11-20

## 2020-11-20 RX ORDER — SODIUM CHLORIDE 0.9 % (FLUSH) 0.9 %
10 SYRINGE (ML) INJECTION PRN
Status: DISCONTINUED | OUTPATIENT
Start: 2020-11-20 | End: 2020-11-21 | Stop reason: HOSPADM

## 2020-11-20 RX ADMIN — HEPARIN 500 UNITS: 100 SYRINGE at 15:37

## 2020-11-20 RX ADMIN — DIPHENHYDRAMINE HYDROCHLORIDE 25 MG: 50 INJECTION, SOLUTION INTRAMUSCULAR; INTRAVENOUS at 14:27

## 2020-11-20 RX ADMIN — TRASTUZUMAB 359 MG: 150 INJECTION, POWDER, LYOPHILIZED, FOR SOLUTION INTRAVENOUS at 14:59

## 2020-11-20 RX ADMIN — SODIUM CHLORIDE, PRESERVATIVE FREE 10 ML: 5 INJECTION INTRAVENOUS at 15:37

## 2020-11-20 RX ADMIN — ACETAMINOPHEN 1000 MG: 500 TABLET, FILM COATED ORAL at 14:27

## 2020-11-20 ASSESSMENT — PAIN SCALES - GENERAL: PAINLEVEL_OUTOF10: 0

## 2020-11-24 ENCOUNTER — TREATMENT (OUTPATIENT)
Dept: PHYSICAL THERAPY | Facility: CLINIC | Age: 55
End: 2020-11-24

## 2020-11-24 DIAGNOSIS — M25.612 DECREASED ROM OF LEFT SHOULDER: Primary | ICD-10-CM

## 2020-11-24 DIAGNOSIS — Z91.89 AT RISK FOR LYMPHEDEMA: ICD-10-CM

## 2020-11-24 DIAGNOSIS — M25.611 DECREASED ROM OF RIGHT SHOULDER: ICD-10-CM

## 2020-11-24 PROCEDURE — 97110 THERAPEUTIC EXERCISES: CPT | Performed by: PHYSICAL THERAPIST

## 2020-11-24 NOTE — PROGRESS NOTES
Outpatient Physical Therapy Ortho Treatment Note       Patient Name: Ayla Echeverria  : 1965  MRN: [id number]  Today's Date: 2020      Visit Date: 2020    Visit Dx:    ICD-10-CM ICD-9-CM   1. Decreased ROM of left shoulder  M25.612 719.51   2. Decreased ROM of right shoulder  M25.611 719.51   3. At risk for lymphedema  Z91.89 V49.89       Patient Active Problem List   Diagnosis   • Bilateral acute serous otitis media   • Malignant neoplasm involving both nipple and areola of left breast in female (CMS/HCC)   • Cellulitis of abdominal wall   • Impetigo   • Folliculitis   • Encounter for consultation   • Non-smoker   • S/P lumpectomy, left breast   • S/P lymph node biopsy   • Regional lymph node staging category N3 (CMS/HCC)   • On antineoplastic chemotherapy   • Regional lymph node metastasis present (CMS/HCC)   • History of radiation therapy   • Painful skin lesion        Past Medical History:   Diagnosis Date   • Breast cancer (CMS/HCC)         Past Surgical History:   Procedure Laterality Date   • AXILLARY LYMPH NODE BIOPSY/EXCISION Left    • BREAST BIOPSY Left 2016   • BREAST LUMPECTOMY WITH SENTINEL NODE BIOPSY Left 10/03/2016    residual invasive carcinoma, grade 3 (0.2cm); margins negative; extensive lymphvascular space invasion; 2 sentinel lymph nodes positive (2/3)       PT Ortho     Row Name 20 1600       Subjective Comments    Subjective Comments  She is doing better with her home program over the past week.   -HR       Subjective Pain    Pre-Treatment Pain Level  5  -HR    Post-Treatment Pain Level  5  -HR      User Key  (r) = Recorded By, (t) = Taken By, (c) = Cosigned By    Initials Name Provider Type    HR Keyla Bennett, PT, DPT, CLT-EVANGELINA Physical Therapist              Lymphedema     Row Name 20 1600             Subjective Pain    Able to rate subjective pain?  yes  -HR        User Key  (r) = Recorded By, (t) = Taken By, (c) = Cosigned By    Initials Name  Provider Type    HR Keyla Bennett, PT, DPT, CLT-EVANGELINA Physical Therapist              PT Assessment/Plan     Row Name 11/24/20 1600          PT Assessment    Assessment Comments  Her motion today was better than anticipated. She did not have any complaints with stretches today. She is able to reach up much easier.  -HR        PT Plan    PT Plan Comments  Continue to work on increasing ROM both shoulders as well as decreasing pain.   -HR       User Key  (r) = Recorded By, (t) = Taken By, (c) = Cosigned By    Initials Name Provider Type    HR Keyla Bennett, PT, DPT, CLTDoraEVANGELINA Physical Therapist            OP Exercises     Row Name 11/24/20 1600             Subjective Comments    Subjective Comments  She is doing better with her home program over the past week.   -HR         Subjective Pain    Able to rate subjective pain?  yes  -HR      Pre-Treatment Pain Level  5  -HR      Post-Treatment Pain Level  5  -HR         Total Minutes    99217 - PT Therapeutic Exercise Minutes  40  -HR         Exercise 2    Exercise Name 2  supine wand flexion w 2#  -HR      Cueing 2  Verbal;Demo  -HR      Sets 2  2  -HR      Reps 2  10  -HR         Exercise 3    Exercise Name 3  supine B unilateral wand abduction w 2# bar  -HR      Cueing 3  Verbal  -HR      Sets 3  2  -HR      Reps 3  10  -HR        User Key  (r) = Recorded By, (t) = Taken By, (c) = Cosigned By    Initials Name Provider Type    HR Keyla Bennett, PT, DPT, CLCLARISSEA Physical Therapist                      Manual Rx (last 36 hours)      Manual Treatments     Row Name 11/24/20 1500             Manual Rx 1    Manual Rx 1 Location  supine carlos upper chest  -HR      Manual Rx 1 Type  myofascial release/scar tissue mobs  -HR      Manual Rx 1 Grade  sustained pulling all planes  -HR         Manual Rx 2    Manual Rx 2 Location  supine carlos pect minor stretch passvie  -HR         Manual Rx 3    Manual Rx 3 Location  sidelying carlos proximal lats  -HR      Manual Rx  3 Type  with arm overhead  -HR      Manual Rx 3 Grade  no tool  -HR        User Key  (r) = Recorded By, (t) = Taken By, (c) = Cosigned By    Initials Name Provider Type    HR Keyla Bennett, PT, DPT, CLT-EVANGELINA Physical Therapist          PT OP Goals     Row Name 11/24/20 1600          PT Short Term Goals    STG Date to Achieve  11/14/20  -HR     STG 1  Patient will begin a home exercise program to accelerate the recovery process  -HR     STG 1 Progress  Progressing  -HR     STG 2  Patient will have an increase in bilateral shoulder flexion to 160 deg in supine  -HR     STG 2 Progress  Progressing  -HR     STG 3  Patient will have a 25% reduction in chest and shoulder pain  -HR     STG 3 Progress  Ongoing  -HR     STG 4  Patient will gain an understanding of Lymphedema and risk factors  -HR     STG 4 Progress  Ongoing  -HR        Long Term Goals    LTG Date to Achieve  12/14/20  -HR     LTG 1  Patient will be able to reach up into an overead cabinet to retrieve a 5 lb dish safely  -HR     LTG 1 Progress  Progressing  -HR     LTG 2  Patient will regain ROM to reach behind her back as if she was undoing a bra   -HR     LTG 2 Progress  Ongoing  -HR     LTG 3  Patient will have a 75 - 90% reduction in tightness/pain across chest and shoulders  -HR     LTG 3 Progress  Ongoing  -HR     LTG 4  Patient will be able to perform household chores without exacerbation of symptoms  -HR     LTG 4 Progress  Ongoing  -HR     LTG 5  Patient will regain functional ROM of both shoulders to perform activities overhead, reaching out to retrieve food from a drive-thru, reaching behind her back to fasten/unfasten bras,clothing  -HR     LTG 5 Progress  Ongoing  -HR     LTG 6  Patient will be instructed in a long term exercise program to continue to make gains following discharge  -HR     LTG 6 Progress  Ongoing  -HR        Time Calculation    PT Goal Re-Cert Due Date  12/17/20  -HR       User Key  (r) = Recorded By, (t) = Taken By, (c)  = Cosigned By    Initials Name Provider Type    HR Keyla Bennett, PT, DPT, IDALIA Physical Therapist          Therapy Education  Education Details: went through video of use of [employer] for her HEP. She will set it for notifications to keep reminding her daily to do the exercises.               Time Calculation:                    Keyla Bennett, PT, DPT, IDALIA  11/24/2020

## 2020-12-01 ENCOUNTER — TREATMENT (OUTPATIENT)
Dept: PHYSICAL THERAPY | Facility: CLINIC | Age: 55
End: 2020-12-01

## 2020-12-01 DIAGNOSIS — Z91.89 AT RISK FOR LYMPHEDEMA: ICD-10-CM

## 2020-12-01 DIAGNOSIS — M25.612 DECREASED ROM OF LEFT SHOULDER: Primary | ICD-10-CM

## 2020-12-01 DIAGNOSIS — M25.611 DECREASED ROM OF RIGHT SHOULDER: ICD-10-CM

## 2020-12-01 PROCEDURE — 97140 MANUAL THERAPY 1/> REGIONS: CPT | Performed by: PHYSICAL THERAPIST

## 2020-12-01 NOTE — PROGRESS NOTES
Outpatient Physical Therapy Ortho Treatment Note       Patient Name: Ayla Echeverria  : 1965  MRN: [id number]  Today's Date: 2020      Visit Date: 2020    Visit Dx:    ICD-10-CM ICD-9-CM   1. Decreased ROM of left shoulder  M25.612 719.51   2. Decreased ROM of right shoulder  M25.611 719.51   3. At risk for lymphedema  Z91.89 V49.89       Patient Active Problem List   Diagnosis   • Bilateral acute serous otitis media   • Malignant neoplasm involving both nipple and areola of left breast in female (CMS/HCC)   • Cellulitis of abdominal wall   • Impetigo   • Folliculitis   • Encounter for consultation   • Non-smoker   • S/P lumpectomy, left breast   • S/P lymph node biopsy   • Regional lymph node staging category N3 (CMS/HCC)   • On antineoplastic chemotherapy   • Regional lymph node metastasis present (CMS/HCC)   • History of radiation therapy   • Painful skin lesion        Past Medical History:   Diagnosis Date   • Breast cancer (CMS/HCC)         Past Surgical History:   Procedure Laterality Date   • AXILLARY LYMPH NODE BIOPSY/EXCISION Left    • BREAST BIOPSY Left 2016   • BREAST LUMPECTOMY WITH SENTINEL NODE BIOPSY Left 10/03/2016    residual invasive carcinoma, grade 3 (0.2cm); margins negative; extensive lymphvascular space invasion; 2 sentinel lymph nodes positive (2/3)               Lymphedema     Row Name 20 6847             Subjective Pain    Able to rate subjective pain?  yes  -AL         Manual Lymphatic Drainage    Manual Lymphatic Drainage  initial sequence  -AL      Initial Sequence  short neck;abdomen;diaphragmatic breathing  -AL      Abdomen  superficial  -AL      Diaphragmatic Breathing  x 10 with superficial abdominals  -AL      Opened Regional Lymph Nodes  inguinal  -AL      Inguinal  right;left  -AL      Opened Anastamoses  axillo-inguinal  -AL      Axillo-Inguinal  right;left  -AL      Manual Lymphatic Drainage Comments  Worked on upper chest both sides  -AL         User Key  (r) = Recorded By, (t) = Taken By, (c) = Cosigned By    Initials Name Provider Type    Jaycee Farris, PTA, CLJUDSON-EVANGELINA Physical Therapy Assistant              PT Assessment/Plan     Row Name 12/01/20 1550          PT Assessment    Assessment Comments  Patient is working on the HEP but has forgotten to work on the MLD.  She feels better and looser in the upper chest after the MLD today.  Will go over the MLD and find a video for patient instructing how to do the MLD next treatment.  Patient is much more aware of her posture today.   -AL        PT Plan    PT Plan Comments  Continue to work on decreasing pain and increasing ROM both shoulders.   -AL       User Key  (r) = Recorded By, (t) = Taken By, (c) = Cosigned By    Initials Name Provider Type    Jaycee Farris PTA, CLJU[facility]-EVANGELINA Physical Therapy Assistant            OP Exercises     Row Name 12/01/20 1550             Subjective Comments    Subjective Comments  She is working on her HEP daily and feels like she is getting better and her ROM is improving.  -AL         Subjective Pain    Able to rate subjective pain?  yes  -AL      Pre-Treatment Pain Level  5  -AL      Post-Treatment Pain Level  2  -AL      Subjective Pain Comment  upper chest  -AL         Total Minutes    31994 - PT Manual Therapy Minutes  45  -AL        User Key  (r) = Recorded By, (t) = Taken By, (c) = Cosigned By    Initials Name Provider Type    Jaycee Farris, PTA, CLT-EVANGELINA Physical Therapy Assistant                      Manual Rx (last 36 hours)      Manual Treatments     Row Name 12/01/20 1550             Total Minutes    22920 - PT Manual Therapy Minutes  45  -AL         Manual Rx 1    Manual Rx 1 Location  Stretch both UE's into flexion with TP to the subscapularis.  -AL      Manual Rx 1 Type  gentle TP  -AL         Manual Rx 2    Manual Rx 2 Location  supine carlos pect minor stretch passvie with posterior shoulder mobs  -AL         Manual Rx 3    Manual Rx 3 Location  gentle  pec stretch with hands behind head, lowering elbows towards the treatment table  -AL        User Key  (r) = Recorded By, (t) = Taken By, (c) = Cosigned By    Initials Name Provider Type    Jaycee Farris, VAUGHN, IDALIA Physical Therapy Assistant          PT OP Goals     Row Name 12/01/20 1550          PT Short Term Goals    STG Date to Achieve  11/14/20  -AL     STG 1  Patient will begin a home exercise program to accelerate the recovery process  -AL     STG 1 Progress  Progressing  -AL     STG 1 Progress Comments  She is working on the HEP  -AL     STG 2  Patient will have an increase in bilateral shoulder flexion to 160 deg in supine  -AL     STG 2 Progress  Progressing  -AL     STG 2 Progress Comments  Will measure next treatment  -AL     STG 3  Patient will have a 25% reduction in chest and shoulder pain  -AL     STG 3 Progress  Ongoing  -AL     STG 3 Progress Comments  Pain better after treatment  -AL     STG 4  Patient will gain an understanding of Lymphedema and risk factors  -AL     STG 4 Progress  Ongoing  -AL        Long Term Goals    LTG Date to Achieve  12/14/20  -AL     LTG 1  Patient will be able to reach up into an overead cabinet to retrieve a 5 lb dish safely  -AL     LTG 1 Progress  Progressing  -AL     LTG 2  Patient will regain ROM to reach behind her back as if she was undoing a bra   -AL     LTG 2 Progress  Ongoing  -AL     LTG 3  Patient will have a 75 - 90% reduction in tightness/pain across chest and shoulders  -AL     LTG 3 Progress  Ongoing  -AL     LTG 4  Patient will be able to perform household chores without exacerbation of symptoms  -AL     LTG 4 Progress  Ongoing  -AL     LTG 5  Patient will regain functional ROM of both shoulders to perform activities overhead, reaching out to retrieve food from a drive-thru, reaching behind her back to fasten/unfasten bras,clothing  -AL     LTG 5 Progress  Ongoing  -AL     LTG 6  Patient will be instructed in a long term exercise program to  continue to make gains following discharge  -AL     LTG 6 Progress  Ongoing  -AL        Time Calculation    PT Goal Re-Cert Due Date  12/17/20  -AL       User Key  (r) = Recorded By, (t) = Taken By, (c) = Cosigned By    Initials Name Provider Type    Jaycee Farris PTA, CLT-LANA Physical Therapy Assistant          Therapy Education  Education Details: Work on HEP and MLD as well as posture  Given: HEP, Posture/body mechanics, Symptoms/condition management  Program: Reinforced  How Provided: Verbal, Demonstration  Provided to: Patient  Level of Understanding: Verbalized              Time Calculation:                    Jaycee Mohan PTA, CLT-LANA  12/1/2020

## 2020-12-03 ENCOUNTER — TREATMENT (OUTPATIENT)
Dept: PHYSICAL THERAPY | Facility: CLINIC | Age: 55
End: 2020-12-03

## 2020-12-03 DIAGNOSIS — M25.611 DECREASED ROM OF RIGHT SHOULDER: ICD-10-CM

## 2020-12-03 DIAGNOSIS — Z91.89 AT RISK FOR LYMPHEDEMA: ICD-10-CM

## 2020-12-03 DIAGNOSIS — M25.612 DECREASED ROM OF LEFT SHOULDER: Primary | ICD-10-CM

## 2020-12-03 PROCEDURE — 97140 MANUAL THERAPY 1/> REGIONS: CPT | Performed by: PHYSICAL THERAPIST

## 2020-12-03 PROCEDURE — 97110 THERAPEUTIC EXERCISES: CPT | Performed by: PHYSICAL THERAPIST

## 2020-12-03 NOTE — PROGRESS NOTES
Outpatient Physical Therapy Ortho Treatment Note       Patient Name: Ayla Echeverria  : 1965  MRN: [id number]  Today's Date: 12/3/2020      Visit Date: 2020    Visit Dx:    ICD-10-CM ICD-9-CM   1. Decreased ROM of left shoulder  M25.612 719.51   2. Decreased ROM of right shoulder  M25.611 719.51   3. At risk for lymphedema  Z91.89 V49.89       Patient Active Problem List   Diagnosis   • Bilateral acute serous otitis media   • Malignant neoplasm involving both nipple and areola of left breast in female (CMS/HCC)   • Cellulitis of abdominal wall   • Impetigo   • Folliculitis   • Encounter for consultation   • Non-smoker   • S/P lumpectomy, left breast   • S/P lymph node biopsy   • Regional lymph node staging category N3 (CMS/HCC)   • On antineoplastic chemotherapy   • Regional lymph node metastasis present (CMS/HCC)   • History of radiation therapy   • Painful skin lesion        Past Medical History:   Diagnosis Date   • Breast cancer (CMS/HCC)         Past Surgical History:   Procedure Laterality Date   • AXILLARY LYMPH NODE BIOPSY/EXCISION Left    • BREAST BIOPSY Left 2016   • BREAST LUMPECTOMY WITH SENTINEL NODE BIOPSY Left 10/03/2016    residual invasive carcinoma, grade 3 (0.2cm); margins negative; extensive lymphvascular space invasion; 2 sentinel lymph nodes positive (2/3)               Lymphedema     Row Name 20 2405             Subjective Pain    Able to rate subjective pain?  yes  -AL      Pre-Treatment Pain Level  7  -AL      Subjective Pain Comment  Upper chest  -AL         Subjective Comments    Subjective Comments  She has had a hard day at work today, she is emotional.  She just took some pain meds.    -AL         Manual Lymphatic Drainage    Manual Lymphatic Drainage  initial sequence  -AL      Initial Sequence  short neck;abdomen;diaphragmatic breathing  -AL      Abdomen  superficial  -AL      Diaphragmatic Breathing  x 10 with superficial abdominals  -AL      Opened  Regional Lymph Nodes  inguinal  -AL      Inguinal  right;left  -AL      Opened Anastamoses  axillo-inguinal  -AL      Axillo-Inguinal  right;left  -AL      Manual Lymphatic Drainage Comments  Worked on upper chest both sides  -AL      Manual Therapy  Discussed the Flexitouch pump.  -AL        User Key  (r) = Recorded By, (t) = Taken By, (c) = Cosigned By    Initials Name Provider Type    Jaycee Farris PTA, CLT-LANA Physical Therapy Assistant              PT Assessment/Plan     Row Name 12/03/20 2840          PT Assessment    Assessment Comments  She has had a stressful day at work and feels very tight.  She was so tired yesterday she fell asleep on the couch.  Stressed importance of working on HEP.  We did discuss the Flexitouch pump, she will think about the pump and let us know next week if she is interested.   -AL        PT Plan    PT Plan Comments  Work o stretching B shoulders, MLD as needed.   -AL       User Key  (r) = Recorded By, (t) = Taken By, (c) = Cosigned By    Initials Name Provider Type    Jaycee Farris PTA, IDALIA Physical Therapy Assistant            OP Exercises     Row Name 12/03/20 8802             Subjective Comments    Subjective Comments  She has had a hard day at work today, she is emotional.  She just took some pain meds.    -AL         Subjective Pain    Able to rate subjective pain?  yes  -AL      Pre-Treatment Pain Level  7  -AL      Post-Treatment Pain Level  5  -AL      Subjective Pain Comment  Upper chest  -AL         Total Minutes    99702 - PT Therapeutic Exercise Minutes  15  -AL      45223 - PT Manual Therapy Minutes  30  -AL         Exercise 2    Exercise Name 2  supine wand flexion w 1#  -AL      Cueing 2  Verbal;Demo  -AL      Sets 2  2  -AL      Reps 2  10  -AL         Exercise 3    Exercise Name 3  supine B unilateral wand abduction w 2# bar  -AL      Cueing 3  Verbal  -AL      Sets 3  2  -AL      Reps 3  10  -AL         Exercise 4    Exercise Name 4  Work on HEP  at work and home  -AL        User Key  (r) = Recorded By, (t) = Taken By, (c) = Cosigned By    Initials Name Provider Type    AL Jaycee Mohan, PTA, CLT-EVANGELINA Physical Therapy Assistant                      Manual Rx (last 36 hours)      Manual Treatments     Row Name 12/03/20 1555             Total Minutes    19381 - PT Manual Therapy Minutes  30  -AL         Manual Rx 2    Manual Rx 2 Location  supine carlos pect minor stretch passvie with posterior shoulder mobs  -AL        User Key  (r) = Recorded By, (t) = Taken By, (c) = Cosigned By    Initials Name Provider Type    Roderick Farrisdionisio DICK, PTA, CLT-EVANGELINA Physical Therapy Assistant          PT OP Goals     Row Name 12/03/20 1555          PT Short Term Goals    STG Date to Achieve  11/14/20  -AL     STG 1  Patient will begin a home exercise program to accelerate the recovery process  -AL     STG 1 Progress  Progressing  -AL     STG 1 Progress Comments  She is working on her HEP  -AL     STG 2  Patient will have an increase in bilateral shoulder flexion to 160 deg in supine  -AL     STG 2 Progress  Progressing  -AL     STG 3  Patient will have a 25% reduction in chest and shoulder pain  -AL     STG 3 Progress  Ongoing  -AL     STG 3 Progress Comments  Pain is worse today  -AL     STG 4  Patient will gain an understanding of Lymphedema and risk factors  -AL     STG 4 Progress  Ongoing  -AL        Long Term Goals    LTG Date to Achieve  12/14/20  -AL     LTG 1  Patient will be able to reach up into an overead cabinet to retrieve a 5 lb dish safely  -AL     LTG 1 Progress  Progressing  -AL     LTG 2  Patient will regain ROM to reach behind her back as if she was undoing a bra   -AL     LTG 2 Progress  Ongoing  -AL     LTG 3  Patient will have a 75 - 90% reduction in tightness/pain across chest and shoulders  -AL     LTG 3 Progress  Ongoing  -AL     LTG 4  Patient will be able to perform household chores without exacerbation of symptoms  -AL     LTG 4 Progress  Ongoing  -AL      LTG 5  Patient will regain functional ROM of both shoulders to perform activities overhead, reaching out to retrieve food from a drive-thru, reaching behind her back to fasten/unfasten bras,clothing  -AL     LTG 5 Progress  Ongoing  -AL     LTG 6  Patient will be instructed in a long term exercise program to continue to make gains following discharge  -AL     LTG 6 Progress  Ongoing  -AL     LTG 6 Progress Comments  She has a good HEP for stretching, posture, and strngthening she is working on   -AL        Time Calculation    PT Goal Re-Cert Due Date  12/17/20  -AL       User Key  (r) = Recorded By, (t) = Taken By, (c) = Cosigned By    Initials Name Provider Type    Jaycee Farris PTA, CLT-LANA Physical Therapy Assistant          Therapy Education  Education Details: Use a pillow along spine when sitting on the couch.  Work on HEP and MLD  Given: HEP, Symptoms/condition management, Edema management  Program: Reinforced  How Provided: Verbal  Provided to: Patient  Level of Understanding: Verbalized              Time Calculation:                    Jaycee Mohan PTA, CLT-LANA  12/3/2020

## 2020-12-08 ENCOUNTER — TREATMENT (OUTPATIENT)
Dept: PHYSICAL THERAPY | Facility: CLINIC | Age: 55
End: 2020-12-08

## 2020-12-08 DIAGNOSIS — M25.612 DECREASED ROM OF LEFT SHOULDER: Primary | ICD-10-CM

## 2020-12-08 DIAGNOSIS — I89.0 LYMPHEDEMA: ICD-10-CM

## 2020-12-08 DIAGNOSIS — M25.611 DECREASED ROM OF RIGHT SHOULDER: ICD-10-CM

## 2020-12-08 PROCEDURE — 97110 THERAPEUTIC EXERCISES: CPT | Performed by: PHYSICAL THERAPIST

## 2020-12-08 PROCEDURE — 97140 MANUAL THERAPY 1/> REGIONS: CPT | Performed by: PHYSICAL THERAPIST

## 2020-12-08 NOTE — PROGRESS NOTES
Outpatient Physical Therapy Ortho Treatment Note       Patient Name: Ayla Echeverria  : 1965  MRN: [id number]  Today's Date: 2020      Visit Date: 2020    Visit Dx:    ICD-10-CM ICD-9-CM   1. Decreased ROM of left shoulder  M25.612 719.51   2. Decreased ROM of right shoulder  M25.611 719.51   3. Lymphedema  I89.0 457.1       Patient Active Problem List   Diagnosis   • Bilateral acute serous otitis media   • Malignant neoplasm involving both nipple and areola of left breast in female (CMS/HCC)   • Cellulitis of abdominal wall   • Impetigo   • Folliculitis   • Encounter for consultation   • Non-smoker   • S/P lumpectomy, left breast   • S/P lymph node biopsy   • Regional lymph node staging category N3 (CMS/HCC)   • On antineoplastic chemotherapy   • Regional lymph node metastasis present (CMS/HCC)   • History of radiation therapy   • Painful skin lesion        Past Medical History:   Diagnosis Date   • Breast cancer (CMS/HCC)         Past Surgical History:   Procedure Laterality Date   • AXILLARY LYMPH NODE BIOPSY/EXCISION Left    • BREAST BIOPSY Left 2016   • BREAST LUMPECTOMY WITH SENTINEL NODE BIOPSY Left 10/03/2016    residual invasive carcinoma, grade 3 (0.2cm); margins negative; extensive lymphvascular space invasion; 2 sentinel lymph nodes positive (2/3)               Lymphedema     Row Name 20 9739             Subjective Pain    Post-Treatment Pain Level  0  -AL         Subjective Comments    Subjective Comments  She felt much better after her last treatment.  She has been working on her HEP.  She felt good for 3-4 days, then she was very tired from her chemo and the pain returned.  Since she was very tired she didn't feel like doing the HEP and thinks this may have made her pain increase.  She thinks the MLD helps the tightness in the upper chest.   -AL         Manual Lymphatic Drainage    Manual Lymphatic Drainage  initial sequence  -AL      Initial Sequence  short  neck;abdomen;diaphragmatic breathing  -AL      Abdomen  superficial  -AL      Diaphragmatic Breathing  x 10 with superficial abdominals  -AL      Opened Regional Lymph Nodes  inguinal  -AL      Inguinal  right;left  -AL      Opened Anastamoses  axillo-inguinal  -AL      Axillo-Inguinal  right;left  -AL      Manual Lymphatic Drainage Comments  Worked on upper chest both sides.  Also B shoulder posterior mobs.   -AL      Manual Therapy  She is not interested in the Flexitouch pump at this time.   -AL        User Key  (r) = Recorded By, (t) = Taken By, (c) = Cosigned By    Initials Name Provider Type    Jaycee Farris PTA, CLT-LANA Physical Therapy Assistant              PT Assessment/Plan     Row Name 12/08/20 3090          PT Assessment    Assessment Comments  Patient is feeling better today, she did take her pain meds.  She had no pain after treatment today.  She felt much better for several days after he last treatment.  She is working on her HEP and posture.  She did feel very tired becuase of her chemo treatment yesterday and did not work on her HEP.  She did have increase pain with the combination of the fatigue and not working on the HEP.  Her ROM is improving, she still has more tightness on L shoulder as compared to the right.   -AL        PT Plan    PT Plan Comments  Continue to work on improving ROM for the shoulders and chest, improving posture and thoracic strength.   -AL       User Key  (r) = Recorded By, (t) = Taken By, (c) = Cosigned By    Initials Name Provider Type    Jaycee Farris PTA, CLT-LANA Physical Therapy Assistant            OP Exercises     Row Name 12/08/20 7263             Subjective Comments    Subjective Comments  She felt much better after her last treatment.  She has been working on her HEP.  She felt good for 3-4 days, then she was very tired from her chemo and the pain returned.  Since she was very tired she didn't feel like doing the HEP and thinks this may have made her  pain increase.  She thinks the MLD helps the tightness in the upper chest.   -AL         Subjective Pain    Able to rate subjective pain?  yes  -AL      Pre-Treatment Pain Level  2  -AL      Post-Treatment Pain Level  0  -AL      Subjective Pain Comment  Upper chest with pain pill  -AL         Total Minutes    62272 - PT Therapeutic Exercise Minutes  25  -AL      88968 - PT Manual Therapy Minutes  20  -AL         Exercise 1    Exercise Name 1  R shoulder flexion:  154 in supine, Sittin  .  L shoulder flexion in supine:  149, In sittin  -AL         Exercise 2    Exercise Name 2  Stretched both shoulders into flexion in supine hooklying  -AL      Cueing 2  Verbal  -AL      Time 2  5 min  -AL         Exercise 3    Exercise Name 3  Wall push ups   -AL      Cueing 3  Verbal  -AL      Sets 3  2  -AL      Reps 3  10  -AL      Additional Comments  Added to HEP  -AL         Exercise 4    Exercise Name 4  UE phasic  -AL      Cueing 4  Verbal;Demo  -AL      Sets 4  2  -AL      Reps 4  10  -AL        User Key  (r) = Recorded By, (t) = Taken By, (c) = Cosigned By    Initials Name Provider Type    Jaycee Farris PTA, CLT-LANA Physical Therapy Assistant                      Manual Rx (last 36 hours)      Manual Treatments     Row Name 20 1550             Total Minutes    82285 - PT Manual Therapy Minutes  20  -AL        User Key  (r) = Recorded By, (t) = Taken By, (c) = Cosigned By    Initials Name Provider Type    Jaycee Farris PTA, CLT-LANA Physical Therapy Assistant          PT OP Goals     Row Name 20 1550          PT Short Term Goals    STG Date to Achieve  20  -AL     STG 1  Patient will begin a home exercise program to accelerate the recovery process  -AL     STG 1 Progress  Progressing  -AL     STG 1 Progress Comments  She is workng more on her HEP  -AL     STG 2  Patient will have an increase in bilateral shoulder flexion to 160 deg in supine  -AL     STG 2 Progress  Progressing   -AL     STG 3  Patient will have a 25% reduction in chest and shoulder pain  -AL     STG 3 Progress  Ongoing  -AL     STG 3 Progress Comments  Better today, she is taking her pain meds more and exercising more.  The tightness is 15-20% better.   -AL     STG 4  Patient will gain an understanding of Lymphedema and risk factors  -AL     STG 4 Progress  Ongoing  -AL        Long Term Goals    LTG Date to Achieve  12/14/20  -AL     LTG 1  Patient will be able to reach up into an overead cabinet to retrieve a 5 lb dish safely  -AL     LTG 1 Progress  Progressing  -AL     LTG 2  Patient will regain ROM to reach behind her back as if she was undoing a bra   -AL     LTG 2 Progress  Ongoing  -AL     LTG 2 Progress Comments  She is able to reach behind her back now.   -AL     LTG 3  Patient will have a 75 - 90% reduction in tightness/pain across chest and shoulders  -AL     LTG 3 Progress  Ongoing  -AL     LTG 4  Patient will be able to perform household chores without exacerbation of symptoms  -AL     LTG 4 Progress  Ongoing  -AL     LTG 4 Progress Comments  Household chores are becoming easier.   -AL     LTG 5  Patient will regain functional ROM of both shoulders to perform activities overhead, reaching out to retrieve food from a drive-thru, reaching behind her back to fasten/unfasten bras,clothing  -AL     LTG 5 Progress  Ongoing;Progressing;Partially Met  -AL     LTG 5 Progress Comments  Improving, functional ROM both UE's.   -AL     LTG 6  Patient will be instructed in a long term exercise program to continue to make gains following discharge  -AL     LTG 6 Progress  Ongoing  -AL        Time Calculation    PT Goal Re-Cert Due Date  12/17/20  -AL       User Key  (r) = Recorded By, (t) = Taken By, (c) = Cosigned By    Initials Name Provider Type    Jaycee Farris, PTA, CLT-EVANGELINA Physical Therapy Assistant          Therapy Education  Education Details: Wall push ups  Given: HEP  Program: New  How Provided: Verbal,  Demonstration  Provided to: Patient  Level of Understanding: Verbalized, Demonstrated              Time Calculation:                    Jaycee Mohan PTA, [facility]-EVANGELINA  12/8/2020

## 2020-12-10 ENCOUNTER — TREATMENT (OUTPATIENT)
Dept: PHYSICAL THERAPY | Facility: CLINIC | Age: 55
End: 2020-12-10

## 2020-12-10 DIAGNOSIS — M25.612 DECREASED ROM OF LEFT SHOULDER: Primary | ICD-10-CM

## 2020-12-10 DIAGNOSIS — M25.611 DECREASED ROM OF RIGHT SHOULDER: ICD-10-CM

## 2020-12-10 DIAGNOSIS — I89.0 LYMPHEDEMA: ICD-10-CM

## 2020-12-10 PROCEDURE — 97110 THERAPEUTIC EXERCISES: CPT | Performed by: PHYSICAL THERAPIST

## 2020-12-10 PROCEDURE — 97140 MANUAL THERAPY 1/> REGIONS: CPT | Performed by: PHYSICAL THERAPIST

## 2020-12-10 NOTE — PROGRESS NOTES
Outpatient Physical Therapy Ortho Treatment Note       Patient Name: Ayla Echeverria  : 1965  MRN: [id number]  Today's Date: 12/10/2020      Visit Date: 12/10/2020    Visit Dx:    ICD-10-CM ICD-9-CM   1. Decreased ROM of left shoulder  M25.612 719.51   2. Decreased ROM of right shoulder  M25.611 719.51   3. Lymphedema  I89.0 457.1       Patient Active Problem List   Diagnosis   • Bilateral acute serous otitis media   • Malignant neoplasm involving both nipple and areola of left breast in female (CMS/HCC)   • Cellulitis of abdominal wall   • Impetigo   • Folliculitis   • Encounter for consultation   • Non-smoker   • S/P lumpectomy, left breast   • S/P lymph node biopsy   • Regional lymph node staging category N3 (CMS/HCC)   • On antineoplastic chemotherapy   • Regional lymph node metastasis present (CMS/HCC)   • History of radiation therapy   • Painful skin lesion        Past Medical History:   Diagnosis Date   • Breast cancer (CMS/HCC)         Past Surgical History:   Procedure Laterality Date   • AXILLARY LYMPH NODE BIOPSY/EXCISION Left    • BREAST BIOPSY Left 2016   • BREAST LUMPECTOMY WITH SENTINEL NODE BIOPSY Left 10/03/2016    residual invasive carcinoma, grade 3 (0.2cm); margins negative; extensive lymphvascular space invasion; 2 sentinel lymph nodes positive (2/3)               Lymphedema     Row Name 12/10/20 4975             Subjective Pain    Able to rate subjective pain?  yes  -AL      Pre-Treatment Pain Level  4  -AL      Subjective Pain Comment  Upper chest  -AL         Manual Lymphatic Drainage    Manual Lymphatic Drainage  initial sequence  -AL      Initial Sequence  short neck;abdomen;diaphragmatic breathing  -AL      Abdomen  superficial  -AL      Diaphragmatic Breathing  x 10 with superficial abdominals  -AL      Opened Regional Lymph Nodes  inguinal  -AL      Inguinal  right;left  -AL      Opened Anastamoses  axillo-inguinal  -AL      Axillo-Inguinal  right;left  -AL      Manual  Lymphatic Drainage Comments  Worked on upper chest both sides.  Also B shoulder posterior mobs.   -AL        User Key  (r) = Recorded By, (t) = Taken By, (c) = Cosigned By    Initials Name Provider Type    Jaycee Farris PTA, CLT-LANA Physical Therapy Assistant              PT Assessment/Plan     Row Name 12/10/20 5640          PT Assessment    Assessment Comments  Patient did have pain after putting up [place] decorations, but is better today.  She continues to have more tightness in the left shoulder.  Both shoulder have improved as well as her posture has improved since her inital visit.  She still gets relief from the MLD, she also has less tightness in the chest after the MLD.   -AL        PT Plan    PT Plan Comments  Continue to work on increasing ROM of shoulders, improivng shoulder girdle strength, and decreasing chest tightness.   -AL       User Key  (r) = Recorded By, (t) = Taken By, (c) = Cosigned By    Initials Name Provider Type    Jaycee Farris PTA, CLT-LANA Physical Therapy Assistant            OP Exercises     Row Name 12/10/20 1390             Subjective Comments    Subjective Comments  She put [place] decorations up yesterday, she had increase pain afterwards.  She also had increase foot and leg pain from the neuropathy.  She has taken pain meds today, just before her treatment.   -AL         Subjective Pain    Able to rate subjective pain?  yes  -AL      Pre-Treatment Pain Level  4  -AL      Post-Treatment Pain Level  0  -AL      Subjective Pain Comment  Upper chest  -AL         Total Minutes    28441 - PT Therapeutic Exercise Minutes  30  -AL      29716 - PT Manual Therapy Minutes  15  -AL         Exercise 1    Exercise Name 1  Discussed compression socks for the neuropathy pain in her feet and legs.   -AL         Exercise 2    Exercise Name 2  With a small ball on the wall, had patient roll the ball for shoulder elevation B sides  -AL      Cueing 2  Verbal  -AL      Sets 2  2  -AL       Reps 2  10  -AL         Exercise 3    Exercise Name 3  Ball on the wall, circles CW and CCW each UE  -AL      Cueing 3  Verbal  -AL      Sets 3  2  -AL      Reps 3  --  -AL      Time 3  1 min each direction  -AL         Exercise 4    Exercise Name 4  Stretched both shoulders into flexion in supine hooklying  -AL      Cueing 4  Verbal  -AL      Time 4  5 min  -AL        User Key  (r) = Recorded By, (t) = Taken By, (c) = Cosigned By    Initials Name Provider Type    Jaycee Farris PTA, ÁNGELA-EVANGELINA Physical Therapy Assistant                      Manual Rx (last 36 hours)      Manual Treatments     Row Name 12/10/20 1550             Total Minutes    58416 - PT Manual Therapy Minutes  15  -AL        User Key  (r) = Recorded By, (t) = Taken By, (c) = Cosigned By    Initials Name Provider Type    Jaycee Farris PTA, ÁNGELA-EVANGELINA Physical Therapy Assistant          PT OP Goals     Row Name 12/10/20 1550          PT Short Term Goals    STG Date to Achieve  20  -AL     STG 1  Patient will begin a home exercise program to accelerate the recovery process  -AL     STG 1 Progress  Progressing  -AL     STG 1 Progress Comments  She is working on her HEP  -AL     STG 2  Patient will have an increase in bilateral shoulder flexion to 160 deg in supine  -AL     STG 2 Progress  Progressing  -AL     STG 2 Progress Comments  R shoulder flexion:  154 in supine, Sittin  .  L shoulder flexion in supine:  149, In sittin last treatment  -AL     STG 3  Patient will have a 25% reduction in chest and shoulder pain  -AL     STG 3 Progress  Ongoing  -AL     STG 3 Progress Comments  Tight today after putting up decorations yesterday  -AL     STG 4  Patient will gain an understanding of Lymphedema and risk factors  -AL     STG 4 Progress  Ongoing  -AL        Long Term Goals    LTG Date to Achieve  20  -AL     LTG 1  Patient will be able to reach up into an overead cabinet to retrieve a 5 lb dish safely  -AL     LTG 1  Progress  Progressing  -AL     LTG 2  Patient will regain ROM to reach behind her back as if she was undoing a bra   -AL     LTG 2 Progress  Ongoing  -AL     LTG 3  Patient will have a 75 - 90% reduction in tightness/pain across chest and shoulders  -AL     LTG 3 Progress  Ongoing  -AL     LTG 4  Patient will be able to perform household chores without exacerbation of symptoms  -AL     LTG 4 Progress  Ongoing  -AL     LTG 5  Patient will regain functional ROM of both shoulders to perform activities overhead, reaching out to retrieve food from a drive-thru, reaching behind her back to fasten/unfasten bras,clothing  -AL     LTG 5 Progress  Ongoing;Progressing;Partially Met  -AL     LTG 6  Patient will be instructed in a long term exercise program to continue to make gains following discharge  -AL     LTG 6 Progress  Ongoing  -AL        Time Calculation    PT Goal Re-Cert Due Date  12/17/20  -AL       User Key  (r) = Recorded By, (t) = Taken By, (c) = Cosigned By    Initials Name Provider Type    Jaycee Farris PTA, CLT-LANA Physical Therapy Assistant          Therapy Education  Education Details: Conitnue to work on HEP  Given: HEP  Program: Reinforced  How Provided: Verbal, Demonstration  Provided to: Patient  Level of Understanding: Verbalized, Demonstrated              Time Calculation:                    Jaycee Mohan PTA, CLT-LANA  12/10/2020

## 2020-12-10 NOTE — PROGRESS NOTES
Terrie Mueller   1965 12/11/2020     Chief Complaint   Patient presents with    Breast Cancer      Interval history/history of present illness:  Diagnosis   IDC left breast, March 2016   Grade 2. ER 1%, WI 6%, HER-2/oseas IHC 3+/FISH amplified ratio 6.7, AR 95%. 01/30/2017- Biopsy-proven in breast relapse/Inflammatory breast cancer. ER negative, WI negative, HER-2/oseas IHC 3+/FISH amplified ratio 4.5, Ki-67 32%. [facility] - Revealed no clinically significant mutation. PTEN VUS. Treatment summary  Neoadjuvant adjuvant chemotherapy TCH-P ×6 cycles. She declined to complete 1 year of Herceptin. She declined radiation therapy. 10/3/2016-left lumpectomy/sentinel lymph node   01/30/2017- Biopsy-proven in breast relapse/Inflammatory breast cancer. 5/11/2017-Chemotherapy with paclitaxel/carboplatin weekly. Herceptin/Pertuzumab every 3 weeks started 05/11/2017. She had a allergic reaction to carboplatin and this was discontinued. 7/21/2017- 9/1/2017 Neuropathy grade 2 with Taxol. Therefore, switched to Taxotere/Herceptin/Pertuzumab. Discontinued due to progression of disease. 9/22/2017 through 3/2/2018[place] every 3 weeks. (stopped as per patient request, neuropathy grade 1)   7/10/2018 through 11/30/2018- Kadcyla. 1/4/2019-Navelbine x 1 cycle with very poor tolerance (pain)   1/25/2018-Gemzar/Herceptin   3/15/2019- low-dose Cisplatin added to Gemzar and Herceptin regimen, regimen changed to every other week   4/17/2019- palliative radiation to left clavicle/chest wall for anticipate total dosing of 5040 cGy   6/22/2019- Xeloda/apatinib  January 2020-palliative radiation to the right breast.  1/10/2020- palliative treatment with Fam-Trastuzumab- stopped Feb due to severe toxicity  September 2020- palliative radiation left chest wall skin metastasis.   8/21/2020-Herceptin, Xeloda and Tucatinib  Tumor target  Skin chest wall left  upper chest/left breast     Interval history  Ebony Llamas is a pleasant 54years old female who has been diagnosed with recurrent HER-2 positive/ER 1% and NE 6% invasive ductal carcinoma of the breast.  Her pattern of recurrence is mostly diffuse skin involvement of the upper chest bilaterally and back on the left. She received several lines of chemotherapy in the past.  She has had very poor tolerance to chemotherapy overall. She was last treated with radiation to the skin lesions and is currently receiving treatment with tucatinib, Xeloda and Herceptin. She is taking Xeloda 500 mg p.o. twice daily 7 days on and 7 days off but feels that hurts her stomach on the day 6. She has been taking tucatinib without major problem. She also presents today for her Herceptin. She has had a complete response in the skin. No visible lesion at this time. Skin discoloration secondary to radiation. Cancer history- Left breast recurrence with Inflammatory Breast Cancer ER 8%/NE negative &HER-2 positive. Ms Jazmyn Lyon was seen in initial oncology consultation on 4/20/2017 referred for a diagnosis of a left breast recurrence with inflammatory breast cancer. She was initially treated by Dr. Yudith Martinez at [facility]. Prior breast cancer history is as follows:  3/22/2016-a diagnostic mammogram for a palpable mass showed a left breast abnormal findings. 4/1/2017- ultrasound-guided core needle biopsy was consistent with invasive ductal carcinoma, grade 2. ER 1%, NE 6%, HER-2/oseas IHC 3+/FISH amplified ratio 6.7., AR 95%. She underwent 6 cycles of TCH-P from April 2016 through September 2016 with G-CSF support. She had a total of 18 weeks of trastuzumab treatment. 10/3/2016-she underwent a left lumpectomy with left sentinel lymph node revealing an invasive ductal carcinoma, grade 3. Margins clear of invasive carcinoma (1.1 cm from the lateral margin), extensive lymphovascular space invasion. 2 out of 3 sentinel lymph nodes positive for metastatic carcinoma. Final pathologic staging [id number])M0. She declined adjuvant radiation therapy and adjuvant Herceptin completion.   -------------------In-breast Recurrence [id number]---------------------------  1/30/2017- she was seen by Dr. Mahogany Christianson with new complaints of erythema in the left breast. A skin punch biopsy was consistent with metastatic ductal carcinoma with extensive dermal lymphovascular invasion. ER negative, DC negative, HER-2/oseas IHC 3+/FISH amplified ratio 4.5, Ki-67 32%. Foundation one showed ERBB2 amplification, AURKA amplification, TP53 and .   2/15/2017- she was again seen by Dr. Keaton Navas and explained about her tumor recurrence. Unfortunately, she declined any further intervention at that time. 4/21/2017- she asked Dr. Keaton Navas for a referral to us.   4/28/2017-referred to me for further evaluation. I recommended a PET scan and neoadjuvant chemotherapy with carboplatin/paclitaxel and Perjeta and Herceptin. 5/4/2017-PET/CT scan revealed abnormal metabolic activity present in multiple regions throughout the left breast and in the skin( SUV 8.9, 7.4, 7.3). In the left supraclavicular region a cluster of lymph nodes present (SUV of 3). Axillary lymph nodes with SUV of 2.8. Tonsils bilaterally with SUV 5.6 (symmetric). No abnormal metabolic activity in the intrathoracic or intra-abdominal pelvic region. No abnormal skeletal metabolic activity. I recommended weekly carboplatin/weekly Taxol/Herceptin and Pertuzumab.   5/26/2017-she had a severe allergic reaction after 2 doses of carboplatin and therefore this was discontinued. She was continued only on Taxol weekly/Herceptin and Pertuzumab.   7/21/2017-after cycle #2 and 8 doses of Taxol she developed neuropathy related to chemotherapy and therefore chemotherapy was switched from Taxol to Taxotere. [facility] PTEN VUS.   9/1/2017-local in breast disease progression with increasing breast erythema.  Treatment switched to Kadcyla. 12/27/2017- repeat re-staging CT scan of the abdomen and pelvis documented no evidence of intra-abdominal pelvic metastasis. Superior left renal cyst. Mild hepatic steatosis. CT scan of the chest documented no evidence of intrathoracic metastasis. Bone scan documented no evidence of bony metastatic disease. 3/2/2018-after 8 cycles of Kadcyla she requested treatment to be on hold. She has requested her PCP a referral to [facility] for a second opinion. She has neuropathy grade 1.   5/2/2018- 2 months off treatment (Kadcyla). She has not yet been seen at [facility]. Apparently, [place] is gathering her records and will see her soon. Examination of her chest wall showed recurrent disease in the left upper chest. Residual neuropathy grade 1. She could not tolerate gabapentin, and therefore has stopped it. She was not interested in any other medication for neuropathy at this time. 7/10/2018-after discussion at [facility] she was recommended to reinitiate Kadcyla. 7/13/2018-CT of chest, abdomen, pelvis showed no evidence of intra-abdominal disease. There is an enlarged right-sided level 1 lymph node that was not appreciated previously, measuring 2.5 x 1.4 cm, metastatic disease considered. 7/16/2018-PET CT scan showed diffuse uptake throughout the left breast extending to the left chest wall. Abnormal uptake within the left axilla lymph nodes and left internal mammary lymph nodes concerning for metastatic disease. Enlarged hypermetabolic right axilla lymph node concerning for metastatic disease with SUV 8.0. Abnormal uptake within a right cervical lymph node, etiology unclear, but could be reactive. 10/26/2018 - CT scan of the chest was completed at [facility] and compared to CT scan of 12/27/2017 versus her last CT scan completed at [facility] on 7/13/2018.  The CT scan identified a right axillary lymph node measuring 1.2 cm, otherwise no intrathoracic neoplastic process/metastatic disease. 10/26/2018 -CT scan of the abdomen and pelvis was completed at [facility] and compared to CT scan of 12/27/2017 versus her last CT scan completed at [facility] on 7/13/2018. No evidence of intra-abdominal or pelvic metastasis was identified. December 2018-interval worsening of skin metastasis in the left breast and diffuse erythema with new nodules. 12/17/2018-skin biopsy of overlying right breast consistent invasive ductal carcinoma, high-grade. ER 20%, ME 0%, HER-2/oseas 3+.  12/17/2018-right axillary FNA biopsy consistent invasive ductal carcinoma. 12/26/2018-recommended clinical trial at [facility]. 1/4/2019-started on Navelbine/Herceptin, due to delays on the clinical trial at [facility]. Unfortunately, she had severe chest wall pain and generalized pain related to Navelbine. 1/25/2019-4/19/2019 she was switched to cisplatin/Gemzar/Herceptin, but had disease progression. 5/30/2019-CT chest, abdomen, pelvis and bone scan was unremarkable for right axillary adenopathy measuring 2 cm. Lymph node located in the upper chest wall under the pectoralis muscle measuring 1.2 cm. Right breast with 2 areas with masslike enhancement. No evidence of metastatic disease in the abdomen pelvis. Bone scan was unremarkable for metastatic bone disease. June 2019-very poor tolerance to cisplatin, Gemzar/Herceptin. She was recommended Xeloda/ Lapatinib.   6/22/2019-she was recommended and started on Xeloda 850 mg/m2 PO BID days 1 through 14 every 21 days and Lapatinib 1250 mg daily continuously. November 2019- progression on prior Xeloda and lapatinib.  11/23/2019- recommended rechallenge with Herceptin and Navelbine vs clinical trial at [facility]. 12/23/2019-CT chest abdomen pelvis showed metastatic disease in the right axillary lymph node and diffuse bilateral skin involvement of both breasts with several nodules. No evidence of pulmonary, liver or bone metastasis.   CT of the head with contrast was unremarkable. 1/3/2020- the patient was unable to follow-up at [facility] for clinical trial.  Therefore recommended Fam-trastuzumab deruxtecan every 3 weeks. 1/10/2020-she was started on palliative treatment with Fam-trastuzumab.  6/5/2020- Ct Chest/Abdomen/Pelvis W Contrast No lymphadenopathy. A previously seen enlarged right axillary lymph node is now small in size. There is some stranding of the fat around the lymph node. Bilateral breast skin thickening left greater than right. Prior lumpectomy on the left with left axillary lymph node sampling. Previously seen nodularity in the right breast on CT is not visualized today. There may be minimal radiation change in the periphery of the left lung. There is no dense consolidation or effusion. No definite metastatic lung nodules. No evidence of abdominal or pelvic neoplastic process or metastatic disease. The previously seen right breast nodule is not visualized in this study. Postsurgical changes of the left breast. No significant change in the previous study. Lobulated skin thickening of the left lower lateral chest and upper abdominal wall which was not noted in the previous study. The etiology is not certain. This data be clinically correlated and further evaluated. 8/7/2020- the patient is currently awaiting for palliative radiation to the chest wall for the skin margins.   She has agreed and want to start on Herceptin, Xeloda and Tucatinib.  8/21/2020 Tumor Marker- CA 15.3- 32.19  10/9/2020-continue Herceptin and tucatinib.  12/11/2020-she has resumed Xeloda on low-dose 500 mg p.o. twice daily 7 days on 7 days off, tucatinib and Herceptin    Past medical history:  Past Medical History:   Diagnosis Date    Cancer ([address]     breast    GERD (gastroesophageal reflux disease)         Past surgical history:  Past Surgical History:   Procedure Laterality Date    BREAST LUMPECTOMY Left 10/4/2016    LUMPECTOMY WITH SENTINAL NODE BIOPSY AND INTRA  Prostate Cancer Father     Hypertension Mother         Current Outpatient Medications   Medication Sig Dispense Refill    pantoprazole (PROTONIX) 40 MG tablet TAKE ONE TABLET DAILY 30 tablet 2    Tucatinib (TUKYSA) 150 MG TABS Take 300 mg by mouth 2 times daily 120 tablet 5    ondansetron (ZOFRAN) 8 MG tablet Take 1 tablet by mouth every 8 hours as needed for Nausea or Vomiting 30 tablet 5    capecitabine (XELODA) 500 MG chemo tablet Take 2 tablets by mouth twice daily for 14 days of a 21 day cycle. 56 tablet 5    capecitabine (XELODA) 150 MG chemo tablet Take 2 tablets by mouth 2 times a daily for 14 days of a 21 day cycle 56 tablet 5    Zinc Sulfate (ZINC 15 PO) Take by mouth daily      Cholecalciferol (VITAMIN D3) 1.25 MG (96003 UT) CAPS Take by mouth daily Unknown amount of Vitamins D3      APPLE CIDER VINEGAR PO Take by mouth      Probiotic Product (PROBIOTIC-10 PO) Take by mouth      Turmeric 500 MG CAPS Take by mouth      SELENIUM ER PO Take by mouth      Multiple Vitamin (MULTI VITAMIN DAILY PO) Take by mouth       No current facility-administered medications for this visit.       Facility-Administered Medications Ordered in Other Visits   Medication Dose Route Frequency Provider Last Rate Last Dose    heparin flush 100 UNIT/ML injection 500 Units  500 Units Intracatheter PRN Jonathan Luis MD   500 Units at 12/11/20 1409    sodium chloride flush 0.9 % injection 10 mL  10 mL Intravenous PRN Jonathan Luis MD   10 mL at 12/11/20 1409        REVIEW OF SYSTEMS:    Constitutional: no fever, no night sweats, fatigue;   HEENT: no blurring of vision, no double vision, no hearing difficulty, no tinnitus,no ulceration, no dental caries, no dysphagia, no epistaxis  Lungs: no cough, no shortness of breath, no wheeze;   CVS: no palpitation, no chest pain, no shortness of breath;  GI: abdominal bloating, nausea, no vomiting, no constipation; no diarrhea  ORLIN: no dysuria, frequency and urgency, no hematuria, no kidney stones;   Musculoskeletal: no joint pain, swelling , stiffness;   Endocrine: no polyuria, polydypsia, no cold or heat intolerence; Hematology/lymphatic: no easy brusing or bleeding, no hx of clotting disorder; no peripheral adenopathy. Dermatology: Skin lesions completely resolved. Dry and itchy skin, no eczema, no pruritis;   Psychiatry: no depression, no anxiety,no panic attacks, no suicide ideation; Neurology: no syncope, no seizures, no numbness or tingling of hands, no numbness and tingling of feet, no paresis;     PHYSICAL EXAM:    /64   Pulse 75   Temp 97.8 °F (36.6 °C)   Ht 5' 3\" (1.6 m)   Wt 126 lb 11.2 oz (57.5 kg)   LMP 02/29/2016 (Approximate)   SpO2 98%   BMI 22.44 kg/m²   /64   Pulse 75   Temp 97.8 °F (36.6 °C)   Ht 5' 3\" (1.6 m)   Wt 126 lb 11.2 oz (57.5 kg)   LMP 02/29/2016 (Approximate)   SpO2 98%   BMI 22.44 kg/m²     Pain scale:5    CONSTITUTIONAL: Alert, appropriate, no acute distress,   EYES: Non icteric, EOM intact, pupils equal round and reactive to light and accommodation. ENT: Oral mucus membranes moist, no oral pharyngeal lesions. External inspection of ears and nose are normal.   NECK: Supple, no masses. No palpable thyroid mass    CHEST/LUNGS: CTA bilaterally, normal respiratory effort   CARDIOVASCULAR: RRR, no murmurs. No lower extremity edema   ABDOMEN: soft non-tender, active bowel sounds, no hepatosplenomegaly. No palpable masses. EXTREMITIES: warm, Full ROM of all fours extremities. No focal weakness. SKIN: Dry skin on the right armpit. Mild erythema. LYMPH: No cervical, clavicular, axillary, or inguinal lymphadenopathy  NEUROLOGIC: follows commands, non focal.   PSYCH: mood and affect appropriate. Alert and oriented to time and place and person.     Relevant Lab findings:   Lab Results   Component Value Date    WBC 3.18 (L) 12/11/2020    HGB 9.6 (L) 12/11/2020    HCT 28.8 (L) 12/11/2020    MCV 91.1 12/11/2020 neutropenia with Xeloda. Recommend to resume reduced dose Xeloda 500mg PO BID 5 days on/9 days off. Continue Herceptin and Tucatinib    Skin metastasis - status post palliative RT with significant improvement. Fatigue-related to chemotherapy. Cancer related pain- start morphine extended release 30 mg every 12 hours and continue Percocet. 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 She denies any significant change from previous evaluation. Prolonged neutropenias- the patient had a bone marrow in 2018 that showed an unremarkable bone marrow. She has had prolonged neutropenia in the past.  ANC 0.8 today. No fever chills. Continue to watch. We will continue to hold Xeloda. Continue Herceptin/Tukyza only    Plan:  1. Continue Tucatinib and Herceptin  2. Reduce Xeloda to 500 mg p.o. twice daily 5 days on and 9 days off.   3. RTC 6 weeks MD  4. Continue clinical/laboratory monitoring    The selection, dosing administration of anticancer agents in the management of associated toxicities are complex. Modifications of drug dose and schedule and the initiation of supportive care interventions are often necessary because of expected toxicities individual patient variability, prior treatment and comorbidity. The optimal delivery of anticancer agents therefore requires a healthcare delivery team experienced in the use of anticancer agents and the management of associated toxicities in patients with cancer. Follow Up:     Return in about 6 weeks (around 1/22/2021) for See Dr. Kailey Machuca in [address] in 6 weeks, treatment every 3 weeks. Data Daniel Nicole am scribing for Brenda Field MD. Electronically signed by Wisam Gaston RN on 12/11/2020 at 3:58 PM CST. I, Dr Annie Jean Baptiste, personally performed the services described in this documentation as scribed by Wisam Gaston RN in my presence and is both accurate and complete.     I have seen,

## 2020-12-11 ENCOUNTER — OFFICE VISIT (OUTPATIENT)
Dept: HEMATOLOGY | Age: 55
End: 2020-12-11
Payer: COMMERCIAL

## 2020-12-11 ENCOUNTER — HOSPITAL ENCOUNTER (OUTPATIENT)
Dept: INFUSION THERAPY | Age: 55
Discharge: HOME | End: 2020-12-11
Payer: COMMERCIAL

## 2020-12-11 VITALS
OXYGEN SATURATION: 98 % | HEIGHT: 63 IN | DIASTOLIC BLOOD PRESSURE: 64 MMHG | HEART RATE: 75 BPM | SYSTOLIC BLOOD PRESSURE: 104 MMHG | WEIGHT: 126.7 LBS | TEMPERATURE: 97.8 F | BODY MASS INDEX: 22.45 KG/M2

## 2020-12-11 DIAGNOSIS — C50.919 HER2-POSITIVE CARCINOMA OF BREAST (HCC): ICD-10-CM

## 2020-12-11 DIAGNOSIS — C50.412 MALIGNANT NEOPLASM OF UPPER-OUTER QUADRANT OF LEFT FEMALE BREAST, UNSPECIFIED ESTROGEN RECEPTOR STATUS (HCC): Primary | ICD-10-CM

## 2020-12-11 LAB
ALBUMIN SERPL-MCNC: 4.1 G/DL (ref 3.5–5.2)
ALP BLD-CCNC: 101 U/L (ref 35–104)
ALT SERPL-CCNC: 37 U/L (ref 9–52)
ANION GAP SERPL CALCULATED.3IONS-SCNC: 10 MMOL/L (ref 7–19)
AST SERPL-CCNC: 48 U/L (ref 14–36)
BASOPHILS ABSOLUTE: 0.01 K/UL (ref 0.01–0.08)
BASOPHILS RELATIVE PERCENT: 0.3 % (ref 0.1–1.2)
BILIRUB SERPL-MCNC: 0.7 MG/DL (ref 0.2–1.3)
BUN BLDV-MCNC: 9 MG/DL (ref 7–17)
CALCIUM SERPL-MCNC: 9.4 MG/DL (ref 8.4–10.2)
CHLORIDE BLD-SCNC: 101 MMOL/L (ref 98–111)
CO2: 29 MMOL/L (ref 22–29)
CREAT SERPL-MCNC: 0.8 MG/DL (ref 0.5–1)
EOSINOPHILS ABSOLUTE: 0.07 K/UL (ref 0.04–0.54)
EOSINOPHILS RELATIVE PERCENT: 2.2 % (ref 0.7–7)
GFR NON-AFRICAN AMERICAN: >60
GLOBULIN: 3.7 G/DL
GLUCOSE BLD-MCNC: 105 MG/DL (ref 74–106)
HCT VFR BLD CALC: 28.8 % (ref 34.1–44.9)
HEMOGLOBIN: 9.6 G/DL (ref 11.2–15.7)
LYMPHOCYTES ABSOLUTE: 0.6 K/UL (ref 1.18–3.74)
LYMPHOCYTES RELATIVE PERCENT: 18.9 % (ref 19.3–53.1)
MCH RBC QN AUTO: 30.4 PG (ref 25.6–32.2)
MCHC RBC AUTO-ENTMCNC: 33.3 G/DL (ref 32.3–35.5)
MCV RBC AUTO: 91.1 FL (ref 79.4–94.8)
MONOCYTES ABSOLUTE: 0.6 K/UL (ref 0.24–0.82)
MONOCYTES RELATIVE PERCENT: 18.9 % (ref 4.7–12.5)
NEUTROPHILS ABSOLUTE: 1.9 K/UL (ref 1.56–6.13)
NEUTROPHILS RELATIVE PERCENT: 59.7 % (ref 34–71.1)
PDW BLD-RTO: 18.3 % (ref 11.7–14.4)
PLATELET # BLD: 245 K/UL (ref 182–369)
PMV BLD AUTO: 9.3 FL (ref 7.4–10.4)
POTASSIUM SERPL-SCNC: 4 MMOL/L (ref 3.5–5.1)
RBC # BLD: 3.16 M/UL (ref 3.93–5.22)
SODIUM BLD-SCNC: 140 MMOL/L (ref 137–145)
TOTAL PROTEIN: 7.8 G/DL (ref 6.3–8.2)
WBC # BLD: 3.18 K/UL (ref 3.98–10.04)

## 2020-12-11 PROCEDURE — 36415 COLL VENOUS BLD VENIPUNCTURE: CPT

## 2020-12-11 PROCEDURE — 80053 COMPREHEN METABOLIC PANEL: CPT

## 2020-12-11 PROCEDURE — 6360000002 HC RX W HCPCS: Performed by: INTERNAL MEDICINE

## 2020-12-11 PROCEDURE — 96413 CHEMO IV INFUSION 1 HR: CPT

## 2020-12-11 PROCEDURE — 96375 TX/PRO/DX INJ NEW DRUG ADDON: CPT

## 2020-12-11 PROCEDURE — 2580000003 HC RX 258: Performed by: INTERNAL MEDICINE

## 2020-12-11 PROCEDURE — 99214 OFFICE O/P EST MOD 30 MIN: CPT | Performed by: INTERNAL MEDICINE

## 2020-12-11 PROCEDURE — 85025 COMPLETE CBC W/AUTO DIFF WBC: CPT

## 2020-12-11 PROCEDURE — 6370000000 HC RX 637 (ALT 250 FOR IP): Performed by: INTERNAL MEDICINE

## 2020-12-11 RX ORDER — DIPHENHYDRAMINE HYDROCHLORIDE 50 MG/ML
50 INJECTION INTRAMUSCULAR; INTRAVENOUS ONCE
Status: CANCELLED | OUTPATIENT
Start: 2020-12-11

## 2020-12-11 RX ORDER — ACETAMINOPHEN 325 MG/1
1000 TABLET ORAL ONCE
Status: CANCELLED
Start: 2020-12-11

## 2020-12-11 RX ORDER — SODIUM CHLORIDE 0.9 % (FLUSH) 0.9 %
5 SYRINGE (ML) INJECTION PRN
Status: CANCELLED | OUTPATIENT
Start: 2020-12-11

## 2020-12-11 RX ORDER — METHYLPREDNISOLONE SODIUM SUCCINATE 125 MG/2ML
125 INJECTION, POWDER, LYOPHILIZED, FOR SOLUTION INTRAMUSCULAR; INTRAVENOUS ONCE
Status: CANCELLED | OUTPATIENT
Start: 2020-12-11

## 2020-12-11 RX ORDER — EPINEPHRINE 1 MG/ML
0.3 INJECTION, SOLUTION, CONCENTRATE INTRAVENOUS PRN
Status: CANCELLED | OUTPATIENT
Start: 2020-12-11

## 2020-12-11 RX ORDER — SODIUM CHLORIDE 9 MG/ML
INJECTION, SOLUTION INTRAVENOUS CONTINUOUS
Status: CANCELLED | OUTPATIENT
Start: 2020-12-11

## 2020-12-11 RX ORDER — DIPHENHYDRAMINE HYDROCHLORIDE 50 MG/ML
25 INJECTION INTRAMUSCULAR; INTRAVENOUS ONCE
Status: CANCELLED
Start: 2020-12-11

## 2020-12-11 RX ORDER — ACETAMINOPHEN 500 MG
1000 TABLET ORAL ONCE
Status: COMPLETED | OUTPATIENT
Start: 2020-12-11 | End: 2020-12-11

## 2020-12-11 RX ORDER — SODIUM CHLORIDE 0.9 % (FLUSH) 0.9 %
10 SYRINGE (ML) INJECTION PRN
Status: DISCONTINUED | OUTPATIENT
Start: 2020-12-11 | End: 2020-12-12 | Stop reason: HOSPADM

## 2020-12-11 RX ORDER — DIPHENHYDRAMINE HYDROCHLORIDE 50 MG/ML
25 INJECTION INTRAMUSCULAR; INTRAVENOUS ONCE
Status: COMPLETED | OUTPATIENT
Start: 2020-12-11 | End: 2020-12-11

## 2020-12-11 RX ORDER — MEPERIDINE HYDROCHLORIDE 50 MG/ML
12.5 INJECTION INTRAMUSCULAR; INTRAVENOUS; SUBCUTANEOUS ONCE
Status: CANCELLED | OUTPATIENT
Start: 2020-12-11

## 2020-12-11 RX ORDER — SODIUM CHLORIDE 0.9 % (FLUSH) 0.9 %
10 SYRINGE (ML) INJECTION PRN
Status: CANCELLED | OUTPATIENT
Start: 2020-12-11

## 2020-12-11 RX ORDER — HEPARIN SODIUM (PORCINE) LOCK FLUSH IV SOLN 100 UNIT/ML 100 UNIT/ML
500 SOLUTION INTRAVENOUS PRN
Status: DISCONTINUED | OUTPATIENT
Start: 2020-12-11 | End: 2020-12-12 | Stop reason: HOSPADM

## 2020-12-11 RX ORDER — HEPARIN SODIUM (PORCINE) LOCK FLUSH IV SOLN 100 UNIT/ML 100 UNIT/ML
500 SOLUTION INTRAVENOUS PRN
Status: CANCELLED | OUTPATIENT
Start: 2020-12-11

## 2020-12-11 RX ORDER — SODIUM CHLORIDE 9 MG/ML
20 INJECTION, SOLUTION INTRAVENOUS ONCE
Status: CANCELLED | OUTPATIENT
Start: 2020-12-11

## 2020-12-11 RX ADMIN — TRASTUZUMAB 359 MG: 150 INJECTION, POWDER, LYOPHILIZED, FOR SOLUTION INTRAVENOUS at 13:36

## 2020-12-11 RX ADMIN — ACETAMINOPHEN 1000 MG: 500 TABLET, FILM COATED ORAL at 13:17

## 2020-12-11 RX ADMIN — HEPARIN 500 UNITS: 100 SYRINGE at 14:09

## 2020-12-11 RX ADMIN — SODIUM CHLORIDE, PRESERVATIVE FREE 10 ML: 5 INJECTION INTRAVENOUS at 14:09

## 2020-12-11 RX ADMIN — DIPHENHYDRAMINE HYDROCHLORIDE 25 MG: 50 INJECTION, SOLUTION INTRAMUSCULAR; INTRAVENOUS at 13:17

## 2020-12-11 ASSESSMENT — PAIN SCALES - GENERAL
PAINLEVEL_OUTOF10: 0
PAINLEVEL_OUTOF10: 0

## 2020-12-15 ENCOUNTER — TREATMENT (OUTPATIENT)
Dept: PHYSICAL THERAPY | Facility: CLINIC | Age: 55
End: 2020-12-15

## 2020-12-15 DIAGNOSIS — M25.612 DECREASED ROM OF LEFT SHOULDER: Primary | ICD-10-CM

## 2020-12-15 DIAGNOSIS — I89.0 LYMPHEDEMA: ICD-10-CM

## 2020-12-15 DIAGNOSIS — M25.611 DECREASED ROM OF RIGHT SHOULDER: ICD-10-CM

## 2020-12-15 PROCEDURE — 97140 MANUAL THERAPY 1/> REGIONS: CPT | Performed by: PHYSICAL THERAPIST

## 2020-12-15 PROCEDURE — 97110 THERAPEUTIC EXERCISES: CPT | Performed by: PHYSICAL THERAPIST

## 2020-12-15 NOTE — PROGRESS NOTES
Outpatient Physical Therapy Ortho Treatment Note       Patient Name: Ayla Echeverria  : 1965  MRN: [id number]  Today's Date: 12/15/2020      Visit Date: 12/15/2020    Visit Dx:    ICD-10-CM ICD-9-CM   1. Decreased ROM of left shoulder  M25.612 719.51   2. Decreased ROM of right shoulder  M25.611 719.51   3. Lymphedema  I89.0 457.1       Patient Active Problem List   Diagnosis   • Bilateral acute serous otitis media   • Malignant neoplasm involving both nipple and areola of left breast in female (CMS/HCC)   • Cellulitis of abdominal wall   • Impetigo   • Folliculitis   • Encounter for consultation   • Non-smoker   • S/P lumpectomy, left breast   • S/P lymph node biopsy   • Regional lymph node staging category N3 (CMS/HCC)   • On antineoplastic chemotherapy   • Regional lymph node metastasis present (CMS/HCC)   • History of radiation therapy   • Painful skin lesion        Past Medical History:   Diagnosis Date   • Breast cancer (CMS/HCC)         Past Surgical History:   Procedure Laterality Date   • AXILLARY LYMPH NODE BIOPSY/EXCISION Left    • BREAST BIOPSY Left 2016   • BREAST LUMPECTOMY WITH SENTINEL NODE BIOPSY Left 10/03/2016    residual invasive carcinoma, grade 3 (0.2cm); margins negative; extensive lymphvascular space invasion; 2 sentinel lymph nodes positive (2/3)               Lymphedema     Row Name 12/15/20 7388             Subjective Pain    Able to rate subjective pain?  yes  -AL      Pre-Treatment Pain Level  3  -AL      Post-Treatment Pain Level  2  -AL      Subjective Pain Comment  Upper chest  -AL         Subjective Comments    Subjective Comments  She states her chemo meds makes the skin on her back hurt.  This also makes her nauseated and have diarrhea.  She states she is staying looser longer.    -AL         Manual Lymphatic Drainage    Manual Lymphatic Drainage  initial sequence  -AL      Initial Sequence  short neck;abdomen;diaphragmatic breathing  -AL      Abdomen  --  -AL       Diaphragmatic Breathing  x 10  -AL      Opened Regional Lymph Nodes  inguinal  -AL      Inguinal  right;left  -AL      Opened Anastamoses  axillo-inguinal  -AL      Axillo-Inguinal  right;left  -AL      Manual Lymphatic Drainage Comments  MLD to upper chest  -AL        User Key  (r) = Recorded By, (t) = Taken By, (c) = Cosigned By    Initials Name Provider Type    Jaycee Farris PTA, CLT-LANA Physical Therapy Assistant              PT Assessment/Plan     Row Name 12/15/20 1550          PT Assessment    Assessment Comments  Patient's overall pain has decreased since her inital visit and her ADL's have improved.  She does not have as much tightness in the upper chest.  There are days she is exhausetd from chemo and can tell her chest is tighter when this happens.  Patient has been more compliant overall with her HEP the past several weeks.  Her ROM has improved since her inital visit.   -AL        PT Plan    PT Plan Comments  Continue to work on decreasing shoulder and upper chest pain, and increasing ROM in both shoulders.  -AL       User Key  (r) = Recorded By, (t) = Taken By, (c) = Cosigned By    Initials Name Provider Type    Jaycee Farris PTA, IDALIA Physical Therapy Assistant            OP Exercises     Row Name 12/15/20 6390             Subjective Comments    Subjective Comments  She states her chemo meds makes the skin on her back hurt.  This also makes her nauseated and have diarrhea.  She states she is staying looser longer.    -AL         Subjective Pain    Able to rate subjective pain?  yes  -AL      Pre-Treatment Pain Level  3  -AL      Post-Treatment Pain Level  2  -AL      Subjective Pain Comment  Upper chest  -AL         Total Minutes    00383 - PT Therapeutic Exercise Minutes  35  -AL      73713 - PT Manual Therapy Minutes  10  -AL         Exercise 1    Exercise Name 1  In standing worked on B shoulder flexion/extension while holding onto a small ball with both hands.  -AL      Cueing 1   Verbal  -AL      Sets 1  3  -AL      Reps 1  10  -AL         Exercise 2    Exercise Name 2  Standing with hand on  ball, serratus punch  -AL      Cueing 2  Verbal;Demo  -AL      Sets 2  2  -AL      Reps 2  10  -AL         Exercise 3    Exercise Name 3  Wall angels  -AL      Cueing 3  Verbal;Demo  -AL      Sets 3  2  -AL      Reps 3  5  -AL      Additional Comments  Added to HEP  -AL         Exercise 4    Exercise Name 4  Supine hooklying on small half roll, pec stretch  -AL      Cueing 4  Verbal  -AL      Additional Comments  Stopped due to increase back pain  -AL         Exercise 5    Exercise Name 5  Stretched both shoulders into flexion in supine hooklying  -AL      Cueing 5  Verbal  -AL      Time 5  5 min  -AL        User Key  (r) = Recorded By, (t) = Taken By, (c) = Cosigned By    Initials Name Provider Type    Jaycee Farris, PTA, CLT-EVANGELINA Physical Therapy Assistant                      Manual Rx (last 36 hours)      Manual Treatments     Row Name 12/15/20 1550             Total Minutes    57159 - PT Manual Therapy Minutes  10  -AL        User Key  (r) = Recorded By, (t) = Taken By, (c) = Cosigned By    Initials Name Provider Type    Jaycee Farris, PTA, CLT-EVANGELINA Physical Therapy Assistant          PT OP Goals     Row Name 12/15/20 1550          PT Short Term Goals    STG Date to Achieve  11/14/20  -AL     STG 1  Patient will begin a home exercise program to accelerate the recovery process  -AL     STG 1 Progress  Progressing  -AL     STG 1 Progress Comments  She is working on her HEP  -AL     STG 2  Patient will have an increase in bilateral shoulder flexion to 160 deg in supine  -AL     STG 2 Progress  Progressing  -AL     STG 2 Progress Comments  AROM R shoulder Flexion:  163, L shoulder flexion:  152  -AL     STG 3  Patient will have a 25% reduction in chest and shoulder pain  -AL     STG 3 Progress  Ongoing  -AL     STG 3 Progress Comments  She has had a 30% reduction of pain in the chest and  shoulder  -AL     STG 4  Patient will gain an understanding of Lymphedema and risk factors  -AL     STG 4 Progress  Ongoing  -AL     STG 4 Progress Comments  Continue to educate  -AL        Long Term Goals    LTG Date to Achieve  12/14/20  -AL     LTG 1  Patient will be able to reach up into an overead cabinet to retrieve a 5 lb dish safely  -AL     LTG 1 Progress  Met;Ongoing  -AL     LTG 1 Progress Comments  She is now able to get a 5 lb dish out of the cabinet  -AL     LTG 2  Patient will regain ROM to reach behind her back as if she was undoing a bra   -AL     LTG 2 Progress  Met;Ongoing  -AL     LTG 2 Progress Comments  She is able to reach behing her back with no problems  -AL     LTG 3  Patient will have a 75 - 90% reduction in tightness/pain across chest and shoulders  -AL     LTG 3 Progress  Ongoing  -AL     LTG 3 Progress Comments  30% reduction  -AL     LTG 4  Patient will be able to perform household chores without exacerbation of symptoms  -AL     LTG 4 Progress  Ongoing  -AL     LTG 4 Progress Comments  Improving and able to do more household chores  -AL     LTG 5  Patient will regain functional ROM of both shoulders to perform activities overhead, reaching out to retrieve food from a drive-thru, reaching behind her back to fasten/unfasten bras,clothing  -AL     LTG 5 Progress  Ongoing;Progressing;Partially Met  -AL     LTG 5 Progress Comments  Improving  -AL     LTG 6  Patient will be instructed in a long term exercise program to continue to make gains following discharge  -AL     LTG 6 Progress  Ongoing;Progressing  -AL     LTG 6 Progress Comments  She is working on her HEP  -AL        Time Calculation    PT Goal Re-Cert Due Date  12/17/20  -AL       User Key  (r) = Recorded By, (t) = Taken By, (c) = Cosigned By    Initials Name Provider Type    Jaycee Farris, PTA, CLT-EVANGELINA Physical Therapy Assistant          Therapy Education  Education Details: Add gio pereira to HEP  Given: HEP  Program:  New  How Provided: Verbal, Demonstration  Provided to: Patient  Level of Understanding: Verbalized, Demonstrated              Time Calculation:                    Jaycee Mohan, PTA, T-EVANGELINA  12/15/2020

## 2020-12-17 ENCOUNTER — TREATMENT (OUTPATIENT)
Dept: PHYSICAL THERAPY | Facility: CLINIC | Age: 55
End: 2020-12-17

## 2020-12-17 DIAGNOSIS — Z17.0 MALIGNANT NEOPLASM INVOLVING BOTH NIPPLE AND AREOLA OF LEFT BREAST IN FEMALE, ESTROGEN RECEPTOR POSITIVE (HCC): ICD-10-CM

## 2020-12-17 DIAGNOSIS — M25.612 DECREASED ROM OF LEFT SHOULDER: Primary | ICD-10-CM

## 2020-12-17 DIAGNOSIS — Z91.89 AT RISK FOR LYMPHEDEMA: ICD-10-CM

## 2020-12-17 DIAGNOSIS — C77.9 REGIONAL LYMPH NODE METASTASIS PRESENT (HCC): ICD-10-CM

## 2020-12-17 DIAGNOSIS — M25.611 DECREASED ROM OF RIGHT SHOULDER: ICD-10-CM

## 2020-12-17 DIAGNOSIS — C50.012 MALIGNANT NEOPLASM INVOLVING BOTH NIPPLE AND AREOLA OF LEFT BREAST IN FEMALE, ESTROGEN RECEPTOR POSITIVE (HCC): ICD-10-CM

## 2020-12-17 DIAGNOSIS — I89.0 LYMPHEDEMA: ICD-10-CM

## 2020-12-17 PROCEDURE — 97140 MANUAL THERAPY 1/> REGIONS: CPT | Performed by: PHYSICAL THERAPIST

## 2020-12-17 PROCEDURE — 97110 THERAPEUTIC EXERCISES: CPT | Performed by: PHYSICAL THERAPIST

## 2020-12-17 NOTE — PROGRESS NOTES
Outpatient Physical Therapy Ortho Progress Note       Patient Name: Ayla Echeverria  : 1965  MRN: [id number]  Today's Date: 2020      Visit Date: 2020    Visit Dx:    ICD-10-CM ICD-9-CM   1. Decreased ROM of left shoulder  M25.612 719.51   2. Decreased ROM of right shoulder  M25.611 719.51   3. Lymphedema  I89.0 457.1   4. At risk for lymphedema  Z91.89 V49.89   5. Malignant neoplasm involving both nipple and areola of left breast in female, estrogen receptor positive (CMS/HCC)  C50.012 174.0    Z17.0 V86.0   6. Regional lymph node metastasis present (CMS/HCC)  C77.9 196.9       Patient Active Problem List   Diagnosis   • Bilateral acute serous otitis media   • Malignant neoplasm involving both nipple and areola of left breast in female (CMS/HCC)   • Cellulitis of abdominal wall   • Impetigo   • Folliculitis   • Encounter for consultation   • Non-smoker   • S/P lumpectomy, left breast   • S/P lymph node biopsy   • Regional lymph node staging category N3 (CMS/HCC)   • On antineoplastic chemotherapy   • Regional lymph node metastasis present (CMS/HCC)   • History of radiation therapy   • Painful skin lesion        Past Medical History:   Diagnosis Date   • Breast cancer (CMS/HCC)         Past Surgical History:   Procedure Laterality Date   • AXILLARY LYMPH NODE BIOPSY/EXCISION Left    • BREAST BIOPSY Left 2016   • BREAST LUMPECTOMY WITH SENTINEL NODE BIOPSY Left 10/03/2016    residual invasive carcinoma, grade 3 (0.2cm); margins negative; extensive lymphvascular space invasion; 2 sentinel lymph nodes positive (2/3)                       PT Assessment/Plan     Row Name 20 1550          PT Assessment    Functional Limitations  Limitation in home management;Limitations in community activities;Limitations in functional capacity and performance;Performance in leisure activities;Performance in self-care ADL  -KR     Impairments  Endurance;Impaired postural alignment;Muscle  strength;Pain;Range of motion  -KR     Assessment Comments  Mrs. Echeverria has met 4/4 short term goals and 3/6 long term goals progressing toward all other goals in treatment plan.  Subjectively pain and tightness across chest wall have improved 35 - 40% since initial evaluation. She is performing more activities around her house like laundry and going up/down stairs more frequently.  Posterior capsule tightness is present in both shoulder L > R.  ROM increased with joint mobs and prolonged stretches today to WNL R shoulder and WNL left shoulder in IR/ER, 170 degrees in flexion and 155 degrees in abduction.  She demonstrates consistent progress.   -KR     Rehab Potential  Good  -KR     Patient/caregiver participated in establishment of treatment plan and goals  Yes  -KR     Patient would benefit from skilled therapy intervention  Yes  -KR        PT Plan    PT Frequency  2x/week  -KR     Predicted Duration of Therapy Intervention (PT)  6 visits  -KR     Planned CPT's?  PT RE-EVAL: 00316;PT THER PROC EA 15 MIN: 69340;PT THER ACT EA 15 MIN: 53674;PT MANUAL THERAPY EA 15 MIN: 36405  -KR     PT Plan Comments  Continue manual therapy, progression of exercise and home program to meet goals of treatment plan.   -KR       User Key  (r) = Recorded By, (t) = Taken By, (c) = Cosigned By    Initials Name Provider Type    Jo Boles, PT DPT Physical Therapist            OP Exercises     Row Name 12/17/20 6371 12/17/20 1500          Subjective Comments    Subjective Comments  Patient reports a 35 - 40% improvement in tightness and pain across chest and tenderness in the left triceps region. She is still undergoing Chemo, but the dosage has been decreased and routine which has improved tolerance.  She relates that she is now doing laundry and going up and down stairs more often.  Sleep is improving, because pain is reducing.    -KR  --        Subjective Pain    Able to rate subjective pain?  yes  -KR  --     Pre-Treatment Pain  Level  4  -KR  --     Post-Treatment Pain Level  3  -KR  --     Subjective Pain Comment  upper chest  -KR  --        Total Minutes    61355 - PT Therapeutic Exercise Minutes  12  -KR  --     31089 - PT Manual Therapy Minutes  --  30  -KR        Exercise 1    Exercise Name 1  Posterior capsule stretch sitting  -KR  --     Cueing 1  Verbal;Demo  -KR  --     Sets 1  1  -KR  --     Reps 1  3  -KR  --     Time 1  30 sec each  -KR  --        Exercise 2    Exercise Name 2  Abduction stretch in sitting stretching w/opposite hand at elbow  -KR  --     Cueing 2  Verbal;Demo  -KR  --     Sets 2  1  -KR  --     Reps 2  3  -KR  --     Time 2  30 sec  -KR  --        Exercise 3    Exercise Name 3  Bilateral  shoulder ER stretch supine   -KR  --     Cueing 3  Verbal;Demo  -KR  --     Sets 3  1  -KR  --     Reps 3  3  -KR  --     Time 3  30 seconds  -KR  --        Exercise 4    Exercise Name 4  objective measurements for progress note  -KR  --       User Key  (r) = Recorded By, (t) = Taken By, (c) = Cosigned By    Initials Name Provider Type    KR Jo Quinonez, PT DPT Physical Therapist                      Manual Rx (last 36 hours)      Manual Treatments     Row Name 12/17/20 1500             Total Minutes    66161 - PT Manual Therapy Minutes  30  -KR         Manual Rx 1    Manual Rx 1 Location  Bilateral shoulder joints/scapula  -KR      Manual Rx 1 Type  Joint mobs supine and sidelying; caudal cranial glides, distraction, med/lat glides, posterior capsule stretching in SL  -KR      Manual Rx 1 Grade  2  -KR         Manual Rx 2    Manual Rx 2 Location  Passive sustained stretch w/distraction carlos. shoulders all planes  -KR      Manual Rx 2 Type  moderate stretch  -KR         Manual Rx 3    Manual Rx 3 Location   left lateral border of scapula SL w/ L UE overhead   -KR      Manual Rx 3 Type  scar massage  -KR      Manual Rx 3 Grade  minimal - moderate  -KR         Manual Rx 4    Manual Rx 4 Location  Subscapularis at lateral border  of scapula  -KR      Manual Rx 4 Type  Trigger point release  -KR      Manual Rx 4 Grade  min - moderate   -KR        User Key  (r) = Recorded By, (t) = Taken By, (c) = Cosigned By    Initials Name Provider Type    Jo Boles, PT DPT Physical Therapist          PT OP Goals     Row Name 12/17/20 1550          PT Short Term Goals    STG Date to Achieve  11/14/20  -KR     STG 1  Patient will begin a home exercise program to accelerate the recovery process  -KR     STG 1 Progress  Met  -KR     STG 2  Patient will have an increase in bilateral shoulder flexion to 160 deg in supine  -KR     STG 2 Progress  Met  -KR     STG 3  Patient will have a 25% reduction in chest and shoulder pain  -KR     STG 3 Progress  Met  -KR     STG 4  Patient will gain an understanding of Lymphedema and risk factors  -KR     STG 4 Progress  Met  -KR        Long Term Goals    LTG Date to Achieve  12/14/20  -KR     LTG 1  Patient will be able to reach up into an overead cabinet to retrieve a 5 lb dish safely  -KR     LTG 1 Progress  Met  -KR     LTG 2  Patient will regain ROM to reach behind her back as if she was undoing a bra   -KR     LTG 2 Progress  Met  -KR     LTG 3  Patient will have a 75 - 90% reduction in tightness/pain across chest and shoulders  -KR     LTG 3 Progress  Progressing  -KR     LTG 4  Patient will be able to perform household chores without exacerbation of symptoms  -KR     LTG 4 Progress  Progressing  -KR     LTG 5  Patient will regain functional ROM of both shoulders to perform activities overhead, reaching out to retrieve food from a drive-thru, reaching behind her back to fasten/unfasten bras,clothing  -KR     LTG 5 Progress  Met  -KR     LTG 6  Patient will be instructed in a long term exercise program to continue to make gains following discharge  -KR     LTG 6 Progress  Ongoing;Progressing  -KR        Time Calculation    PT Goal Re-Cert Due Date  12/17/20  -KR       User Key  (r) = Recorded By, (t) = Taken By,  (c) = Cosigned By    Initials Name Provider Type    Jo Boles, PT DPT Physical Therapist          Therapy Education  Education Details: posterior capsule stretch, abduction stretch,carlos ER stretch supine   Given: HEP  Program: Reinforced, Progressed  How Provided: Verbal, Demonstration, Written  Provided to: Patient  Level of Understanding: Verbalized, Demonstrated              Time Calculation:                    Jo Quinonez, PT DPT  12/17/2020

## 2020-12-22 NOTE — PROGRESS NOTES
Outpatient Physical Therapy Ortho Treatment Note       Patient Name: Ayla Echeverria  : 1965  MRN: [id number]  Today's Date: 2020      Visit Date: 2020    Visit Dx:    ICD-10-CM ICD-9-CM   1. Decreased ROM of left shoulder  M25.612 719.51   2. Decreased ROM of right shoulder  M25.611 719.51   3. Lymphedema  I89.0 457.1       Patient Active Problem List   Diagnosis   • Bilateral acute serous otitis media   • Malignant neoplasm involving both nipple and areola of left breast in female (CMS/HCC)   • Cellulitis of abdominal wall   • Impetigo   • Folliculitis   • Encounter for consultation   • Non-smoker   • S/P lumpectomy, left breast   • S/P lymph node biopsy   • Regional lymph node staging category N3 (CMS/HCC)   • On antineoplastic chemotherapy   • Regional lymph node metastasis present (CMS/HCC)   • History of radiation therapy   • Painful skin lesion        Past Medical History:   Diagnosis Date   • Breast cancer (CMS/HCC)         Past Surgical History:   Procedure Laterality Date   • AXILLARY LYMPH NODE BIOPSY/EXCISION Left    • BREAST BIOPSY Left 2016   • BREAST LUMPECTOMY WITH SENTINEL NODE BIOPSY Left 10/03/2016    residual invasive carcinoma, grade 3 (0.2cm); margins negative; extensive lymphvascular space invasion; 2 sentinel lymph nodes positive (2/3)               Lymphedema     Row Name 20 0992             Subjective Pain    Able to rate subjective pain?  yes  -AL      Pre-Treatment Pain Level  2  -AL      Post-Treatment Pain Level  2  -AL         Subjective Comments    Subjective Comments  Her L shoulder was hurting and the L arm with stretching.  She feels a string that is pulling down the left arm and into the forearm.  -AL         Manual Lymphatic Drainage    Manual Lymphatic Drainage  initial sequence  -AL      Initial Sequence  short neck;abdomen;diaphragmatic breathing  -AL      Abdomen  superficial  -AL      Diaphragmatic Breathing  x 10  -AL      Opened Regional  Lymph Nodes  inguinal  -AL      Inguinal  right;left  -AL      Opened Anastamoses  axillo-inguinal  -AL      Axillo-Inguinal  right;left  -AL      Manual Lymphatic Drainage Comments  MLD to upper chestand both breasts.   -AL      Manual Therapy  Went over Self MLD.  Gentle nerve glides with L arm in extension and gentle wrist extenstion.  Stretched both shoulders into flexion, also posterior and inferior mobs both shoulders.   -AL        User Key  (r) = Recorded By, (t) = Taken By, (c) = Cosigned By    Initials Name Provider Type    Jaycee Farris PTA, IDALIA Physical Therapy Assistant              PT Assessment/Plan     Row Name 12/22/20 2960          PT Assessment    Assessment Comments  Pain was still present after treatment in the L Upper arm, but overall intensity was less.  She does have tightness in both breasts, more so on the L.  This does soften with the MLD.  Patient will work on the MLD for the upper chest and breasts at home.  She will continue to stretch the shoulders and work on nerve glides for the L UE.   -AL        PT Plan    PT Plan Comments  Continue wtih manual work, progress HEP.   -AL       User Key  (r) = Recorded By, (t) = Taken By, (c) = Cosigned By    Initials Name Provider Type    Jaycee Farris PTA, CLT-LANA Physical Therapy Assistant            OP Exercises     Row Name 12/22/20 8744             Subjective Comments    Subjective Comments  Her L shoulder was hurting and the L arm with stretching.  She feels a string that is pulling down the left arm and into the forearm.  -AL         Subjective Pain    Able to rate subjective pain?  yes  -AL      Pre-Treatment Pain Level  2  -AL      Post-Treatment Pain Level  2  -AL      Subjective Pain Comment  Upper chest with pain meds.  -AL         Total Minutes    54465 - PT Manual Therapy Minutes  45  -AL        User Key  (r) = Recorded By, (t) = Taken By, (c) = Cosigned By    Initials Name Provider Type    Jaycee Farris PTA,  IDALIA Physical Therapy Assistant                      Manual Rx (last 36 hours)      Manual Treatments     Row Name 12/22/20 1550             Total Minutes    20374 - PT Manual Therapy Minutes  45  -AL        User Key  (r) = Recorded By, (t) = Taken By, (c) = Cosigned By    Initials Name Provider Type    Jaycee Farris, PTA, IDALIA Physical Therapy Assistant          PT OP Goals     Row Name 12/22/20 1550          PT Short Term Goals    STG Date to Achieve  11/14/20  -AL     STG 1  Patient will begin a home exercise program to accelerate the recovery process  -AL     STG 1 Progress  Met  -AL     STG 2  Patient will have an increase in bilateral shoulder flexion to 160 deg in supine  -AL     STG 2 Progress  Met  -AL     STG 3  Patient will have a 25% reduction in chest and shoulder pain  -AL     STG 3 Progress  Met  -AL     STG 4  Patient will gain an understanding of Lymphedema and risk factors  -AL     STG 4 Progress  Met  -AL        Long Term Goals    LTG Date to Achieve  12/14/20  -AL     LTG 1  Patient will be able to reach up into an overead cabinet to retrieve a 5 lb dish safely  -AL     LTG 1 Progress  Met  -AL     LTG 2  Patient will regain ROM to reach behind her back as if she was undoing a bra   -AL     LTG 2 Progress  Met  -AL     LTG 3  Patient will have a 75 - 90% reduction in tightness/pain across chest and shoulders  -AL     LTG 3 Progress  Progressing  -AL     LTG 4  Patient will be able to perform household chores without exacerbation of symptoms  -AL     LTG 4 Progress  Progressing  -AL     LTG 4 Progress Comments  Improving  -AL     LTG 5  Patient will regain functional ROM of both shoulders to perform activities overhead, reaching out to retrieve food from a drive-thru, reaching behind her back to fasten/unfasten bras,clothing  -AL     LTG 5 Progress  Met  -AL     LTG 6  Patient will be instructed in a long term exercise program to continue to make gains following discharge  -AL     LTG  6 Progress  Ongoing;Progressing  -AL     LTG 6 Progress Comments  She is working on the HEP  -AL        Time Calculation    PT Goal Re-Cert Due Date  01/16/21  -AL       User Key  (r) = Recorded By, (t) = Taken By, (c) = Cosigned By    Initials Name Provider Type    Jaycee Farris PTA, CLT-LANA Physical Therapy Assistant          Therapy Education  Education Details: Work on MLD and stretching.  Work on nerve glides for the L UE.   Given: HEP, Edema management  Program: Reinforced, Progressed  How Provided: Verbal, Demonstration, Written  Provided to: Patient  Level of Understanding: Verbalized, Demonstrated              Time Calculation:                    Jaycee Mohan PTA, CLT-LANA  12/22/2020

## 2020-12-29 NOTE — PROGRESS NOTES
Outpatient Physical Therapy Ortho Treatment Note       Patient Name: Ayla Echeverria  : 1965  MRN: [id number]  Today's Date: 2020      Visit Date: 2020    Visit Dx:    ICD-10-CM ICD-9-CM   1. Decreased ROM of left shoulder  M25.612 719.51   2. Decreased ROM of right shoulder  M25.611 719.51   3. Lymphedema  I89.0 457.1       Patient Active Problem List   Diagnosis   • Bilateral acute serous otitis media   • Malignant neoplasm involving both nipple and areola of left breast in female (CMS/HCC)   • Cellulitis of abdominal wall   • Impetigo   • Folliculitis   • Encounter for consultation   • Non-smoker   • S/P lumpectomy, left breast   • S/P lymph node biopsy   • Regional lymph node staging category N3 (CMS/HCC)   • On antineoplastic chemotherapy   • Regional lymph node metastasis present (CMS/HCC)   • History of radiation therapy   • Painful skin lesion        Past Medical History:   Diagnosis Date   • Breast cancer (CMS/HCC)         Past Surgical History:   Procedure Laterality Date   • AXILLARY LYMPH NODE BIOPSY/EXCISION Left    • BREAST BIOPSY Left 2016   • BREAST LUMPECTOMY WITH SENTINEL NODE BIOPSY Left 10/03/2016    residual invasive carcinoma, grade 3 (0.2cm); margins negative; extensive lymphvascular space invasion; 2 sentinel lymph nodes positive (2/3)               Lymphedema     Row Name 20 6147             Subjective Pain    Able to rate subjective pain?  yes  -AL      Post-Treatment Pain Level  5  -AL         Subjective Comments    Subjective Comments  She has been working on her stretches.  She is tired from the chemo pill.  She can tell she didn't do her exercises enough  night.   -AL         Manual Lymphatic Drainage    Manual Lymphatic Drainage  initial sequence  -AL      Initial Sequence  short neck;abdomen;diaphragmatic breathing  -AL      Abdomen  superficial  -AL      Diaphragmatic Breathing  x 10  -AL      Opened Regional Lymph Nodes  inguinal  -AL       Inguinal  right;left  -AL      Opened Anastamoses  axillo-inguinal  -AL      Axillo-Inguinal  right;left  -AL      Manual Lymphatic Drainage Comments  Worked maily on B upper chest  -AL      Manual Therapy  Work on MLD  -AL        User Key  (r) = Recorded By, (t) = Taken By, (c) = Cosigned By    Initials Name Provider Type    Jaycee Farris PTA, IDALIA Physical Therapy Assistant              PT Assessment/Plan     Row Name 12/29/20 1550          PT Assessment    Assessment Comments  Patient did not have to take pain meds before her treatment today and her pain level was not high.  She is workig on stretching more, but knows she needs to get into a routine.  Will decrease treatment to 1 x a week, will go back to 2 x a week if needed. She did have increase pain after treatment today, but she did not take her pain meds before treatment.   -AL        PT Plan    PT Plan Comments  Work on MLD as well as capular strengthening and working on increasing ROM of both shoulders.  -AL       User Key  (r) = Recorded By, (t) = Taken By, (c) = Cosigned By    Initials Name Provider Type    Jaycee Farris PTA, IDALIA Physical Therapy Assistant            OP Exercises     Row Name 12/29/20 7750 12/29/20 1500          Subjective Comments    Subjective Comments  She has been working on her stretches.  She is tired from the chemo pill.  She can tell she didn't do her exercises enough Sunday night.   -AL  --        Subjective Pain    Able to rate subjective pain?  yes  -AL  --     Pre-Treatment Pain Level  2  -AL  --     Post-Treatment Pain Level  5  -AL  --     Subjective Pain Comment  No pain meds since this am.   -AL  --        Total Minutes    28106 - PT Therapeutic Exercise Minutes  25  -AL  --     16034 - PT Manual Therapy Minutes  20  -AL  --  -AL        Exercise 1    Exercise Name 1  Wall push ups  -AL  --     Cueing 1  Verbal  -AL  --     Sets 1  2  -AL  --     Reps 1  10  -AL  --        Exercise 2    Exercise Name 2   Discussed getting into a routine to work on HEP and MLD at home.  -AL  --        Exercise 3    Exercise Name 3  wall angels  -AL  --     Cueing 3  Verbal;Demo  -AL  --     Sets 3  2  -AL  --     Reps 3  10  -AL  --        Exercise 4    Exercise Name 4  Scapular retracion  -AL  --     Cueing 4  Verbal  -AL  --     Sets 4  2  -AL  --     Reps 4  10  -AL  --        Exercise 5    Exercise Name 5  UE phasic  -AL  --     Cueing 5  Verbal;Demo  -AL  --     Sets 5  2  -AL  --     Reps 5  10  -AL  --       User Key  (r) = Recorded By, (t) = Taken By, (c) = Cosigned By    Initials Name Provider Type    Jaycee Farris PTA, IDALIA Physical Therapy Assistant                      Manual Rx (last 36 hours)      Manual Treatments     Row Name 12/29/20 1550 12/29/20 1500          Total Minutes    74705 - PT Manual Therapy Minutes  20  -AL  --  -AL       User Key  (r) = Recorded By, (t) = Taken By, (c) = Cosigned By    Initials Name Provider Type    Jaycee Farris PTA, IDALIA Physical Therapy Assistant          PT OP Goals     Row Name 12/29/20 1550          PT Short Term Goals    STG Date to Achieve  11/14/20  -AL     STG 1  Patient will begin a home exercise program to accelerate the recovery process  -AL     STG 1 Progress  Met  -AL     STG 2  Patient will have an increase in bilateral shoulder flexion to 160 deg in supine  -AL     STG 2 Progress  Met  -AL     STG 3  Patient will have a 25% reduction in chest and shoulder pain  -AL     STG 3 Progress  Met  -AL     STG 4  Patient will gain an understanding of Lymphedema and risk factors  -AL     STG 4 Progress  Met  -AL        Long Term Goals    LTG Date to Achieve  12/14/20  -AL     LTG 1  Patient will be able to reach up into an overead cabinet to retrieve a 5 lb dish safely  -AL     LTG 1 Progress  Met  -AL     LTG 2  Patient will regain ROM to reach behind her back as if she was undoing a bra   -AL     LTG 2 Progress  Met  -AL     LTG 3  Patient will have a 75 - 90%  reduction in tightness/pain across chest and shoulders  -AL     LTG 3 Progress  Progressing  -AL     LTG 4  Patient will be able to perform household chores without exacerbation of symptoms  -AL     LTG 4 Progress  Progressing  -AL     LTG 4 Progress Comments  Continues to improve  -AL     LTG 5  Patient will regain functional ROM of both shoulders to perform activities overhead, reaching out to retrieve food from a drive-thru, reaching behind her back to fasten/unfasten bras,clothing  -AL     LTG 5 Progress  Met  -AL     LTG 6  Patient will be instructed in a long term exercise program to continue to make gains following discharge  -AL     LTG 6 Progress  Ongoing;Progressing  -AL     LTG 6 Progress Comments  Working towards home maintenance program  -AL        Time Calculation    PT Goal Re-Cert Due Date  01/16/21  -AL       User Key  (r) = Recorded By, (t) = Taken By, (c) = Cosigned By    Initials Name Provider Type    Jaycee Farris PTA, IDALIA Physical Therapy Assistant          Therapy Education  Education Details: Get into routine for HEP and MLD  Given: HEP, Edema management  Program: Reinforced, Progressed  How Provided: Verbal, Demonstration, Written  Provided to: Patient  Level of Understanding: Verbalized, Demonstrated              Time Calculation:                    Jaycee Mohan PTA, CLT-LANA  12/29/2020

## 2020-12-31 ENCOUNTER — HOSPITAL ENCOUNTER (OUTPATIENT)
Dept: INFUSION THERAPY | Age: 55
Discharge: HOME | End: 2020-12-31
Payer: COMMERCIAL

## 2020-12-31 VITALS
BODY MASS INDEX: 23.21 KG/M2 | WEIGHT: 131 LBS | DIASTOLIC BLOOD PRESSURE: 64 MMHG | OXYGEN SATURATION: 98 % | TEMPERATURE: 97.7 F | HEART RATE: 74 BPM | SYSTOLIC BLOOD PRESSURE: 108 MMHG

## 2020-12-31 DIAGNOSIS — C50.919 HER2-POSITIVE CARCINOMA OF BREAST (HCC): ICD-10-CM

## 2020-12-31 DIAGNOSIS — C50.412 MALIGNANT NEOPLASM OF UPPER-OUTER QUADRANT OF LEFT FEMALE BREAST, UNSPECIFIED ESTROGEN RECEPTOR STATUS (HCC): Primary | ICD-10-CM

## 2020-12-31 LAB
ALBUMIN SERPL-MCNC: 4.4 G/DL (ref 3.5–5.2)
ALP BLD-CCNC: 90 U/L (ref 35–104)
ALT SERPL-CCNC: 40 U/L (ref 9–52)
ANION GAP SERPL CALCULATED.3IONS-SCNC: 12 MMOL/L (ref 7–19)
AST SERPL-CCNC: 48 U/L (ref 14–36)
BASOPHILS ABSOLUTE: 0.02 K/UL (ref 0.01–0.08)
BASOPHILS RELATIVE PERCENT: 0.8 % (ref 0.1–1.2)
BILIRUB SERPL-MCNC: 0.4 MG/DL (ref 0.2–1.3)
BUN BLDV-MCNC: 15 MG/DL (ref 7–17)
CALCIUM SERPL-MCNC: 9.5 MG/DL (ref 8.4–10.2)
CHLORIDE BLD-SCNC: 103 MMOL/L (ref 98–111)
CO2: 27 MMOL/L (ref 22–29)
CREAT SERPL-MCNC: 0.9 MG/DL (ref 0.5–1)
EOSINOPHILS ABSOLUTE: 0.06 K/UL (ref 0.04–0.54)
EOSINOPHILS RELATIVE PERCENT: 2.5 % (ref 0.7–7)
GFR NON-AFRICAN AMERICAN: >60
GLOBULIN: 3.6 G/DL
GLUCOSE BLD-MCNC: 98 MG/DL (ref 74–106)
HCT VFR BLD CALC: 29.5 % (ref 34.1–44.9)
HEMOGLOBIN: 9.9 G/DL (ref 11.2–15.7)
LYMPHOCYTES ABSOLUTE: 0.51 K/UL (ref 1.18–3.74)
LYMPHOCYTES RELATIVE PERCENT: 21 % (ref 19.3–53.1)
MCH RBC QN AUTO: 30.9 PG (ref 25.6–32.2)
MCHC RBC AUTO-ENTMCNC: 33.6 G/DL (ref 32.3–35.5)
MCV RBC AUTO: 92.2 FL (ref 79.4–94.8)
MONOCYTES ABSOLUTE: 0.4 K/UL (ref 0.24–0.82)
MONOCYTES RELATIVE PERCENT: 16.5 % (ref 4.7–12.5)
NEUTROPHILS ABSOLUTE: 1.44 K/UL (ref 1.56–6.13)
NEUTROPHILS RELATIVE PERCENT: 59.2 % (ref 34–71.1)
PDW BLD-RTO: 17 % (ref 11.7–14.4)
PLATELET # BLD: 227 K/UL (ref 182–369)
PMV BLD AUTO: 9.3 FL (ref 7.4–10.4)
POTASSIUM SERPL-SCNC: 3.8 MMOL/L (ref 3.5–5.1)
RBC # BLD: 3.2 M/UL (ref 3.93–5.22)
SODIUM BLD-SCNC: 142 MMOL/L (ref 137–145)
TOTAL PROTEIN: 8 G/DL (ref 6.3–8.2)
WBC # BLD: 2.43 K/UL (ref 3.98–10.04)

## 2020-12-31 PROCEDURE — 6370000000 HC RX 637 (ALT 250 FOR IP): Performed by: NURSE PRACTITIONER

## 2020-12-31 PROCEDURE — 6360000002 HC RX W HCPCS: Performed by: NURSE PRACTITIONER

## 2020-12-31 PROCEDURE — 85025 COMPLETE CBC W/AUTO DIFF WBC: CPT

## 2020-12-31 PROCEDURE — 36415 COLL VENOUS BLD VENIPUNCTURE: CPT

## 2020-12-31 PROCEDURE — 80053 COMPREHEN METABOLIC PANEL: CPT

## 2020-12-31 PROCEDURE — 2580000003 HC RX 258: Performed by: NURSE PRACTITIONER

## 2020-12-31 PROCEDURE — 96413 CHEMO IV INFUSION 1 HR: CPT

## 2020-12-31 PROCEDURE — 96375 TX/PRO/DX INJ NEW DRUG ADDON: CPT

## 2020-12-31 RX ORDER — DIPHENHYDRAMINE HYDROCHLORIDE 50 MG/ML
25 INJECTION INTRAMUSCULAR; INTRAVENOUS ONCE
Status: CANCELLED
Start: 2020-12-31 | End: 2020-12-31

## 2020-12-31 RX ORDER — DIPHENHYDRAMINE HYDROCHLORIDE 50 MG/ML
25 INJECTION INTRAMUSCULAR; INTRAVENOUS ONCE
Status: COMPLETED | OUTPATIENT
Start: 2020-12-31 | End: 2020-12-31

## 2020-12-31 RX ORDER — METHYLPREDNISOLONE SODIUM SUCCINATE 125 MG/2ML
125 INJECTION, POWDER, LYOPHILIZED, FOR SOLUTION INTRAMUSCULAR; INTRAVENOUS ONCE
Status: CANCELLED | OUTPATIENT
Start: 2020-12-31 | End: 2020-12-31

## 2020-12-31 RX ORDER — DIPHENHYDRAMINE HYDROCHLORIDE 50 MG/ML
50 INJECTION INTRAMUSCULAR; INTRAVENOUS ONCE
Status: CANCELLED | OUTPATIENT
Start: 2020-12-31 | End: 2020-12-31

## 2020-12-31 RX ORDER — SODIUM CHLORIDE 9 MG/ML
20 INJECTION, SOLUTION INTRAVENOUS ONCE
Status: CANCELLED | OUTPATIENT
Start: 2020-12-31 | End: 2020-12-31

## 2020-12-31 RX ORDER — EPINEPHRINE 1 MG/ML
0.3 INJECTION, SOLUTION, CONCENTRATE INTRAVENOUS PRN
Status: CANCELLED | OUTPATIENT
Start: 2020-12-31

## 2020-12-31 RX ORDER — MEPERIDINE HYDROCHLORIDE 50 MG/ML
12.5 INJECTION INTRAMUSCULAR; INTRAVENOUS; SUBCUTANEOUS ONCE
Status: CANCELLED | OUTPATIENT
Start: 2020-12-31 | End: 2020-12-31

## 2020-12-31 RX ORDER — SODIUM CHLORIDE 0.9 % (FLUSH) 0.9 %
10 SYRINGE (ML) INJECTION PRN
Status: CANCELLED | OUTPATIENT
Start: 2020-12-31

## 2020-12-31 RX ORDER — SODIUM CHLORIDE 0.9 % (FLUSH) 0.9 %
5 SYRINGE (ML) INJECTION PRN
Status: CANCELLED | OUTPATIENT
Start: 2020-12-31

## 2020-12-31 RX ORDER — SODIUM CHLORIDE 9 MG/ML
INJECTION, SOLUTION INTRAVENOUS CONTINUOUS
Status: CANCELLED | OUTPATIENT
Start: 2020-12-31

## 2020-12-31 RX ORDER — HEPARIN SODIUM (PORCINE) LOCK FLUSH IV SOLN 100 UNIT/ML 100 UNIT/ML
500 SOLUTION INTRAVENOUS PRN
Status: CANCELLED | OUTPATIENT
Start: 2020-12-31

## 2020-12-31 RX ORDER — ACETAMINOPHEN 325 MG/1
1000 TABLET ORAL ONCE
Status: CANCELLED
Start: 2020-12-31 | End: 2020-12-31

## 2020-12-31 RX ORDER — SODIUM CHLORIDE 0.9 % (FLUSH) 0.9 %
10 SYRINGE (ML) INJECTION PRN
Status: DISCONTINUED | OUTPATIENT
Start: 2020-12-31 | End: 2021-01-01 | Stop reason: HOSPADM

## 2020-12-31 RX ORDER — HEPARIN SODIUM (PORCINE) LOCK FLUSH IV SOLN 100 UNIT/ML 100 UNIT/ML
500 SOLUTION INTRAVENOUS PRN
Status: DISCONTINUED | OUTPATIENT
Start: 2020-12-31 | End: 2021-01-01 | Stop reason: HOSPADM

## 2020-12-31 RX ORDER — ACETAMINOPHEN 500 MG
1000 TABLET ORAL ONCE
Status: COMPLETED | OUTPATIENT
Start: 2020-12-31 | End: 2020-12-31

## 2020-12-31 RX ADMIN — TRASTUZUMAB 359 MG: 150 INJECTION, POWDER, LYOPHILIZED, FOR SOLUTION INTRAVENOUS at 14:57

## 2020-12-31 RX ADMIN — ACETAMINOPHEN 1000 MG: 500 TABLET ORAL at 14:46

## 2020-12-31 RX ADMIN — HEPARIN 500 UNITS: 100 SYRINGE at 15:29

## 2020-12-31 RX ADMIN — SODIUM CHLORIDE, PRESERVATIVE FREE 10 ML: 5 INJECTION INTRAVENOUS at 15:29

## 2020-12-31 RX ADMIN — DIPHENHYDRAMINE HYDROCHLORIDE 25 MG: 50 INJECTION, SOLUTION INTRAMUSCULAR; INTRAVENOUS at 14:46

## 2020-12-31 ASSESSMENT — PAIN SCALES - GENERAL: PAINLEVEL_OUTOF10: 0

## 2021-01-01 ENCOUNTER — TREATMENT (OUTPATIENT)
Dept: PHYSICAL THERAPY | Facility: CLINIC | Age: 56
End: 2021-01-01

## 2021-01-01 ENCOUNTER — APPOINTMENT (OUTPATIENT)
Dept: RADIATION ONCOLOGY | Facility: HOSPITAL | Age: 56
End: 2021-01-01

## 2021-01-01 ENCOUNTER — HOSPITAL ENCOUNTER (INPATIENT)
Facility: HOSPITAL | Age: 56
LOS: 6 days | Discharge: HOSPICE | End: 2021-09-11
Attending: EMERGENCY MEDICINE | Admitting: FAMILY MEDICINE

## 2021-01-01 ENCOUNTER — CONSULT (OUTPATIENT)
Dept: RADIATION ONCOLOGY | Facility: HOSPITAL | Age: 56
End: 2021-01-01

## 2021-01-01 ENCOUNTER — TELEPHONE (OUTPATIENT)
Dept: RADIATION ONCOLOGY | Facility: HOSPITAL | Age: 56
End: 2021-01-01

## 2021-01-01 ENCOUNTER — DOCUMENTATION (OUTPATIENT)
Dept: RADIATION ONCOLOGY | Facility: HOSPITAL | Age: 56
End: 2021-01-01

## 2021-01-01 ENCOUNTER — HOSPITAL ENCOUNTER (OUTPATIENT)
Dept: RADIATION ONCOLOGY | Facility: HOSPITAL | Age: 56
Setting detail: RADIATION/ONCOLOGY SERIES
End: 2021-01-01

## 2021-01-01 ENCOUNTER — HOSPITAL ENCOUNTER (EMERGENCY)
Facility: HOSPITAL | Age: 56
Discharge: LEFT BEFORE DISCHARGE | End: 2021-09-11

## 2021-01-01 ENCOUNTER — HOSPITAL ENCOUNTER (OUTPATIENT)
Dept: RADIATION ONCOLOGY | Facility: HOSPITAL | Age: 56
Setting detail: RADIATION/ONCOLOGY SERIES
Discharge: HOME | End: 2021-08-20

## 2021-01-01 ENCOUNTER — HOSPITAL ENCOUNTER (OUTPATIENT)
Facility: HOSPITAL | Age: 56
Setting detail: OBSERVATION
Discharge: HOME | End: 2021-09-17
Attending: FAMILY MEDICINE | Admitting: FAMILY MEDICINE

## 2021-01-01 ENCOUNTER — HOSPITAL ENCOUNTER (OUTPATIENT)
Dept: RADIATION ONCOLOGY | Facility: HOSPITAL | Age: 56
Setting detail: RADIATION/ONCOLOGY SERIES
Discharge: HOME | End: 2021-08-16

## 2021-01-01 ENCOUNTER — HOSPITAL ENCOUNTER (OUTPATIENT)
Facility: HOSPITAL | Age: 56
Setting detail: OBSERVATION
Discharge: HOME | End: 2021-09-21
Attending: STUDENT IN AN ORGANIZED HEALTH CARE EDUCATION/TRAINING PROGRAM | Admitting: FAMILY MEDICINE

## 2021-01-01 ENCOUNTER — HOSPITAL ENCOUNTER (OUTPATIENT)
Dept: RADIATION ONCOLOGY | Facility: HOSPITAL | Age: 56
Setting detail: RADIATION/ONCOLOGY SERIES
Discharge: HOME | End: 2021-08-10

## 2021-01-01 ENCOUNTER — TELEPHONE (OUTPATIENT)
Dept: FAMILY MEDICINE CLINIC | Facility: CLINIC | Age: 56
End: 2021-01-01

## 2021-01-01 ENCOUNTER — APPOINTMENT (OUTPATIENT)
Dept: ULTRASOUND IMAGING | Facility: HOSPITAL | Age: 56
End: 2021-01-01

## 2021-01-01 ENCOUNTER — READMISSION MANAGEMENT (OUTPATIENT)
Dept: CALL CENTER | Facility: HOSPITAL | Age: 56
End: 2021-01-01

## 2021-01-01 ENCOUNTER — HOSPITAL ENCOUNTER (OUTPATIENT)
Dept: RADIATION ONCOLOGY | Facility: HOSPITAL | Age: 56
Setting detail: RADIATION/ONCOLOGY SERIES
Discharge: HOME | End: 2021-08-18

## 2021-01-01 ENCOUNTER — TELEPHONE (OUTPATIENT)
Dept: PHYSICAL THERAPY | Facility: CLINIC | Age: 56
End: 2021-01-01

## 2021-01-01 ENCOUNTER — HOSPITAL ENCOUNTER (OUTPATIENT)
Dept: RADIATION ONCOLOGY | Facility: HOSPITAL | Age: 56
Setting detail: RADIATION/ONCOLOGY SERIES
Discharge: HOME | End: 2021-08-09

## 2021-01-01 ENCOUNTER — NURSE TRIAGE (OUTPATIENT)
Dept: CALL CENTER | Facility: HOSPITAL | Age: 56
End: 2021-01-01

## 2021-01-01 ENCOUNTER — TRANSITIONAL CARE MANAGEMENT TELEPHONE ENCOUNTER (OUTPATIENT)
Dept: CALL CENTER | Facility: HOSPITAL | Age: 56
End: 2021-01-01

## 2021-01-01 ENCOUNTER — APPOINTMENT (OUTPATIENT)
Dept: GENERAL RADIOLOGY | Facility: HOSPITAL | Age: 56
End: 2021-01-01

## 2021-01-01 VITALS
WEIGHT: 137.8 LBS | HEART RATE: 75 BPM | HEIGHT: 63 IN | DIASTOLIC BLOOD PRESSURE: 58 MMHG | SYSTOLIC BLOOD PRESSURE: 110 MMHG | BODY MASS INDEX: 24.41 KG/M2 | RESPIRATION RATE: 20 BRPM

## 2021-01-01 VITALS
TEMPERATURE: 98.5 F | OXYGEN SATURATION: 96 % | HEART RATE: 66 BPM | BODY MASS INDEX: 27.64 KG/M2 | SYSTOLIC BLOOD PRESSURE: 149 MMHG | DIASTOLIC BLOOD PRESSURE: 76 MMHG | WEIGHT: 156 LBS | RESPIRATION RATE: 16 BRPM | HEIGHT: 63 IN

## 2021-01-01 VITALS
OXYGEN SATURATION: 96 % | HEART RATE: 69 BPM | RESPIRATION RATE: 16 BRPM | SYSTOLIC BLOOD PRESSURE: 122 MMHG | BODY MASS INDEX: 30.23 KG/M2 | WEIGHT: 170.6 LBS | HEIGHT: 63 IN | DIASTOLIC BLOOD PRESSURE: 69 MMHG | TEMPERATURE: 97.6 F

## 2021-01-01 VITALS
OXYGEN SATURATION: 95 % | SYSTOLIC BLOOD PRESSURE: 147 MMHG | HEART RATE: 77 BPM | TEMPERATURE: 97.6 F | BODY MASS INDEX: 27.71 KG/M2 | RESPIRATION RATE: 18 BRPM | HEIGHT: 63 IN | WEIGHT: 156.4 LBS | DIASTOLIC BLOOD PRESSURE: 76 MMHG

## 2021-01-01 VITALS
HEIGHT: 63 IN | DIASTOLIC BLOOD PRESSURE: 62 MMHG | RESPIRATION RATE: 16 BRPM | OXYGEN SATURATION: 95 % | SYSTOLIC BLOOD PRESSURE: 125 MMHG | WEIGHT: 167.9 LBS | TEMPERATURE: 98.3 F | HEART RATE: 89 BPM | BODY MASS INDEX: 29.75 KG/M2

## 2021-01-01 DIAGNOSIS — I89.0 LYMPHEDEMA: ICD-10-CM

## 2021-01-01 DIAGNOSIS — M25.611 DECREASED ROM OF RIGHT SHOULDER: ICD-10-CM

## 2021-01-01 DIAGNOSIS — R52 PAIN AND TENDERNESS: Primary | ICD-10-CM

## 2021-01-01 DIAGNOSIS — Z91.89 AT RISK FOR LYMPHEDEMA: ICD-10-CM

## 2021-01-01 DIAGNOSIS — C50.012 MALIGNANT NEOPLASM INVOLVING BOTH NIPPLE AND AREOLA OF LEFT BREAST IN FEMALE, ESTROGEN RECEPTOR POSITIVE (HCC): ICD-10-CM

## 2021-01-01 DIAGNOSIS — R52 INTRACTABLE PAIN: ICD-10-CM

## 2021-01-01 DIAGNOSIS — C77.9 REGIONAL LYMPH NODE METASTASIS PRESENT (HCC): ICD-10-CM

## 2021-01-01 DIAGNOSIS — M79.89 ARM SWELLING: Primary | ICD-10-CM

## 2021-01-01 DIAGNOSIS — M25.612 DECREASED ROM OF LEFT SHOULDER: Primary | ICD-10-CM

## 2021-01-01 DIAGNOSIS — Z17.0 MALIGNANT NEOPLASM INVOLVING BOTH NIPPLE AND AREOLA OF LEFT BREAST IN FEMALE, ESTROGEN RECEPTOR POSITIVE (HCC): ICD-10-CM

## 2021-01-01 DIAGNOSIS — Z17.0 MALIGNANT NEOPLASM INVOLVING BOTH NIPPLE AND AREOLA OF LEFT BREAST IN FEMALE, ESTROGEN RECEPTOR POSITIVE (HCC): Primary | ICD-10-CM

## 2021-01-01 DIAGNOSIS — Z91.89 AT RISK FOR LYMPHEDEMA: Primary | ICD-10-CM

## 2021-01-01 DIAGNOSIS — L98.9 PAINFUL SKIN LESION: ICD-10-CM

## 2021-01-01 DIAGNOSIS — M25.612 DECREASED ROM OF LEFT SHOULDER: ICD-10-CM

## 2021-01-01 DIAGNOSIS — C50.012 MALIGNANT NEOPLASM INVOLVING BOTH NIPPLE AND AREOLA OF LEFT BREAST IN FEMALE, ESTROGEN RECEPTOR POSITIVE (HCC): Primary | ICD-10-CM

## 2021-01-01 DIAGNOSIS — C77.9 REGIONAL LYMPH NODE METASTASIS PRESENT (HCC): Primary | ICD-10-CM

## 2021-01-01 DIAGNOSIS — R07.89 CHEST WALL PAIN: ICD-10-CM

## 2021-01-01 DIAGNOSIS — G89.3 CANCER ASSOCIATED PAIN: ICD-10-CM

## 2021-01-01 DIAGNOSIS — I89.0 LYMPHEDEMA: Primary | ICD-10-CM

## 2021-01-01 DIAGNOSIS — Z78.9 NON-SMOKER: ICD-10-CM

## 2021-01-01 DIAGNOSIS — R07.89 CHEST WALL PAIN: Primary | ICD-10-CM

## 2021-01-01 DIAGNOSIS — Z92.3 HISTORY OF RADIATION THERAPY: ICD-10-CM

## 2021-01-01 DIAGNOSIS — C77.9 REGIONAL LYMPH NODE STAGING CATEGORY N3 (HCC): ICD-10-CM

## 2021-01-01 DIAGNOSIS — Z79.899 ON ANTINEOPLASTIC CHEMOTHERAPY: ICD-10-CM

## 2021-01-01 DIAGNOSIS — Z74.09 IMPAIRED MOBILITY: ICD-10-CM

## 2021-01-01 DIAGNOSIS — Z98.890 S/P LUMPECTOMY, LEFT BREAST: ICD-10-CM

## 2021-01-01 DIAGNOSIS — Z98.890 S/P LYMPH NODE BIOPSY: ICD-10-CM

## 2021-01-01 DIAGNOSIS — M25.611 DECREASED ROM OF RIGHT SHOULDER: Primary | ICD-10-CM

## 2021-01-01 DIAGNOSIS — Z78.9 DECREASED ACTIVITIES OF DAILY LIVING (ADL): ICD-10-CM

## 2021-01-01 LAB
ALBUMIN SERPL-MCNC: 3.2 G/DL (ref 3.5–5.2)
ALBUMIN/GLOB SERPL: 1 G/DL
ALBUMIN/GLOB SERPL: 1 G/DL
ALBUMIN/GLOB SERPL: 1.2 G/DL
ALP SERPL-CCNC: 46 U/L (ref 39–117)
ALP SERPL-CCNC: 49 U/L (ref 39–117)
ALP SERPL-CCNC: 56 U/L (ref 39–117)
ALT SERPL W P-5'-P-CCNC: 10 U/L (ref 1–33)
ALT SERPL W P-5'-P-CCNC: 5 U/L (ref 1–33)
ALT SERPL W P-5'-P-CCNC: <5 U/L (ref 1–33)
ANION GAP SERPL CALCULATED.3IONS-SCNC: 6 MMOL/L (ref 5–15)
ANION GAP SERPL CALCULATED.3IONS-SCNC: 7 MMOL/L (ref 5–15)
ANION GAP SERPL CALCULATED.3IONS-SCNC: 7 MMOL/L (ref 5–15)
ANION GAP SERPL CALCULATED.3IONS-SCNC: 8 MMOL/L (ref 5–15)
AST SERPL-CCNC: 21 U/L (ref 1–32)
AST SERPL-CCNC: 24 U/L (ref 1–32)
AST SERPL-CCNC: 24 U/L (ref 1–32)
BASOPHILS # BLD AUTO: 0.02 10*3/MM3 (ref 0–0.2)
BASOPHILS NFR BLD AUTO: 0.3 % (ref 0–1.5)
BASOPHILS NFR BLD AUTO: 0.7 % (ref 0–1.5)
BASOPHILS NFR BLD AUTO: 0.7 % (ref 0–1.5)
BILIRUB SERPL-MCNC: 0.3 MG/DL (ref 0–1.2)
BILIRUB SERPL-MCNC: 0.3 MG/DL (ref 0–1.2)
BILIRUB SERPL-MCNC: <0.2 MG/DL (ref 0–1.2)
BUN SERPL-MCNC: 15 MG/DL (ref 6–20)
BUN SERPL-MCNC: 4 MG/DL (ref 6–20)
BUN SERPL-MCNC: 4 MG/DL (ref 6–20)
BUN SERPL-MCNC: 8 MG/DL (ref 6–20)
BUN/CREAT SERPL: 10 (ref 7–25)
BUN/CREAT SERPL: 18.2 (ref 7–25)
BUN/CREAT SERPL: 28.8 (ref 7–25)
BUN/CREAT SERPL: 9.8 (ref 7–25)
CALCIUM SPEC-SCNC: 8.6 MG/DL (ref 8.6–10.5)
CALCIUM SPEC-SCNC: 8.7 MG/DL (ref 8.6–10.5)
CALCIUM SPEC-SCNC: 8.9 MG/DL (ref 8.6–10.5)
CALCIUM SPEC-SCNC: 8.9 MG/DL (ref 8.6–10.5)
CHLORIDE SERPL-SCNC: 102 MMOL/L (ref 98–107)
CHLORIDE SERPL-SCNC: 103 MMOL/L (ref 98–107)
CHLORIDE SERPL-SCNC: 104 MMOL/L (ref 98–107)
CHLORIDE SERPL-SCNC: 105 MMOL/L (ref 98–107)
CK SERPL-CCNC: 83 U/L (ref 20–180)
CO2 SERPL-SCNC: 28 MMOL/L (ref 22–29)
CO2 SERPL-SCNC: 31 MMOL/L (ref 22–29)
CREAT SERPL-MCNC: 0.4 MG/DL (ref 0.57–1)
CREAT SERPL-MCNC: 0.41 MG/DL (ref 0.57–1)
CREAT SERPL-MCNC: 0.44 MG/DL (ref 0.57–1)
CREAT SERPL-MCNC: 0.52 MG/DL (ref 0.57–1)
CRP SERPL-MCNC: 3.61 MG/DL (ref 0–0.5)
DEPRECATED RDW RBC AUTO: 53.4 FL (ref 37–54)
DEPRECATED RDW RBC AUTO: 54.3 FL (ref 37–54)
DEPRECATED RDW RBC AUTO: 54.4 FL (ref 37–54)
DEPRECATED RDW RBC AUTO: 56.1 FL (ref 37–54)
EOSINOPHIL # BLD AUTO: 0.03 10*3/MM3 (ref 0–0.4)
EOSINOPHIL # BLD AUTO: 0.05 10*3/MM3 (ref 0–0.4)
EOSINOPHIL # BLD AUTO: 0.06 10*3/MM3 (ref 0–0.4)
EOSINOPHIL NFR BLD AUTO: 1 % (ref 0.3–6.2)
EOSINOPHIL NFR BLD AUTO: 1.1 % (ref 0.3–6.2)
EOSINOPHIL NFR BLD AUTO: 1.7 % (ref 0.3–6.2)
ERYTHROCYTE [DISTWIDTH] IN BLOOD BY AUTOMATED COUNT: 16.1 % (ref 12.3–15.4)
ERYTHROCYTE [DISTWIDTH] IN BLOOD BY AUTOMATED COUNT: 16.2 % (ref 12.3–15.4)
ERYTHROCYTE [DISTWIDTH] IN BLOOD BY AUTOMATED COUNT: 16.4 % (ref 12.3–15.4)
ERYTHROCYTE [DISTWIDTH] IN BLOOD BY AUTOMATED COUNT: 16.7 % (ref 12.3–15.4)
ERYTHROCYTE [SEDIMENTATION RATE] IN BLOOD: 104 MM/HR (ref 0–20)
GFR SERPL CREATININE-BSD FRML MDRD: 148 ML/MIN/1.73
GFR SERPL CREATININE-BSD FRML MDRD: >150 ML/MIN/1.73
GLOBULIN UR ELPH-MCNC: 2.6 GM/DL
GLOBULIN UR ELPH-MCNC: 3.2 GM/DL
GLOBULIN UR ELPH-MCNC: 3.3 GM/DL
GLUCOSE SERPL-MCNC: 108 MG/DL (ref 65–99)
GLUCOSE SERPL-MCNC: 117 MG/DL (ref 65–99)
GLUCOSE SERPL-MCNC: 96 MG/DL (ref 65–99)
GLUCOSE SERPL-MCNC: 97 MG/DL (ref 65–99)
HCT VFR BLD AUTO: 29.9 % (ref 34–46.6)
HCT VFR BLD AUTO: 31.3 % (ref 34–46.6)
HCT VFR BLD AUTO: 32.1 % (ref 34–46.6)
HCT VFR BLD AUTO: 32.5 % (ref 34–46.6)
HGB BLD-MCNC: 10.1 G/DL (ref 12–15.9)
HGB BLD-MCNC: 9.5 G/DL (ref 12–15.9)
HGB BLD-MCNC: 9.6 G/DL (ref 12–15.9)
HGB BLD-MCNC: 9.8 G/DL (ref 12–15.9)
IMM GRANULOCYTES # BLD AUTO: 0.01 10*3/MM3 (ref 0–0.05)
IMM GRANULOCYTES # BLD AUTO: 0.02 10*3/MM3 (ref 0–0.05)
IMM GRANULOCYTES # BLD AUTO: 0.06 10*3/MM3 (ref 0–0.05)
IMM GRANULOCYTES NFR BLD AUTO: 0.3 % (ref 0–0.5)
IMM GRANULOCYTES NFR BLD AUTO: 0.7 % (ref 0–0.5)
IMM GRANULOCYTES NFR BLD AUTO: 1 % (ref 0–0.5)
LYMPHOCYTES # BLD AUTO: 0.37 10*3/MM3 (ref 0.7–3.1)
LYMPHOCYTES # BLD AUTO: 0.48 10*3/MM3 (ref 0.7–3.1)
LYMPHOCYTES # BLD AUTO: 0.81 10*3/MM3 (ref 0.7–3.1)
LYMPHOCYTES NFR BLD AUTO: 13.1 % (ref 19.6–45.3)
LYMPHOCYTES NFR BLD AUTO: 14 % (ref 19.6–45.3)
LYMPHOCYTES NFR BLD AUTO: 16.7 % (ref 19.6–45.3)
MCH RBC QN AUTO: 27.5 PG (ref 26.6–33)
MCH RBC QN AUTO: 28 PG (ref 26.6–33)
MCH RBC QN AUTO: 28.2 PG (ref 26.6–33)
MCH RBC QN AUTO: 29.1 PG (ref 26.6–33)
MCHC RBC AUTO-ENTMCNC: 30.4 G/DL (ref 31.5–35.7)
MCHC RBC AUTO-ENTMCNC: 30.5 G/DL (ref 31.5–35.7)
MCHC RBC AUTO-ENTMCNC: 31.1 G/DL (ref 31.5–35.7)
MCHC RBC AUTO-ENTMCNC: 32.1 G/DL (ref 31.5–35.7)
MCV RBC AUTO: 90.6 FL (ref 79–97)
MCV RBC AUTO: 90.7 FL (ref 79–97)
MCV RBC AUTO: 90.8 FL (ref 79–97)
MCV RBC AUTO: 91.7 FL (ref 79–97)
MONOCYTES # BLD AUTO: 0.44 10*3/MM3 (ref 0.1–0.9)
MONOCYTES # BLD AUTO: 0.48 10*3/MM3 (ref 0.1–0.9)
MONOCYTES # BLD AUTO: 0.66 10*3/MM3 (ref 0.1–0.9)
MONOCYTES NFR BLD AUTO: 11.4 % (ref 5–12)
MONOCYTES NFR BLD AUTO: 15.6 % (ref 5–12)
MONOCYTES NFR BLD AUTO: 16.7 % (ref 5–12)
NEUTROPHILS NFR BLD AUTO: 1.84 10*3/MM3 (ref 1.7–7)
NEUTROPHILS NFR BLD AUTO: 1.94 10*3/MM3 (ref 1.7–7)
NEUTROPHILS NFR BLD AUTO: 4.18 10*3/MM3 (ref 1.7–7)
NEUTROPHILS NFR BLD AUTO: 63.9 % (ref 42.7–76)
NEUTROPHILS NFR BLD AUTO: 68.8 % (ref 42.7–76)
NEUTROPHILS NFR BLD AUTO: 72.3 % (ref 42.7–76)
NRBC BLD AUTO-RTO: 0 /100 WBC (ref 0–0.2)
NT-PROBNP SERPL-MCNC: 242.6 PG/ML (ref 0–900)
PLATELET # BLD AUTO: 282 10*3/MM3 (ref 140–450)
PLATELET # BLD AUTO: 284 10*3/MM3 (ref 140–450)
PLATELET # BLD AUTO: 338 10*3/MM3 (ref 140–450)
PLATELET # BLD AUTO: 346 10*3/MM3 (ref 140–450)
PMV BLD AUTO: 8.6 FL (ref 6–12)
PMV BLD AUTO: 9 FL (ref 6–12)
PMV BLD AUTO: 9.1 FL (ref 6–12)
PMV BLD AUTO: 9.6 FL (ref 6–12)
POTASSIUM SERPL-SCNC: 3.7 MMOL/L (ref 3.5–5.2)
POTASSIUM SERPL-SCNC: 3.8 MMOL/L (ref 3.5–5.2)
POTASSIUM SERPL-SCNC: 4 MMOL/L (ref 3.5–5.2)
POTASSIUM SERPL-SCNC: 4 MMOL/L (ref 3.5–5.2)
PROT SERPL-MCNC: 5.8 G/DL (ref 6–8.5)
PROT SERPL-MCNC: 6.4 G/DL (ref 6–8.5)
PROT SERPL-MCNC: 6.5 G/DL (ref 6–8.5)
RBC # BLD AUTO: 3.3 10*6/MM3 (ref 3.77–5.28)
RBC # BLD AUTO: 3.45 10*6/MM3 (ref 3.77–5.28)
RBC # BLD AUTO: 3.5 10*6/MM3 (ref 3.77–5.28)
RBC # BLD AUTO: 3.58 10*6/MM3 (ref 3.77–5.28)
SARS-COV-2 RNA PNL SPEC NAA+PROBE: NOT DETECTED
SODIUM SERPL-SCNC: 137 MMOL/L (ref 136–145)
SODIUM SERPL-SCNC: 139 MMOL/L (ref 136–145)
SODIUM SERPL-SCNC: 139 MMOL/L (ref 136–145)
SODIUM SERPL-SCNC: 142 MMOL/L (ref 136–145)
WBC # BLD AUTO: 2.82 10*3/MM3 (ref 3.4–10.8)
WBC # BLD AUTO: 2.88 10*3/MM3 (ref 3.4–10.8)
WBC # BLD AUTO: 5.79 10*3/MM3 (ref 3.4–10.8)
WBC # BLD AUTO: 6.7 10*3/MM3 (ref 3.4–10.8)

## 2021-01-01 PROCEDURE — G0378 HOSPITAL OBSERVATION PER HR: HCPCS

## 2021-01-01 PROCEDURE — 97140 MANUAL THERAPY 1/> REGIONS: CPT | Performed by: PHYSICAL THERAPIST

## 2021-01-01 PROCEDURE — 25010000002 MORPHINE SULFATE (PF) 2 MG/ML SOLUTION: Performed by: FAMILY MEDICINE

## 2021-01-01 PROCEDURE — 99233 SBSQ HOSP IP/OBS HIGH 50: CPT | Performed by: CLINICAL NURSE SPECIALIST

## 2021-01-01 PROCEDURE — 99253 IP/OBS CNSLTJ NEW/EST LOW 45: CPT | Performed by: NURSE PRACTITIONER

## 2021-01-01 PROCEDURE — 99221 1ST HOSP IP/OBS SF/LOW 40: CPT | Performed by: FAMILY MEDICINE

## 2021-01-01 PROCEDURE — 63710000001 DEXAMETHASONE PER 0.25 MG: Performed by: FAMILY MEDICINE

## 2021-01-01 PROCEDURE — 97535 SELF CARE MNGMENT TRAINING: CPT

## 2021-01-01 PROCEDURE — 25010000002 DEXAMETHASONE PER 1 MG: Performed by: CLINICAL NURSE SPECIALIST

## 2021-01-01 PROCEDURE — 97164 PT RE-EVAL EST PLAN CARE: CPT | Performed by: PHYSICAL THERAPIST

## 2021-01-01 PROCEDURE — 77412 RADIATION TX DELIVERY LVL 3: CPT | Performed by: RADIOLOGY

## 2021-01-01 PROCEDURE — 25010000003 HYDROMORPHONE 1 MG/ML SOLUTION: Performed by: FAMILY MEDICINE

## 2021-01-01 PROCEDURE — 71045 X-RAY EXAM CHEST 1 VIEW: CPT

## 2021-01-01 PROCEDURE — 93970 EXTREMITY STUDY: CPT

## 2021-01-01 PROCEDURE — 97110 THERAPEUTIC EXERCISES: CPT | Performed by: PHYSICAL THERAPIST

## 2021-01-01 PROCEDURE — 25010000002 CEFTRIAXONE PER 250 MG: Performed by: PHYSICIAN ASSISTANT

## 2021-01-01 PROCEDURE — 99226 PR SBSQ OBSERVATION CARE/DAY 35 MINUTES: CPT | Performed by: CLINICAL NURSE SPECIALIST

## 2021-01-01 PROCEDURE — 63710000001 DEXAMETHASONE PER 0.25 MG: Performed by: CLINICAL NURSE SPECIALIST

## 2021-01-01 PROCEDURE — 99217 PR OBSERVATION CARE DISCHARGE MANAGEMENT: CPT | Performed by: FAMILY MEDICINE

## 2021-01-01 PROCEDURE — C9803 HOPD COVID-19 SPEC COLLECT: HCPCS

## 2021-01-01 PROCEDURE — 82550 ASSAY OF CK (CPK): CPT | Performed by: FAMILY MEDICINE

## 2021-01-01 PROCEDURE — 99224 PR SBSQ OBSERVATION CARE/DAY 15 MINUTES: CPT | Performed by: FAMILY MEDICINE

## 2021-01-01 PROCEDURE — 99211 OFF/OP EST MAY X REQ PHY/QHP: CPT

## 2021-01-01 PROCEDURE — 25010000003 HYDROMORPHONE 1 MG/ML SOLUTION: Performed by: EMERGENCY MEDICINE

## 2021-01-01 PROCEDURE — 80053 COMPREHEN METABOLIC PANEL: CPT | Performed by: EMERGENCY MEDICINE

## 2021-01-01 PROCEDURE — 93970 EXTREMITY STUDY: CPT | Performed by: SURGERY

## 2021-01-01 PROCEDURE — 99225 PR SBSQ OBSERVATION CARE/DAY 25 MINUTES: CPT | Performed by: FAMILY MEDICINE

## 2021-01-01 PROCEDURE — 77336 RADIATION PHYSICS CONSULT: CPT | Performed by: RADIOLOGY

## 2021-01-01 PROCEDURE — 25010000002 HYDROMORPHONE PER 4 MG: Performed by: CLINICAL NURSE SPECIALIST

## 2021-01-01 PROCEDURE — 25010000002 LORAZEPAM PER 2 MG: Performed by: FAMILY MEDICINE

## 2021-01-01 PROCEDURE — 96376 TX/PRO/DX INJ SAME DRUG ADON: CPT

## 2021-01-01 PROCEDURE — 87635 SARS-COV-2 COVID-19 AMP PRB: CPT | Performed by: FAMILY MEDICINE

## 2021-01-01 PROCEDURE — 86140 C-REACTIVE PROTEIN: CPT | Performed by: EMERGENCY MEDICINE

## 2021-01-01 PROCEDURE — 94799 UNLISTED PULMONARY SVC/PX: CPT

## 2021-01-01 PROCEDURE — 97162 PT EVAL MOD COMPLEX 30 MIN: CPT

## 2021-01-01 PROCEDURE — 99218 PR INITIAL OBSERVATION CARE/DAY 30 MINUTES: CPT | Performed by: FAMILY MEDICINE

## 2021-01-01 PROCEDURE — 99220 PR INITIAL OBSERVATION CARE/DAY 70 MINUTES: CPT | Performed by: CLINICAL NURSE SPECIALIST

## 2021-01-01 PROCEDURE — 85025 COMPLETE CBC W/AUTO DIFF WBC: CPT | Performed by: EMERGENCY MEDICINE

## 2021-01-01 PROCEDURE — 99284 EMERGENCY DEPT VISIT MOD MDM: CPT

## 2021-01-01 PROCEDURE — 97166 OT EVAL MOD COMPLEX 45 MIN: CPT | Performed by: OCCUPATIONAL THERAPIST

## 2021-01-01 PROCEDURE — 85025 COMPLETE CBC W/AUTO DIFF WBC: CPT | Performed by: STUDENT IN AN ORGANIZED HEALTH CARE EDUCATION/TRAINING PROGRAM

## 2021-01-01 PROCEDURE — 87635 SARS-COV-2 COVID-19 AMP PRB: CPT | Performed by: EMERGENCY MEDICINE

## 2021-01-01 PROCEDURE — 80048 BASIC METABOLIC PNL TOTAL CA: CPT | Performed by: FAMILY MEDICINE

## 2021-01-01 PROCEDURE — 99223 1ST HOSP IP/OBS HIGH 75: CPT | Performed by: CLINICAL NURSE SPECIALIST

## 2021-01-01 PROCEDURE — 77385: CPT | Performed by: RADIOLOGY

## 2021-01-01 PROCEDURE — 77280 THER RAD SIMULAJ FIELD SMPL: CPT | Performed by: RADIOLOGY

## 2021-01-01 PROCEDURE — 25010000002 MORPHINE SULFATE (PF) 2 MG/ML SOLUTION: Performed by: CLINICAL NURSE SPECIALIST

## 2021-01-01 PROCEDURE — 99202 OFFICE O/P NEW SF 15 MIN: CPT

## 2021-01-01 PROCEDURE — 99231 SBSQ HOSP IP/OBS SF/LOW 25: CPT | Performed by: FAMILY MEDICINE

## 2021-01-01 PROCEDURE — 80053 COMPREHEN METABOLIC PANEL: CPT | Performed by: STUDENT IN AN ORGANIZED HEALTH CARE EDUCATION/TRAINING PROGRAM

## 2021-01-01 PROCEDURE — 25010000002 HEPARIN LOCK FLUSH PER 10 UNITS: Performed by: FAMILY MEDICINE

## 2021-01-01 PROCEDURE — 85025 COMPLETE CBC W/AUTO DIFF WBC: CPT | Performed by: FAMILY MEDICINE

## 2021-01-01 PROCEDURE — 85027 COMPLETE CBC AUTOMATED: CPT | Performed by: FAMILY MEDICINE

## 2021-01-01 PROCEDURE — 77332 RADIATION TREATMENT AID(S): CPT | Performed by: RADIOLOGY

## 2021-01-01 PROCEDURE — 63710000001 DIPHENHYDRAMINE PER 50 MG: Performed by: STUDENT IN AN ORGANIZED HEALTH CARE EDUCATION/TRAINING PROGRAM

## 2021-01-01 PROCEDURE — 83880 ASSAY OF NATRIURETIC PEPTIDE: CPT | Performed by: STUDENT IN AN ORGANIZED HEALTH CARE EDUCATION/TRAINING PROGRAM

## 2021-01-01 PROCEDURE — 80053 COMPREHEN METABOLIC PANEL: CPT | Performed by: FAMILY MEDICINE

## 2021-01-01 PROCEDURE — 96375 TX/PRO/DX INJ NEW DRUG ADDON: CPT

## 2021-01-01 PROCEDURE — 77290 THER RAD SIMULAJ FIELD CPLX: CPT | Performed by: RADIOLOGY

## 2021-01-01 PROCEDURE — 85651 RBC SED RATE NONAUTOMATED: CPT | Performed by: EMERGENCY MEDICINE

## 2021-01-01 PROCEDURE — 97530 THERAPEUTIC ACTIVITIES: CPT | Performed by: PHYSICAL THERAPIST

## 2021-01-01 PROCEDURE — 77306 TELETHX ISODOSE PLAN SIMPLE: CPT | Performed by: RADIOLOGY

## 2021-01-01 PROCEDURE — 96374 THER/PROPH/DIAG INJ IV PUSH: CPT

## 2021-01-01 PROCEDURE — 87635 SARS-COV-2 COVID-19 AMP PRB: CPT | Performed by: STUDENT IN AN ORGANIZED HEALTH CARE EDUCATION/TRAINING PROGRAM

## 2021-01-01 PROCEDURE — 99244 OFF/OP CNSLTJ NEW/EST MOD 40: CPT | Performed by: CLINICAL NURSE SPECIALIST

## 2021-01-01 PROCEDURE — 99238 HOSP IP/OBS DSCHRG MGMT 30/<: CPT | Performed by: FAMILY MEDICINE

## 2021-01-01 RX ORDER — MONTELUKAST SODIUM 10 MG/1
10 TABLET ORAL DAILY
Qty: 30 TABLET | Refills: 3 | Status: SHIPPED | OUTPATIENT
Start: 2021-01-01

## 2021-01-01 RX ORDER — MORPHINE SULFATE 15 MG/1
15 TABLET ORAL EVERY 4 HOURS PRN
Qty: 25 TABLET | Refills: 0 | Status: SHIPPED | OUTPATIENT
Start: 2021-01-01 | End: 2021-01-01

## 2021-01-01 RX ORDER — HYDROXYZINE 50 MG/1
50 TABLET, FILM COATED ORAL EVERY 6 HOURS PRN
Qty: 60 TABLET | Refills: 1
Start: 2021-01-01 | End: 2021-01-01 | Stop reason: SDUPTHER

## 2021-01-01 RX ORDER — SODIUM CHLORIDE 0.9 % (FLUSH) 0.9 %
10 SYRINGE (ML) INJECTION AS NEEDED
Status: DISCONTINUED | OUTPATIENT
Start: 2021-01-01 | End: 2021-01-01 | Stop reason: HOSPADM

## 2021-01-01 RX ORDER — MONTELUKAST SODIUM 10 MG/1
10 TABLET ORAL NIGHTLY
Status: DISCONTINUED | OUTPATIENT
Start: 2021-01-01 | End: 2021-01-01 | Stop reason: HOSPADM

## 2021-01-01 RX ORDER — DEXAMETHASONE SODIUM PHOSPHATE 4 MG/ML
4 INJECTION, SOLUTION INTRA-ARTICULAR; INTRALESIONAL; INTRAMUSCULAR; INTRAVENOUS; SOFT TISSUE EVERY 12 HOURS
Status: COMPLETED | OUTPATIENT
Start: 2021-01-01 | End: 2021-01-01

## 2021-01-01 RX ORDER — DEXAMETHASONE 4 MG/1
4 TABLET ORAL
Status: DISCONTINUED | OUTPATIENT
Start: 2021-01-01 | End: 2021-01-01 | Stop reason: HOSPADM

## 2021-01-01 RX ORDER — MORPHINE SULFATE 15 MG/1
15 TABLET ORAL EVERY 4 HOURS PRN
Status: DISCONTINUED | OUTPATIENT
Start: 2021-01-01 | End: 2021-01-01 | Stop reason: SDUPTHER

## 2021-01-01 RX ORDER — MORPHINE SULFATE 15 MG/1
15 TABLET ORAL EVERY 4 HOURS PRN
Qty: 12 TABLET | Refills: 0 | Status: ON HOLD | OUTPATIENT
Start: 2021-01-01 | End: 2021-01-01 | Stop reason: SDUPTHER

## 2021-01-01 RX ORDER — NAPROXEN 500 MG/1
500 TABLET ORAL 2 TIMES DAILY WITH MEALS
Status: ON HOLD
Start: 2021-01-01 | End: 2021-01-01 | Stop reason: SDUPTHER

## 2021-01-01 RX ORDER — MORPHINE SULFATE 15 MG/1
15 TABLET ORAL EVERY 4 HOURS PRN
Status: DISCONTINUED | OUTPATIENT
Start: 2021-01-01 | End: 2021-01-01

## 2021-01-01 RX ORDER — NALOXONE HCL 0.4 MG/ML
0.4 VIAL (ML) INJECTION
Status: DISCONTINUED | OUTPATIENT
Start: 2021-01-01 | End: 2021-01-01

## 2021-01-01 RX ORDER — SALIVA STIMULANT COMB. NO.3
1 SPRAY, NON-AEROSOL (ML) MUCOUS MEMBRANE
Qty: 50 ML | Refills: 1 | Status: SHIPPED | OUTPATIENT
Start: 2021-01-01 | End: 2021-01-01 | Stop reason: SDUPTHER

## 2021-01-01 RX ORDER — HYDROXYZINE HYDROCHLORIDE 25 MG/1
50 TABLET, FILM COATED ORAL EVERY 6 HOURS PRN
Status: DISCONTINUED | OUTPATIENT
Start: 2021-01-01 | End: 2021-01-01 | Stop reason: HOSPADM

## 2021-01-01 RX ORDER — DIAPER,BRIEF,INFANT-TODD,DISP
1 EACH MISCELLANEOUS EVERY 8 HOURS SCHEDULED
Status: DISCONTINUED | OUTPATIENT
Start: 2021-01-01 | End: 2021-01-01 | Stop reason: CLARIF

## 2021-01-01 RX ORDER — DEXAMETHASONE 4 MG/1
4 TABLET ORAL
Status: ON HOLD
Start: 2021-01-01 | End: 2021-01-01 | Stop reason: SDUPTHER

## 2021-01-01 RX ORDER — FAMOTIDINE 20 MG/1
20 TABLET, FILM COATED ORAL
Status: ON HOLD
Start: 2021-01-01 | End: 2021-01-01 | Stop reason: SDUPTHER

## 2021-01-01 RX ORDER — AMOXICILLIN 250 MG
1 CAPSULE ORAL 2 TIMES DAILY PRN
Status: ON HOLD
Start: 2021-01-01 | End: 2021-01-01 | Stop reason: SDUPTHER

## 2021-01-01 RX ORDER — GABAPENTIN 100 MG/1
100 CAPSULE ORAL 3 TIMES DAILY
Start: 2021-01-01 | End: 2021-01-01 | Stop reason: HOSPADM

## 2021-01-01 RX ORDER — LORAZEPAM 2 MG/ML
1 INJECTION INTRAMUSCULAR EVERY 4 HOURS PRN
Status: DISCONTINUED | OUTPATIENT
Start: 2021-01-01 | End: 2021-01-01 | Stop reason: HOSPADM

## 2021-01-01 RX ORDER — NAPROXEN 500 MG/1
500 TABLET ORAL 2 TIMES DAILY WITH MEALS
Status: DISCONTINUED | OUTPATIENT
Start: 2021-01-01 | End: 2021-01-01 | Stop reason: HOSPADM

## 2021-01-01 RX ORDER — MORPHINE SULFATE 60 MG/1
60 TABLET, FILM COATED, EXTENDED RELEASE ORAL EVERY 8 HOURS SCHEDULED
Status: ON HOLD
Start: 2021-01-01 | End: 2021-01-01 | Stop reason: SDUPTHER

## 2021-01-01 RX ORDER — MORPHINE SULFATE 60 MG/1
60 TABLET, FILM COATED, EXTENDED RELEASE ORAL EVERY 8 HOURS SCHEDULED
Qty: 90 TABLET | Refills: 0
Start: 2021-01-01

## 2021-01-01 RX ORDER — HYDROCODONE BITARTRATE AND ACETAMINOPHEN 10; 325 MG/1; MG/1
1 TABLET ORAL EVERY 6 HOURS PRN
COMMUNITY
End: 2021-01-01 | Stop reason: HOSPADM

## 2021-01-01 RX ORDER — HYDROCODONE BITARTRATE AND ACETAMINOPHEN 10; 325 MG/1; MG/1
1 TABLET ORAL EVERY 6 HOURS PRN
Status: DISCONTINUED | OUTPATIENT
Start: 2021-01-01 | End: 2021-01-01

## 2021-01-01 RX ORDER — MORPHINE SULFATE 15 MG/1
TABLET ORAL
Qty: 12 TABLET | Refills: 0 | Status: SHIPPED | OUTPATIENT
Start: 2021-01-01 | End: 2021-01-01

## 2021-01-01 RX ORDER — MORPHINE SULFATE 30 MG/1
60 TABLET, FILM COATED, EXTENDED RELEASE ORAL EVERY 8 HOURS SCHEDULED
Status: DISCONTINUED | OUTPATIENT
Start: 2021-01-01 | End: 2021-01-01 | Stop reason: HOSPADM

## 2021-01-01 RX ORDER — AMOXICILLIN 250 MG
1 CAPSULE ORAL 2 TIMES DAILY PRN
Status: DISCONTINUED | OUTPATIENT
Start: 2021-01-01 | End: 2021-01-01

## 2021-01-01 RX ORDER — MORPHINE SULFATE 15 MG/1
22.5 TABLET ORAL EVERY 4 HOURS PRN
Status: DISCONTINUED | OUTPATIENT
Start: 2021-01-01 | End: 2021-01-01 | Stop reason: HOSPADM

## 2021-01-01 RX ORDER — MONTELUKAST SODIUM 10 MG/1
10 TABLET ORAL DAILY
Status: DISCONTINUED | OUTPATIENT
Start: 2021-01-01 | End: 2021-01-01 | Stop reason: HOSPADM

## 2021-01-01 RX ORDER — NALOXONE HCL 0.4 MG/ML
0.4 VIAL (ML) INJECTION
Status: DISCONTINUED | OUTPATIENT
Start: 2021-01-01 | End: 2021-01-01 | Stop reason: HOSPADM

## 2021-01-01 RX ORDER — NAPROXEN 500 MG/1
500 TABLET ORAL 2 TIMES DAILY WITH MEALS
Qty: 60 TABLET | Refills: 2
Start: 2021-01-01 | End: 2021-01-01 | Stop reason: SDUPTHER

## 2021-01-01 RX ORDER — SALIVA STIMULANT COMB. NO.3
1 SPRAY, NON-AEROSOL (ML) MUCOUS MEMBRANE
Qty: 50 ML | Refills: 1 | Status: SHIPPED | OUTPATIENT
Start: 2021-01-01

## 2021-01-01 RX ORDER — SODIUM CHLORIDE 0.9 % (FLUSH) 0.9 %
10 SYRINGE (ML) INJECTION EVERY 12 HOURS SCHEDULED
Status: DISCONTINUED | OUTPATIENT
Start: 2021-01-01 | End: 2021-01-01 | Stop reason: HOSPADM

## 2021-01-01 RX ORDER — MORPHINE SULFATE 30 MG/1
60 TABLET, FILM COATED, EXTENDED RELEASE ORAL EVERY 8 HOURS SCHEDULED
Status: DISCONTINUED | OUTPATIENT
Start: 2021-01-01 | End: 2021-01-01

## 2021-01-01 RX ORDER — SALIVA STIMULANT COMB. NO.3
1 SPRAY, NON-AEROSOL (ML) MUCOUS MEMBRANE
Status: DISCONTINUED | OUTPATIENT
Start: 2021-01-01 | End: 2021-01-01 | Stop reason: HOSPADM

## 2021-01-01 RX ORDER — MORPHINE SULFATE 30 MG/1
60 TABLET, FILM COATED, EXTENDED RELEASE ORAL EVERY 12 HOURS SCHEDULED
Status: DISCONTINUED | OUTPATIENT
Start: 2021-01-01 | End: 2021-01-01

## 2021-01-01 RX ORDER — PANTOPRAZOLE SODIUM 40 MG/1
40 TABLET, DELAYED RELEASE ORAL DAILY
Status: DISCONTINUED | OUTPATIENT
Start: 2021-01-01 | End: 2021-01-01 | Stop reason: HOSPADM

## 2021-01-01 RX ORDER — HYDROXYZINE 50 MG/1
50 TABLET, FILM COATED ORAL EVERY 6 HOURS PRN
Status: ON HOLD
Start: 2021-01-01 | End: 2021-01-01 | Stop reason: SDUPTHER

## 2021-01-01 RX ORDER — FAMOTIDINE 20 MG/1
20 TABLET, FILM COATED ORAL
Qty: 60 TABLET | Refills: 5
Start: 2021-01-01 | End: 2021-01-01 | Stop reason: SDUPTHER

## 2021-01-01 RX ORDER — AMOXICILLIN 250 MG
2 CAPSULE ORAL NIGHTLY
Status: DISCONTINUED | OUTPATIENT
Start: 2021-01-01 | End: 2021-01-01 | Stop reason: HOSPADM

## 2021-01-01 RX ORDER — FAMOTIDINE 20 MG/1
20 TABLET, FILM COATED ORAL
Status: DISCONTINUED | OUTPATIENT
Start: 2021-01-01 | End: 2021-01-01 | Stop reason: HOSPADM

## 2021-01-01 RX ORDER — OXYCODONE AND ACETAMINOPHEN 7.5; 325 MG/1; MG/1
1 TABLET ORAL
COMMUNITY
Start: 2021-01-01 | End: 2021-01-01

## 2021-01-01 RX ORDER — DIAPER,BRIEF,INFANT-TODD,DISP
1 EACH MISCELLANEOUS EVERY 8 HOURS SCHEDULED
Status: DISCONTINUED | OUTPATIENT
Start: 2021-01-01 | End: 2021-01-01 | Stop reason: HOSPADM

## 2021-01-01 RX ORDER — MONTELUKAST SODIUM 10 MG/1
10 TABLET ORAL DAILY
Status: ON HOLD
Start: 2021-01-01 | End: 2021-01-01 | Stop reason: SDUPTHER

## 2021-01-01 RX ORDER — HYDROXYZINE 50 MG/1
50 TABLET, FILM COATED ORAL EVERY 6 HOURS PRN
Qty: 60 TABLET | Refills: 1 | Status: SHIPPED | OUTPATIENT
Start: 2021-01-01

## 2021-01-01 RX ORDER — SODIUM CHLORIDE 9 MG/ML
50 INJECTION, SOLUTION INTRAVENOUS CONTINUOUS
Status: DISCONTINUED | OUTPATIENT
Start: 2021-01-01 | End: 2021-01-01

## 2021-01-01 RX ORDER — HEPARIN SODIUM (PORCINE) LOCK FLUSH IV SOLN 100 UNIT/ML 100 UNIT/ML
5 SOLUTION INTRAVENOUS AS NEEDED
Status: DISCONTINUED | OUTPATIENT
Start: 2021-01-01 | End: 2021-01-01 | Stop reason: HOSPADM

## 2021-01-01 RX ORDER — GABAPENTIN 100 MG/1
100 CAPSULE ORAL 3 TIMES DAILY
Status: DISCONTINUED | OUTPATIENT
Start: 2021-01-01 | End: 2021-01-01 | Stop reason: HOSPADM

## 2021-01-01 RX ORDER — DEXAMETHASONE 4 MG/1
4 TABLET ORAL
Qty: 30 TABLET | Refills: 1 | Status: SHIPPED | OUTPATIENT
Start: 2021-01-01 | End: 2021-01-01 | Stop reason: SDUPTHER

## 2021-01-01 RX ORDER — SALIVA STIMULANT COMB. NO.3
1 SPRAY, NON-AEROSOL (ML) MUCOUS MEMBRANE
Status: ON HOLD
Start: 2021-01-01 | End: 2021-01-01 | Stop reason: SDUPTHER

## 2021-01-01 RX ORDER — MONTELUKAST SODIUM 10 MG/1
10 TABLET ORAL DAILY
Qty: 30 TABLET | Refills: 3
Start: 2021-01-01 | End: 2021-01-01 | Stop reason: SDUPTHER

## 2021-01-01 RX ORDER — GABAPENTIN 300 MG/1
300 CAPSULE ORAL EVERY 12 HOURS SCHEDULED
Status: DISCONTINUED | OUTPATIENT
Start: 2021-01-01 | End: 2021-01-01 | Stop reason: HOSPADM

## 2021-01-01 RX ORDER — NAPROXEN 500 MG/1
500 TABLET ORAL 2 TIMES DAILY WITH MEALS
Qty: 60 TABLET | Refills: 2 | Status: SHIPPED | OUTPATIENT
Start: 2021-01-01

## 2021-01-01 RX ORDER — MORPHINE SULFATE 15 MG/1
15 TABLET ORAL EVERY 4 HOURS PRN
Qty: 25 TABLET | Refills: 0 | Status: SHIPPED | OUTPATIENT
Start: 2021-01-01

## 2021-01-01 RX ORDER — DIAPER,BRIEF,INFANT-TODD,DISP
1 EACH MISCELLANEOUS EVERY 8 HOURS SCHEDULED
Start: 2021-01-01 | End: 2021-01-01 | Stop reason: HOSPADM

## 2021-01-01 RX ORDER — GABAPENTIN 300 MG/1
300 CAPSULE ORAL EVERY 12 HOURS SCHEDULED
Qty: 10 CAPSULE | Refills: 0 | Status: ON HOLD | OUTPATIENT
Start: 2021-01-01 | End: 2021-01-01 | Stop reason: SDUPTHER

## 2021-01-01 RX ORDER — MORPHINE SULFATE 15 MG/1
30 TABLET ORAL EVERY 4 HOURS PRN
Start: 2021-01-01 | End: 2021-01-01 | Stop reason: HOSPADM

## 2021-01-01 RX ORDER — ONDANSETRON 2 MG/ML
4 INJECTION INTRAMUSCULAR; INTRAVENOUS EVERY 6 HOURS PRN
Status: DISCONTINUED | OUTPATIENT
Start: 2021-01-01 | End: 2021-01-01 | Stop reason: HOSPADM

## 2021-01-01 RX ORDER — MORPHINE SULFATE 2 MG/ML
2 INJECTION, SOLUTION INTRAMUSCULAR; INTRAVENOUS
Status: DISCONTINUED | OUTPATIENT
Start: 2021-01-01 | End: 2021-01-01

## 2021-01-01 RX ORDER — HYDROMORPHONE HYDROCHLORIDE 1 MG/ML
0.5 INJECTION, SOLUTION INTRAMUSCULAR; INTRAVENOUS; SUBCUTANEOUS EVERY 4 HOURS PRN
Status: DISCONTINUED | OUTPATIENT
Start: 2021-01-01 | End: 2021-01-01

## 2021-01-01 RX ORDER — AMOXICILLIN 250 MG
1 CAPSULE ORAL 2 TIMES DAILY PRN
Status: DISCONTINUED | OUTPATIENT
Start: 2021-01-01 | End: 2021-01-01 | Stop reason: HOSPADM

## 2021-01-01 RX ORDER — MORPHINE SULFATE 60 MG/1
60 TABLET, FILM COATED, EXTENDED RELEASE ORAL EVERY 8 HOURS SCHEDULED
Qty: 10 TABLET | Refills: 0 | Status: SHIPPED | OUTPATIENT
Start: 2021-01-01 | End: 2021-01-01 | Stop reason: HOSPADM

## 2021-01-01 RX ORDER — AMOXICILLIN 250 MG
1 CAPSULE ORAL 2 TIMES DAILY PRN
Qty: 60 TABLET | Refills: 2
Start: 2021-01-01 | End: 2021-01-01 | Stop reason: SDUPTHER

## 2021-01-01 RX ORDER — MORPHINE SULFATE 30 MG/1
30 TABLET, FILM COATED, EXTENDED RELEASE ORAL EVERY 8 HOURS SCHEDULED
COMMUNITY
End: 2021-01-01 | Stop reason: HOSPADM

## 2021-01-01 RX ORDER — MORPHINE SULFATE 60 MG/1
60 TABLET, FILM COATED, EXTENDED RELEASE ORAL EVERY 8 HOURS SCHEDULED
Qty: 14 TABLET | Refills: 0 | Status: SHIPPED | OUTPATIENT
Start: 2021-01-01 | End: 2021-01-01

## 2021-01-01 RX ORDER — FAMOTIDINE 20 MG/1
20 TABLET, FILM COATED ORAL
Qty: 60 TABLET | Refills: 5 | Status: SHIPPED | OUTPATIENT
Start: 2021-01-01

## 2021-01-01 RX ORDER — HYDROCODONE BITARTRATE AND ACETAMINOPHEN 10; 325 MG/1; MG/1
2 TABLET ORAL ONCE
Status: COMPLETED | OUTPATIENT
Start: 2021-01-01 | End: 2021-01-01

## 2021-01-01 RX ORDER — GABAPENTIN 300 MG/1
300 CAPSULE ORAL EVERY 12 HOURS SCHEDULED
Qty: 60 CAPSULE | Refills: 0 | Status: SHIPPED | OUTPATIENT
Start: 2021-01-01

## 2021-01-01 RX ORDER — MORPHINE SULFATE 2 MG/ML
2 INJECTION, SOLUTION INTRAMUSCULAR; INTRAVENOUS EVERY 4 HOURS PRN
Status: DISCONTINUED | OUTPATIENT
Start: 2021-01-01 | End: 2021-01-01 | Stop reason: HOSPADM

## 2021-01-01 RX ORDER — DEXAMETHASONE 4 MG/1
4 TABLET ORAL
Qty: 30 TABLET | Refills: 1 | Status: SHIPPED | OUTPATIENT
Start: 2021-01-01

## 2021-01-01 RX ORDER — GABAPENTIN 100 MG/1
100 CAPSULE ORAL 3 TIMES DAILY
Status: DISCONTINUED | OUTPATIENT
Start: 2021-01-01 | End: 2021-01-01

## 2021-01-01 RX ORDER — AMOXICILLIN 250 MG
1 CAPSULE ORAL 2 TIMES DAILY PRN
Qty: 60 TABLET | Refills: 2 | Status: SHIPPED | OUTPATIENT
Start: 2021-01-01

## 2021-01-01 RX ORDER — DIPHENHYDRAMINE HCL 25 MG
25 CAPSULE ORAL ONCE
Status: COMPLETED | OUTPATIENT
Start: 2021-01-01 | End: 2021-01-01

## 2021-01-01 RX ORDER — HYDROXYZINE HYDROCHLORIDE 25 MG/1
50 TABLET, FILM COATED ORAL EVERY 6 HOURS PRN
Status: DISCONTINUED | OUTPATIENT
Start: 2021-01-01 | End: 2021-01-01

## 2021-01-01 RX ORDER — MORPHINE SULFATE 15 MG/1
30 TABLET ORAL EVERY 4 HOURS PRN
Status: DISCONTINUED | OUTPATIENT
Start: 2021-01-01 | End: 2021-01-01 | Stop reason: HOSPADM

## 2021-01-01 RX ORDER — SODIUM CHLORIDE 0.9 % (FLUSH) 0.9 %
20 SYRINGE (ML) INJECTION AS NEEDED
Status: DISCONTINUED | OUTPATIENT
Start: 2021-01-01 | End: 2021-01-01 | Stop reason: HOSPADM

## 2021-01-01 RX ADMIN — MORPHINE SULFATE 60 MG: 30 TABLET, FILM COATED, EXTENDED RELEASE ORAL at 13:48

## 2021-01-01 RX ADMIN — MORPHINE SULFATE 60 MG: 30 TABLET, FILM COATED, EXTENDED RELEASE ORAL at 14:51

## 2021-01-01 RX ADMIN — NAPROXEN 500 MG: 500 TABLET ORAL at 08:35

## 2021-01-01 RX ADMIN — MORPHINE SULFATE 60 MG: 30 TABLET, FILM COATED, EXTENDED RELEASE ORAL at 05:53

## 2021-01-01 RX ADMIN — MORPHINE SULFATE 22.5 MG: 15 TABLET ORAL at 12:05

## 2021-01-01 RX ADMIN — MORPHINE SULFATE 60 MG: 30 TABLET, FILM COATED, EXTENDED RELEASE ORAL at 13:45

## 2021-01-01 RX ADMIN — FAMOTIDINE 20 MG: 20 TABLET, FILM COATED ORAL at 08:12

## 2021-01-01 RX ADMIN — MORPHINE SULFATE 60 MG: 30 TABLET, FILM COATED, EXTENDED RELEASE ORAL at 06:42

## 2021-01-01 RX ADMIN — DOCUSATE SODIUM 50 MG AND SENNOSIDES 8.6 MG 1 TABLET: 8.6; 5 TABLET, FILM COATED ORAL at 09:26

## 2021-01-01 RX ADMIN — HYDROCORTISONE 1 APPLICATION: 1 OINTMENT TOPICAL at 06:43

## 2021-01-01 RX ADMIN — FAMOTIDINE 20 MG: 20 TABLET, FILM COATED ORAL at 17:45

## 2021-01-01 RX ADMIN — MORPHINE SULFATE 15 MG: 15 TABLET ORAL at 00:09

## 2021-01-01 RX ADMIN — MORPHINE SULFATE 60 MG: 30 TABLET, FILM COATED, EXTENDED RELEASE ORAL at 06:48

## 2021-01-01 RX ADMIN — NAPROXEN 500 MG: 500 TABLET ORAL at 18:35

## 2021-01-01 RX ADMIN — LORAZEPAM 1 MG: 2 INJECTION INTRAMUSCULAR; INTRAVENOUS at 13:56

## 2021-01-01 RX ADMIN — DEXAMETHASONE 4 MG: 4 TABLET ORAL at 08:57

## 2021-01-01 RX ADMIN — MORPHINE SULFATE 15 MG: 15 TABLET ORAL at 06:50

## 2021-01-01 RX ADMIN — MORPHINE SULFATE 2 MG: 2 INJECTION, SOLUTION INTRAMUSCULAR; INTRAVENOUS at 00:00

## 2021-01-01 RX ADMIN — LORAZEPAM 1 MG: 2 INJECTION INTRAMUSCULAR; INTRAVENOUS at 12:30

## 2021-01-01 RX ADMIN — MORPHINE SULFATE 15 MG: 15 TABLET ORAL at 11:57

## 2021-01-01 RX ADMIN — HYDROMORPHONE HYDROCHLORIDE 1 MG: 1 INJECTION, SOLUTION INTRAMUSCULAR; INTRAVENOUS; SUBCUTANEOUS at 00:15

## 2021-01-01 RX ADMIN — NAPROXEN 500 MG: 500 TABLET ORAL at 08:21

## 2021-01-01 RX ADMIN — MORPHINE SULFATE 2 MG: 2 INJECTION, SOLUTION INTRAMUSCULAR; INTRAVENOUS at 09:51

## 2021-01-01 RX ADMIN — MORPHINE SULFATE 15 MG: 15 TABLET ORAL at 10:53

## 2021-01-01 RX ADMIN — GABAPENTIN 100 MG: 100 CAPSULE ORAL at 08:22

## 2021-01-01 RX ADMIN — HYDROCORTISONE 1 APPLICATION: 1 OINTMENT TOPICAL at 21:24

## 2021-01-01 RX ADMIN — NAPROXEN 500 MG: 500 TABLET ORAL at 17:23

## 2021-01-01 RX ADMIN — GABAPENTIN 100 MG: 100 CAPSULE ORAL at 15:57

## 2021-01-01 RX ADMIN — HYDROCORTISONE 1 APPLICATION: 1 CREAM TOPICAL at 22:16

## 2021-01-01 RX ADMIN — MORPHINE SULFATE 15 MG: 15 TABLET ORAL at 10:49

## 2021-01-01 RX ADMIN — MORPHINE SULFATE 22.5 MG: 15 TABLET ORAL at 00:17

## 2021-01-01 RX ADMIN — GABAPENTIN 100 MG: 100 CAPSULE ORAL at 09:00

## 2021-01-01 RX ADMIN — SODIUM CHLORIDE, PRESERVATIVE FREE 10 ML: 5 INJECTION INTRAVENOUS at 08:27

## 2021-01-01 RX ADMIN — Medication 1 SPRAY: at 22:23

## 2021-01-01 RX ADMIN — MORPHINE SULFATE 30 MG: 15 TABLET ORAL at 10:46

## 2021-01-01 RX ADMIN — NAPROXEN 500 MG: 500 TABLET ORAL at 17:27

## 2021-01-01 RX ADMIN — GABAPENTIN 300 MG: 300 CAPSULE ORAL at 22:22

## 2021-01-01 RX ADMIN — NAPROXEN 500 MG: 500 TABLET ORAL at 08:44

## 2021-01-01 RX ADMIN — MORPHINE SULFATE 60 MG: 30 TABLET, FILM COATED, EXTENDED RELEASE ORAL at 22:16

## 2021-01-01 RX ADMIN — MORPHINE SULFATE 60 MG: 30 TABLET, FILM COATED, EXTENDED RELEASE ORAL at 14:02

## 2021-01-01 RX ADMIN — HYDROCORTISONE 1 APPLICATION: 1 CREAM TOPICAL at 08:21

## 2021-01-01 RX ADMIN — MORPHINE SULFATE 60 MG: 30 TABLET, FILM COATED, EXTENDED RELEASE ORAL at 23:20

## 2021-01-01 RX ADMIN — HYDROMORPHONE HYDROCHLORIDE 1 MG: 1 INJECTION, SOLUTION INTRAMUSCULAR; INTRAVENOUS; SUBCUTANEOUS at 08:23

## 2021-01-01 RX ADMIN — MONTELUKAST SODIUM 10 MG: 10 TABLET, FILM COATED ORAL at 23:53

## 2021-01-01 RX ADMIN — DOCUSATE SODIUM 50 MG AND SENNOSIDES 8.6 MG 2 TABLET: 8.6; 5 TABLET, FILM COATED ORAL at 20:03

## 2021-01-01 RX ADMIN — MORPHINE SULFATE 15 MG: 15 TABLET ORAL at 01:02

## 2021-01-01 RX ADMIN — HYDROCORTISONE 1 APPLICATION: 1 OINTMENT TOPICAL at 14:37

## 2021-01-01 RX ADMIN — HYDROCORTISONE 1 APPLICATION: 1 OINTMENT TOPICAL at 06:49

## 2021-01-01 RX ADMIN — GABAPENTIN 100 MG: 100 CAPSULE ORAL at 16:09

## 2021-01-01 RX ADMIN — FAMOTIDINE 20 MG: 20 TABLET, FILM COATED ORAL at 18:03

## 2021-01-01 RX ADMIN — MORPHINE SULFATE 22.5 MG: 15 TABLET ORAL at 01:59

## 2021-01-01 RX ADMIN — MORPHINE SULFATE 30 MG: 15 TABLET ORAL at 17:02

## 2021-01-01 RX ADMIN — Medication 1 SPRAY: at 23:39

## 2021-01-01 RX ADMIN — MORPHINE SULFATE 60 MG: 30 TABLET, FILM COATED, EXTENDED RELEASE ORAL at 21:31

## 2021-01-01 RX ADMIN — MORPHINE SULFATE 30 MG: 15 TABLET ORAL at 14:03

## 2021-01-01 RX ADMIN — DIPHENHYDRAMINE HYDROCHLORIDE 25 MG: 25 CAPSULE ORAL at 03:05

## 2021-01-01 RX ADMIN — DOCUSATE SODIUM 50 MG AND SENNOSIDES 8.6 MG 2 TABLET: 8.6; 5 TABLET, FILM COATED ORAL at 21:37

## 2021-01-01 RX ADMIN — GABAPENTIN 100 MG: 100 CAPSULE ORAL at 08:25

## 2021-01-01 RX ADMIN — MORPHINE SULFATE 15 MG: 15 TABLET ORAL at 20:03

## 2021-01-01 RX ADMIN — Medication 1 SPRAY: at 21:40

## 2021-01-01 RX ADMIN — MORPHINE SULFATE 60 MG: 30 TABLET, FILM COATED, EXTENDED RELEASE ORAL at 05:03

## 2021-01-01 RX ADMIN — HYDROMORPHONE HYDROCHLORIDE 1 MG: 1 INJECTION, SOLUTION INTRAMUSCULAR; INTRAVENOUS; SUBCUTANEOUS at 22:37

## 2021-01-01 RX ADMIN — HYDROMORPHONE HYDROCHLORIDE 1 MG: 1 INJECTION, SOLUTION INTRAMUSCULAR; INTRAVENOUS; SUBCUTANEOUS at 21:52

## 2021-01-01 RX ADMIN — NAPROXEN 500 MG: 500 TABLET ORAL at 08:12

## 2021-01-01 RX ADMIN — Medication 1 SPRAY: at 13:45

## 2021-01-01 RX ADMIN — MORPHINE SULFATE 15 MG: 15 TABLET ORAL at 05:21

## 2021-01-01 RX ADMIN — MORPHINE SULFATE 15 MG: 15 TABLET ORAL at 20:54

## 2021-01-01 RX ADMIN — GABAPENTIN 300 MG: 300 CAPSULE ORAL at 20:03

## 2021-01-01 RX ADMIN — GABAPENTIN 100 MG: 100 CAPSULE ORAL at 09:01

## 2021-01-01 RX ADMIN — HYDROCORTISONE 1 APPLICATION: 1 OINTMENT TOPICAL at 21:31

## 2021-01-01 RX ADMIN — HYDROMORPHONE HYDROCHLORIDE 1 MG: 1 INJECTION, SOLUTION INTRAMUSCULAR; INTRAVENOUS; SUBCUTANEOUS at 08:37

## 2021-01-01 RX ADMIN — GABAPENTIN 100 MG: 100 CAPSULE ORAL at 21:40

## 2021-01-01 RX ADMIN — Medication 1 SPRAY: at 14:02

## 2021-01-01 RX ADMIN — MORPHINE SULFATE 30 MG: 15 TABLET ORAL at 05:11

## 2021-01-01 RX ADMIN — DEXAMETHASONE SODIUM PHOSPHATE 4 MG: 4 INJECTION, SOLUTION INTRA-ARTICULAR; INTRALESIONAL; INTRAMUSCULAR; INTRAVENOUS; SOFT TISSUE at 22:16

## 2021-01-01 RX ADMIN — MONTELUKAST SODIUM 10 MG: 10 TABLET, FILM COATED ORAL at 08:11

## 2021-01-01 RX ADMIN — HYDROCORTISONE 1 APPLICATION: 1 OINTMENT TOPICAL at 13:45

## 2021-01-01 RX ADMIN — NAPROXEN 500 MG: 500 TABLET ORAL at 08:31

## 2021-01-01 RX ADMIN — MONTELUKAST SODIUM 10 MG: 10 TABLET, FILM COATED ORAL at 08:43

## 2021-01-01 RX ADMIN — GABAPENTIN 100 MG: 100 CAPSULE ORAL at 10:12

## 2021-01-01 RX ADMIN — MORPHINE SULFATE 22.5 MG: 15 TABLET ORAL at 20:19

## 2021-01-01 RX ADMIN — FAMOTIDINE 20 MG: 20 TABLET, FILM COATED ORAL at 09:16

## 2021-01-01 RX ADMIN — CEFTRIAXONE 1 G: 1 INJECTION, POWDER, FOR SOLUTION INTRAMUSCULAR; INTRAVENOUS at 08:29

## 2021-01-01 RX ADMIN — MORPHINE SULFATE 2 MG: 2 INJECTION, SOLUTION INTRAMUSCULAR; INTRAVENOUS at 06:47

## 2021-01-01 RX ADMIN — FAMOTIDINE 20 MG: 20 TABLET, FILM COATED ORAL at 17:05

## 2021-01-01 RX ADMIN — HYDROMORPHONE HYDROCHLORIDE 1 MG: 1 INJECTION, SOLUTION INTRAMUSCULAR; INTRAVENOUS; SUBCUTANEOUS at 18:54

## 2021-01-01 RX ADMIN — Medication 1 SPRAY: at 21:32

## 2021-01-01 RX ADMIN — Medication 1 SPRAY: at 18:05

## 2021-01-01 RX ADMIN — MORPHINE SULFATE 60 MG: 30 TABLET, FILM COATED, EXTENDED RELEASE ORAL at 14:31

## 2021-01-01 RX ADMIN — MONTELUKAST SODIUM 10 MG: 10 TABLET, FILM COATED ORAL at 09:16

## 2021-01-01 RX ADMIN — Medication 1 SPRAY: at 14:38

## 2021-01-01 RX ADMIN — MORPHINE SULFATE 60 MG: 30 TABLET, FILM COATED, EXTENDED RELEASE ORAL at 05:37

## 2021-01-01 RX ADMIN — GABAPENTIN 100 MG: 100 CAPSULE ORAL at 21:37

## 2021-01-01 RX ADMIN — FAMOTIDINE 20 MG: 20 TABLET, FILM COATED ORAL at 08:21

## 2021-01-01 RX ADMIN — GABAPENTIN 100 MG: 100 CAPSULE ORAL at 09:25

## 2021-01-01 RX ADMIN — NAPROXEN 500 MG: 500 TABLET ORAL at 17:05

## 2021-01-01 RX ADMIN — HYDROCORTISONE 1 APPLICATION: 1 CREAM TOPICAL at 13:03

## 2021-01-01 RX ADMIN — DEXAMETHASONE 4 MG: 4 TABLET ORAL at 08:12

## 2021-01-01 RX ADMIN — MORPHINE SULFATE 22.5 MG: 15 TABLET ORAL at 08:59

## 2021-01-01 RX ADMIN — MORPHINE SULFATE 15 MG: 15 TABLET ORAL at 13:03

## 2021-01-01 RX ADMIN — NAPROXEN 500 MG: 500 TABLET ORAL at 18:03

## 2021-01-01 RX ADMIN — FAMOTIDINE 20 MG: 20 TABLET, FILM COATED ORAL at 08:57

## 2021-01-01 RX ADMIN — MORPHINE SULFATE 60 MG: 30 TABLET, FILM COATED, EXTENDED RELEASE ORAL at 22:21

## 2021-01-01 RX ADMIN — NAPROXEN 500 MG: 500 TABLET ORAL at 09:00

## 2021-01-01 RX ADMIN — NAPROXEN 500 MG: 500 TABLET ORAL at 17:32

## 2021-01-01 RX ADMIN — SODIUM CHLORIDE 50 ML/HR: 9 INJECTION, SOLUTION INTRAVENOUS at 06:31

## 2021-01-01 RX ADMIN — MORPHINE SULFATE 60 MG: 30 TABLET, FILM COATED, EXTENDED RELEASE ORAL at 14:21

## 2021-01-01 RX ADMIN — DOCUSATE SODIUM 50 MG AND SENNOSIDES 8.6 MG 2 TABLET: 8.6; 5 TABLET, FILM COATED ORAL at 21:31

## 2021-01-01 RX ADMIN — MORPHINE SULFATE 15 MG: 15 TABLET ORAL at 21:41

## 2021-01-01 RX ADMIN — DEXAMETHASONE 4 MG: 4 TABLET ORAL at 08:21

## 2021-01-01 RX ADMIN — DEXAMETHASONE SODIUM PHOSPHATE 4 MG: 4 INJECTION, SOLUTION INTRA-ARTICULAR; INTRALESIONAL; INTRAMUSCULAR; INTRAVENOUS; SOFT TISSUE at 10:11

## 2021-01-01 RX ADMIN — NAPROXEN 500 MG: 500 TABLET ORAL at 17:26

## 2021-01-01 RX ADMIN — DOCUSATE SODIUM 50 MG AND SENNOSIDES 8.6 MG 2 TABLET: 8.6; 5 TABLET, FILM COATED ORAL at 20:20

## 2021-01-01 RX ADMIN — Medication 500 UNITS: at 15:02

## 2021-01-01 RX ADMIN — MONTELUKAST SODIUM 10 MG: 10 TABLET, FILM COATED ORAL at 08:21

## 2021-01-01 RX ADMIN — HYDROCORTISONE 1 APPLICATION: 1 CREAM TOPICAL at 13:16

## 2021-01-01 RX ADMIN — MORPHINE SULFATE 30 MG: 15 TABLET ORAL at 03:38

## 2021-01-01 RX ADMIN — MORPHINE SULFATE 30 MG: 15 TABLET ORAL at 05:18

## 2021-01-01 RX ADMIN — MORPHINE SULFATE 60 MG: 30 TABLET, FILM COATED, EXTENDED RELEASE ORAL at 20:50

## 2021-01-01 RX ADMIN — MORPHINE SULFATE 30 MG: 15 TABLET ORAL at 15:36

## 2021-01-01 RX ADMIN — MORPHINE SULFATE 22.5 MG: 15 TABLET ORAL at 08:11

## 2021-01-01 RX ADMIN — SODIUM CHLORIDE, PRESERVATIVE FREE 10 ML: 5 INJECTION INTRAVENOUS at 08:37

## 2021-01-01 RX ADMIN — HYDROMORPHONE HYDROCHLORIDE 1 MG: 1 INJECTION, SOLUTION INTRAMUSCULAR; INTRAVENOUS; SUBCUTANEOUS at 15:02

## 2021-01-01 RX ADMIN — MORPHINE SULFATE 60 MG: 30 TABLET, FILM COATED, EXTENDED RELEASE ORAL at 21:41

## 2021-01-01 RX ADMIN — MORPHINE SULFATE 60 MG: 30 TABLET, FILM COATED, EXTENDED RELEASE ORAL at 21:37

## 2021-01-01 RX ADMIN — SODIUM CHLORIDE 50 ML/HR: 9 INJECTION, SOLUTION INTRAVENOUS at 00:35

## 2021-01-01 RX ADMIN — SODIUM CHLORIDE 50 ML/HR: 9 INJECTION, SOLUTION INTRAVENOUS at 10:49

## 2021-01-01 RX ADMIN — NAPROXEN 500 MG: 500 TABLET ORAL at 17:28

## 2021-01-01 RX ADMIN — GABAPENTIN 100 MG: 100 CAPSULE ORAL at 08:44

## 2021-01-01 RX ADMIN — GABAPENTIN 100 MG: 100 CAPSULE ORAL at 23:20

## 2021-01-01 RX ADMIN — MORPHINE SULFATE 60 MG: 30 TABLET, FILM COATED, EXTENDED RELEASE ORAL at 21:18

## 2021-01-01 RX ADMIN — Medication 1 SPRAY: at 14:01

## 2021-01-01 RX ADMIN — MORPHINE SULFATE 15 MG: 15 TABLET ORAL at 02:30

## 2021-01-01 RX ADMIN — DEXAMETHASONE 4 MG: 4 TABLET ORAL at 09:01

## 2021-01-01 RX ADMIN — MORPHINE SULFATE 22.5 MG: 15 TABLET ORAL at 08:35

## 2021-01-01 RX ADMIN — MORPHINE SULFATE 15 MG: 15 TABLET ORAL at 16:00

## 2021-01-01 RX ADMIN — Medication 1 SPRAY: at 23:54

## 2021-01-01 RX ADMIN — GABAPENTIN 300 MG: 300 CAPSULE ORAL at 21:30

## 2021-01-01 RX ADMIN — MORPHINE SULFATE 60 MG: 30 TABLET, FILM COATED, EXTENDED RELEASE ORAL at 06:50

## 2021-01-01 RX ADMIN — HYDROMORPHONE HYDROCHLORIDE 1 MG: 1 INJECTION, SOLUTION INTRAMUSCULAR; INTRAVENOUS; SUBCUTANEOUS at 15:52

## 2021-01-01 RX ADMIN — MORPHINE SULFATE 30 MG: 15 TABLET ORAL at 23:08

## 2021-01-01 RX ADMIN — GABAPENTIN 100 MG: 100 CAPSULE ORAL at 21:18

## 2021-01-01 RX ADMIN — HYDROCORTISONE 1 APPLICATION: 1 OINTMENT TOPICAL at 06:51

## 2021-01-01 RX ADMIN — HYDROCORTISONE 1 APPLICATION: 1 OINTMENT TOPICAL at 05:37

## 2021-01-01 RX ADMIN — HYDROMORPHONE HYDROCHLORIDE 1 MG: 1 INJECTION, SOLUTION INTRAMUSCULAR; INTRAVENOUS; SUBCUTANEOUS at 02:16

## 2021-01-01 RX ADMIN — GABAPENTIN 100 MG: 100 CAPSULE ORAL at 20:19

## 2021-01-01 RX ADMIN — Medication 1 SPRAY: at 17:45

## 2021-01-01 RX ADMIN — HYDROMORPHONE HYDROCHLORIDE 1 MG: 1 INJECTION, SOLUTION INTRAMUSCULAR; INTRAVENOUS; SUBCUTANEOUS at 10:51

## 2021-01-01 RX ADMIN — MORPHINE SULFATE 30 MG: 15 TABLET ORAL at 05:48

## 2021-01-01 RX ADMIN — HYDROCORTISONE 1 APPLICATION: 1 OINTMENT TOPICAL at 14:01

## 2021-01-01 RX ADMIN — GABAPENTIN 300 MG: 300 CAPSULE ORAL at 08:35

## 2021-01-01 RX ADMIN — MORPHINE SULFATE 60 MG: 30 TABLET, FILM COATED, EXTENDED RELEASE ORAL at 21:01

## 2021-01-01 RX ADMIN — MORPHINE SULFATE 60 MG: 30 TABLET, FILM COATED, EXTENDED RELEASE ORAL at 13:15

## 2021-01-01 RX ADMIN — HYDROCORTISONE 1 APPLICATION: 1 CREAM TOPICAL at 21:01

## 2021-01-01 RX ADMIN — MORPHINE SULFATE 60 MG: 30 TABLET, FILM COATED, EXTENDED RELEASE ORAL at 05:35

## 2021-01-01 RX ADMIN — CEFTRIAXONE 1 G: 1 INJECTION, POWDER, FOR SOLUTION INTRAMUSCULAR; INTRAVENOUS at 08:44

## 2021-01-01 RX ADMIN — GABAPENTIN 300 MG: 300 CAPSULE ORAL at 21:24

## 2021-01-01 RX ADMIN — MORPHINE SULFATE 60 MG: 30 TABLET, FILM COATED, EXTENDED RELEASE ORAL at 09:03

## 2021-01-01 RX ADMIN — GABAPENTIN 300 MG: 300 CAPSULE ORAL at 21:01

## 2021-01-01 RX ADMIN — DEXAMETHASONE 4 MG: 4 TABLET ORAL at 09:00

## 2021-01-01 RX ADMIN — DOCUSATE SODIUM 50 MG AND SENNOSIDES 8.6 MG 1 TABLET: 8.6; 5 TABLET, FILM COATED ORAL at 14:28

## 2021-01-01 RX ADMIN — Medication 1 SPRAY: at 11:40

## 2021-01-01 RX ADMIN — DOCUSATE SODIUM 50 MG AND SENNOSIDES 8.6 MG 2 TABLET: 8.6; 5 TABLET, FILM COATED ORAL at 21:23

## 2021-01-01 RX ADMIN — MONTELUKAST SODIUM 10 MG: 10 TABLET, FILM COATED ORAL at 20:04

## 2021-01-01 RX ADMIN — HYDROMORPHONE HYDROCHLORIDE 1 MG: 1 INJECTION, SOLUTION INTRAMUSCULAR; INTRAVENOUS; SUBCUTANEOUS at 09:10

## 2021-01-01 RX ADMIN — MORPHINE SULFATE 2 MG: 2 INJECTION, SOLUTION INTRAMUSCULAR; INTRAVENOUS at 22:43

## 2021-01-01 RX ADMIN — FAMOTIDINE 20 MG: 20 TABLET, FILM COATED ORAL at 18:35

## 2021-01-01 RX ADMIN — HYDROMORPHONE HYDROCHLORIDE 1 MG: 1 INJECTION, SOLUTION INTRAMUSCULAR; INTRAVENOUS; SUBCUTANEOUS at 06:31

## 2021-01-01 RX ADMIN — MORPHINE SULFATE 15 MG: 15 TABLET ORAL at 17:47

## 2021-01-01 RX ADMIN — MORPHINE SULFATE 22.5 MG: 15 TABLET ORAL at 18:03

## 2021-01-01 RX ADMIN — HYDROMORPHONE HYDROCHLORIDE 0.5 MG: 1 INJECTION, SOLUTION INTRAMUSCULAR; INTRAVENOUS; SUBCUTANEOUS at 22:16

## 2021-01-01 RX ADMIN — NAPROXEN 500 MG: 500 TABLET ORAL at 09:16

## 2021-01-01 RX ADMIN — MORPHINE SULFATE 22.5 MG: 15 TABLET ORAL at 11:01

## 2021-01-01 RX ADMIN — MORPHINE SULFATE 15 MG: 15 TABLET ORAL at 01:19

## 2021-01-01 RX ADMIN — MORPHINE SULFATE 22.5 MG: 15 TABLET ORAL at 00:00

## 2021-01-01 RX ADMIN — Medication 1 SPRAY: at 17:23

## 2021-01-01 RX ADMIN — MORPHINE SULFATE 60 MG: 30 TABLET, FILM COATED, EXTENDED RELEASE ORAL at 21:39

## 2021-01-01 RX ADMIN — Medication 1 SPRAY: at 13:03

## 2021-01-01 RX ADMIN — MONTELUKAST SODIUM 10 MG: 10 TABLET, FILM COATED ORAL at 21:31

## 2021-01-01 RX ADMIN — HYDROMORPHONE HYDROCHLORIDE 1 MG: 1 INJECTION, SOLUTION INTRAMUSCULAR; INTRAVENOUS; SUBCUTANEOUS at 07:57

## 2021-01-01 RX ADMIN — HYDROMORPHONE HYDROCHLORIDE 1 MG: 1 INJECTION, SOLUTION INTRAMUSCULAR; INTRAVENOUS; SUBCUTANEOUS at 16:58

## 2021-01-01 RX ADMIN — HYDROCORTISONE 1 APPLICATION: 1 CREAM TOPICAL at 05:04

## 2021-01-01 RX ADMIN — GABAPENTIN 100 MG: 100 CAPSULE ORAL at 20:50

## 2021-01-01 RX ADMIN — MORPHINE SULFATE 60 MG: 30 TABLET, FILM COATED, EXTENDED RELEASE ORAL at 13:51

## 2021-01-01 RX ADMIN — HYDROMORPHONE HYDROCHLORIDE 1 MG: 1 INJECTION, SOLUTION INTRAMUSCULAR; INTRAVENOUS; SUBCUTANEOUS at 02:38

## 2021-01-01 RX ADMIN — DEXAMETHASONE 4 MG: 4 TABLET ORAL at 09:25

## 2021-01-01 RX ADMIN — MORPHINE SULFATE 22.5 MG: 15 TABLET ORAL at 19:03

## 2021-01-01 RX ADMIN — MORPHINE SULFATE 15 MG: 15 TABLET ORAL at 16:56

## 2021-01-01 RX ADMIN — Medication 1 SPRAY: at 10:47

## 2021-01-01 RX ADMIN — GABAPENTIN 100 MG: 100 CAPSULE ORAL at 17:02

## 2021-01-01 RX ADMIN — HYDROCORTISONE 1 APPLICATION: 1 CREAM TOPICAL at 14:55

## 2021-01-01 RX ADMIN — Medication 1 SPRAY: at 12:00

## 2021-01-01 RX ADMIN — FAMOTIDINE 20 MG: 20 TABLET, FILM COATED ORAL at 17:26

## 2021-01-01 RX ADMIN — SODIUM CHLORIDE, PRESERVATIVE FREE 10 ML: 5 INJECTION INTRAVENOUS at 15:40

## 2021-01-01 RX ADMIN — GABAPENTIN 100 MG: 100 CAPSULE ORAL at 16:41

## 2021-01-01 RX ADMIN — PANTOPRAZOLE SODIUM 40 MG: 40 TABLET, DELAYED RELEASE ORAL at 09:25

## 2021-01-01 RX ADMIN — HYDROMORPHONE HYDROCHLORIDE 1 MG: 1 INJECTION, SOLUTION INTRAMUSCULAR; INTRAVENOUS; SUBCUTANEOUS at 04:40

## 2021-01-01 RX ADMIN — Medication 1 SPRAY: at 06:43

## 2021-01-01 RX ADMIN — HYDROCORTISONE 1 APPLICATION: 1 CREAM TOPICAL at 05:59

## 2021-01-01 RX ADMIN — GABAPENTIN 300 MG: 300 CAPSULE ORAL at 09:16

## 2021-01-01 RX ADMIN — Medication 1 SPRAY: at 11:45

## 2021-01-01 RX ADMIN — Medication 500 UNITS: at 15:40

## 2021-01-01 RX ADMIN — HYDROMORPHONE HYDROCHLORIDE 1 MG: 1 INJECTION, SOLUTION INTRAMUSCULAR; INTRAVENOUS; SUBCUTANEOUS at 04:05

## 2021-01-01 RX ADMIN — MORPHINE SULFATE 30 MG: 15 TABLET ORAL at 11:11

## 2021-01-01 RX ADMIN — MONTELUKAST SODIUM 10 MG: 10 TABLET, FILM COATED ORAL at 20:20

## 2021-01-01 RX ADMIN — FAMOTIDINE 20 MG: 20 TABLET, FILM COATED ORAL at 08:31

## 2021-01-01 RX ADMIN — NAPROXEN 500 MG: 500 TABLET ORAL at 09:01

## 2021-01-01 RX ADMIN — MORPHINE SULFATE 22.5 MG: 15 TABLET ORAL at 03:07

## 2021-01-01 RX ADMIN — SODIUM CHLORIDE, PRESERVATIVE FREE 10 ML: 5 INJECTION INTRAVENOUS at 20:50

## 2021-01-01 RX ADMIN — NAPROXEN 500 MG: 500 TABLET ORAL at 08:57

## 2021-01-01 RX ADMIN — MORPHINE SULFATE 15 MG: 15 TABLET ORAL at 10:29

## 2021-01-01 RX ADMIN — MORPHINE SULFATE 60 MG: 30 TABLET, FILM COATED, EXTENDED RELEASE ORAL at 05:57

## 2021-01-01 RX ADMIN — GABAPENTIN 300 MG: 300 CAPSULE ORAL at 21:31

## 2021-01-01 RX ADMIN — Medication 1 SPRAY: at 05:36

## 2021-01-01 RX ADMIN — MORPHINE SULFATE 60 MG: 30 TABLET, FILM COATED, EXTENDED RELEASE ORAL at 23:49

## 2021-01-01 RX ADMIN — MORPHINE SULFATE 60 MG: 30 TABLET, FILM COATED, EXTENDED RELEASE ORAL at 14:01

## 2021-01-01 RX ADMIN — HYDROMORPHONE HYDROCHLORIDE 0.5 MG: 1 INJECTION, SOLUTION INTRAMUSCULAR; INTRAVENOUS; SUBCUTANEOUS at 14:18

## 2021-01-01 RX ADMIN — MORPHINE SULFATE 15 MG: 15 TABLET ORAL at 04:05

## 2021-01-01 RX ADMIN — MORPHINE SULFATE 30 MG: 15 TABLET ORAL at 16:09

## 2021-01-01 RX ADMIN — PANTOPRAZOLE SODIUM 40 MG: 40 TABLET, DELAYED RELEASE ORAL at 08:43

## 2021-01-01 RX ADMIN — NAPROXEN 500 MG: 500 TABLET ORAL at 11:55

## 2021-01-01 RX ADMIN — HYDROXYZINE HYDROCHLORIDE 50 MG: 25 TABLET ORAL at 01:20

## 2021-01-01 RX ADMIN — MORPHINE SULFATE 60 MG: 30 TABLET, FILM COATED, EXTENDED RELEASE ORAL at 08:19

## 2021-01-01 RX ADMIN — HYDROMORPHONE HYDROCHLORIDE 1 MG: 1 INJECTION, SOLUTION INTRAMUSCULAR; INTRAVENOUS; SUBCUTANEOUS at 07:04

## 2021-01-01 RX ADMIN — NAPROXEN 500 MG: 500 TABLET ORAL at 08:25

## 2021-01-01 RX ADMIN — GABAPENTIN 300 MG: 300 CAPSULE ORAL at 08:57

## 2021-01-01 RX ADMIN — MORPHINE SULFATE 30 MG: 15 TABLET ORAL at 17:28

## 2021-01-01 RX ADMIN — CEFTRIAXONE 1 G: 1 INJECTION, POWDER, FOR SOLUTION INTRAMUSCULAR; INTRAVENOUS at 08:24

## 2021-01-01 RX ADMIN — Medication 1 SPRAY: at 18:35

## 2021-01-01 RX ADMIN — HYDROXYZINE HYDROCHLORIDE 50 MG: 25 TABLET ORAL at 01:48

## 2021-01-01 RX ADMIN — MORPHINE SULFATE 60 MG: 30 TABLET, FILM COATED, EXTENDED RELEASE ORAL at 14:37

## 2021-01-01 RX ADMIN — CEFTRIAXONE 1 G: 1 INJECTION, POWDER, FOR SOLUTION INTRAMUSCULAR; INTRAVENOUS at 10:09

## 2021-01-01 RX ADMIN — MORPHINE SULFATE 60 MG: 30 TABLET, FILM COATED, EXTENDED RELEASE ORAL at 08:25

## 2021-01-01 RX ADMIN — MORPHINE SULFATE 60 MG: 30 TABLET, FILM COATED, EXTENDED RELEASE ORAL at 07:34

## 2021-01-01 RX ADMIN — MORPHINE SULFATE 2 MG: 2 INJECTION, SOLUTION INTRAMUSCULAR; INTRAVENOUS at 20:01

## 2021-01-01 RX ADMIN — GABAPENTIN 300 MG: 300 CAPSULE ORAL at 08:31

## 2021-01-01 RX ADMIN — HYDROXYZINE HYDROCHLORIDE 50 MG: 25 TABLET ORAL at 05:21

## 2021-01-01 RX ADMIN — Medication 1 SPRAY: at 09:00

## 2021-01-01 RX ADMIN — Medication 1 SPRAY: at 08:36

## 2021-01-01 RX ADMIN — FAMOTIDINE 20 MG: 20 TABLET, FILM COATED ORAL at 17:36

## 2021-01-01 RX ADMIN — GABAPENTIN 300 MG: 300 CAPSULE ORAL at 08:11

## 2021-01-01 RX ADMIN — SODIUM CHLORIDE, PRESERVATIVE FREE 10 ML: 5 INJECTION INTRAVENOUS at 08:44

## 2021-01-01 RX ADMIN — DEXAMETHASONE 4 MG: 4 TABLET ORAL at 08:25

## 2021-01-01 RX ADMIN — SODIUM CHLORIDE, PRESERVATIVE FREE 10 ML: 5 INJECTION INTRAVENOUS at 08:22

## 2021-01-01 RX ADMIN — MORPHINE SULFATE 30 MG: 15 TABLET ORAL at 23:52

## 2021-01-01 RX ADMIN — DOCUSATE SODIUM 50 MG AND SENNOSIDES 8.6 MG 1 TABLET: 8.6; 5 TABLET, FILM COATED ORAL at 11:10

## 2021-01-01 RX ADMIN — MORPHINE SULFATE 60 MG: 30 TABLET, FILM COATED, EXTENDED RELEASE ORAL at 09:20

## 2021-01-01 RX ADMIN — MORPHINE SULFATE 2 MG: 2 INJECTION, SOLUTION INTRAMUSCULAR; INTRAVENOUS at 17:30

## 2021-01-01 RX ADMIN — FAMOTIDINE 20 MG: 20 TABLET, FILM COATED ORAL at 08:35

## 2021-01-01 RX ADMIN — HYDROCODONE BITARTRATE AND ACETAMINOPHEN 2 TABLET: 10; 325 TABLET ORAL at 03:05

## 2021-01-01 RX ADMIN — MORPHINE SULFATE 30 MG: 15 TABLET ORAL at 19:35

## 2021-01-01 RX ADMIN — HYDROMORPHONE HYDROCHLORIDE 1 MG: 1 INJECTION, SOLUTION INTRAMUSCULAR; INTRAVENOUS; SUBCUTANEOUS at 14:28

## 2021-01-01 RX ADMIN — MORPHINE SULFATE 30 MG: 15 TABLET ORAL at 20:18

## 2021-01-01 RX ADMIN — HYDROCORTISONE 1 APPLICATION: 1 OINTMENT TOPICAL at 22:22

## 2021-01-01 RX ADMIN — Medication 1 SPRAY: at 17:36

## 2021-01-01 RX ADMIN — DOCUSATE SODIUM 50 MG AND SENNOSIDES 8.6 MG 2 TABLET: 8.6; 5 TABLET, FILM COATED ORAL at 20:50

## 2021-01-01 RX ADMIN — MORPHINE SULFATE 60 MG: 30 TABLET, FILM COATED, EXTENDED RELEASE ORAL at 14:03

## 2021-01-01 RX ADMIN — PANTOPRAZOLE SODIUM 40 MG: 40 TABLET, DELAYED RELEASE ORAL at 08:25

## 2021-01-01 RX ADMIN — MORPHINE SULFATE 22.5 MG: 15 TABLET ORAL at 04:14

## 2021-01-01 RX ADMIN — HYDROCORTISONE 1 APPLICATION: 1 OINTMENT TOPICAL at 14:32

## 2021-01-01 RX ADMIN — MORPHINE SULFATE 60 MG: 30 TABLET, FILM COATED, EXTENDED RELEASE ORAL at 20:54

## 2021-01-01 RX ADMIN — HYDROMORPHONE HYDROCHLORIDE 1 MG: 1 INJECTION, SOLUTION INTRAMUSCULAR; INTRAVENOUS; SUBCUTANEOUS at 01:57

## 2021-01-01 RX ADMIN — SODIUM CHLORIDE, PRESERVATIVE FREE 10 ML: 5 INJECTION INTRAVENOUS at 21:38

## 2021-01-01 RX ADMIN — DEXAMETHASONE 4 MG: 4 TABLET ORAL at 08:43

## 2021-01-01 RX ADMIN — MONTELUKAST SODIUM 10 MG: 10 TABLET, FILM COATED ORAL at 21:24

## 2021-01-01 RX ADMIN — HYDROCODONE BITARTRATE AND ACETAMINOPHEN 1 TABLET: 10; 325 TABLET ORAL at 00:17

## 2021-01-01 RX ADMIN — MORPHINE SULFATE 30 MG: 15 TABLET ORAL at 01:30

## 2021-01-01 RX ADMIN — GABAPENTIN 300 MG: 300 CAPSULE ORAL at 08:21

## 2021-01-01 RX ADMIN — NAPROXEN 500 MG: 500 TABLET ORAL at 09:25

## 2021-01-01 RX ADMIN — NAPROXEN 500 MG: 500 TABLET ORAL at 17:36

## 2021-01-01 RX ADMIN — MONTELUKAST SODIUM 10 MG: 10 TABLET, FILM COATED ORAL at 09:25

## 2021-01-01 RX ADMIN — HYDROMORPHONE HYDROCHLORIDE 1 MG: 1 INJECTION, SOLUTION INTRAMUSCULAR; INTRAVENOUS; SUBCUTANEOUS at 14:38

## 2021-01-01 RX ADMIN — NAPROXEN 500 MG: 500 TABLET ORAL at 23:53

## 2021-01-01 RX ADMIN — MORPHINE SULFATE 22.5 MG: 15 TABLET ORAL at 04:16

## 2021-01-01 RX ADMIN — CEFTRIAXONE 1 G: 1 INJECTION, POWDER, FOR SOLUTION INTRAMUSCULAR; INTRAVENOUS at 09:25

## 2021-01-01 RX ADMIN — MORPHINE SULFATE 60 MG: 30 TABLET, FILM COATED, EXTENDED RELEASE ORAL at 06:43

## 2021-01-01 RX ADMIN — MORPHINE SULFATE 30 MG: 15 TABLET ORAL at 09:32

## 2021-01-01 RX ADMIN — DOCUSATE SODIUM 50 MG AND SENNOSIDES 8.6 MG 2 TABLET: 8.6; 5 TABLET, FILM COATED ORAL at 20:18

## 2021-01-01 RX ADMIN — MONTELUKAST SODIUM 10 MG: 10 TABLET, FILM COATED ORAL at 08:27

## 2021-01-01 RX ADMIN — MORPHINE SULFATE 22.5 MG: 15 TABLET ORAL at 16:09

## 2021-01-01 RX ADMIN — GABAPENTIN 100 MG: 100 CAPSULE ORAL at 15:36

## 2021-01-01 RX ADMIN — PANTOPRAZOLE SODIUM 40 MG: 40 TABLET, DELAYED RELEASE ORAL at 08:19

## 2021-01-01 RX ADMIN — DEXAMETHASONE 4 MG: 4 TABLET ORAL at 08:35

## 2021-01-01 RX ADMIN — GABAPENTIN 300 MG: 300 CAPSULE ORAL at 08:12

## 2021-01-01 RX ADMIN — Medication 1 SPRAY: at 21:24

## 2021-01-01 RX ADMIN — MORPHINE SULFATE 15 MG: 15 TABLET ORAL at 04:24

## 2021-01-01 RX ADMIN — MORPHINE SULFATE 60 MG: 30 TABLET, FILM COATED, EXTENDED RELEASE ORAL at 13:03

## 2021-01-01 RX ADMIN — PANTOPRAZOLE SODIUM 40 MG: 40 TABLET, DELAYED RELEASE ORAL at 08:22

## 2021-01-01 RX ADMIN — SODIUM CHLORIDE, PRESERVATIVE FREE 10 ML: 5 INJECTION INTRAVENOUS at 22:37

## 2021-01-01 RX ADMIN — HYDROCORTISONE 1 APPLICATION: 1 OINTMENT TOPICAL at 21:30

## 2021-01-01 RX ADMIN — NAPROXEN 500 MG: 500 TABLET ORAL at 17:02

## 2021-01-01 RX ADMIN — MORPHINE SULFATE 22.5 MG: 15 TABLET ORAL at 08:12

## 2021-01-01 RX ADMIN — SODIUM CHLORIDE 50 ML/HR: 9 INJECTION, SOLUTION INTRAVENOUS at 22:37

## 2021-01-01 RX ADMIN — MORPHINE SULFATE 30 MG: 15 TABLET ORAL at 11:55

## 2021-01-01 RX ADMIN — MORPHINE SULFATE 30 MG: 15 TABLET ORAL at 00:34

## 2021-01-01 RX ADMIN — NAPROXEN 500 MG: 500 TABLET ORAL at 17:44

## 2021-01-01 RX ADMIN — MORPHINE SULFATE 60 MG: 30 TABLET, FILM COATED, EXTENDED RELEASE ORAL at 21:24

## 2021-01-01 RX ADMIN — DEXAMETHASONE 4 MG: 4 TABLET ORAL at 08:31

## 2021-01-01 RX ADMIN — Medication 1 SPRAY: at 06:50

## 2021-01-01 RX ADMIN — SODIUM CHLORIDE, PRESERVATIVE FREE 10 ML: 5 INJECTION INTRAVENOUS at 20:18

## 2021-01-01 RX ADMIN — DOCUSATE SODIUM 50 MG AND SENNOSIDES 8.6 MG 2 TABLET: 8.6; 5 TABLET, FILM COATED ORAL at 21:18

## 2021-01-01 RX ADMIN — SODIUM CHLORIDE 50 ML/HR: 9 INJECTION, SOLUTION INTRAVENOUS at 13:52

## 2021-01-05 NOTE — PROGRESS NOTES
Outpatient Physical Therapy Ortho Treatment Note       Patient Name: Ayla Echeverria  : 1965  MRN: [id number]  Today's Date: 2021      Visit Date: 2021    Visit Dx:    ICD-10-CM ICD-9-CM   1. Decreased ROM of left shoulder  M25.612 719.51   2. Decreased ROM of right shoulder  M25.611 719.51   3. Lymphedema  I89.0 457.1       Patient Active Problem List   Diagnosis   • Bilateral acute serous otitis media   • Malignant neoplasm involving both nipple and areola of left breast in female (CMS/HCC)   • Cellulitis of abdominal wall   • Impetigo   • Folliculitis   • Encounter for consultation   • Non-smoker   • S/P lumpectomy, left breast   • S/P lymph node biopsy   • Regional lymph node staging category N3 (CMS/HCC)   • On antineoplastic chemotherapy   • Regional lymph node metastasis present (CMS/HCC)   • History of radiation therapy   • Painful skin lesion        Past Medical History:   Diagnosis Date   • Breast cancer (CMS/HCC)         Past Surgical History:   Procedure Laterality Date   • AXILLARY LYMPH NODE BIOPSY/EXCISION Left    • BREAST BIOPSY Left 2016   • BREAST LUMPECTOMY WITH SENTINEL NODE BIOPSY Left 10/03/2016    residual invasive carcinoma, grade 3 (0.2cm); margins negative; extensive lymphvascular space invasion; 2 sentinel lymph nodes positive (2/3)               Lymphedema     Row Name 21 1546             Subjective Pain    Able to rate subjective pain?  yes  -AL      Pre-Treatment Pain Level  0  -AL      Post-Treatment Pain Level  0  -AL         Manual Lymphatic Drainage    Manual Lymphatic Drainage  initial sequence  -AL      Initial Sequence  short neck;abdomen;diaphragmatic breathing  -AL      Abdomen  superficial  -AL      Diaphragmatic Breathing  x 10  -AL      Opened Regional Lymph Nodes  inguinal  -AL      Inguinal  right;left  -AL      Opened Anastamoses  axillo-inguinal  -AL      Axillo-Inguinal  right;left  -AL      Manual Lymphatic Drainage Comments  Upper chest  and both breasts  -AL        User Key  (r) = Recorded By, (t) = Taken By, (c) = Cosigned By    Initials Name Provider Type    Jaycee Farris PTA, CLT-LANA Physical Therapy Assistant              PT Assessment/Plan     Row Name 01/05/21 1543          PT Assessment    Assessment Comments  Patient is having less pain today in the upper chest and no tightness.  She took pain meds early this am, but has not had to take anymore.  She would benefit from chair yoga to help strech the chest and shoulders.   -AL        PT Plan    PT Plan Comments  Continue with MLD, will look at options for chair yoga.   -AL       User Key  (r) = Recorded By, (t) = Taken By, (c) = Cosigned By    Initials Name Provider Type    Jaycee Farris PTA, CLT-LANA Physical Therapy Assistant            OP Exercises     Row Name 01/05/21 1642             Subjective Comments    Subjective Comments  She feels pretty good today, she states she did not have good posture today.   She states she feels like both breasts are not as dense.   -AL         Subjective Pain    Able to rate subjective pain?  yes  -AL      Pre-Treatment Pain Level  0  -AL      Post-Treatment Pain Level  0  -AL         Total Minutes    59654 - PT Therapeutic Exercise Minutes  15  -AL      22181 - PT Manual Therapy Minutes  30  -AL         Exercise 1    Exercise Name 1  pec stretch with small roll against wall  -AL      Cueing 1  Verbal  -AL      Sets 1  2  -AL      Reps 1  10  -AL         Exercise 2    Exercise Name 2  wall angels with small bolster along spine  -AL      Cueing 2  Verbal  -AL      Sets 2  2  -AL      Reps 2  10  -AL         Exercise 3    Exercise Name 3  scapular depression  -AL      Cueing 3  Verbal  -AL      Sets 3  2  -AL      Reps 3  10  -AL         Exercise 4    Exercise Name 4  Discussed patient looking into chair yoga she can do at home.   -AL        User Key  (r) = Recorded By, (t) = Taken By, (c) = Cosigned By    Initials Name Provider Type    GWYN Mohan  Jaycee DICK PTA, CLT-LANA Physical Therapy Assistant                      Manual Rx (last 36 hours)      Manual Treatments     Row Name 01/05/21 1546             Total Minutes    85636 - PT Manual Therapy Minutes  30  -AL        User Key  (r) = Recorded By, (t) = Taken By, (c) = Cosigned By    Initials Name Provider Type    Jaycee Farris PTA, CLT-LANA Physical Therapy Assistant          PT OP Goals     Row Name 01/05/21 1546          PT Short Term Goals    STG Date to Achieve  11/14/20  -AL     STG 1  Patient will begin a home exercise program to accelerate the recovery process  -AL     STG 1 Progress  Met  -AL     STG 2  Patient will have an increase in bilateral shoulder flexion to 160 deg in supine  -AL     STG 2 Progress  Met  -AL     STG 3  Patient will have a 25% reduction in chest and shoulder pain  -AL     STG 3 Progress  Met  -AL     STG 4  Patient will gain an understanding of Lymphedema and risk factors  -AL     STG 4 Progress  Met  -AL        Long Term Goals    LTG Date to Achieve  12/14/20  -AL     LTG 1  Patient will be able to reach up into an overead cabinet to retrieve a 5 lb dish safely  -AL     LTG 1 Progress  Met  -AL     LTG 2  Patient will regain ROM to reach behind her back as if she was undoing a bra   -AL     LTG 2 Progress  Met  -AL     LTG 3  Patient will have a 75 - 90% reduction in tightness/pain across chest and shoulders  -AL     LTG 3 Progress  Progressing  -AL     LTG 3 Progress Comments  Improving  -AL     LTG 4  Patient will be able to perform household chores without exacerbation of symptoms  -AL     LTG 4 Progress  Progressing  -AL     LTG 4 Progress Comments  Improving  -AL     LTG 5  Patient will regain functional ROM of both shoulders to perform activities overhead, reaching out to retrieve food from a drive-thru, reaching behind her back to fasten/unfasten bras,clothing  -AL     LTG 5 Progress  Met  -AL     LTG 6  Patient will be instructed in a long term exercise program  to continue to make gains following discharge  -AL     LTG 6 Progress  Ongoing;Progressing  -AL        Time Calculation    PT Goal Re-Cert Due Date  01/16/21  -AL       User Key  (r) = Recorded By, (t) = Taken By, (c) = Cosigned By    Initials Name Provider Type    Jaycee Farris PTA, CLT-LANA Physical Therapy Assistant          Therapy Education  Education Details: Work on HEP and MLD.  Check into chair yoga.  Given: HEP, Edema management  Program: Reinforced, Progressed  How Provided: Verbal, Demonstration, Written  Provided to: Patient  Level of Understanding: Verbalized, Demonstrated              Time Calculation:                    Jaycee Mohan PTA, CLT-LANA  1/5/2021

## 2021-01-11 DIAGNOSIS — G89.3 CANCER ASSOCIATED PAIN: ICD-10-CM

## 2021-01-11 DIAGNOSIS — C50.412 MALIGNANT NEOPLASM OF UPPER-OUTER QUADRANT OF LEFT FEMALE BREAST, UNSPECIFIED ESTROGEN RECEPTOR STATUS (HCC): ICD-10-CM

## 2021-01-12 RX ORDER — OXYCODONE AND ACETAMINOPHEN 7.5; 325 MG/1; MG/1
1 TABLET ORAL EVERY 6 HOURS PRN
Qty: 120 TABLET | Refills: 0 | Status: SHIPPED | OUTPATIENT
Start: 2021-01-12 | End: 2021-03-12

## 2021-01-12 NOTE — PROGRESS NOTES
Physical Therapy Ortho 90 Day Recertification Note    Patient:           Ayla Echeverria            : 1965  Today's Date: 2021  Referring practitioner: Gloria Hendricks*  Date of Initial Visit:       Type: THERAPY  Noted: 10/15/2020  Patient seen for 18 sessions    Visit Diagnoses:    ICD-10-CM ICD-9-CM   1. Decreased ROM of left shoulder  M25.612 719.51   2. Decreased ROM of right shoulder  M25.611 719.51   3. Lymphedema  I89.0 457.1       SUBJECTIVE     Subjective:  She started feeling worse on , she has a lot of tightness in her chest and shoulders.  She has pain in her back where she states there is a spot from radiation that itches and burns.  This will flare up every so often.    PAIN: 4/10 chest, shoulders, and back.  Pain after treatment 2/10.       OBJECTIVE     Objective   Lymphedema     Row Name 21 1550             Manual Lymphatic Drainage    Manual Lymphatic Drainage  initial sequence  -AL      Initial Sequence  short neck;abdomen;diaphragmatic breathing  -AL      Abdomen  superficial  -AL      Opened Regional Lymph Nodes  inguinal  -AL      Inguinal  right;left  -AL      Opened Anastamoses  axillo-inguinal  -AL      Axillo-Inguinal  right;left  -AL      Manual Lymphatic Drainage Comments  Upper chest and both breasts.  Stretching into flexion both shoulders, as well as inferior and posterior mobs both shoulders.  -AL      Manual Therapy  Manual therapy:  30 minutes  -AL         Compression/Skin Care    Compression/Skin Care  skin care  -AL      Skin Care  lotion applied  -AL      Compression/Skin Care Comments  Applied lotion to patients back  -AL        User Key  (r) = Recorded By, (t) = Taken By, (c) = Cosigned By    Initials Name Provider Type    AL Jaycee Mohan, PTA, CLT-EVANGELINA Physical Therapy Assistant         Therapeutic Exercises    26759 Comments   Supine hooklying with 1 pound bar worked on [employer] B shoulder flexion Very tight today   Attempted to have patient  lay with towel roll along spine.  Not able to tolerate               Timed Minutes 15 min       Therapy Education/Self Care 43804   Details: Work on HEP  2-3 x per day and MLD daily   [employer] Code:    Given HEP;Edema management   Progress: Reinforced   Who provided to: Luís   Level of understanding Verbalized   Timed Minutes      Goals  Recert Due:     STG by: 11/14/20 Comments Status   Patient will begin a home exercise program to accelerate the recovery process  She is working on her HEP Met   Patient will have an increase in bilateral shoulder flexion to 160 deg in supine L shoulder flexion:  145 degrees    R shoulder flexion:  165 degrees Ongoing   Patient will have a 25% reduction in chest and shoulder pain 45% reduction of pain Met   Patient will gain an understanding of Lymphedema and risk factors  She understands lymphedema and risk factors Met   LTG by:       Patient will be able to reach up into an overead cabinet to retrieve a 5 lb dish safely  She is able to reach overhead to get a 5 pound dish Met    Patient will regain ROM to reach behind her back as if she was undoing a bra Able to reach behind back.  Met    Patient will have a 75 - 90% reduction in tightness/pain across chest and shoulders Pain and tightness has reduced 45% Progressing   Patient will be able to perform household chores without exacerbation of symptoms She is able to do most household chores with less symptoms Ongoing, progressing, partially met   Patient will regain functional ROM of both shoulders to perform activities overhead, reaching out to retrieve food from a drive-thru, reaching behind her back to fasten/unfasten bras,clothing   She is able to do the activities, but will have some pain with them.  Met   Patient will be instructed in a long term exercise program to continue to make gains following discharge She is working on her HEP, needs to do this more than once a day. Ongoing progressing      Total Timed Treatment:      45   mins  Total Time of Visit:            45  mins         ASSESSMENT/PLAN     Assessment/Plan     ASSESSEMENT: Patient has tightness in both shoulders today, more so on the left.  She has been working on her HEP daily, and working some on the MLD.  She will start working on the HEP 2-3 x per day and working on the MLD daily.  Stressed importance of working on HEP and MLD to reduce and prevent tightness in the upper chest and shoulders.  She has dense tissue L breast today, this does soften with the MLD.     PLAN: Will continue with MLD and working on increasing ROM both shoulders, a well as decreasing tightness in upper chest.     Jyacee Mohan, VAUGHN, CLT-EVANGELINA  Physical Therapy Assistant

## 2021-01-15 NOTE — PROGRESS NOTES
30 Day Progress Note and 90 Day Recertification Addendum      Patient: Ayla Echeverria           : 1965  Today's Date: 2021  Referring practitioner: Gloria Hendricks*  Date of Initial Visit: Type: THERAPY  Noted: 10/15/2020  Patient seen for 18 sessions  Visit Diagnoses:    ICD-10-CM ICD-9-CM   1. Decreased ROM of left shoulder  M25.612 719.51   2. Decreased ROM of right shoulder  M25.611 719.51   3. Lymphedema  I89.0 457.1          Clinical Progress: improved  Home Program Compliance: Yes  Treatment has included: therapeutic exercise, manual therapy and therapeutic activity  Progress toward previous goals: Partially Met  Prognosis to achieve goals: good    Subjective   Objective   Assessment/Plan    I have reviewed the progress note information provided by Flori Mohan PTA, and I concur with their findings.  Keyla Bennett, PT, DPT, CLT-EVANGELINA  Physical Therapist    90 Day Recertification  Certification Period: 2021  Based upon review of the patient's progress and continued therapy plan, it is my medical opinion that Ayla Echeverria should continue physical therapy treatment at [facility]     PHYSICIAN: Gloria Crockett PA-C______________________________________________DATE: ___________________     Please sign and return via fax to [phone].   Thank you so much for letting us work with Ayla. I appreciate your letting us work with your patients. If you have any questions or concerns, please don't hesitate to contact me.

## 2021-01-26 NOTE — PROGRESS NOTES
Physical Therapy Ortho Treatment Note    Patient:           Ayla Echeverria            : 1965  Today's Date: 2021  Referring practitioner: Gloria Hendricks*  Date of Initial Visit:       Type: THERAPY  Noted: 10/15/2020  Patient seen for 20 sessions    Visit Diagnoses:    ICD-10-CM ICD-9-CM   1. Decreased ROM of left shoulder  M25.612 719.51   2. Decreased ROM of right shoulder  M25.611 719.51   3. Lymphedema  I89.0 457.1       SUBJECTIVE     Subjective:  She is having a pretty good day today, she started her chemo pill  and she has two more days to take it.  She is exercising more at home and working on [employer].     PAIN: 2/10 upper chest  Pain after treatment 0/10.       OBJECTIVE     Objective   Lymphedema     Row Name 21 1550             Manual Lymphatic Drainage    Manual Lymphatic Drainage  initial sequence  -AL      Initial Sequence  short neck;abdomen;diaphragmatic breathing  -AL      Abdomen  superficial  -AL      Opened Regional Lymph Nodes  inguinal  -AL      Inguinal  right;left  -AL      Opened Anastamoses  axillo-inguinal  -AL      Axillo-Inguinal  right;left  -AL      Manual Lymphatic Drainage Comments  Upper chest and both breasts where dense tissue is present.   Posterior joint mobs to both shoulders.   -AL      Manual Therapy  Manual therapy:  30 minutes  -AL        User Key  (r) = Recorded By, (t) = Taken By, (c) = Cosigned By    Initials Name Provider Type    AL Jaycee Mohan PTA, CLT-EVANGELINA Physical Therapy Assistant         Therapeutic Exercises    33217 Comments   Door stretch one arm at a time 2 x 30 seconds added to HEP   Corner stretch X 30 seconds, add to HEP   Wall climbs each side 1 x 10   Chin tucks 1 x 10 added to HEP       Timed Minutes 15 min       Therapy Education/Self Care 25583   Details: Door stretch one arm at a time, Corner stretch, Chin tucks   [facility] Code: [id number]   Given HEP   Progress: New   Who provided to: Patent   Level of  understanding Verbalized and demonstrated   Timed Minutes      Goals  Progress Note Due:  2/11/2021   STG by: 11/14/20 Comments Status   Patient will begin a home exercise program to accelerate the recovery process  She is working on the HEP more often Met   Patient will have an increase in bilateral shoulder flexion to 160 deg in supine  Ongoing   Patient will have a 25% reduction in chest and shoulder pain  Met   Patient will gain an understanding of Lymphedema and risk factors   Met   LTG by:       Patient will be able to reach up into an overhead cabinet to retrieve a 5 lb dish safely   Met    Patient will regain ROM to reach behind her back as if she was undoing a bra  Met    Patient will have a 75 - 90% reduction in tightness/pain across chest and shoulders She has tightness today Progressing   Patient will be able to perform household chores without exacerbation of symptoms Over the weekend, household chores were ok. Ongoing, progressing, partially met   Patient will regain functional ROM of both shoulders to perform activities overhead, reaching out to retrieve food from a drive-thru, reaching behind her back to fasten/unfasten bras,clothing    Met   Patient will be instructed in a long term exercise program to continue to make gains following discharge Working on HEP and stretching more often.  Ongoing progressing      Total Timed Treatment:     45   mins  Total Time of Visit:            45  mins         ASSESSMENT/PLAN     Assessment/Plan     ASSESSMENT: Pain is less today, patient has been working more on her HEP and stretching for the neck, pecs, and shoulders.  She is trying to work on her posture more as well.  She understands if she does not work on her HEP she will become tighter.  She has dense tissue L breast that softens with the MLD.     PLAN: Will continue with MLD and working on increasing ROM both shoulders, a well as decreasing tightness in upper chest.     Jaycee Mohan, PTA,  CLT-EVANGELINA  Physical Therapy Assistant

## 2021-01-28 NOTE — PROGRESS NOTES
Igor Mueller   1965 1/29/2021     Chief Complaint   Patient presents with    Follow-up     Malignant neoplasm of upper-outer quadrant of left female breast, unspecified estrogen receptor status ([facility] [address]      Interval history/history of present illness:  Diagnosis   IDC left breast, March 2016   Grade 2. ER 1%, NC 6%, HER-2/oseas IHC 3+/FISH amplified ratio 6.7, AR 95%. 01/30/2017- Biopsy-proven in breast relapse/Inflammatory breast cancer. ER negative, NC negative, HER-2/oseas IHC 3+/FISH amplified ratio 4.5, Ki-67 32%. MyRisk - Revealed no clinically significant mutation. PTEN VUS. Treatment summary  Neoadjuvant adjuvant chemotherapy TCH-P ×6 cycles. She declined to complete 1 year of Herceptin. She declined radiation therapy. 10/3/2016-left lumpectomy/sentinel lymph node   01/30/2017- Biopsy-proven in breast relapse/Inflammatory breast cancer. 5/11/2017-Chemotherapy with paclitaxel/carboplatin weekly. Herceptin/Pertuzumab every 3 weeks started 05/11/2017. She had a allergic reaction to carboplatin and this was discontinued. 7/21/2017- 9/1/2017 Neuropathy grade 2 with Taxol. Therefore, switched to Taxotere/Herceptin/Pertuzumab. Discontinued due to progression of disease. 9/22/2017 through 3/2/2018Ezell Patron every 3 weeks. (stopped as per patient request, neuropathy grade 1)   7/10/2018 through 11/30/2018- Kadcyla. 1/4/2019-Navelbine x 1 cycle with very poor tolerance (pain)   1/25/2018-Gemzar/Herceptin   3/15/2019- low-dose Cisplatin added to Gemzar and Herceptin regimen, regimen changed to every other week   4/17/2019- palliative radiation to left clavicle/chest wall for anticipate total dosing of 5040 cGy   6/22/2019- Xeloda/apatinib  January 2020-palliative radiation to the right breast.  1/10/2020- palliative treatment with Fam-Trastuzumab- stopped Feb due to severe toxicity  September 2020- palliative radiation left chest wall skin metastasis.   8/21/2020-Herceptin, Xeloda and Tucatinib  Tumor target  Skin chest wall left  upper chest/left breast     Interval history  Blanca Beltran is a pleasant 54years old female who has been diagnosed with recurrent HER-2 positive/ER 1% and MI 6% invasive ductal carcinoma of the breast.  Her pattern of recurrence is diffuse skin involvement of the upper chest bilaterally and back on the left. She received several lines of chemotherapy in the past.  She has had very poor tolerance to chemotherapy overall. She was last treated with radiation to the skin lesions and is currently receiving treatment with tucatinib, Xeloda and Herceptin. She is taking Xeloda 500 mg p.o. twice daily 7 days on and 7 days off but feels that hurts her stomach on the day 6. She has been taking tucatinib without major problem. She also presents today for her Herceptin. She has had a complete response in the skin. No visible lesion at this time. Skin discoloration secondary to radiation. She has not had any repeat echo in a while. She has gained some weight since last visit with me. Cancer history- Left breast recurrence with Inflammatory Breast Cancer ER 8%/MI negative &HER-2 positive. Ms Ayanna Vela was seen in initial oncology consultation on 4/20/2017 referred for a diagnosis of a left breast recurrence with inflammatory breast cancer. She was initially treated by Dr. Suzi Bryant at [facility]. Prior breast cancer history is as follows:  3/22/2016-a diagnostic mammogram for a palpable mass showed a left breast abnormal findings. 4/1/2017- ultrasound-guided core needle biopsy was consistent with invasive ductal carcinoma, grade 2. ER 1%, MI 6%, HER-2/oseas IHC 3+/FISH amplified ratio 6.7., AR 95%. She underwent 6 cycles of TCH-P from April 2016 through September 2016 with G-CSF support. She had a total of 18 weeks of trastuzumab treatment. 10/3/2016-she underwent a left lumpectomy with left sentinel lymph node revealing an invasive ductal carcinoma, grade 3.  Margins clear of invasive carcinoma (1.1 cm from the lateral margin), extensive lymphovascular space invasion. 2 out of 3 sentinel lymph nodes positive for metastatic carcinoma. Final pathologic staging [id number])M0. She declined adjuvant radiation therapy and adjuvant Herceptin completion.   -------------------In-breast Recurrence [id number]---------------------------  1/30/2017- she was seen by Dr. Nick Schwartz with new complaints of erythema in the left breast. A skin punch biopsy was consistent with metastatic ductal carcinoma with extensive dermal lymphovascular invasion. ER negative, DE negative, HER-2/oseas IHC 3+/FISH amplified ratio 4.5, Ki-67 32%. Foundation one showed ERBB2 amplification, AURKA amplification, TP53 and .   2/15/2017- she was again seen by Dr. Anika Kirk and explained about her tumor recurrence. Unfortunately, she declined any further intervention at that time. 4/21/2017- she asked Dr. Anika Kirk for a referral to us.   4/28/2017-referred to me for further evaluation. I recommended a PET scan and neoadjuvant chemotherapy with carboplatin/paclitaxel and Perjeta and Herceptin. 5/4/2017-PET/CT scan revealed abnormal metabolic activity present in multiple regions throughout the left breast and in the skin( SUV 8.9, 7.4, 7.3). In the left supraclavicular region a cluster of lymph nodes present (SUV of 3). Axillary lymph nodes with SUV of 2.8. Tonsils bilaterally with SUV 5.6 (symmetric). No abnormal metabolic activity in the intrathoracic or intra-abdominal pelvic region. No abnormal skeletal metabolic activity. I recommended weekly carboplatin/weekly Taxol/Herceptin and Pertuzumab.   5/26/2017-she had a severe allergic reaction after 2 doses of carboplatin and therefore this was discontinued.  She was continued only on Taxol weekly/Herceptin and Pertuzumab.   7/21/2017-after cycle #2 and 8 doses of Taxol she developed neuropathy related to chemotherapy and therefore chemotherapy was switched from Taxol to Taxotere. Genetic assessment- [place] [id number] revealed no clinically significant mutation. PTEN VUS.   9/1/2017-local in breast disease progression with increasing breast erythema. Treatment switched to Kadcyla. 12/27/2017- repeat re-staging CT scan of the abdomen and pelvis documented no evidence of intra-abdominal pelvic metastasis. Superior left renal cyst. Mild hepatic steatosis. CT scan of the chest documented no evidence of intrathoracic metastasis. Bone scan documented no evidence of bony metastatic disease. 3/2/2018-after 8 cycles of Kadcyla she requested treatment to be on hold. She has requested her PCP a referral to [facility] for a second opinion. She has neuropathy grade 1.   5/2/2018- 2 months off treatment (Kadcyla). She has not yet been seen at [facility]. Apparently, [place] is gathering her records and will see her soon. Examination of her chest wall showed recurrent disease in the left upper chest. Residual neuropathy grade 1. She could not tolerate gabapentin, and therefore has stopped it. She was not interested in any other medication for neuropathy at this time. 7/10/2018-after discussion at [facility] she was recommended to reinitiate Kadcyla. 7/13/2018-CT of chest, abdomen, pelvis showed no evidence of intra-abdominal disease. There is an enlarged right-sided level 1 lymph node that was not appreciated previously, measuring 2.5 x 1.4 cm, metastatic disease considered. 7/16/2018-PET CT scan showed diffuse uptake throughout the left breast extending to the left chest wall. Abnormal uptake within the left axilla lymph nodes and left internal mammary lymph nodes concerning for metastatic disease. Enlarged hypermetabolic right axilla lymph node concerning for metastatic disease with SUV 8.0. Abnormal uptake within a right cervical lymph node, etiology unclear, but could be reactive.    10/26/2018 - CT scan of the chest was completed at [facility] and compared to CT scan of 12/27/2017 versus her last CT scan completed at [facility] on 7/13/2018. The CT scan identified a right axillary lymph node measuring 1.2 cm, otherwise no intrathoracic neoplastic process/metastatic disease. 10/26/2018 -CT scan of the abdomen and pelvis was completed at [facility] and compared to CT scan of 12/27/2017 versus her last CT scan completed at [facility] on 7/13/2018. No evidence of intra-abdominal or pelvic metastasis was identified. December 2018-interval worsening of skin metastasis in the left breast and diffuse erythema with new nodules. 12/17/2018-skin biopsy of overlying right breast consistent invasive ductal carcinoma, high-grade. ER 20%, NJ 0%, HER-2/oseas 3+.  12/17/2018-right axillary FNA biopsy consistent invasive ductal carcinoma. 12/26/2018-recommended clinical trial at [facility]. 1/4/2019-started on Navelbine/Herceptin, due to delays on the clinical trial at [address] [place] [address]. Unfortunately, she had severe chest wall pain and generalized pain related to Navelbine. 1/25/2019-4/19/2019 she was switched to cisplatin/Gemzar/Herceptin, but had disease progression. 5/30/2019-CT chest, abdomen, pelvis and bone scan was unremarkable for right axillary adenopathy measuring 2 cm. Lymph node located in the upper chest wall under the pectoralis muscle measuring 1.2 cm. Right breast with 2 areas with masslike enhancement. No evidence of metastatic disease in the abdomen pelvis. Bone scan was unremarkable for metastatic bone disease. June 2019-very poor tolerance to cisplatin, Gemzar/Herceptin. She was recommended Xeloda/ Lapatinib.   6/22/2019-she was recommended and started on Xeloda 850 mg/m2 PO BID days 1 through 14 every 21 days and Lapatinib 1250 mg daily continuously. November 2019- progression on prior Xeloda and lapatinib.  11/23/2019- recommended rechallenge with Herceptin and Navelbine vs clinical trial at [facility].   12/23/2019-CT chest abdomen pelvis showed metastatic disease in the right axillary lymph node and diffuse bilateral skin involvement of both breasts with several nodules. No evidence of pulmonary, liver or bone metastasis. CT of the head with contrast was unremarkable. 1/3/2020- the patient was unable to follow-up at [facility] for clinical trial.  Therefore recommended Fam-trastuzumab deruxtecan every 3 weeks. 1/10/2020-she was started on palliative treatment with Fam-trastuzumab.  6/5/2020- Ct Chest/Abdomen/Pelvis W Contrast No lymphadenopathy. A previously seen enlarged right axillary lymph node is now small in size. There is some stranding of the fat around the lymph node. Bilateral breast skin thickening left greater than right. Prior lumpectomy on the left with left axillary lymph node sampling. Previously seen nodularity in the right breast on CT is not visualized today. There may be minimal radiation change in the periphery of the left lung. There is no dense consolidation or effusion. No definite metastatic lung nodules. No evidence of abdominal or pelvic neoplastic process or metastatic disease. The previously seen right breast nodule is not visualized in this study. Postsurgical changes of the left breast. No significant change in the previous study. Lobulated skin thickening of the left lower lateral chest and upper abdominal wall which was not noted in the previous study. The etiology is not certain. This data be clinically correlated and further evaluated. 8/7/2020- the patient is currently awaiting for palliative radiation to the chest wall for the skin margins.   She has agreed and want to start on Herceptin, Xeloda and Tucatinib.  8/21/2020 Tumor Marker- CA 15.3- 32.19  10/9/2020-continue Herceptin and tucatinib.  12/11/2020-she has resumed Xeloda on low-dose 500 mg p.o. twice daily 7 days on 7 days off, tucatinib and Herceptin    Past medical history:  Past Medical History:   Diagnosis Date    Cancer ([facility] [address]     breast    GERD (gastroesophageal reflux disease)       Past surgical history:  Past Surgical History:   Procedure Laterality Date    BREAST LUMPECTOMY Left 10/4/2016    LUMPECTOMY WITH SENTINAL NODE BIOPSY AND INTRA OP US GUIDED NEEDLE LOCALIZATION performed by Cam Bergeron MD at [address] [place] [address]      biopsy left breast    COLONOSCOPY  2008    INSERTION / REMOVAL / REPLACEMENT VENOUS ACCESS CATHETER N/A 5/12/2016    SINGLE LUMEN PORT INSERTION performed by Cam Bergeron MD at [address] [place] [address] DIAGNOSTIC BONE MARROW BIOPSIES Right 11/14/2018    BONE MARROW ASPIRATION BIOPSY performed by Zuly Cuba MD at [facility]      Social history:  Social History     Socioeconomic History    Marital status:      Spouse name: Not on file    Number of children: Not on file    Years of education: Not on file    Highest education level: Not on file   Occupational History    Not on file   Social Needs    Financial resource strain: Not on file    Food insecurity     Worry: Not on file     Inability: Not on file    Transportation needs     Medical: Not on file     Non-medical: Not on file   Tobacco Use    Smoking status: Never Smoker    Smokeless tobacco: Never Used   Substance and Sexual Activity    Alcohol use: No     Alcohol/week: 0.0 standard drinks    Drug use: No    Sexual activity: Not on file   Lifestyle    Physical activity     Days per week: Not on file     Minutes per session: Not on file    Stress: Not on file   Relationships    Social connections     Talks on phone: Not on file     Gets together: Not on file     Attends [employer] service: Not on file     Active member of club or organization: Not on file     Attends meetings of clubs or organizations: Not on file     Relationship status: Not on file    Intimate partner violence     Fear of current or ex partner: Not on file     Emotionally abused: Not on file     Physically abused: Not on file     Forced sexual activity: Not on file Other Topics Concern    Not on file   Social History Narrative    Not on file      Family history:   Family History   Problem Relation Age of Onset    Lung Cancer Father     Colon Cancer Father     Prostate Cancer Father     Hypertension Mother         Current Outpatient Medications   Medication Sig Dispense Refill    oxyCODONE-acetaminophen (PERCOCET) 7.5-325 MG per tablet Take 1 tablet by mouth every 6 hours as needed for Pain for up to 30 days. Intended supply: 30 days 120 tablet 0    pantoprazole (PROTONIX) 40 MG tablet TAKE ONE TABLET DAILY 30 tablet 2    Tucatinib (TUKYSA) 150 MG TABS Take 300 mg by mouth 2 times daily 120 tablet 5    ondansetron (ZOFRAN) 8 MG tablet Take 1 tablet by mouth every 8 hours as needed for Nausea or Vomiting 30 tablet 5    capecitabine (XELODA) 500 MG chemo tablet Take 2 tablets by mouth twice daily for 14 days of a 21 day cycle. 56 tablet 5    capecitabine (XELODA) 150 MG chemo tablet Take 2 tablets by mouth 2 times a daily for 14 days of a 21 day cycle 56 tablet 5    Zinc Sulfate (ZINC 15 PO) Take by mouth daily      Cholecalciferol (VITAMIN D3) 1.25 MG (04019 UT) CAPS Take by mouth daily Unknown amount of Vitamins D3      APPLE CIDER VINEGAR PO Take by mouth      Probiotic Product (PROBIOTIC-10 PO) Take by mouth      Turmeric 500 MG CAPS Take by mouth      SELENIUM ER PO Take by mouth      Multiple Vitamin (MULTI VITAMIN DAILY PO) Take by mouth       No current facility-administered medications for this visit.          REVIEW OF SYSTEMS:    Constitutional: no fever, no night sweats, fatigue;   HEENT: no blurring of vision, no double vision, no hearing difficulty, no tinnitus,no ulceration, no dental caries, no dysphagia, no epistaxis  Lungs: no cough, no shortness of breath, no wheeze;   CVS: no palpitation, no chest pain, no shortness of breath;  GI: abdominal bloating, nausea, no vomiting, no constipation; no diarrhea  ORLIN: no dysuria, frequency and urgency, no hematuria, no kidney stones;   Musculoskeletal: no joint pain, swelling , stiffness;   Endocrine: no polyuria, polydypsia, no cold or heat intolerence; Hematology/lymphatic: no easy brusing or bleeding, no hx of clotting disorder; no peripheral adenopathy. Dermatology: Skin lesions completely resolved. Dry and itchy skin, no eczema, no pruritis;   Psychiatry: no depression, no anxiety,no panic attacks, no suicide ideation; Neurology: no syncope, no seizures, no numbness or tingling of hands, no numbness and tingling of feet, no paresis;     PHYSICAL EXAM:    /63   Pulse 72   Temp 98.1 °F (36.7 °C)   Resp 16   Wt 133 lb 14.4 oz (60.7 kg)   LMP 02/29/2016 (Approximate)   BMI 23.72 kg/m²     Pain scale:5    CONSTITUTIONAL: Alert, appropriate, no acute distress,   EYES: Non icteric, EOM intact, pupils equal round and reactive to light and accommodation. ENT: Oral mucus membranes moist, no oral pharyngeal lesions. External inspection of ears and nose are normal.   NECK: Supple, no masses. No palpable thyroid mass    CHEST/LUNGS: CTA bilaterally, normal respiratory effort  Chaperoned upper body exam with Michael Wing RN   CARDIOVASCULAR: RRR, no murmurs. No lower extremity edema   ABDOMEN: soft non-tender, active bowel sounds, no hepatosplenomegaly. No palpable masses. EXTREMITIES: warm, Full ROM of all fours extremities. No focal weakness. SKIN: Dry skin on the right armpit. Mild erythema. LYMPH: No cervical, clavicular, axillary, or inguinal lymphadenopathy  NEUROLOGIC: follows commands, non focal.   PSYCH: mood and affect appropriate. Alert and oriented to time and place and person.     Relevant Lab findings reviewed/analyzed by me:   Lab Results   Component Value Date    WBC 2.48 (L) 01/29/2021    HGB 9.7 (L) 01/29/2021    HCT 29.5 (L) 01/29/2021    MCV 90.2 01/29/2021     01/29/2021     Lab Results   Component Value Date    NEUTROABS 1.43 (L) 01/29/2021     Lab Results Component Value Date     01/29/2021    K 4.0 01/29/2021     01/29/2021    CO2 30 (H) 01/29/2021    BUN 19 (H) 01/29/2021    CREATININE 0.8 01/29/2021    GLUCOSE 96 01/29/2021    CALCIUM 9.3 01/29/2021    PROT 7.7 01/29/2021    LABALBU 4.2 01/29/2021    BILITOT 0.7 01/29/2021    ALKPHOS 82 01/29/2021    AST 41 (H) 01/29/2021    ALT 33 01/29/2021    LABGLOM >60 01/29/2021    GFRAA 114 04/28/2020    AGRATIO 1.3 04/28/2020    GLOB 3.5 01/29/2021     My assessment: CBC showed normocytic anemia secondary to chemotherapy. Neutrophils 1.43 consistent with neutropenia. LFTs within the normal limits. Creatinine normal.    Relevant Imaging studies reviewed/analyzed by me:   No results found. ASSESSMENT:    Orders Placed This Encounter   Procedures    CT ABDOMEN PELVIS W IV CONTRAST Additional Contrast? Oral     Standing Status:   Future     Standing Expiration Date:   1/29/2022     Order Specific Question:   Additional Contrast?     Answer:   Oral     Order Specific Question:   Reason for exam:     Answer:   f/u breast cancer    CT CHEST W CONTRAST     Standing Status:   Future     Standing Expiration Date:   1/29/2022     Order Specific Question:   Reason for exam:     Answer:   f/u breast cancer    CBC Auto Differential     Standing Status:   Future     Number of Occurrences:   1     Standing Expiration Date:   1/29/2022    Comprehensive Metabolic Panel     Standing Status:   Future     Number of Occurrences:   1     Standing Expiration Date:   1/29/2022    ECHO Complete 2D W Doppler W Color     Standing Status:   Future     Standing Expiration Date:   1/29/2022     Order Specific Question:   Reason for exam:     Answer:   f/u on Herceptin      Radhika Murray was seen today for follow-up.     Diagnoses and all orders for this visit:    Malignant neoplasm of upper-outer quadrant of left female breast, unspecified estrogen receptor status ([facility] [address]  -     CT ABDOMEN PELVIS W IV CONTRAST Additional Contrast? 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 Oral; Future  -     CT CHEST W CONTRAST; Future    HER2-positive carcinoma of breast ([address]  -     CT ABDOMEN PELVIS W IV CONTRAST Additional Contrast? Oral; Future  -     CT CHEST W CONTRAST; Future    Chemotherapy management, encounter for  -     CBC Auto Differential; Future  -     Comprehensive Metabolic Panel; Future  -     ECHO Complete 2D W Doppler W Color; Future    Encounter for antineoplastic immunotherapy  -     ECHO Complete 2D W Doppler W Color; Future    Adverse effect of chemotherapy, subsequent encounter    Care plan discussed with patient    At risk for cardiac complication  -     ECHO Complete 2D W Doppler W Color; Future         Metastatic HER-2 oseas breast cancer   She received 2 cycles of Fam-trastuzumab deruxtecan 5.4 mg/kg every 3 weeks She was going to receive third cycle with 20% dose reduction Fam-trastuzumab deruxtecan 4.3 mg/kg but the patient opted to discontinue chemotherapy. Due to increased toxicity and poor quality of life the patient request to stop treatment at this time. CT chest abdomen pelvis showed no evidence of intrathoracic or intra-abdominal metastatic disease  Status post completion of palliative radiation to the skin. Current regimen   Xeloda 500 mg p.o. day 7 days and 7 days off with poor tolerance after 5 days of treatment. Continue Xeloda 5 days on /9 days off  Continue Herceptin and Tucatinib. She has had prolonged neutropenia with Xeloda. Recommend to resume reduced dose Xeloda 500mg PO BID 5 days on/9 days off. Continue Herceptin and Tucatinib    Continue response. No evidence of recurrence in the skin. Skin metastasis - status post palliative RT with significant improvement. Clinical/laboratory monitoring      Fatigue-related to chemotherapy.     Cancer related pain-as needed Percocet    Peripheral neuropathy satisfaction. I have also reviewed the chief complaint (CC) and part of the history (History of Present Illness (HPI), Past Family Social History [facility]), or Review of Systems (ROS) and made changes when appropriated.        (Please note that portions of this note were completed with a voice recognition program. Efforts were made to edit the dictations but occasionally words are mis-transcribed.)

## 2021-01-29 ENCOUNTER — OFFICE VISIT (OUTPATIENT)
Dept: HEMATOLOGY | Age: 56
End: 2021-01-29
Payer: COMMERCIAL

## 2021-01-29 ENCOUNTER — APPOINTMENT (OUTPATIENT)
Dept: INFUSION THERAPY | Age: 56
End: 2021-01-29
Payer: COMMERCIAL

## 2021-01-29 ENCOUNTER — HOSPITAL ENCOUNTER (OUTPATIENT)
Dept: INFUSION THERAPY | Age: 56
Discharge: HOME | End: 2021-01-29
Payer: COMMERCIAL

## 2021-01-29 VITALS
RESPIRATION RATE: 16 BRPM | WEIGHT: 133.9 LBS | DIASTOLIC BLOOD PRESSURE: 63 MMHG | SYSTOLIC BLOOD PRESSURE: 107 MMHG | HEART RATE: 72 BPM | TEMPERATURE: 98.1 F | BODY MASS INDEX: 23.72 KG/M2

## 2021-01-29 DIAGNOSIS — Z51.11 CHEMOTHERAPY MANAGEMENT, ENCOUNTER FOR: ICD-10-CM

## 2021-01-29 DIAGNOSIS — T45.1X5D ADVERSE EFFECT OF CHEMOTHERAPY, SUBSEQUENT ENCOUNTER: ICD-10-CM

## 2021-01-29 DIAGNOSIS — C50.412 MALIGNANT NEOPLASM OF UPPER-OUTER QUADRANT OF LEFT FEMALE BREAST, UNSPECIFIED ESTROGEN RECEPTOR STATUS (HCC): ICD-10-CM

## 2021-01-29 DIAGNOSIS — Z91.89 AT RISK FOR CARDIAC COMPLICATION: ICD-10-CM

## 2021-01-29 DIAGNOSIS — Z51.11 CHEMOTHERAPY MANAGEMENT, ENCOUNTER FOR: Primary | ICD-10-CM

## 2021-01-29 DIAGNOSIS — C79.2 CARCINOMATOUS METASTASIS IN SKIN (HCC): ICD-10-CM

## 2021-01-29 DIAGNOSIS — C50.412 MALIGNANT NEOPLASM OF UPPER-OUTER QUADRANT OF LEFT FEMALE BREAST, UNSPECIFIED ESTROGEN RECEPTOR STATUS (HCC): Primary | ICD-10-CM

## 2021-01-29 DIAGNOSIS — Z71.89 CARE PLAN DISCUSSED WITH PATIENT: ICD-10-CM

## 2021-01-29 DIAGNOSIS — Z51.12 ENCOUNTER FOR ANTINEOPLASTIC IMMUNOTHERAPY: ICD-10-CM

## 2021-01-29 DIAGNOSIS — C50.919 HER2-POSITIVE CARCINOMA OF BREAST (HCC): ICD-10-CM

## 2021-01-29 LAB
ALBUMIN SERPL-MCNC: 4.2 G/DL (ref 3.5–5.2)
ALP BLD-CCNC: 82 U/L (ref 35–104)
ALT SERPL-CCNC: 33 U/L (ref 9–52)
ANION GAP SERPL CALCULATED.3IONS-SCNC: 7 MMOL/L (ref 7–19)
AST SERPL-CCNC: 41 U/L (ref 14–36)
BASOPHILS ABSOLUTE: 0.02 K/UL (ref 0.01–0.08)
BASOPHILS RELATIVE PERCENT: 0.8 % (ref 0.1–1.2)
BILIRUB SERPL-MCNC: 0.7 MG/DL (ref 0.2–1.3)
BUN BLDV-MCNC: 19 MG/DL (ref 7–17)
CALCIUM SERPL-MCNC: 9.3 MG/DL (ref 8.4–10.2)
CHLORIDE BLD-SCNC: 104 MMOL/L (ref 98–111)
CO2: 30 MMOL/L (ref 22–29)
CREAT SERPL-MCNC: 0.8 MG/DL (ref 0.5–1)
EOSINOPHILS ABSOLUTE: 0.07 K/UL (ref 0.04–0.54)
EOSINOPHILS RELATIVE PERCENT: 2.8 % (ref 0.7–7)
GFR NON-AFRICAN AMERICAN: >60
GLOBULIN: 3.5 G/DL
GLUCOSE BLD-MCNC: 96 MG/DL (ref 74–106)
HCT VFR BLD CALC: 29.5 % (ref 34.1–44.9)
HEMOGLOBIN: 9.7 G/DL (ref 11.2–15.7)
LYMPHOCYTES ABSOLUTE: 0.6 K/UL (ref 1.18–3.74)
LYMPHOCYTES RELATIVE PERCENT: 24.2 % (ref 19.3–53.1)
MCH RBC QN AUTO: 29.7 PG (ref 25.6–32.2)
MCHC RBC AUTO-ENTMCNC: 32.9 G/DL (ref 32.3–35.5)
MCV RBC AUTO: 90.2 FL (ref 79.4–94.8)
MONOCYTES ABSOLUTE: 0.36 K/UL (ref 0.24–0.82)
MONOCYTES RELATIVE PERCENT: 14.5 % (ref 4.7–12.5)
NEUTROPHILS ABSOLUTE: 1.43 K/UL (ref 1.56–6.13)
NEUTROPHILS RELATIVE PERCENT: 57.7 % (ref 34–71.1)
PDW BLD-RTO: 15.7 % (ref 11.7–14.4)
PLATELET # BLD: 215 K/UL (ref 182–369)
PMV BLD AUTO: 8.9 FL (ref 7.4–10.4)
POTASSIUM SERPL-SCNC: 4 MMOL/L (ref 3.5–5.1)
RBC # BLD: 3.27 M/UL (ref 3.93–5.22)
SODIUM BLD-SCNC: 141 MMOL/L (ref 137–145)
TOTAL PROTEIN: 7.7 G/DL (ref 6.3–8.2)
WBC # BLD: 2.48 K/UL (ref 3.98–10.04)

## 2021-01-29 PROCEDURE — 99215 OFFICE O/P EST HI 40 MIN: CPT | Performed by: INTERNAL MEDICINE

## 2021-01-29 PROCEDURE — 36415 COLL VENOUS BLD VENIPUNCTURE: CPT

## 2021-01-29 PROCEDURE — 96413 CHEMO IV INFUSION 1 HR: CPT

## 2021-01-29 PROCEDURE — 85025 COMPLETE CBC W/AUTO DIFF WBC: CPT

## 2021-01-29 PROCEDURE — 96372 THER/PROPH/DIAG INJ SC/IM: CPT

## 2021-01-29 PROCEDURE — 6370000000 HC RX 637 (ALT 250 FOR IP): Performed by: INTERNAL MEDICINE

## 2021-01-29 PROCEDURE — 2580000003 HC RX 258: Performed by: INTERNAL MEDICINE

## 2021-01-29 PROCEDURE — 6360000002 HC RX W HCPCS: Performed by: INTERNAL MEDICINE

## 2021-01-29 PROCEDURE — 96375 TX/PRO/DX INJ NEW DRUG ADDON: CPT

## 2021-01-29 PROCEDURE — 80053 COMPREHEN METABOLIC PANEL: CPT

## 2021-01-29 RX ORDER — ACETAMINOPHEN 500 MG
1000 TABLET ORAL ONCE
Status: DISCONTINUED | OUTPATIENT
Start: 2021-01-29 | End: 2021-01-29 | Stop reason: SDUPTHER

## 2021-01-29 RX ORDER — SODIUM CHLORIDE 9 MG/ML
INJECTION, SOLUTION INTRAVENOUS CONTINUOUS
Status: CANCELLED | OUTPATIENT
Start: 2021-01-29

## 2021-01-29 RX ORDER — DIPHENHYDRAMINE HYDROCHLORIDE 50 MG/ML
50 INJECTION INTRAMUSCULAR; INTRAVENOUS ONCE
Status: CANCELLED | OUTPATIENT
Start: 2021-01-29 | End: 2021-01-29

## 2021-01-29 RX ORDER — MEPERIDINE HYDROCHLORIDE 50 MG/ML
12.5 INJECTION INTRAMUSCULAR; INTRAVENOUS; SUBCUTANEOUS ONCE
Status: CANCELLED | OUTPATIENT
Start: 2021-01-29 | End: 2021-01-29

## 2021-01-29 RX ORDER — SODIUM CHLORIDE 0.9 % (FLUSH) 0.9 %
10 SYRINGE (ML) INJECTION PRN
Status: CANCELLED | OUTPATIENT
Start: 2021-01-29

## 2021-01-29 RX ORDER — SODIUM CHLORIDE 9 MG/ML
20 INJECTION, SOLUTION INTRAVENOUS ONCE
Status: CANCELLED | OUTPATIENT
Start: 2021-01-29 | End: 2021-01-29

## 2021-01-29 RX ORDER — DIPHENHYDRAMINE HYDROCHLORIDE 50 MG/ML
25 INJECTION INTRAMUSCULAR; INTRAVENOUS ONCE
Status: DISCONTINUED | OUTPATIENT
Start: 2021-01-29 | End: 2021-01-29 | Stop reason: SDUPTHER

## 2021-01-29 RX ORDER — HEPARIN SODIUM (PORCINE) LOCK FLUSH IV SOLN 100 UNIT/ML 100 UNIT/ML
500 SOLUTION INTRAVENOUS PRN
Status: CANCELLED | OUTPATIENT
Start: 2021-01-29

## 2021-01-29 RX ORDER — SODIUM CHLORIDE 0.9 % (FLUSH) 0.9 %
10 SYRINGE (ML) INJECTION PRN
Status: DISCONTINUED | OUTPATIENT
Start: 2021-01-29 | End: 2021-01-30 | Stop reason: HOSPADM

## 2021-01-29 RX ORDER — SODIUM CHLORIDE 0.9 % (FLUSH) 0.9 %
5 SYRINGE (ML) INJECTION PRN
Status: CANCELLED | OUTPATIENT
Start: 2021-01-29

## 2021-01-29 RX ORDER — DIPHENHYDRAMINE HYDROCHLORIDE 50 MG/ML
25 INJECTION INTRAMUSCULAR; INTRAVENOUS ONCE
Status: COMPLETED | OUTPATIENT
Start: 2021-01-29 | End: 2021-01-29

## 2021-01-29 RX ORDER — HEPARIN SODIUM (PORCINE) LOCK FLUSH IV SOLN 100 UNIT/ML 100 UNIT/ML
500 SOLUTION INTRAVENOUS PRN
Status: DISCONTINUED | OUTPATIENT
Start: 2021-01-29 | End: 2021-01-30 | Stop reason: HOSPADM

## 2021-01-29 RX ORDER — EPINEPHRINE 1 MG/ML
0.3 INJECTION, SOLUTION, CONCENTRATE INTRAVENOUS PRN
Status: CANCELLED | OUTPATIENT
Start: 2021-01-29

## 2021-01-29 RX ORDER — METHYLPREDNISOLONE SODIUM SUCCINATE 125 MG/2ML
125 INJECTION, POWDER, LYOPHILIZED, FOR SOLUTION INTRAMUSCULAR; INTRAVENOUS ONCE
Status: CANCELLED | OUTPATIENT
Start: 2021-01-29 | End: 2021-01-29

## 2021-01-29 RX ORDER — ACETAMINOPHEN 500 MG
1000 TABLET ORAL ONCE
Status: COMPLETED | OUTPATIENT
Start: 2021-01-29 | End: 2021-01-29

## 2021-01-29 RX ORDER — DIPHENHYDRAMINE HYDROCHLORIDE 50 MG/ML
25 INJECTION INTRAMUSCULAR; INTRAVENOUS ONCE
Status: CANCELLED
Start: 2021-01-29 | End: 2021-01-29

## 2021-01-29 RX ORDER — ACETAMINOPHEN 325 MG/1
1000 TABLET ORAL ONCE
Status: CANCELLED
Start: 2021-01-29 | End: 2021-01-29

## 2021-01-29 RX ORDER — ACETAMINOPHEN 500 MG
TABLET ORAL
Status: DISCONTINUED
Start: 2021-01-29 | End: 2021-01-30 | Stop reason: HOSPADM

## 2021-01-29 RX ORDER — DIPHENHYDRAMINE HYDROCHLORIDE 50 MG/ML
INJECTION INTRAMUSCULAR; INTRAVENOUS
Status: DISCONTINUED
Start: 2021-01-29 | End: 2021-01-30 | Stop reason: HOSPADM

## 2021-01-29 RX ORDER — HEPARIN SODIUM (PORCINE) LOCK FLUSH IV SOLN 100 UNIT/ML 100 UNIT/ML
500 SOLUTION INTRAVENOUS PRN
Status: DISCONTINUED | OUTPATIENT
Start: 2021-01-29 | End: 2021-01-29 | Stop reason: SDUPTHER

## 2021-01-29 RX ORDER — SODIUM CHLORIDE 0.9 % (FLUSH) 0.9 %
10 SYRINGE (ML) INJECTION PRN
Status: DISCONTINUED | OUTPATIENT
Start: 2021-01-29 | End: 2021-01-29 | Stop reason: SDUPTHER

## 2021-01-29 RX ADMIN — DIPHENHYDRAMINE HYDROCHLORIDE 25 MG: 50 INJECTION, SOLUTION INTRAMUSCULAR; INTRAVENOUS at 14:56

## 2021-01-29 RX ADMIN — TRASTUZUMAB 359 MG: 150 INJECTION, POWDER, LYOPHILIZED, FOR SOLUTION INTRAVENOUS at 15:04

## 2021-01-29 RX ADMIN — SODIUM CHLORIDE, PRESERVATIVE FREE 10 ML: 5 INJECTION INTRAVENOUS at 15:43

## 2021-01-29 RX ADMIN — HEPARIN 500 UNITS: 100 SYRINGE at 15:43

## 2021-01-29 RX ADMIN — ACETAMINOPHEN 1000 MG: 500 TABLET ORAL at 14:55

## 2021-02-01 ENCOUNTER — TELEPHONE (OUTPATIENT)
Dept: HEMATOLOGY | Age: 56
End: 2021-02-01

## 2021-02-01 NOTE — TELEPHONE ENCOUNTER
I called and left Masoud Cade a message about scans and echo scheduled and next treatment/ov apt. I have also mailed a copy of orders and AVS with apts. Venkatesh Dunbar.  Fritz Sorto

## 2021-02-02 NOTE — PROGRESS NOTES
Physical Therapy Ortho Treatment Note    Patient:           Ayla Echeverria            : 1965  Today's Date: 2021  Referring practitioner: Gloria Hendricks*  Date of Initial Visit:       Type: THERAPY  Noted: 10/15/2020  Patient seen for 21 sessions    Visit Diagnoses:    ICD-10-CM ICD-9-CM   1. Decreased ROM of left shoulder  M25.612 719.51   2. Decreased ROM of right shoulder  M25.611 719.51       SUBJECTIVE     Subjective:  She is having a pretty good day today. She can tell when she doesn't keep up with her stretches .  PAIN: 2/10 upper chest  Pain after treatment 0/10.       OBJECTIVE     Objective      Therapeutic Exercises    [id number] Comments   Door stretch one arm at a time 2 x 30 seconds    Corner stretch X 30 seconds   Wall climbs each side 1 x 10   Chin tucks 1 x 10        Timed Minutes 15 min     Manual Therapy     [id number]  Comments   Supine lying on 1/2 bolster with knees on bolster- posterior shoulder mobs to L     Stretched each shoulder into flexion and abduction    Stretched L shoulder into IR and ER            Timed Minutes 15          Therapy Education/Self Care [id number]   Details: She has to be consistent with HEP. Tightness will always return   [employer] Code:    Given HEP   Progress: reinforced   Who provided to: Patent   Level of understanding Verbalized and demonstrated   Timed Minutes      Goals  Progress Note Due:  2021   STG by: 20 Comments Status   Patient will begin a home exercise program to accelerate the recovery process  She is working on the HEP more often Met   Patient will have an increase in bilateral shoulder flexion to 160 deg in supine  Ongoing   Patient will have a 25% reduction in chest and shoulder pain  Met   Patient will gain an understanding of Lymphedema and risk factors   Met   LTG by:       Patient will be able to reach up into an overhead cabinet to retrieve a 5 lb dish safely   Met    Patient will regain ROM to reach behind her back as if she  was undoing a bra  Met    Patient will have a 75 - 90% reduction in tightness/pain across chest and shoulders She has tightness today Progressing   Patient will be able to perform household chores without exacerbation of symptoms Over the weekend, household chores were ok. Ongoing, progressing, partially met   Patient will regain functional ROM of both shoulders to perform activities overhead, reaching out to retrieve food from a drive-thru, reaching behind her back to fasten/unfasten bras,clothing    Met   Patient will be instructed in a long term exercise program to continue to make gains following discharge Working on HEP and stretching more often.  Ongoing progressing      Total Timed Treatment:     45   mins  Total Time of Visit:            45  mins         ASSESSMENT/PLAN     Assessment/Plan     ASSESSMENT: She could tell a big difference after the mobs and stretching. She knows she has to do it on her own, but gets distracted easily.    PLAN: Will continue with MLD and working on increasing ROM both shoulders, a well as decreasing tightness in upper chest.     Keyla Bennett, PT, DPT, CLT-EVANGELINA  Physical Therapy Assistant

## 2021-02-18 NOTE — PROGRESS NOTES
pleasant 54years old female who has been diagnosed with recurrent HER-2 positive/ER 1% and UT 6% invasive ductal carcinoma of the breast.  Her pattern of recurrence is diffuse skin involvement of the upper chest bilaterally and back on the left. She received several lines of chemotherapy in the past.  She has had very poor tolerance to chemotherapy overall. She was last treated with radiation to the skin lesions and is currently receiving treatment with tucatinib, Xeloda and Herceptin. She is taking Xeloda 650 mg p.o. twice daily 5 days on and 9 days off. She has been taking tucatinib without major problem the 5 mg p.o. twice daily. She also presents today for her Herceptin. She has had a complete response in the skin. No visible lesion at this time. Skin discoloration secondary to radiation. She has not had any repeat echo in a while. She has gained some weight since last visit with me. No new complaints since last visit. CT scans and 2D echo scheduled for next week. Cancer history- Left breast recurrence with Inflammatory Breast Cancer ER 8%/UT negative &HER-2 positive. Ms Sergo Birmingham was seen in initial oncology consultation on 4/20/2017 referred for a diagnosis of a left breast recurrence with inflammatory breast cancer. She was initially treated by Dr. Sebastián Cruz at [facility]. Prior breast cancer history is as follows:  3/22/2016-a diagnostic mammogram for a palpable mass showed a left breast abnormal findings. 4/1/2017- ultrasound-guided core needle biopsy was consistent with invasive ductal carcinoma, grade 2. ER 1%, UT 6%, HER-2/oseas IHC 3+/FISH amplified ratio 6.7., AR 95%. She underwent 6 cycles of TCH-P from April 2016 through September 2016 with G-CSF support. She had a total of 18 weeks of trastuzumab treatment. 10/3/2016-she underwent a left lumpectomy with left sentinel lymph node revealing an invasive ductal carcinoma, grade 3.  Margins clear of invasive carcinoma (1.1 cm from the lateral margin), extensive lymphovascular space invasion. 2 out of 3 sentinel lymph nodes positive for metastatic carcinoma. Final pathologic staging [id number])M0. She declined adjuvant radiation therapy and adjuvant Herceptin completion.   -------------------In-breast Recurrence [id number]---------------------------  1/30/2017- she was seen by Dr. Aldo Mcqueen with new complaints of erythema in the left breast. A skin punch biopsy was consistent with metastatic ductal carcinoma with extensive dermal lymphovascular invasion. ER negative, KS negative, HER-2/oseas IHC 3+/FISH amplified ratio 4.5, Ki-67 32%. Foundation one showed ERBB2 amplification, AURKA amplification, TP53 and .   2/15/2017- she was again seen by Dr. Gelacio Barger and explained about her tumor recurrence. Unfortunately, she declined any further intervention at that time. 4/21/2017- she asked Dr. Gelacio Barger for a referral to us.   4/28/2017-referred to me for further evaluation. I recommended a PET scan and neoadjuvant chemotherapy with carboplatin/paclitaxel and Perjeta and Herceptin. 5/4/2017-PET/CT scan revealed abnormal metabolic activity present in multiple regions throughout the left breast and in the skin( SUV 8.9, 7.4, 7.3). In the left supraclavicular region a cluster of lymph nodes present (SUV of 3). Axillary lymph nodes with SUV of 2.8. Tonsils bilaterally with SUV 5.6 (symmetric). No abnormal metabolic activity in the intrathoracic or intra-abdominal pelvic region. No abnormal skeletal metabolic activity. I recommended weekly carboplatin/weekly Taxol/Herceptin and Pertuzumab.   5/26/2017-she had a severe allergic reaction after 2 doses of carboplatin and therefore this was discontinued. She was continued only on Taxol weekly/Herceptin and Pertuzumab.   7/21/2017-after cycle #2 and 8 doses of Taxol she developed neuropathy related to chemotherapy and therefore chemotherapy was switched from Taxol to Taxotere.    Genetic assessment- Sukhwinder -25 revealed no clinically significant mutation. PTEN VUS.   9/1/2017-local in breast disease progression with increasing breast erythema. Treatment switched to Kadcyla. 12/27/2017- repeat re-staging CT scan of the abdomen and pelvis documented no evidence of intra-abdominal pelvic metastasis. Superior left renal cyst. Mild hepatic steatosis. CT scan of the chest documented no evidence of intrathoracic metastasis. Bone scan documented no evidence of bony metastatic disease. 3/2/2018-after 8 cycles of Kadcyla she requested treatment to be on hold. She has requested her PCP a referral to [facility] for a second opinion. She has neuropathy grade 1.   5/2/2018- 2 months off treatment (Kadcyla). She has not yet been seen at [facility]. Apparently, [place] is gathering her records and will see her soon. Examination of her chest wall showed recurrent disease in the left upper chest. Residual neuropathy grade 1. She could not tolerate gabapentin, and therefore has stopped it. She was not interested in any other medication for neuropathy at this time. 7/10/2018-after discussion at [facility] she was recommended to reinitiate Kadcyla. 7/13/2018-CT of chest, abdomen, pelvis showed no evidence of intra-abdominal disease. There is an enlarged right-sided level 1 lymph node that was not appreciated previously, measuring 2.5 x 1.4 cm, metastatic disease considered. 7/16/2018-PET CT scan showed diffuse uptake throughout the left breast extending to the left chest wall. Abnormal uptake within the left axilla lymph nodes and left internal mammary lymph nodes concerning for metastatic disease. Enlarged hypermetabolic right axilla lymph node concerning for metastatic disease with SUV 8.0. Abnormal uptake within a right cervical lymph node, etiology unclear, but could be reactive.    10/26/2018 - CT scan of the chest was completed at [facility] and compared to CT scan of 12/27/2017 versus her last CT scan completed at [facility] on 7/13/2018. The CT scan identified a right axillary lymph node measuring 1.2 cm, otherwise no intrathoracic neoplastic process/metastatic disease. 10/26/2018 -CT scan of the abdomen and pelvis was completed at [facility] and compared to CT scan of 12/27/2017 versus her last CT scan completed at [facility] on 7/13/2018. No evidence of intra-abdominal or pelvic metastasis was identified. December 2018-interval worsening of skin metastasis in the left breast and diffuse erythema with new nodules. 12/17/2018-skin biopsy of overlying right breast consistent invasive ductal carcinoma, high-grade. ER 20%, AK 0%, HER-2/oseas 3+.  12/17/2018-right axillary FNA biopsy consistent invasive ductal carcinoma. 12/26/2018-recommended clinical trial at [facility]. 1/4/2019-started on Navelbine/Herceptin, due to delays on the clinical trial at [facility]. Unfortunately, she had severe chest wall pain and generalized pain related to Navelbine. 1/25/2019-4/19/2019 she was switched to cisplatin/Gemzar/Herceptin, but had disease progression. 5/30/2019-CT chest, abdomen, pelvis and bone scan was unremarkable for right axillary adenopathy measuring 2 cm. Lymph node located in the upper chest wall under the pectoralis muscle measuring 1.2 cm. Right breast with 2 areas with masslike enhancement. No evidence of metastatic disease in the abdomen pelvis. Bone scan was unremarkable for metastatic bone disease. June 2019-very poor tolerance to cisplatin, Gemzar/Herceptin. She was recommended Xeloda/ Lapatinib.   6/22/2019-she was recommended and started on Xeloda 850 mg/m2 PO BID days 1 through 14 every 21 days and Lapatinib 1250 mg daily continuously. November 2019- progression on prior Xeloda and lapatinib.  11/23/2019- recommended rechallenge with Herceptin and Navelbine vs clinical trial at [facility].   12/23/2019-CT chest abdomen pelvis showed metastatic disease in the right axillary lymph node and diffuse bilateral skin involvement of both breasts with several nodules. No evidence of pulmonary, liver or bone metastasis. CT of the head with contrast was unremarkable. 1/3/2020- the patient was unable to follow-up at [facility] for clinical trial.  Therefore recommended Fam-trastuzumab deruxtecan every 3 weeks. 1/10/2020-she was started on palliative treatment with Fam-trastuzumab.  6/5/2020- Ct Chest/Abdomen/Pelvis W Contrast No lymphadenopathy. A previously seen enlarged right axillary lymph node is now small in size. There is some stranding of the fat around the lymph node. Bilateral breast skin thickening left greater than right. Prior lumpectomy on the left with left axillary lymph node sampling. Previously seen nodularity in the right breast on CT is not visualized today. There may be minimal radiation change in the periphery of the left lung. There is no dense consolidation or effusion. No definite metastatic lung nodules. No evidence of abdominal or pelvic neoplastic process or metastatic disease. The previously seen right breast nodule is not visualized in this study. Postsurgical changes of the left breast. No significant change in the previous study. Lobulated skin thickening of the left lower lateral chest and upper abdominal wall which was not noted in the previous study. The etiology is not certain. This data be clinically correlated and further evaluated. 8/7/2020- the patient is currently awaiting for palliative radiation to the chest wall for the skin margins.   She has agreed and want to start on Herceptin, Xeloda and Tucatinib.  8/21/2020 Tumor Marker- CA 15.3- 32.19  10/9/2020-continue Herceptin and tucatinib.  12/11/2020-she has resumed Xeloda on low-dose 500 mg p.o. twice daily 7 days on 7 days off, tucatinib and Herceptin    Past medical history:  Past Medical History:   Diagnosis Date    Cancer ([facility] [address]     breast    GERD (gastroesophageal reflux disease)       Past surgical history:  Past Surgical History:   Procedure Laterality Date    BREAST LUMPECTOMY Left 10/4/2016    LUMPECTOMY WITH SENTINAL NODE BIOPSY AND INTRA OP US GUIDED NEEDLE LOCALIZATION performed by Tavo Norris MD at [address] [place] [address]      biopsy left breast    COLONOSCOPY  2008    INSERTION / REMOVAL / REPLACEMENT VENOUS ACCESS CATHETER N/A 5/12/2016    SINGLE LUMEN PORT INSERTION performed by Tavo Norris MD at [address] [place] [address] DIAGNOSTIC BONE MARROW BIOPSIES Right 11/14/2018    BONE MARROW ASPIRATION BIOPSY performed by Mich Flores MD at [facility]      Social history:  Social History     Socioeconomic History    Marital status:      Spouse name: Not on file    Number of children: Not on file    Years of education: Not on file    Highest education level: Not on file   Occupational History    Not on file   Social Needs    Financial resource strain: Not on file    Food insecurity     Worry: Not on file     Inability: Not on file    Transportation needs     Medical: Not on file     Non-medical: Not on file   Tobacco Use    Smoking status: Never Smoker    Smokeless tobacco: Never Used   Substance and Sexual Activity    Alcohol use: No     Alcohol/week: 0.0 standard drinks    Drug use: No    Sexual activity: Not on file   Lifestyle    Physical activity     Days per week: Not on file     Minutes per session: Not on file    Stress: Not on file   Relationships    Social connections     Talks on phone: Not on file     Gets together: Not on file     Attends [employer] service: Not on file     Active member of club or organization: Not on file     Attends meetings of clubs or organizations: Not on file     Relationship status: Not on file    Intimate partner violence     Fear of current or ex partner: Not on file     Emotionally abused: Not on file     Physically abused: Not on file     Forced sexual activity: Not on file   Other Topics Concern    Not on file   Social History Narrative    Not on file      Family history:   Family History   Problem Relation Age of Onset    Lung Cancer Father     Colon Cancer Father     Prostate Cancer Father     Hypertension Mother         Current Outpatient Medications   Medication Sig Dispense Refill    capecitabine (XELODA) 150 MG chemo tablet Take 2 tablets by mouth 2 times a daily for 14 days of a 21 day cycle 56 tablet 5    capecitabine (XELODA) 500 MG chemo tablet Take 2 tablets by mouth twice daily for 14 days of a 21 day cycle. 56 tablet 5    pantoprazole (PROTONIX) 40 MG tablet TAKE ONE TABLET DAILY 30 tablet 2    Tucatinib (TUKYSA) 150 MG TABS Take 300 mg by mouth 2 times daily 120 tablet 5    ondansetron (ZOFRAN) 8 MG tablet Take 1 tablet by mouth every 8 hours as needed for Nausea or Vomiting 30 tablet 5    Zinc Sulfate (ZINC 15 PO) Take by mouth daily      Cholecalciferol (VITAMIN D3) 1.25 MG (33122 UT) CAPS Take by mouth daily Unknown amount of Vitamins D3      APPLE CIDER VINEGAR PO Take by mouth      Probiotic Product (PROBIOTIC-10 PO) Take by mouth      Turmeric 500 MG CAPS Take by mouth      SELENIUM ER PO Take by mouth      Multiple Vitamin (MULTI VITAMIN DAILY PO) Take by mouth       No current facility-administered medications for this visit.       Facility-Administered Medications Ordered in Other Visits   Medication Dose Route Frequency Provider Last Rate Last Admin    sodium chloride flush 0.9 % injection 10 mL  10 mL Intravenous PRN Richmond Matson MD   10 mL at 02/19/21 1437    heparin flush 100 UNIT/ML injection 500 Units  500 Units Intracatheter PRN Richmond Matson MD   500 Units at 02/19/21 1436        REVIEW OF SYSTEMS:    Constitutional: no fever, no night sweats, fatigue;   HEENT: no blurring of vision, no double vision, no hearing difficulty, no tinnitus,no ulceration, no dental caries, no dysphagia, no epistaxis  Lungs: no cough, no shortness of breath, no wheeze;   CVS: no palpitation, no chest pain, no shortness of breath;  GI: abdominal bloating, nausea, no vomiting, no constipation; no diarrhea  ORLIN: no dysuria, frequency and urgency, no hematuria, no kidney stones;   Musculoskeletal: no joint pain, swelling , stiffness;   Endocrine: no polyuria, polydypsia, no cold or heat intolerence; Hematology/lymphatic: no easy brusing or bleeding, no hx of clotting disorder; no peripheral adenopathy. Dermatology: Skin lesions completely resolved. Dry and itchy skin, no eczema, no pruritis;   Psychiatry: no depression, no anxiety,no panic attacks, no suicide ideation; Neurology: no syncope, no seizures, no numbness or tingling of hands, no numbness and tingling of feet, no paresis;     PHYSICAL EXAM:    /72   Pulse 86   Temp 97.7 °F (36.5 °C)   Resp 18   Ht 5' 3\" (1.6 m)   Wt 136 lb (61.7 kg)   LMP 02/29/2016 (Approximate)   SpO2 98%   BMI 24.09 kg/m²     Pain scale:5    CONSTITUTIONAL: Alert, appropriate, no acute distress,   EYES: Non icteric, EOM intact, pupils equal round and reactive to light and accommodation. ENT: Oral mucus membranes moist, no oral pharyngeal lesions. External inspection of ears and nose are normal.   NECK: Supple, no masses. No palpable thyroid mass    CHEST/LUNGS: CTA bilaterally, normal respiratory effort  Chaperoned upper body exam with Ernesto Charles RN   CARDIOVASCULAR: RRR, no murmurs. No lower extremity edema   ABDOMEN: soft non-tender, active bowel sounds, no hepatosplenomegaly. No palpable masses. EXTREMITIES: warm, Full ROM of all fours extremities. No focal weakness. SKIN: Dry skin on the right armpit. Mild erythema. LYMPH: No cervical, clavicular, axillary, or inguinal lymphadenopathy  NEUROLOGIC: follows commands, non focal.   PSYCH: mood and affect appropriate. Alert and oriented to time and place and person.     Relevant Lab findings reviewed/analyzed by me:   Lab Results   Component Value Date    WBC 2.55 (L) 02/19/2021 HGB 10.0 (L) 02/19/2021    HCT 29.8 (L) 02/19/2021    MCV 88.4 02/19/2021     02/19/2021     Lab Results   Component Value Date    NEUTROABS 1.47 (L) 02/19/2021     Lab Results   Component Value Date     02/19/2021    K 3.7 02/19/2021     02/19/2021    CO2 30 (H) 02/19/2021    BUN 12 02/19/2021    CREATININE 0.9 02/19/2021    GLUCOSE 108 (H) 02/19/2021    CALCIUM 9.3 02/19/2021    PROT 8.2 02/19/2021    LABALBU 4.4 02/19/2021    BILITOT 0.4 02/19/2021    ALKPHOS 94 02/19/2021    AST 50 (H) 02/19/2021    ALT 45 02/19/2021    LABGLOM >60 02/19/2021    GFRAA 114 04/28/2020    AGRATIO 1.3 04/28/2020    GLOB 3.8 02/19/2021     My analysis showed leukopenia with neutropenia ANC 1.47. Adequate for treatment. Kidney function is normal at 0.9. Liver function tests with mild elevated AST. Ischemic right breast on 2/19/2021      Relevant Imaging studies reviewed/analyzed by me:   No results found. ASSESSMENT:    Orders Placed This Encounter   Procedures    CBC Auto Differential     Standing Status:   Future     Number of Occurrences:   1     Standing Expiration Date:   2/19/2022    Comprehensive Metabolic Panel     Standing Status:   Future     Number of Occurrences:   1     Standing Expiration Date:   2/19/2022    Cancer Antigen 27.29     Standing Status:   Future     Standing Expiration Date:   2/19/2022    Cancer Antigen 15-3     Standing Status:   Future     Number of Occurrences:   1     Standing Expiration Date:   2/19/2022    CEA     Standing Status:   Future     Number of Occurrences:   1     Standing Expiration Date:   2/19/2022      Radhika Murray was seen today for cancer and chemotherapy. 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 Due to increased toxicity and poor quality of life the patient request to stop treatment at this time. CT chest abdomen pelvis showed no evidence of intrathoracic or intra-abdominal metastatic disease  Status post completion of palliative radiation to the skin. Current regimen:  Xeloda 650 mg p.o. day 5 days and 9 days off  Continue Herceptin and Tucatinib 150 mg p.o. twice daily. Continued response. No evidence of recurrence in the skin. Skin metastasis - status post palliative RT with significant improvement. Clinical/laboratory monitoring      Fatigue-related to chemotherapy. 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 She denies any significant change from previous evaluation. Prolonged neutropenias- the patient had a bone marrow in 2018 that showed an unremarkable bone marrow. She has had prolonged neutropenia in the past.  ANC 1.47. No fever chills. Continue to watch. Continue Herceptin/Tukyza/reduced dose Xeloda only    Plan:  1. CBC, CMP, CEA, CA 15-3, CA 27-29 today  2. Continue Tucatinib 300 mg p.o. twice daily  3. Continue Herceptin every 3 weeks  4. Continue Xeloda to 650 mg p.o. twice daily 5 days on and 9 days off.   5. RTC 3 weeks MD  6.  CT chest, abdomen, pelvis next available-sched 2/26  7. 2D echo next available-sched 2/26  8. Continue clinical/laboratory monitoring    The selection, dosing administration of anticancer agents in the management of associated toxicities are complex. Modifications of drug dose and schedule and the initiation of supportive care interventions are often necessary because of expected toxicities individual patient variability, prior treatment and comorbidity. The optimal delivery of anticancer agents therefore requires a healthcare delivery team experienced in the use of anticancer agents and the management of associated toxicities in patients with cancer. Follow Up:     Return in about 3 weeks (around 3/12/2021) for Treatment & see Dr. Hali Padilla in [address] [place] [address] 153 RM Herceptin. Data Honey [place] am scribing for Andra Husain MD. Electronically signed by Sandra Case RN on 2/19/2021 at 2:33 PM CST. I, Dr Lizet Frazier, personally performed the services described in this documentation as scribed by Sandra Case RN in my presence and is both accurate and complete. I have seen, examined and reviewed this patient medication list, appropriate labs and imaging studies. I reviewed relevant medical records and others physicians notes. I discussed the plans of care with the patient. I answered all the questions to the patients satisfaction. I have also reviewed the chief complaint (CC) and part of the history (History of Present Illness (HPI), Past Family Social History [facility]), or Review of Systems (ROS) and made changes when appropriated.        (Please note that portions of this note were completed with a voice recognition program. Efforts were made to edit the dictations but occasionally words are mis-transcribed.)

## 2021-02-19 ENCOUNTER — HOSPITAL ENCOUNTER (OUTPATIENT)
Dept: INFUSION THERAPY | Age: 56
Discharge: HOME | End: 2021-02-19
Payer: COMMERCIAL

## 2021-02-19 ENCOUNTER — OFFICE VISIT (OUTPATIENT)
Dept: HEMATOLOGY | Age: 56
End: 2021-02-19
Payer: COMMERCIAL

## 2021-02-19 VITALS
SYSTOLIC BLOOD PRESSURE: 130 MMHG | DIASTOLIC BLOOD PRESSURE: 72 MMHG | HEART RATE: 86 BPM | TEMPERATURE: 97.7 F | OXYGEN SATURATION: 98 % | RESPIRATION RATE: 18 BRPM | BODY MASS INDEX: 24.1 KG/M2 | HEIGHT: 63 IN | WEIGHT: 136 LBS

## 2021-02-19 DIAGNOSIS — C50.412 MALIGNANT NEOPLASM OF UPPER-OUTER QUADRANT OF LEFT FEMALE BREAST, UNSPECIFIED ESTROGEN RECEPTOR STATUS (HCC): Primary | ICD-10-CM

## 2021-02-19 DIAGNOSIS — Z71.89 CARE PLAN DISCUSSED WITH PATIENT: ICD-10-CM

## 2021-02-19 DIAGNOSIS — C79.2 CARCINOMATOUS METASTASIS IN SKIN (HCC): ICD-10-CM

## 2021-02-19 DIAGNOSIS — C50.919 HER2-POSITIVE CARCINOMA OF BREAST (HCC): ICD-10-CM

## 2021-02-19 DIAGNOSIS — Z51.12 ENCOUNTER FOR ANTINEOPLASTIC IMMUNOTHERAPY: ICD-10-CM

## 2021-02-19 DIAGNOSIS — Z51.11 CHEMOTHERAPY MANAGEMENT, ENCOUNTER FOR: ICD-10-CM

## 2021-02-19 LAB
ALBUMIN SERPL-MCNC: 4.4 G/DL (ref 3.5–5.2)
ALP BLD-CCNC: 94 U/L (ref 35–104)
ALT SERPL-CCNC: 45 U/L (ref 9–52)
ANION GAP SERPL CALCULATED.3IONS-SCNC: 8 MMOL/L (ref 7–19)
AST SERPL-CCNC: 50 U/L (ref 14–36)
BASOPHILS ABSOLUTE: 0.02 K/UL (ref 0.01–0.08)
BASOPHILS RELATIVE PERCENT: 0.8 % (ref 0.1–1.2)
BILIRUB SERPL-MCNC: 0.4 MG/DL (ref 0.2–1.3)
BUN BLDV-MCNC: 12 MG/DL (ref 7–17)
CA 15-3: 13.6 U/ML (ref 0–35)
CALCIUM SERPL-MCNC: 9.3 MG/DL (ref 8.4–10.2)
CEA: 1.9 NG/ML (ref 0–3)
CHLORIDE BLD-SCNC: 103 MMOL/L (ref 98–111)
CO2: 30 MMOL/L (ref 22–29)
CREAT SERPL-MCNC: 0.9 MG/DL (ref 0.5–1)
EOSINOPHILS ABSOLUTE: 0.08 K/UL (ref 0.04–0.54)
EOSINOPHILS RELATIVE PERCENT: 3.1 % (ref 0.7–7)
GFR NON-AFRICAN AMERICAN: >60
GLOBULIN: 3.8 G/DL
GLUCOSE BLD-MCNC: 108 MG/DL (ref 74–106)
HCT VFR BLD CALC: 29.8 % (ref 34.1–44.9)
HEMOGLOBIN: 10 G/DL (ref 11.2–15.7)
LYMPHOCYTES ABSOLUTE: 0.62 K/UL (ref 1.18–3.74)
LYMPHOCYTES RELATIVE PERCENT: 24.3 % (ref 19.3–53.1)
MCH RBC QN AUTO: 29.7 PG (ref 25.6–32.2)
MCHC RBC AUTO-ENTMCNC: 33.6 G/DL (ref 32.3–35.5)
MCV RBC AUTO: 88.4 FL (ref 79.4–94.8)
MONOCYTES ABSOLUTE: 0.36 K/UL (ref 0.24–0.82)
MONOCYTES RELATIVE PERCENT: 14.1 % (ref 4.7–12.5)
NEUTROPHILS ABSOLUTE: 1.47 K/UL (ref 1.56–6.13)
NEUTROPHILS RELATIVE PERCENT: 57.7 % (ref 34–71.1)
PDW BLD-RTO: 15.3 % (ref 11.7–14.4)
PLATELET # BLD: 200 K/UL (ref 182–369)
PMV BLD AUTO: 8.7 FL (ref 7.4–10.4)
POTASSIUM SERPL-SCNC: 3.7 MMOL/L (ref 3.5–5.1)
RBC # BLD: 3.37 M/UL (ref 3.93–5.22)
SODIUM BLD-SCNC: 141 MMOL/L (ref 137–145)
TOTAL PROTEIN: 8.2 G/DL (ref 6.3–8.2)
WBC # BLD: 2.55 K/UL (ref 3.98–10.04)

## 2021-02-19 PROCEDURE — 6360000002 HC RX W HCPCS: Performed by: INTERNAL MEDICINE

## 2021-02-19 PROCEDURE — 86300 IMMUNOASSAY TUMOR CA 15-3: CPT

## 2021-02-19 PROCEDURE — 80053 COMPREHEN METABOLIC PANEL: CPT

## 2021-02-19 PROCEDURE — 96413 CHEMO IV INFUSION 1 HR: CPT

## 2021-02-19 PROCEDURE — 2580000003 HC RX 258: Performed by: INTERNAL MEDICINE

## 2021-02-19 PROCEDURE — 82378 CARCINOEMBRYONIC ANTIGEN: CPT

## 2021-02-19 PROCEDURE — 85025 COMPLETE CBC W/AUTO DIFF WBC: CPT

## 2021-02-19 PROCEDURE — 6370000000 HC RX 637 (ALT 250 FOR IP): Performed by: INTERNAL MEDICINE

## 2021-02-19 PROCEDURE — 99215 OFFICE O/P EST HI 40 MIN: CPT | Performed by: INTERNAL MEDICINE

## 2021-02-19 PROCEDURE — 96375 TX/PRO/DX INJ NEW DRUG ADDON: CPT

## 2021-02-19 RX ORDER — EPINEPHRINE 1 MG/ML
0.3 INJECTION, SOLUTION, CONCENTRATE INTRAVENOUS PRN
Status: CANCELLED | OUTPATIENT
Start: 2021-02-19

## 2021-02-19 RX ORDER — HEPARIN SODIUM (PORCINE) LOCK FLUSH IV SOLN 100 UNIT/ML 100 UNIT/ML
500 SOLUTION INTRAVENOUS PRN
Status: CANCELLED | OUTPATIENT
Start: 2021-02-19

## 2021-02-19 RX ORDER — ACETAMINOPHEN 325 MG/1
1000 TABLET ORAL ONCE
Status: CANCELLED
Start: 2021-02-19 | End: 2021-02-19

## 2021-02-19 RX ORDER — SODIUM CHLORIDE 9 MG/ML
INJECTION, SOLUTION INTRAVENOUS CONTINUOUS
Status: CANCELLED | OUTPATIENT
Start: 2021-02-19

## 2021-02-19 RX ORDER — DIPHENHYDRAMINE HYDROCHLORIDE 50 MG/ML
25 INJECTION INTRAMUSCULAR; INTRAVENOUS ONCE
Status: CANCELLED
Start: 2021-02-19 | End: 2021-02-19

## 2021-02-19 RX ORDER — SODIUM CHLORIDE 0.9 % (FLUSH) 0.9 %
10 SYRINGE (ML) INJECTION PRN
Status: CANCELLED | OUTPATIENT
Start: 2021-02-19

## 2021-02-19 RX ORDER — MEPERIDINE HYDROCHLORIDE 50 MG/ML
12.5 INJECTION INTRAMUSCULAR; INTRAVENOUS; SUBCUTANEOUS ONCE
Status: CANCELLED | OUTPATIENT
Start: 2021-02-19 | End: 2021-02-19

## 2021-02-19 RX ORDER — CAPECITABINE 150 MG/1
TABLET, FILM COATED ORAL
Qty: 56 TABLET | Refills: 5 | Status: SHIPPED | OUTPATIENT
Start: 2021-02-19 | End: 2021-02-22 | Stop reason: SDUPTHER

## 2021-02-19 RX ORDER — CAPECITABINE 500 MG/1
TABLET, FILM COATED ORAL
Qty: 56 TABLET | Refills: 5 | Status: SHIPPED | OUTPATIENT
Start: 2021-02-19 | End: 2021-02-22 | Stop reason: SDUPTHER

## 2021-02-19 RX ORDER — SODIUM CHLORIDE 0.9 % (FLUSH) 0.9 %
10 SYRINGE (ML) INJECTION PRN
Status: DISCONTINUED | OUTPATIENT
Start: 2021-02-19 | End: 2021-02-20 | Stop reason: HOSPADM

## 2021-02-19 RX ORDER — DIPHENHYDRAMINE HYDROCHLORIDE 50 MG/ML
50 INJECTION INTRAMUSCULAR; INTRAVENOUS ONCE
Status: CANCELLED | OUTPATIENT
Start: 2021-02-19 | End: 2021-02-19

## 2021-02-19 RX ORDER — ACETAMINOPHEN 500 MG
1000 TABLET ORAL ONCE
Status: COMPLETED | OUTPATIENT
Start: 2021-02-19 | End: 2021-02-19

## 2021-02-19 RX ORDER — SODIUM CHLORIDE 0.9 % (FLUSH) 0.9 %
5 SYRINGE (ML) INJECTION PRN
Status: CANCELLED | OUTPATIENT
Start: 2021-02-19

## 2021-02-19 RX ORDER — DIPHENHYDRAMINE HYDROCHLORIDE 50 MG/ML
25 INJECTION INTRAMUSCULAR; INTRAVENOUS ONCE
Status: COMPLETED | OUTPATIENT
Start: 2021-02-19 | End: 2021-02-19

## 2021-02-19 RX ORDER — METHYLPREDNISOLONE SODIUM SUCCINATE 125 MG/2ML
125 INJECTION, POWDER, LYOPHILIZED, FOR SOLUTION INTRAMUSCULAR; INTRAVENOUS ONCE
Status: CANCELLED | OUTPATIENT
Start: 2021-02-19 | End: 2021-02-19

## 2021-02-19 RX ORDER — HEPARIN SODIUM (PORCINE) LOCK FLUSH IV SOLN 100 UNIT/ML 100 UNIT/ML
500 SOLUTION INTRAVENOUS PRN
Status: DISCONTINUED | OUTPATIENT
Start: 2021-02-19 | End: 2021-02-20 | Stop reason: HOSPADM

## 2021-02-19 RX ORDER — SODIUM CHLORIDE 9 MG/ML
20 INJECTION, SOLUTION INTRAVENOUS ONCE
Status: CANCELLED | OUTPATIENT
Start: 2021-02-19 | End: 2021-02-19

## 2021-02-19 RX ADMIN — ACETAMINOPHEN 1000 MG: 500 TABLET ORAL at 13:37

## 2021-02-19 RX ADMIN — HEPARIN 500 UNITS: 100 SYRINGE at 14:36

## 2021-02-19 RX ADMIN — TRASTUZUMAB 359 MG: 150 INJECTION, POWDER, LYOPHILIZED, FOR SOLUTION INTRAVENOUS at 13:59

## 2021-02-19 RX ADMIN — DIPHENHYDRAMINE HYDROCHLORIDE 25 MG: 50 INJECTION, SOLUTION INTRAMUSCULAR; INTRAVENOUS at 13:37

## 2021-02-19 RX ADMIN — SODIUM CHLORIDE, PRESERVATIVE FREE 10 ML: 5 INJECTION INTRAVENOUS at 14:37

## 2021-02-19 ASSESSMENT — PAIN SCALES - GENERAL: PAINLEVEL_OUTOF10: 0

## 2021-02-22 ENCOUNTER — TELEPHONE (OUTPATIENT)
Dept: INFUSION THERAPY | Age: 56
End: 2021-02-22

## 2021-02-22 RX ORDER — CAPECITABINE 500 MG/1
TABLET, FILM COATED ORAL
Qty: 56 TABLET | Refills: 5 | Status: SHIPPED | OUTPATIENT
Start: 2021-02-22 | End: 2021-07-09 | Stop reason: ALTCHOICE

## 2021-02-22 RX ORDER — CAPECITABINE 150 MG/1
TABLET, FILM COATED ORAL
Qty: 56 TABLET | Refills: 5 | Status: SHIPPED | OUTPATIENT
Start: 2021-02-22 | End: 2021-07-09 | Stop reason: ALTCHOICE

## 2021-02-22 NOTE — TELEPHONE ENCOUNTER
Received a call from [facility]. She stated she developed a rash from her wrist to elbow. Described as blisters that look like pimples. Spoke with Dr. Casi Marcus Nurse to alisha tomorrow.

## 2021-02-22 NOTE — PROGRESS NOTES
Physical 30 Day Progress Note    Patient:           Ayla Echeverria            : 1965  Today's Date: 2021  Referring practitioner: Gloria Hendricks*  Date of Initial Visit:       Type: THERAPY  Noted: 10/15/2020  Patient seen for 22 sessions    Visit Diagnoses:    ICD-10-CM ICD-9-CM   1. Decreased ROM of left shoulder  M25.612 719.51   2. Decreased ROM of right shoulder  M25.611 719.51   3. Lymphedema  I89.0 457.1   4. At risk for lymphedema  Z91.89 V49.89       SUBJECTIVE     Subjective:  She had a bump on the R arm near the elbow that she scratched, she noticed this either Thursday night or Friday morning.  She had her Hercepton treatment Friday.  Last night she had 5 bumps and she put some antibiotic ointment on it.  Around 8:00 this morning she has a rash above the cubital fossa.  She states she is stiff both shoulders.  Patient spoke with Jo at Dr. Ott's office, patient has an appointment tomorrow afternoon to see Dr. Ott.     Pain after treatment 6/10.       OBJECTIVE     Objective      Therapeutic Exercises    [id number] Comments                       Timed Minutes      Manual Therapy     [id number]  Comments   Supine lying on 1/2 bolster with knees on bolster- posterior shoulder mobs to L     Stretched each shoulder into flexion and abduction    Assisted patient will dressing the rash with the Calaminae lotion she brought, then ABD pads, used 2.5 in stockinette to keep in place.             Timed Minutes 30        Therapeutic Activities    [id number] Comments   Assessed all goals                    Timed Minutes 15       Therapy Education/Self Care 88144   Details: Be consistent with HEP   [facility]   Given HEP   Progress: reinforced   Who provided to: Patent   Level of understanding Verbalized    Timed Minutes      Goals  Progress Note Due:  2021   STG by: 20 Comments Status   Patient will begin a home exercise program to accelerate the recovery process  She is  working on the HEP more often Met   Patient will have an increase in bilateral shoulder flexion to 160 deg in supine R 163 degrees  L 145 degrees Ongoing   Patient will have a 25% reduction in chest and shoulder pain Tight today but not as bad as initial visit. Met   Patient will gain an understanding of Lymphedema and risk factors   Met   LTG by:       Patient will be able to reach up into an overhead cabinet to retrieve a 5 lb dish safely   Met    Patient will regain ROM to reach behind her back as if she was undoing a bra  Met    Patient will have a 75 - 90% reduction in tightness/pain across chest and shoulders She has tightness today Progressing   Patient will be able to perform household chores without exacerbation of symptoms Still some tightness with chores, but better than initially Ongoing, progressing, partially met   Patient will regain functional ROM of both shoulders to perform activities overhead, reaching out to retrieve food from a drive-thru, reaching behind her back to fasten/unfasten bras,clothing    Met   Patient will be instructed in a long term exercise program to continue to make gains following discharge   Working on HEP and stretching more often.  Ongoing progressing                Total Timed Treatment:     45   mins  Total Time of Visit:            45  mins         ASSESSMENT/PLAN     Assessment/Plan     ASSESSMENT: Patient has some type of rash R UE.  She did contact Dr. Ott's office and will see him tomorrow.  The rash itches and burns, the Calaminae lotion she is putting on the rash helps the pain and itching.  She needs encouragement to work on her HEP.  Patient continues to have tightness more so L shoulder.  Since patent has a rash of unknown origin today, I did not to the MLD.     PLAN: Will continue  working on increasing ROM both shoulders, a well as decreasing tightness in upper chest. If patient's rash is better next week, will resume MLD. Will continue to see patient 1 x  a week.     Jaycee Mohan PTA, T-EVANGELINA  Physical Therapy Assistant

## 2021-02-22 NOTE — PROGRESS NOTES
Progress Note Addendum      Patient: Ayla Echeverria           : 1965  Today's Date: 2021  Referring practitioner: Gloria Hendricks*  Date of Initial Visit: Type: THERAPY  Noted: 10/15/2020  Patient seen for 22 sessions  Visit Diagnoses:    ICD-10-CM ICD-9-CM   1. Decreased ROM of left shoulder  M25.612 719.51   2. Decreased ROM of right shoulder  M25.611 719.51   3. Lymphedema  I89.0 457.1   4. At risk for lymphedema  Z91.89 V49.89          Clinical Progress: improved  Home Program Compliance: Yes  Treatment has included: therapeutic exercise, manual therapy and therapeutic activity  Progress toward previous goals: Partially Met  Prognosis to achieve goals: good    Subjective   Objective   Assessment & Plan     Assessment  Impairments: abnormal or restricted ROM, activity intolerance, impaired physical strength, lacks appropriate home exercise program and pain with function  Prognosis: good  Functional Limitations: uncomfortable because of pain, reaching overhead and unable to perform repetitive tasks  Plan  Therapy options: will be seen for skilled physical therapy services  Planned modality interventions: dry needling, low level laser therapy and TENS  Planned therapy interventions: manual therapy, strengthening, stretching, therapeutic activities, functional ROM exercises, ADL retraining, joint mobilization, soft tissue mobilization, neuromuscular re-education and flexibility  Frequency: 1x week  Duration in visits: 12  Treatment plan discussed with: PTA        I have reviewed the progress note information provided by Flori oMhan PTA, and I concur with the findings.    Wicho Cancino, PT  Physical Therapist

## 2021-02-23 ENCOUNTER — HOSPITAL ENCOUNTER (OUTPATIENT)
Dept: INFUSION THERAPY | Age: 56
Discharge: HOME | End: 2021-02-23
Payer: COMMERCIAL

## 2021-02-23 DIAGNOSIS — Z17.1 MALIGNANT NEOPLASM OF UPPER-OUTER QUADRANT OF LEFT BREAST IN FEMALE, ESTROGEN RECEPTOR NEGATIVE (HCC): Primary | ICD-10-CM

## 2021-02-23 DIAGNOSIS — C50.412 MALIGNANT NEOPLASM OF UPPER-OUTER QUADRANT OF LEFT BREAST IN FEMALE, ESTROGEN RECEPTOR NEGATIVE (HCC): Primary | ICD-10-CM

## 2021-02-23 PROCEDURE — 99211 OFF/OP EST MAY X REQ PHY/QHP: CPT

## 2021-02-23 RX ORDER — DOXYCYCLINE HYCLATE 100 MG
100 TABLET ORAL 2 TIMES DAILY
Qty: 14 TABLET | Refills: 0 | Status: SHIPPED | OUTPATIENT
Start: 2021-02-23 | End: 2021-03-12 | Stop reason: SDUPTHER

## 2021-02-23 RX ORDER — CLINDAMYCIN PHOSPHATE 10 MG/G
GEL TOPICAL
Qty: 1 TUBE | Refills: 1 | Status: SHIPPED | OUTPATIENT
Start: 2021-02-23 | End: 2021-03-12 | Stop reason: SDUPTHER

## 2021-02-23 NOTE — PROGRESS NOTES
Antonieta Cade is here with a weeping rash from her right wrist up a little past her elbow. She states that it itches really bad and she has been putting calamine lotion on it. It started last Friday and has continued to get worse. Per Dr. Linn Smyth, I will call in Doxycycline and Clindamycin Gel. She will hold her tacatnib for 1 week and call us next week to report the progress.

## 2021-02-26 ENCOUNTER — HOSPITAL ENCOUNTER (OUTPATIENT)
Dept: CT IMAGING | Age: 56
Discharge: HOME | End: 2021-02-26
Payer: COMMERCIAL

## 2021-02-26 ENCOUNTER — HOSPITAL ENCOUNTER (OUTPATIENT)
Dept: INFUSION THERAPY | Age: 56
Discharge: HOME | End: 2021-02-26
Payer: COMMERCIAL

## 2021-02-26 ENCOUNTER — HOSPITAL ENCOUNTER (OUTPATIENT)
Dept: NON INVASIVE DIAGNOSTICS | Age: 56
Discharge: HOME | End: 2021-02-26
Payer: COMMERCIAL

## 2021-02-26 DIAGNOSIS — C50.412 MALIGNANT NEOPLASM OF UPPER-OUTER QUADRANT OF LEFT FEMALE BREAST, UNSPECIFIED ESTROGEN RECEPTOR STATUS (HCC): ICD-10-CM

## 2021-02-26 DIAGNOSIS — Z91.89 AT RISK FOR CARDIAC COMPLICATION: ICD-10-CM

## 2021-02-26 DIAGNOSIS — Z45.2 ENCOUNTER FOR CENTRAL LINE CARE: Primary | ICD-10-CM

## 2021-02-26 DIAGNOSIS — C50.919 HER2-POSITIVE CARCINOMA OF BREAST (HCC): ICD-10-CM

## 2021-02-26 DIAGNOSIS — Z51.11 CHEMOTHERAPY MANAGEMENT, ENCOUNTER FOR: ICD-10-CM

## 2021-02-26 DIAGNOSIS — C79.2 CARCINOMATOUS METASTASIS IN SKIN (HCC): ICD-10-CM

## 2021-02-26 DIAGNOSIS — Z51.12 ENCOUNTER FOR ANTINEOPLASTIC IMMUNOTHERAPY: ICD-10-CM

## 2021-02-26 LAB
LV EF: 58 %
LVEF MODALITY: NORMAL

## 2021-02-26 PROCEDURE — 71260 CT THORAX DX C+: CPT

## 2021-02-26 PROCEDURE — 6360000004 HC RX CONTRAST MEDICATION: Performed by: INTERNAL MEDICINE

## 2021-02-26 PROCEDURE — 96523 IRRIG DRUG DELIVERY DEVICE: CPT

## 2021-02-26 PROCEDURE — 93306 TTE W/DOPPLER COMPLETE: CPT

## 2021-02-26 PROCEDURE — 6360000002 HC RX W HCPCS

## 2021-02-26 PROCEDURE — 74177 CT ABD & PELVIS W/CONTRAST: CPT

## 2021-02-26 RX ORDER — HEPARIN SODIUM (PORCINE) LOCK FLUSH IV SOLN 100 UNIT/ML 100 UNIT/ML
SOLUTION INTRAVENOUS
Status: COMPLETED
Start: 2021-02-26 | End: 2021-02-26

## 2021-02-26 RX ORDER — SODIUM CHLORIDE 0.9 % (FLUSH) 0.9 %
20 SYRINGE (ML) INJECTION PRN
Status: DISCONTINUED | OUTPATIENT
Start: 2021-02-26 | End: 2021-02-27 | Stop reason: HOSPADM

## 2021-02-26 RX ORDER — SODIUM CHLORIDE 0.9 % (FLUSH) 0.9 %
20 SYRINGE (ML) INJECTION PRN
Status: CANCELLED | OUTPATIENT
Start: 2021-02-26

## 2021-02-26 RX ORDER — HEPARIN SODIUM (PORCINE) LOCK FLUSH IV SOLN 100 UNIT/ML 100 UNIT/ML
500 SOLUTION INTRAVENOUS PRN
Status: CANCELLED | OUTPATIENT
Start: 2021-02-26

## 2021-02-26 RX ORDER — HEPARIN SODIUM (PORCINE) LOCK FLUSH IV SOLN 100 UNIT/ML 100 UNIT/ML
500 SOLUTION INTRAVENOUS PRN
Status: DISCONTINUED | OUTPATIENT
Start: 2021-02-26 | End: 2021-02-27 | Stop reason: HOSPADM

## 2021-02-26 RX ORDER — SODIUM CHLORIDE 0.9 % (FLUSH) 0.9 %
10 SYRINGE (ML) INJECTION PRN
Status: CANCELLED | OUTPATIENT
Start: 2021-02-26

## 2021-02-26 RX ADMIN — IOPAMIDOL 90 ML: 755 INJECTION, SOLUTION INTRAVENOUS at 09:42

## 2021-02-26 RX ADMIN — SODIUM CHLORIDE, PRESERVATIVE FREE 3 ML: 5 INJECTION INTRAVENOUS at 09:47

## 2021-03-02 NOTE — PROGRESS NOTES
Physical 30 Day Progress Note    Patient:           Ayla Echeverria            : 1965  Today's Date: 3/2/2021  Referring practitioner: Gloria Hendricks*  Date of Initial Visit:       Type: THERAPY  Noted: 10/15/2020  Patient seen for 23 sessions    Visit Diagnoses:    ICD-10-CM ICD-9-CM   1. Decreased ROM of right shoulder  M25.611 719.51   2. Decreased ROM of left shoulder  M25.612 719.51   3. Lymphedema  I89.0 457.1       SUBJECTIVE     Subjective:  She states her arm is much better now, she has been taking an antibiotic.  She states she is very tight in the shoulders.     Pain after treatment 5/10 upper chest and shoulders.  Looser after treatment, but pain still 5/10.         OBJECTIVE     Objective   Lymphedema     Row Name 21 1550 21 1540          Manual Lymphatic Drainage    Manual Lymphatic Drainage  initial sequence  -AL  --  -AL     Initial Sequence  short neck;abdomen;diaphragmatic breathing  -AL  --  -AL     Abdomen  superficial  -AL  --  -AL     Opened Regional Lymph Nodes  inguinal  -AL  --  -AL     Inguinal  right;left  -AL  --  -AL     Opened Anastamoses  axillo-inguinal  -AL  --  -AL     Axillo-Inguinal  right;left  -AL  --  -AL     Manual Lymphatic Drainage Comments  Upper chest and both breasts where dense tissue is present.   Posterior joint mobs to both shoulders.  Used a small ball for AAROM for flexion  -AL  --     Manual Therapy  Manual therapy 45 min  -AL  --       User Key  (r) = Recorded By, (t) = Taken By, (c) = Cosigned By    Initials Name Provider Type    Jaycee Farris PTA, IDALIA Physical Therapy Assistant         Therapy Education/Self Care [id number]   Details: Be consistent with HEP   [facility] Code: N/A   Given HEP   Progress: reinforced   Who provided to: Patent   Level of understanding Verbalized    Timed Minutes      Goals  Progress Note Due:  2021   STG by: 20 Comments Status   Patient will begin a home exercise program to accelerate the  recovery process  She is working on the HEP more often Met   Patient will have an increase in bilateral shoulder flexion to 160 deg in supine  Ongoing   Patient will have a 25% reduction in chest and shoulder pain Still tight Met   Patient will gain an understanding of Lymphedema and risk factors   Met   LTG by:       Patient will be able to reach up into an overhead cabinet to retrieve a 5 lb dish safely   Met    Patient will regain ROM to reach behind her back as if she was undoing a bra  Met    Patient will have a 75 - 90% reduction in tightness/pain across chest and shoulders She has tightness today Progressing   Patient will be able to perform household chores without exacerbation of symptoms Still some tightness with chores, but better than initially Ongoing, progressing, partially met   Patient will regain functional ROM of both shoulders to perform activities overhead, reaching out to retrieve food from a drive-thru, reaching behind her back to fasten/unfasten bras,clothing    Met   Patient will be instructed in a long term exercise program to continue to make gains following discharge    Ongoing progressing              Total Timed Treatment:     45   mins  Total Time of Visit:            45  mins         ASSESSMENT/PLAN     Assessment/Plan     ASSESSMENT: Patient has not been able to work on the stretching as much and she is tighter today and more painful.  Stressed to patient the importance of working on her HEP consistently   Patient's rash is almost gone, she does have some scaring from the rash.  She finished her antibiotic today.  Will take ROM measurements next treatment.     PLAN: Will continue  working on increasing ROM both shoulders, a well as decreasing tightness in upper chest. If patient's rash is better next week, will resume MLD. Will continue to see patient 1 x a week.     Jacyee Mohan PTA, JUDSON-EVANGELINA  Physical Therapy Assistant

## 2021-03-08 NOTE — PROGRESS NOTES
Vannessa Peralta   1965   3/12/2021     Chief Complaint   Patient presents with    Breast Cancer   Reason for MD visit-disease/treatment assessment    Interval history/history of present illness:  Diagnosis   IDC left breast, March 2016   Grade 2. ER 1%, NE 6%, HER-2/oseas IHC 3+/FISH amplified ratio 6.7, AR 95%. 01/30/2017- Biopsy-proven in breast relapse/Inflammatory breast cancer. ER negative, NE negative, HER-2/oseas IHC 3+/FISH amplified ratio 4.5, Ki-67 32%. MyRisk - Revealed no clinically significant mutation. PTEN VUS. Treatment summary  Neoadjuvant adjuvant chemotherapy TCH-P ×6 cycles. She declined to complete 1 year of Herceptin. She declined radiation therapy. 10/3/2016-left lumpectomy/sentinel lymph node   01/30/2017- Biopsy-proven in breast relapse/Inflammatory breast cancer. 5/11/2017-Chemotherapy with paclitaxel/carboplatin weekly. Herceptin/Pertuzumab every 3 weeks started 05/11/2017. She had a allergic reaction to carboplatin and this was discontinued. 7/21/2017- 9/1/2017 Neuropathy grade 2 with Taxol. Therefore, switched to Taxotere/Herceptin/Pertuzumab. Discontinued due to progression of disease. 9/22/2017 through 3/2/2018[place] every 3 weeks. (stopped as per patient request, neuropathy grade 1)   7/10/2018 through 11/30/2018- Kadcyla. 1/4/2019-Navelbine x 1 cycle with very poor tolerance (pain)   1/25/2018-Gemzar/Herceptin   3/15/2019- low-dose Cisplatin added to Gemzar and Herceptin regimen, regimen changed to every other week   4/17/2019- palliative radiation to left clavicle/chest wall for anticipate total dosing of 5040 cGy   6/22/2019- Xeloda/apatinib  January 2020-palliative radiation to the right breast.  1/10/2020- palliative treatment with Fam-Trastuzumab- stopped Feb due to severe toxicity  September 2020- palliative radiation left chest wall skin metastasis.   8/21/2020-Herceptin, Xeloda and Tucatinib  Tumor target  Skin chest wall left  upper chest/left breast Interval history  Yasir Navarrete is a pleasant 54years old female who has been diagnosed with recurrent HER-2 positive/ER 1% and ME 6% invasive ductal carcinoma of the breast.  Her pattern of recurrence is diffuse skin involvement of the upper chest bilaterally and back on the left. She received several lines of chemotherapy in the past.  She has had very poor tolerance to chemotherapy overall. She was last treated with radiation to the skin lesions and is currently receiving treatment with tucatinib, Xeloda and Herceptin. She is taking Xeloda 650 mg p.o. twice daily 5 days on and 9 days off. She has been taking tucatinib. She also presents today for her Herceptin. She has had a complete response in the skin after radiation. No visible lesion at this time. However she presents today with significant skin toxicity in her right upper extremity. She has a maculopapular rash involving the entirety of her right arm. This is quite itchy. She has had a trial of doxycycline and clindamycin cream that improved temporarily but now the rash is worse again. Treatment will be delayed today. Discussed at length about rash management. Otherwise, she feels well. She denies any systemic complaints. She has gained weight. Cancer history- Left breast recurrence with Inflammatory Breast Cancer ER 8%/ME negative &HER-2 positive. Ms Devon Field was seen in initial oncology consultation on 4/20/2017 referred for a diagnosis of a left breast recurrence with inflammatory breast cancer. She was initially treated by Dr. Daniel Olson at [facility]. Prior breast cancer history is as follows:  3/22/2016-a diagnostic mammogram for a palpable mass showed a left breast abnormal findings. 4/1/2017- ultrasound-guided core needle biopsy was consistent with invasive ductal carcinoma, grade 2. ER 1%, ME 6%, HER-2/oseas IHC 3+/FISH amplified ratio 6.7., AR 95%.    She underwent 6 cycles of TCH-P from April 2016 through September 2016 with G-CSF support. She had a total of 18 weeks of trastuzumab treatment. 10/3/2016-she underwent a left lumpectomy with left sentinel lymph node revealing an invasive ductal carcinoma, grade 3. Margins clear of invasive carcinoma (1.1 cm from the lateral margin), extensive lymphovascular space invasion. 2 out of 3 sentinel lymph nodes positive for metastatic carcinoma. Final pathologic staging [id number])M0. She declined adjuvant radiation therapy and adjuvant Herceptin completion.   -------------------In-breast Recurrence [id number]---------------------------  1/30/2017- she was seen by Dr. Sulaiman Ghosh with new complaints of erythema in the left breast. A skin punch biopsy was consistent with metastatic ductal carcinoma with extensive dermal lymphovascular invasion. ER negative, NV negative, HER-2/oseas IHC 3+/FISH amplified ratio 4.5, Ki-67 32%. Foundation one showed ERBB2 amplification, AURKA amplification, TP53 and .   2/15/2017- she was again seen by Dr. Mark Mchugh and explained about her tumor recurrence. Unfortunately, she declined any further intervention at that time. 4/21/2017- she asked Dr. Mark Mchugh for a referral to us.   4/28/2017-referred to me for further evaluation. I recommended a PET scan and neoadjuvant chemotherapy with carboplatin/paclitaxel and Perjeta and Herceptin. 5/4/2017-PET/CT scan revealed abnormal metabolic activity present in multiple regions throughout the left breast and in the skin( SUV 8.9, 7.4, 7.3). In the left supraclavicular region a cluster of lymph nodes present (SUV of 3). Axillary lymph nodes with SUV of 2.8. Tonsils bilaterally with SUV 5.6 (symmetric). No abnormal metabolic activity in the intrathoracic or intra-abdominal pelvic region. No abnormal skeletal metabolic activity. I recommended weekly carboplatin/weekly Taxol/Herceptin and Pertuzumab.   5/26/2017-she had a severe allergic reaction after 2 doses of carboplatin and therefore this was discontinued.  She was continued only on Taxol weekly/Herceptin and Pertuzumab.   7/21/2017-after cycle #2 and 8 doses of Taxol she developed neuropathy related to chemotherapy and therefore chemotherapy was switched from Taxol to Taxotere. Genetic assessment- [facility] revealed no clinically significant mutation. PTEN VUS.   9/1/2017-local in breast disease progression with increasing breast erythema. Treatment switched to Kadcyla. 12/27/2017- repeat re-staging CT scan of the abdomen and pelvis documented no evidence of intra-abdominal pelvic metastasis. Superior left renal cyst. Mild hepatic steatosis. CT scan of the chest documented no evidence of intrathoracic metastasis. Bone scan documented no evidence of bony metastatic disease. 3/2/2018-after 8 cycles of Kadcyla she requested treatment to be on hold. She has requested her PCP a referral to [facility] for a second opinion. She has neuropathy grade 1.   5/2/2018- 2 months off treatment (Kadcyla). She has not yet been seen at [facility]. Apparently, [facility] is gathering her records and will see her soon. Examination of her chest wall showed recurrent disease in the left upper chest. Residual neuropathy grade 1. She could not tolerate gabapentin, and therefore has stopped it. She was not interested in any other medication for neuropathy at this time. 7/10/2018-after discussion at [facility] she was recommended to reinitiate Kadcyla. 7/13/2018-CT of chest, abdomen, pelvis showed no evidence of intra-abdominal disease. There is an enlarged right-sided level 1 lymph node that was not appreciated previously, measuring 2.5 x 1.4 cm, metastatic disease considered. 7/16/2018-PET CT scan showed diffuse uptake throughout the left breast extending to the left chest wall. Abnormal uptake within the left axilla lymph nodes and left internal mammary lymph nodes concerning for metastatic disease.  Enlarged hypermetabolic right axilla lymph node concerning for metastatic disease with SUV 8.0. Abnormal uptake within a right cervical lymph node, etiology unclear, but could be reactive. 10/26/2018 - CT scan of the chest was completed at [facility] and compared to CT scan of 12/27/2017 versus her last CT scan completed at [facility] on 7/13/2018. The CT scan identified a right axillary lymph node measuring 1.2 cm, otherwise no intrathoracic neoplastic process/metastatic disease. 10/26/2018 -CT scan of the abdomen and pelvis was completed at [facility] and compared to CT scan of 12/27/2017 versus her last CT scan completed at [facility] on 7/13/2018. No evidence of intra-abdominal or pelvic metastasis was identified. December 2018-interval worsening of skin metastasis in the left breast and diffuse erythema with new nodules. 12/17/2018-skin biopsy of overlying right breast consistent invasive ductal carcinoma, high-grade. ER 20%, W[place] 0%, HER-2/oseas 3+.  12/17/2018-right axillary FNA biopsy consistent invasive ductal carcinoma. 12/26/2018-recommended clinical trial at [facility]. 1/4/2019-started on Navelbine/Herceptin, due to delays on the clinical trial at [facility]. Unfortunately, she had severe chest wall pain and generalized pain related to Navelbine. 1/25/2019-4/19/2019 she was switched to cisplatin/Gemzar/Herceptin, but had disease progression. 5/30/2019-CT chest, abdomen, pelvis and bone scan was unremarkable for right axillary adenopathy measuring 2 cm. Lymph node located in the upper chest wall under the pectoralis muscle measuring 1.2 cm. Right breast with 2 areas with masslike enhancement. No evidence of metastatic disease in the abdomen pelvis. Bone scan was unremarkable for metastatic bone disease. June 2019-very poor tolerance to cisplatin, Gemzar/Herceptin. She was recommended Xeloda/ Lapatinib.   6/22/2019-she was recommended and started on Xeloda 850 mg/m2 PO BID days 1 through 14 every 21 days and Lapatinib 1250 mg daily continuously.   November 2019- progression on prior Xeloda and lapatinib.  11/23/2019- recommended rechallenge with Herceptin and Navelbine vs clinical trial at [facility] 12/23/2019-CT chest abdomen pelvis showed metastatic disease in the right axillary lymph node and diffuse bilateral skin involvement of both breasts with several nodules. No evidence of pulmonary, liver or bone metastasis. CT of the head with contrast was unremarkable. 1/3/2020- the patient was unable to follow-up at [facility] for clinical trial.  Therefore recommended Fam-trastuzumab deruxtecan every 3 weeks. 1/10/2020-she was started on palliative treatment with Fam-trastuzumab.  6/5/2020- Ct Chest/Abdomen/Pelvis W Contrast No lymphadenopathy. A previously seen enlarged right axillary lymph node is now small in size. There is some stranding of the fat around the lymph node. Bilateral breast skin thickening left greater than right. Prior lumpectomy on the left with left axillary lymph node sampling. Previously seen nodularity in the right breast on CT is not visualized today. There may be minimal radiation change in the periphery of the left lung. There is no dense consolidation or effusion. No definite metastatic lung nodules. No evidence of abdominal or pelvic neoplastic process or metastatic disease. The previously seen right breast nodule is not visualized in this study. Postsurgical changes of the left breast. No significant change in the previous study. Lobulated skin thickening of the left lower lateral chest and upper abdominal wall which was not noted in the previous study. The etiology is not certain. This data be clinically correlated and further evaluated. 8/7/2020- the patient is currently awaiting for palliative radiation to the chest wall for the skin margins.   She has agreed and want to start on Herceptin, Xeloda and Tucatinib.  8/21/2020 Tumor Marker- CA 15.3- 32.19  10/9/2020-continue Herceptin and tucatinib.  12/11/2020-she has resumed Xeloda on low-dose 500 mg p.o. twice daily 7 days on 7 days off, tucatinib and Herceptin  2/19/21 tumor markers: CA 27-29 11.8 CA 15-3 13.6 CEA 1.9  2/26/21 Ct Chest W Contrast No evidence of intrathoracic metastasis. Scarring suspected within the left upper and right middle lobes. 3. Diffuse left greater than right breast skin thickening with prominence of the trabecular breast tissue. Postoperative changes of the left breast and axilla. No pathologic axillary or intrathoracic lymphadenopathy localized. 2/26/21 Ct Abdomen Pelvis W Iv Contrast  No evidence of intra-abdominal pelvic metastasis. Hepatic steatosis. Superior left renal cyst.  Constipation. 2/26/21 2D echo: Mitral valve leaflets are mildly thickened with preserved leaflet mobility. Mildly thickened aortic valve leaflets with preserved leaflet mobility. Tricuspid valve is structurally normal. Mild tricuspid regurgitation with estimated RVSP of 16 mm Hg. Normal left ventricular size with preserved LV function and an estimated ejection fraction of approximately 55-60%. Normal left ventricular wall thickness. No regional wall motion abnormalities.     Past medical history:  Past Medical History:   Diagnosis Date    Cancer [facility])     breast    GERD (gastroesophageal reflux disease)       Past surgical history:  Past Surgical History:   Procedure Laterality Date    BREAST LUMPECTOMY Left 10/4/2016    LUMPECTOMY WITH SENTINAL NODE BIOPSY AND INTRA OP US GUIDED NEEDLE LOCALIZATION performed by Niles Potter MD at [address] [place] [address]      biopsy left breast    COLONOSCOPY  2008    INSERTION / REMOVAL / REPLACEMENT VENOUS ACCESS CATHETER N/A 5/12/2016    SINGLE LUMEN PORT INSERTION performed by Niles Potter MD at [address] [facility] DIAGNOSTIC BONE MARROW BIOPSIES Right 11/14/2018    BONE MARROW ASPIRATION BIOPSY performed by Blake Hansen MD at [facility]      Social history:  Social History     Socioeconomic History    Marital status:      Spouse name: Not on file    Number of children: Not on file    Years of education: Not on file    Highest education level: Not on file   Occupational History    Not on file   Social Needs    Financial resource strain: Not on file    Food insecurity     Worry: Not on file     Inability: Not on file    Transportation needs     Medical: Not on file     Non-medical: Not on file   Tobacco Use    Smoking status: Never Smoker    Smokeless tobacco: Never Used   Substance and Sexual Activity    Alcohol use: No     Alcohol/week: 0.0 standard drinks    Drug use: No    Sexual activity: Not on file   Lifestyle    Physical activity     Days per week: Not on file     Minutes per session: Not on file    Stress: Not on file   Relationships    Social connections     Talks on phone: Not on file     Gets together: Not on file     Attends Judaism service: Not on file     Active member of club or organization: Not on file     Attends meetings of clubs or organizations: Not on file     Relationship status: Not on file    Intimate partner violence     Fear of current or ex partner: Not on file     Emotionally abused: Not on file     Physically abused: Not on file     Forced sexual activity: Not on file   Other Topics Concern    Not on file   Social History Narrative    Not on file      Family history:   Family History   Problem Relation Age of Onset    Lung Cancer Father     Colon Cancer Father     Prostate Cancer Father     Hypertension Mother         Current Outpatient Medications   Medication Sig Dispense Refill    clindamycin (CLEOCIN-T) 1 % gel Apply topically 2 times daily.  1 Tube 5    doxycycline hyclate (VIBRA-TABS) 100 MG tablet Take 1 tablet by mouth 2 times daily for 7 days 14 tablet 0    hydrOXYzine (ATARAX) 25 MG tablet Take 1 tablet by mouth every 8 hours as needed for Itching 30 tablet 1    predniSONE (DELTASONE) 20 MG tablet Take 2 tablets by mouth daily for 5 days 10 tablet 0    oxyCODONE-acetaminophen (PERCOCET) 7.5-325 MG per tablet TAKE ONE TABLET EVERY SIX HOURS AS NEEDED FOR PAIN - REDUCE DOSES TAKEN AS PAIN BECOMES MANAGEABLE 120 tablet 0    capecitabine (XELODA) 150 MG chemo tablet Take 2 tablets by mouth 2 times a daily for 14 days of a 21 day cycle 56 tablet 5    capecitabine (XELODA) 500 MG chemo tablet Take 2 tablets by mouth twice daily for 14 days of a 21 day cycle. 56 tablet 5    pantoprazole (PROTONIX) 40 MG tablet TAKE ONE TABLET DAILY 30 tablet 2    Tucatinib (TUKYSA) 150 MG TABS Take 300 mg by mouth 2 times daily 120 tablet 5    ondansetron (ZOFRAN) 8 MG tablet Take 1 tablet by mouth every 8 hours as needed for Nausea or Vomiting 30 tablet 5    Zinc Sulfate (ZINC 15 PO) Take by mouth daily      Cholecalciferol (VITAMIN D3) 1.25 MG (48016 UT) CAPS Take by mouth daily Unknown amount of Vitamins D3      APPLE CIDER VINEGAR PO Take by mouth      Probiotic Product (PROBIOTIC-10 PO) Take by mouth      Turmeric 500 MG CAPS Take by mouth      SELENIUM ER PO Take by mouth      Multiple Vitamin (MULTI VITAMIN DAILY PO) Take by mouth       No current facility-administered medications for this visit. REVIEW OF SYSTEMS:    Constitutional: no fever, no night sweats, fatigue;   HEENT: no blurring of vision, no double vision, no hearing difficulty, no tinnitus,no ulceration, no dental caries, no dysphagia, no epistaxis  Lungs: no cough, no shortness of breath, no wheeze;   CVS: no palpitation, no chest pain, no shortness of breath;  GI: abdominal bloating, nausea, no vomiting, no constipation; no diarrhea  ORLIN: no dysuria, frequency and urgency, no hematuria, no kidney stones;   Musculoskeletal: no joint pain, swelling , stiffness;   Endocrine: no polyuria, polydypsia, no cold or heat intolerence; Hematology/lymphatic: no easy brusing or bleeding, no hx of clotting disorder; no peripheral adenopathy.   Dermatology: Right forearm rash, no eczema, no pruritis; Psychiatry: no depression, no anxiety,no panic attacks, no suicide ideation; Neurology: no syncope, no seizures, no numbness or tingling of hands, no numbness and tingling of feet, no paresis;     PHYSICAL EXAM:    /60   Pulse 78   Temp 98.2 °F (36.8 °C)   Ht 5' 3\" (1.6 m)   Wt 134 lb (60.8 kg)   LMP 02/29/2016 (Approximate)   BMI 23.74 kg/m²     Pain scale:5    CONSTITUTIONAL: Alert, appropriate, no acute distress,   EYES: Non icteric, EOM intact, pupils equal round and reactive to light and accommodation. ENT: Oral mucus membranes moist, no oral pharyngeal lesions. External inspection of ears and nose are normal.   NECK: Supple, no masses. No palpable thyroid mass    CHEST/LUNGS: CTA bilaterally, normal respiratory effort  Chaperoned upper body exam with Luigi Comfort, RN   CARDIOVASCULAR: RRR, no murmurs. No lower extremity edema   ABDOMEN: soft non-tender, active bowel sounds, no hepatosplenomegaly. No palpable masses. EXTREMITIES: warm, Full ROM of all fours extremities. No focal weakness. SKIN: Dry skin on the right armpit. Mild erythema. Significant maculopapular rash in the right forearm. LYMPH: No cervical, clavicular, axillary, or inguinal lymphadenopathy  NEUROLOGIC: follows commands, non focal.   PSYCH: mood and affect appropriate. Alert and oriented to time and place and person. Relevant Lab findings reviewed/analyzed by me:   2/19/21   CA 27-29 11.8  CA 15-3 13.6  CEA 1.9        Relevant Imaging studies reviewed/analyzed by me:   Ct Chest W Contrast    Result Date: 2/26/2021  1. No evidence of intrathoracic metastasis. Scarring suspected within the left upper and right middle lobes. 3. Diffuse left greater than right breast skin thickening with prominence of the trabecular breast tissue. Postoperative changes of the left breast and axilla. No pathologic axillary or intrathoracic lymphadenopathy localized.  Signed by Dr Guerda Daley on 2/26/2021 9:59 AM    Ct Abdomen Pelvis the skin. Reviewed the scans and a 2D echo. No evidence of systemic metastatic disease. 2D echo normal stable EF. Treatment related toxicity-maculopapular rash in the right forearm. Recommended-clindamycin cream, oral doxycycline x10 days and oral steroids 40 mg p.o. x5 days. Recent 2D echo showed normal EF. I believe that the rash is secondary to EGFR inhibition by tucatinib. Skin metastasis - status post palliative RT with significant improvement. Clinical/laboratory monitoring      Fatigue-related to chemotherapy. 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 She denies any significant change from previous evaluation. Prolonged neutropenias- the patient had a bone marrow in 2018 that showed an unremarkable bone marrow. She has had prolonged neutropenia in the past.  ANC 1.47. No fever chills. Continue to watch. No intervention. Plan:  1. CBC, CMP, CEA, CA 15-3, CA 27-29 today  2. Hold tucatinib 300 mg p.o. twice daily for 1 week  3. Delay Herceptin treatment for 1 week  4. Delay treatment for 1 week. Xeloda to 650 mg p.o. twice daily 5 days on and 9 days off.   5. RTC 3 weeks MD  6. Continue clinical/laboratory monitoring  7. Clindamycin cream, prednisone 4 mg p.o. x5 days, doxycycline x10 days. The selection, dosing administration of anticancer agents in the management of associated toxicities are complex. Modifications of drug dose and schedule and the initiation of supportive care interventions are often necessary because of expected toxicities individual patient variability, prior treatment and comorbidity. The optimal delivery of anticancer agents therefore requires a healthcare delivery team experienced in the use of anticancer agents and the management of associated toxicities in patients with cancer. Follow Up:     Return for come in 1 week to see a nurse, 2 weeks for tx and Dr. Yuniel Zavala in tx room.    Data Manuel Herrera, am scribing for Marisa Fritz MD. Electronically signed by aLlitha Albert RN on 3/13/2021 at 9:11 AM CST. I, Dr Carmelita Obando, personally performed the services described in this documentation as scribed by Lalitha Albert RN in my presence and is both accurate and complete. I have seen, examined and reviewed this patient medication list, appropriate labs and imaging studies. I reviewed relevant medical records and others physicians notes. I discussed the plans of care with the patient. I answered all the questions to the patients satisfaction. I have also reviewed the chief complaint (CC) and part of the history (History of Present Illness (HPI), Past Family Social History [facility]), or Review of Systems (ROS) and made changes when appropriated.        (Please note that portions of this note were completed with a voice recognition program. Efforts were made to edit the dictations but occasionally words are mis-transcribed.)

## 2021-03-09 NOTE — PROGRESS NOTES
Physical 30 Day Progress Note    Patient:           Ayla Echeverria            : 1965  Today's Date: 3/9/2021  Referring practitioner: Gloria Hendricks*  Date of Initial Visit:       Type: THERAPY  Noted: 10/15/2020  Patient seen for 24 sessions    Visit Diagnoses:    ICD-10-CM ICD-9-CM   1. Decreased ROM of right shoulder  M25.611 719.51   2. Decreased ROM of left shoulder  M25.612 719.51   3. Lymphedema  I89.0 457.1       SUBJECTIVE     Subjective:  She has been working on stretching her shoulders, it is still very tired and falls asleep easily.  She states the rash came back and she has a gel she puts on that the  Gave her.  The rash is dried out. She has another chemo treatment on Friday.     Pain after treatment 4/10 upper chest and shoulders with pain meds.   Pain after treatment:  2/10.         OBJECTIVE     Objective   Lymphedema     Row Name 21 1553             Manual Lymphatic Drainage    Manual Lymphatic Drainage  initial sequence  -AL      Initial Sequence  short neck;abdomen;diaphragmatic breathing  -AL      Abdomen  superficial  -AL      Opened Regional Lymph Nodes  inguinal  -AL      Inguinal  right;left  -AL      Opened Anastamoses  axillo-inguinal  -AL      Axillo-Inguinal  right;left  -AL      Manual Lymphatic Drainage Comments  MLD to the upper chest.  Stretched both shoulders into flexion also did posterior and inferior mobs to both shoulders.    -AL      Manual Therapy  Manual Therapy 45 Minutes  -AL        User Key  (r) = Recorded By, (t) = Taken By, (c) = Cosigned By    Initials Name Provider Type    AL Jaycee Mohan, VAUGHN, IDALIA Physical Therapy Assistant         Therapy Education/Self Care 13681   Details: Be consistent with HEP   [facility]: N[facility]A   Given HEP   Progress: reinforced   Who provided to: Patent   Level of understanding Verbalized    Timed Minutes      Goals  Progress Note Due:  3/22/2021   STG by: 20 Comments Status   Patient will begin a  home exercise program to accelerate the recovery process  She is working on the HEP more often Met   Patient will have an increase in bilateral shoulder flexion to 160 deg in supine  Ongoing   Patient will have a 25% reduction in chest and shoulder pain Still tight Met   Patient will gain an understanding of Lymphedema and risk factors   Met   LTG by:       Patient will be able to reach up into an overhead cabinet to retrieve a 5 lb dish safely   Met    Patient will regain ROM to reach behind her back as if she was undoing a bra  Met    Patient will have a 75 - 90% reduction in tightness/pain across chest and shoulders She has tightness today Progressing   Patient will be able to perform household chores without exacerbation of symptoms Still some tightness with chores, but better than initially Ongoing, progressing, partially met   Patient will regain functional ROM of both shoulders to perform activities overhead, reaching out to retrieve food from a drive-thru, reaching behind her back to fasten/unfasten bras,clothing    Met   Patient will be instructed in a long term exercise program to continue to make gains following discharge    Ongoing progressing              Total Timed Treatment:     45   mins  Total Time of Visit:            45  mins         ASSESSMENT/PLAN     Assessment/Plan     ASSESSMENT: Patient's rash came back but has now dried out again, she will have her chemo at the end of this week.  She continues to have tightness in both shoulders, more so on the left.  Stressed importance of working stretching more often.     PLAN: Will continue  working on increasing ROM both shoulders, as well as decreasing tightness in upper chest, will also work on MLD.     Jaycee Mohan, VAUGHN, [facility]-EVANGELINA  Physical Therapy Assistant

## 2021-03-12 ENCOUNTER — HOSPITAL ENCOUNTER (OUTPATIENT)
Dept: INFUSION THERAPY | Age: 56
Discharge: HOME | End: 2021-03-12
Payer: COMMERCIAL

## 2021-03-12 ENCOUNTER — OFFICE VISIT (OUTPATIENT)
Dept: HEMATOLOGY | Age: 56
End: 2021-03-12
Payer: COMMERCIAL

## 2021-03-12 VITALS
SYSTOLIC BLOOD PRESSURE: 109 MMHG | HEART RATE: 78 BPM | HEIGHT: 63 IN | TEMPERATURE: 98.2 F | DIASTOLIC BLOOD PRESSURE: 60 MMHG | WEIGHT: 134 LBS | BODY MASS INDEX: 23.74 KG/M2

## 2021-03-12 DIAGNOSIS — Z51.11 CHEMOTHERAPY MANAGEMENT, ENCOUNTER FOR: ICD-10-CM

## 2021-03-12 DIAGNOSIS — Z17.0 MALIGNANT NEOPLASM OF UPPER-OUTER QUADRANT OF LEFT BREAST IN FEMALE, ESTROGEN RECEPTOR POSITIVE (HCC): Primary | ICD-10-CM

## 2021-03-12 DIAGNOSIS — C50.919 HER2-POSITIVE CARCINOMA OF BREAST (HCC): ICD-10-CM

## 2021-03-12 DIAGNOSIS — Z71.89 CARE PLAN DISCUSSED WITH PATIENT: ICD-10-CM

## 2021-03-12 DIAGNOSIS — Z51.12 ENCOUNTER FOR ANTINEOPLASTIC IMMUNOTHERAPY: ICD-10-CM

## 2021-03-12 DIAGNOSIS — T45.1X5D ADVERSE EFFECT OF CHEMOTHERAPY, SUBSEQUENT ENCOUNTER: ICD-10-CM

## 2021-03-12 DIAGNOSIS — C50.412 MALIGNANT NEOPLASM OF UPPER-OUTER QUADRANT OF LEFT BREAST IN FEMALE, ESTROGEN RECEPTOR POSITIVE (HCC): Primary | ICD-10-CM

## 2021-03-12 DIAGNOSIS — G89.3 CANCER ASSOCIATED PAIN: ICD-10-CM

## 2021-03-12 DIAGNOSIS — C79.2 CARCINOMATOUS METASTASIS IN SKIN (HCC): ICD-10-CM

## 2021-03-12 DIAGNOSIS — C50.412 MALIGNANT NEOPLASM OF UPPER-OUTER QUADRANT OF LEFT FEMALE BREAST, UNSPECIFIED ESTROGEN RECEPTOR STATUS (HCC): ICD-10-CM

## 2021-03-12 PROCEDURE — 99214 OFFICE O/P EST MOD 30 MIN: CPT | Performed by: INTERNAL MEDICINE

## 2021-03-12 RX ORDER — PREDNISONE 20 MG/1
40 TABLET ORAL DAILY
Qty: 10 TABLET | Refills: 0 | Status: SHIPPED | OUTPATIENT
Start: 2021-03-12 | End: 2021-03-17

## 2021-03-12 RX ORDER — CLINDAMYCIN PHOSPHATE 10 MG/G
GEL TOPICAL
Qty: 1 TUBE | Refills: 5 | Status: SHIPPED | OUTPATIENT
Start: 2021-03-12 | End: 2021-03-19

## 2021-03-12 RX ORDER — DOXYCYCLINE HYCLATE 100 MG
100 TABLET ORAL 2 TIMES DAILY
Qty: 14 TABLET | Refills: 0 | Status: SHIPPED | OUTPATIENT
Start: 2021-03-12 | End: 2021-03-19

## 2021-03-12 RX ORDER — HYDROXYZINE HYDROCHLORIDE 25 MG/1
25 TABLET, FILM COATED ORAL EVERY 8 HOURS PRN
Qty: 30 TABLET | Refills: 1 | Status: SHIPPED | OUTPATIENT
Start: 2021-03-12 | End: 2021-03-22

## 2021-03-12 RX ORDER — OXYCODONE AND ACETAMINOPHEN 7.5; 325 MG/1; MG/1
TABLET ORAL
Qty: 120 TABLET | Refills: 0 | Status: SHIPPED | OUTPATIENT
Start: 2021-03-12 | End: 2021-04-26 | Stop reason: SDUPTHER

## 2021-03-18 NOTE — PROGRESS NOTES
PhysicalTherapy Treatment Note and 30 Day Progress Note    Patient:           Ayla Echeverria            : 1965  Today's Date: 3/18/2021  Referring practitioner: Gloria Hendricks*  Date of Initial Visit:       Type: THERAPY  Noted: 10/15/2020  Patient seen for 25 sessions    Visit Diagnoses:    ICD-10-CM ICD-9-CM   1. Regional lymph node metastasis present (CMS/[facility])  C77.9 196.9       SUBJECTIVE     Subjective:  She is on another antibiotic and a steroid because the rash on the R arm came back.  She also has some splotches on her chest.  She was taken off of the chemo because the DrChristiana Thinks she may be having a reaction to the chemo.  She is hurting in both shoulders and her chest, she feels tighter now than she has in a long time.  She has been trying to do her stretches and she has had a sinus headache and sinus issues.  She will see the Dr Ott tomorrow.     Pain after treatment 5/10 upper chest and shoulders with pain meds.   Pain after treatment:  4/10.         OBJECTIVE     Objective   Lymphedema     Row Name 21 1545             Manual Lymphatic Drainage    Manual Lymphatic Drainage  initial sequence  -AL      Initial Sequence  short neck;abdomen;diaphragmatic breathing  -AL      Abdomen  superficial  -AL      Diaphragmatic Breathing  x 10 wit superficial abdominals  -AL      Opened Regional Lymph Nodes  inguinal  -AL      Inguinal  right;left  -AL      Opened Anastamoses  axillo-inguinal  -AL      Axillo-Inguinal  right;left  -AL      Manual Lymphatic Drainage Comments  Worked on stretching for both shoulder for flexion and abduction.  MLD to the upper chest area and both breast.  Went over MLD again and instructed patient to concentrate on MLD to both breasts.   -AL      Manual Therapy  45 Minutes  -AL        User Key  (r) = Recorded By, (t) = Taken By, (c) = Cosigned By    Initials Name Provider Type    AL Jaycee Mohan PTA, IDALIA Physical Therapy Assistant         Therapy  Education/Self Care 73481   Details: Be consistent with HEP, work on MLD to both breasts and upper chest.    MDCapsule Code: N/A   Given HEP   Progress: reinforced   Who provided to: Patent   Level of understanding Verbalized and demonstrated   Timed Minutes      Goals  Progress Note Due:  4/21/2021   STG by: 11/14/20 Comments Status   Patient will begin a home exercise program to accelerate the recovery process  She is working on the HEP Met   Patient will have an increase in bilateral shoulder flexion to 160 deg in supine R shoulder flexion 157  L shoulder flexion  144 Ongoing   Patient will have a 25% reduction in chest and shoulder pain She has more tightness today.  Ongoing   Patient will gain an understanding of Lymphedema and risk factors   Met   LTG by:       Patient will be able to reach up into an overhead cabinet to retrieve a 5 lb dish safely   Met    Patient will regain ROM to reach behind her back as if she was undoing a bra  Met    Patient will have a 75 - 90% reduction in tightness/pain across chest and shoulders She has had an increase in tightness in chest and shoulders Progressing   Patient will be able to perform household chores without exacerbation of symptoms More tightness since the reaction to chemo.  Ongoing, progressing, partially met   Patient will regain functional ROM of both shoulders to perform activities overhead, reaching out to retrieve food from a drive-thru, reaching behind her back to fasten/unfasten bras,clothing    Met   Patient will be instructed in a long term exercise program to continue to make gains following discharge   She knows she needs to work on her HEP more often.  Ongoing progressing              Total Timed Treatment:     45   mins  Total Time of Visit:            45  mins         ASSESSMENT/PLAN     Assessment/Plan     ASSESSMENT: Patient has had a reaction to her chemo with a recurrent rash R UE.  She now has some red blotches on her chest as well. She is on  another antibiotic and a steroid also so the rash is better.  She will see Dr. Ott tomorrow, he will assess the rash and decide if she can continue with chemo.  Patient has more tightness R shoulder ROM, which may be due to the discomfort and guarding since the rash started.  She has not had a significant change in ROM for L shoulder flexion.  She is tight both shoulders with abduction.     PLAN: Will continue  working on increasing ROM both shoulders, as well as decreasing tightness in upper chest, will also work on MLD. Will continue treatment 1 x a week for 4 weeks.     Jaycee Mohan PTA, ÁNGELA-EVANGELINA  Physical Therapy Assistant

## 2021-03-19 ENCOUNTER — HOSPITAL ENCOUNTER (OUTPATIENT)
Dept: INFUSION THERAPY | Age: 56
Discharge: HOME | End: 2021-03-19
Payer: COMMERCIAL

## 2021-03-19 DIAGNOSIS — Z45.2 ENCOUNTER FOR CENTRAL LINE CARE: Primary | ICD-10-CM

## 2021-03-19 RX ORDER — SODIUM CHLORIDE 0.9 % (FLUSH) 0.9 %
20 SYRINGE (ML) INJECTION PRN
Status: CANCELLED | OUTPATIENT
Start: 2021-03-19

## 2021-03-19 RX ORDER — SODIUM CHLORIDE 0.9 % (FLUSH) 0.9 %
10 SYRINGE (ML) INJECTION PRN
Status: DISCONTINUED | OUTPATIENT
Start: 2021-03-19 | End: 2021-03-20 | Stop reason: HOSPADM

## 2021-03-19 RX ORDER — HEPARIN SODIUM (PORCINE) LOCK FLUSH IV SOLN 100 UNIT/ML 100 UNIT/ML
500 SOLUTION INTRAVENOUS PRN
Status: DISCONTINUED | OUTPATIENT
Start: 2021-03-19 | End: 2021-03-20 | Stop reason: HOSPADM

## 2021-03-19 RX ORDER — HEPARIN SODIUM (PORCINE) LOCK FLUSH IV SOLN 100 UNIT/ML 100 UNIT/ML
500 SOLUTION INTRAVENOUS PRN
Status: CANCELLED | OUTPATIENT
Start: 2021-03-19

## 2021-03-19 RX ORDER — SODIUM CHLORIDE 0.9 % (FLUSH) 0.9 %
10 SYRINGE (ML) INJECTION PRN
Status: CANCELLED | OUTPATIENT
Start: 2021-03-19

## 2021-03-19 NOTE — PROGRESS NOTES
30 Day Progress Note and 90 Day Recertification Addendum      Patient: Ayla Echeverria           : 1965  Visit Date: 3/18/2021  Referring practitioner: Gloria Hendricks*  Date of Initial Visit: Type: THERAPY  Noted: 10/15/2020  Patient seen for 25 sessions  Visit Diagnoses:    ICD-10-CM ICD-9-CM   1. Regional lymph node metastasis present (CMS/HCC)  C77.9 196.9          Clinical Progress: improved  Home Program Compliance: Yes  Treatment has included: therapeutic exercise, manual therapy and therapeutic activity  Progress toward previous goals: Partially Met  Prognosis to achieve goals: good    Subjective   Objective   Assessment & Plan     Assessment  Impairments: abnormal or restricted ROM, activity intolerance, impaired physical strength, lacks appropriate home exercise program and pain with function  Prognosis: good  Functional Limitations: uncomfortable because of pain, reaching overhead and unable to perform repetitive tasks  Plan  Therapy options: will be seen for skilled physical therapy services  Planned modality interventions: dry needling, low level laser therapy and TENS  Planned therapy interventions: manual therapy, strengthening, stretching, therapeutic activities, functional ROM exercises, ADL retraining, joint mobilization, soft tissue mobilization, neuromuscular re-education and flexibility  Frequency: 1x week  Duration in visits: 4  Treatment plan discussed with: PTA        I have reviewed the progress note information provided by Flori Mohan PTA, and I concur with their findings.  Wicho Cancino, PT  Physical Therapist      Initial Certification  Certification Period: 2021  Based upon review of the patient's progress and continued therapy plan, it is my medical opinion that Ayla Echeverria should continue physical therapy treatment at [facility]     PHYSICIAN: Gloria Crockett PA-C______________________________________________DATE:  ___________________     Please sign and return via fax to [phone].   Thank you so much for letting us work with Ayla. I appreciate your letting us work with your patients. If you have any questions or concerns, please don't hesitate to contact me.

## 2021-03-22 NOTE — PROGRESS NOTES
Physical Therapy Ortho Treatment Note    Patient:           Ayla Echeverria            : 1965  Today's Date: 2021  Referring practitioner: Gloria Hendricks*  Date of Initial Visit:       Type: THERAPY  Noted: 10/15/2020  Patient seen for 19 sessions    Visit Diagnoses:    ICD-10-CM ICD-9-CM   1. Decreased ROM of left shoulder  M25.612 719.51   2. Decreased ROM of right shoulder  M25.611 719.51   3. Lymphedema  I89.0 457.1   4. At risk for lymphedema  Z91.89 V49.89       SUBJECTIVE     Subjective:  She states she went out of town to see her daughter and helped her paint.  She states she is tight in both shoulders and her chest and arms.  She is also exhausted, today is the first day of her 9 days of being off.     PAIN: 4/10 chest, shoulders, and back with pain meds  Pain after treatment 2/10.       OBJECTIVE     Objective   Lymphedema     Row Name 21 1545             Manual Lymphatic Drainage    Manual Lymphatic Drainage  initial sequence  -AL      Initial Sequence  short neck;abdomen;diaphragmatic breathing  -AL      Abdomen  superficial  -AL      Opened Regional Lymph Nodes  inguinal  -AL      Inguinal  right;left  -AL      Opened Anastamoses  axillo-inguinal  -AL      Axillo-Inguinal  right;left  -AL      Manual Lymphatic Drainage Comments  Upper chest and both breasts where dense tissue is present.   -AL      Manual Therapy  Manual therapy:  30 minutes  -AL        User Key  (r) = Recorded By, (t) = Taken By, (c) = Cosigned By    Initials Name Provider Type    AL Jaycee Mohan, PTA, CLT-EVANGELINA Physical Therapy Assistant         Therapeutic Exercises    [id number] Comments   Supine hooklying with 1 pound bar worked on AAROM B shoulder flexion Very tight today   Supine hooklying with 1 pound bar worked on AAROM B shoulder abduction Very tight   Stretched both pecs, inferior and posterior mobs to  Both shoulders.             Timed Minutes 15 min       Therapy Education/Self Care 03161   Details:  Be diligent with HEP and  MLD daily, work on posture.   Medbridge Code:    Given HEP;Edema management   Progress: Reinforced   Who provided to: [place]   Level of understanding Verbalized   Timed Minutes      Goals  Progress Note Due:  2/11/2021   STG by: 11/14/20 Comments Status   Patient will begin a home exercise program to accelerate the recovery process  Needs to work on HEP more often during the day. Met   Patient will have an increase in bilateral shoulder flexion to 160 deg in supine  Ongoing   Patient will have a 25% reduction in chest and shoulder pain  Met   Patient will gain an understanding of Lymphedema and risk factors   Met   LTG by:       Patient will be able to reach up into an overead cabinet to retrieve a 5 lb dish safely   Met    Patient will regain ROM to reach behind her back as if she was undoing a bra  Met    Patient will have a 75 - 90% reduction in tightness/pain across chest and shoulders She has tightness today Progressing   Patient will be able to perform household chores without exacerbation of symptoms  Ongoing, progressing, partially met   Patient will regain functional ROM of both shoulders to perform activities overhead, reaching out to retrieve food from a drive-thru, reaching behind her back to fasten/unfasten bras,clothing    Met   Patient will be instructed in a long term exercise program to continue to make gains following discharge Needs to work on HEP and MLD more often.  Ongoing progressing      Total Timed Treatment:     45   mins  Total Time of Visit:            45  mins         ASSESSMENT/PLAN     Assessment/Plan     ASSESSEMENT: Patient has more tightness in both shoulders today.  She did paint over the weekend and she felt like this made more her more tight.  Stressed importance of working on her HEP more than one time a day, also work on posture daily.  Patient continues to have dense tissue present both breast, more so on the L.  The dense tissue does soften with the  MLD.     PLAN: Will continue with MLD and working on increasing ROM both shoulders, a well as decreasing tightness in upper chest. Also will work on posture.     Jaycee Mohan PTA, ÁNGELA-EVANGELINA  Physical Therapy Assistant   [place]

## 2021-03-23 NOTE — PROGRESS NOTES
PhysicalTherapy Treatment Note    Patient:           Ayla Echeverria            : 1965  Today's Date: 3/30/2021  Referring practitioner: Gloria Hendricks*  Date of Initial Visit:       Type: THERAPY  Noted: 10/15/2020  Patient seen for 26 sessions    Visit Diagnoses:    ICD-10-CM ICD-9-CM   1. Regional lymph node metastasis present (CMS/HCC)  C77.9 196.9   2. Decreased ROM of right shoulder  M25.611 719.51   3. Decreased ROM of left shoulder  M25.612 719.51       SUBJECTIVE     Subjective:  She states her arm is better, just itching.  She started her chemo again, she is tired today. She has been working on stretching at home and the MLD.     Pain before treatment 3/10 upper chest and shoulders with pain meds.   Pain after treatment:  2/10.         OBJECTIVE     Objective        21 1547   Manual Lymphatic Drainage   Manual Lymphatic Drainage initial sequence   Initial Sequence short neck;abdomen;diaphragmatic breathing   Abdomen superficial   Diaphragmatic Breathing x 10 wit superficial abdominals   Opened Regional Lymph Nodes inguinal   Inguinal right;left   Opened Anastamoses axillo-inguinal   Axillo-Inguinal right;left   Manual Lymphatic Drainage Comments MLD to the upper chest and both breasts.  Posterior mobs both shoulders.  Had patient hold onto a ball for streching into B shoulder flexion.  PROM B shoulders for shoulder flexion.  Used a towel roll along spine and stretched pecs.    Manual Therapy 45 Minutes        Therapy Education/Self Care 23739   Details: Be consistent with HEP, work on MLD to both breasts and upper chest.    Medbridge Code: N/A   Given HEP   Progress: reinforced   Who provided to: Patent   Level of understanding Verbalized    Timed Minutes      Goals  Progress Note Due:  2021   STG by: 20 Comments Status   Patient will begin a home exercise program to accelerate the recovery process  She is working on the HEP Met   Patient will have an increase in  bilateral shoulder flexion to 160 deg in supine    Ongoing   Patient will have a 25% reduction in chest and shoulder pain Not as tight today Ongoing   Patient will gain an understanding of Lymphedema and risk factors   Met   LTG by:       Patient will be able to reach up into an overhead cabinet to retrieve a 5 lb dish safely   Met    Patient will regain ROM to reach behind her back as if she was undoing a bra  Met    Patient will have a 75 - 90% reduction in tightness/pain across chest and shoulders  Progressing   Patient will be able to perform household chores without exacerbation of symptoms  Ongoing, progressing, partially met   Patient will regain functional ROM of both shoulders to perform activities overhead, reaching out to retrieve food from a drive-thru, reaching behind her back to fasten/unfasten bras,clothing    Met   Patient will be instructed in a long term exercise program to continue to make gains following discharge   She is working on her HEP more often.  Ongoing progressing              Total Timed Treatment:     45   mins  Total Time of Visit:            45  mins         ASSESSMENT/PLAN     Assessment/Plan     ASSESSMENT: Patient has started chemo again, she no longer has the rash but does still have blotches on her chest.  She has some pain in the L upper back she is not sure why.  Patient has been working on her HEP and MLD at home and is less tight today although she remains tight both shoulders, more so on the L.  I used the towel roll along patient's thoracic spine to help stretch her pecs.      PLAN: Will continue  working on increasing ROM both shoulders, as well as decreasing tightness in upper chest, will also work on MLD.      Jaycee Mohan, VAUGHN, T-EVANGELINA  Physical Therapy Assistant

## 2021-03-25 NOTE — PROGRESS NOTES
Héctor Olmedo   1965   3/26/2021     Chief Complaint   Patient presents with    Cancer    Chemotherapy   Reason for MD visit-disease/treatment assessment    Interval history/history of present illness:  Diagnosis   IDC left breast, March 2016   Grade 2. ER 1%, MT 6%, HER-2/oseas IHC 3+/FISH amplified ratio 6.7, AR 95%. 01/30/2017- Biopsy-proven in breast relapse/Inflammatory breast cancer. ER negative, MT negative, HER-2/oseas IHC 3+/FISH amplified ratio 4.5, Ki-67 32%. MyRisk - Revealed no clinically significant mutation. PTEN VUS. Treatment summary  Neoadjuvant adjuvant chemotherapy TCH-P ×6 cycles. She declined to complete 1 year of Herceptin. She declined radiation therapy. 10/3/2016-left lumpectomy/sentinel lymph node   01/30/2017- Biopsy-proven in breast relapse/Inflammatory breast cancer. 5/11/2017-Chemotherapy with paclitaxel/carboplatin weekly. Herceptin/Pertuzumab every 3 weeks started 05/11/2017. She had a allergic reaction to carboplatin and this was discontinued. 7/21/2017- 9/1/2017 Neuropathy grade 2 with Taxol. Therefore, switched to Taxotere/Herceptin/Pertuzumab. Discontinued due to progression of disease. 9/22/2017 through 3/2/2018Gita Adina every 3 weeks. (stopped as per patient request, neuropathy grade 1)   7/10/2018 through 11/30/2018- Kadcyla. 1/4/2019-Navelbine x 1 cycle with very poor tolerance (pain)   1/25/2018-Gemzar/Herceptin   3/15/2019- low-dose Cisplatin added to Gemzar and Herceptin regimen, regimen changed to every other week   4/17/2019- palliative radiation to left clavicle/chest wall for anticipate total dosing of 5040 cGy   6/22/2019- Xeloda/apatinib  January 2020-palliative radiation to the right breast.  1/10/2020- palliative treatment with Fam-Trastuzumab- stopped Feb due to severe toxicity  September 2020- palliative radiation left chest wall skin metastasis.   8/21/2020-Herceptin, Xeloda and Tucatinib  Tumor target  Skin chest wall left  upper support. She had a total of 18 weeks of trastuzumab treatment. 10/3/2016-she underwent a left lumpectomy with left sentinel lymph node revealing an invasive ductal carcinoma, grade 3. Margins clear of invasive carcinoma (1.1 cm from the lateral margin), extensive lymphovascular space invasion. 2 out of 3 sentinel lymph nodes positive for metastatic carcinoma. Final pathologic staging [id number])M0. She declined adjuvant radiation therapy and adjuvant Herceptin completion.   -------------------In-breast Recurrence [id number]---------------------------  1/30/2017- she was seen by Dr. Shoaib Gar with new complaints of erythema in the left breast. A skin punch biopsy was consistent with metastatic ductal carcinoma with extensive dermal lymphovascular invasion. ER negative, CA negative, HER-2/oseas IHC 3+/FISH amplified ratio 4.5, Ki-67 32%. Foundation one showed ERBB2 amplification, AURKA amplification, TP53 and .   2/15/2017- she was again seen by Dr. Malik Silveira and explained about her tumor recurrence. Unfortunately, she declined any further intervention at that time. 4/21/2017- she asked Dr. Malik Silveira for a referral to us.   4/28/2017-referred to me for further evaluation. I recommended a PET scan and neoadjuvant chemotherapy with carboplatin/paclitaxel and Perjeta and Herceptin. 5/4/2017-PET/CT scan revealed abnormal metabolic activity present in multiple regions throughout the left breast and in the skin( SUV 8.9, 7.4, 7.3). In the left supraclavicular region a cluster of lymph nodes present (SUV of 3). Axillary lymph nodes with SUV of 2.8. Tonsils bilaterally with SUV 5.6 (symmetric). No abnormal metabolic activity in the intrathoracic or intra-abdominal pelvic region. No abnormal skeletal metabolic activity. I recommended weekly carboplatin/weekly Taxol/Herceptin and Pertuzumab.   5/26/2017-she had a severe allergic reaction after 2 doses of carboplatin and therefore this was discontinued.  She was continued only on Taxol weekly/Herceptin and Pertuzumab.   7/21/2017-after cycle #2 and 8 doses of Taxol she developed neuropathy related to chemotherapy and therefore chemotherapy was switched from Taxol to Taxotere. Genetic assessment- [facility] revealed no clinically significant mutation. PTEN VUS.   9/1/2017-local in breast disease progression with increasing breast erythema. Treatment switched to Kadcyla. 12/27/2017- repeat re-staging CT scan of the abdomen and pelvis documented no evidence of intra-abdominal pelvic metastasis. Superior left renal cyst. Mild hepatic steatosis. CT scan of the chest documented no evidence of intrathoracic metastasis. Bone scan documented no evidence of bony metastatic disease. 3/2/2018-after 8 cycles of Kadcyla she requested treatment to be on hold. She has requested her PCP a referral to [facility] for a second opinion. She has neuropathy grade 1.   5/2/2018- 2 months off treatment (Kadcyla). She has not yet been seen at [facility]. Apparently, [facility] is gathering her records and will see her soon. Examination of her chest wall showed recurrent disease in the left upper chest. Residual neuropathy grade 1. She could not tolerate gabapentin, and therefore has stopped it. She was not interested in any other medication for neuropathy at this time. 7/10/2018-after discussion at [facility] she was recommended to reinitiate Kadcyla. 7/13/2018-CT of chest, abdomen, pelvis showed no evidence of intra-abdominal disease. There is an enlarged right-sided level 1 lymph node that was not appreciated previously, measuring 2.5 x 1.4 cm, metastatic disease considered. 7/16/2018-PET CT scan showed diffuse uptake throughout the left breast extending to the left chest wall. Abnormal uptake within the left axilla lymph nodes and left internal mammary lymph nodes concerning for metastatic disease.  Enlarged hypermetabolic right axilla lymph node concerning for metastatic disease with SUV 8.0. Abnormal uptake within a right cervical lymph node, etiology unclear, but could be reactive. 10/26/2018 - CT scan of the chest was completed at [facility] and compared to CT scan of 12/27/2017 versus her last CT scan completed at [facility] on 7/13/2018. The CT scan identified a right axillary lymph node measuring 1.2 cm, otherwise no intrathoracic neoplastic process/metastatic disease. 10/26/2018 -CT scan of the abdomen and pelvis was completed at [facility] and compared to CT scan of 12/27/2017 versus her last CT scan completed at [facility] on 7/13/2018. No evidence of intra-abdominal or pelvic metastasis was identified. December 2018-interval worsening of skin metastasis in the left breast and diffuse erythema with new nodules. 12/17/2018-skin biopsy of overlying right breast consistent invasive ductal carcinoma, high-grade. ER 20%, FL 0%, HER-2/oseas 3+.  12/17/2018-right axillary FNA biopsy consistent invasive ductal carcinoma. 12/26/2018-recommended clinical trial at [facility]. 1/4/2019-started on Navelbine/Herceptin, due to delays on the clinical trial at [place]. Unfortunately, she had severe chest wall pain and generalized pain related to Navelbine. 1/25/2019-4/19/2019 she was switched to cisplatin/Gemzar/Herceptin, but had disease progression. 5/30/2019-CT chest, abdomen, pelvis and bone scan was unremarkable for right axillary adenopathy measuring 2 cm. Lymph node located in the upper chest wall under the pectoralis muscle measuring 1.2 cm. Right breast with 2 areas with masslike enhancement. No evidence of metastatic disease in the abdomen pelvis. Bone scan was unremarkable for metastatic bone disease. June 2019-very poor tolerance to cisplatin, Gemzar/Herceptin. She was recommended Xeloda/ Lapatinib.   6/22/2019-she was recommended and started on Xeloda 850 mg/m2 PO BID days 1 through 14 every 21 days and Lapatinib 1250 mg daily continuously.   November 2019- progression on prior Xeloda and lapatinib.  11/23/2019- recommended rechallenge with Herceptin and Navelbine vs clinical trial at [facility]. 12/23/2019-CT chest abdomen pelvis showed metastatic disease in the right axillary lymph node and diffuse bilateral skin involvement of both breasts with several nodules. No evidence of pulmonary, liver or bone metastasis. CT of the head with contrast was unremarkable. 1/3/2020- the patient was unable to follow-up at [facility] for clinical trial.  Therefore recommended Fam-trastuzumab deruxtecan every 3 weeks. 1/10/2020-she was started on palliative treatment with Fam-trastuzumab.  6/5/2020- Ct Chest/Abdomen/Pelvis W Contrast No lymphadenopathy. A previously seen enlarged right axillary lymph node is now small in size. There is some stranding of the fat around the lymph node. Bilateral breast skin thickening left greater than right. Prior lumpectomy on the left with left axillary lymph node sampling. Previously seen nodularity in the right breast on CT is not visualized today. There may be minimal radiation change in the periphery of the left lung. There is no dense consolidation or effusion. No definite metastatic lung nodules. No evidence of abdominal or pelvic neoplastic process or metastatic disease. The previously seen right breast nodule is not visualized in this study. Postsurgical changes of the left breast. No significant change in the previous study. Lobulated skin thickening of the left lower lateral chest and upper abdominal wall which was not noted in the previous study. The etiology is not certain. This data be clinically correlated and further evaluated. 8/7/2020- the patient is currently awaiting for palliative radiation to the chest wall for the skin margins.   She has agreed and want to start on Herceptin, Xeloda and Tucatinib.  8/21/2020 Tumor Marker- CA 15.3- 32.19  10/9/2020-continue Herceptin and tucatinib.  12/11/2020-she has resumed Xeloda on cycle. 56 tablet 5    pantoprazole (PROTONIX) 40 MG tablet TAKE ONE TABLET DAILY 30 tablet 2    Tucatinib (TUKYSA) 150 MG TABS Take 300 mg by mouth 2 times daily 120 tablet 5    ondansetron (ZOFRAN) 8 MG tablet Take 1 tablet by mouth every 8 hours as needed for Nausea or Vomiting 30 tablet 5    Zinc Sulfate (ZINC 15 PO) Take by mouth daily      Cholecalciferol (VITAMIN D3) 1.25 MG (77027 UT) CAPS Take by mouth daily Unknown amount of Vitamins D3      APPLE CIDER VINEGAR PO Take by mouth      Probiotic Product (PROBIOTIC-10 PO) Take by mouth      Turmeric 500 MG CAPS Take by mouth      SELENIUM ER PO Take by mouth      Multiple Vitamin (MULTI VITAMIN DAILY PO) Take by mouth       No current facility-administered medications for this visit. REVIEW OF SYSTEMS:    Constitutional: no fever, no night sweats, fatigue;   HEENT: no blurring of vision, no double vision, no hearing difficulty, no tinnitus,no ulceration, no dental caries, no dysphagia, no epistaxis  Lungs: no cough, no shortness of breath, no wheeze;   CVS: no palpitation, no chest pain, no shortness of breath;  GI: abdominal bloating resolved, nausea resolved, no vomiting, no constipation; no diarrhea  ORLIN: no dysuria, frequency and urgency, no hematuria, no kidney stones;   Musculoskeletal: no joint pain, swelling , stiffness;   Endocrine: no polyuria, polydypsia, no cold or heat intolerence; Hematology/lymphatic: no easy brusing or bleeding, no hx of clotting disorder; no peripheral adenopathy. Dermatology: Right forearm rash improved, no eczema, no pruritis; mild flare of skin rash on upper right breast, intermittent pruritis  Psychiatry: no depression, no anxiety,no panic attacks, no suicide ideation;    Neurology: no syncope, no seizures, no numbness or tingling of hands, no numbness and tingling of feet, no paresis;     PHYSICAL EXAM:    /65   Pulse 73   Temp 97.3 °F (36.3 °C)   Resp 18   Ht 5' 3\" (1.6 m)   Wt 135 lb 14.4 oz (61.6 kg)   LMP 02/29/2016 (Approximate)   SpO2 100%   BMI 24.07 kg/m²     Pain scale:5    CONSTITUTIONAL: Alert, appropriate, no acute distress,   EYES: Non icteric, EOM intact, pupils equal round and reactive to light and accommodation. ENT: Oral mucus membranes moist, no oral pharyngeal lesions. External inspection of ears and nose are normal.   NECK: Supple, no masses. No palpable thyroid mass    CHEST/LUNGS: CTA bilaterally, normal respiratory effort  Chaperoned upper body exam with Tom Delvalle RN -mild erythematous rash in the upper chest.  CARDIOVASCULAR: RRR, no murmurs. No lower extremity edema   ABDOMEN: soft non-tender, active bowel sounds, no hepatosplenomegaly. No palpable masses. EXTREMITIES: warm, Full ROM of all fours extremities. No focal weakness. SKIN: Dry skin on the right armpit. Mild erythema. maculopapular rash in the right forearm improved. LYMPH: No cervical, clavicular, axillary, or inguinal lymphadenopathy  NEUROLOGIC: follows commands, non focal.   PSYCH: mood and affect appropriate. Alert and oriented to time and place and person.     Relevant Lab findings reviewed/analyzed by me:   Lab Results   Component Value Date    WBC 3.51 (L) 03/26/2021    HGB 10.1 (L) 03/26/2021    HCT 30.8 (L) 03/26/2021    MCV 87.3 03/26/2021     03/26/2021     Lab Results   Component Value Date    NEUTROABS 2.16 03/26/2021     Lab Results   Component Value Date     03/26/2021    K 4.2 03/26/2021     03/26/2021    CO2 31 (H) 03/26/2021    BUN 11 03/26/2021    CREATININE 0.9 03/26/2021    GLUCOSE 86 03/26/2021    CALCIUM 9.1 03/26/2021    PROT 7.9 03/26/2021    LABALBU 4.3 03/26/2021    BILITOT 0.8 03/26/2021    ALKPHOS 91 03/26/2021    AST 88 (H) 03/26/2021    ALT 53 (H) 03/26/2021    LABGLOM >60 03/26/2021    GFRAA 114 04/28/2020    AGRATIO 1.3 04/28/2020    GLOB 3.6 03/26/2021       Relevant Imaging studies reviewed/analyzed by me:   Echo Complete 2d W Doppler W Color    Result Date: 2/26/2021  Transthoracic Echocardiography Report (TTE)  Demographics   Patient Name  Lauryn Zhu Date of Study         02/26/2021   MRN           [id number]          Gender                Female   Date of Birth 1965      Room Number   Age           54 year(s)   Height:       63 inches       Referring Physician   Hamida David   Weight:       135 pounds      Sonographer           Rhianna Lujan [facility]   BSA:          1.64 m^2        Interpreting          Alexx Hooker MD                                Physician   BMI:          23.91 kg/m^2  Procedure Type of Study   TTE procedure:ECHO NO CONTRAST WITH DOP/COLR. Study Location: Echo Lab Technical Quality: Adequate visualization Patient Status: Outpatient Rhythm: Within normal limits Indications:Pre-chemotherapy. Conclusions   Summary  Mitral valve leaflets are mildly thickened with preserved leaflet  mobility. Mildly thickened aortic valve leaflets with preserved leaflet mobility. Tricuspid valve is structurally normal.  Mild tricuspid regurgitation with estimated RVSP of 16 mm Hg. Normal left ventricular size with preserved LV function and an estimated  ejection fraction of approximately 55-60%. Normal left ventricular wall thickness. No regional wall motion abnormalities. Signature   ----------------------------------------------------------------  Electronically signed by Alexx Hooker MD(Interpreting  physician) on 02/26/2021 11:13 AM  ----------------------------------------------------------------   Findings   Mitral Valve  Mitral valve leaflets are mildly thickened with preserved leaflet  mobility. Aortic Valve  Mildly thickened aortic valve leaflets with preserved leaflet mobility. Tricuspid Valve  Tricuspid valve is structurally normal.  Mild tricuspid regurgitation with estimated RVSP of 16 mm Hg. Pulmonic Valve  The pulmonic valve was not well visualized . Left Atrium  Normal size left atrium.    Left Ventricle  Normal left ventricular size with preserved LV function and an estimated  ejection fraction of approximately 55-60%. Normal left ventricular wall thickness. No regional wall motion abnormalities. Right Atrium  Normal right atrial dimension with no evidence of thrombus or mass noted. Right Ventricle  Normal right ventricular size with preserved RV function. Pericardial Effusion  No evidence of significant pericardial effusion is noted. Pleural Effusion  No evidence of pleural effusion. M-Mode Measurements (cm)   LVIDd: 3.38 cm                         LVIDs: 2.18 cm  IVSd: 0.92 cm  LVPWd: 1.07 cm                         AO Root Dimension: 2 cm  % Ejection Fraction: 65 %              LA: 2.6 cm                                         LVOT: 2 cm  Doppler Measurements:   AV Peak Gradient: 5.66 mmHg        MV Peak E-Wave: 69.9 cm/s                                     MV Peak A-Wave: 66.8 cm/s  TR Velocity:164 cm/s               MV E/A Ratio: 1.05 %  TR Gradient:10.76 mmHg             MV Peak Gradient: 1.95 mmHg  Estimated RAP:5 mmHg  RVSP:16 mmHg      Ct Chest W Contrast    Result Date: 2/26/2021  CT chest 2/26/2021 8:29 AM HISTORY: Left breast cancer TECHNIQUE: Axial images of the chest were obtained following IV contrast. . Coronal and sagittal reformatted images are reconstructed and reviewed. COMPARISON: 6/5/2020. DLP: 188 mGy cm Automated exposure control was utilized to minimize patient radiation dose. FINDINGS: A left subclavian port is in place with the tip at the atrial caval junction. There are left axillary surgical clips. There is bilateral breast skin thickening, more prominent on the left with increasing trabecular prominence of the breast tissue as was noted on the prior study. Surgical clips again noted within the left breast tissue. No pathologic axillary lymphadenopathy is localized. No intrathoracic or axillary lymphadenopathy appreciated.  The thoracic aorta is normal in caliber with no aneurysm or dissection. No pericardial or pleural effusions. Please for to CT abdomen pelvis report for findings below the hemidiaphragms. There is left apical lung scarring with minimal scarring along the anterior left upper lobe and inferior right middle lobes. No suspicious pulmonary nodules are identified. No lobar consolidation. No pneumothorax. 6 no discrete endobronchial lesions. No focal destructive osseous lesions are identified. Mild degenerative change of the thoracic spine is noted. 1. No evidence of intrathoracic metastasis. Scarring suspected within the left upper and right middle lobes. 3. Diffuse left greater than right breast skin thickening with prominence of the trabecular breast tissue. Postoperative changes of the left breast and axilla. No pathologic axillary or intrathoracic lymphadenopathy localized. Signed by Dr Safia Segovia on 2/26/2021 9:59 AM    Ct Abdomen Pelvis W Iv Contrast Additional Contrast? Oral    Result Date: 2/26/2021  CT abdomen and pelvis 2/26/2021 8:29 AM HISTORY: Breast cancer TECHNIQUE: Axial images of the abdomen and pelvis were obtained following IV and oral contrast. Coronal and sagittal reformatted images are reconstructed and reviewed. COMPARISON: 6/5/2020. DLP: 188 mGy cm Automated exposure control was utilized to minimize patient radiation dose. FINDINGS: Mild hepatic steatosis with no focal suspicious liver or splenic mass identified. No pancreatic cyst or mass localized. Mild diffuse nonspecific prominence of the intrapancreatic duct. Gallbladder is unremarkable. No adrenal nodule identified. Superior left renal cysts. No enhancing renal mass. No hydronephrosis. Bladder unremarkable. Uterine calcification may represent small uterine leiomyomas. No adnexal pathology identified. Moderate to large volume of stool within the colon suggesting constipation. Normal appendix. No small bowel obstruction. Decompressed stomach. No pathologic lymphadenopathy. Normal caliber abdominal aorta with no aneurysm or dissection. No focal destructive osseous lesions. Please for to CT chest report for description bilateral breast skin thickening and edematous appearance to the breast tissue. Surgical clips partially visualized within the left breast.    1. No evidence of intra-abdominal pelvic metastasis. 2. Hepatic steatosis. 3. Superior left renal cyst. 4. Constipation. Signed by Dr Burrows Courser on 2/26/2021 10:03 AM    ASSESSMENT:    Orders Placed This Encounter   Procedures    CBC Auto Differential     Standing Status:   Future     Number of Occurrences:   1     Standing Expiration Date:   3/26/2022    Comprehensive Metabolic Panel     Standing Status:   Future     Number of Occurrences:   1     Standing Expiration Date:   3/26/2022      Christiane Duke was seen today for cancer and chemotherapy. Diagnoses and all orders for this visit:    Malignant neoplasm of upper-outer quadrant of left female breast, unspecified estrogen receptor status ([address]  -     CBC Auto Differential; Future  -     Comprehensive Metabolic Panel; Future    HER2-positive carcinoma of breast ([address]    Carcinomatous metastasis in skin ([address]    Encounter for antineoplastic immunotherapy    Care plan discussed with patient    Skin rash         Metastatic HER-2 oseas breast cancer   She received 2 cycles of Fam-trastuzumab deruxtecan 5.4 mg/kg every 3 weeks She was going to receive third cycle with 20% dose reduction Fam-trastuzumab deruxtecan 4.3 mg/kg but the patient opted to discontinue chemotherapy. Due to increased toxicity and poor quality of life the patient request to stop treatment at this time. CT chest abdomen pelvis showed no evidence of intrathoracic or intra-abdominal metastatic disease  Status post completion of palliative radiation to the skin. Current regimen:  Xeloda 650 mg p.o. day 5 days and 9 days off  Continue Herceptin and Tucatinib 150 mg p.o. twice daily. Continued response.   No evidence of recurrence in the skin. February 2021-reviewed the scans and a 2D echo. No evidence of systemic metastatic disease. 2D echo normal stable EF. Treatment related toxicity-maculopapular rash in the right forearm has resolved. Recommended-clindamycin cream, oral doxycycline x10 days and oral steroids 40 mg p.o. x5 days. Recent 2D echo showed normal EF. I believe that the rash is secondary to EGFR inhibition by tucatinib. Mild rash upper chest.      Skin metastasis - status post palliative RT with significant improvement. No evidence of skin recurrence at this time. Clinical/laboratory monitoring      Fatigue-related to chemotherapy. 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 She denies any significant change from previous evaluation. Prolonged neutropenias- the patient had a bone marrow in 2018 that showed an unremarkable bone marrow. She has had prolonged neutropenia in the past.  ANC 1.47. No fever chills. Continue to watch. No intervention. CBC today showed WBC 3.51/ANC 2.16, hemoglobin 10.1, platelet count 098,018. Plan:  1. CBC, CMP today  2. Resume tucatinib 300 mg p.o. twice daily  3. Continue Herceptin  Every 3 weeks  4. Xeloda to 650 mg p.o. twice daily 5 days on and 9 days off.   5. RTC 6 weeks MD  6. Continue clinical/laboratory monitoring  7. Recommend hydrocortisone cream     The selection, dosing administration of anticancer agents in the management of associated toxicities are complex. Modifications of drug dose and schedule and the initiation of supportive care interventions are often necessary because of expected toxicities individual patient variability, prior treatment and comorbidity.  The optimal delivery of anticancer agents therefore requires a healthcare delivery team experienced in the use of anticancer agents and the management of associated toxicities in patients with

## 2021-03-26 ENCOUNTER — OFFICE VISIT (OUTPATIENT)
Dept: HEMATOLOGY | Age: 56
End: 2021-03-26
Payer: COMMERCIAL

## 2021-03-26 ENCOUNTER — HOSPITAL ENCOUNTER (OUTPATIENT)
Dept: INFUSION THERAPY | Age: 56
Discharge: HOME | End: 2021-03-26
Payer: COMMERCIAL

## 2021-03-26 VITALS
HEART RATE: 73 BPM | TEMPERATURE: 97.3 F | HEIGHT: 63 IN | RESPIRATION RATE: 18 BRPM | OXYGEN SATURATION: 100 % | WEIGHT: 135.9 LBS | SYSTOLIC BLOOD PRESSURE: 117 MMHG | DIASTOLIC BLOOD PRESSURE: 65 MMHG | BODY MASS INDEX: 24.08 KG/M2

## 2021-03-26 DIAGNOSIS — C50.919 HER2-POSITIVE CARCINOMA OF BREAST (HCC): ICD-10-CM

## 2021-03-26 DIAGNOSIS — R21 SKIN RASH: ICD-10-CM

## 2021-03-26 DIAGNOSIS — C79.2 CARCINOMATOUS METASTASIS IN SKIN (HCC): ICD-10-CM

## 2021-03-26 DIAGNOSIS — C50.412 MALIGNANT NEOPLASM OF UPPER-OUTER QUADRANT OF LEFT FEMALE BREAST, UNSPECIFIED ESTROGEN RECEPTOR STATUS (HCC): Primary | ICD-10-CM

## 2021-03-26 DIAGNOSIS — Z71.89 CARE PLAN DISCUSSED WITH PATIENT: ICD-10-CM

## 2021-03-26 DIAGNOSIS — Z51.12 ENCOUNTER FOR ANTINEOPLASTIC IMMUNOTHERAPY: ICD-10-CM

## 2021-03-26 LAB
ALBUMIN SERPL-MCNC: 4.3 G/DL (ref 3.5–5.2)
ALP BLD-CCNC: 91 U/L (ref 35–104)
ALT SERPL-CCNC: 53 U/L (ref 9–52)
ANION GAP SERPL CALCULATED.3IONS-SCNC: 8 MMOL/L (ref 7–19)
AST SERPL-CCNC: 88 U/L (ref 14–36)
BASOPHILS ABSOLUTE: 0.01 K/UL (ref 0.01–0.08)
BASOPHILS RELATIVE PERCENT: 0.3 % (ref 0.1–1.2)
BILIRUB SERPL-MCNC: 0.8 MG/DL (ref 0.2–1.3)
BUN BLDV-MCNC: 11 MG/DL (ref 7–17)
CALCIUM SERPL-MCNC: 9.1 MG/DL (ref 8.4–10.2)
CHLORIDE BLD-SCNC: 102 MMOL/L (ref 98–111)
CO2: 31 MMOL/L (ref 22–29)
CREAT SERPL-MCNC: 0.9 MG/DL (ref 0.5–1)
EOSINOPHILS ABSOLUTE: 0.11 K/UL (ref 0.04–0.54)
EOSINOPHILS RELATIVE PERCENT: 3.1 % (ref 0.7–7)
GFR NON-AFRICAN AMERICAN: >60
GLOBULIN: 3.6 G/DL
GLUCOSE BLD-MCNC: 86 MG/DL (ref 74–106)
HCT VFR BLD CALC: 30.8 % (ref 34.1–44.9)
HEMOGLOBIN: 10.1 G/DL (ref 11.2–15.7)
LYMPHOCYTES ABSOLUTE: 0.75 K/UL (ref 1.18–3.74)
LYMPHOCYTES RELATIVE PERCENT: 21.4 % (ref 19.3–53.1)
MCH RBC QN AUTO: 28.6 PG (ref 25.6–32.2)
MCHC RBC AUTO-ENTMCNC: 32.8 G/DL (ref 32.3–35.5)
MCV RBC AUTO: 87.3 FL (ref 79.4–94.8)
MONOCYTES ABSOLUTE: 0.48 K/UL (ref 0.24–0.82)
MONOCYTES RELATIVE PERCENT: 13.7 % (ref 4.7–12.5)
NEUTROPHILS ABSOLUTE: 2.16 K/UL (ref 1.56–6.13)
NEUTROPHILS RELATIVE PERCENT: 61.5 % (ref 34–71.1)
PDW BLD-RTO: 17.2 % (ref 11.7–14.4)
PLATELET # BLD: 220 K/UL (ref 182–369)
PMV BLD AUTO: 8.5 FL (ref 7.4–10.4)
POTASSIUM SERPL-SCNC: 4.2 MMOL/L (ref 3.5–5.1)
RBC # BLD: 3.53 M/UL (ref 3.93–5.22)
SODIUM BLD-SCNC: 141 MMOL/L (ref 137–145)
TOTAL PROTEIN: 7.9 G/DL (ref 6.3–8.2)
WBC # BLD: 3.51 K/UL (ref 3.98–10.04)

## 2021-03-26 PROCEDURE — 6370000000 HC RX 637 (ALT 250 FOR IP): Performed by: INTERNAL MEDICINE

## 2021-03-26 PROCEDURE — 85025 COMPLETE CBC W/AUTO DIFF WBC: CPT

## 2021-03-26 PROCEDURE — 96413 CHEMO IV INFUSION 1 HR: CPT

## 2021-03-26 PROCEDURE — 80053 COMPREHEN METABOLIC PANEL: CPT

## 2021-03-26 PROCEDURE — 99214 OFFICE O/P EST MOD 30 MIN: CPT | Performed by: INTERNAL MEDICINE

## 2021-03-26 PROCEDURE — 96375 TX/PRO/DX INJ NEW DRUG ADDON: CPT

## 2021-03-26 PROCEDURE — 2580000003 HC RX 258: Performed by: INTERNAL MEDICINE

## 2021-03-26 PROCEDURE — 6360000002 HC RX W HCPCS: Performed by: INTERNAL MEDICINE

## 2021-03-26 RX ORDER — SODIUM CHLORIDE 0.9 % (FLUSH) 0.9 %
10 SYRINGE (ML) INJECTION PRN
Status: DISCONTINUED | OUTPATIENT
Start: 2021-03-26 | End: 2021-03-27 | Stop reason: HOSPADM

## 2021-03-26 RX ORDER — SODIUM CHLORIDE 0.9 % (FLUSH) 0.9 %
10 SYRINGE (ML) INJECTION PRN
Status: CANCELLED | OUTPATIENT
Start: 2021-03-26

## 2021-03-26 RX ORDER — MEPERIDINE HYDROCHLORIDE 50 MG/ML
12.5 INJECTION INTRAMUSCULAR; INTRAVENOUS; SUBCUTANEOUS ONCE
Status: CANCELLED | OUTPATIENT
Start: 2021-03-26 | End: 2021-03-26

## 2021-03-26 RX ORDER — SODIUM CHLORIDE 0.9 % (FLUSH) 0.9 %
5 SYRINGE (ML) INJECTION PRN
Status: CANCELLED | OUTPATIENT
Start: 2021-03-26

## 2021-03-26 RX ORDER — ACETAMINOPHEN 500 MG
1000 TABLET ORAL ONCE
Status: COMPLETED | OUTPATIENT
Start: 2021-03-26 | End: 2021-03-26

## 2021-03-26 RX ORDER — EPINEPHRINE 1 MG/ML
0.3 INJECTION, SOLUTION, CONCENTRATE INTRAVENOUS PRN
Status: CANCELLED | OUTPATIENT
Start: 2021-03-26

## 2021-03-26 RX ORDER — SODIUM CHLORIDE 9 MG/ML
INJECTION, SOLUTION INTRAVENOUS CONTINUOUS
Status: CANCELLED | OUTPATIENT
Start: 2021-03-26

## 2021-03-26 RX ORDER — DIPHENHYDRAMINE HYDROCHLORIDE 50 MG/ML
25 INJECTION INTRAMUSCULAR; INTRAVENOUS ONCE
Status: COMPLETED | OUTPATIENT
Start: 2021-03-26 | End: 2021-03-26

## 2021-03-26 RX ORDER — SODIUM CHLORIDE 9 MG/ML
20 INJECTION, SOLUTION INTRAVENOUS ONCE
Status: COMPLETED | OUTPATIENT
Start: 2021-03-26 | End: 2021-03-26

## 2021-03-26 RX ORDER — HEPARIN SODIUM (PORCINE) LOCK FLUSH IV SOLN 100 UNIT/ML 100 UNIT/ML
500 SOLUTION INTRAVENOUS PRN
Status: DISCONTINUED | OUTPATIENT
Start: 2021-03-26 | End: 2021-03-27 | Stop reason: HOSPADM

## 2021-03-26 RX ORDER — METHYLPREDNISOLONE SODIUM SUCCINATE 125 MG/2ML
125 INJECTION, POWDER, LYOPHILIZED, FOR SOLUTION INTRAMUSCULAR; INTRAVENOUS ONCE
Status: CANCELLED | OUTPATIENT
Start: 2021-03-26 | End: 2021-03-26

## 2021-03-26 RX ORDER — DIPHENHYDRAMINE HYDROCHLORIDE 50 MG/ML
50 INJECTION INTRAMUSCULAR; INTRAVENOUS ONCE
Status: CANCELLED | OUTPATIENT
Start: 2021-03-26 | End: 2021-03-26

## 2021-03-26 RX ORDER — HEPARIN SODIUM (PORCINE) LOCK FLUSH IV SOLN 100 UNIT/ML 100 UNIT/ML
500 SOLUTION INTRAVENOUS PRN
Status: CANCELLED | OUTPATIENT
Start: 2021-03-26

## 2021-03-26 RX ADMIN — SODIUM CHLORIDE 20 ML/HR: 9 INJECTION, SOLUTION INTRAVENOUS at 14:11

## 2021-03-26 RX ADMIN — DIPHENHYDRAMINE HYDROCHLORIDE 25 MG: 50 INJECTION, SOLUTION INTRAMUSCULAR; INTRAVENOUS at 14:12

## 2021-03-26 RX ADMIN — ACETAMINOPHEN 1000 MG: 500 TABLET ORAL at 14:11

## 2021-03-26 RX ADMIN — SODIUM CHLORIDE, PRESERVATIVE FREE 10 ML: 5 INJECTION INTRAVENOUS at 15:22

## 2021-03-26 RX ADMIN — TRASTUZUMAB 359 MG: 150 INJECTION, POWDER, LYOPHILIZED, FOR SOLUTION INTRAVENOUS at 14:44

## 2021-03-26 RX ADMIN — HEPARIN 500 UNITS: 100 SYRINGE at 15:22

## 2021-03-26 ASSESSMENT — PAIN SCALES - GENERAL: PAINLEVEL_OUTOF10: 0

## 2021-03-30 NOTE — PROGRESS NOTES
PhysicalTherapy Treatment Note    Patient:           Ayla Echeverria            : 1965  Today's Date: 3/30/2021  Referring practitioner: Gloria Hendricks*  Date of Initial Visit:       Type: THERAPY  Noted: 10/15/2020  Patient seen for 27 sessions    Visit Diagnoses:    ICD-10-CM ICD-9-CM   1. 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 Regional lymph node metastasis present (CMS/HCC)  C77.9 196.9   3. Decreased ROM of right shoulder  M25.611 719.51   4. Decreased ROM of left shoulder  M25.612 719.51   5. Lymphedema  I89.0 457.1       SUBJECTIVE     Subjective:  She states both arms are itching now but worse on the R.  She has talked to Dr. Ott's office and they said it is from the chemo.  She had a scan done and there were no cancer.      Pain before treatment 5/10 upper chest and shoulders with pain meds.   Pain after treatment:  5/10.         OBJECTIVE     Objective   Lymphedema     Row Name 21 1545             Manual Lymphatic Drainage    Manual Lymphatic Drainage  initial sequence  -AL      Initial Sequence  short neck;abdomen;diaphragmatic breathing  -AL      Abdomen  superficial  -AL      Diaphragmatic Breathing  x 10 with superficial abdominals  -AL      Opened Regional Lymph Nodes  inguinal  -AL      Inguinal  right;left  -AL      Opened Anastamoses  axillo-inguinal  -AL      Axillo-Inguinal  right;left  -AL      Manual Lymphatic Drainage Comments  MLD to the upper chest and both breasts.  Worked on MLD to the L lateral trunk  -AL      Manual Therapy  30 minutes  -AL        User Key  (r) = Recorded By, (t) = Taken By, (c) = Cosigned By    Initials Name Provider Type    AL Jaycee Mohan, PTA, CLT-EVANGELINA Physical Therapy Assistant         Therapeutic Exercises    [id number] Comments   Wall climbs each UE 2 x 5     Corner stretch 2 x 5 hold 15 seconds   Stretched L arm into abduction  with table stretch 1 x 5 hold 20 seconds  Added to HEP   Stretched L shoulder into flexion        Timed Minutes 15     Therapy Education/Self Care [id number]   Details: Be consistent with HEP, work on MLD to both breasts and upper chest. Stretched L arm into abduction with table stretch   Medbridge Code: N/A   Given HEP   Progress: Reinforced and new   Who provided to: Patent   Level of understanding Verbalized and demonstrated   Timed Minutes      Goals  Progress Note Due:  4/21/2021   STG by: 11/14/20 Comments Status   Patient will begin a home exercise program to accelerate the recovery process  She is working on the HEP Met   Patient will have an increase in bilateral shoulder flexion to 160 deg in supine    Ongoing   Patient will have a 25% reduction in chest and shoulder pain More pain today Ongoing   Patient will gain an understanding of Lymphedema and risk factors   Met   LTG by:       Patient will be able to reach up into an overhead cabinet to retrieve a 5 lb dish safely   Met    Patient will regain ROM to reach behind her back as if she was undoing a bra  Met    Patient will have a 75 - 90% reduction in tightness/pain across chest and shoulders Tighter today Ongoing   Patient will be able to perform household chores without exacerbation of symptoms  Ongoing, progressing, partially met   Patient will regain functional ROM of both shoulders to perform activities overhead, reaching out to retrieve food from a drive-thru, reaching behind her back to fasten/unfasten bras,clothing    Met   Patient will be instructed in a long term exercise program to continue to make gains following discharge   She is working on her HEP more often.  Ongoing progressing              Total Timed Treatment:     45   mins  Total Time of Visit:            45  mins         ASSESSMENT/PLAN     Assessment/Plan     ASSESSMENT: Patient now has itching both of her arms, she has spoken with Dr. Ott's office about this. She had a scan and  there was no cancer found. Today I worked on MLD to the L lateral trunk to the areas of tightness and radiation fibrosis.  She will work on stretching the L arm more at home and using the kitchen table to stretch the L arm into abduction.     PLAN: Will continue  working on increasing ROM both shoulders, as well as decreasing tightness in upper chest, will also work on MLD.      Jaycee Mohan PTA, [facility]  Physical Therapy Assistant

## 2021-04-06 NOTE — PROGRESS NOTES
PhysicalTherapy Treatment Note    Patient:           Ayla Echeverria            : 1965  Today's Date: 2021  Referring practitioner: Gloria Hendricks*  Date of Initial Visit:       Type: THERAPY  Noted: 10/15/2020  Patient seen for 28 sessions    Visit Diagnoses:    ICD-10-CM ICD-9-CM   1. 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 Regional lymph node metastasis present (CMS/HCC)  C77.9 196.9   3. Decreased ROM of right shoulder  M25.611 719.51   4. Decreased ROM of left shoulder  M25.612 719.51   5. Lymphedema  I89.0 457.1       SUBJECTIVE     Subjective:  She states the rash is better but still itches.  If she scratches her arms the rash gets worse, she has a cream that helps. Today she feels tight in the shoulders, she is working on stretching at home.  When she is busy she doesn't hurt as much. She wasn't able to leave work early so she is running a few minutes late.     Pain before treatment 4/10 upper chest and shoulders with pain meds.            OBJECTIVE     Objective   Lymphedema     Row Name 21 1353             Subjective Pain    Able to rate subjective pain?  yes  -AL      Pre-Treatment Pain Level  4  -AL      Subjective Pain Comment  Both shoulders and chest  -AL         Manual Lymphatic Drainage    Manual Lymphatic Drainage  initial sequence  -AL      Initial Sequence  short neck;abdomen;diaphragmatic breathing  -AL      Abdomen  superficial  -AL      Diaphragmatic Breathing  x 10 with superficial abdominals  -AL      Opened Regional Lymph Nodes  inguinal  -AL      Inguinal  right;left  -AL      Opened Anastamoses  axillo-inguinal  -AL      Axillo-Inguinal  right;left  -AL      Manual Lymphatic Drainage Comments  MLD to the L lateral chest, breasts, and upper chest. Stretched both shoulder into flexion and abduction, posterior and inferior mobs both shoulders.   -AL       Manual Therapy  40  -AL        User Key  (r) = Recorded By, (t) = Taken By, (c) = Cosigned By    Initials Name Provider Type    Jaycee Farris PTA, IDALIA Physical Therapy Assistant           Therapy Education/Self Care [id number]   Details: Be consistent with HEP, work on MLD to both breasts and upper chest. Stretched L arm into abduction with table stretch   Medbridge Code: N/A   Given HEP   Progress: Reinforced and new   Who provided to: Patent   Level of understanding Verbalized and demonstrated   Timed Minutes      Goals  Progress Note Due:  4/21/2021   STG by: 11/14/20 Comments Status   Patient will begin a home exercise program to accelerate the recovery process  She is working on the HEP Met   Patient will have an increase in bilateral shoulder flexion to 160 deg in supine   Better after the stretching and MLD Ongoing   Patient will have a 25% reduction in chest and shoulder pain More pain today Ongoing   Patient will gain an understanding of Lymphedema and risk factors   Met   LTG by:       Patient will be able to reach up into an overhead cabinet to retrieve a 5 lb dish safely   Met    Patient will regain ROM to reach behind her back as if she was undoing a bra  Met    Patient will have a 75 - 90% reduction in tightness/pain across chest and shoulders Tighter today Ongoing   Patient will be able to perform household chores without exacerbation of symptoms  Ongoing, progressing, partially met   Patient will regain functional ROM of both shoulders to perform activities overhead, reaching out to retrieve food from a drive-thru, reaching behind her back to fasten/unfasten bras,clothing    Met   Patient will be instructed in a long term exercise program to continue to make gains following discharge   She is working on her HEP more often.  Ongoing progressing              Total Timed Treatment:     45   mins  Total Time of Visit:            40  mins         ASSESSMENT/PLAN     Assessment/Plan     ASSESSMENT:  Spent extra time working on MLD to the L lateral trunk, this did help the tightness in the L shoulder.  She may bring someone with her in the future so I can show them how to do the MLD.  I did speak to patient about the Flexitouch pump again but she is not interested.     PLAN: Will continue  working on increasing ROM both shoulders, as well as decreasing tightness in upper chest, will also work on MLD.      Jaycee Mohan PTA, T-EVANGELINA  Physical Therapy Assistant

## 2021-04-13 NOTE — PROGRESS NOTES
PhysicalTherapy Treatment Note and 30 Day Progress Note    Patient:           Ayla Echeverria            : 1965  Today's Date: 2021  Referring practitioner: Gloria Hendricks*  Date of Initial Visit:       Type: THERAPY  Noted: 10/15/2020  Patient seen for 29 sessions    Visit Diagnoses:    ICD-10-CM ICD-9-CM   1. 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 Regional lymph node metastasis present (CMS/HCC)  C77.9 196.9   3. Decreased ROM of right shoulder  M25.611 719.51   4. Decreased ROM of left shoulder  M25.612 719.51   5. Lymphedema  I89.0 457.1       SUBJECTIVE     Subjective:  She states she is tired from the weekend and she wasn't able to stretch as much as she wanted to.  She has been working on her stretches some.     Pain before treatment 4/10 upper chest and shoulders with pain meds. After treatment pain 2/10.           OBJECTIVE     Objective   Lymphedema     Row Name 21 1548             Subjective Pain    Able to rate subjective pain?  yes  -AL      Pre-Treatment Pain Level  4  -AL      Post-Treatment Pain Level  2  -AL      Subjective Pain Comment  upper chest with pain meds  -AL         Manual Lymphatic Drainage    Manual Lymphatic Drainage  initial sequence  -AL      Initial Sequence  short neck;abdomen;diaphragmatic breathing  -AL      Abdomen  superficial  -AL      Diaphragmatic Breathing  x 10 with superficial abdominals  -AL      Opened Regional Lymph Nodes  inguinal  -AL      Inguinal  right;left  -AL      Opened Anastamoses  axillo-inguinal  -AL      Axillo-Inguinal  right;left  -AL      Manual Lymphatic Drainage Comments  MLD to the L lateral chest, breasts, and upper chest. Stretched both shoulder into flexion, ER, and abduction, posterior and inferior mobs both shoulders.  Patient also worked on [place] for flexion for both shoulders.   -AL      Manual Therapy  45   -AL        User Key  (r) = Recorded By, (t) = Taken By, (c) = Cosigned By    Initials Name Provider Type    Jaycee Farris PTA, IDALIA Physical Therapy Assistant           Therapy Education/Self Care [id number]   Details: Continue to work on HEP, MLD, and stretching for both arms.   [place] Code: N/A   Given HEP   Progress: Reinforced   Who provided to: Patent   Level of understanding Verbalized    Timed Minutes      Goals  Progress Note Due:  4/21/2021   STG by: 11/14/20 Comments Status   Patient will begin a home exercise program to accelerate the recovery process  She is working on the HEP Met   Patient will have an increase in bilateral shoulder flexion to 160 deg in supine L shoulder flexion 145 degrees  R shoulder 155 degrees   Ongoing   Patient will have a 25% reduction in chest and shoulder pain Feels like she is 15% better Ongoing   Patient will gain an understanding of Lymphedema and risk factors  Good understanding of lymphedema Met   LTG by:       Patient will be able to reach up into an overhead cabinet to retrieve a 5 lb dish safely   Met    Patient will regain ROM to reach behind her back as if she was undoing a bra  Met    Patient will have a 75 - 90% reduction in tightness/pain across chest and shoulders She feels like her chest tightness has reduced  Ongoing   Patient will be able to perform household chores without exacerbation of symptoms Improving, able to work on household chores. Ongoing, progressing, partially met   Patient will regain functional ROM of both shoulders to perform activities overhead, reaching out to retrieve food from a drive-thru, reaching behind her back to fasten/unfasten bras,clothing   Able to reach in cabinets and work on ADL's. Met   Patient will be instructed in a long term exercise program to continue to make gains following discharge   She had a busy weekend and wasn't able to work on her HEP as much Ongoing progressing              Total Timed Treatment:     45    mins  Total Time of Visit:            45  mins         ASSESSMENT/PLAN     Assessment/Plan     ASSESSMENT:  She has less tightness today with the stretching.  Functionally she is able to reach into the cabinets at home and the drive-thru.  She is tired from working during the week and she was busy with family over the weekend.  She is working on stretching and her posture exercises, stressed importance on being consistent with her HEP.  Patient does get pain relief from the stretches in the clinic as well as the MLD.     PLAN: Will continue  working on increasing ROM both shoulders, as well as decreasing tightness in upper chest, will also work on MLD.  Will continue with treatment 1 x a week for 4 weeks.     Jaycee Mohan PTA, ÁNGELA-EVANGELINA  Physical Therapy Assistant

## 2021-04-14 NOTE — PROGRESS NOTES
Progress Note Addendum      Patient: Ayla Echeverria           : 1965  Visit Date: 2021  Referring practitioner: Gloria Hendricks*  Date of Initial Visit: Type: THERAPY  Noted: 10/15/2020  Patient seen for 29 sessions  Visit Diagnoses:    ICD-10-CM ICD-9-CM   1. 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 Regional lymph node metastasis present (CMS/HCC)  C77.9 196.9   3. Decreased ROM of right shoulder  M25.611 719.51   4. Decreased ROM of left shoulder  M25.612 719.51   5. Lymphedema  I89.0 457.1          Clinical Progress: improved  Home Program Compliance: Yes  Treatment has included: therapeutic exercise, neuromuscular re-education, manual therapy and therapeutic activity  Progress toward previous goals: Partially Met  Prognosis to achieve goals: good    Subjective     Objective     Assessment & Plan       Plan  Therapy options: will be seen for skilled physical therapy services  Frequency: 1x week  Duration in weeks: 4  Treatment plan discussed with: PTA        I have reviewed the progress note information provided by Flori Mohan PTA, IDALIA and I concur with the findings.    Keyla Bennett, PT, DPT, IDALIA  Physical Therapist

## 2021-04-16 ENCOUNTER — HOSPITAL ENCOUNTER (OUTPATIENT)
Dept: INFUSION THERAPY | Age: 56
Discharge: HOME | End: 2021-04-16
Payer: COMMERCIAL

## 2021-04-16 VITALS
BODY MASS INDEX: 24.82 KG/M2 | RESPIRATION RATE: 16 BRPM | WEIGHT: 140.1 LBS | HEART RATE: 78 BPM | SYSTOLIC BLOOD PRESSURE: 109 MMHG | TEMPERATURE: 97.7 F | OXYGEN SATURATION: 100 % | DIASTOLIC BLOOD PRESSURE: 65 MMHG

## 2021-04-16 DIAGNOSIS — C50.412 MALIGNANT NEOPLASM OF UPPER-OUTER QUADRANT OF LEFT FEMALE BREAST, UNSPECIFIED ESTROGEN RECEPTOR STATUS (HCC): ICD-10-CM

## 2021-04-16 DIAGNOSIS — C50.412 MALIGNANT NEOPLASM OF UPPER-OUTER QUADRANT OF LEFT BREAST IN FEMALE, ESTROGEN RECEPTOR NEGATIVE (HCC): Primary | ICD-10-CM

## 2021-04-16 DIAGNOSIS — Z17.1 MALIGNANT NEOPLASM OF UPPER-OUTER QUADRANT OF LEFT BREAST IN FEMALE, ESTROGEN RECEPTOR NEGATIVE (HCC): Primary | ICD-10-CM

## 2021-04-16 DIAGNOSIS — C50.919 HER2-POSITIVE CARCINOMA OF BREAST (HCC): ICD-10-CM

## 2021-04-16 LAB
ALBUMIN SERPL-MCNC: 4.4 G/DL (ref 3.5–5.2)
ALP BLD-CCNC: 96 U/L (ref 35–104)
ALT SERPL-CCNC: 22 U/L (ref 9–52)
ANION GAP SERPL CALCULATED.3IONS-SCNC: 9 MMOL/L (ref 7–19)
AST SERPL-CCNC: 58 U/L (ref 14–36)
BASOPHILS ABSOLUTE: 0.01 K/UL (ref 0.01–0.08)
BASOPHILS RELATIVE PERCENT: 0.3 % (ref 0.1–1.2)
BILIRUB SERPL-MCNC: 0.4 MG/DL (ref 0.2–1.3)
BUN BLDV-MCNC: 12 MG/DL (ref 7–17)
CALCIUM SERPL-MCNC: 9.5 MG/DL (ref 8.4–10.2)
CHLORIDE BLD-SCNC: 102 MMOL/L (ref 98–111)
CO2: 31 MMOL/L (ref 22–29)
CREAT SERPL-MCNC: 0.7 MG/DL (ref 0.5–1)
EOSINOPHILS ABSOLUTE: 0.05 K/UL (ref 0.04–0.54)
EOSINOPHILS RELATIVE PERCENT: 1.6 % (ref 0.7–7)
GFR NON-AFRICAN AMERICAN: >60
GLUCOSE BLD-MCNC: 113 MG/DL (ref 74–106)
HCT VFR BLD CALC: 31.6 % (ref 34.1–44.9)
HEMOGLOBIN: 10.1 G/DL (ref 11.2–15.7)
LYMPHOCYTES ABSOLUTE: 0.63 K/UL (ref 1.18–3.74)
LYMPHOCYTES RELATIVE PERCENT: 20 % (ref 19.3–53.1)
MCH RBC QN AUTO: 28.2 PG (ref 25.6–32.2)
MCHC RBC AUTO-ENTMCNC: 32 G/DL (ref 32.3–35.5)
MCV RBC AUTO: 88.3 FL (ref 79.4–94.8)
MONOCYTES ABSOLUTE: 0.53 K/UL (ref 0.24–0.82)
MONOCYTES RELATIVE PERCENT: 16.8 % (ref 4.7–12.5)
NEUTROPHILS ABSOLUTE: 1.93 K/UL (ref 1.56–6.13)
NEUTROPHILS RELATIVE PERCENT: 61.3 % (ref 34–71.1)
PDW BLD-RTO: 17.3 % (ref 11.7–14.4)
PLATELET # BLD: 249 K/UL (ref 182–369)
PMV BLD AUTO: 8.9 FL (ref 7.4–10.4)
POTASSIUM SERPL-SCNC: 4 MMOL/L (ref 3.5–5.1)
RBC # BLD: 3.58 M/UL (ref 3.93–5.22)
SODIUM BLD-SCNC: 142 MMOL/L (ref 137–145)
TOTAL PROTEIN: 8 G/DL (ref 6.3–8.2)
WBC # BLD: 3.15 K/UL (ref 3.98–10.04)

## 2021-04-16 PROCEDURE — 36415 COLL VENOUS BLD VENIPUNCTURE: CPT

## 2021-04-16 PROCEDURE — 36591 DRAW BLOOD OFF VENOUS DEVICE: CPT

## 2021-04-16 PROCEDURE — 6360000002 HC RX W HCPCS: Performed by: INTERNAL MEDICINE

## 2021-04-16 PROCEDURE — 80053 COMPREHEN METABOLIC PANEL: CPT

## 2021-04-16 PROCEDURE — 2580000003 HC RX 258: Performed by: INTERNAL MEDICINE

## 2021-04-16 PROCEDURE — 85025 COMPLETE CBC W/AUTO DIFF WBC: CPT

## 2021-04-16 PROCEDURE — 96375 TX/PRO/DX INJ NEW DRUG ADDON: CPT

## 2021-04-16 PROCEDURE — 96413 CHEMO IV INFUSION 1 HR: CPT

## 2021-04-16 PROCEDURE — 6370000000 HC RX 637 (ALT 250 FOR IP): Performed by: INTERNAL MEDICINE

## 2021-04-16 RX ORDER — SODIUM CHLORIDE 9 MG/ML
INJECTION, SOLUTION INTRAVENOUS CONTINUOUS
Status: CANCELLED | OUTPATIENT
Start: 2021-04-16

## 2021-04-16 RX ORDER — ACETAMINOPHEN 325 MG/1
1000 TABLET ORAL ONCE
Status: CANCELLED
Start: 2021-04-16 | End: 2021-04-16

## 2021-04-16 RX ORDER — HEPARIN SODIUM (PORCINE) LOCK FLUSH IV SOLN 100 UNIT/ML 100 UNIT/ML
500 SOLUTION INTRAVENOUS PRN
Status: DISCONTINUED | OUTPATIENT
Start: 2021-04-16 | End: 2021-04-17 | Stop reason: HOSPADM

## 2021-04-16 RX ORDER — ACETAMINOPHEN 500 MG
1000 TABLET ORAL ONCE
Status: COMPLETED | OUTPATIENT
Start: 2021-04-16 | End: 2021-04-16

## 2021-04-16 RX ORDER — EPINEPHRINE 1 MG/ML
0.3 INJECTION, SOLUTION, CONCENTRATE INTRAVENOUS PRN
Status: CANCELLED | OUTPATIENT
Start: 2021-04-16

## 2021-04-16 RX ORDER — HEPARIN SODIUM (PORCINE) LOCK FLUSH IV SOLN 100 UNIT/ML 100 UNIT/ML
500 SOLUTION INTRAVENOUS PRN
Status: CANCELLED | OUTPATIENT
Start: 2021-04-16

## 2021-04-16 RX ORDER — DIPHENHYDRAMINE HYDROCHLORIDE 50 MG/ML
25 INJECTION INTRAMUSCULAR; INTRAVENOUS ONCE
Status: COMPLETED | OUTPATIENT
Start: 2021-04-16 | End: 2021-04-16

## 2021-04-16 RX ORDER — SODIUM CHLORIDE 0.9 % (FLUSH) 0.9 %
5 SYRINGE (ML) INJECTION PRN
Status: CANCELLED | OUTPATIENT
Start: 2021-04-16

## 2021-04-16 RX ORDER — MEPERIDINE HYDROCHLORIDE 50 MG/ML
12.5 INJECTION INTRAMUSCULAR; INTRAVENOUS; SUBCUTANEOUS ONCE
Status: CANCELLED | OUTPATIENT
Start: 2021-04-16 | End: 2021-04-16

## 2021-04-16 RX ORDER — SODIUM CHLORIDE 0.9 % (FLUSH) 0.9 %
10 SYRINGE (ML) INJECTION PRN
Status: DISCONTINUED | OUTPATIENT
Start: 2021-04-16 | End: 2021-04-17 | Stop reason: HOSPADM

## 2021-04-16 RX ORDER — SODIUM CHLORIDE 9 MG/ML
20 INJECTION, SOLUTION INTRAVENOUS ONCE
Status: CANCELLED | OUTPATIENT
Start: 2021-04-16 | End: 2021-04-16

## 2021-04-16 RX ORDER — SODIUM CHLORIDE 9 MG/ML
20 INJECTION, SOLUTION INTRAVENOUS ONCE
Status: COMPLETED | OUTPATIENT
Start: 2021-04-16 | End: 2021-04-16

## 2021-04-16 RX ORDER — DIPHENHYDRAMINE HYDROCHLORIDE 50 MG/ML
25 INJECTION INTRAMUSCULAR; INTRAVENOUS ONCE
Status: CANCELLED
Start: 2021-04-16 | End: 2021-04-16

## 2021-04-16 RX ORDER — DIPHENHYDRAMINE HYDROCHLORIDE 50 MG/ML
50 INJECTION INTRAMUSCULAR; INTRAVENOUS ONCE
Status: CANCELLED | OUTPATIENT
Start: 2021-04-16 | End: 2021-04-16

## 2021-04-16 RX ORDER — SODIUM CHLORIDE 0.9 % (FLUSH) 0.9 %
10 SYRINGE (ML) INJECTION PRN
Status: CANCELLED | OUTPATIENT
Start: 2021-04-16

## 2021-04-16 RX ORDER — METHYLPREDNISOLONE SODIUM SUCCINATE 125 MG/2ML
125 INJECTION, POWDER, LYOPHILIZED, FOR SOLUTION INTRAMUSCULAR; INTRAVENOUS ONCE
Status: CANCELLED | OUTPATIENT
Start: 2021-04-16 | End: 2021-04-16

## 2021-04-16 RX ADMIN — SODIUM CHLORIDE, PRESERVATIVE FREE 10 ML: 5 INJECTION INTRAVENOUS at 15:57

## 2021-04-16 RX ADMIN — TRASTUZUMAB 359 MG: 150 INJECTION, POWDER, LYOPHILIZED, FOR SOLUTION INTRAVENOUS at 15:17

## 2021-04-16 RX ADMIN — DIPHENHYDRAMINE HYDROCHLORIDE 25 MG: 50 INJECTION, SOLUTION INTRAMUSCULAR; INTRAVENOUS at 15:01

## 2021-04-16 RX ADMIN — SODIUM CHLORIDE 20 ML/HR: 9 INJECTION, SOLUTION INTRAVENOUS at 15:02

## 2021-04-16 RX ADMIN — ACETAMINOPHEN 1000 MG: 500 TABLET ORAL at 14:59

## 2021-04-16 RX ADMIN — HEPARIN 500 UNITS: 100 SYRINGE at 15:57

## 2021-04-16 ASSESSMENT — PAIN SCALES - GENERAL: PAINLEVEL_OUTOF10: 5

## 2021-04-22 NOTE — PROGRESS NOTES
PhysicalTherapy Treatment Note    Patient:           Ayla Echeverria            : 1965  Today's Date: 2021  Referring practitioner: Gloria Hendricks*  Date of Initial Visit:       Type: THERAPY  Noted: 10/15/2020  Patient seen for 30 sessions    Visit Diagnoses:    ICD-10-CM ICD-9-CM   1. At risk for lymphedema  Z91.89 V49.89   2. Malignant neoplasm involving both nipple and areola of left breast in female, estrogen receptor positive (CMS/HCC)  C50.012 174.0    Z17.0 V86.0   3. Regional lymph node metastasis present (CMS/HCC)  C77.9 196.9   4. Decreased ROM of right shoulder  M25.611 719.51   5. Decreased ROM of left shoulder  M25.612 719.51       SUBJECTIVE     Subjective:  She is worried that the L shoulder will not get better.  She states she is so tired after work and it's hard for her to get motivated to stretch.  She started back on the pills and is still itching but she hasn't broken out.  The red blotches are starting to fade.     Pain before treatment 2/10 upper chest and shoulders with pain meds.            OBJECTIVE     Objective   Lymphedema     Row Name 21 1547 21 1500          Subjective Pain    Able to rate subjective pain?  yes  -AL  --     Pre-Treatment Pain Level  2  -AL  --        Manual Lymphatic Drainage    Manual Lymphatic Drainage  initial sequence  -AL  --  -AL     Initial Sequence  short neck;abdomen;diaphragmatic breathing  -AL  --  -AL     Abdomen  superficial  -AL  --  -AL     Diaphragmatic Breathing  x 10 with superficial abdominals  -AL  --     Opened Regional Lymph Nodes  inguinal  -AL  --  -AL     Inguinal  right;left  -AL  --  -AL     Opened Anastamoses  axillo-inguinal  -AL  --  -AL     Axillo-Inguinal  right;left  -AL  --  -AL     Manual Lymphatic Drainage Comments  MLD to the upper chest and L lateral trunk.  Continue to staretch both arms into B shoulder flexion, abduciton, and ER. Posterior mobs both shoudlers.  Be more consistent with  stretching.   -AL  --     Manual Therapy  45  -AL  --       User Key  (r) = Recorded By, (t) = Taken By, (c) = Cosigned By    Initials Name Provider Type    Jaycee Farris PTA, CLT-LANA Physical Therapy Assistant           Therapy Education/Self Care [id number]   Details: Be more compliant with work on HEP, MLD, and stretching for both arms.   Medbridge Code: N/A   Given HEP   Progress: Reinforced   Who provided to: Patent   Level of understanding Verbalized    Timed Minutes      Goals  Progress Note Due:  457/2021   STG by: 11/14/20 Comments Status   Patient will begin a home exercise program to accelerate the recovery process   Met   Patient will have an increase in bilateral shoulder flexion to 160 deg in supine    Ongoing   Patient will have a 25% reduction in chest and shoulder pain  Ongoing   Patient will gain an understanding of Lymphedema and risk factors   Met   LTG by:       Patient will be able to reach up into an overhead cabinet to retrieve a 5 lb dish safely   Met    Patient will regain ROM to reach behind her back as if she was undoing a bra  Met    Patient will have a 75 - 90% reduction in tightness/pain across chest and shoulders Feels tighter today Ongoing   Patient will be able to perform household chores without exacerbation of symptoms  Ongoing, progressing, partially met   Patient will regain functional ROM of both shoulders to perform activities overhead, reaching out to retrieve food from a drive-thru, reaching behind her back to fasten/unfasten bras,clothing   Able to reach in cabinets and work on ADL's. Met   Patient will be instructed in a long term exercise program to continue to make gains following discharge   She has not been able to work on her HEP as much.  Ongoing progressing              Total Timed Treatment:     45   mins  Total Time of Visit:            45  mins         ASSESSMENT/PLAN     Assessment/Plan     ASSESSMENT:  Today patient has more tightness in the L shoulders. She  has not been as consistent with the stretching at home due to being tired after work. We discussed the importance of stretching of the shoulders, especially the L shoulder as it will continue to become tighter and affect her function as far as reaching if she is not consistent with stretching.     PLAN: Will continue  working on increasing ROM both shoulders, as well as decreasing tightness in upper chest, will also work on MLD.  .     Jaycee Mohan PTA, T-EVANGELINA  Physical Therapy Assistant

## 2021-04-26 DIAGNOSIS — G89.3 CANCER ASSOCIATED PAIN: ICD-10-CM

## 2021-04-26 DIAGNOSIS — C50.412 MALIGNANT NEOPLASM OF UPPER-OUTER QUADRANT OF LEFT FEMALE BREAST, UNSPECIFIED ESTROGEN RECEPTOR STATUS (HCC): ICD-10-CM

## 2021-04-26 RX ORDER — OXYCODONE AND ACETAMINOPHEN 7.5; 325 MG/1; MG/1
1 TABLET ORAL EVERY 6 HOURS PRN
Qty: 120 TABLET | Refills: 0 | Status: SHIPPED | OUTPATIENT
Start: 2021-04-26 | End: 2021-06-01 | Stop reason: SDUPTHER

## 2021-04-29 NOTE — PROGRESS NOTES
PhysicalTherapy Treatment Note    Patient:           Ayla Echeverria            : 1965  Today's Date: 2021  Referring practitioner: Gloria Hendricks*  Date of Initial Visit:       Type: THERAPY  Noted: 10/15/2020  Patient seen for 31 sessions    Visit Diagnoses:    ICD-10-CM ICD-9-CM   1. 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 Regional lymph node metastasis present (CMS/HCC)  C77.9 196.9   3. Decreased ROM of right shoulder  M25.611 719.51   4. Decreased ROM of left shoulder  M25.612 719.51   5. Lymphedema  I89.0 457.1       SUBJECTIVE     Subjective:  She is not having a good day today.  She has been stretching more at home.  She was a few minutes late today due to the traffic.  She watched her granddaughter this week which was a little hard on her.     Pain before treatment 4/10 upper chest and shoulders with pain meds.            OBJECTIVE     Objective   Lymphedema     Row Name 21 1352             Subjective Pain    Able to rate subjective pain?  yes  -AL      Pre-Treatment Pain Level  4  -AL         Manual Lymphatic Drainage    Manual Lymphatic Drainage  initial sequence  -AL      Initial Sequence  short neck;abdomen;diaphragmatic breathing  -AL      Abdomen  superficial  -AL      Diaphragmatic Breathing  x 10 with superficial abdominals  -AL      Opened Regional Lymph Nodes  inguinal  -AL      Inguinal  right;left  -AL      Opened Anastamoses  axillo-inguinal  -AL      Axillo-Inguinal  right;left  -AL      Manual Lymphatic Drainage Comments  MLD to the upper chest and L lateral trunk.  Continue to stretch both arms into B shoulder flexion and abduction. Posterior mobs both shoudlers.  Holding onto a small ball patient working on B shoulder flexion, then worked in diagonal patterns for flexion extension again holding onto the ball.  -AL      Manual Therapy  45  -AL         User Key  (r) = Recorded By, (t) = Taken By, (c) = Cosigned By    Initials Name Provider Type    Jaycee Farris PTA, IDALIA Physical Therapy Assistant           Therapy Education/Self Care [id number]   Details: Work on HEP, MLD, and stretching for both arms.   [employer] Code: N/A   Given HEP   Progress: Reinforced   Who provided to: Patent   Level of understanding Verbalized    Timed Minutes      Goals  Progress Note Due:  5/7/2021   STG by: 11/14/20 Comments Status   Patient will begin a home exercise program to accelerate the recovery process   Met   Patient will have an increase in bilateral shoulder flexion to 160 deg in supine Worked on increasing shoulder flexion today. Ongoing   Patient will have a 25% reduction in chest and shoulder pain  Ongoing   Patient will gain an understanding of Lymphedema and risk factors   Met   LTG by:       Patient will be able to reach up into an overhead cabinet to retrieve a 5 lb dish safely   Met    Patient will regain ROM to reach behind her back as if she was undoing a bra  Met    Patient will have a 75 - 90% reduction in tightness/pain across chest and shoulders Feels tighter today Ongoing   Patient will be able to perform household chores without exacerbation of symptoms  Ongoing, progressing, partially met   Patient will regain functional ROM of both shoulders to perform activities overhead, reaching out to retrieve food from a drive-thru, reaching behind her back to fasten/unfasten bras,clothing   She has been stretching more at home. Met   Patient will be instructed in a long term exercise program to continue to make gains following discharge   She has not been able to work on her HEP as much.  Ongoing progressing              Total Timed Treatment:     45   mins  Total Time of Visit:            40  mins         ASSESSMENT/PLAN     Assessment/Plan:       ASSESSMENT:  Patient has been working on stretching more at home.  She continues to have tightness in both  shoulders more so L shoulder. She feels more of a stretch with the diagonal patterns today.  Dense tissue present B breasts, more so on the L.  Also worked on dense tissue and scar tissue L lateral breast, this does not soften as much with the MLD.     PLAN: Will continue  working on increasing ROM both shoulders, as well as decreasing tightness in upper chest, will also work on MLD.  .     Jaycee Mohan PTA, T-EVANGELINA  Physical Therapy Assistant

## 2021-05-05 NOTE — PROGRESS NOTES
Susan Ramirez   1965 5/7/2021     Chief Complaint   Patient presents with    Breast Cancer   Reason for MD visit-disease/treatment assessment    Interval history/history of present illness:  Diagnosis   IDC left breast, March 2016   Grade 2. ER 1%, NJ 6%, HER-2/oseas IHC 3+/FISH amplified ratio 6.7, AR 95%. 01/30/2017- Biopsy-proven in breast relapse/Inflammatory breast cancer. ER negative, NJ negative, HER-2/oseas IHC 3+/FISH amplified ratio 4.5, Ki-67 32%. MyRisk - Revealed no clinically significant mutation. PTEN VUS. Treatment summary  Neoadjuvant adjuvant chemotherapy TCH-P ×6 cycles. She declined to complete 1 year of Herceptin. She declined radiation therapy. 10/3/2016-left lumpectomy/sentinel lymph node   01/30/2017- Biopsy-proven in breast relapse/Inflammatory breast cancer. 5/11/2017-Chemotherapy with paclitaxel/carboplatin weekly. Herceptin/Pertuzumab every 3 weeks started 05/11/2017. She had a allergic reaction to carboplatin and this was discontinued. 7/21/2017- 9/1/2017 Neuropathy grade 2 with Taxol. Therefore, switched to Taxotere/Herceptin/Pertuzumab. Discontinued due to progression of disease. 9/22/2017 through 3/2/2018Margit Steven every 3 weeks. (stopped as per patient request, neuropathy grade 1)   7/10/2018 through 11/30/2018- Kadcyla. 1/4/2019-Navelbine x 1 cycle with very poor tolerance (pain)   1/25/2018-Gemzar/Herceptin   3/15/2019- low-dose Cisplatin added to Gemzar and Herceptin regimen, regimen changed to every other week   4/17/2019- palliative radiation to left clavicle/chest wall for anticipate total dosing of 5040 cGy   6/22/2019- Xeloda/apatinib  January 2020-palliative radiation to the right breast.  1/10/2020- palliative treatment with Fam-Trastuzumab- stopped Feb due to severe toxicity  September 2020- palliative radiation left chest wall skin metastasis.   8/21/2020-Herceptin, Xeloda and Tucatinib  Tumor target  Skin chest wall left  upper chest/left breast Interval history  Maciej Kramer is a pleasant 54years old female who has been diagnosed with recurrent HER-2 positive/ER 1% and DE 6% invasive ductal carcinoma of the breast.  Her pattern of recurrence is diffuse skin involvement of the upper chest bilaterally and back on the left. She received several lines of chemotherapy in the past.  She has had very poor tolerance to chemotherapy overall. She was last treated with radiation to the skin lesions and is currently receiving treatment with tucatinib, Xeloda and Herceptin. She had complete clearing of the skin. There is no evidence of skin recurrence at this time. She is taking Xeloda 650 mg p.o. twice daily 5 days on and 9 days off. She has been taking tucatinib. She also presents today for her Herceptin. She has been experience significant skin toxicity. She had a acneform rash in her right upper extremity treated with clindamycin cream, steroids topical and p.o with complete resolution. She now report a itchy rash on her upper chest.  She has been using clindamycin without much effect. This is mild. Otherwise she has gained weight. She feels well. She noted some erythema in her back on both sides. Skin biopsy be performed today. Otherwise she has tolerated treatment well. She will take Xeloda 500 mg p.o. twice daily x7 days only/7 days off. She will continue tucatinib twice daily continuously. Cancer history- Left breast recurrence with Inflammatory Breast Cancer ER 8%/DE negative &HER-2 positive. Ms Ela Rodriguez was seen in initial oncology consultation on 4/20/2017 referred for a diagnosis of a left breast recurrence with inflammatory breast cancer. She was initially treated by Dr. Majo Grossman at [facility]. Prior breast cancer history is as follows:  3/22/2016-a diagnostic mammogram for a palpable mass showed a left breast abnormal findings.    4/1/2017- ultrasound-guided core needle biopsy was consistent with invasive ductal carcinoma, grade 2. ER 1%, AR 6%, HER-2/oseas IHC 3+/FISH amplified ratio 6.7., AR 95%. She underwent 6 cycles of TCH-P from April 2016 through September 2016 with G-CSF support. She had a total of 18 weeks of trastuzumab treatment. 10/3/2016-she underwent a left lumpectomy with left sentinel lymph node revealing an invasive ductal carcinoma, grade 3. Margins clear of invasive carcinoma (1.1 cm from the lateral margin), extensive lymphovascular space invasion. 2 out of 3 sentinel lymph nodes positive for metastatic carcinoma. Final pathologic staging [id number])[id number]. She declined adjuvant radiation therapy and adjuvant Herceptin completion.   -------------------In-breast Recurrence [id number]---------------------------  1/30/2017- she was seen by Dr. Germania Lara with new complaints of erythema in the left breast. A skin punch biopsy was consistent with metastatic ductal carcinoma with extensive dermal lymphovascular invasion. ER negative, AR negative, HER-2/oseas IHC 3+/FISH amplified ratio 4.5, Ki-67 32%. Foundation one showed ERBB2 amplification, AURKA amplification, TP53 and .   2/15/2017- she was again seen by Dr. Edith Moya and explained about her tumor recurrence. Unfortunately, she declined any further intervention at that time. 4/21/2017- she asked Dr. Edith Moya for a referral to us.   4/28/2017-referred to me for further evaluation. I recommended a PET scan and neoadjuvant chemotherapy with carboplatin/paclitaxel and Perjeta and Herceptin. 5/4/2017-PET/CT scan revealed abnormal metabolic activity present in multiple regions throughout the left breast and in the skin( SUV 8.9, 7.4, 7.3). In the left supraclavicular region a cluster of lymph nodes present (SUV of 3). Axillary lymph nodes with SUV of 2.8. Tonsils bilaterally with SUV 5.6 (symmetric). No abnormal metabolic activity in the intrathoracic or intra-abdominal pelvic region. No abnormal skeletal metabolic activity.  I recommended weekly carboplatin/weekly Taxol/Herceptin and Pertuzumab.   5/26/2017-she had a severe allergic reaction after 2 doses of carboplatin and therefore this was discontinued. She was continued only on Taxol weekly/Herceptin and Pertuzumab.   7/21/2017-after cycle #2 and 8 doses of Taxol she developed neuropathy related to chemotherapy and therefore chemotherapy was switched from Taxol to Taxotere. Genetic assessment- [place] [address] revealed no clinically significant mutation. PTEN VUS.   9/1/2017-local in breast disease progression with increasing breast erythema. Treatment switched to Kadcyla. 12/27/2017- repeat re-staging CT scan of the abdomen and pelvis documented no evidence of intra-abdominal pelvic metastasis. Superior left renal cyst. Mild hepatic steatosis. CT scan of the chest documented no evidence of intrathoracic metastasis. Bone scan documented no evidence of bony metastatic disease. 3/2/2018-after 8 cycles of Kadcyla she requested treatment to be on hold. She has requested her PCP a referral to [facility] for a second opinion. She has neuropathy grade 1.   5/2/2018- 2 months off treatment (Kadcyla). She has not yet been seen at [facility]. Apparently, [facility] is gathering her records and will see her soon. Examination of her chest wall showed recurrent disease in the left upper chest. Residual neuropathy grade 1. She could not tolerate gabapentin, and therefore has stopped it. She was not interested in any other medication for neuropathy at this time. 7/10/2018-after discussion at [facility] she was recommended to reinitiate Kadcyla. 7/13/2018-CT of chest, abdomen, pelvis showed no evidence of intra-abdominal disease. There is an enlarged right-sided level 1 lymph node that was not appreciated previously, measuring 2.5 x 1.4 cm, metastatic disease considered. 7/16/2018-PET CT scan showed diffuse uptake throughout the left breast extending to the left chest wall.  Abnormal uptake within the left axilla lymph nodes and left internal mammary lymph nodes concerning for metastatic disease. Enlarged hypermetabolic right axilla lymph node concerning for metastatic disease with SUV 8.0. Abnormal uptake within a right cervical lymph node, etiology unclear, but could be reactive. 10/26/2018 - CT scan of the chest was completed at [place] and compared to CT scan of 12/27/2017 versus her last CT scan completed at [facility] on 7/13/2018. The CT scan identified a right axillary lymph node measuring 1.2 cm, otherwise no intrathoracic neoplastic process/metastatic disease. 10/26/2018 -CT scan of the abdomen and pelvis was completed at [place] and compared to CT scan of 12/27/2017 versus her last CT scan completed at [facility] on 7/13/2018. No evidence of intra-abdominal or pelvic metastasis was identified. December 2018-interval worsening of skin metastasis in the left breast and diffuse erythema with new nodules. 12/17/2018-skin biopsy of overlying right breast consistent invasive ductal carcinoma, high-grade. ER 20%, WV 0%, HER-2/oseas 3+.  12/17/2018-right axillary FNA biopsy consistent invasive ductal carcinoma. 12/26/2018-recommended clinical trial at [facility] [place]. 1/4/2019-started on Navelbine/Herceptin, due to delays on the clinical trial at [facility]. Unfortunately, she had severe chest wall pain and generalized pain related to Navelbine. 1/25/2019-4/19/2019 she was switched to cisplatin/Gemzar/Herceptin, but had disease progression. 5/30/2019-CT chest, abdomen, pelvis and bone scan was unremarkable for right axillary adenopathy measuring 2 cm. Lymph node located in the upper chest wall under the pectoralis muscle measuring 1.2 cm. Right breast with 2 areas with masslike enhancement. No evidence of metastatic disease in the abdomen pelvis. Bone scan was unremarkable for metastatic bone disease. June 2019-very poor tolerance to cisplatin, Gemzar/Herceptin. She was recommended Xeloda/ Lapatinib. agreed and want to start on Herceptin, Xeloda and Tucatinib.  8/21/2020 Tumor Marker- CA 15.3- 32.19  10/9/2020-continue Herceptin and tucatinib.  12/11/2020-she has resumed Xeloda on low-dose 500 mg p.o. twice daily 7 days on 7 days off, tucatinib and Herceptin  2/19/21 tumor markers: CA 27-29 11.8 CA 15-3 13.6 CEA 1.9  2/26/21 Ct Chest W Contrast No evidence of intrathoracic metastasis. Scarring suspected within the left upper and right middle lobes. 3. Diffuse left greater than right breast skin thickening with prominence of the trabecular breast tissue. Postoperative changes of the left breast and axilla. No pathologic axillary or intrathoracic lymphadenopathy localized. 2/26/21 Ct Abdomen Pelvis W Iv Contrast  No evidence of intra-abdominal pelvic metastasis. Hepatic steatosis. Superior left renal cyst.  Constipation. 2/26/21 2D echo: Mitral valve leaflets are mildly thickened with preserved leaflet mobility. Mildly thickened aortic valve leaflets with preserved leaflet mobility. Tricuspid valve is structurally normal. Mild tricuspid regurgitation with estimated RVSP of 16 mm Hg. Normal left ventricular size with preserved LV function and an estimated ejection fraction of approximately 55-60%. Normal left ventricular wall thickness. No regional wall motion abnormalities.     Past medical history:  Past Medical History:   Diagnosis Date    Cancer [facility])     breast    GERD (gastroesophageal reflux disease)       Past surgical history:  Past Surgical History:   Procedure Laterality Date    BREAST LUMPECTOMY Left 10/4/2016    LUMPECTOMY WITH SENTINAL NODE BIOPSY AND INTRA OP US GUIDED NEEDLE LOCALIZATION performed by Eugenia Alcala MD at [address] [place] [address]      biopsy left breast    COLONOSCOPY  2008    INSERTION / REMOVAL / REPLACEMENT VENOUS ACCESS CATHETER N/A 5/12/2016    SINGLE LUMEN PORT INSERTION performed by Eugenia [facility]cala MD at [facility] Right 2 times a daily for 14 days of a 21 day cycle 56 tablet 5    capecitabine (XELODA) 500 MG chemo tablet Take 2 tablets by mouth twice daily for 14 days of a 21 day cycle. 56 tablet 5    pantoprazole (PROTONIX) 40 MG tablet TAKE ONE TABLET DAILY 30 tablet 2    Tucatinib (TUKYSA) 150 MG TABS Take 300 mg by mouth 2 times daily 120 tablet 5    ondansetron (ZOFRAN) 8 MG tablet Take 1 tablet by mouth every 8 hours as needed for Nausea or Vomiting 30 tablet 5    Zinc Sulfate (ZINC 15 PO) Take by mouth daily      Cholecalciferol (VITAMIN D3) 1.25 MG (19817 UT) CAPS Take by mouth daily Unknown amount of Vitamins D3      APPLE CIDER VINEGAR PO Take by mouth      Probiotic Product (PROBIOTIC-10 PO) Take by mouth      Turmeric 500 MG CAPS Take by mouth      SELENIUM ER PO Take by mouth      Multiple Vitamin (MULTI VITAMIN DAILY PO) Take by mouth       No current facility-administered medications for this visit. REVIEW OF SYSTEMS:    Constitutional: no fever, no night sweats, fatigue;   HEENT: no blurring of vision, no double vision, no hearing difficulty, no tinnitus,no ulceration, no dental caries, no dysphagia, no epistaxis  Lungs: no cough, no shortness of breath, no wheeze;   CVS: no palpitation, no chest pain, no shortness of breath;  GI: abdominal bloating resolved, nausea resolved, no vomiting, no constipation; no diarrhea  ORLIN: no dysuria, frequency and urgency, no hematuria, no kidney stones;   Musculoskeletal: no joint pain, swelling , stiffness;   Endocrine: no polyuria, polydypsia, no cold or heat intolerence; Hematology/lymphatic: no easy brusing or bleeding, no hx of clotting disorder; no peripheral adenopathy. Dermatology: Right forearm rash improved, no eczema, no pruritis; mild flare of skin rash on upper right breast, intermittent pruritis  Psychiatry: no depression, no anxiety,no panic attacks, no suicide ideation;    Neurology: no syncope, no seizures, no numbness or tingling of hands, no numbness and tingling of feet, no paresis;     PHYSICAL EXAM:    /74   Pulse 70   Temp 97.5 °F (36.4 °C)   Ht 5' 3\" (1.6 m)   Wt 138 lb (62.6 kg)   LMP 02/29/2016 (Approximate)   BMI 24.45 kg/m²     Pain scale:5    CONSTITUTIONAL: Alert, appropriate, no acute distress,   EYES: Non icteric, EOM intact, pupils equal round and reactive to light and accommodation. ENT: Oral mucus membranes moist, no oral pharyngeal lesions. External inspection of ears and nose are normal.   NECK: Supple, no masses. No palpable thyroid mass    CHEST/LUNGS: CTA bilaterally, normal respiratory effort  Chaperoned upper body exam with Ebonie Smyth RN -mild erythematous rash in the upper chest.  CARDIOVASCULAR: RRR, no murmurs. No lower extremity edema   ABDOMEN: soft non-tender, active bowel sounds, no hepatosplenomegaly. No palpable masses. EXTREMITIES: warm, Full ROM of all fours extremities. No focal weakness. SKIN: Dry skin on the right armpit. Mild erythema. maculopapular rash in the right forearm improved. LYMPH: No cervical, clavicular, axillary, or inguinal lymphadenopathy  NEUROLOGIC: follows commands, non focal.   PSYCH: mood and affect appropriate. Alert and oriented to time and place and person.     Relevant Lab findings reviewed/analyzed by me:   Lab Results   Component Value Date    WBC 2.60 (L) 05/07/2021    HGB 10.1 (L) 05/07/2021    HCT 31.7 (L) 05/07/2021    MCV 88.3 05/07/2021     05/07/2021     Lab Results   Component Value Date    NEUTROABS 1.41 (L) 05/07/2021     Lab Results   Component Value Date    TSH 1.050 01/10/2019     Lab Results   Component Value Date     05/07/2021    K 3.7 05/07/2021     05/07/2021    CO2 29 05/07/2021    BUN 12 05/07/2021    CREATININE 0.9 05/07/2021    GLUCOSE 100 05/07/2021    CALCIUM 9.3 05/07/2021    PROT 8.2 05/07/2021    LABALBU 4.4 05/07/2021    BILITOT 0.5 05/07/2021    ALKPHOS 102 05/07/2021    AST 69 (H) 05/07/2021    ALT 36 05/07/2021 LABGLOM >60 05/07/2021    GFRAA 114 04/28/2020    AGRATIO 1.3 04/28/2020    GLOB 3.8 05/07/2021             Relevant Imaging studies reviewed/analyzed by me:   No results found. Skin biopsy procedure note  Local infiltration with lidocaine skin. 1 cc bleeding. Requires 2 mm size of the skin biopsy was obtained. No immediate complications. ASSESSMENT:    Orders Placed This Encounter   Procedures    CBC Auto Differential     Standing Status:   Future     Number of Occurrences:   1     Standing Expiration Date:   5/7/2022    Comprehensive Metabolic Panel     Standing Status:   Future     Number of Occurrences:   1     Standing Expiration Date:   5/7/2022      Gillian Orellana was seen today for breast cancer. Diagnoses and all orders for this visit:    Malignant neoplasm of upper-outer quadrant of left female breast, unspecified estrogen receptor status ([facility] [address]  -     CBC Auto Differential; Future  -     Comprehensive Metabolic Panel; Future    HER2-positive carcinoma of breast ([address]    Carcinomatous metastasis in skin ([facility] [address]    Encounter for antineoplastic immunotherapy         Metastatic HER-2 oseas breast cancer   She received 2 cycles of Fam-trastuzumab deruxtecan 5.4 mg/kg every 3 weeks She was going to receive third cycle with 20% dose reduction Fam-trastuzumab deruxtecan 4.3 mg/kg but the patient opted to discontinue chemotherapy. Due to increased toxicity and poor quality of life the patient request to stop treatment at this time. CT chest abdomen pelvis showed no evidence of intrathoracic or intra-abdominal metastatic disease  Status post completion of palliative radiation to the skin. Current regimen:  Xeloda 650 mg p.o. day 5 days and 9 days off  Continue Herceptin and Tucatinib 150 mg p.o. twice daily. Continued response. No evidence of recurrence in the skin. February 2021-reviewed the scans and a 2D echo. No evidence of systemic metastatic disease. 2D echo normal stable EF.     Treatment related toxicity-maculopapular rash in the right forearm has resolved. Recommended-clindamycin cream, oral doxycycline x10 days and oral steroids 40 mg p.o. x5 days. Recent 2D echo showed normal EF. I believe that the rash is secondary to EGFR inhibition by tucatinib. Mild rash upper chest.      Skin metastasis - status post palliative RT with significant improvement. No evidence of skin recurrence at this time. Punch skin biopsy today    Clinical/laboratory monitoring      Fatigue-related to chemotherapy. 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 She denies any significant change from previous evaluation. Prolonged neutropenias- the patient had a bone marrow in 2018 that showed an unremarkable bone marrow. She has had prolonged neutropenia in the past.  ANC 1.47. No fever chills. Continue to watch. No intervention. CBC today showed WBC 3.51/ANC 2.16, hemoglobin 10.1, platelet count [phone]. Erythematous Skin lesion-punch skin biopsy today. Discussed pathology  Local infiltration with lidocaine skin. 1 cc bleeding. Requires 2 mm size of the skin biopsy was obtained. No immediate complications. Plan:  1. CBC, CMP today  2. Resume tucatinib 300 mg p.o. twice daily  3. Continue Herceptin  Every 3 weeks  4. Xeloda to 650 mg p.o. twice daily 5 days on and 9 days off.   5. RTC 6 weeks MD  6. Continue clinical/laboratory monitoring  7. Recommend hydrocortisone cream     The selection, dosing administration of anticancer agents in the management of associated toxicities are complex. Modifications of drug dose and schedule and the initiation of supportive care interventions are often necessary because of expected toxicities individual patient variability, prior treatment and comorbidity.  The optimal delivery of anticancer agents therefore requires a healthcare delivery team experienced in the use of anticancer agents and the management of associated toxicities in patients with cancer. Follow Up:     No follow-ups on file. Data Marcy Ramirez, am scribing for Adonay Ro MD. Electronically signed by Marco Gonzalez RN on 5/7/2021 at 2:47 PM CDT. I, Dr Dana Dobbins, personally performed the services described in this documentation as scribed by Marco Gonzalez RN in my presence and is both accurate and complete. I have seen, examined and reviewed this patient medication list, appropriate labs and imaging studies. I reviewed relevant medical records and others physicians notes. I discussed the plans of care with the patient. I answered all the questions to the patients satisfaction. I have also reviewed the chief complaint (CC) and part of the history (History of Present Illness (HPI), Past Family Social History [facility]), or Review of Systems (ROS) and made changes when appropriated.        (Please note that portions of this note were completed with a voice recognition program. Efforts were made to edit the dictations but occasionally words are mis-transcribed.)

## 2021-05-06 NOTE — PROGRESS NOTES
PhysicalTherapy Treatment Note    Patient:           Ayla Echeverria            : 1965  Today's Date: 2021  Referring practitioner: Gloria Hendricks*  Date of Initial Visit:       Type: THERAPY  Noted: 10/15/2020  Patient seen for 32 sessions    Visit Diagnoses:    ICD-10-CM ICD-9-CM   1. 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 Regional lymph node metastasis present (CMS/HCC)  C77.9 196.9   3. Decreased ROM of right shoulder  M25.611 719.51   4. Decreased ROM of left shoulder  M25.612 719.51   5. Lymphedema  I89.0 457.1       SUBJECTIVE     Subjective: Patient states she has been sitting still for awhile at work and she is stiff, she has had a busy week and she is very tired.     Pain before treatment 5/10 upper chest and shoulders with pain meds.            OBJECTIVE     Objective   Lymphedema     Row Name 21 1545 21 1345          Subjective Pain    Able to rate subjective pain?  yes  -AL  --  -AL     Pre-Treatment Pain Level  5  -AL  --     Post-Treatment Pain Level  3  -AL  --     Subjective Pain Comment  Upper chest  -AL  --        Manual Lymphatic Drainage    Manual Lymphatic Drainage  initial sequence  -AL  --     Initial Sequence  short neck;abdomen;diaphragmatic breathing  -AL  --     Abdomen  superficial  -AL  --     Opened Regional Lymph Nodes  inguinal  -AL  --     Inguinal  right;left  -AL  --     Opened Anastamoses  axillo-inguinal  -AL  --     Axillo-Inguinal  right;left  -AL  --     Manual Lymphatic Drainage Comments  MLD to the upper chest and L lateral trunk.  Continue to stretch both arms into B shoulder flexion and abduction. Posterior mobs both shoudlers.  Holding onto a small ball patient working on B shoulder flexion, then worked in diagonal patterns for flexion extension again holding onto the ball.  -AL  --     Manual Therapy  45  -AL  --       User Key   (r) = Recorded By, (t) = Taken By, (c) = Cosigned By    Initials Name Provider Type    Jaycee Farris PTA, IDALIA Physical Therapy Assistant           Therapy Education/Self Care [id number]   Details: Work on HEP, MLD, and stretching for both arms.   [employer] Code: N/A   Given HEP   Progress: Reinforced   Who provided to: Patent   Level of understanding Verbalized    Timed Minutes      Goals  Progress Note Due:  5/14/2021   STG by: 11/14/20 Comments Status   Patient will begin a home exercise program to accelerate the recovery process   Met   Patient will have an increase in bilateral shoulder flexion to 160 deg in supine Worked on increasing shoulder flexion today. Ongoing   Patient will have a 25% reduction in chest and shoulder pain  Ongoing   Patient will gain an understanding of Lymphedema and risk factors   Met   LTG by:       Patient will be able to reach up into an overhead cabinet to retrieve a 5 lb dish safely   Met    Patient will regain ROM to reach behind her back as if she was undoing a bra  Met    Patient will have a 75 - 90% reduction in tightness/pain across chest and shoulders Her chest is not as tight today Ongoing   Patient will be able to perform household chores without exacerbation of symptoms  Ongoing, progressing, partially met   Patient will regain functional ROM of both shoulders to perform activities overhead, reaching out to retrieve food from a drive-thru, reaching behind her back to fasten/unfasten bras,clothing    Met   Patient will be instructed in a long term exercise program to continue to make gains following discharge   Patient has been working on her HEP more.  Ongoing progressing              Total Timed Treatment:     45   mins  Total Time of Visit:            45  mins         ASSESSMENT/PLAN     Assessment/Plan:       ASSESSMENT:  Patient has been working on her HEP for stretching, she is less tight today with shoulder PROM.  She is able to lay on the R side with less  discomfort.  Will measure patient next treatment and assess all goals.     PLAN: Will continue  working on increasing ROM both shoulders, as well as decreasing tightness in upper chest, will also work on MLD.  Assess all goals next treatment.     Jaycee Mohan PTA, T-EVANGELINA  Physical Therapy Assistant

## 2021-05-07 ENCOUNTER — HOSPITAL ENCOUNTER (OUTPATIENT)
Dept: INFUSION THERAPY | Age: 56
Discharge: HOME | End: 2021-05-07
Payer: COMMERCIAL

## 2021-05-07 ENCOUNTER — OFFICE VISIT (OUTPATIENT)
Dept: HEMATOLOGY | Age: 56
End: 2021-05-07
Payer: COMMERCIAL

## 2021-05-07 VITALS
SYSTOLIC BLOOD PRESSURE: 138 MMHG | HEART RATE: 70 BPM | BODY MASS INDEX: 24.45 KG/M2 | TEMPERATURE: 97.5 F | DIASTOLIC BLOOD PRESSURE: 74 MMHG | WEIGHT: 138 LBS | HEIGHT: 63 IN

## 2021-05-07 DIAGNOSIS — C50.412 MALIGNANT NEOPLASM OF UPPER-OUTER QUADRANT OF LEFT FEMALE BREAST, UNSPECIFIED ESTROGEN RECEPTOR STATUS (HCC): Primary | ICD-10-CM

## 2021-05-07 DIAGNOSIS — C79.2 CARCINOMATOUS METASTASIS IN SKIN (HCC): ICD-10-CM

## 2021-05-07 DIAGNOSIS — C50.919 HER2-POSITIVE CARCINOMA OF BREAST (HCC): ICD-10-CM

## 2021-05-07 DIAGNOSIS — D70.1 CHEMOTHERAPY-INDUCED NEUTROPENIA (HCC): ICD-10-CM

## 2021-05-07 DIAGNOSIS — T45.1X5A CHEMOTHERAPY-INDUCED NEUTROPENIA (HCC): ICD-10-CM

## 2021-05-07 DIAGNOSIS — Z51.12 ENCOUNTER FOR ANTINEOPLASTIC IMMUNOTHERAPY: ICD-10-CM

## 2021-05-07 LAB
ALBUMIN SERPL-MCNC: 4.4 G/DL (ref 3.5–5.2)
ALP BLD-CCNC: 102 U/L (ref 35–104)
ALT SERPL-CCNC: 36 U/L (ref 9–52)
ANION GAP SERPL CALCULATED.3IONS-SCNC: 10 MMOL/L (ref 7–19)
AST SERPL-CCNC: 69 U/L (ref 14–36)
BASOPHILS ABSOLUTE: 0.03 K/UL (ref 0.01–0.08)
BASOPHILS RELATIVE PERCENT: 1.2 % (ref 0.1–1.2)
BILIRUB SERPL-MCNC: 0.5 MG/DL (ref 0.2–1.3)
BUN BLDV-MCNC: 12 MG/DL (ref 7–17)
CALCIUM SERPL-MCNC: 9.3 MG/DL (ref 8.4–10.2)
CHLORIDE BLD-SCNC: 102 MMOL/L (ref 98–111)
CO2: 29 MMOL/L (ref 22–29)
CREAT SERPL-MCNC: 0.9 MG/DL (ref 0.5–1)
EOSINOPHILS ABSOLUTE: 0.09 K/UL (ref 0.04–0.54)
EOSINOPHILS RELATIVE PERCENT: 3.5 % (ref 0.7–7)
GFR NON-AFRICAN AMERICAN: >60
GLOBULIN: 3.8 G/DL
GLUCOSE BLD-MCNC: 100 MG/DL (ref 74–106)
HCT VFR BLD CALC: 31.7 % (ref 34.1–44.9)
HEMOGLOBIN: 10.1 G/DL (ref 11.2–15.7)
LYMPHOCYTES ABSOLUTE: 0.67 K/UL (ref 1.18–3.74)
LYMPHOCYTES RELATIVE PERCENT: 25.8 % (ref 19.3–53.1)
MCH RBC QN AUTO: 28.1 PG (ref 25.6–32.2)
MCHC RBC AUTO-ENTMCNC: 31.9 G/DL (ref 32.3–35.5)
MCV RBC AUTO: 88.3 FL (ref 79.4–94.8)
MONOCYTES ABSOLUTE: 0.4 K/UL (ref 0.24–0.82)
MONOCYTES RELATIVE PERCENT: 15.4 % (ref 4.7–12.5)
NEUTROPHILS ABSOLUTE: 1.41 K/UL (ref 1.56–6.13)
NEUTROPHILS RELATIVE PERCENT: 54.1 % (ref 34–71.1)
PDW BLD-RTO: 17.6 % (ref 11.7–14.4)
PLATELET # BLD: 243 K/UL (ref 182–369)
PMV BLD AUTO: 9 FL (ref 7.4–10.4)
POTASSIUM SERPL-SCNC: 3.7 MMOL/L (ref 3.5–5.1)
RBC # BLD: 3.59 M/UL (ref 3.93–5.22)
SODIUM BLD-SCNC: 141 MMOL/L (ref 137–145)
TOTAL PROTEIN: 8.2 G/DL (ref 6.3–8.2)
WBC # BLD: 2.6 K/UL (ref 3.98–10.04)

## 2021-05-07 PROCEDURE — 80053 COMPREHEN METABOLIC PANEL: CPT

## 2021-05-07 PROCEDURE — 11104 PUNCH BX SKIN SINGLE LESION: CPT | Performed by: INTERNAL MEDICINE

## 2021-05-07 PROCEDURE — 99214 OFFICE O/P EST MOD 30 MIN: CPT | Performed by: INTERNAL MEDICINE

## 2021-05-07 PROCEDURE — 96375 TX/PRO/DX INJ NEW DRUG ADDON: CPT

## 2021-05-07 PROCEDURE — 96413 CHEMO IV INFUSION 1 HR: CPT

## 2021-05-07 PROCEDURE — 2580000003 HC RX 258: Performed by: INTERNAL MEDICINE

## 2021-05-07 PROCEDURE — 85025 COMPLETE CBC W/AUTO DIFF WBC: CPT

## 2021-05-07 PROCEDURE — 6370000000 HC RX 637 (ALT 250 FOR IP): Performed by: INTERNAL MEDICINE

## 2021-05-07 PROCEDURE — 6360000002 HC RX W HCPCS: Performed by: INTERNAL MEDICINE

## 2021-05-07 PROCEDURE — 2500000003 HC RX 250 WO HCPCS: Performed by: INTERNAL MEDICINE

## 2021-05-07 RX ORDER — DIPHENHYDRAMINE HYDROCHLORIDE 50 MG/ML
25 INJECTION INTRAMUSCULAR; INTRAVENOUS ONCE
Status: CANCELLED
Start: 2021-05-07 | End: 2021-05-07

## 2021-05-07 RX ORDER — SODIUM CHLORIDE 9 MG/ML
20 INJECTION, SOLUTION INTRAVENOUS ONCE
Status: DISCONTINUED | OUTPATIENT
Start: 2021-05-07 | End: 2021-05-09 | Stop reason: HOSPADM

## 2021-05-07 RX ORDER — SODIUM CHLORIDE 9 MG/ML
20 INJECTION, SOLUTION INTRAVENOUS ONCE
Status: CANCELLED | OUTPATIENT
Start: 2021-05-07 | End: 2021-05-07

## 2021-05-07 RX ORDER — SODIUM CHLORIDE 0.9 % (FLUSH) 0.9 %
10 SYRINGE (ML) INJECTION PRN
Status: CANCELLED | OUTPATIENT
Start: 2021-05-07

## 2021-05-07 RX ORDER — METHYLPREDNISOLONE SODIUM SUCCINATE 125 MG/2ML
125 INJECTION, POWDER, LYOPHILIZED, FOR SOLUTION INTRAMUSCULAR; INTRAVENOUS ONCE
Status: CANCELLED | OUTPATIENT
Start: 2021-05-07 | End: 2021-05-07

## 2021-05-07 RX ORDER — EPINEPHRINE 1 MG/ML
0.3 INJECTION, SOLUTION, CONCENTRATE INTRAVENOUS PRN
Status: CANCELLED | OUTPATIENT
Start: 2021-05-07

## 2021-05-07 RX ORDER — LIDOCAINE HYDROCHLORIDE 10 MG/ML
5 INJECTION, SOLUTION EPIDURAL; INFILTRATION; INTRACAUDAL; PERINEURAL ONCE
Status: COMPLETED | OUTPATIENT
Start: 2021-05-07 | End: 2021-05-07

## 2021-05-07 RX ORDER — SODIUM CHLORIDE 9 MG/ML
INJECTION, SOLUTION INTRAVENOUS CONTINUOUS
Status: CANCELLED | OUTPATIENT
Start: 2021-05-07

## 2021-05-07 RX ORDER — MEPERIDINE HYDROCHLORIDE 50 MG/ML
12.5 INJECTION INTRAMUSCULAR; INTRAVENOUS; SUBCUTANEOUS ONCE
Status: CANCELLED | OUTPATIENT
Start: 2021-05-07 | End: 2021-05-07

## 2021-05-07 RX ORDER — SODIUM CHLORIDE 0.9 % (FLUSH) 0.9 %
10 SYRINGE (ML) INJECTION PRN
Status: DISCONTINUED | OUTPATIENT
Start: 2021-05-07 | End: 2021-05-08 | Stop reason: HOSPADM

## 2021-05-07 RX ORDER — ACETAMINOPHEN 325 MG/1
1000 TABLET ORAL ONCE
Status: CANCELLED
Start: 2021-05-07 | End: 2021-05-07

## 2021-05-07 RX ORDER — SODIUM CHLORIDE 0.9 % (FLUSH) 0.9 %
5 SYRINGE (ML) INJECTION PRN
Status: CANCELLED | OUTPATIENT
Start: 2021-05-07

## 2021-05-07 RX ORDER — HEPARIN SODIUM (PORCINE) LOCK FLUSH IV SOLN 100 UNIT/ML 100 UNIT/ML
500 SOLUTION INTRAVENOUS PRN
Status: CANCELLED | OUTPATIENT
Start: 2021-05-07

## 2021-05-07 RX ORDER — ACETAMINOPHEN 500 MG
1000 TABLET ORAL ONCE
Status: COMPLETED | OUTPATIENT
Start: 2021-05-07 | End: 2021-05-07

## 2021-05-07 RX ORDER — DIPHENHYDRAMINE HYDROCHLORIDE 50 MG/ML
50 INJECTION INTRAMUSCULAR; INTRAVENOUS ONCE
Status: CANCELLED | OUTPATIENT
Start: 2021-05-07 | End: 2021-05-07

## 2021-05-07 RX ORDER — HEPARIN SODIUM (PORCINE) LOCK FLUSH IV SOLN 100 UNIT/ML 100 UNIT/ML
500 SOLUTION INTRAVENOUS PRN
Status: DISCONTINUED | OUTPATIENT
Start: 2021-05-07 | End: 2021-05-08 | Stop reason: HOSPADM

## 2021-05-07 RX ORDER — DIPHENHYDRAMINE HYDROCHLORIDE 50 MG/ML
25 INJECTION INTRAMUSCULAR; INTRAVENOUS ONCE
Status: COMPLETED | OUTPATIENT
Start: 2021-05-07 | End: 2021-05-07

## 2021-05-07 RX ADMIN — HEPARIN 500 UNITS: 100 SYRINGE at 15:33

## 2021-05-07 RX ADMIN — LIDOCAINE HYDROCHLORIDE 5 ML: 10 INJECTION, SOLUTION EPIDURAL; INFILTRATION; INTRACAUDAL; PERINEURAL at 14:39

## 2021-05-07 RX ADMIN — SODIUM CHLORIDE, PRESERVATIVE FREE 10 ML: 5 INJECTION INTRAVENOUS at 15:32

## 2021-05-07 RX ADMIN — ACETAMINOPHEN 1000 MG: 500 TABLET ORAL at 14:39

## 2021-05-07 RX ADMIN — TRASTUZUMAB 359 MG: 150 INJECTION, POWDER, LYOPHILIZED, FOR SOLUTION INTRAVENOUS at 14:52

## 2021-05-07 RX ADMIN — DIPHENHYDRAMINE HYDROCHLORIDE 12.5 MG: 50 INJECTION, SOLUTION INTRAMUSCULAR; INTRAVENOUS at 14:38

## 2021-05-07 ASSESSMENT — PAIN SCALES - GENERAL: PAINLEVEL_OUTOF10: 0

## 2021-05-13 NOTE — PROGRESS NOTES
Progress Note Addendum      Patient: Ayla Ehceverria           : 1965  Visit Date: 2021  Referring practitioner: Gloria Hendricks*  Date of Initial Visit: Type: THERAPY  Noted: 10/15/2020  Patient seen for 33 sessions  Visit Diagnoses:    ICD-10-CM ICD-9-CM   1. 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 Regional lymph node metastasis present (CMS/HCC)  C77.9 196.9   3. Decreased ROM of right shoulder  M25.611 719.51   4. Lymphedema  I89.0 457.1   5. Decreased ROM of left shoulder  M25.612 719.51          Clinical Progress: improved  Home Program Compliance: Yes  Treatment has included: therapeutic exercise, manual therapy and therapeutic activity  Progress toward previous goals: Partially Met  Prognosis to achieve goals: good    Subjective     Objective     Assessment & Plan     Assessment  Impairments: abnormal or restricted ROM, activity intolerance, impaired physical strength, lacks appropriate home exercise program and pain with function  Prognosis: good  Functional Limitations: uncomfortable because of pain, reaching overhead and unable to perform repetitive tasks  Plan  Therapy options: will be seen for skilled physical therapy services  Planned modality interventions: dry needling, low level laser therapy and TENS  Planned therapy interventions: manual therapy, strengthening, stretching, therapeutic activities, functional ROM exercises, ADL retraining, joint mobilization, soft tissue mobilization, neuromuscular re-education and flexibility  Frequency: 1x week  Duration in visits: 4  Treatment plan discussed with: PTA        I have reviewed the progress note information provided by Ivan Mohan PTA, IDALIA and I concur with the findings.    Samia Newman, PT DPT  Physical Therapist

## 2021-05-13 NOTE — PROGRESS NOTES
PhysicalTherapy Treatment Note and 30 Day Progress Note    Patient:           Ayla Echeverria            : 1965  Today's Date: 2021  Referring practitioner: Gloria Hendricks*  Date of Initial Visit:       Type: THERAPY  Noted: 10/15/2020  Patient seen for 33 sessions    Visit Diagnoses:    ICD-10-CM ICD-9-CM   1. 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 Regional lymph node metastasis present (CMS/HCC)  C77.9 196.9   3. Decreased ROM of right shoulder  M25.611 719.51   4. Lymphedema  I89.0 457.1   5. Decreased ROM of left shoulder  M25.612 719.51       SUBJECTIVE     Subjective:  She states she has a good week, she is feeling better today.  She is back on her chemo pill, 7 days on and 7 days off.  She thinks she will have scans again in September.     Pain before treatment 2/10 upper chest and shoulders with pain meds.            OBJECTIVE     Objective   Lymphedema     Row Name 21 1550             Subjective Pain    Able to rate subjective pain?  yes  -AL      Pre-Treatment Pain Level  2  -AL      Subjective Pain Comment  L upper quadrant  -AL         Manual Lymphatic Drainage    Manual Lymphatic Drainage  initial sequence  -AL      Initial Sequence  short neck;abdomen;diaphragmatic breathing  -AL      Abdomen  superficial  -AL      Opened Regional Lymph Nodes  inguinal  -AL      Inguinal  right;left  -AL      Opened Anastamoses  axillo-inguinal  -AL      Axillo-Inguinal  right;left  -AL      Manual Lymphatic Drainage Comments  Assessed all goals.  MLD to the upper chest and L lateral trunk.  Continue to stretch both arms into B shoulder flexion and abduction. Posterior mobs both shoudlers.  Holding onto a small ball patient working on B shoulder flexion, then worked in diagonal patterns for flexion extension again holding onto the ball.  -AL      Manual Therapy  40  -AL        User  Key  (r) = Recorded By, (t) = Taken By, (c) = Cosigned By    Initials Name Provider Type    Jaycee Farris PTA, IDALIA Physical Therapy Assistant           Therapy Education/Self Care [id number]   Details: Work on HEP, MLD, and stretching for both arms.   [employer] Code: N/A   Given HEP   Progress: Reinforced   Who provided to: Patent   Level of understanding Verbalized    Timed Minutes      Goals  Progress Note Due:  2021   STG by: 20 Comments Status   Patient will begin a home exercise program to accelerate the recovery process   Met   Patient will have an increase in bilateral shoulder flexion to 160 deg in supine R shoulder in sittin degrees, supine: 160  L shoulder in sittin degrees, supine:  138 Ongoing   Patient will have a 25% reduction in chest and shoulder pain The pain in the chest and shoulder is 45% better than it was.  Met   Patient will gain an understanding of Lymphedema and risk factors   Met   LTG by:       Patient will be able to reach up into an overhead cabinet to retrieve a 5 lb dish safely   Met    Patient will regain ROM to reach behind her back as if she was undoing a bra  Met    Patient will have a 75 - 90% reduction in tightness/pain across chest and shoulders Her chest is not as tight today, this is 35% better.  Ongoing   Patient will be able to perform household chores without exacerbation of symptoms Reaching up to the top shelf is still challenging, she is able to reach the lower shelf.  Ongoing, progressing, partially met   Patient will regain functional ROM of both shoulders to perform activities overhead, reaching out to retrieve food from a drive-thru, reaching behind her back to fasten/unfasten bras,clothing    Met   Patient will be instructed in a long term exercise program to continue to make gains following discharge   Patient has been working on her HEP more.  Ongoing progressing              Total Timed Treatment:     45   mins  Total Time of Visit:             40  mins         ASSESSMENT/PLAN     Assessment/Plan:       ASSESSMENT: Overall pain is better, but she does have pain flairs at times.  The pain in her chest and shoulders has improved 45% since her initial evaluation.  She is still lacking ROM in her shoulders, more so on the L side from the radiation fibrosis.  Functionally patient is now able to reach the lower shelves in her cabinets, but she cannot reach the upper shelves due to decrease ROM L shoulder.  Patient is working on her HEP, but realizes she needs to work on this more as the L shoulder will tighten quickly if she does not stretch it.     PLAN: Will continue  working on increasing ROM both shoulders, as well as decreasing tightness in upper chest, will also work on MLD.      Jaycee Mohan, VAUGHN, CLT-EVANGELINA  Physical Therapy Assistant

## 2021-05-25 NOTE — PROGRESS NOTES
PhysicalTherapy Treatment Note    Patient:           Ayla Echeverria            : 1965  Today's Date: 2021  Referring practitioner: Gloria Hendricks*  Date of Initial Visit:       Type: THERAPY  Noted: 10/15/2020  Patient seen for 34 sessions    Visit Diagnoses:    ICD-10-CM ICD-9-CM   1. At risk for lymphedema  Z91.89 V49.89   2. 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 Regional lymph node metastasis present (CMS/[facility])  C77.9 196.9   4. Decreased ROM of right shoulder  M25.611 719.51   5. Lymphedema  I89.0 457.1   6. Decreased ROM of left shoulder  M25.612 719.51       SUBJECTIVE     Subjective:  She feels like she has more hardness of the skin L pec area.  She knows she has not been stretching and doing her exercises enough.  Heaviness in the upper chest.     Pain before treatment 2/10 upper chest and shoulders with pain meds.            OBJECTIVE     Objective   Lymphedema     Row Name 21 4225             Subjective Pain    Able to rate subjective pain?  yes  -AL      Pre-Treatment Pain Level  2  -AL      Subjective Pain Comment  L pec area  -AL         Manual Lymphatic Drainage    Manual Lymphatic Drainage  initial sequence  -AL      Initial Sequence  short neck;abdomen;diaphragmatic breathing  -AL      Abdomen  superficial  -AL      Diaphragmatic Breathing  x 10 with superficial abdminals  -AL      Opened Regional Lymph Nodes  inguinal  -AL      Inguinal  right;left  -AL      Opened Anastamoses  axillo-inguinal  -AL      Axillo-Inguinal  right;left  -AL      Manual Lymphatic Drainage Comments  Had patient worked on [facility] using a 3 pound bar for B flexion, abduction, and ER.  Spent extra time working on MLD to the L upper quadrant.   -AL      Manual Therapy  40  -AL        User Key  (r) = Recorded By, (t) = Taken By, (c) = Cosigned By    Initials Name Provider Type    GWYN Mohan  Jaycee DICK PTA, ANAID Physical Therapy Assistant           Therapy Education/Self Care [id number]   Details: Work on HEP, MLD, and stretching for both arms.   [employer] Code: N/A   Given HEP   Progress: Reinforced   Who provided to: Patent   Level of understanding Verbalized    Timed Minutes      Goals  Progress Note Due:  2021   STG by: 20 Comments Status   Patient will begin a home exercise program to accelerate the recovery process   Met   Patient will have an increase in bilateral shoulder flexion to 160 deg in supine R shoulder in sittin degrees, supine: 160  L shoulder in sittin degrees, supine:  138 Ongoing   Patient will have a 25% reduction in chest and shoulder pain The pain in the chest and shoulder is 45% better than it was.  Met   Patient will gain an understanding of Lymphedema and risk factors   Met   LTG by:       Patient will be able to reach up into an overhead cabinet to retrieve a 5 lb dish safely   Met    Patient will regain ROM to reach behind her back as if she was undoing a bra  Met    Patient will have a 75 - 90% reduction in tightness/pain across chest and shoulders Her chest is not as tight today, this is 35% better.  Ongoing   Patient will be able to perform household chores without exacerbation of symptoms Reaching up to the top shelf is still challenging, she is able to reach the lower shelf.  Ongoing, progressing, partially met   Patient will regain functional ROM of both shoulders to perform activities overhead, reaching out to retrieve food from a drive-thru, reaching behind her back to fasten/unfasten bras,clothing    Met   Patient will be instructed in a long term exercise program to continue to make gains following discharge   Patient has been working on her HEP more.  Ongoing progressing              Total Timed Treatment:     45   mins  Total Time of Visit:            40  mins         ASSESSMENT/PLAN     Assessment/Plan:       ASSESSMENT:  Patient has more  tightness in the L pec area today, she does need to work on her stretches and her HEP more often.  Stressed the importance of being diligent with her HEP daily to help keep the L shoulder looser. Patient still felt a heaviness in the upper chest after treatment.     PLAN: Will continue  working on increasing ROM both shoulders, as well as decreasing tightness in upper chest, will also work on MLD.      Jaycee Mohan PTA, T-EVANGELINA  Physical Therapy Assistant
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
## 2021-05-28 ENCOUNTER — OFFICE VISIT (OUTPATIENT)
Dept: HEMATOLOGY | Age: 56
End: 2021-05-28
Payer: COMMERCIAL

## 2021-05-28 ENCOUNTER — HOSPITAL ENCOUNTER (OUTPATIENT)
Dept: INFUSION THERAPY | Age: 56
Discharge: HOME | End: 2021-05-28
Payer: COMMERCIAL

## 2021-05-28 VITALS
SYSTOLIC BLOOD PRESSURE: 129 MMHG | BODY MASS INDEX: 24.64 KG/M2 | TEMPERATURE: 99.7 F | HEART RATE: 70 BPM | DIASTOLIC BLOOD PRESSURE: 78 MMHG | WEIGHT: 139.1 LBS | RESPIRATION RATE: 16 BRPM

## 2021-05-28 VITALS
SYSTOLIC BLOOD PRESSURE: 129 MMHG | BODY MASS INDEX: 24.64 KG/M2 | HEIGHT: 63 IN | HEART RATE: 70 BPM | DIASTOLIC BLOOD PRESSURE: 78 MMHG | OXYGEN SATURATION: 99 % | WEIGHT: 139.1 LBS | TEMPERATURE: 98.6 F

## 2021-05-28 DIAGNOSIS — T45.1X5A ANTINEOPLASTIC CHEMOTHERAPY INDUCED ANEMIA: ICD-10-CM

## 2021-05-28 DIAGNOSIS — C50.919 HER2-POSITIVE CARCINOMA OF BREAST (HCC): ICD-10-CM

## 2021-05-28 DIAGNOSIS — C50.412 MALIGNANT NEOPLASM OF UPPER-OUTER QUADRANT OF LEFT FEMALE BREAST, UNSPECIFIED ESTROGEN RECEPTOR STATUS (HCC): ICD-10-CM

## 2021-05-28 DIAGNOSIS — Z45.2 ENCOUNTER FOR CENTRAL LINE CARE: ICD-10-CM

## 2021-05-28 DIAGNOSIS — D70.1 CHEMOTHERAPY INDUCED NEUTROPENIA (HCC): ICD-10-CM

## 2021-05-28 DIAGNOSIS — Z71.89 CARE PLAN DISCUSSED WITH PATIENT: ICD-10-CM

## 2021-05-28 DIAGNOSIS — Z17.1 MALIGNANT NEOPLASM OF UPPER-OUTER QUADRANT OF LEFT BREAST IN FEMALE, ESTROGEN RECEPTOR NEGATIVE (HCC): Primary | ICD-10-CM

## 2021-05-28 DIAGNOSIS — C79.2 CARCINOMATOUS METASTASIS IN SKIN (HCC): ICD-10-CM

## 2021-05-28 DIAGNOSIS — D64.81 ANTINEOPLASTIC CHEMOTHERAPY INDUCED ANEMIA: ICD-10-CM

## 2021-05-28 DIAGNOSIS — Z51.12 ENCOUNTER FOR ANTINEOPLASTIC IMMUNOTHERAPY: ICD-10-CM

## 2021-05-28 DIAGNOSIS — T45.1X5A CHEMOTHERAPY INDUCED NEUTROPENIA (HCC): ICD-10-CM

## 2021-05-28 DIAGNOSIS — C50.412 MALIGNANT NEOPLASM OF UPPER-OUTER QUADRANT OF LEFT BREAST IN FEMALE, ESTROGEN RECEPTOR NEGATIVE (HCC): Primary | ICD-10-CM

## 2021-05-28 DIAGNOSIS — C50.412 MALIGNANT NEOPLASM OF UPPER-OUTER QUADRANT OF LEFT FEMALE BREAST, UNSPECIFIED ESTROGEN RECEPTOR STATUS (HCC): Primary | ICD-10-CM

## 2021-05-28 LAB
BASOPHILS ABSOLUTE: 0.01 K/UL (ref 0.01–0.08)
BASOPHILS RELATIVE PERCENT: 0.4 % (ref 0.1–1.2)
EOSINOPHILS ABSOLUTE: 0.05 K/UL (ref 0.04–0.54)
EOSINOPHILS RELATIVE PERCENT: 1.8 % (ref 0.7–7)
HCT VFR BLD CALC: 31.2 % (ref 34.1–44.9)
HEMOGLOBIN: 10 G/DL (ref 11.2–15.7)
LYMPHOCYTES ABSOLUTE: 0.73 K/UL (ref 1.18–3.74)
LYMPHOCYTES RELATIVE PERCENT: 25.6 % (ref 19.3–53.1)
MCH RBC QN AUTO: 28.5 PG (ref 25.6–32.2)
MCHC RBC AUTO-ENTMCNC: 32.1 G/DL (ref 32.3–35.5)
MCV RBC AUTO: 88.9 FL (ref 79.4–94.8)
MONOCYTES ABSOLUTE: 0.32 K/UL (ref 0.24–0.82)
MONOCYTES RELATIVE PERCENT: 11.2 % (ref 4.7–12.5)
NEUTROPHILS ABSOLUTE: 1.74 K/UL (ref 1.56–6.13)
NEUTROPHILS RELATIVE PERCENT: 61 % (ref 34–71.1)
PDW BLD-RTO: 17.1 % (ref 11.7–14.4)
PLATELET # BLD: 229 K/UL (ref 182–369)
PMV BLD AUTO: 8.5 FL (ref 7.4–10.4)
RBC # BLD: 3.51 M/UL (ref 3.93–5.22)
WBC # BLD: 2.85 K/UL (ref 3.98–10.04)

## 2021-05-28 PROCEDURE — 2580000003 HC RX 258: Performed by: INTERNAL MEDICINE

## 2021-05-28 PROCEDURE — 85025 COMPLETE CBC W/AUTO DIFF WBC: CPT

## 2021-05-28 PROCEDURE — 6360000002 HC RX W HCPCS

## 2021-05-28 PROCEDURE — 99214 OFFICE O/P EST MOD 30 MIN: CPT | Performed by: INTERNAL MEDICINE

## 2021-05-28 PROCEDURE — 36591 DRAW BLOOD OFF VENOUS DEVICE: CPT

## 2021-05-28 PROCEDURE — 96523 IRRIG DRUG DELIVERY DEVICE: CPT

## 2021-05-28 PROCEDURE — 36415 COLL VENOUS BLD VENIPUNCTURE: CPT

## 2021-05-28 PROCEDURE — 99212 OFFICE O/P EST SF 10 MIN: CPT

## 2021-05-28 RX ORDER — HEPARIN SODIUM (PORCINE) LOCK FLUSH IV SOLN 100 UNIT/ML 100 UNIT/ML
500 SOLUTION INTRAVENOUS PRN
Status: DISCONTINUED | OUTPATIENT
Start: 2021-05-28 | End: 2021-05-29 | Stop reason: HOSPADM

## 2021-05-28 RX ORDER — SODIUM CHLORIDE 0.9 % (FLUSH) 0.9 %
10 SYRINGE (ML) INJECTION PRN
Status: CANCELLED | OUTPATIENT
Start: 2021-05-28

## 2021-05-28 RX ORDER — SODIUM CHLORIDE 0.9 % (FLUSH) 0.9 %
20 SYRINGE (ML) INJECTION PRN
Status: DISCONTINUED | OUTPATIENT
Start: 2021-05-28 | End: 2021-05-29 | Stop reason: HOSPADM

## 2021-05-28 RX ORDER — HEPARIN SODIUM (PORCINE) LOCK FLUSH IV SOLN 100 UNIT/ML 100 UNIT/ML
SOLUTION INTRAVENOUS
Status: COMPLETED
Start: 2021-05-28 | End: 2021-05-28

## 2021-05-28 RX ORDER — HEPARIN SODIUM (PORCINE) LOCK FLUSH IV SOLN 100 UNIT/ML 100 UNIT/ML
500 SOLUTION INTRAVENOUS PRN
Status: CANCELLED | OUTPATIENT
Start: 2021-05-28

## 2021-05-28 RX ORDER — SODIUM CHLORIDE 0.9 % (FLUSH) 0.9 %
20 SYRINGE (ML) INJECTION PRN
Status: CANCELLED | OUTPATIENT
Start: 2021-05-28

## 2021-05-28 RX ADMIN — SODIUM CHLORIDE, PRESERVATIVE FREE 20 ML: 5 INJECTION INTRAVENOUS at 14:47

## 2021-05-28 RX ADMIN — HEPARIN 500 UNITS: 100 SYRINGE at 14:47

## 2021-05-28 RX ADMIN — HEPARIN SODIUM (PORCINE) LOCK FLUSH IV SOLN 100 UNIT/ML 500 UNITS: 100 SOLUTION at 14:47

## 2021-05-28 ASSESSMENT — PAIN SCALES - GENERAL: PAINLEVEL_OUTOF10: 0

## 2021-06-01 DIAGNOSIS — C50.412 MALIGNANT NEOPLASM OF UPPER-OUTER QUADRANT OF LEFT FEMALE BREAST, UNSPECIFIED ESTROGEN RECEPTOR STATUS (HCC): ICD-10-CM

## 2021-06-01 DIAGNOSIS — G89.3 CANCER ASSOCIATED PAIN: ICD-10-CM

## 2021-06-01 RX ORDER — OXYCODONE AND ACETAMINOPHEN 7.5; 325 MG/1; MG/1
1 TABLET ORAL EVERY 6 HOURS PRN
Qty: 120 TABLET | Refills: 0 | Status: SHIPPED | OUTPATIENT
Start: 2021-06-01 | End: 2021-07-07 | Stop reason: SDUPTHER

## 2021-06-03 NOTE — PROGRESS NOTES
PhysicalTherapy Treatment Note    Patient:           Ayla Echeverria            : 1965  Today's Date: 6/3/2021  Referring practitioner: Gloria Hendricks*  Date of Initial Visit:       Type: THERAPY  Noted: 10/15/2020  Patient seen for 35 sessions    Visit Diagnoses:    ICD-10-CM ICD-9-CM   1. At risk for lymphedema  Z91.89 V49.89   2. Malignant neoplasm involving both nipple and areola of left breast in female, estrogen receptor positive (CMS/HCC)  C50.012 174.0    Z17.0 V86.0   3. Regional lymph node metastasis present (CMS/HCC)  C77.9 196.9   4. Decreased ROM of right shoulder  M25.611 719.51   5. Lymphedema  I89.0 457.1   6. Decreased ROM of left shoulder  M25.612 719.51       SUBJECTIVE     Subjective:  She has a little swelling in the L UE today.  She is having pain in the L upper chest and pec area.  She states she stretches and the L arm still gets tight.  She states she has stopped her chemo pill because it wasn't working.  The DrChristiana Wants to do radiation and try a different chemo, patient is deciding what she wants to do.      Pain before treatment 2/10 upper chest and L shoulder           OBJECTIVE     Objective   Lymphedema     Row Name 21 1550             Subjective Pain    Able to rate subjective pain?  yes  -AL      Pre-Treatment Pain Level  2  -AL      Subjective Pain Comment  L upper chest and pec area  -AL         Lymphedema Measurements    Measurement Type(s)  Circumferential  -AL      Circumferential Areas  Upper extremities  -AL         BUE Circumferential (cm)    Measurement Location 1  MCP's  -AL      Left 1  16.7 cm  -AL      Right 1  17 cm  -AL      Measurement Location 2  WEb space  -AL      Left 2  17.5 cm  -AL      Right 2  17 cm  -AL      Measurement Location 3  Wrist  -AL      Left 3  14.2 cm  -AL      Right 3  13.9 cm  -AL      Measurement Location 4  10 cm  -AL      Left 4  16.7 cm  -AL      Right 4  17.2 cm  -AL      Measurement Location 5  10cm  -AL      Left 5   24.4 cm  -AL      Right 5  22.2 cm  -AL      Measurement Location 6  10 cm  -AL      Left 6  28.6 cm  -AL      Right 6  27.1 cm  -AL      Measurement Location 7  10 cm  -AL      Left 7  29.9 cm  -AL      Right 7  29.2 cm  -AL      LUE Circumferential Total  148 cm  -AL      RUE Circumferential Total  143.6 cm  -AL         Manual Lymphatic Drainage    Manual Lymphatic Drainage  initial sequence  -AL      Initial Sequence  short neck;abdomen;diaphragmatic breathing  -AL      Abdomen  superficial  -AL      Diaphragmatic Breathing  x 10 with superficial abdminals  -AL      Opened Regional Lymph Nodes  inguinal  -AL      Inguinal  right;left  -AL      Opened Anastamoses  axillo-inguinal  -AL      Axillo-Inguinal  right;left  -AL      Manual Lymphatic Drainage Comments  Worked on stretching the arms in flexion in diagonal patterns. Spent extra time working on MLD to the B UE's as well as stretching to shoulders.   -AL      Manual Therapy  40  -AL         Compression/Skin Care    Compression/Skin Care Comments  She will need to be measured for a [employer] [place] Sleeve and gauntlet for both UE's.   -AL        User Key  (r) = Recorded By, (t) = Taken By, (c) = Cosigned By    Initials Name Provider Type    Jaycee Farris PTA, CLJUDSON-EVANGELINA Physical Therapy Assistant           Therapy Education/Self Care [id number]   Details: Work on HEP, MLD, and stretching for both arms.   [employer] Code: N/A   Given HEP   Progress: Reinforced   Who provided to: Patent   Level of understanding Verbalized    Timed Minutes      Goals  Progress Note Due:  6/12/2021   STG by: 11/14/20 Comments Status   Patient will begin a home exercise program to accelerate the recovery process   Met   Patient will have an increase in bilateral shoulder flexion to 160 deg in supine More limited today Ongoing   Patient will have a 25% reduction in chest and shoulder pain  Met   Patient will gain an understanding of Lymphedema and risk factors   Met   LTG by:        Patient will be able to reach up into an overhead cabinet to retrieve a 5 lb dish safely   Met    Patient will regain ROM to reach behind her back as if she was undoing a bra  Met    Patient will have a 75 - 90% reduction in tightness/pain across chest and shoulders  Ongoing   Patient will be able to perform household chores without exacerbation of symptoms Reaching up to the top shelf is still challenging, she is able to reach the lower shelf.  Ongoing, progressing, partially met   Patient will regain functional ROM of both shoulders to perform activities overhead, reaching out to retrieve food from a drive-thru, reaching behind her back to fasten/unfasten bras,clothing    Met   Patient will be instructed in a long term exercise program to continue to make gains following discharge   Patient has been working on her HEP more.  Ongoing progressing              Total Timed Treatment:     45   mins  Total Time of Visit:            40  mins         ASSESSMENT/PLAN     Assessment/Plan:       ASSESSMENT:  She has tightness in the L chest and shoulder today as well as an increase in size in both UE's today. I spent extra time working on MLD to B  UE's as well as stretching B shoulders.  Patient is deciding her options for her cancer treatments. Patient will need compression garments for both UE's including her hands.     PLAN: Will continue  working on increasing ROM both shoulders, as well as decreasing tightness in upper chest. Will assess the fit of the compression garments if she has them next treatment.      Jaycee Mohan PTA, T-EVANGELINA  Physical Therapy Assistant

## 2021-06-07 NOTE — TELEPHONE ENCOUNTER
Multiple voicemails have been left for patient to return my call to schedule consult with Dr. Pipo Darby.  Referred by Dr. Ott.

## 2021-06-09 NOTE — TELEPHONE ENCOUNTER
Called patient to schedule appointment with Dr. Pipo Darby.  Patient states at this time she does not want radiation.  Referred by Dr. Ott.

## 2021-06-10 NOTE — PROGRESS NOTES
PhysicalTherapy 90 Day Progress Note ReCertification    Patient:           Ayla Echeverria            : 1965  Today's Date: 6/10/2021  Referring practitioner: Gloria Hendricks*  Date of Initial Visit:       Type: THERAPY  Noted: 10/15/2020  Patient seen for 36 sessions    Visit Diagnoses:    ICD-10-CM ICD-9-CM   1. At risk for lymphedema  Z91.89 V49.89   2. Malignant neoplasm involving both nipple and areola of left breast in female, estrogen receptor positive (CMS/HCC)  C50.012 174.0    Z17.0 V86.0   3. Regional lymph node metastasis present (CMS/HCC)  C77.9 196.9   4. Decreased ROM of right shoulder  M25.611 719.51   5. Lymphedema  I89.0 457.1   6. Decreased ROM of left shoulder  M25.612 719.51       SUBJECTIVE     Subjective:  She has been feeling up and down. She is still does not was to have any radiation or take any chemo for her cancer that is progressing. She still has swelling in the arms and can tell her arms are weak when in sitting.  She states she now has red areas on the R side of her chest and her stomach.    Pain before treatment 3/10 upper chest and L shoulder with pain meds           OBJECTIVE     Objective   Lymphedema     Row Name 06/10/21 1550             Subjective Pain    Able to rate subjective pain?  yes  -AL      Pre-Treatment Pain Level  3  -AL         Lymphedema Measurements    Measurement Type(s)  Circumferential  -AL      Circumferential Areas  Upper extremities  -AL         BUE Circumferential (cm)    Measurement Location 1  MCP's  -AL      Left 1  16.7 cm  -AL      Right 1  17.3 cm  -AL      Measurement Location 2  Web space  -AL      Left 2  17.4 cm  -AL      Right 2  17.1 cm  -AL      Measurement Location 3  Wrist  -AL      Left 3  13.8 cm  -AL      Right 3  13.9 cm  -AL      Measurement Location 4  10 cm  -AL      Left 4  16.6 cm  -AL      Right 4  17.5 cm  -AL      Measurement Location 5  10cm  -AL      Left 5  25.5 cm  -AL      Right 5  23.1 cm  -AL       Measurement Location 6  10 cm  -AL      Left 6  28.9 cm  -AL      Right 6  28.1 cm  -AL      Measurement Location 7  10 cm  -AL      Left 7  31.1 cm  -AL      Right 7  29.4 cm  -AL      LUE Circumferential Total  150 cm  -AL      RUE Circumferential Total  146.4 cm  -AL         Manual Lymphatic Drainage    Manual Lymphatic Drainage  initial sequence  -AL      Initial Sequence  short neck;abdomen;diaphragmatic breathing  -AL      Abdomen  superficial  -AL      Diaphragmatic Breathing  x 9 with superficial abdminals  -AL      Opened Regional Lymph Nodes  inguinal  -AL      Inguinal  right;left  -AL      Opened Anastamoses  axillo-inguinal  -AL      Axillo-Inguinal  right;left  -AL      Manual Lymphatic Drainage Comments  Paitent instructed to work on AROM in sitting for B shoulder flexion holding onto a ball.  Stretched both shoulders into felxion, abduction, and ER.  Posterior and inferior mobs.   -AL      Manual Therapy  45  -AL        User Key  (r) = Recorded By, (t) = Taken By, (c) = Cosigned By    Initials Name Provider Type    AL Jaycee Mohan PTA, CLT-EVANGELINA Physical Therapy Assistant           Therapy Education/Self Care [id number]   Details: Work on HEP, MLD, and stretching for both arms.  Work on AROM for flexion holding onto a ball in sitting.   [employer] Code: N/A   Given HEP   Progress: Reinforced and new.    Who provided to: Patent   Level of understanding Verbalized    Timed Minutes      Goals  Progress Note Due:  7/10/2021   STG by: 11/14/20 Comments Status   Patient will begin a home exercise program to accelerate the recovery process   Met   Patient will have an increase in bilateral shoulder flexion to 160 deg in supine More limited today and weaker due to increase swelling in both arms.  Ongoing   Patient will have a 25% reduction in chest and shoulder pain  Met   Patient will gain an understanding of Lymphedema and risk factors   Met   LTG by:       Patient will be able to reach up into an overhead  cabinet to retrieve a 5 lb dish safely   Met    Patient will regain ROM to reach behind her back as if she was undoing a bra  Met    Patient will have a 75 - 90% reduction in tightness/pain across chest and shoulders  Ongoing   Patient will be able to perform household chores without exacerbation of symptoms Reaching up to the top shelf is still challenging, especially since she is having more swelling in both UE's.  Ongoing, progressing, partially met   Patient will regain functional ROM of both shoulders to perform activities overhead, reaching out to retrieve food from a drive-thru, reaching behind her back to fasten/unfasten bras,clothing    Met   Patient will be instructed in a long term exercise program to continue to make gains following discharge   Patient has been working on her HEP more. She will work on more active shoulder flexion in sitting at home.  Ongoing progressing              Total Timed Treatment:     45   mins  Total Time of Visit:            45  mins         ASSESSMENT/PLAN     Assessment/Plan:       ASSESSMENT:  Functionally patient has less tightness in the shoulders after stretching which will help with reaching, but before treatment she has tightness in the L shoulder.  She has had an increase in size in both UE's as compared to her last treatment. This makes reaching up more difficult, the L arm especially is more difficult for her to raise past 90 degrease in sitting.  She is working on her HEP and the MLD.  Patient does have less dense tissue in the L breast today.  Spent extra time working on MLD to both upper arms.     PLAN: Will continue  working on increasing ROM both shoulders, as well as decreasing tightness in upper chest. Will continue to see patient 1 x a week x 4 weeks.     Jaycee Mohan PTA, JUDSON-EVANGELINA  Physical Therapy Assistant

## 2021-06-11 NOTE — PROGRESS NOTES
30 Day Progress Note and 90 Day Recertification Addendum      Patient: Ayla Echeverria           : 1965  Visit Date: 6/10/2021  Referring practitioner: Gloria Hendricks*  Date of Initial Visit: Type: THERAPY  Noted: 10/15/2020  Patient seen for 36 sessions  Visit Diagnoses:    ICD-10-CM ICD-9-CM   1. At risk for lymphedema  Z91.89 V49.89   2. Malignant neoplasm involving both nipple and areola of left breast in female, estrogen receptor positive (CMS/HCC)  C50.012 174.0    Z17.0 V86.0   3. Regional lymph node metastasis present (CMS/HCC)  C77.9 196.9   4. Decreased ROM of right shoulder  M25.611 719.51   5. Lymphedema  I89.0 457.1   6. Decreased ROM of left shoulder  M25.612 719.51          Clinical Progress: improved  Home Program Compliance: Yes  Treatment has included: therapeutic exercise and manual therapy  Progress toward previous goals: Partially Met  Prognosis to achieve goals: good    Subjective     Objective     Assessment & Plan     Assessment  Impairments: abnormal or restricted ROM, activity intolerance, impaired physical strength, lacks appropriate home exercise program and pain with function  Prognosis: good  Functional Limitations: uncomfortable because of pain, reaching overhead and unable to perform repetitive tasks  Plan  Therapy options: will be seen for skilled physical therapy services  Planned modality interventions: dry needling, low level laser therapy and TENS  Planned therapy interventions: manual therapy, strengthening, stretching, therapeutic activities, functional ROM exercises, ADL retraining, joint mobilization, soft tissue mobilization, neuromuscular re-education and flexibility  Frequency: 1x week  Duration in visits: 4  Treatment plan discussed with: VAUGHN        I have reviewed the progress note information provided by Flori Mohan PTA, and I concur with their findings.  Signature:  Wicho Cancino, PT        90 Day Recertification  Certification Period:  9/8/2021  Based upon review of the patient's progress and continued therapy plan, it is my medical opinion that Ayla Echeverria should continue physical therapy treatment at [facility]     PHYSICIAN: Gloria Crockett PA-C______________________________________________DATE: ___________________     Please sign and return via fax to [phone].   Thank you so much for letting us work with Ayla. I appreciate your letting us work with your patients. If you have any questions or concerns, please don't hesitate to contact me.

## 2021-06-17 NOTE — PROGRESS NOTES
PhysicalTherapy Treatment Note    Patient:           Ayla Echeverria            : 1965  Today's Date: 2021  Referring practitioner: Gloria Hendricks*  Date of Initial Visit:       Type: THERAPY  Noted: 10/15/2020  Patient seen for 37 sessions    Visit Diagnoses:    ICD-10-CM ICD-9-CM   1. At risk for lymphedema  Z91.89 V49.89   2. Malignant neoplasm involving both nipple and areola of left breast in female, estrogen receptor positive (CMS/[facility])  C50.012 174.0    Z17.0 V86.0   3. Regional lymph node metastasis present (CMS/[facility])  C77.9 196.9   4. Lymphedema  I89.0 457.1   5. Decreased ROM of left shoulder  M25.612 719.51   6. Decreased ROM of right shoulder  M25.611 719.51       SUBJECTIVE     Subjective:      Pain before treatment 2/10 upper chest and L shoulder with pain meds           OBJECTIVE     Objective   Lymphedema     Row Name 21 1547             Subjective Pain    Able to rate subjective pain?  yes  -AL      Pre-Treatment Pain Level  2  -AL         Manual Lymphatic Drainage    Manual Lymphatic Drainage  initial sequence  -AL      Initial Sequence  short neck;abdomen;diaphragmatic breathing  -AL      Abdomen  superficial  -AL      Diaphragmatic Breathing  x 9 with superficial abdminals  -AL      Opened Regional Lymph Nodes  inguinal  -AL      Inguinal  right;left  -AL      Opened Anastamoses  axillo-inguinal  -AL      Axillo-Inguinal  right;left  -AL      Manual Lymphatic Drainage Comments  Stretched both arms into flexion, ER, and abduction.    -AL      Manual Therapy  45  -AL        User Key  (r) = Recorded By, (t) = Taken By, (c) = Cosigned By    Initials Name Provider Type    AL Jaycee Mohan PTA, IDALIA Physical Therapy Assistant           Therapy Education/Self Care [id number]   Details: Work on stretching both shoulders   [employer] Code: N/A   Given HEP   Progress: Reinforced and new.    Who provided to: Patent   Level of understanding Verbalized    Timed Minutes      Goals   Progress Note Due:  7/10/2021   STG by: 11/14/20 Comments Status   Patient will begin a home exercise program to accelerate the recovery process   Met   Patient will have an increase in bilateral shoulder flexion to 160 deg in supine More limited today and weaker due to increase swelling in both arms.  Ongoing   Patient will have a 25% reduction in chest and shoulder pain  Met   Patient will gain an understanding of Lymphedema and risk factors   Met   LTG by:       Patient will be able to reach up into an overhead cabinet to retrieve a 5 lb dish safely   Met    Patient will regain ROM to reach behind her back as if she was undoing a bra  Met    Patient will have a 75 - 90% reduction in tightness/pain across chest and shoulders  Ongoing   Patient will be able to perform household chores without exacerbation of symptoms Reaching up to the top shelf is still challenging, especially since she is having more swelling in both UE's.  Ongoing, progressing, partially met   Patient will regain functional ROM of both shoulders to perform activities overhead, reaching out to retrieve food from a drive-thru, reaching behind her back to fasten/unfasten bras,clothing    Met   Patient will be instructed in a long term exercise program to continue to make gains following discharge   Patient has been working on her HEP more. She will work on more active shoulder flexion in sitting at home.  Ongoing progressing              Total Timed Treatment:     45   mins  Total Time of Visit:            45  mins         ASSESSMENT/PLAN     Assessment/Plan:       ASSESSMENT:  L shoulder flexion is better today, but she is still very limited with L ER and abduction.  She will work on this more at home.  She continues to have dense tissue in both UE's, she has not been able to purchase any compression garments for the UE's yet.     PLAN: Will continue  working on increasing ROM both shoulders, as well as decreasing tightness in upper chest. Will  continue to see patient 1 x a week x 4 weeks.     Jaycee Mohan, VAUGHN, JUDSON-EVANGELINA  Physical Therapy Assistant

## 2021-06-24 NOTE — PROGRESS NOTES
PhysicalTherapy Treatment Note    Patient:           Ayla Echeverria            : 1965  Today's Date: 2021  Referring practitioner: Gloria Hendricks*  Date of Initial Visit:       Type: THERAPY  Noted: 10/15/2020  Patient seen for 38 sessions    Visit Diagnoses:    ICD-10-CM ICD-9-CM   1. At risk for lymphedema  Z91.89 V49.89   2. Malignant neoplasm involving both nipple and areola of left breast in female, estrogen receptor positive (CMS/[facility])  C50.012 174.0    Z17.0 V86.0   3. Decreased ROM of right shoulder  M25.611 719.51   4. Regional lymph node metastasis present (CMS/[facility])  C77.9 196.9   5. Lymphedema  I89.0 457.1   6. Decreased ROM of left shoulder  M25.612 719.51       SUBJECTIVE     Subjective:  She has been stretching more in shoulder flexion, she is tight with shoulder abduction    Pain before treatment 2/10 upper chest and L shoulder with pain meds           OBJECTIVE     Objective   Lymphedema     Row Name 21 1550             Subjective Pain    Able to rate subjective pain?  yes  -AL      Pre-Treatment Pain Level  2  -AL         Manual Lymphatic Drainage    Manual Lymphatic Drainage  initial sequence;extremity treatment;opened anastamoses  -AL      Initial Sequence  short neck;abdomen;diaphragmatic breathing  -AL      Abdomen  superficial  -AL      Diaphragmatic Breathing  x 9 with superficial abdminals  -AL      Opened Regional Lymph Nodes  inguinal  -AL      Inguinal  right;left  -AL      Opened Anastamoses  axillo-inguinal  -AL      Axillo-Inguinal  right;left  -AL      Extremity Treatment  MLD to full limb  -AL      MLD to Full Limb  B UE's  -AL      Manual Lymphatic Drainage Comments  She weighs 136 pounds today.  Stretched both arms into flexion, ER, and abduction.  Patient also worked on self stretching into abduction and flexion with a 1# bar.  -AL      Manual Therapy  45  -AL        User Key  (r) = Recorded By, (t) = Taken By, (c) = Cosigned By    Initials Name  Provider Type    Jaycee Farris, PTA, CLT-EVANGELINA Physical Therapy Assistant           Therapy Education/Self Care [id number]   Details: Work on stretching both shoulders   [employer] Code: N/A   Given HEP   Progress: Reinforced and new.    Who provided to: Patent   Level of understanding Verbalized    Timed Minutes      Goals  Progress Note Due:  7/10/2021   STG by: 11/14/20 Comments Status   Patient will begin a home exercise program to accelerate the recovery process   Met   Patient will have an increase in bilateral shoulder flexion to 160 deg in supine More limited today and weaker due to increase swelling in both arms.  Ongoing   Patient will have a 25% reduction in chest and shoulder pain  Met   Patient will gain an understanding of Lymphedema and risk factors   Met   LTG by:       Patient will be able to reach up into an overhead cabinet to retrieve a 5 lb dish safely   Met    Patient will regain ROM to reach behind her back as if she was undoing a bra  Met    Patient will have a 75 - 90% reduction in tightness/pain across chest and shoulders  Ongoing   Patient will be able to perform household chores without exacerbation of symptoms Has trouble with shoulder abduction, tight today. Ongoing, progressing, partially met   Patient will regain functional ROM of both shoulders to perform activities overhead, reaching out to retrieve food from a drive-thru, reaching behind her back to fasten/unfasten bras,clothing    Met   Patient will be instructed in a long term exercise program to continue to make gains following discharge   Patient has been working on her HEP more. She will work on more active shoulder flexion in sitting at home.  Ongoing progressing              Total Timed Treatment:     45   mins  Total Time of Visit:            45  mins         ASSESSMENT/PLAN     Assessment/Plan:       ASSESSMENT:  Still very limited with L shoulder abduction and ER, also limited with R shoulder ER, she will work on this more  at home.  Will measure B UE's next treatment.     PLAN: Will continue  working on increasing ROM both shoulders, as well as decreasing tightness in upper chest. Will continue to see patient 1 x a week x 4 weeks.     Jaycee Mohan PTA, JUDSON-EVANGELINA  Physical Therapy Assistant

## 2021-07-01 NOTE — PROGRESS NOTES
PhysicalTherapy Treatment Note    Patient:           Ayla Echeverria            : 1965  Today's Date: 2021  Referring practitioner: Gloria Hendricks*  Date of Initial Visit:       Type: THERAPY  Noted: 10/15/2020  Patient seen for 39 sessions    Visit Diagnoses:    ICD-10-CM ICD-9-CM   1. Decreased ROM of left shoulder  M25.612 719.51   2. At risk for lymphedema  Z91.89 V49.89   3. Malignant neoplasm involving both nipple and areola of left breast in female, estrogen receptor positive (CMS/[facility])  C50.012 174.0    Z17.0 V86.0   4. Decreased ROM of right shoulder  M25.611 719.51   5. Regional lymph node metastasis present (CMS/[facility])  C77.9 196.9   6. Lymphedema  I89.0 457.1       SUBJECTIVE     Subjective:  She has more pain today and had to take her pain meds.  [place]  She feels very tight today under the L arm and in the L shoulder. She has not been eating a lot.     Pain before treatment 3/10 upper chest and L shoulder with pain meds           OBJECTIVE     Objective   Lymphedema     Row Name 21 1510             Subjective Pain    Able to rate subjective pain?  yes  -AL      Pre-Treatment Pain Level  3  -AL         Lymphedema Measurements    Measurement Type(s)  Circumferential  -AL      Circumferential Areas  Upper extremities  -AL         BUE Circumferential (cm)    Measurement Location 1  MCP's  -AL      Left 1  16.5 cm  -AL      Right 1  17 cm  -AL      Measurement Location 2  Web space  -AL      Left 2  17.1 cm  -AL      Right 2  17.1 cm  -AL      Measurement Location 3  Wrist  -AL      Left 3  14 cm  -AL      Right 3  13.9 cm  -AL      Measurement Location 4  10 cm  -AL      Left 4  16.4 cm  -AL      Right 4  16.7 cm  -AL      Measurement Location 5  10cm  -AL      Left 5  24.9 cm  -AL      Right 5  22.8 cm  -AL      Measurement Location 6  10 cm  -AL      Left 6  28.6 cm  -AL      Right 6  28.5 cm  -AL      Measurement Location 7  10 cm  -AL       Left 7  32 cm  -AL      Right 7  28.7 cm  -AL      LUE Circumferential Total  149.5 cm  -AL      RUE Circumferential Total  144.7 cm  -AL         Manual Lymphatic Drainage    Manual Lymphatic Drainage  initial sequence;extremity treatment;opened anastamoses  -AL      Initial Sequence  short neck;abdomen;diaphragmatic breathing  -AL      Abdomen  superficial  -AL      Diaphragmatic Breathing  x 9 with superficial abdminals  -AL      Opened Regional Lymph Nodes  inguinal  -AL      Inguinal  right;left  -AL      Opened Anastamoses  axillo-inguinal  -AL      Axillo-Inguinal  right;left  -AL      Extremity Treatment  MLD to full limb  -AL      MLD to Full Limb  B UE's  -AL      Manual Lymphatic Drainage Comments  Stretched both shoulders into flexion, abduction, and ER.  -AL      Manual Therapy  45  -AL        User Key  (r) = Recorded By, (t) = Taken By, (c) = Cosigned By    Initials Name Provider Type    Jaycee Farris PTA, CLT-LANA Physical Therapy Assistant           Therapy Education/Self Care [id number]   Details: Work on stretching both shoulders   Globitel Code: N/A   Given HEP   Progress: Reinforced and new.    Who provided to: Patent   Level of understanding Verbalized    Timed Minutes      Goals  Progress Note Due:  7/10/2021   STG by: 11/14/20 Comments Status   Patient will begin a home exercise program to accelerate the recovery process   Met   Patient will have an increase in bilateral shoulder flexion to 160 deg in supine More limited today and weaker due to increase swelling in both arms.  Ongoing   Patient will have a 25% reduction in chest and shoulder pain  Met   Patient will gain an understanding of Lymphedema and risk factors   Met   LTG by:       Patient will be able to reach up into an overhead cabinet to retrieve a 5 lb dish safely   Met    Patient will regain ROM to reach behind her back as if she was undoing a bra  Met    Patient will have a 75 - 90% reduction in tightness/pain across chest  and shoulders  Ongoing   Patient will be able to perform household chores without exacerbation of symptoms Has trouble with shoulder abduction, tight today. Ongoing, progressing, partially met   Patient will regain functional ROM of both shoulders to perform activities overhead, reaching out to retrieve food from a drive-thru, reaching behind her back to fasten/unfasten bras,clothing    Met   Patient will be instructed in a long term exercise program to continue to make gains following discharge   Patient has been working on her HEP more. She will work on more active shoulder flexion in sitting at home.  Ongoing progressing              Total Timed Treatment:     45   mins  Total Time of Visit:            45  mins         ASSESSMENT/PLAN     Assessment/Plan:       ASSESSMENT:  Patient has had a reduction in the R UE, very slight increase in size in the L UE.  The rash is now on the L upper arm and the R pec and shoulder areas.  We discussed how the swelling may increase with the rash progressing, she may check into the compression garments.  She is very tight in the L shoulder today, most limited with abduction.  She will see if her family can help with stretching the arms.      PLAN: Will continue  working on increasing ROM both shoulders, as well as decreasing tightness in upper chest. Will continue to see patient 1 x a week x 4 weeks.     Jaycee Mohan PTA, ÁNGELA-EVANGELINA  Physical Therapy Assistant

## 2021-07-05 NOTE — PROGRESS NOTES
MEDICAL ONCOLOGY PROGRESS NOTE      Helena Dodson   1965 7/9/2021     Chief Complaint   Patient presents with    Follow-up   Reason for MD visit-disease/treatment assessment    Interval history/history of present illness:  Diagnosis   IDC left breast, March 2016   Grade 2. ER 1%, PA 6%, HER-2/oseas IHC 3+/FISH amplified ratio 6.7, AR 95%. 01/30/2017- Biopsy-proven in breast relapse/Inflammatory breast cancer. ER negative, PA negative, HER-2/oseas IHC 3+/FISH amplified ratio 4.5, Ki-67 32%. MyRisk - Revealed no clinically significant mutation. PTEN VUS.  5/7/21 Invasive breast cancer involving dermal lymphatics  Treatment summary  Neoadjuvant adjuvant chemotherapy TCH-P ×6 cycles. She declined to complete 1 year of Herceptin. She declined radiation therapy. 10/3/2016-left lumpectomy/sentinel lymph node   01/30/2017- Biopsy-proven in breast relapse/Inflammatory breast cancer. 5/11/2017-Chemotherapy with paclitaxel/carboplatin weekly. Herceptin/Pertuzumab every 3 weeks started 05/11/2017. She had a allergic reaction to carboplatin and this was discontinued. 7/21/2017- 9/1/2017 Neuropathy grade 2 with Taxol. Therefore, switched to Taxotere/Herceptin/Pertuzumab. Discontinued due to progression of disease. 9/22/2017 through 3/2/2018[facility] Meet every 3 weeks. (stopped as per patient request, neuropathy grade 1)   7/10/2018 through 11/30/2018- Kadcyla.    1/4/2019-Navelbine x 1 cycle with very poor tolerance (pain)   1/25/2018-Gemzar/Herceptin   3/15/2019- low-dose Cisplatin added to Gemzar and Herceptin regimen, regimen changed to every other week   4/17/2019- palliative radiation to left clavicle/chest wall for anticipate total dosing of 5040 cGy   6/22/2019- Xeloda/apatinib  January 2020-palliative radiation to the right breast.  1/10/2020- palliative treatment with Fam-Trastuzumab- stopped Feb due to severe toxicity  September 2020- palliative radiation left chest wall skin metastasis. 8/21/2020-Herceptin, Xeloda and Tucatinib  5/28/21- Stop Tucatinib and Xeloda due to progression in skin  Declined palliative radiation  Tumor target  Skin chest wall left upper chest/left breast   Skin on back and both sides    Interval history  Gypsy Sauceda is a pleasant 54years old female who has been diagnosed with recurrent HER-2 positive/ER 1% and NM 6% invasive ductal carcinoma of the breast.  Her pattern of recurrence is diffuse skin involvement of the upper chest bilaterally and back on the left. She received several lines of chemotherapy in the past.  She has had very poor tolerance to chemotherapy overall. She was last treated with radiation to the skin lesions and subsequently received treatment with tucatinib, Xeloda and Herceptin up to 5/28/2021 due to disease progression. She was referred again for consideration of palliative radiation that worked very well for her in the past.  However, Gypsy Sauceda has declined further radiation at this time and would like to try some holistic approach. She is not interested in resuming chemotherapy at this time. Cancer history- Left breast recurrence with Inflammatory Breast Cancer ER 8%/NM negative &HER-2 positive. Ms Nigel Velasquez was seen in initial oncology consultation on 4/20/2017 referred for a diagnosis of a left breast recurrence with inflammatory breast cancer. She was initially treated by Dr. Win Estrella at [facility]. Prior breast cancer history is as follows:  3/22/2016-a diagnostic mammogram for a palpable mass showed a left breast abnormal findings. 4/1/2017- ultrasound-guided core needle biopsy was consistent with invasive ductal carcinoma, grade 2. ER 1%, NM 6%, HER-2/oseas IHC 3+/FISH amplified ratio 6.7., AR 95%. She underwent 6 cycles of TCH-P from April 2016 through September 2016 with G-CSF support. She had a total of 18 weeks of trastuzumab treatment.    10/3/2016-she underwent a left lumpectomy with left sentinel lymph node revealing an invasive ductal carcinoma, grade 3. Margins clear of invasive carcinoma (1.1 cm from the lateral margin), extensive lymphovascular space invasion. 2 out of 3 sentinel lymph nodes positive for metastatic carcinoma. Final pathologic staging [id number])M0. She declined adjuvant radiation therapy and adjuvant Herceptin completion.   -------------------In-breast Recurrence [id number]---------------------------  1/30/2017- she was seen by Dr. Alize Quintero with new complaints of erythema in the left breast. A skin punch biopsy was consistent with metastatic ductal carcinoma with extensive dermal lymphovascular invasion. ER negative, W[place] negative, HER-2/oseas IHC 3+/FISH amplified ratio 4.5, Ki-67 32%. Foundation one showed ERBB2 amplification, AURKA amplification, TP53 and .   2/15/2017- she was again seen by Dr. Aquiles Mckenna and explained about her tumor recurrence. Unfortunately, she declined any further intervention at that time. 4/21/2017- she asked Dr. Aquiles Mckenna for a referral to us.   4/28/2017-referred to me for further evaluation. I recommended a PET scan and neoadjuvant chemotherapy with carboplatin/paclitaxel and Perjeta and Herceptin. 5/4/2017-PET/CT scan revealed abnormal metabolic activity present in multiple regions throughout the left breast and in the skin( SUV 8.9, 7.4, 7.3). In the left supraclavicular region a cluster of lymph nodes present (SUV of 3). Axillary lymph nodes with SUV of 2.8. Tonsils bilaterally with SUV 5.6 (symmetric). No abnormal metabolic activity in the intrathoracic or intra-abdominal pelvic region. No abnormal skeletal metabolic activity. I recommended weekly carboplatin/weekly Taxol/Herceptin and Pertuzumab.   5/26/2017-she had a severe allergic reaction after 2 doses of carboplatin and therefore this was discontinued.  She was continued only on Taxol weekly/Herceptin and Pertuzumab.   7/21/2017-after cycle #2 and 8 doses of Taxol she developed neuropathy related to chemotherapy and therefore chemotherapy was switched from Taxol to Taxotere. Genetic assessment- [facility] -25 revealed no clinically significant mutation. PTEN VUS.   9/1/2017-local in breast disease progression with increasing breast erythema. Treatment switched to Kadcyla. 12/27/2017- repeat re-staging CT scan of the abdomen and pelvis documented no evidence of intra-abdominal pelvic metastasis. Superior left renal cyst. Mild hepatic steatosis. CT scan of the chest documented no evidence of intrathoracic metastasis. Bone scan documented no evidence of bony metastatic disease. 3/2/2018-after 8 cycles of Kadcyla she requested treatment to be on hold. She has requested her PCP a referral to [address] [place] [address] for a second opinion. She has neuropathy grade 1.   5/2/2018- 2 months off treatment (Kadcyla). She has not yet been seen at [address] [place] [address]. Apparently, [facility] is gathering her records and will see her soon. Examination of her chest wall showed recurrent disease in the left upper chest. Residual neuropathy grade 1. She could not tolerate gabapentin, and therefore has stopped it. She was not interested in any other medication for neuropathy at this time. 7/10/2018-after discussion at [facility] she was recommended to reinitiate Kadcyla. 7/13/2018-CT of chest, abdomen, pelvis showed no evidence of intra-abdominal disease. There is an enlarged right-sided level 1 lymph node that was not appreciated previously, measuring 2.5 x 1.4 cm, metastatic disease considered. 7/16/2018-PET CT scan showed diffuse uptake throughout the left breast extending to the left chest wall. Abnormal uptake within the left axilla lymph nodes and left internal mammary lymph nodes concerning for metastatic disease. Enlarged hypermetabolic right axilla lymph node concerning for metastatic disease with SUV 8.0. Abnormal uptake within a right cervical lymph node, etiology unclear, but could be reactive.    10/26/2018 - CT scan of the chest was completed at [facility] and compared to CT scan of 12/27/2017 versus her last CT scan completed at [facility] on 7/13/2018. The CT scan identified a right axillary lymph node measuring 1.2 cm, otherwise no intrathoracic neoplastic process/metastatic disease. 10/26/2018 -CT scan of the abdomen and pelvis was completed at [facility] and compared to CT scan of 12/27/2017 versus her last CT scan completed at [facility] on 7/13/2018. No evidence of intra-abdominal or pelvic metastasis was identified. December 2018-interval worsening of skin metastasis in the left breast and diffuse erythema with new nodules. 12/17/2018-skin biopsy of overlying right breast consistent invasive ductal carcinoma, high-grade. ER 20%, ID 0%, HER-2/oseas 3+.  12/17/2018-right axillary FNA biopsy consistent invasive ductal carcinoma. 12/26/2018-recommended clinical trial at [facility]. 1/4/2019-started on Navelbine/Herceptin, due to delays on the clinical trial at [facility]. Unfortunately, she had severe chest wall pain and generalized pain related to Navelbine. 1/25/2019-4/19/2019 she was switched to cisplatin/Gemzar/Herceptin, but had disease progression. 5/30/2019-CT chest, abdomen, pelvis and bone scan was unremarkable for right axillary adenopathy measuring 2 cm. Lymph node located in the upper chest wall under the pectoralis muscle measuring 1.2 cm. Right breast with 2 areas with masslike enhancement. No evidence of metastatic disease in the abdomen pelvis. Bone scan was unremarkable for metastatic bone disease. June 2019-very poor tolerance to cisplatin, Gemzar/Herceptin. She was recommended Xeloda/ Lapatinib.   6/22/2019-she was recommended and started on Xeloda 850 mg/m2 PO BID days 1 through 14 every 21 days and Lapatinib 1250 mg daily continuously.   November 2019- progression on prior Xeloda and lapatinib.  11/23/2019- recommended rechallenge with Herceptin and Navelbine vs clinical trial at [facility] [address]. 12/23/2019-CT chest abdomen pelvis showed metastatic disease in the right axillary lymph node and diffuse bilateral skin involvement of both breasts with several nodules. No evidence of pulmonary, liver or bone metastasis. CT of the head with contrast was unremarkable. 1/3/2020- the patient was unable to follow-up at [facility] for clinical trial.  Therefore recommended Fam-trastuzumab deruxtecan every 3 weeks. 1/10/2020-she was started on palliative treatment with Fam-trastuzumab.  6/5/2020- Ct Chest/Abdomen/Pelvis W Contrast No lymphadenopathy. A previously seen enlarged right axillary lymph node is now small in size. There is some stranding of the fat around the lymph node. Bilateral breast skin thickening left greater than right. Prior lumpectomy on the left with left axillary lymph node sampling. Previously seen nodularity in the right breast on CT is not visualized today. There may be minimal radiation change in the periphery of the left lung. There is no dense consolidation or effusion. No definite metastatic lung nodules. No evidence of abdominal or pelvic neoplastic process or metastatic disease. The previously seen right breast nodule is not visualized in this study. Postsurgical changes of the left breast. No significant change in the previous study. Lobulated skin thickening of the left lower lateral chest and upper abdominal wall which was not noted in the previous study. The etiology is not certain. This data be clinically correlated and further evaluated. 8/7/2020- the patient is currently awaiting for palliative radiation to the chest wall for the skin margins.   She has agreed and want to start on Herceptin, Xeloda and Tucatinib.  8/21/2020 Tumor Marker- CA 15.3- 32.19  10/9/2020-continue Herceptin and tucatinib.  12/11/2020-she has resumed Xeloda on low-dose 500 mg p.o. twice daily 7 days on 7 days off, tucatinib and Herceptin  2/19/21 tumor markers: CA 27-29 11.8 CA 15-3 13.6 CEA 1.9  2/26/21 Ct Chest W Contrast No evidence of intrathoracic metastasis. Scarring suspected within the left upper and right middle lobes. 3. Diffuse left greater than right breast skin thickening with prominence of the trabecular breast tissue. Postoperative changes of the left breast and axilla. No pathologic axillary or intrathoracic lymphadenopathy localized. 2/26/21 Ct Abdomen Pelvis W Iv Contrast  No evidence of intra-abdominal pelvic metastasis. Hepatic steatosis. Superior left renal cyst.  Constipation. 2/26/21 2D echo: Mitral valve leaflets are mildly thickened with preserved leaflet mobility. Mildly thickened aortic valve leaflets with preserved leaflet mobility. Tricuspid valve is structurally normal. Mild tricuspid regurgitation with estimated RVSP of 16 mm Hg. Normal left ventricular size with preserved LV function and an estimated ejection fraction of approximately 55-60%. Normal left ventricular wall thickness. No regional wall motion abnormalities. 5/7/21 Skin lesion, right flank, punch biopsy: Invasive breast cancer involving dermal lymphatics  5/28/21 Tumor markers: CA 27-29= 21.2 CEA= 1.5 CA 15-3 =  7/9/2021-she was referred to radiation for consideration of palliative radiation to the skin lesions. However, she declined the referral and wants to try some holistic approach. She is not interested in cytotoxic therapy at this time.     Past medical history:  Past Medical History:   Diagnosis Date    Cancer [facility])     breast    GERD (gastroesophageal reflux disease)       Past surgical history:  Past Surgical History:   Procedure Laterality Date    BREAST LUMPECTOMY Left 10/4/2016    LUMPECTOMY WITH SENTINAL NODE BIOPSY AND INTRA OP US GUIDED NEEDLE LOCALIZATION performed by Reza Sauceda MD at [address] [place] [address]      biopsy left breast    COLONOSCOPY  2008    INSERTION / REMOVAL / REPLACEMENT VENOUS ACCESS CATHETER N/A 5/12/2016    SINGLE LUMEN PORT INSERTION performed by Igor Bui MD at [facility] DIAGNOSTIC BONE MARROW BIOPSIES Right 11/14/2018    BONE MARROW ASPIRATION BIOPSY performed by Patti Segal MD at [place] [address] history:  Social History     Socioeconomic History    Marital status:      Spouse name: Not on file    Number of children: Not on file    Years of education: Not on file    Highest education level: Not on file   Occupational History    Not on file   Tobacco Use    Smoking status: Never Smoker    Smokeless tobacco: Never Used   Vaping Use    Vaping Use: Never used   Substance and Sexual Activity    Alcohol use: No     Alcohol/week: 0.0 standard drinks    Drug use: No    Sexual activity: Not on file   Other Topics Concern    Not on file   Social History Narrative    Not on file     Social Determinants of Health     Financial Resource Strain:     Difficulty of Paying Living Expenses:    Food Insecurity:     Worried About Running Out of Food in the Last Year:     Callie of Food in the Last Year:    Transportation Needs:     Lack of Transportation (Medical):      Lack of Transportation (Non-Medical):    Physical Activity:     Days of Exercise per Week:     Minutes of Exercise per Session:    Stress:     Feeling of Stress :    Social Connections:     Frequency of Communication with Friends and Family:     Frequency of Social Gatherings with Friends and Family:     Attends Shinto Services:     Active Member of Clubs or Organizations:     Attends Club or Organization Meetings:     Marital Status:    Intimate Partner Violence:     Fear of Current or Ex-Partner:     Emotionally Abused:     Physically Abused:     Sexually Abused:       Family history:   Family History   Problem Relation Age of Onset    Lung Cancer Father     Colon Cancer Father     Prostate Cancer Father     Hypertension Mother         Current Outpatient Medications   Medication Sig Dispense Refill    oxyCODONE-acetaminophen and tingling of feet, no paresis;     PHYSICAL EXAM:    Pulse 80   Ht 5' 3\" (1.6 m)   Wt 138 lb (62.6 kg)   LMP 02/29/2016 (Approximate)   SpO2 94%   BMI 24.45 kg/m²     Pain scale:5    CONSTITUTIONAL: Alert, appropriate, no acute distress,   EYES: Non icteric, EOM intact, pupils equal round and reactive to light and accommodation. ENT: Oral mucus membranes moist, no oral pharyngeal lesions. External inspection of ears and nose are normal.   NECK: Supple, no masses. No palpable thyroid mass    CHEST/LUNGS: CTA bilaterally, normal respiratory effort  Chaperoned upper body exam with Soraya Marie RN -mild erythematous rash in the upper chest.  CARDIOVASCULAR: RRR, no murmurs. No lower extremity edema   ABDOMEN: soft non-tender, active bowel sounds, no hepatosplenomegaly. No palpable masses. EXTREMITIES: warm, Full ROM of all fours extremities. No focal weakness. SKIN: Significant interval worsening of erythematous involvement of the skin in the back and also abdomen. This is consistent with metastatic HER-2 positive breast cancer. LYMPH: No cervical, clavicular, axillary, or inguinal lymphadenopathy  NEUROLOGIC: follows commands, non focal.     Relevant Lab findings reviewed/analyzed by me:   5/28/21   CA 27-29 21.2  CEA 1.5      Lab Results   Component Value Date    WBC 3.31 (L) 07/09/2021    HGB 10.9 (L) 07/09/2021    HCT 34.7 07/09/2021    MCV 91.1 07/09/2021     (L) 07/09/2021     Lab Results   Component Value Date    NEUTROABS 2.06 07/09/2021     Relevant Imaging studies reviewed/analyzed by me:   none     ASSESSMENT:    No orders of the defined types were placed in this encounter. Hanna Sadler was seen today for follow-up.     Diagnoses and all orders for this visit:    Malignant neoplasm of upper-outer quadrant of left female breast, unspecified estrogen receptor status ([address]    Carcinomatous metastasis in skin ([address]    HER2-positive carcinoma of breast [facility])    Care plan discussed with patient    Lymphopenia    Antineoplastic chemotherapy induced anemia  Comments:  Improving. Continue monitoring         Metastatic HER-2 oseas breast cancer   She received 2 cycles of Fam-trastuzumab deruxtecan 5.4 mg/kg every 3 weeks She was going to receive third cycle with 20% dose reduction Fam-trastuzumab deruxtecan 4.3 mg/kg but the patient opted to discontinue chemotherapy. Due to increased toxicity and poor quality of life the patient request to stop treatment at this time. CT chest abdomen pelvis showed no evidence of intrathoracic or intra-abdominal metastatic disease  Status post completion of palliative radiation to the skin. August 2020-May 2021-Herceptin, Xeloda, tucatinib, progressive disease  February 2021-reviewed the scans and a 2D echo. No evidence of systemic metastatic disease. 2D echo normal stable EF. May 2021-progressive disease with extension of skin involvement to the back. Skin biopsy showed involvement by breast cancer      7/9/2021-Jason has refused referral to radiation at this time. She is not interested in cytotoxic chemotherapy. She tells me today that she wants to try a holistic approach at this time. She will be back in 3-month or sooner if needed. Skin metastasis -she has had skin metastasis in the past and is status post palliative RT with significant improvement. Now with biopsy-proven recurrence. She was referred to radiation but declined. 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 She denies any significant change from previous evaluation. Lymphopenia-ALC some renal calcification 0.77     Plan:  1. RTC with MD 3 months or sooner if needed  2. Consider repeating CT scans when she returns  3. Consider Margetuximab in the future    Follow Up:     Return in about 3 months (around 10/9/2021) for CBC, Appointment with Dr. Omid Zaidi.    Data Mario Tavares am scribing for Levy Murrieta MD. Electronically signed by Jg Moreau RN on 7/11/2021 at 3:31 PM CDT. I, Dr Yeimy Coffey, personally performed the services described in this documentation as scribed by Jg Moreau RN in my presence and is both accurate and complete. I have seen, examined and reviewed this patient medication list, appropriate labs and imaging studies. I reviewed relevant medical records and others physicians notes. I discussed the plans of care with the patient. I answered all the questions to the patients satisfaction. I have also reviewed the chief complaint (CC) and part of the history (History of Present Illness (HPI), Past Family Social History [facility]), or Review of Systems (ROS) and made changes when appropriated. (Please note that portions of this note were completed with a voice recognition program. Efforts were made to edit the dictations but occasionally words are mis-transcribed.)    The total time (25-minute) I spent to see the patient today includes at least one or more of the following: preparing to see the patient by reviewing prior tests, prior notes or other relevant information, performing appropriate independent examination and evaluation, counseling, ordering of medications, tests or procedures, communicating with other healthcare professionals when appropriated to coordinate care, documenting clinic information in the electronic medical record or other health records, independently interpreting results of tests, managing test results and communicating the results to the patient/family or caregiver.

## 2021-07-07 DIAGNOSIS — C50.412 MALIGNANT NEOPLASM OF UPPER-OUTER QUADRANT OF LEFT FEMALE BREAST, UNSPECIFIED ESTROGEN RECEPTOR STATUS (HCC): ICD-10-CM

## 2021-07-07 DIAGNOSIS — G89.3 CANCER ASSOCIATED PAIN: ICD-10-CM

## 2021-07-07 RX ORDER — OXYCODONE AND ACETAMINOPHEN 7.5; 325 MG/1; MG/1
1 TABLET ORAL EVERY 6 HOURS PRN
Qty: 120 TABLET | Refills: 0 | Status: SHIPPED | OUTPATIENT
Start: 2021-07-07 | End: 2021-08-06

## 2021-07-08 NOTE — PROGRESS NOTES
"PhysicalTherapy Treatment Note    Patient:           Ayla Echeverria            : 1965  Today's Date: 2021  Referring practitioner: Gloria Hendricks*  Date of Initial Visit:       Type: THERAPY  Noted: 10/15/2020  Patient seen for 40 sessions    Visit Diagnoses:    ICD-10-CM ICD-9-CM   1. Decreased ROM of left shoulder  M25.612 719.51   2. At risk for lymphedema  Z91.89 V49.89   3. Malignant neoplasm involving both nipple and areola of left breast in female, estrogen receptor positive (CMS/HCC)  C50.012 174.0    Z17.0 V86.0   4. Decreased ROM of right shoulder  M25.611 719.51   5. Regional lymph node metastasis present (CMS/[facility])  C77.9 196.9   6. Lymphedema  I89.0 457.1       SUBJECTIVE     Subjective:  Patient states she is having more pain today and her shoulders are tight.  The rash on the R upper chest/pec area is still present as well as the rash on the L arm.  She will see Dr. Ott tomorrow to discuss treatment options, she states \"I'm just tired of hurting\".    Pain before treatment 4/10 upper chest and L shoulder with pain meds           OBJECTIVE     Objective   Lymphedema     Row Name 21 1550             Subjective Pain    Able to rate subjective pain?  yes  -AL      Pre-Treatment Pain Level  4  -AL         Lymphedema Measurements    Measurement Type(s)  Circumferential  -AL      Circumferential Areas  Upper extremities  -AL         BUE Circumferential (cm)    Measurement Location 1  MCP's  -AL      Left 1  16.5 cm  -AL      Right 1  16.9 cm  -AL      Measurement Location 2  Web space  -AL      Left 2  17 cm  -AL      Right 2  16.8 cm  -AL      Measurement Location 3  Wrist  -AL      Left 3  14.4 cm  -AL      Right 3  14.2 cm  -AL      Measurement Location 4  10 cm  -AL      Left 4  16.5 cm  -AL      Right 4  17.5 cm  -AL      Measurement Location 5  10cm  -AL      Left 5  25.9 cm  -AL      Right 5  23.9 cm  -AL      Measurement Location 6  10 cm  -AL      Left 6  29.4 cm  -AL "      Right 6  28.5 cm  -AL      Measurement Location 7  10 cm  -AL      Left 7  32.4 cm  -AL      Right 7  29.4 cm  -AL      LUE Circumferential Total  152.1 cm  -AL      RUE Circumferential Total  147.2 cm  -AL         Manual Lymphatic Drainage    Manual Lymphatic Drainage  initial sequence;extremity treatment;opened anastamoses  -AL      Initial Sequence  short neck;abdomen;diaphragmatic breathing  -AL      Abdomen  superficial  -AL      Diaphragmatic Breathing  x 9 with superficial abdminals  -AL      Opened Regional Lymph Nodes  inguinal  -AL      Inguinal  right;left  -AL      Opened Anastamoses  axillo-inguinal  -AL      Axillo-Inguinal  right;left  -AL      Extremity Treatment  MLD to full limb  -AL      MLD to Full Limb  B UE's  -AL        User Key  (r) = Recorded By, (t) = Taken By, (c) = Cosigned By    Initials Name Provider Type    Jaycee Farris PTA, CLT-LANA Physical Therapy Assistant           Therapy Education/Self Care 29550   Details: Work on stretching both shoulders and self MLD.  Check into compression garments for B UE.   BiocroÃƒÂ­ Code: N/A   Given HEP   Progress: Reinforced and new.    Who provided to: Patent   Level of understanding Verbalized    Timed Minutes      Goals  Progress Note Due:  7/10/2021   STG by: 11/14/20 Comments Status   Patient will begin a home exercise program to accelerate the recovery process   Met   Patient will have an increase in bilateral shoulder flexion to 160 deg in supine R shoulder supine: 154  L shoulder supine:  127 Ongoing   Patient will have a 25% reduction in chest and shoulder pain  Met   Patient will gain an understanding of Lymphedema and risk factors   Met   LTG by:       Patient will be able to reach up into an overhead cabinet to retrieve a 5 lb dish safely   Met    Patient will regain ROM to reach behind her back as if she was undoing a bra  Met    Patient will have a 75 - 90% reduction in tightness/pain across chest and shoulders  Ongoing    Patient will be able to perform household chores without exacerbation of symptoms Has tightness with B shoulder abduction and L shoulder flexion making chores more difficult. Ongoing, progressing, partially met   Patient will regain functional ROM of both shoulders to perform activities overhead, reaching out to retrieve food from a drive-thru, reaching behind her back to fasten/unfasten bras,clothing    Met   Patient will be instructed in a long term exercise program to continue to make gains following discharge   Patient has been working on her HEP more. She will work on more stretching of B shoulders as well as working on self MLD.  Ongoing progressing              Total Timed Treatment:     45   mins  Total Time of Visit:            45  mins         ASSESSMENT/PLAN     Assessment/Plan:       ASSESSMENT:  Patient has had an increase in size in both UE's as compared to last treatment, she now has dense tissue present L posterior upper arm.  She will work on the self MLD, she does not have the compression garments for her arms yet.  Functionally she is having more pain and fatigue as well as tightness in both shoulders.  In the past she did not want to continue with cancer treatments, but she is going to see Dr. Ott tomorrow about treatment options for her cancer.  The rashes have progressed since her last visit.     PLAN: Will continue  working on increasing ROM both shoulders, as well as decreasing tightness in upper chest. Will continue to see patient 1 x a week x 4 weeks.     Jaycee Mohan PTA, JUDSON-EVANGELINA  Physical Therapy Assistant

## 2021-07-09 ENCOUNTER — HOSPITAL ENCOUNTER (OUTPATIENT)
Dept: INFUSION THERAPY | Age: 56
Discharge: HOME | End: 2021-07-09
Payer: COMMERCIAL

## 2021-07-09 ENCOUNTER — OFFICE VISIT (OUTPATIENT)
Dept: HEMATOLOGY | Age: 56
End: 2021-07-09
Payer: COMMERCIAL

## 2021-07-09 VITALS — BODY MASS INDEX: 24.45 KG/M2 | HEART RATE: 80 BPM | WEIGHT: 138 LBS | OXYGEN SATURATION: 94 % | HEIGHT: 63 IN

## 2021-07-09 DIAGNOSIS — T45.1X5A ANTINEOPLASTIC CHEMOTHERAPY INDUCED ANEMIA: ICD-10-CM

## 2021-07-09 DIAGNOSIS — D64.81 ANTINEOPLASTIC CHEMOTHERAPY INDUCED ANEMIA: ICD-10-CM

## 2021-07-09 DIAGNOSIS — Z17.1 MALIGNANT NEOPLASM OF UPPER-OUTER QUADRANT OF LEFT BREAST IN FEMALE, ESTROGEN RECEPTOR NEGATIVE (HCC): ICD-10-CM

## 2021-07-09 DIAGNOSIS — D72.810 LYMPHOPENIA: ICD-10-CM

## 2021-07-09 DIAGNOSIS — C79.2 CARCINOMATOUS METASTASIS IN SKIN (HCC): ICD-10-CM

## 2021-07-09 DIAGNOSIS — Z71.89 CARE PLAN DISCUSSED WITH PATIENT: ICD-10-CM

## 2021-07-09 DIAGNOSIS — C50.919 HER2-POSITIVE CARCINOMA OF BREAST (HCC): ICD-10-CM

## 2021-07-09 DIAGNOSIS — C50.412 MALIGNANT NEOPLASM OF UPPER-OUTER QUADRANT OF LEFT FEMALE BREAST, UNSPECIFIED ESTROGEN RECEPTOR STATUS (HCC): Primary | ICD-10-CM

## 2021-07-09 DIAGNOSIS — C50.412 MALIGNANT NEOPLASM OF UPPER-OUTER QUADRANT OF LEFT BREAST IN FEMALE, ESTROGEN RECEPTOR NEGATIVE (HCC): ICD-10-CM

## 2021-07-09 LAB
BASOPHILS ABSOLUTE: 0.01 K/UL (ref 0.01–0.08)
BASOPHILS RELATIVE PERCENT: 0.3 % (ref 0.1–1.2)
EOSINOPHILS ABSOLUTE: 0.04 K/UL (ref 0.04–0.54)
EOSINOPHILS RELATIVE PERCENT: 1.2 % (ref 0.7–7)
HCT VFR BLD CALC: 34.7 % (ref 34.1–44.9)
HEMOGLOBIN: 10.9 G/DL (ref 11.2–15.7)
LYMPHOCYTES ABSOLUTE: 0.77 K/UL (ref 1.18–3.74)
LYMPHOCYTES RELATIVE PERCENT: 23.3 % (ref 19.3–53.1)
MCH RBC QN AUTO: 28.6 PG (ref 25.6–32.2)
MCHC RBC AUTO-ENTMCNC: 31.4 G/DL (ref 32.3–35.5)
MCV RBC AUTO: 91.1 FL (ref 79.4–94.8)
MONOCYTES ABSOLUTE: 0.43 K/UL (ref 0.24–0.82)
MONOCYTES RELATIVE PERCENT: 13 % (ref 4.7–12.5)
NEUTROPHILS ABSOLUTE: 2.06 K/UL (ref 1.56–6.13)
NEUTROPHILS RELATIVE PERCENT: 62.2 % (ref 34–71.1)
PDW BLD-RTO: 16 % (ref 11.7–14.4)
PLATELET # BLD: 169 K/UL (ref 182–369)
PMV BLD AUTO: 11 FL (ref 7.4–10.4)
RBC # BLD: 3.81 M/UL (ref 3.93–5.22)
WBC # BLD: 3.31 K/UL (ref 3.98–10.04)

## 2021-07-09 PROCEDURE — 85025 COMPLETE CBC W/AUTO DIFF WBC: CPT

## 2021-07-09 PROCEDURE — G0463 HOSPITAL OUTPT CLINIC VISIT: HCPCS

## 2021-07-09 PROCEDURE — 36415 COLL VENOUS BLD VENIPUNCTURE: CPT

## 2021-07-09 PROCEDURE — 99213 OFFICE O/P EST LOW 20 MIN: CPT | Performed by: INTERNAL MEDICINE

## 2021-07-09 PROCEDURE — 99211 OFF/OP EST MAY X REQ PHY/QHP: CPT

## 2021-07-09 NOTE — PROGRESS NOTES
Progress Note Addendum      Patient: Ayla Echeverria           : 1965  Visit Date: 2021  Referring practitioner: Gloria Hendricks*  Date of Initial Visit: Type: THERAPY  Noted: 10/15/2020  Patient seen for 40 sessions  Visit Diagnoses:    ICD-10-CM ICD-9-CM   1. Decreased ROM of left shoulder  M25.612 719.51   2. At risk for lymphedema  Z91.89 V49.89   3. Malignant neoplasm involving both nipple and areola of left breast in female, estrogen receptor positive (CMS/[facility])  C50.012 174.0    Z17.0 V86.0   4. Decreased ROM of right shoulder  M25.611 719.51   5. Regional lymph node metastasis present (CMS/[facility])  C77.9 196.9   6. Lymphedema  I89.0 457.1          Clinical Progress: worse  Home Program Compliance: Yes  Treatment has included: therapeutic exercise, manual therapy and therapeutic activity  Progress toward previous goals: Partially Met  Prognosis to achieve goals: fair    Subjective     Objective     Assessment & Plan     Assessment  Impairments: abnormal or restricted ROM, activity intolerance, impaired physical strength, lacks appropriate home exercise program and pain with function  Prognosis: fair  Functional Limitations: uncomfortable because of pain, reaching overhead and unable to perform repetitive tasks  Plan  Therapy options: will be seen for skilled physical therapy services  Planned modality interventions: dry needling, low level laser therapy and TENS  Planned therapy interventions: manual therapy, strengthening, stretching, therapeutic activities, functional ROM exercises, ADL retraining, joint mobilization, soft tissue mobilization, neuromuscular re-education and flexibility  Frequency: 1x week  Duration in visits: 4  Treatment plan discussed with: VAUGHN        I have reviewed the progress note information provided by Jaycee Mohan PTA, IDALIA and I concur with the findings.    Samia Newman, PT DPT  Physical Therapist

## 2021-07-15 NOTE — PROGRESS NOTES
PhysicalTherapy Treatment Note    Patient:           Ayla Echeverria            : 1965  Today's Date: 7/15/2021  Referring practitioner: Gloria Hendricks*  Date of Initial Visit:       Type: THERAPY  Noted: 10/15/2020  Patient seen for 41 sessions    Visit Diagnoses:    ICD-10-CM ICD-9-CM   1. Decreased ROM of left shoulder  M25.612 719.51   2. At risk for lymphedema  Z91.89 V49.89   3. Malignant neoplasm involving both nipple and areola of left breast in female, estrogen receptor positive (CMS/HCC)  C50.012 174.0    Z17.0 V86.0   4. Decreased ROM of right shoulder  M25.611 719.51   5. Regional lymph node metastasis present (CMS/HCC)  C77.9 196.9   6. Lymphedema  I89.0 457.1       SUBJECTIVE     Subjective:  She did see Dr. Ott and they discuss options for chemo.  Patient states because of the side effects of the chemo she does not want to peruse any treatment right now. She has stiffness in both shoulders, more so on the L.  She has been working on her stretches at home.     Pain before treatment 10 upper chest and L shoulder with pain meds, after treatment 6/10.           OBJECTIVE     Objective   Lymphedema     Row Name 07/15/21 8456             Subjective Pain    Able to rate subjective pain?  yes  -AL      Pre-Treatment Pain Level  4  -AL      Post-Treatment Pain Level  6  -AL      Subjective Pain Comment  Shoulders and upper chest  -AL         Manual Lymphatic Drainage    Manual Lymphatic Drainage  initial sequence;extremity treatment;opened anastamoses  -AL      Initial Sequence  short neck;abdomen;diaphragmatic breathing  -AL      Abdomen  superficial  -AL      Diaphragmatic Breathing  x 9 with superficial abdminals  -AL      Opened Regional Lymph Nodes  inguinal  -AL      Inguinal  right;left  -AL      Opened Anastamoses  axillo-inguinal  -AL      Axillo-Inguinal  right;left  -AL      Extremity Treatment  MLD to full limb  -AL      MLD to Full Limb  B UE's  -AL      Manual Lymphatic  Drainage Comments  Stretched both shoulders into flexion, ER and abduction.  -AL      Manual Therapy  42  -AL        User Key  (r) = Recorded By, (t) = Taken By, (c) = Cosigned By    Initials Name Provider Type    Jaycee Farris PTA, CLT-LANA Physical Therapy Assistant           Therapy Education/Self Care [id number]   Details: Work on stretching both shoulders and self MLD.  Check into compression garments for B UE.   JNS [employer] Code: N/A   Given HEP   Progress: Reinforced and new.    Who provided to: Patent   Level of understanding Verbalized    Timed Minutes      Goals  Progress Note Due:  7/10/2021   STG by: 11/14/20 Comments Status   Patient will begin a home exercise program to accelerate the recovery process   Met   Patient will have an increase in bilateral shoulder flexion to 160 deg in supine R shoulder supine: 154  L shoulder supine:  127 Ongoing   Patient will have a 25% reduction in chest and shoulder pain  Met   Patient will gain an understanding of Lymphedema and risk factors   Met   LTG by:       Patient will be able to reach up into an overhead cabinet to retrieve a 5 lb dish safely   Met    Patient will regain ROM to reach behind her back as if she was undoing a bra  Met    Patient will have a 75 - 90% reduction in tightness/pain across chest and shoulders  Ongoing   Patient will be able to perform household chores without exacerbation of symptoms Has tightness with B shoulder abduction and L shoulder flexion making chores more difficult. Ongoing, progressing, partially met   Patient will regain functional ROM of both shoulders to perform activities overhead, reaching out to retrieve food from a drive-thru, reaching behind her back to fasten/unfasten bras,clothing    Met   Patient will be instructed in a long term exercise program to continue to make gains following discharge   Patient has been working on her HEP more. She will work on more stretching of B shoulders as well as working on self MLD.   Ongoing progressing              Total Timed Treatment:     42   mins  Total Time of Visit:            42  mins         ASSESSMENT/PLAN     Assessment/Plan:       ASSESSMENT:  Spent extra time working on stretching to both shoulders, she continues to be more tight in abduction both shoulders.  Patient has decided not to take any chemo at this time.     PLAN: Will continue  working on increasing ROM both shoulders, as well as decreasing tightness in upper chest. Will continue to see patient 1 x a week x 4 weeks.     Jaycee Mohan PTA, CLT-EVANGELINA  Physical Therapy Assistant

## 2021-07-26 NOTE — PROGRESS NOTES
MEDICAL ONCOLOGY PROGRESS NOTE      Yudi Wallace   1965 7/28/2021     Chief Complaint   Patient presents with    Follow-up     Malignant neoplasm of upper-outer quadrant of left female breast, unspecified estrogen receptor status ([facility] [address]   Reason for MD visit-disease/treatment assessment    Interval history/history of present illness:  Diagnosis   IDC left breast, March 2016   Grade 2. ER 1%, MD 6%, HER-2/oseas IHC 3+/FISH amplified ratio 6.7, AR 95%. 01/30/2017- Biopsy-proven in breast relapse/Inflammatory breast cancer. ER negative, MD negative, HER-2/oseas IHC 3+/FISH amplified ratio 4.5, Ki-67 32%. MyRisk - Revealed no clinically significant mutation. PTEN VUS.  5/7/21 Invasive breast cancer involving dermal lymphatics  Treatment summary  Neoadjuvant adjuvant chemotherapy TCH-P ×6 cycles. She declined to complete 1 year of Herceptin. She declined radiation therapy. 10/3/2016-left lumpectomy/sentinel lymph node   01/30/2017- Biopsy-proven in breast relapse/Inflammatory breast cancer. 5/11/2017-Chemotherapy with paclitaxel/carboplatin weekly. Herceptin/Pertuzumab every 3 weeks started 05/11/2017. She had a allergic reaction to carboplatin and this was discontinued. 7/21/2017- 9/1/2017 Neuropathy grade 2 with Taxol. Therefore, switched to Taxotere/Herceptin/Pertuzumab. Discontinued due to progression of disease. 9/22/2017 through 3/2/2018Doyce Budd every 3 weeks. (stopped as per patient request, neuropathy grade 1)   7/10/2018 through 11/30/2018- Kadcyla.    1/4/2019-Navelbine x 1 cycle with very poor tolerance (pain)   1/25/2018-Gemzar/Herceptin   3/15/2019- low-dose Cisplatin added to Gemzar and Herceptin regimen, regimen changed to every other week   4/17/2019- palliative radiation to left clavicle/chest wall for anticipate total dosing of 5040 cGy   6/22/2019- Xeloda/apatinib  January 2020-palliative radiation to the right breast.  1/10/2020- palliative treatment with Fam-Trastuzumab- stopped Feb due to severe toxicity  September 2020- palliative radiation left chest wall skin metastasis. 8/21/2020-Herceptin, Xeloda and Tucatinib  5/28/21- Stop Tucatinib and Xeloda due to progression in skin  Declined palliative radiation  Tumor target  Skin chest wall left upper chest/left breast   Skin on back and both sides    Interval history  Taylor Hernandez is a pleasant 54years old female who has been diagnosed with recurrent HER-2 positive/ER 1% and DE 6% invasive ductal carcinoma of the breast.  Her pattern of recurrence is diffuse skin involvement of the upper chest bilaterally and back on the left. She received several lines of chemotherapy in the past.  She has had very poor tolerance to chemotherapy overall. She was last treated with radiation to the skin lesions and subsequently received treatment with tucatinib, Xeloda and Herceptin up to 5/28/2021 due to disease progression. She was referred again for consideration of palliative radiation that worked very well for her in the past.  However, Taylor Hernandez returned today with complaints of severe pain and generalized pruritus. Her skin lesions have gotten significantly worse. Cancer history- Left breast recurrence with Inflammatory Breast Cancer ER 8%/DE negative &HER-2 positive. Ms Pratt was seen in initial oncology consultation on 4/20/2017 referred for a diagnosis of a left breast recurrence with inflammatory breast cancer. She was initially treated by Dr. Jennifer Cruz at [facility]. Prior breast cancer history is as follows:  3/22/2016-a diagnostic mammogram for a palpable mass showed a left breast abnormal findings. 4/1/2017- ultrasound-guided core needle biopsy was consistent with invasive ductal carcinoma, grade 2. ER 1%, DE 6%, HER-2/oseas IHC 3+/FISH amplified ratio 6.7., AR 95%. She underwent 6 cycles of TCH-P from April 2016 through September 2016 with G-CSF support. She had a total of 18 weeks of trastuzumab treatment.    10/3/2016-she underwent a left lumpectomy with left sentinel lymph node revealing an invasive ductal carcinoma, grade 3. Margins clear of invasive carcinoma (1.1 cm from the lateral margin), extensive lymphovascular space invasion. 2 out of 3 sentinel lymph nodes positive for metastatic carcinoma. Final pathologic staging [id number])M0. She declined adjuvant radiation therapy and adjuvant Herceptin completion.   -------------------In-breast Recurrence [id number]---------------------------  1/30/2017- she was seen by Dr. Rebecca Dale with new complaints of erythema in the left breast. A skin punch biopsy was consistent with metastatic ductal carcinoma with extensive dermal lymphovascular invasion. ER negative, DC negative, HER-2/oseas IHC 3+/FISH amplified ratio 4.5, Ki-67 32%. Foundation one showed ERBB2 amplification, AURKA amplification, TP53 and .   2/15/2017- she was again seen by Dr. Evangelist Wills and explained about her tumor recurrence. Unfortunately, she declined any further intervention at that time. 4/21/2017- she asked Dr. Evangelist Wills for a referral to us.   4/28/2017-referred to me for further evaluation. I recommended a PET scan and neoadjuvant chemotherapy with carboplatin/paclitaxel and Perjeta and Herceptin. 5/4/2017-PET/CT scan revealed abnormal metabolic activity present in multiple regions throughout the left breast and in the skin( SUV 8.9, 7.4, 7.3). In the left supraclavicular region a cluster of lymph nodes present (SUV of 3). Axillary lymph nodes with SUV of 2.8. Tonsils bilaterally with SUV 5.6 (symmetric). No abnormal metabolic activity in the intrathoracic or intra-abdominal pelvic region. No abnormal skeletal metabolic activity. I recommended weekly carboplatin/weekly Taxol/Herceptin and Pertuzumab.   5/26/2017-she had a severe allergic reaction after 2 doses of carboplatin and therefore this was discontinued.  She was continued only on Taxol weekly/Herceptin and Pertuzumab.   7/21/2017-after cycle #2 and 8 doses of Taxol she developed neuropathy related to chemotherapy and therefore chemotherapy was switched from Taxol to Taxotere. Genetic assessment- [place] [id number] revealed no clinically significant mutation. PTEN VUS.   9/1/2017-local in breast disease progression with increasing breast erythema. Treatment switched to Kadcyla. 12/27/2017- repeat re-staging CT scan of the abdomen and pelvis documented no evidence of intra-abdominal pelvic metastasis. Superior left renal cyst. Mild hepatic steatosis. CT scan of the chest documented no evidence of intrathoracic metastasis. Bone scan documented no evidence of bony metastatic disease. 3/2/2018-after 8 cycles of Kadcyla she requested treatment to be on hold. She has requested her PCP a referral to [facility] for a second opinion. She has neuropathy grade 1.   5/2/2018- 2 months off treatment (Kadcyla). She has not yet been seen at [facility]. Apparently, [facility] is gathering her records and will see her soon. Examination of her chest wall showed recurrent disease in the left upper chest. Residual neuropathy grade 1. She could not tolerate gabapentin, and therefore has stopped it. She was not interested in any other medication for neuropathy at this time. 7/10/2018-after discussion at [facility] she was recommended to reinitiate Kadcyla. 7/13/2018-CT of chest, abdomen, pelvis showed no evidence of intra-abdominal disease. There is an enlarged right-sided level 1 lymph node that was not appreciated previously, measuring 2.5 x 1.4 cm, metastatic disease considered. 7/16/2018-PET CT scan showed diffuse uptake throughout the left breast extending to the left chest wall. Abnormal uptake within the left axilla lymph nodes and left internal mammary lymph nodes concerning for metastatic disease. Enlarged hypermetabolic right axilla lymph node concerning for metastatic disease with SUV 8.0.  Abnormal uptake within a right cervical lymph node, etiology unclear, but could be reactive. 10/26/2018 - CT scan of the chest was completed at [facility] and compared to CT scan of 12/27/2017 versus her last CT scan completed at [facility] on 7/13/2018. The CT scan identified a right axillary lymph node measuring 1.2 cm, otherwise no intrathoracic neoplastic process/metastatic disease. 10/26/2018 -CT scan of the abdomen and pelvis was completed at [facility] and compared to CT scan of 12/27/2017 versus her last CT scan completed at [facility] on 7/13/2018. No evidence of intra-abdominal or pelvic metastasis was identified. December 2018-interval worsening of skin metastasis in the left breast and diffuse erythema with new nodules. 12/17/2018-skin biopsy of overlying right breast consistent invasive ductal carcinoma, high-grade. ER 20%, ME 0%, HER-2/oseas 3+.  12/17/2018-right axillary FNA biopsy consistent invasive ductal carcinoma. 12/26/2018-recommended clinical trial at [facility]. 1/4/2019-started on Navelbine/Herceptin, due to delays on the clinical trial at [facility]. Unfortunately, she had severe chest wall pain and generalized pain related to Navelbine. 1/25/2019-4/19/2019 she was switched to cisplatin/Gemzar/Herceptin, but had disease progression. 5/30/2019-CT chest, abdomen, pelvis and bone scan was unremarkable for right axillary adenopathy measuring 2 cm. Lymph node located in the upper chest wall under the pectoralis muscle measuring 1.2 cm. Right breast with 2 areas with masslike enhancement. No evidence of metastatic disease in the abdomen pelvis. Bone scan was unremarkable for metastatic bone disease. June 2019-very poor tolerance to cisplatin, Gemzar/Herceptin. She was recommended Xeloda/ Lapatinib.   6/22/2019-she was recommended and started on Xeloda 850 mg/m2 PO BID days 1 through 14 every 21 days and Lapatinib 1250 mg daily continuously.   November 2019- progression on prior Xeloda and lapatinib.  11/23/2019- recommended rechallenge with Herceptin and Navelbine vs clinical trial at [facility]. 12/23/2019-CT chest abdomen pelvis showed metastatic disease in the right axillary lymph node and diffuse bilateral skin involvement of both breasts with several nodules. No evidence of pulmonary, liver or bone metastasis. CT of the head with contrast was unremarkable. 1/3/2020- the patient was unable to follow-up at [facility] for clinical trial.  Therefore recommended Fam-trastuzumab deruxtecan every 3 weeks. 1/10/2020-she was started on palliative treatment with Fam-trastuzumab.  6/5/2020- Ct Chest/Abdomen/Pelvis W Contrast No lymphadenopathy. A previously seen enlarged right axillary lymph node is now small in size. There is some stranding of the fat around the lymph node. Bilateral breast skin thickening left greater than right. Prior lumpectomy on the left with left axillary lymph node sampling. Previously seen nodularity in the right breast on CT is not visualized today. There may be minimal radiation change in the periphery of the left lung. There is no dense consolidation or effusion. No definite metastatic lung nodules. No evidence of abdominal or pelvic neoplastic process or metastatic disease. The previously seen right breast nodule is not visualized in this study. Postsurgical changes of the left breast. No significant change in the previous study. Lobulated skin thickening of the left lower lateral chest and upper abdominal wall which was not noted in the previous study. The etiology is not certain. This data be clinically correlated and further evaluated. 8/7/2020- the patient is currently awaiting for palliative radiation to the chest wall for the skin margins.   She has agreed and want to start on Herceptin, Xeloda and Tucatinib.  8/21/2020 Tumor Marker- CA 15.3- 32.19  10/9/2020-continue Herceptin and tucatinib.  12/11/2020-she has resumed Xeloda on low-dose 500 mg p.o. twice daily 7 days on 7 days off, tucatinib and Herceptin  2/19/21 tumor markers: CA 27-29 11.8 CA 15-3 13.6 CEA 1.9  2/26/21 Ct Chest W Contrast No evidence of intrathoracic metastasis. Scarring suspected within the left upper and right middle lobes. 3. Diffuse left greater than right breast skin thickening with prominence of the trabecular breast tissue. Postoperative changes of the left breast and axilla. No pathologic axillary or intrathoracic lymphadenopathy localized. 2/26/21 Ct Abdomen Pelvis W Iv Contrast  No evidence of intra-abdominal pelvic metastasis. Hepatic steatosis. Superior left renal cyst.  Constipation. 2/26/21 2D echo: Mitral valve leaflets are mildly thickened with preserved leaflet mobility. Mildly thickened aortic valve leaflets with preserved leaflet mobility. Tricuspid valve is structurally normal. Mild tricuspid regurgitation with estimated RVSP of 16 mm Hg. Normal left ventricular size with preserved LV function and an estimated ejection fraction of approximately 55-60%. Normal left ventricular wall thickness. No regional wall motion abnormalities. 5/7/21 Skin lesion, right flank, punch biopsy: Invasive breast cancer involving dermal lymphatics  5/28/21 Tumor markers: CA 27-29= 21.2 CEA= 1.5 CA 15-3 =  7/9/2021-she was referred to radiation for consideration of palliative radiation to the skin lesions. However, she declined the referral and wants to try some holistic approach. She is not interested in cytotoxic therapy at this time. 07/28/21-skin lesions is significantly worse. Recommended radiation.     Past medical history:  Past Medical History:   Diagnosis Date    Cancer [facility])     breast    GERD (gastroesophageal reflux disease)       Past surgical history:  Past Surgical History:   Procedure Laterality Date    BREAST LUMPECTOMY Left 10/4/2016    LUMPECTOMY WITH SENTINAL NODE BIOPSY AND INTRA OP US GUIDED NEEDLE LOCALIZATION performed by Arnulfo Aviles MD at [address] [place] [address]      biopsy left breast    COLONOSCOPY  2008    INSERTION / REMOVAL / REPLACEMENT VENOUS ACCESS CATHETER N/A 5/12/2016    SINGLE LUMEN PORT INSERTION performed by Igor Bui MD at [facility] DIAGNOSTIC BONE MARROW BIOPSIES Right 11/14/2018    BONE MARROW ASPIRATION BIOPSY performed by Patti Segal MD at [facility]      Social history:  Social History     Socioeconomic History    Marital status:      Spouse name: Not on file    Number of children: Not on file    Years of education: Not on file    Highest education level: Not on file   Occupational History    Not on file   Tobacco Use    Smoking status: Never Smoker    Smokeless tobacco: Never Used   Vaping Use    Vaping Use: Never used   Substance and Sexual Activity    Alcohol use: No     Alcohol/week: 0.0 standard drinks    Drug use: No    Sexual activity: Not on file   Other Topics Concern    Not on file   Social History Narrative    Not on file     Social Determinants of Health     Financial Resource Strain:     Difficulty of Paying Living Expenses:    Food Insecurity:     Worried About Running Out of Food in the Last Year:     Callie of Food in the Last Year:    Transportation Needs:     Lack of Transportation (Medical):      Lack of Transportation (Non-Medical):    Physical Activity:     Days of Exercise per Week:     Minutes of Exercise per Session:    Stress:     Feeling of Stress :    Social Connections:     Frequency of Communication with Friends and Family:     Frequency of Social Gatherings with Friends and Family:     Attends [employer] Services:     Active Member of Clubs or Organizations:     Attends Club or Organization Meetings:     Marital Status:    Intimate Partner Violence:     Fear of Current or Ex-Partner:     Emotionally Abused:     Physically Abused:     Sexually Abused:       Family history:   Family History   Problem Relation Age of Onset    Lung Cancer Father     Colon Cancer Father     Prostate Cancer Father     Hypertension Mother         Current Outpatient Medications   Medication Sig Dispense Refill    HYDROcodone-acetaminophen (NORCO)  MG per tablet Take 1 tablet by mouth every 6 hours as needed for Pain for up to 30 days. Intended supply: 30 days 120 tablet 0    Morphine Sulfate ER 15 MG T12A Take 15 mg by mouth every 8 hours for 30 days. 90 tablet 0    oxyCODONE-acetaminophen (PERCOCET) 7.5-325 MG per tablet Take 1 tablet by mouth every 6 hours as needed for Pain for up to 30 days. 120 tablet 0    pantoprazole (PROTONIX) 40 MG tablet TAKE ONE TABLET DAILY 30 tablet 2    ondansetron (ZOFRAN) 8 MG tablet Take 1 tablet by mouth every 8 hours as needed for Nausea or Vomiting 30 tablet 5    Zinc Sulfate (ZINC 15 PO) Take by mouth daily       Cholecalciferol (VITAMIN D3) 1.25 MG (18685 UT) CAPS Take by mouth daily Unknown amount of Vitamins D3      APPLE CIDER VINEGAR PO Take by mouth      Probiotic Product (PROBIOTIC-10 PO) Take by mouth       Turmeric 500 MG CAPS Take by mouth      SELENIUM ER PO Take by mouth      Multiple Vitamin (MULTI VITAMIN DAILY PO) Take by mouth       No current facility-administered medications for this visit. REVIEW OF SYSTEMS:    Constitutional: no fever, no night sweats, fatigue;   HEENT: no blurring of vision, no double vision, no hearing difficulty, no tinnitus,no ulceration, no dental caries, no dysphagia, no epistaxis  Lungs: no cough, no shortness of breath, no wheeze;   CVS: no palpitation, no chest pain, no shortness of breath;  GI: abdominal bloating resolved, nausea resolved, no vomiting, no constipation; no diarrhea  ORLIN: no dysuria, frequency and urgency, no hematuria, no kidney stones;   Musculoskeletal: no joint pain, swelling , stiffness;   Endocrine: no polyuria, polydypsia, no cold or heat intolerence; Hematology/lymphatic: no easy brusing or bleeding, no hx of clotting disorder; no peripheral adenopathy.   Dermatology:, no eczema, no pruritis; significant interval worsening of skin rash on the back and also abdomen. Psychiatry: no depression, no anxiety,no panic attacks, no suicide ideation; Neurology: no syncope, no seizures, no numbness or tingling of hands, no numbness and tingling of feet, no paresis;     PHYSICAL EXAM:    BP (!) 112/58   Pulse 78   Ht 5' 3\" (1.6 m)   Wt 136 lb 9.6 oz (62 kg)   LMP 02/29/2016 (Approximate)   SpO2 95%   BMI 24.20 kg/m²     Pain scale:5    CONSTITUTIONAL: Alert, appropriate, no acute distress,   EYES: Non icteric, EOM intact, pupils equal round and reactive to light and accommodation. ENT: Oral mucus membranes moist, no oral pharyngeal lesions. External inspection of ears and nose are normal.   NECK: Supple, no masses. No palpable thyroid mass    CHEST/LUNGS: CTA bilaterally, normal respiratory effort  Chaperoned upper body exam with Gregorio Lizzeth, RN -mild erythematous rash in the upper chest.  CARDIOVASCULAR: RRR, no murmurs. No lower extremity edema   ABDOMEN: soft non-tender, active bowel sounds, no hepatosplenomegaly. No palpable masses. EXTREMITIES: warm, Full ROM of all fours extremities. No focal weakness. SKIN: Significant interval worsening of erythematous involvement of the skin in the back and also abdomen. This is consistent with metastatic HER-2 positive breast cancer.   LYMPH: No cervical, clavicular, axillary, or inguinal lymphadenopathy  NEUROLOGIC: follows commands, non focal.     Relevant Lab findings reviewed/analyzed by me:   5/28/21   CA 27-29 21.2  CEA 1.5  Lab Results   Component Value Date    WBC 3.19 (L) 07/28/2021    HGB 11.5 07/28/2021    HCT 37.6 07/28/2021    MCV 93.8 07/28/2021     07/28/2021     Lab Results   Component Value Date    NEUTROABS 1.88 07/28/2021       Relevant Imaging studies reviewed/analyzed by me:   none     ASSESSMENT:    Orders Placed This Encounter   Procedures    CT ABDOMEN PELVIS W IV CONTRAST Additional Contrast? Oral     Standing days  -     Morphine Sulfate ER 15 MG T12A; Take 15 mg by mouth every 8 hours for 30 days. Pruritus         Metastatic HER-2 oseas breast cancer (diffuse skin involvement)  She received 2 cycles of Fam-trastuzumab deruxtecan 5.4 mg/kg every 3 weeks She was going to receive third cycle with 20% dose reduction Fam-trastuzumab deruxtecan 4.3 mg/kg but the patient opted to discontinue chemotherapy. Due to increased toxicity and poor quality of life the patient request to stop treatment at this time. CT chest abdomen pelvis showed no evidence of intrathoracic or intra-abdominal metastatic disease  Status post completion of palliative radiation to the skin. August 2020-May 2021-Herceptin, Xeloda, tucatinib, progressive disease  February 2021-reviewed the scans and a 2D echo. No evidence of systemic metastatic disease. 2D echo normal stable EF. May 2021-progressive disease with extension of skin involvement to the back. Skin biopsy showed involvement by breast cancer      7/9/2021-Elvira has refused referral to radiation at this time. She is not interested in cytotoxic chemotherapy. She tells me today that she wants to try a holistic approach at this time. She will be back in 3-month or sooner if needed. Skin metastasis -she has had skin metastasis in the past and is status post palliative RT with significant improvement. Now with biopsy-proven recurrence. 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 She denies any significant change from previous evaluation. Lymphopenia-ALC some renal calcification 0.77     Plan:  1. RTC with MD 3 weeks  2. CT chest, abdomen, pelvis-1st available  3. Refer to Dr. Paulina Javed for radiation therapy evaluation  4. Norco 10mg every 6 hrs as needed-script sent  5.  MS Contin 15 mg every 8 hrs-script sent  6. Ketamine 5%+Amitriptyline 5%+lidocaine 5% cream to skin for pruritus as needed-script sent  7. Consider Margetuximab in the future-after radiation therapy    Follow Up:     Return in about 3 weeks (around 8/18/2021) for CBC, Appointment with Dr. Candy Whitt. Refer to Dr. Lilibeth Patel see today  CT c/a/p ASAP     I, Gamal Farrar, am scribing for Miguel Angel Mcguire MD. Electronically signed by Gamal Farrar RN on 7/28/2021 at 5:02 PM CDT. I, Dr Catalina Betancur, personally performed the services described in this documentation as scribed by Gamal Farrar RN in my presence and is both accurate and complete. I have seen, examined and reviewed this patient medication list, appropriate labs and imaging studies. I reviewed relevant medical records and others physicians notes. I discussed the plans of care with the patient. I answered all the questions to the patients satisfaction. I have also reviewed the chief complaint (CC) and part of the history (History of Present Illness (HPI), Past Family Social History [facility]), or Review of Systems (ROS) and made changes when appropriated.        (Please note that portions of this note were completed with a voice recognition program. Efforts were made to edit the dictations but occasionally words are mis-transcribed.)

## 2021-07-28 ENCOUNTER — OFFICE VISIT (OUTPATIENT)
Dept: HEMATOLOGY | Age: 56
End: 2021-07-28
Payer: COMMERCIAL

## 2021-07-28 ENCOUNTER — CLINICAL DOCUMENTATION (OUTPATIENT)
Dept: HEMATOLOGY | Age: 56
End: 2021-07-28

## 2021-07-28 ENCOUNTER — HOSPITAL ENCOUNTER (OUTPATIENT)
Dept: INFUSION THERAPY | Age: 56
Discharge: HOME | End: 2021-07-28
Payer: COMMERCIAL

## 2021-07-28 VITALS
WEIGHT: 136.6 LBS | SYSTOLIC BLOOD PRESSURE: 112 MMHG | OXYGEN SATURATION: 95 % | HEIGHT: 63 IN | BODY MASS INDEX: 24.2 KG/M2 | HEART RATE: 78 BPM | DIASTOLIC BLOOD PRESSURE: 58 MMHG

## 2021-07-28 DIAGNOSIS — L29.9 PRURITUS: ICD-10-CM

## 2021-07-28 DIAGNOSIS — C50.919 HER2-POSITIVE CARCINOMA OF BREAST (HCC): ICD-10-CM

## 2021-07-28 DIAGNOSIS — G89.3 CANCER ASSOCIATED PAIN: ICD-10-CM

## 2021-07-28 DIAGNOSIS — C50.412 MALIGNANT NEOPLASM OF UPPER-OUTER QUADRANT OF LEFT BREAST IN FEMALE, ESTROGEN RECEPTOR NEGATIVE (HCC): ICD-10-CM

## 2021-07-28 DIAGNOSIS — C50.412 MALIGNANT NEOPLASM OF UPPER-OUTER QUADRANT OF LEFT FEMALE BREAST, UNSPECIFIED ESTROGEN RECEPTOR STATUS (HCC): Primary | ICD-10-CM

## 2021-07-28 DIAGNOSIS — Z71.89 CARE PLAN DISCUSSED WITH PATIENT: ICD-10-CM

## 2021-07-28 DIAGNOSIS — C79.2 CARCINOMATOUS METASTASIS IN SKIN (HCC): ICD-10-CM

## 2021-07-28 DIAGNOSIS — Z17.1 MALIGNANT NEOPLASM OF UPPER-OUTER QUADRANT OF LEFT BREAST IN FEMALE, ESTROGEN RECEPTOR NEGATIVE (HCC): ICD-10-CM

## 2021-07-28 LAB
BASOPHILS ABSOLUTE: 0.01 K/UL (ref 0.01–0.08)
BASOPHILS RELATIVE PERCENT: 0.3 % (ref 0.1–1.2)
EOSINOPHILS ABSOLUTE: 0.03 K/UL (ref 0.04–0.54)
EOSINOPHILS RELATIVE PERCENT: 0.9 % (ref 0.7–7)
HCT VFR BLD CALC: 37.6 % (ref 34.1–44.9)
HEMOGLOBIN: 11.5 G/DL (ref 11.2–15.7)
LYMPHOCYTES ABSOLUTE: 0.82 K/UL (ref 1.18–3.74)
LYMPHOCYTES RELATIVE PERCENT: 25.7 % (ref 19.3–53.1)
MCH RBC QN AUTO: 28.7 PG (ref 25.6–32.2)
MCHC RBC AUTO-ENTMCNC: 30.6 G/DL (ref 32.3–35.5)
MCV RBC AUTO: 93.8 FL (ref 79.4–94.8)
MONOCYTES ABSOLUTE: 0.45 K/UL (ref 0.24–0.82)
MONOCYTES RELATIVE PERCENT: 14.1 % (ref 4.7–12.5)
NEUTROPHILS ABSOLUTE: 1.88 K/UL (ref 1.56–6.13)
NEUTROPHILS RELATIVE PERCENT: 59 % (ref 34–71.1)
PDW BLD-RTO: 16.1 % (ref 11.7–14.4)
PLATELET # BLD: 210 K/UL (ref 182–369)
PMV BLD AUTO: 9.7 FL (ref 7.4–10.4)
RBC # BLD: 4.01 M/UL (ref 3.93–5.22)
WBC # BLD: 3.19 K/UL (ref 3.98–10.04)

## 2021-07-28 PROCEDURE — 99212 OFFICE O/P EST SF 10 MIN: CPT

## 2021-07-28 PROCEDURE — 99215 OFFICE O/P EST HI 40 MIN: CPT | Performed by: INTERNAL MEDICINE

## 2021-07-28 PROCEDURE — 85025 COMPLETE CBC W/AUTO DIFF WBC: CPT

## 2021-07-28 PROCEDURE — 36415 COLL VENOUS BLD VENIPUNCTURE: CPT

## 2021-07-28 RX ORDER — HYDROCODONE BITARTRATE AND ACETAMINOPHEN 10; 325 MG/1; MG/1
1 TABLET ORAL EVERY 6 HOURS PRN
Qty: 120 TABLET | Refills: 0 | Status: SHIPPED | OUTPATIENT
Start: 2021-07-28 | End: 2021-08-26 | Stop reason: SDUPTHER

## 2021-07-28 NOTE — PROGRESS NOTES
Orders from Dr. Kylie Adams for Ketamine 5% + Amitriptyline 5% + Lidocaine 5% topical cream/lotion to apply to breast cancer skin mets to torso area TID for pruritis. Call to [facility] with above orders. Script will be made ASAP and hopefully will be available tomorrow. Pharmacy will call pt.

## 2021-07-29 ENCOUNTER — HOSPITAL ENCOUNTER (OUTPATIENT)
Dept: CT IMAGING | Age: 56
Discharge: HOME | End: 2021-07-29
Payer: COMMERCIAL

## 2021-07-29 ENCOUNTER — APPOINTMENT (OUTPATIENT)
Dept: INFUSION THERAPY | Age: 56
End: 2021-07-29
Payer: COMMERCIAL

## 2021-07-29 ENCOUNTER — HOSPITAL ENCOUNTER (OUTPATIENT)
Dept: INFUSION THERAPY | Age: 56
Discharge: HOME | End: 2021-07-29
Payer: COMMERCIAL

## 2021-07-29 DIAGNOSIS — C50.412 MALIGNANT NEOPLASM OF UPPER-OUTER QUADRANT OF LEFT FEMALE BREAST, UNSPECIFIED ESTROGEN RECEPTOR STATUS (HCC): ICD-10-CM

## 2021-07-29 DIAGNOSIS — C79.2 CARCINOMATOUS METASTASIS IN SKIN (HCC): ICD-10-CM

## 2021-07-29 DIAGNOSIS — Z45.2 ENCOUNTER FOR CENTRAL LINE CARE: Primary | ICD-10-CM

## 2021-07-29 PROCEDURE — 6360000002 HC RX W HCPCS: Performed by: INTERNAL MEDICINE

## 2021-07-29 PROCEDURE — 96523 IRRIG DRUG DELIVERY DEVICE: CPT

## 2021-07-29 PROCEDURE — 6360000004 HC RX CONTRAST MEDICATION: Performed by: INTERNAL MEDICINE

## 2021-07-29 PROCEDURE — 71260 CT THORAX DX C+: CPT

## 2021-07-29 PROCEDURE — 74177 CT ABD & PELVIS W/CONTRAST: CPT

## 2021-07-29 PROCEDURE — 2580000003 HC RX 258: Performed by: INTERNAL MEDICINE

## 2021-07-29 RX ORDER — SODIUM CHLORIDE 0.9 % (FLUSH) 0.9 %
10 SYRINGE (ML) INJECTION PRN
Status: DISCONTINUED | OUTPATIENT
Start: 2021-07-29 | End: 2021-07-30 | Stop reason: HOSPADM

## 2021-07-29 RX ORDER — SODIUM CHLORIDE 0.9 % (FLUSH) 0.9 %
20 SYRINGE (ML) INJECTION PRN
Status: CANCELLED | OUTPATIENT
Start: 2021-07-29

## 2021-07-29 RX ORDER — HEPARIN SODIUM (PORCINE) LOCK FLUSH IV SOLN 100 UNIT/ML 100 UNIT/ML
500 SOLUTION INTRAVENOUS PRN
Status: CANCELLED | OUTPATIENT
Start: 2021-07-29

## 2021-07-29 RX ORDER — HEPARIN SODIUM (PORCINE) LOCK FLUSH IV SOLN 100 UNIT/ML 100 UNIT/ML
500 SOLUTION INTRAVENOUS PRN
Status: DISCONTINUED | OUTPATIENT
Start: 2021-07-29 | End: 2021-07-30 | Stop reason: HOSPADM

## 2021-07-29 RX ORDER — SODIUM CHLORIDE 0.9 % (FLUSH) 0.9 %
10 SYRINGE (ML) INJECTION PRN
Status: CANCELLED | OUTPATIENT
Start: 2021-07-29

## 2021-07-29 RX ADMIN — SODIUM CHLORIDE, PRESERVATIVE FREE 10 ML: 5 INJECTION INTRAVENOUS at 09:03

## 2021-07-29 RX ADMIN — IOPAMIDOL 75 ML: 755 INJECTION, SOLUTION INTRAVENOUS at 08:39

## 2021-07-29 RX ADMIN — HEPARIN 500 UNITS: 100 SYRINGE at 09:03

## 2021-07-29 NOTE — PROGRESS NOTES
BRIAN spoke to Ms. Echeverria via phone due to her distress score of 5. She had a radiation consultation on 7-28-21. She lives alone. Her support system is strong and includes her spiritual mother, friends, and other family members. She is independent with her ADLs and plans to drive herself to treatment. She does have financial concerns with hospital bills. She did receive a financial assistance application but did not know if it was for [facility] or [facility]. BRIAN spoke to her about the financial counselors. She is nervous about having to take time off of work since last time she was off for six months. She works for the [employer] and has been there for 13 years. Ms. Echeverria does have short term disability from work. BRIAN encouraged her to speak to human resources at her work regarding her questions for short term disability she states they do not have a human resources department but they have financial staff that assist. She has been having pain and states her oncologist prescribed her a new pain medication yesterday. BRIAN encouraged Ms. Echeverria to call if further assistance is needed and she verbalized understanding.

## 2021-08-10 NOTE — TELEPHONE ENCOUNTER
Received a call from William Franklin regarding pain medication  Not helping. I spoke with Dr. Sheila Paniagua. He wants her to take 30 mg morphine every 12 hours for five days to see if pain is better, if not can increase to 30 mg every 8 hours. I attempted to call Elvira thomas left to call back.

## 2021-08-12 NOTE — PROGRESS NOTES
PhysicalTherapy Treatment Note and 90 Day ReCertification    Patient:           Ayla Echeverria            : 1965  Today's Date: 2021  Referring practitioner: Gloria Hendricks*  Date of Initial Visit:       Type: THERAPY  Noted: 10/15/2020  Patient seen for 42 sessions    Visit Diagnoses:    ICD-10-CM ICD-9-CM   1. Decreased ROM of left shoulder  M25.612 719.51   2. At risk for lymphedema  Z91.89 V49.89   3. Malignant neoplasm involving both nipple and areola of left breast in female, estrogen receptor positive (CMS/HCC)  C50.012 174.0    Z17.0 V86.0   4. Decreased ROM of right shoulder  M25.611 719.51   5. Regional lymph node metastasis present (CMS/[facility])  C77.9 196.9       SUBJECTIVE     Subjective:  Patient states she is on a leave of absence from work, she has started radiation and has two treatments.  She did get a compression sleeve for each arm and had a gauntlet for the L hand but her hand started swelling.     Pain before treatment 8/10 from her waist up.           OBJECTIVE     Objective        Therapy Education/Self Care 47470   Details: Remove compression wraps in 24 hours or if painful or causes more swelling. Do not wear compression sleeves without a gauntlet.   [employer] Code: N/A   Given Compression   Progress: New   Who provided to: Patent   Level of understanding Verbalized    Timed Minutes      Goals  Progress Note Due:  7/10/2021   STG by: 20 Comments Status   Patient will begin a home exercise program to accelerate the recovery process   Met   Patient will have an increase in bilateral shoulder flexion to 160 deg in supine L shoulder flexion 109 degrees  R shoulder flexion 146 degrees Ongoing   Patient will have a 25% reduction in chest and shoulder pain She has had increase pain since the progression of her cancer Ongoing   Patient will gain an understanding of Lymphedema and risk factors   Met   LTG by:       Patient will be able to reach up into an overhead cabinet  to retrieve a 5 lb dish safely  She can reach with the R arm, difficulty with the L arm.  Partially met    Patient will regain ROM to reach behind her back as if she was undoing a bra She is having more difficulty recently Ongoing   Patient will have a 75 - 90% reduction in tightness/pain across chest and shoulders Pain has increased pain with the progression of her cancer.  Ongoing   Patient will be able to perform household chores without exacerbation of symptoms Has tightness with B shoulder abduction and L shoulder flexion making chores more difficult. Ongoing   Patient will regain functional ROM of both shoulders to perform activities overhead, reaching out to retrieve food from a drive-thru, reaching behind her back to fasten/unfasten bras,clothing   ROM has decreased with progression of her cancer.  Ongoing   Patient will be instructed in a long term exercise program to continue to make gains following discharge   Patient has been working on her HEP more. She will work on more stretching of B shoulders as well as working on self MLD.  Ongoing progressing              Total Timed Treatment:     45   mins  Total Time of Visit:            45  mins         ASSESSMENT/PLAN     Assessment/Plan:       ASSESSMENT:  Patient has had two radiation treatments.  She is in a lot of pain due to the progression of cancer on her back and her chest.  She has had an increase in size of the L UE which she has been trying to wear compression for.  The arm and hand are both swollen.  She has tried to gauntlets but they both made her hand swell, I did speak with Thu from [facility] and I will check into a arpit paying for the gauntlet. I did wrap the L UE today, if her arm does not improve with the wraps, she may not be able to tolerate compression on the L UE due to the progression of her cancer.     PLAN: Will continue  working on decreasing swelling in the L UE as well as improving ROM both shoulders.  Assess  how patient did with the compression wraps. Will see patient 2-3 x per week to assist patient and her family how to care for her increase in swelling of the UE's and trunk.     Jaycee Mohan PTA, JUDSON-EVANGELINA  Physical Therapy Assistant

## 2021-08-13 NOTE — PROGRESS NOTES
PhysicalTherapy Treatment Note     Patient:           Ayla Echeverria            : 1965  Today's Date: 2021  Referring practitioner: Gloria Hendricks*  Date of Initial Visit:       Type: THERAPY  Noted: 10/15/2020  Patient seen for 43 sessions    Visit Diagnoses:    ICD-10-CM ICD-9-CM   1. Decreased ROM of left shoulder  M25.612 719.51   2. At risk for lymphedema  Z91.89 V49.89   3. Malignant neoplasm involving both nipple and areola of left breast in female, estrogen receptor positive (CMS/[facility])  C50.012 174.0    Z17.0 V86.0   4. Decreased ROM of right shoulder  M25.611 719.51   5. Regional lymph node metastasis present (CMS/[facility])  C77.9 196.9   6. Lymphedema  I89.0 457.1       SUBJECTIVE                OBJECTIVE     Objective   Lymphedema     Row Name 21 1510             Subjective Pain    Able to rate subjective pain?  yes  -AL      Pre-Treatment Pain Level  9  -AL      Subjective Pain Comment  She states her pain is so high she has been crying  -AL         Subjective Comments    Subjective Comments  Patient called crying and stated her arm was swollen after she took and nap and needed the arm wrapped again. Patient states she took the wraps off before her nap and her arm and hand looked much better  -AL         Manual Lymphatic Drainage    Manual Lymphatic Drainage Comments  Reviewed MLD  -AL      Manual Therapy  20  -AL         Compression/Skin Care    Compression/Skin Care  skin care;wrapping location;bandaging  -AL      Skin Care  moisturizing lotion applied  -AL      Wrapping Location  upper extremity  -AL      Wrapping Location UE  left:;fingers to axilla  -AL      Bandage Layers  cotton liner;padding/fluff layer;short-stretch bandages (comment size/quantity)  -AL      Bandaging Comments  Used a 4 in stockinette, two 1 in finger wraps, Artiflex, one size 6cm and one size 8 cm Comprilan  -AL      Bandaging Technique  circumferential/spiral;light compression  -AL      Compression  Curtis Comments  I have found a [employer] group that will purchase a gauntlet for patient.   -AL      Remove Bandages  She can wear the wraps up to 48 hours.   -AL      Compression/Skin Care Comments  Had her family member video tape me wrapping patient's arm so family can re-wrap as needed.  Instructed patient to go to the ER if pain levels are high.   -AL        User Key  (r) = Recorded By, (t) = Taken By, (c) = Cosigned By    Initials Name Provider Type    Jaycee Farris PTA, LISATAZUL Physical Therapy Assistant           Therapy Education/Self Care 25384   Details: Have family re-wrap the L UE as needed.  Go to ER if patient having high pain levels.    Materia Code: N/A   Given Compression   Progress: New   Who provided to: Patent   Level of understanding Verbalized    Timed Minutes      Goals  Progress Note Due:  7/10/2021   STG by: 11/14/20 Comments Status   Patient will begin a home exercise program to accelerate the recovery process   Met   Patient will have an increase in bilateral shoulder flexion to 160 deg in supine L shoulder flexion 109 degrees  R shoulder flexion 146 degrees Ongoing   Patient will have a 25% reduction in chest and shoulder pain She has had increase pain since the progression of her cancer Ongoing   Patient will gain an understanding of Lymphedema and risk factors   Met   LTG by:       Patient will be able to reach up into an overhead cabinet to retrieve a 5 lb dish safely  She can reach with the R arm, difficulty with the L arm.  Partially met    Patient will regain ROM to reach behind her back as if she was undoing a bra She is having more difficulty recently Ongoing   Patient will have a 75 - 90% reduction in tightness/pain across chest and shoulders Pain has increased pain with the progression of her cancer.  Ongoing   Patient will be able to perform household chores without exacerbation of symptoms Has tightness with B shoulder abduction and L shoulder flexion making chores  more difficult. Ongoing   Patient will regain functional ROM of both shoulders to perform activities overhead, reaching out to retrieve food from a drive-thru, reaching behind her back to fasten/unfasten bras,clothing   ROM has decreased with progression of her cancer.  Ongoing   Patient will be instructed in a long term exercise program to continue to make gains following discharge   Patient has been working on her HEP more. She will work on more stretching of B shoulders as well as working on self MLD.  Ongoing progressing              Total Timed Treatment:     20   mins  Total Time of Visit:            20  mins         ASSESSMENT/PLAN     Assessment/Plan:       ASSESSMENT:  Patient called today and needed to come in due to increase pain in the L arm, she wanted the L UE wrapped again as this helped decrease the swelling yesterday.  Patient's family will re-wrap the L UE as needed.  We discussed again that the progression of the cancer may be causing the L UE to swell.   Patient will also work on the MLD to the L UE and L lateral trunk.      PLAN: Will continue  working on decreasing swelling in the L UE as well as improving ROM both shoulders.      Jaycee Mohan, PTA, CLT-EVANGELINA  Physical Therapy Assistant

## 2021-08-13 NOTE — PROGRESS NOTES
Progress Note Addendum      Patient: Ayla Echeverria           : 1965  Visit Date: 2021  Referring practitioner: Gloria Hendricks*  Date of Initial Visit: Type: THERAPY  Noted: 10/15/2020  Patient seen for 42 sessions  Visit Diagnoses:    ICD-10-CM ICD-9-CM   1. Decreased ROM of left shoulder  M25.612 719.51   2. At risk for lymphedema  Z91.89 V49.89   3. Malignant neoplasm involving both nipple and areola of left breast in female, estrogen receptor positive (CMS/[facility])  C50.012 174.0    Z17.0 V86.0   4. Decreased ROM of right shoulder  M25.611 719.51   5. Regional lymph node metastasis present (CMS/[facility])  C77.9 196.9          Clinical Progress: worse  Home Program Compliance: Yes  Progress toward previous goals: Partially Met  Prognosis to achieve goals: fair    Objective     Assessment & Plan     Assessment  Impairments: abnormal or restricted ROM, activity intolerance, impaired physical strength, lacks appropriate home exercise program and pain with function  Prognosis: fair  Functional Limitations: uncomfortable because of pain, reaching overhead and unable to perform repetitive tasks  Plan  Therapy options: will be seen for skilled physical therapy services  Planned modality interventions: dry needling, low level laser therapy and TENS  Planned therapy interventions: manual therapy, strengthening, stretching, therapeutic activities, functional ROM exercises, ADL retraining, joint mobilization, soft tissue mobilization, neuromuscular re-education and flexibility  Frequency: 1x week  Duration in visits: 4  Treatment plan discussed with: PTA        I have reviewed the progress note information provided by Jaycee Mohan PTA, IDALIA and I concur with the findings.    Samia Newman, PT DPT  Physical Therapist

## 2021-08-16 ENCOUNTER — OFFICE VISIT (OUTPATIENT)
Dept: HEMATOLOGY | Age: 56
End: 2021-08-16
Payer: COMMERCIAL

## 2021-08-16 ENCOUNTER — HOSPITAL ENCOUNTER (OUTPATIENT)
Dept: INFUSION THERAPY | Age: 56
Discharge: HOME | End: 2021-08-16
Payer: COMMERCIAL

## 2021-08-16 VITALS — HEIGHT: 63 IN | BODY MASS INDEX: 24.66 KG/M2 | WEIGHT: 139.2 LBS | HEART RATE: 88 BPM | OXYGEN SATURATION: 97 %

## 2021-08-16 DIAGNOSIS — C50.412 MALIGNANT NEOPLASM OF UPPER-OUTER QUADRANT OF LEFT FEMALE BREAST, UNSPECIFIED ESTROGEN RECEPTOR STATUS (HCC): Primary | ICD-10-CM

## 2021-08-16 DIAGNOSIS — Z17.1 MALIGNANT NEOPLASM OF UPPER-OUTER QUADRANT OF LEFT BREAST IN FEMALE, ESTROGEN RECEPTOR NEGATIVE (HCC): ICD-10-CM

## 2021-08-16 DIAGNOSIS — I89.0 LYMPHEDEMA OF LEFT ARM: ICD-10-CM

## 2021-08-16 DIAGNOSIS — C50.412 MALIGNANT NEOPLASM OF UPPER-OUTER QUADRANT OF LEFT BREAST IN FEMALE, ESTROGEN RECEPTOR NEGATIVE (HCC): ICD-10-CM

## 2021-08-16 DIAGNOSIS — Z71.89 CARE PLAN DISCUSSED WITH PATIENT: ICD-10-CM

## 2021-08-16 DIAGNOSIS — G89.3 CANCER ASSOCIATED PAIN: ICD-10-CM

## 2021-08-16 DIAGNOSIS — C50.919 HER2-POSITIVE CARCINOMA OF BREAST (HCC): ICD-10-CM

## 2021-08-16 DIAGNOSIS — F11.90 OPIOID USE: ICD-10-CM

## 2021-08-16 DIAGNOSIS — C79.2 CARCINOMATOUS METASTASIS IN SKIN (HCC): ICD-10-CM

## 2021-08-16 LAB
BASOPHILS ABSOLUTE: 0.02 K/UL (ref 0.01–0.08)
BASOPHILS RELATIVE PERCENT: 0.4 % (ref 0.1–1.2)
EOSINOPHILS ABSOLUTE: 0.02 K/UL (ref 0.04–0.54)
EOSINOPHILS RELATIVE PERCENT: 0.4 % (ref 0.7–7)
HCT VFR BLD CALC: 41.5 % (ref 34.1–44.9)
HEMOGLOBIN: 12.8 G/DL (ref 11.2–15.7)
LYMPHOCYTES ABSOLUTE: 0.69 K/UL (ref 1.18–3.74)
LYMPHOCYTES RELATIVE PERCENT: 15.3 % (ref 19.3–53.1)
MCH RBC QN AUTO: 29 PG (ref 25.6–32.2)
MCHC RBC AUTO-ENTMCNC: 30.8 G/DL (ref 32.3–35.5)
MCV RBC AUTO: 94.1 FL (ref 79.4–94.8)
MONOCYTES ABSOLUTE: 0.9 K/UL (ref 0.24–0.82)
MONOCYTES RELATIVE PERCENT: 20 % (ref 4.7–12.5)
NEUTROPHILS ABSOLUTE: 2.88 K/UL (ref 1.56–6.13)
NEUTROPHILS RELATIVE PERCENT: 63.9 % (ref 34–71.1)
PDW BLD-RTO: 16.6 % (ref 11.7–14.4)
PLATELET # BLD: 248 K/UL (ref 182–369)
PMV BLD AUTO: 9.4 FL (ref 7.4–10.4)
RBC # BLD: 4.41 M/UL (ref 3.93–5.22)
WBC # BLD: 4.51 K/UL (ref 3.98–10.04)

## 2021-08-16 PROCEDURE — 99211 OFF/OP EST MAY X REQ PHY/QHP: CPT

## 2021-08-16 PROCEDURE — 36415 COLL VENOUS BLD VENIPUNCTURE: CPT

## 2021-08-16 PROCEDURE — 85025 COMPLETE CBC W/AUTO DIFF WBC: CPT

## 2021-08-16 PROCEDURE — 99214 OFFICE O/P EST MOD 30 MIN: CPT | Performed by: INTERNAL MEDICINE

## 2021-08-16 RX ORDER — MORPHINE SULFATE 30 MG/1
30 TABLET, FILM COATED, EXTENDED RELEASE ORAL EVERY 8 HOURS
Qty: 90 TABLET | Refills: 0 | Status: SHIPPED | OUTPATIENT
Start: 2021-08-16 | End: 2021-09-15

## 2021-08-16 RX ORDER — NALOXONE HYDROCHLORIDE 4 MG/.1ML
1 SPRAY NASAL PRN
Qty: 1 EACH | Refills: 1 | Status: SHIPPED | OUTPATIENT
Start: 2021-08-16

## 2021-08-16 NOTE — PROGRESS NOTES
PhysicalTherapy Treatment Note     Patient:           Ayla Echeverria            : 1965  Today's Date: 2021  Referring practitioner: Gloria Hendricks*  Date of Initial Visit:       Type: THERAPY  Noted: 10/15/2020  Patient seen for 44 sessions    Visit Diagnoses:    ICD-10-CM ICD-9-CM   1. Decreased ROM of left shoulder  M25.612 719.51   2. At risk for lymphedema  Z91.89 V49.89   3. Malignant neoplasm involving both nipple and areola of left breast in female, estrogen receptor positive (CMS/[facility])  C50.012 174.0    Z17.0 V86.0   4. Decreased ROM of right shoulder  M25.611 719.51   5. Regional lymph node metastasis present (CMS/[facility])  C77.9 196.9   6. Lymphedema  I89.0 457.1       SUBJECTIVE                OBJECTIVE     Objective   Lymphedema     Row Name 21 1135             Subjective Pain    Able to rate subjective pain?  yes  -AL      Pre-Treatment Pain Level  10  -AL      Subjective Pain Comment  L arm  -AL         Subjective Comments    Subjective Comments  Patient states her cousin was supposed to wrap the L arm for her, but she didn't come by to do it.  She called today to get in becuase she has her daughter with her and she wants her daughter to learn how to wrap the L arm and how to do the MLD.  Patient states she has been working on the MLD.   -AL         Manual Lymphatic Drainage    Manual Lymphatic Drainage Comments  Went over MLD with patient's daughter.   -AL      Manual Therapy  30  -AL         Compression/Skin Care    Compression/Skin Care  skin care;wrapping location;bandaging  -AL      Skin Care  moisturizing lotion applied  -AL      Wrapping Location  upper extremity  -AL      Wrapping Location UE  left:;fingers to axilla  -AL      Bandage Layers  cotton liner;padding/fluff layer;short-stretch bandages (comment size/quantity)  -AL      Bandaging Comments  Used a 4 in stockinette, two 1 in finger wraps, Artiflex, one size 6cm and one size 8 cm Comprilan  -AL      Bandaging  Technique  circumferential/spiral;light compression  -AL      Remove Bandages  She can wear the wraps up to 48 hours.   -AL      Compression/Skin Care Comments  Her daughter took a video of the wrapping on patient's phone.   -AL        User Key  (r) = Recorded By, (t) = Taken By, (c) = Cosigned By    Initials Name Provider Type    Jaycee Farris, VAUGHN, LISAT-EVANGELINA Physical Therapy Assistant           Therapy Education/Self Care 11285   Details: Have family re-wrap the L UE as needed.  Go to ER if patient having high pain levels.    [employer] Code: N/A   Given Compression   Progress: New   Who provided to: Patent   Level of understanding Verbalized    Timed Minutes      Goals  Progress Note Due:  7/10/2021   STG by: 11/14/20 Comments Status   Patient will begin a home exercise program to accelerate the recovery process   Met   Patient will have an increase in bilateral shoulder flexion to 160 deg in supine L shoulder flexion 109 degrees  R shoulder flexion 146 degrees Ongoing   Patient will have a 25% reduction in chest and shoulder pain She has had increase pain since the progression of her cancer Ongoing   Patient will gain an understanding of Lymphedema and risk factors   Met   LTG by:       Patient will be able to reach up into an overhead cabinet to retrieve a 5 lb dish safely  She can reach with the R arm, difficulty with the L arm.  Partially met    Patient will regain ROM to reach behind her back as if she was undoing a bra She is having more difficulty recently Ongoing   Patient will have a 75 - 90% reduction in tightness/pain across chest and shoulders Pain has increased pain with the progression of her cancer.  Ongoing   Patient will be able to perform household chores without exacerbation of symptoms Has tightness with B shoulder abduction and L shoulder flexion making chores more difficult. Ongoing   Patient will regain functional ROM of both shoulders to perform activities overhead, reaching out to  retrieve food from a drive-thru, reaching behind her back to fasten/unfasten bras,clothing   ROM has decreased with progression of her cancer.  Ongoing   Patient will be instructed in a long term exercise program to continue to make gains following discharge   Patient has been working on her HEP more. She will work on more stretching of B shoulders as well as working on self MLD.  Ongoing progressing              Total Timed Treatment:     30   mins  Total Time of Visit:            30  mins         ASSESSMENT/PLAN     Assessment/Plan:       ASSESSMENT:  Patient called today and needed to come in, her family has not been able to wrap the L arm for her.  I did instruct patient's daughter on how to wrap the L UE as well as how to do the MLD.  Patient's daughter will help her mother to be more comfortable and work on the MLD and wrapping at home.     PLAN: Will continue  working on decreasing swelling in the L UE    Jaycee Mohan PTA, CLJUDSON-EVANGELINA  Physical Therapy Assistant

## 2021-08-16 NOTE — PROGRESS NOTES
MEDICAL ONCOLOGY PROGRESS NOTE      Manual Greenberg   1965 8/16/2021     Chief Complaint   Patient presents with    Follow-up     Malignant neoplasm of upper-outer quadrant of left female breast, unspecified estrogen receptor status ([facility] [address]   Reason for MD visit-disease/treatment assessment    Interval history/history of present illness:  Diagnosis   IDC left breast, March 2016   Grade 2. ER 1%, WY 6%, HER-2/oseas IHC 3+/FISH amplified ratio 6.7, AR 95%. 01/30/2017- Biopsy-proven in breast relapse/Inflammatory breast cancer. ER negative, WY negative, HER-2/oseas IHC 3+/FISH amplified ratio 4.5, Ki-67 32%. MyRisk - Revealed no clinically significant mutation. PTEN VUS.  5/7/21 Invasive breast cancer involving dermal lymphatics    Treatment summary  Neoadjuvant adjuvant chemotherapy TCH-P ×6 cycles. She declined to complete 1 year of Herceptin. She declined radiation therapy. 10/3/2016-left lumpectomy/sentinel lymph node   01/30/2017- Biopsy-proven in breast relapse/Inflammatory breast cancer. 5/11/2017-Chemotherapy with paclitaxel/carboplatin weekly. Herceptin/Pertuzumab every 3 weeks started 05/11/2017. She had a allergic reaction to carboplatin and this was discontinued. 7/21/2017- 9/1/2017 Neuropathy grade 2 with Taxol. Therefore, switched to Taxotere/Herceptin/Pertuzumab. Discontinued due to progression of disease. 9/22/2017 through 3/2/2018Ethan Mar every 3 weeks. (stopped as per patient request, neuropathy grade 1)   7/10/2018 through 11/30/2018- Kadcyla.    1/4/2019-Navelbine x 1 cycle with very poor tolerance (pain)   1/25/2018-Gemzar/Herceptin   3/15/2019- low-dose Cisplatin added to Gemzar and Herceptin regimen, regimen changed to every other week   4/16/2019-5/23/19 5040cGy palliative radiation to left clavicle/chest wall   6/22/2019- Xeloda/apatinib  12/27/19-2/11/20 3800 cGy palliative radiation to the right breast.  1/10/2020- palliative treatment with Fam-Trastuzumab- stopped Feb due to severe toxicity  8/24/20-9/23/20 2925 cGy palliative radiation left chest wall skin metastasis. 8/21/2020-Herceptin, Xeloda and Tucatinib  5/28/21- Stop Tucatinib and Xeloda due to progression in skin  7/9/21 Declined palliative radiation  8/9/21 Initiate radiation therapy with anticipated dose of 6000 cGy radiation therapy anterior chest and posterior back  Tumor target  Skin chest wall left upper chest/left breast   Skin on back and both sides    Interval history  Erika Long is a pleasant 54years old female who has been diagnosed with recurrent HER-2 positive/ER 1% and FL 6% invasive ductal carcinoma of the breast.  Her pattern of recurrence is diffuse skin involvement of the upper chest bilaterally and back on the left. She received several lines of chemotherapy in the past.  She has had very poor tolerance to chemotherapy overall. She was last treated with radiation to the skin lesions and subsequently received treatment with tucatinib, Xeloda and Herceptin up to 5/28/2021 due to disease progression. She was referred again for consideration of palliative radiation that worked very well for her in the past.  However, Erika Long returned today with complaints of severe pain. She has received 2 treatments with radiation so far. She could not get treatment this morning to significant pain and could not lay down. She also had CT scans performed that were reviewed by me today. Cancer history- Left breast recurrence with Inflammatory Breast Cancer ER 8%/FL negative &HER-2 positive. Ms Pratt was seen in initial oncology consultation on 4/20/2017 referred for a diagnosis of a left breast recurrence with inflammatory breast cancer. She was initially treated by Dr. Earnest Sexton at [facility]. Prior breast cancer history is as follows:  3/22/2016-a diagnostic mammogram for a palpable mass showed a left breast abnormal findings.    4/1/2017- ultrasound-guided core needle biopsy was consistent with invasive ductal carcinoma, grade 2. ER 1%, PA 6%, HER-2/oseas IHC 3+/FISH amplified ratio 6.7., AR 95%. She underwent 6 cycles of TCH-P from April 2016 through September 2016 with G-CSF support. She had a total of 18 weeks of trastuzumab treatment. 10/3/2016-she underwent a left lumpectomy with left sentinel lymph node revealing an invasive ductal carcinoma, grade 3. Margins clear of invasive carcinoma (1.1 cm from the lateral margin), extensive lymphovascular space invasion. 2 out of 3 sentinel lymph nodes positive for metastatic carcinoma. Final pathologic staging [id number])[id number]. She declined adjuvant radiation therapy and adjuvant Herceptin completion.   -------------------In-breast Recurrence [id number]---------------------------  1/30/2017- she was seen by Dr. Elle Hadley with new complaints of erythema in the left breast. A skin punch biopsy was consistent with metastatic ductal carcinoma with extensive dermal lymphovascular invasion. ER negative, PA negative, HER-2/oseas IHC 3+/FISH amplified ratio 4.5, Ki-67 32%. Foundation one showed ERBB2 amplification, AURKA amplification, TP53 and .   2/15/2017- she was again seen by Dr. Shreyas Dozier and explained about her tumor recurrence. Unfortunately, she declined any further intervention at that time. 4/21/2017- she asked Dr. Shreyas Dozier for a referral to us.   4/28/2017-referred to me for further evaluation. I recommended a PET scan and neoadjuvant chemotherapy with carboplatin/paclitaxel and Perjeta and Herceptin. 5/4/2017-PET/CT scan revealed abnormal metabolic activity present in multiple regions throughout the left breast and in the skin( SUV 8.9, 7.4, 7.3). In the left supraclavicular region a cluster of lymph nodes present (SUV of 3). Axillary lymph nodes with SUV of 2.8. Tonsils bilaterally with SUV 5.6 (symmetric). No abnormal metabolic activity in the intrathoracic or intra-abdominal pelvic region. No abnormal skeletal metabolic activity.  I recommended weekly carboplatin/weekly Taxol/Herceptin and Pertuzumab.   5/26/2017-she had a severe allergic reaction after 2 doses of carboplatin and therefore this was discontinued. She was continued only on Taxol weekly/Herceptin and Pertuzumab.   7/21/2017-after cycle #2 and 8 doses of Taxol she developed neuropathy related to chemotherapy and therefore chemotherapy was switched from Taxol to Taxotere. Genetic assessment- [place] [id number] revealed no clinically significant mutation. PTEN VUS.   9/1/2017-local in breast disease progression with increasing breast erythema. Treatment switched to Kadcyla. 12/27/2017- repeat re-staging CT scan of the abdomen and pelvis documented no evidence of intra-abdominal pelvic metastasis. Superior left renal cyst. Mild hepatic steatosis. CT scan of the chest documented no evidence of intrathoracic metastasis. Bone scan documented no evidence of bony metastatic disease. 3/2/2018-after 8 cycles of Kadcyla she requested treatment to be on hold. She has requested her PCP a referral to [facility] for a second opinion. She has neuropathy grade 1.   5/2/2018- 2 months off treatment (Kadcyla). She has not yet been seen at [facility]. Apparently, [facility] is gathering her records and will see her soon. Examination of her chest wall showed recurrent disease in the left upper chest. Residual neuropathy grade 1. She could not tolerate gabapentin, and therefore has stopped it. She was not interested in any other medication for neuropathy at this time. 7/10/2018-after discussion at [facility] she was recommended to reinitiate Kadcyla. 7/13/2018-CT of chest, abdomen, pelvis showed no evidence of intra-abdominal disease. There is an enlarged right-sided level 1 lymph node that was not appreciated previously, measuring 2.5 x 1.4 cm, metastatic disease considered. 7/16/2018-PET CT scan showed diffuse uptake throughout the left breast extending to the left chest wall.  Abnormal uptake within the left axilla lymph nodes and left internal mammary lymph nodes concerning for metastatic disease. Enlarged hypermetabolic right axilla lymph node concerning for metastatic disease with SUV 8.0. Abnormal uptake within a right cervical lymph node, etiology unclear, but could be reactive. 10/26/2018 - CT scan of the chest was completed at [facility] and compared to CT scan of 12/27/2017 versus her last CT scan completed at [facility] on 7/13/2018. The CT scan identified a right axillary lymph node measuring 1.2 cm, otherwise no intrathoracic neoplastic process/metastatic disease. 10/26/2018 -CT scan of the abdomen and pelvis was completed at [facility] and compared to CT scan of 12/27/2017 versus her last CT scan completed at [facility] on 7/13/2018. No evidence of intra-abdominal or pelvic metastasis was identified. December 2018-interval worsening of skin metastasis in the left breast and diffuse erythema with new nodules. 12/17/2018-skin biopsy of overlying right breast consistent invasive ductal carcinoma, high-grade. ER 20%, SD 0%, HER-2/oseas 3+.  12/17/2018-right axillary FNA biopsy consistent invasive ductal carcinoma. 12/26/2018-recommended clinical trial at [facility]. 1/4/2019-started on Navelbine/Herceptin, due to delays on the clinical trial at [facility]. Unfortunately, she had severe chest wall pain and generalized pain related to Navelbine. 1/25/2019-4/19/2019 she was switched to cisplatin/Gemzar/Herceptin, but had disease progression. 4/16/2019-5/23/19 5040cGy palliative radiation to left clavicle/chest wall   5/30/2019-CT chest, abdomen, pelvis and bone scan was unremarkable for right axillary adenopathy measuring 2 cm. Lymph node located in the upper chest wall under the pectoralis muscle measuring 1.2 cm. Right breast with 2 areas with masslike enhancement. No evidence of metastatic disease in the abdomen pelvis. Bone scan was unremarkable for metastatic bone disease.   June 2019-very poor tolerance to cisplatin, Gemzar/Herceptin. She was recommended Xeloda/ Lapatinib.   6/22/2019-she was recommended and started on Xeloda 850 mg/m2 PO BID days 1 through 14 every 21 days and Lapatinib 1250 mg daily continuously. November 2019- progression on prior Xeloda and lapatinib.  11/23/2019- recommended rechallenge with Herceptin and Navelbine vs clinical trial at [facility]. 12/23/2019-CT chest abdomen pelvis showed metastatic disease in the right axillary lymph node and diffuse bilateral skin involvement of both breasts with several nodules. No evidence of pulmonary, liver or bone metastasis. CT of the head with contrast was unremarkable. 1/3/2020- the patient was unable to follow-up at [facility] [place] for clinical trial.  Therefore recommended Fam-trastuzumab deruxtecan every 3 weeks. 1/10/2020-she was started on palliative treatment with Fam-trastuzumab  12/27/19-2/11/20 3800 cGy palliative radiation to the right breast  6/5/2020- Ct Chest/Abdomen/Pelvis W Contrast No lymphadenopathy. A previously seen enlarged right axillary lymph node is now small in size. There is some stranding of the fat around the lymph node. Bilateral breast skin thickening left greater than right. Prior lumpectomy on the left with left axillary lymph node sampling. Previously seen nodularity in the right breast on CT is not visualized today. There may be minimal radiation change in the periphery of the left lung. There is no dense consolidation or effusion. No definite metastatic lung nodules. No evidence of abdominal or pelvic neoplastic process or metastatic disease. The previously seen right breast nodule is not visualized in this study. Postsurgical changes of the left breast. No significant change in the previous study. Lobulated skin thickening of the left lower lateral chest and upper abdominal wall which was not noted in the previous study. The etiology is not certain.  This data be clinically correlated and further evaluated. 8/7/2020- the patient is currently awaiting for palliative radiation to the chest wall for the skin margins. She has agreed and want to start on Herceptin, Xeloda and Tucatinib.  8/21/2020 Tumor Marker- CA 15.3- 32.19  8/24/20-9/23/20 2925 cGy palliative radiation left chest wall skin metastasis. 10/9/2020-continue Herceptin and tucatinib.  12/11/2020-she has resumed Xeloda on low-dose 500 mg p.o. twice daily 7 days on 7 days off, tucatinib and Herceptin  2/19/21 tumor markers: CA 27-29 11.8 CA 15-3 13.6 CEA 1.9  2/26/21 Ct Chest W Contrast No evidence of intrathoracic metastasis. Scarring suspected within the left upper and right middle lobes. 3. Diffuse left greater than right breast skin thickening with prominence of the trabecular breast tissue. Postoperative changes of the left breast and axilla. No pathologic axillary or intrathoracic lymphadenopathy localized. 2/26/21 Ct Abdomen Pelvis W Iv Contrast  No evidence of intra-abdominal pelvic metastasis. Hepatic steatosis. Superior left renal cyst.  Constipation. 2/26/21 2D echo: Mitral valve leaflets are mildly thickened with preserved leaflet mobility. Mildly thickened aortic valve leaflets with preserved leaflet mobility. Tricuspid valve is structurally normal. Mild tricuspid regurgitation with estimated RVSP of 16 mm Hg. Normal left ventricular size with preserved LV function and an estimated ejection fraction of approximately 55-60%. Normal left ventricular wall thickness. No regional wall motion abnormalities. 5/7/21 Skin lesion, right flank, punch biopsy: Invasive breast cancer involving dermal lymphatics  5/28/21 Tumor markers: CA 27-29= 21.2 CEA= 1.5 CA 15-3 =  7/9/2021-she was referred to radiation for consideration of palliative radiation to the skin lesions. However, she declined the referral and wants to try some holistic approach. She is not interested in cytotoxic therapy at this time.   07/28/21-skin lesions is significantly worse. Recommended radiation. 7/29/21 CT CHEST W CONTRAST No evidence of intrathoracic metastasis. Similar left greater than right breast skin thickening with increased trabecular pattern of the left breast which may represent sequelae of radiation. Postoperative changes of the left breast and axilla. No pathologic axillary or internal mammary lymphadenopathy. Left subclavian port in place with tip at the atrial caval junction. 7/29/21 CT ABDOMEN PELVIS W IV CONTRAST No evidence of intra-abdominal or pelvic metastatic disease. Small stable cyst at the upper pole left kidney. Moderate constipation.    8/9/21- Initiate radiation therapy with anticipated dose of 6000 cGy radiation therapy anterior chest and posterior back    Past medical history:  Past Medical History:   Diagnosis Date    Cancer ([facility] [address]     breast    GERD (gastroesophageal reflux disease)       Past surgical history:  Past Surgical History:   Procedure Laterality Date    BREAST LUMPECTOMY Left 10/4/2016    LUMPECTOMY WITH SENTINAL NODE BIOPSY AND INTRA OP US GUIDED NEEDLE LOCALIZATION performed by Kaela Fishman MD at [address] [place] [address]      biopsy left breast    COLONOSCOPY  2008    INSERTION / REMOVAL / REPLACEMENT VENOUS ACCESS CATHETER N/A 5/12/2016    SINGLE LUMEN PORT INSERTION performed by Kaela Fishman MD at [address] [place] [address] Right 11/14/2018    BONE MARROW ASPIRATION BIOPSY performed by Jose A Moraes MD at [facility]      Social history:  Social History     Socioeconomic History    Marital status:      Spouse name: Not on file    Number of children: Not on file    Years of education: Not on file    Highest education level: Not on file   Occupational History    Not on file   Tobacco Use    Smoking status: Never Smoker    Smokeless tobacco: Never Used   Vaping Use    Vaping Use: Never used   Substance and Sexual Activity    Alcohol use: No     Alcohol/week: 0.0 standard drinks    Drug use: No    Sexual activity: Not on file   Other Topics Concern    Not on file   Social History Narrative    Not on file     Social Determinants of Health     Financial Resource Strain:     Difficulty of Paying Living Expenses:    Food Insecurity:     Worried About Running Out of Food in the Last Year:     [address] in the Last Year:    Transportation Needs:     Lack of Transportation (Medical):  Lack of Transportation (Non-Medical):    Physical Activity:     Days of Exercise per Week:     Minutes of Exercise per Session:    Stress:     Feeling of Stress :    Social Connections:     Frequency of Communication with Friends and Family:     Frequency of Social Gatherings with Friends and Family:     Attends [employer] Services:     Active Member of Clubs or Organizations:     Attends Club or Organization Meetings:     Marital Status:    Intimate Partner Violence:     Fear of Current or Ex-Partner:     Emotionally Abused:     Physically Abused:     Sexually Abused:       Family history:   Family History   Problem Relation Age of Onset    Lung Cancer Father     Colon Cancer Father     Prostate Cancer Father     Hypertension Mother         Current Outpatient Medications   Medication Sig Dispense Refill    HYDROcodone-acetaminophen (NORCO)  MG per tablet Take 1 tablet by mouth every 6 hours as needed for Pain for up to 30 days. Intended supply: 30 days 120 tablet 0    Morphine Sulfate ER 15 MG T12A Take 15 mg by mouth every 8 hours for 30 days.  90 tablet 0    pantoprazole (PROTONIX) 40 MG tablet TAKE ONE TABLET DAILY 30 tablet 2    ondansetron (ZOFRAN) 8 MG tablet Take 1 tablet by mouth every 8 hours as needed for Nausea or Vomiting 30 tablet 5    Zinc Sulfate (ZINC 15 PO) Take by mouth daily       Cholecalciferol (VITAMIN D3) 1.25 MG (49639 UT) CAPS Take by mouth daily Unknown amount of Vitamins D3      APPLE CIDER VINEGAR PO Take by mouth      Probiotic Product (PROBIOTIC-10 PO) Take by mouth       Turmeric 500 MG CAPS Take by mouth      SELENIUM ER PO Take by mouth      Multiple Vitamin (MULTI VITAMIN DAILY PO) Take by mouth       No current facility-administered medications for this visit. REVIEW OF SYSTEMS:    Constitutional: no fever, no night sweats, fatigue;   HEENT: no blurring of vision, no double vision, no hearing difficulty, no tinnitus,no ulceration, no dental caries, no dysphagia, no epistaxis  Lungs: no cough, no shortness of breath, no wheeze;   CVS: no palpitation, no chest pain, no shortness of breath;  GI: abdominal bloating resolved, nausea resolved, no vomiting, no constipation; no diarrhea  ORLIN: no dysuria, frequency and urgency, no hematuria, no kidney stones;   Musculoskeletal: no joint pain, swelling , stiffness;   Endocrine: no polyuria, polydypsia, no cold or heat intolerence; Hematology/lymphatic: no easy brusing or bleeding, no hx of clotting disorder; no peripheral adenopathy. Dermatology:, no eczema, no pruritis; significant interval worsening of skin rash on the back and also abdomen. Psychiatry: no depression, no anxiety,no panic attacks, no suicide ideation; Neurology: no syncope, no seizures, no numbness or tingling of hands, no numbness and tingling of feet, no paresis;     PHYSICAL EXAM:    Pulse 88   Ht 5' 3\" (1.6 m)   Wt 139 lb 3.2 oz (63.1 kg)   LMP 02/29/2016 (Approximate)   SpO2 97%   BMI 24.66 kg/m²     Pain scale:5    CONSTITUTIONAL: Alert, appropriate, no acute distress,   EYES: Non icteric, EOM intact, pupils equal round and reactive to light and accommodation. ENT: Oral mucus membranes moist, no oral pharyngeal lesions. External inspection of ears and nose are normal.   NECK: Supple, no masses.  No palpable thyroid mass    CHEST/LUNGS: CTA bilaterally, normal respiratory effort  Chaperoned upper body exam with Gamal Farrar RN -mild erythematous rash in the upper chest.  CARDIOVASCULAR: RRR, no murmurs. No lower extremity edema   ABDOMEN: soft non-tender, active bowel sounds, no hepatosplenomegaly. No palpable masses. EXTREMITIES: warm, Full ROM of all fours extremities. No focal weakness. SKIN: Significant interval worsening of erythematous involvement of the skin in the back and also abdomen. This is consistent with metastatic HER-2 positive breast cancer. LYMPH: No cervical, clavicular, axillary, or inguinal lymphadenopathy  NEUROLOGIC: follows commands, non focal.     Relevant Lab findings reviewed/analyzed by me:   Lab Results   Component Value Date    WBC 4.51 08/16/2021    HGB 12.8 08/16/2021    HCT 41.5 08/16/2021    MCV 94.1 08/16/2021     08/16/2021     Lab Results   Component Value Date    NEUTROABS 2.88 08/16/2021     Relevant Imaging studies reviewed/analyzed by me:   CT CHEST W CONTRAST    Result Date: 7/29/2021  1. No evidence of intrathoracic metastasis. 2. Similar left greater than right breast skin thickening with increased trabecular pattern of the left breast which may represent sequelae of radiation. Postoperative changes of the left breast and axilla. No pathologic axillary or internal mammary lymphadenopathy. 3. Left subclavian port in place with tip at the atrial caval junction. Signed by Dr Jessica Lim Additional Contrast? Oral    Result Date: 7/29/2021  1. No evidence of intra-abdominal or pelvic metastatic disease. 2. Small stable cyst at the upper pole left kidney. 3. Moderate constipation. Signed by Dr Ashutosh Hughes. [place]      ASSESSMENT:    No orders of the defined types were placed in this encounter. Nhan Louis was seen today for follow-up.     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 Due to increased toxicity and poor quality of life the patient request to stop treatment at this time. CT chest abdomen pelvis showed no evidence of intrathoracic or intra-abdominal metastatic disease  Status post completion of palliative radiation to the skin. August 2020-May 2021-Herceptin, Xeloda, tucatinib, progressive disease  February 2021-reviewed the scans and a 2D echo. No evidence of systemic metastatic disease. 2D echo normal stable EF. May 2021-progressive disease with extension of skin involvement to the back. Skin biopsy showed involvement by breast cancer      7/9/2021-Elvira has refused referral to radiation at this time. She is not interested in cytotoxic chemotherapy. She tells me today that she wants to try a holistic approach at this time. She will be back in 3-month or sooner if needed. 7/31/2021-CT chest abdomen pelvis showed no evidence of visceral metastasis. Continue radiation. Skin metastasis -she has had skin metastasis in the past and is status post palliative RT with significant improvement. Now with biopsy-proven recurrence. Continue radiation treatment with     Treatment related toxicity-cytopenias, fatigue    Cancer related pain-worsening. Increase morphine extended release 30 mg every 8 hours. Continue Percocet same dose. 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 She denies any significant change from previous evaluation. Lymphopenia-ALC some renal calcification 0.69     Plan:  1. RTC with MD 3 weeks  2.  Continue follow-up with lymphedema clinic  3. Continue follow-up with Dr. Josee Diaz for radiation therapy-started 8/9  4. Norco 10mg every 6 hrs as needed  5. Increase MS Contin to 30 mg every 8 hrs-script sent  6. Recommend Narcan to have at h ome as needed  7. Continue Ketamine 5%+Amitriptyline 5%+lidocaine 5% cream to skin for pruritus as needed  8. Consider Margetuximab in the future-after radiation therapy    Follow Up:     Return in about 3 weeks (around 9/6/2021) for CBC, Appointment with Dr. Lefty Cardoso. Data Holly Doherty am scribing for Ghanshyam Perry MD. Electronically signed by Petar Talbot RN on 8/16/2021 at 10:01 AM CDT. I, Dr Jose Alfredo Victoria, personally performed the services described in this documentation as scribed by Petar Talbot RN in my presence and is both accurate and complete. I have seen, examined and reviewed this patient medication list, appropriate labs and imaging studies. I reviewed relevant medical records and others physicians notes. I discussed the plans of care with the patient. I answered all the questions to the patients satisfaction. I have also reviewed the chief complaint (CC) and part of the history (History of Present Illness (HPI), Past Family Social History [facility]), or Review of Systems (ROS) and made changes when appropriated.        (Please note that portions of this note were completed with a voice recognition program. Efforts were made to edit the dictations but occasionally words are mis-transcribed.)

## 2021-08-17 NOTE — PROGRESS NOTES
Progress Note Addendum      Patient: Ayla Echeverria           : 1965  Visit Date: 2021  Referring practitioner: Gloria Hendricks*  Date of Initial Visit: Type: THERAPY  Noted: 10/15/2020  Patient seen for 42 sessions  Visit Diagnoses:    ICD-10-CM ICD-9-CM   1. Decreased ROM of left shoulder  M25.612 719.51   2. At risk for lymphedema  Z91.89 V49.89   3. Malignant neoplasm involving both nipple and areola of left breast in female, estrogen receptor positive (CMS/[facility])  C50.012 174.0    Z17.0 V86.0   4. Decreased ROM of right shoulder  M25.611 719.51   5. Regional lymph node metastasis present (CMS/[facility])  C77.9 196.9          Clinical Progress: worse  Home Program Compliance: Yes  Progress toward previous goals: Partially Met  Prognosis to achieve goals: fair    Objective     Assessment & Plan     Assessment  Impairments: abnormal or restricted ROM, activity intolerance, impaired physical strength, lacks appropriate home exercise program and pain with function  Prognosis: fair  Functional Limitations: uncomfortable because of pain, reaching overhead and unable to perform repetitive tasks  Plan  Therapy options: will be seen for skilled physical therapy services  Planned modality interventions: dry needling, low level laser therapy and TENS  Planned therapy interventions: manual therapy, strengthening, stretching, therapeutic activities, functional ROM exercises, ADL retraining, joint mobilization, soft tissue mobilization, neuromuscular re-education and flexibility  Frequency: 2-3 times per week.  Duration in weeks: 4  Treatment plan discussed with: PTA        I have reviewed the progress note information provided by Jaycee Mohan PTA, IDALIA and I concur with the findings.    Samia Newman, PT DPT  Physical Therapist

## 2021-08-18 ENCOUNTER — APPOINTMENT (OUTPATIENT)
Dept: INFUSION THERAPY | Age: 56
End: 2021-08-18
Payer: COMMERCIAL

## 2021-08-19 NOTE — PROGRESS NOTES
PhysicalTherapy Treatment Note     Patient:           Ayla Echeverria            : 1965  Today's Date: 2021  Referring practitioner: Gloria Hendricks*  Date of Initial Visit:       Type: THERAPY  Noted: 10/15/2020  Patient seen for 45 sessions    Visit Diagnoses:    ICD-10-CM ICD-9-CM   1. Decreased ROM of left shoulder  M25.612 719.51   2. At risk for lymphedema  Z91.89 V49.89   3. Malignant neoplasm involving both nipple and areola of left breast in female, estrogen receptor positive (CMS/HCC)  C50.012 174.0    Z17.0 V86.0   4. Decreased ROM of right shoulder  M25.611 719.51   5. Regional lymph node metastasis present (CMS/[facility])  C77.9 196.9   6. Lymphedema  I89.0 457.1       SUBJECTIVE       Subjective:  Patient states her pain today is a 6/10.  She was able to tolerate the radiation yesterday and will try again tomorrow.  Patient's daughter states her mother can only tolerate the compression for about 24 hours then it becomes painful and has to be removed.  Patient's daughter is wrapping the L arm at home, patient continues to have fluid weeping from the L arm pit.          OBJECTIVE     Objective   Lymphedema     Row Name 21 1616             Subjective Pain    Able to rate subjective pain?  yes  -AL      Pre-Treatment Pain Level  6  -AL      Subjective Pain Comment  L arm  -AL         Manual Lymphatic Drainage    Manual Lymphatic Drainage  initial sequence;opened regional lymph nodes;opened anastamoses;extremity treatment  -AL      Initial Sequence  short neck;abdomen;diaphragmatic breathing  -AL      Abdomen  superficial  -AL      Diaphragmatic Breathing  x 3  -AL      Opened Regional Lymph Nodes  inguinal also sent fluid to L SCF  -AL      Inguinal  left  -AL      Opened Anastamoses  axillo-inguinal Sent fluid from L scapula to L SCF  -AL      Anterior Axillo-Axillary  --  -AL      Axillo-Inguinal  left  -AL      Extremity Treatment  extremity treatment focus on  -AL      MLD to Full  Limb  Did the MLD after the L UE was wrapped.  Did MLD   -AL      Extremity Treatment Focus On  L upper arm and L lateral trunk   -AL      Manual Lymphatic Drainage Comments  Did MLD to the L upper arm, L lateral trunk, and abdomen  -AL      Manual Therapy  45  -AL         Compression/Skin Care    Compression/Skin Care  skin care;wrapping location;bandaging  -AL      Skin Care  moisturizing lotion applied  -AL      Wrapping Location  upper extremity  -AL      Wrapping Location UE  left:;fingers to axilla  -AL      Bandage Layers  cotton liner;padding/fluff layer;short-stretch bandages (comment size/quantity)  -AL      Bandaging Comments  Used a 4 in stockinette, two 1 in finger wraps, Artiflex, one size 6cm and one size 8 cm Comprilan  -AL      Bandaging Technique  circumferential/spiral;light compression  -AL      Remove Bandages  after 22-24 hours or if painful.   -AL        User Key  (r) = Recorded By, (t) = Taken By, (c) = Cosigned By    Initials Name Provider Type    Jaycee Farris PTA, CLT-LANA Physical Therapy Assistant           Therapy Education/Self Care [id number]   Details: Have family re-wrap the L UE as needed.     [employer] Code: N/A   Given Compression   Progress: New   Who provided to: Patent   Level of understanding Verbalized    Timed Minutes      Goals  Progress Note Due:  9/11/21     STG by: 11/14/20 Comments Status   Patient will begin a home exercise program to accelerate the recovery process   Met   Patient will have an increase in bilateral shoulder flexion to 160 deg in supine L shoulder flexion 109 degrees  R shoulder flexion 146 degrees Ongoing   Patient will have a 25% reduction in chest and shoulder pain She has had increase pain since the progression of her cancer Ongoing   Patient will gain an understanding of Lymphedema and risk factors   Met   LTG by:       Patient will be able to reach up into an overhead cabinet to retrieve a 5 lb dish safely  She can reach with the R arm,  difficulty with the L arm.  Partially met    Patient will regain ROM to reach behind her back as if she was undoing a bra She is having more difficulty recently Ongoing   Patient will have a 75 - 90% reduction in tightness/pain across chest and shoulders Pain has increased pain with the progression of her cancer.  Ongoing   Patient will be able to perform household chores without exacerbation of symptoms Has tightness with B shoulder abduction and L shoulder flexion making chores more difficult. Ongoing   Patient will regain functional ROM of both shoulders to perform activities overhead, reaching out to retrieve food from a drive-thru, reaching behind her back to fasten/unfasten bras,clothing   ROM has decreased with progression of her cancer.  Ongoing   Patient will be instructed in a long term exercise program to continue to make gains following discharge   Patient has been working on her HEP more. She will work on more stretching of B shoulders as well as working on self MLD.  Ongoing progressing              Total Timed Treatment:     45   mins  Total Time of Visit:            45  mins         ASSESSMENT/PLAN     Assessment/Plan:       ASSESSMENT:  Patient's pain is less today but she is still in intense pain. Dr. Ott has started her on morphine for pain.  Patient is not able to tolerate the compression wraps for more than 24 hours.  Her gauntlet is in, she will try the compression sleeve and gauntlet during the day.  She will have to wrap the fingers when wearing the gauntlet since the fingers are now swelling as well.    PLAN: Will continue  working on decreasing swelling in the L UE and improving comfort.    Jaycee Mohan, VAUGHN, JUDSON-EVANGELINA  Physical Therapy Assistant

## 2021-08-20 NOTE — PROGRESS NOTES
BRIAN met with Ms. Echeverria who states she lost her job on Wednesday. She did not know if she still had insurance. BRIAN recommended Ms. Echeverria to call her insurance to see if it still active and spoke to her regarding COBRA. BRIAN contacted [employer] who will call Ms. Echeverria and screen her to see if she qualifies for Medicaid. BRIAN also provided her with contact information to the financial counselors and a checklist for the disability application.

## 2021-08-20 NOTE — TELEPHONE ENCOUNTER
Ayla had called stating she had removed the compression wraps last night around 11:00 and wanted to put the compression sleeve and gauntlet on but her arm was too swollen for the sleeve.  I instructed patient and her daughter to wrap the L UE a few hours, then remove the wraps so the arm would be less swollen then don the compression sleeve and gauntlet.  She will have to have the fingers wrapped since her fingers are now swelling.

## 2021-08-24 ENCOUNTER — TELEPHONE (OUTPATIENT)
Dept: HEMATOLOGY | Age: 56
End: 2021-08-24

## 2021-08-24 NOTE — PROGRESS NOTES
PhysicalTherapy Treatment Note     Patient:           Ayla Echeverria            : 1965  Today's Date: 2021  Referring practitioner: Gloria Hendricks*  Date of Initial Visit:       Type: THERAPY  Noted: 10/15/2020  Patient seen for 46 sessions    Visit Diagnoses:    ICD-10-CM ICD-9-CM   1. Decreased ROM of left shoulder  M25.612 719.51   2. At risk for lymphedema  Z91.89 V49.89   3. Malignant neoplasm involving both nipple and areola of left breast in female, estrogen receptor positive (CMS/[facility])  C50.012 174.0    Z17.0 V86.0   4. Decreased ROM of right shoulder  M25.611 719.51   5. Regional lymph node metastasis present (CMS/[facility])  C77.9 196.9   6. Lymphedema  I89.0 457.1       SUBJECTIVE       Subjective:  Patient states she is in more pain today, she is hurting in the L arm and her back.  She has been able to tolerate 4 radiation treatments so far.  She now has an open area on the L lateral breast/trunk area, she is wondering if she should go to wound care.  Her daughter is with her today and states she has been wrapping her mother's hand and fingers with the 1 inch gauze, wrapping the L UE with one wrap, then putting the gantlet over the hand.  The arm is not going down enough to use the sleeve, the R arm is starting to swell more and patient states her abdomen is swelling more as well.        OBJECTIVE     Objective   Lymphedema     Row Name 21 1415             Subjective Pain    Able to rate subjective pain?  yes  -AL      Pre-Treatment Pain Level  10  -AL      Subjective Pain Comment  L arm and back  -AL         Manual Lymphatic Drainage    Manual Lymphatic Drainage  initial sequence;opened regional lymph nodes;opened anastamoses;extremity treatment  -AL      Initial Sequence  short neck;abdomen;diaphragmatic breathing  -AL      Abdomen  superficial  -AL      Diaphragmatic Breathing  x 10 with superficial abdominals  -AL      Opened Regional Lymph Nodes  inguinal also sent fluid to L  SCF  -AL      Inguinal  left  -AL      Opened Anastamoses  axillo-inguinal Sent fluid from L scapula to L SCF  -AL      Axillo-Inguinal  left  -AL      Extremity Treatment  extremity treatment focus on;simple/brief MLD;MLD to proximal limb only  -AL      Simple/Brief MLD  R UE  -AL      MLD to Proximal Limb Only  L UE  -AL      MLD to Full Limb  Did the MLD after the L UE was wrapped.  Did MLD with patient in sitting  -AL      Extremity Treatment Focus On  Lateral trunk  -AL      Manual Lymphatic Drainage Comments  Patient did call her oncologist Dr Ott about the intense pain and seeing wound care.  Dr. Ott's nurse stated Dr. Dolan wants patient to go to the ER for her pain, the nurse was going to ask Dr. Ott about wound care.   -AL      Manual Therapy  45  -AL         Compression/Skin Care    Compression/Skin Care  skin care;wrapping location;bandaging  -AL      Skin Care  --  -AL      Wrapping Location  upper extremity  -AL      Wrapping Location UE  left:;fingers to axilla  -AL      Bandage Layers  cotton liner;padding/fluff layer;short-stretch bandages (comment size/quantity)  -AL      Bandaging Comments  Used a 4 in stockinette, One 1 in finger wraps, Artiflex, one size 8 cm Comprilan  -AL      Bandaging Technique  circumferential/spiral;light compression  -AL      Compression Garment Comments  Gauntlet on the L hand over the hand/finger wraps  -AL      Remove Bandages  after 22-24 hours or if painful.   -AL      Compression/Skin Care Comments  I did tell patient we would not wrap the R UE as I feel it would be too much moving fluid to the trunk at this time.   -AL        User Key  (r) = Recorded By, (t) = Taken By, (c) = Cosigned By    Initials Name Provider Type    AL Jaycee Mohan, VAUGHN, CLT-EVANGELINA Physical Therapy Assistant           Therapy Education/Self Care [id number]   Details: Have family re-wrap the L UE as needed.     [employer] Code: N/A   Given Compression   Progress: New   Who provided  to: Patent   Level of understanding Verbalized    Timed Minutes      Goals  Progress Note Due:  9/11/21     STG by: 11/14/20 Comments Status   Patient will begin a home exercise program to accelerate the recovery process   Met   Patient will have an increase in bilateral shoulder flexion to 160 deg in supine L shoulder flexion 109 degrees  R shoulder flexion 146 degrees Ongoing   Patient will have a 25% reduction in chest and shoulder pain She has had increase pain since the progression of her cancer Ongoing   Patient will gain an understanding of Lymphedema and risk factors   Met   LTG by:       Patient will be able to reach up into an overhead cabinet to retrieve a 5 lb dish safely  She can reach with the R arm, difficulty with the L arm.  Partially met    Patient will regain ROM to reach behind her back as if she was undoing a bra She is having more difficulty recently Ongoing   Patient will have a 75 - 90% reduction in tightness/pain across chest and shoulders Pain has increased pain with the progression of her cancer.  Ongoing   Patient will be able to perform household chores without exacerbation of symptoms Has tightness with B shoulder abduction and L shoulder flexion making chores more difficult. Ongoing   Patient will regain functional ROM of both shoulders to perform activities overhead, reaching out to retrieve food from a drive-thru, reaching behind her back to fasten/unfasten bras,clothing   ROM has decreased with progression of her cancer.  Ongoing   Patient will be instructed in a long term exercise program to continue to make gains following discharge   Patient has been working on her HEP more. She will work on more stretching of B shoulders as well as working on self MLD.  Ongoing progressing              Total Timed Treatment:     45   mins  Total Time of Visit:            45  mins         ASSESSMENT/PLAN     Assessment/Plan:       ASSESSMENT:  Patient's pain level was high today, she was  instructed by Dr Ott to go to the ER for pain if needed.  Patient has an open wound on the L lateral breat/lateral trunk area she thinks is from scratching.  She is now having more swelling in the R UE and the abdomen.  Patient continues to have swelling in the L UE that her daughter helps control with the compression wraps and gauntlet, patient is not able to tolerate the wraps long and once the wraps are removed the arm starts to swell again. Patient can no longer fit into the compression sleeve for the L UE.     PLAN: Will continue  working on decreasing swelling in the L UE and improving comfort.    Jaycee Mohan, VAUGHN, CLT-EVANGELINA  Physical Therapy Assistant

## 2021-08-26 DIAGNOSIS — G89.3 CANCER ASSOCIATED PAIN: ICD-10-CM

## 2021-08-26 DIAGNOSIS — C50.412 MALIGNANT NEOPLASM OF UPPER-OUTER QUADRANT OF LEFT FEMALE BREAST, UNSPECIFIED ESTROGEN RECEPTOR STATUS (HCC): ICD-10-CM

## 2021-08-26 DIAGNOSIS — Z17.1 MALIGNANT NEOPLASM OF UPPER-OUTER QUADRANT OF LEFT BREAST IN FEMALE, ESTROGEN RECEPTOR NEGATIVE (HCC): ICD-10-CM

## 2021-08-26 DIAGNOSIS — C50.412 MALIGNANT NEOPLASM OF UPPER-OUTER QUADRANT OF LEFT FEMALE BREAST, UNSPECIFIED ESTROGEN RECEPTOR STATUS (HCC): Primary | ICD-10-CM

## 2021-08-26 DIAGNOSIS — C79.2 CARCINOMATOUS METASTASIS IN SKIN (HCC): ICD-10-CM

## 2021-08-26 DIAGNOSIS — C50.412 MALIGNANT NEOPLASM OF UPPER-OUTER QUADRANT OF LEFT BREAST IN FEMALE, ESTROGEN RECEPTOR NEGATIVE (HCC): ICD-10-CM

## 2021-08-26 RX ORDER — HYDROCODONE BITARTRATE AND ACETAMINOPHEN 10; 325 MG/1; MG/1
1 TABLET ORAL EVERY 6 HOURS PRN
Qty: 120 TABLET | Refills: 0 | Status: SHIPPED | OUTPATIENT
Start: 2021-08-26 | End: 2021-09-25

## 2021-08-26 NOTE — PROGRESS NOTES
PhysicalTherapy Treatment Note     Patient:           Ayla Echeverria            : 1965  Today's Date: 2021  Referring practitioner: Gloria Hendricks*  Date of Initial Visit:       Type: THERAPY  Noted: 10/15/2020  Patient seen for 47 sessions    Visit Diagnoses:    ICD-10-CM ICD-9-CM   1. Decreased ROM of left shoulder  M25.612 719.51   2. At risk for lymphedema  Z91.89 V49.89   3. Malignant neoplasm involving both nipple and areola of left breast in female, estrogen receptor positive (CMS/[facility])  C50.012 174.0    Z17.0 V86.0   4. Decreased ROM of right shoulder  M25.611 719.51   5. Regional lymph node metastasis present (CMS/[facility])  C77.9 196.9   6. Lymphedema  I89.0 457.1       SUBJECTIVE       Subjective:  Patient's daughter states her mother has been able to tolerate the compression wraps longer.  The arm and had do not get as small as they did before.  Her daughter called Dr. Ott's office during treatment, he will send an order for patient for wound care for the open area L axilla/breast.  Patient thinks her abdomen is a little better today.     OBJECTIVE     Objective   Lymphedema     Row Name 21 0961             Subjective Pain    Able to rate subjective pain?  yes  -AL      Pre-Treatment Pain Level  10  -AL      Subjective Pain Comment  L arm and back.  -AL         Manual Lymphatic Drainage    Manual Lymphatic Drainage  initial sequence;opened regional lymph nodes;opened anastamoses;extremity treatment  -AL      Initial Sequence  short neck;abdomen;diaphragmatic breathing  -AL      Abdomen  superficial  -AL      Opened Regional Lymph Nodes  inguinal also sent fluid to L SCF  -AL      Inguinal  left  -AL      Opened Anastamoses  axillo-inguinal Sent fluid from L scapula to L SCF  -AL      Axillo-Inguinal  left  -AL      Extremity Treatment  extremity treatment focus on;simple/brief MLD;MLD to proximal limb only  -AL      Simple/Brief MLD  R UE  -AL      MLD to Full Limb  L UE  -AL       Extremity Treatment Focus On  L UE  -AL      Manual Therapy  35  -AL         Compression/Skin Care    Compression/Skin Care  skin care;wrapping location;bandaging  -AL      Wrapping Location  upper extremity  -AL      Wrapping Location UE  left:;fingers to axilla  -AL      Bandage Layers  cotton liner;padding/fluff layer;short-stretch bandages (comment size/quantity)  -AL      Bandaging Comments  Used a 4 in stockinette, One 1 in finger wraps, Artiflex, one size 8 cm Comprilan  -AL      Bandaging Technique  circumferential/spiral;light compression  -AL      Compression Garment Comments  Gauntlet on the L hand over the hand/finger wraps  -AL      Remove Bandages  after 22-24 hours or if painful.   -AL        User Key  (r) = Recorded By, (t) = Taken By, (c) = Cosigned By    Initials Name Provider Type    Jaycee Farris PTA, CLT-LANA Physical Therapy Assistant           Therapy Education/Self Care [id number]   Details: Have family re-wrap the L UE as needed.     [employer] Code: N/A   Given Compression   Progress: New   Who provided to: Patent   Level of understanding Verbalized    Timed Minutes      Goals  Progress Note Due:  9/11/21     STG by: 11/14/20 Comments Status   Patient will begin a home exercise program to accelerate the recovery process   Met   Patient will have an increase in bilateral shoulder flexion to 160 deg in supine L shoulder flexion 109 degrees  R shoulder flexion 146 degrees Ongoing   Patient will have a 25% reduction in chest and shoulder pain She has had increase pain since the progression of her cancer Ongoing   Patient will gain an understanding of Lymphedema and risk factors   Met   LTG by:       Patient will be able to reach up into an overhead cabinet to retrieve a 5 lb dish safely  She can reach with the R arm, difficulty with the L arm.  Partially met    Patient will regain ROM to reach behind her back as if she was undoing a bra She is having more difficulty recently Ongoing    Patient will have a 75 - 90% reduction in tightness/pain across chest and shoulders Pain has increased pain with the progression of her cancer.  Ongoing   Patient will be able to perform household chores without exacerbation of symptoms Has tightness with B shoulder abduction and L shoulder flexion making chores more difficult. Ongoing   Patient will regain functional ROM of both shoulders to perform activities overhead, reaching out to retrieve food from a drive-thru, reaching behind her back to fasten/unfasten bras,clothing   ROM has decreased with progression of her cancer.  Ongoing   Patient will be instructed in a long term exercise program to continue to make gains following discharge   Patient has been working on her HEP more. She will work on more stretching of B shoulders as well as working on self MLD.  Ongoing progressing              Total Timed Treatment:     35   mins  Total Time of Visit:            35  mins         ASSESSMENT/PLAN     Assessment/Plan:       ASSESSMENT:  Patient continues to have a high level of pain, she is having a lot of pain in the back area. Patient has been able to wear the wraps longer on the L UE, her daughter has been doing a great job wrapping the L UE.  The R UE is starting to swell more but I do believe compression on both UE's at the same time would be too much for patient's system.  She does have some discomfort in the back area when I am doing the MLD on the arms, this may be due to the fluid moving.      PLAN: Will continue  working on decreasing swelling in the L UE and improving comfort.    Jaycee Mohan, PTA, T-EVANGELINA  Physical Therapy Assistant

## 2021-08-30 ENCOUNTER — HOSPITAL ENCOUNTER (OUTPATIENT)
Dept: WOUND CARE | Age: 56
Discharge: HOME | End: 2021-08-30
Payer: COMMERCIAL

## 2021-08-30 VITALS
HEIGHT: 63 IN | TEMPERATURE: 98 F | RESPIRATION RATE: 20 BRPM | BODY MASS INDEX: 23.39 KG/M2 | WEIGHT: 132 LBS | HEART RATE: 88 BPM

## 2021-08-30 DIAGNOSIS — C50.919 HER2-POSITIVE CARCINOMA OF BREAST (HCC): Primary | ICD-10-CM

## 2021-08-30 DIAGNOSIS — S21.002D BREAST WOUND, LEFT, SUBSEQUENT ENCOUNTER: Chronic | ICD-10-CM

## 2021-08-30 DIAGNOSIS — C77.3 SECONDARY MALIGNANT NEOPLASM OF AXILLARY LYMPH NODES (HCC): ICD-10-CM

## 2021-08-30 DIAGNOSIS — L98.492 CHEST WALL ULCER, WITH FAT LAYER EXPOSED (HCC): ICD-10-CM

## 2021-08-30 DIAGNOSIS — Z17.0 MALIGNANT NEOPLASM OF UPPER-OUTER QUADRANT OF LEFT BREAST IN FEMALE, ESTROGEN RECEPTOR POSITIVE (HCC): ICD-10-CM

## 2021-08-30 DIAGNOSIS — I89.0 LYMPHEDEMA OF BOTH UPPER EXTREMITIES: ICD-10-CM

## 2021-08-30 DIAGNOSIS — C50.412 MALIGNANT NEOPLASM OF UPPER-OUTER QUADRANT OF LEFT BREAST IN FEMALE, ESTROGEN RECEPTOR POSITIVE (HCC): ICD-10-CM

## 2021-08-30 PROCEDURE — 99203 OFFICE O/P NEW LOW 30 MIN: CPT | Performed by: NURSE PRACTITIONER

## 2021-08-30 PROCEDURE — 99213 OFFICE O/P EST LOW 20 MIN: CPT

## 2021-08-30 ASSESSMENT — PAIN DESCRIPTION - PAIN TYPE: TYPE: CHRONIC PAIN

## 2021-08-30 ASSESSMENT — PAIN SCALES - GENERAL: PAINLEVEL_OUTOF10: 10

## 2021-08-30 ASSESSMENT — PAIN DESCRIPTION - LOCATION: LOCATION: BACK

## 2021-08-30 ASSESSMENT — PAIN DESCRIPTION - DESCRIPTORS: DESCRIPTORS: ACHING

## 2021-08-31 PROBLEM — L98.492 CHEST WALL ULCER, WITH FAT LAYER EXPOSED (HCC): Status: ACTIVE | Noted: 2021-08-31

## 2021-08-31 PROBLEM — I89.0 LYMPHEDEMA OF BOTH UPPER EXTREMITIES: Status: ACTIVE | Noted: 2021-08-31

## 2021-08-31 PROBLEM — D70.9 NEUTROPENIA (HCC): Status: RESOLVED | Noted: 2019-06-07 | Resolved: 2021-08-31

## 2021-08-31 ASSESSMENT — VISUAL ACUITY: OU: 1

## 2021-08-31 NOTE — HOME CARE
Deshaun-er [address] f: [phone] f: [phone] p: [phone] [email or web address]      Ordering Center:     [address] [place] [address],[address]  [address] [facility], [place] [address] [address] [phone]  WOUND CARE Dept: [address] [facility] [phone]    Patient Information:      Huber Palomo [id number]   [phone]   : 1965  AGE: 54 y.o. GENDER: female   EPISODE DATE: 2021    Insurance:      PRIMARY INSURANCE:  Plan: [employer]  Coverage: BCBS  Effective Date: 2021  Group Number: [unfilled]  Subscriber Number: [id number] - (BX Traditional)    Payor/Plan Subscr  Sex Relation Sub. Ins. ID Effective Group Num   1. [facility] 1965 Female Self [id number]                                   [address]   2.  Carolynnjulia RosaesLISA NUNN 1965 Female Self [id number]                                    [address] [place] [address], [address] [place] [address]       Patient Wound Information:      Problem List Items Addressed This Visit     Breast wound, left, subsequent encounter (Chronic)    Breast cancer of upper-outer quadrant of left female breast ([address]    Secondary malignant neoplasm of axillary lymph nodes ([address]    HER2-positive carcinoma of breast ([address] - Primary    * (Principal) Chest wall ulcer, with fat layer exposed ([address]          WOUNDS REQUIRING DRESSING SUPPLIES:     Wound 21 Breast Lateral;Left wound 1- left breast radiation (Active)   Wound Image    21 1343   Wound Etiology Other 21 1343   Dressing Status Old drainage noted 21 1343   Wound Cleansed Soap and water 21 1343   Wound Length (cm) 7 cm 21 1343   Wound Width (cm) 7 cm 21 1343   Wound Depth (cm) 0.1 cm 21 1343   Wound Surface Area (cm^2) 49 cm^2 21 1343   Wound Volume (cm^3) 4.9 cm^3 08/30/21 1343   Wound Assessment Slough;[place]/red 08/30/21 1343   Drainage Amount Moderate 08/30/21 1343   Drainage Description Serous 08/30/21 1343   Odor None 08/30/21 1343   Isabelle-wound Assessment Intact;Edematous 08/30/21 1343   Margins Defined edges 08/30/21 1343   Wound Thickness Description not for Pressure Injury Full thickness 08/30/21 1343   Number of days: 0          Supplies Requested :      WOUND #: 1   PRIMARY DRESSING:  Other: mepilex lite   Cover and Secure with: None     FREQUENCY OF DRESSING CHANGES:  Daily     ADDITIONAL ITEMS:  [] Gloves Small  [] Gloves Medium [x] Gloves Large [] Gloves XLarge  [] Tape 1\" [] Tape 2\" [] Tape 3\"  [] Medipore Tape  [] Saline  [] Skin Prep   [] Adhesive Remover   [] Cotton Tip Applicators   [] Other:    Patient Wound(s) Debrided: [x] Yes if yes please add date 8/30/21   [] No    Debribement Type: Mechanical     Patient currently being seen by Home Health: [] Yes   [x] No    Duration for needed supplies:  []15  [x]30  []60  []90 Days    Electronically signed by MILLICENT Joseph CNP on 8/30/2021 at 8:14 AM     Provider Information:      PROVIDER'S NAME: Az RICKS    NPI: [id number]

## 2021-08-31 NOTE — PROGRESS NOTES
tablet by mouth every 8 hours as needed for Nausea or Vomiting 30 tablet 5     No current facility-administered medications for this encounter. Allergies: Carboplatin and Platinum-containing compounds  Past Medical History:   Diagnosis Date    Cancer ([address]     breast    GERD (gastroesophageal reflux disease)        Past Surgical History:   Procedure Laterality Date    BREAST LUMPECTOMY Left 10/4/2016    LUMPECTOMY WITH SENTINAL NODE BIOPSY AND INTRA OP US GUIDED NEEDLE LOCALIZATION performed by Kaitlynn Luna MD at [address] [place] [address]      biopsy left breast    COLONOSCOPY  2008    INSERTION / REMOVAL / REPLACEMENT VENOUS ACCESS CATHETER N/A 5/12/2016    SINGLE LUMEN PORT INSERTION performed by Kaitlynn Luna MD at [address] [facility] DIAGNOSTIC BONE MARROW BIOPSIES Right 11/14/2018    BONE MARROW ASPIRATION BIOPSY performed by Matthew Henry MD at [facility]     Family History   Problem Relation Age of Onset    Lung Cancer Father     Colon Cancer Father     Prostate Cancer Father     Hypertension Mother      Social History     Tobacco Use    Smoking status: Never Smoker    Smokeless tobacco: Never Used   Substance Use Topics    Alcohol use: No     Alcohol/week: 0.0 standard drinks         Review of Systems    Review of Systems   Skin: Positive for wound. All other systems reviewed and are negative. All other review of systems are negative. Physical Exam    Pulse 88   Temp 98 °F (36.7 °C) (Temporal)   Resp 20   Ht 5' 3\" (1.6 m)   Wt 132 lb (59.9 kg)   LMP 02/29/2016 (Approximate)   BMI 23.38 kg/m²     Physical Exam  Vitals reviewed. Exam conducted with a chaperone present. Constitutional:       Appearance: Normal appearance. She is normal weight. HENT:      Head: Normocephalic and atraumatic. Right Ear: External ear normal.      Left Ear: External ear normal.   Eyes:      General: Lids are normal. Lids are everted, no foreign bodies appreciated.  Vision grossly intact. Gaze aligned appropriately. Cardiovascular:      Rate and Rhythm: Normal rate and regular rhythm. Pulses: Normal pulses. Heart sounds: Normal heart sounds. Pulmonary:      Effort: Pulmonary effort is normal.      Breath sounds: Normal breath sounds. Abdominal:      General: Bowel sounds are normal.   Musculoskeletal:         General: Normal range of motion. Skin:     General: Skin is warm and dry. Findings: Wound present. Neurological:      Mental Status: She is alert and oriented to person, place, and time. Psychiatric:         Mood and Affect: Mood normal.         Behavior: Behavior normal.         Thought Content: Thought content normal.         Judgment: Judgment normal.             Post Debridement Measurements and Assessment:    The patientspain isPain Level: 10 Pain Type: Chronic pain. Please refer to nursing measurements and assessment regarding wound pre and postdebridement. Wound 08/30/21 Breast Lateral;Left wound 1- left breast radiation (Active)   Wound Image    08/30/21 1343   Wound Etiology Other 08/30/21 1343   Dressing Status Old drainage noted 08/30/21 1343   Wound Cleansed Soap and water 08/30/21 1343   Wound Length (cm) 7 cm 08/30/21 1343   Wound Width (cm) 7 cm 08/30/21 1343   Wound Depth (cm) 0.1 cm 08/30/21 1343   Wound Surface Area (cm^2) 49 cm^2 08/30/21 1343   Wound Volume (cm^3) 4.9 cm^3 08/30/21 1343   Wound Assessment Slough;[place]/red 08/30/21 1343   Drainage Amount Moderate 08/30/21 1343   Drainage Description Serous 08/30/21 1343   Odor None 08/30/21 1343   Isabelle-wound Assessment Intact;Edematous 08/30/21 1343   Margins Defined edges 08/30/21 1343   Wound Thickness Description not for Pressure Injury Full thickness 08/30/21 1343   Number of days: 0            Assessment    1. HER2-positive carcinoma of breast ([address]    2. Chest wall ulcer, with fat layer exposed ([address]    3. Secondary malignant neoplasm of axillary lymph nodes ([address]    4. Malignant neoplasm of upper-outer quadrant of left breast in female, estrogen receptor positive ([facility]    5. Breast wound, left, subsequent encounter    6. Lymphedema of both upper extremities          Plan    1. Follow up in 1 month    Plan for wound - Dress per physician order  Treatment:     Compression : No   Offloading : No   Dressing : interdry in axilla/chest    Discussed importance of nutrition and plan of care. Patient understanding and questions answered. I spent a total of 30 minutes face to face with the patient. Over 75% of that time was spent on counseling and care coordination. Patient was told that if symptoms worsen or new symptoms develop they are to go to the emergency department immediately. Patient was educated on diagnosis and treatment plan. All of patient's questions were answered, and the patient understands the discharge plan. Discussed appropriate home care of this wound. Wound redressed. Patient instructions were given.   Recommend no smoking  Offloading instructions given

## 2021-08-31 NOTE — PROGRESS NOTES
PhysicalTherapy Treatment Note     Patient:           Ayla Echeverria            : 1965  Today's Date: 2021  Referring practitioner: No ref. provider found  Date of Initial Visit:       Type: THERAPY  Noted: 10/15/2020  Patient seen for 48 sessions    Visit Diagnoses:    ICD-10-CM ICD-9-CM   1. Decreased ROM of left shoulder  M25.612 719.51   2. At risk for lymphedema  Z91.89 V49.89   3. Malignant neoplasm involving both nipple and areola of left breast in female, estrogen receptor positive (CMS/[facility])  C50.012 174.0    Z17.0 V86.0   4. Decreased ROM of right shoulder  M25.611 719.51   5. Lymphedema  I89.0 457.1   6. Regional lymph node metastasis present (CMS/[facility])  C77.9 196.9       SUBJECTIVE       Subjective:  Patient states she is not able to tolerate the wraps very long and the gauntlet is not fitting anymore since the left hand is more swollen.  She went to wound care and they did give her Tubigrip which she was able to wear.  Wound care also suggested a basic lymphedema pump.     OBJECTIVE     Objective   Lymphedema     Row Name 21 1035             Subjective Pain    Able to rate subjective pain?  yes  -AL      Pre-Treatment Pain Level  10  -AL         Manual Lymphatic Drainage    Manual Lymphatic Drainage  initial sequence;opened regional lymph nodes;opened anastamoses;extremity treatment  -AL      Initial Sequence  short neck;abdomen;diaphragmatic breathing  -AL      Abdomen  superficial  -AL      Opened Regional Lymph Nodes  inguinal also sent fluid to L SCF  -AL      Inguinal  left  -AL      Opened Anastamoses  axillo-inguinal Sent fluid from L scapula to L SCF  -AL      Axillo-Inguinal  left  -AL      Extremity Treatment  extremity treatment focus on;simple/brief MLD;MLD to proximal limb only  -AL      Extremity Treatment Focus On  B UE's  -AL      Manual Lymphatic Drainage Comments  Went over MLD with patient's daugther again, she did take a video as well.  Keyla Bennett PT, DPT,  IDALIA and I discussed the basic pump with patient which we feel would be too much pressure and painful for the arms.  Also disucssed the Flexitouch pump which would be difficulty for patient to tolerate even just a few minutes due to her pain.   -AL      Manual Therapy  35  -AL         Compression/Skin Care    Compression/Skin Care  skin care;wrapping location;bandaging  -AL      Wrapping Location  upper extremity  -AL      Wrapping Location UE  left:;fingers to axilla  -AL      Bandage Layers  cotton liner;padding/fluff layer;short-stretch bandages (comment size/quantity)  -AL      Bandaging Comments  Used a 4 in stockinette, two 1 in finger wraps, Artiflex, one size 6cm and one size 8 cm Comprilan  -AL      Bandaging Technique  circumferential/spiral;light compression  -AL      Remove Bandages  As long as she can tolerate it.   -AL      Compression/Skin Care Comments  Only use compression on one arm at a time, can alternate wrapping arms if needed.   -AL        User Key  (r) = Recorded By, (t) = Taken By, (c) = Cosigned By    Initials Name Provider Type    Jaycee Farris PTA, CLT-LANA Physical Therapy Assistant           Therapy Education/Self Care [id number]   Details: Have family re-wrap the L UE as needed.     [employer] Code: N/A   Given Compression   Progress: New   Who provided to: Patent   Level of understanding Verbalized    Timed Minutes      Goals  Progress Note Due:  9/11/21     STG by: 11/14/20 Comments Status   Patient will begin a home exercise program to accelerate the recovery process   Met   Patient will have an increase in bilateral shoulder flexion to 160 deg in supine L shoulder flexion 109 degrees  R shoulder flexion 146 degrees Ongoing   Patient will have a 25% reduction in chest and shoulder pain She has had increase pain since the progression of her cancer Ongoing   Patient will gain an understanding of Lymphedema and risk factors   Met   LTG by:       Patient will be able to reach up into  an overhead cabinet to retrieve a 5 lb dish safely  She can reach with the R arm, difficulty with the L arm.  Partially met    Patient will regain ROM to reach behind her back as if she was undoing a bra She is having more difficulty recently Ongoing   Patient will have a 75 - 90% reduction in tightness/pain across chest and shoulders Pain has increased pain with the progression of her cancer.  Ongoing   Patient will be able to perform household chores without exacerbation of symptoms Has tightness with B shoulder abduction and L shoulder flexion making chores more difficult. Ongoing   Patient will regain functional ROM of both shoulders to perform activities overhead, reaching out to retrieve food from a drive-thru, reaching behind her back to fasten/unfasten bras,clothing   ROM has decreased with progression of her cancer.  Ongoing   Patient will be instructed in a long term exercise program to continue to make gains following discharge   Patient has been working on her HEP more. She will work on more stretching of B shoulders as well as working on self MLD.  Ongoing progressing              Total Timed Treatment:     35   mins  Total Time of Visit:            35  mins         ASSESSMENT/PLAN     Assessment/Plan:       ASSESSMENT:  Patient is in intense pain today and it is very difficult for her to stay in one position.  She is not tolerating the wraps as long and her L hand is so large she is not able to wear the gauntlet.  The L arm and hand are now both very swollen, she may use the Tubigrip wound care gave her but today she felt like her arms were so swollen the Tubigrip would not fit.  We did decide that the lymphedema pumps would not be a good option at this time and for patient's family to wrap and massage the arms as needed for patient's comfort.     PLAN: Will continue  working on decreasing swelling in the L UE and improving comfort.    Jaycee Mohan, VAUGHN, CLT-EVANGELINA  Physical Therapy Assistant

## 2021-09-05 PROBLEM — M79.89 ARM SWELLING: Status: ACTIVE | Noted: 2021-01-01

## 2021-09-05 NOTE — CONSULTS
MEDICAL ONCOLOGY CONSULTATION    Pt Name: Ayla Echeverria  MRN: [id number]  YOB: 1965  Date of evaluation: 9/5/2021    REASON FOR CONSULTATION:    REQUESTING PHYSICIAN:    History Obtained From:    patient, electronic medical record    HISTORY OF PRESENT ILLNESS:   Ayla Echeverria is well known to my clinic.  She has a diagnosis of invasive ductal carcinoma the left breast.  She was initially diagnosed in 2016.  She had locally advanced disease.  Unfortunately, she had significant compliance issues when she was diagnosed.  Later on she developed widespread metastatic disease with predominantly skin recurrence. She has received several lines of treatment with chemotherapy agents as well as radiation to the skin.  She is currently receiving palliative radiation.  She has had significant complains of pain, ulceration of the skin lesions.  She presents today ER department on 9/5/2021 at [facility] due to uncontrolled pain.      Prior cancer history  Diagnosis   IDC left breast, March 2016   Grade 2. ER 1%, CA 6%, HER-2/migel IHC 3+/FISH amplified ratio 6.7, AR 95%.   01/30/2017- Biopsy-proven in breast relapse/Inflammatory breast cancer. ER negative, CA negative, HER-2/migel IHC 3+/FISH amplified ratio 4.5, Ki-67 32%.   Psychiatricsk - Revealed no clinically significant mutation. PTEN VUS.  5/7/21 Invasive breast cancer involving dermal lymphatics    Treatment summary  Neoadjuvant adjuvant chemotherapy TCH-P ×6 cycles. She declined to complete 1 year of Herceptin.   She declined radiation therapy.   10/3/2016-left lumpectomy/sentinel lymph node   01/30/2017- Biopsy-proven in breast relapse/Inflammatory breast cancer.   5/11/2017-Chemotherapy with paclitaxel/carboplatin weekly. Herceptin/Pertuzumab every 3 weeks started 05/11/2017. She had a allergic reaction to carboplatin and this was discontinued.   7/21/2017- 9/1/2017 Neuropathy grade 2 with Taxol. Therefore, switched to Taxotere/Herceptin/Pertuzumab.  Discontinued due to progression of disease.   9/22/2017 through 3/2/2018- Kadycla every 3 weeks. (stopped as per patient request, neuropathy grade 1)   7/10/2018 through 11/30/2018- Kadcyla.   1/4/2019-Navelbine x 1 cycle with very poor tolerance (pain)   1/25/2018-Gemzar/Herceptin   3/15/2019- low-dose Cisplatin added to Gemzar and Herceptin regimen, regimen changed to every other week   4/16/2019-5/23/19 5040cGy palliative radiation to left clavicle/chest wall   6/22/2019- Xeloda/apatinib  12/27/19-2/11/20 3800 cGy palliative radiation to the right breast.  1/10/2020- palliative treatment with Fam-Trastuzumab- stopped Feb due to severe toxicity  8/24/20-9/23/20 2925 cGy palliative radiation left chest wall skin metastasis.  8/21/2020-Herceptin, Xeloda and Tucatinib  5/28/21- Stop Tucatinib and Xeloda due to progression in skin  7/9/21 Declined palliative radiation  8/9/21 Initiate radiation therapy with anticipated dose of 6000 cGy radiation therapy anterior chest and posterior back  Tumor target  Skin chest wall left upper chest/left breast   Skin on back and both sides    Interval history  Ayla is a pleasant 55 years old female who has been diagnosed with recurrent HER-2 positive/ER 1% and KY 6% invasive ductal carcinoma of the breast. Her pattern of recurrence is diffuse skin involvement of the upper chest bilaterally and back on the left. She received several lines of chemotherapy in the past. She has had very poor tolerance to chemotherapy overall. She was last treated with radiation to the skin lesions and subsequently received treatment with tucatinib, Xeloda and Herceptin up to 5/28/2021 due to disease progression. She was referred again for consideration of palliative radiation that worked very well for her in the past. However, Ayla returned today with complaints of severe pain. She has received 2 treatments with radiation so far. She could not get treatment this morning to significant pain and could  not lay down. She also had CT scans performed that were reviewed by me today.    Cancer history- Left breast recurrence with Inflammatory Breast Cancer ER 8%/LA negative &HER-2 positive.  Ms Echeverria was seen in initial oncology consultation on 4/20/2017 referred for a diagnosis of a left breast recurrence with inflammatory breast cancer. She was initially treated by Dr. Peres at [facility]. Prior breast cancer history is as follows:  3/22/2016-a diagnostic mammogram for a palpable mass showed a left breast abnormal findings.   4/1/2017- ultrasound-guided core needle biopsy was consistent with invasive ductal carcinoma, grade 2. ER 1%, LA 6%, HER-2/migel IHC 3+/FISH amplified ratio 6.7., AR 95%.   She underwent 6 cycles of TCH-P from April 2016 through September 2016 with G-CSF support. She had a total of 18 weeks of trastuzumab treatment.   10/3/2016-she underwent a left lumpectomy with left sentinel lymph node revealing an invasive ductal carcinoma, grade 3. Margins clear of invasive carcinoma (1.1 cm from the lateral margin), extensive lymphovascular space invasion. 2 out of 3 sentinel lymph nodes positive for metastatic carcinoma. Final pathologic staging [id number](sn)M0.   She declined adjuvant radiation therapy and adjuvant Herceptin completion.   -------------------In-breast Recurrence Jan2017---------------------------  1/30/2017- she was seen by Dr. Subhash Dleaney with new complaints of erythema in the left breast. A skin punch biopsy was consistent with metastatic ductal carcinoma with extensive dermal lymphovascular invasion. ER negative, LA negative, HER-2/migel IHC 3+/FISH amplified ratio 4.5, Ki-67 32%. Foundation one showed ERBB2 amplification, AURKA amplification, TP53 and .   2/15/2017- she was again seen by Dr. Delaney and explained about her tumor recurrence. Unfortunately, she declined any further intervention at that time.   4/21/2017- she asked Dr. Delaney for a referral to us.    4/28/2017-referred to me for further evaluation. I recommended a PET scan and neoadjuvant chemotherapy with carboplatin/paclitaxel and Perjeta and Herceptin.   5/4/2017-PET/CT scan revealed abnormal metabolic activity present in multiple regions throughout the left breast and in the skin( SUV 8.9, 7.4, 7.3). In the left supraclavicular region a cluster of lymph nodes present (SUV of 3). Axillary lymph nodes with SUV of 2.8. Tonsils bilaterally with SUV 5.6 (symmetric). No abnormal metabolic activity in the intrathoracic or intra-abdominal pelvic region. No abnormal skeletal metabolic activity. I recommended weekly carboplatin/weekly Taxol/Herceptin and Pertuzumab.   5/26/2017-she had a severe allergic reaction after 2 doses of carboplatin and therefore this was discontinued. She was continued only on Taxol weekly/Herceptin and Pertuzumab.   7/21/2017-after cycle #2 and 8 doses of Taxol she developed neuropathy related to chemotherapy and therefore chemotherapy was switched from Taxol to Taxotere.   Genetic assessment- [place] [id number] revealed no clinically significant mutation. PTEN VUS.   9/1/2017-local in breast disease progression with increasing breast erythema. Treatment switched to Kadcyla.   12/27/2017- repeat re-staging CT scan of the abdomen and pelvis documented no evidence of intra-abdominal pelvic metastasis. Superior left renal cyst. Mild hepatic steatosis. CT scan of the chest documented no evidence of intrathoracic metastasis. Bone scan documented no evidence of bony metastatic disease.   3/2/2018-after 8 cycles of Kadcyla she requested treatment to be on hold. She has requested her PCP a referral to [facility] for a second opinion. She has neuropathy grade 1.   5/2/2018- 2 months off treatment (Kadcyla). She has not yet been seen at [facility]. Apparently, [facility] is gathering her records and will see her soon. Examination of her chest wall showed recurrent disease in the left upper chest. Residual  neuropathy grade 1. She could not tolerate gabapentin, and therefore has stopped it. She was not interested in any other medication for neuropathy at this time.   7/10/2018-after discussion at [facility] she was recommended to reinitiate Kadcyla.   7/13/2018-CT of chest, abdomen, pelvis showed no evidence of intra-abdominal disease. There is an enlarged right-sided level 1 lymph node that was not appreciated previously, measuring 2.5 x 1.4 cm, metastatic disease considered.   7/16/2018-PET CT scan showed diffuse uptake throughout the left breast extending to the left chest wall. Abnormal uptake within the left axilla lymph nodes and left internal mammary lymph nodes concerning for metastatic disease. Enlarged hypermetabolic right axilla lymph node concerning for metastatic disease with SUV 8.0. Abnormal uptake within a right cervical lymph node, etiology unclear, but could be reactive.   10/26/2018 - CT scan of the chest was completed at [facility] and compared to CT scan of 12/27/2017 versus her last CT scan completed at [facility] on 7/13/2018. The CT scan identified a right axillary lymph node measuring 1.2 cm, otherwise no intrathoracic neoplastic process/metastatic disease.  10/26/2018 -CT scan of the abdomen and pelvis was completed at [facility] and compared to CT scan of 12/27/2017 versus her last CT scan completed at [facility] on 7/13/2018. No evidence of intra-abdominal or pelvic metastasis was identified.  December 2018-interval worsening of skin metastasis in the left breast and diffuse erythema with new nodules.  12/17/2018-skin biopsy of overlying right breast consistent invasive ductal carcinoma, high-grade. ER 20%, MA 0%, HER-2/migel 3+.  12/17/2018-right axillary FNA biopsy consistent invasive ductal carcinoma.   12/26/2018-recommended clinical trial at [facility].  1/4/2019-started on Navelbine/Herceptin, due to delays on the clinical trial at [place]. Unfortunately, she had severe  chest wall pain and generalized pain related to Navelbine.   1/25/2019-4/19/2019 she was switched to cisplatin/Gemzar/Herceptin, but had disease progression.   4/16/2019-5/23/19 5040cGy palliative radiation to left clavicle/chest wall   5/30/2019-CT chest, abdomen, pelvis and bone scan was unremarkable for right axillary adenopathy measuring 2 cm. Lymph node located in the upper chest wall under the pectoralis muscle measuring 1.2 cm. Right breast with 2 areas with masslike enhancement. No evidence of metastatic disease in the abdomen pelvis. Bone scan was unremarkable for metastatic bone disease.  June 2019-very poor tolerance to cisplatin, Gemzar/Herceptin. She was recommended Xeloda/ Lapatinib.   6/22/2019-she was recommended and started on Xeloda 850 mg/m2 PO BID days 1 through 14 every 21 days and Lapatinib 1250 mg daily continuously.  November 2019- progression on prior Xeloda and lapatinib.  11/23/2019- recommended rechallenge with Herceptin and Navelbine vs clinical trial at [facility].  12/23/2019-CT chest abdomen pelvis showed metastatic disease in the right axillary lymph node and diffuse bilateral skin involvement of both breasts with several nodules. No evidence of pulmonary, liver or bone metastasis. CT of the head with contrast was unremarkable.  1/3/2020- the patient was unable to follow-up at [facility] for clinical trial. Therefore recommended Fam-trastuzumab deruxtecan every 3 weeks.  1/10/2020-she was started on palliative treatment with Fam-trastuzumab  12/27/19-2/11/20 3800 cGy palliative radiation to the right breast  6/5/2020- Ct Chest/Abdomen/Pelvis W Contrast No lymphadenopathy. A previously seen enlarged right axillary lymph node is now small in size. There is some stranding of the fat around the lymph node. Bilateral breast skin thickening left greater than right. Prior lumpectomy on the left with left axillary lymph node sampling. Previously seen nodularity in the  right breast on CT is not visualized today.There may be minimal radiation change in the periphery of the left lung. There is no dense consolidation or effusion. No definite metastatic lung nodules. No evidence of abdominal or pelvic neoplastic process or metastatic disease. The previously seen right breast nodule is not visualized in this study. Postsurgical changes of the left breast. No significant change in the previous study. Lobulated skin thickening of the left lower lateral chest and upper abdominal wall which was not noted in the previous study. The etiology is not certain. This data be clinically correlated and further evaluated.  8/7/2020- the patient is currently awaiting for palliative radiation to the chest wall for the skin margins. She has agreed and want to start on Herceptin, Xeloda and Tucatinib.  8/21/2020 Tumor Marker- CA 15.3- 32.19  8/24/20-9/23/20 2925 cGy palliative radiation left chest wall skin metastasis.  10/9/2020-continue Herceptin and tucatinib.  12/11/2020-she has resumed Xeloda on low-dose 500 mg p.o. twice daily 7 days on 7 days off, tucatinib and Herceptin  2/19/21 tumor markers: CA 27-29 11.8 CA 15-3 13.6 CEA 1.9  2/26/21 Ct Chest W Contrast No evidence of intrathoracic metastasis. Scarring suspected within the left upper and right middle lobes. 3. Diffuse left greater than right breast skin thickening with prominence of the trabecular breast tissue. Postoperative changes of the left breast and axilla. No pathologic axillary or intrathoracic lymphadenopathy localized.   2/26/21 Ct Abdomen Pelvis W Iv Contrast No evidence of intra-abdominal pelvic metastasis. Hepatic steatosis. Superior left renal cyst. Constipation.   2/26/21 2D echo: Mitral valve leaflets are mildly thickened with preserved leaflet mobility. Mildly thickened aortic valve leaflets with preserved leaflet mobility. Tricuspid valve is structurally normal. Mild tricuspid regurgitation with estimated RVSP of 16 mm Hg.  Normal left ventricular size with preserved LV function and an estimated ejection fraction of approximately 55-60%. Normal left ventricular wall thickness. No regional wall motion abnormalities.  5/7/21 Skin lesion, right flank, punch biopsy: Invasive breast cancer involving dermal lymphatics  5/28/21 Tumor markers: CA 27-29= 21.2 CEA= 1.5 CA 15-3 =  7/9/2021-she was referred to radiation for consideration of palliative radiation to the skin lesions. However, she declined the referral and wants to try some holistic approach. She is not interested in cytotoxic therapy at this time.  07/28/21-skin lesions is significantly worse. Recommended radiation.  7/29/21 CT CHEST W CONTRAST No evidence of intrathoracic metastasis. Similar left greater than right breast skin thickening with increased trabecular pattern of the left breast which may represent sequelae of radiation. Postoperative changes of the left breast and axilla. No pathologic axillary or internal mammary lymphadenopathy. Left subclavian port in place with tip at the atrial caval junction.  7/29/21 CT ABDOMEN PELVIS W IV CONTRAST No evidence of intra-abdominal or pelvic metastatic disease. Small stable cyst at the upper pole left kidney. Moderate constipation.   8/9/21- Initiate radiation therapy with anticipated dose of 6000 cGy radiation therapy anterior chest and posterior back           Past Medical History:    Past Medical History:   Diagnosis Date   • Breast cancer (CMS/HCC)        Past Surgical History:    Past Surgical History:   Procedure Laterality Date   • AXILLARY LYMPH NODE BIOPSY/EXCISION Left    • BREAST BIOPSY Left 03/31/2016   • BREAST LUMPECTOMY WITH SENTINEL NODE BIOPSY Left 10/03/2016    residual invasive carcinoma, grade 3 (0.2cm); margins negative; extensive lymphvascular space invasion; 2 sentinel lymph nodes positive (2/3)       Social History:    Social History     Socioeconomic History   • Marital status:      Spouse name: Not  on file   • Number of children: Not on file   • Years of education: Not on file   • Highest education level: Not on file   Tobacco Use   • Smoking status: Never Smoker   • Smokeless tobacco: Never Used   Substance and Sexual Activity   • Alcohol use: No   • Drug use: No   • Sexual activity: Defer       Family History:   Family History   Problem Relation Age of Onset   • Lung cancer Mother    • Prostate cancer Father    • Colon cancer Father    • Lupus Sister    • Diabetes Sister    • Hypertension Sister        Current Hospital Medications:      Current Facility-Administered Medications:   •  HYDROcodone-acetaminophen (NORCO)  MG per tablet 1 tablet, 1 tablet, Oral, Q6H PRN, Jack Cárdenas MD  •  HYDROmorphone (DILAUDID) injection 1 mg, 1 mg, Intravenous, Q2H PRN, 1 mg at 09/05/21 1051 **AND** naloxone (NARCAN) injection 0.4 mg, 0.4 mg, Intravenous, Q5 Min PRN, Jack Cárdenas MD  •  hydrOXYzine (ATARAX) tablet 50 mg, 50 mg, Oral, Q6H PRN, Jack Cárdenas MD  •  montelukast (SINGULAIR) tablet 10 mg, 10 mg, Oral, Daily, Jack Cárdenas MD  •  Morphine (MSIR) tablet 15 mg, 15 mg, Oral, Q4H PRN, Jack Cárdenas MD  •  ondansetron (ZOFRAN) injection 4 mg, 4 mg, Intravenous, Q6H PRN, Jack Cárdenas MD  •  pantoprazole (PROTONIX) EC tablet 40 mg, 40 mg, Oral, Daily, Jack Cárdenas MD  •  Insert peripheral IV, , , Once **AND** sodium chloride 0.9 % flush 10 mL, 10 mL, Intravenous, PRN, Jack Cárdenas MD  •  sodium chloride 0.9 % flush 10 mL, 10 mL, Intravenous, Q12H, Jack Cárdenas MD  •  sodium chloride 0.9 % flush 10 mL, 10 mL, Intravenous, PRN, Jack Cárdenas MD  •  sodium chloride 0.9 % infusion, 50 mL/hr, Intravenous, Continuous, Jack Cárdenas MD      Allergies: No Known Allergies      Subjective   REVIEW OF SYSTEMS:   CONSTITUTIONAL: no fever, no night sweats, fatigue;  HEENT: no blurring of vision, no double vision, no hearing  "difficulty, no tinnitus, no ulceration, no dysplasia, no epistaxis;  LUNGS: no cough, no hemoptysis, no wheeze,  no shortness of breath;  CARDIOVASCULAR: chest pain, no palpitation, no chest pain, no shortness of breath;  GI: no abdominal pain, no nausea, no vomiting, no diarrhea, no constipation;  STIVEN: no dysuria, no hematuria, no frequency or urgency, no nephrolithiasis;  MUSCULOSKELETAL: no joint pain, bilateral upper extremity swelling, no stiffness;  ENDOCRINE: no polyuria, no polydipsia, no cold or heat intolerance;  HEMATOLOGY: no easy bruising or bleeding, no history of clotting disorder;  DERMATOLOGY: skin lesions, no eczema, no pruritus;  PSYCHIATRY: no depression, no anxiety, no panic attacks, no suicidal ideation, no homicidal ideation;  NEUROLOGY: no syncope, no seizures, no numbness or tingling of hands, no numbness or tingling of feet, no paresis;    Objective   /70 (BP Location: Left leg, Patient Position: Sitting)   Pulse 85   Temp 98.9 °F (37.2 °C) (Oral)   Resp 26   Ht 160 cm (63\")   Wt 59 kg (130 lb)   SpO2 96%   BMI 23.03 kg/m²     PHYSICAL EXAM:  CONSTITUTIONAL: Alert, appropriate, no acute distress, chronically ill appearing  EYES: Non icteric, EOM intact, pupils equal round   ENT: Mucus membranes moist, no oral pharyngeal lesions, external inspection of ears and nose are normal  NECK: Supple, no masses.  No palpable thyroid mass  CHEST/LUNGS: CTA bilaterally, normal respiratory effort   CARDIOVASCULAR: RRR, no murmurs.  No lower extremity edema  ABDOMEN: soft non-tender, active bowel sounds, no HSM.  No palpable masses  EXTREMITIES: bilateral upper extremities lymphedema, warm, limited ROM upper extremity due to lymphedema. No focal weakness.  SKIN:skin discoloration,  diffuse skin involvement by breast cancer upper chest/back  LYMPH: No cervical, clavicular, axillary, or inguinal lymphadenopathy  NEUROLOGIC: follows commands, non focal   PSYCH: flat mood    LABORATORY RESULTS " REVIEWED BY ME:    Lab Results   Component Value Date     09/05/2021    K 3.7 09/05/2021     09/05/2021    CO2 28.0 09/05/2021    BUN 4 (L) 09/05/2021    CREATININE 0.41 (L) 09/05/2021    GLUCOSE 96 09/05/2021    CALCIUM 8.9 09/05/2021    BILITOT 0.3 09/05/2021    ALKPHOS 49 09/05/2021    AST 24 09/05/2021    ALT 5 09/05/2021    AGRATIO 1.0 09/05/2021    GLOB 3.3 09/05/2021       Lab Results   Component Value Date    INR 0.97 01/05/2020    INR 1.04 01/10/2019    INR 0.90 (L) 05/26/2016    PROTIME 13.2 01/05/2020    PROTIME 13 01/10/2019    PROTIME 12.5 05/26/2016       RADIOLOGY STUDIES REPORT/REVIEWED AND INTERPRETED BY ME:  XR Chest 1 View    Result Date: 9/5/2021  Narrative: HISTORY: Dyspnea  CXR: A frontal view the chest obtained  COMPARISON: 10/9/2018  FINDINGS: Left subclavian port remains in place with the tip overlying the SVC. Surgical clips of the left axilla and chest wall. Small left pleural effusion with left lower lobe consolidation. Probable right basilar atelectasis. No pneumothorax. No radiographic evidence of edema. Heart normal in size. Mild right convexity of the thoracolumbar curvature may relate to patient posturing. No acute bony pathology identified.      Impression: 1. Small left pleural effusion with left basilar consolidation concerning for pneumonia. Probable right basilar atelectasis. This report was finalized on 09/05/2021 08:25 by Dr. Elsi Starr MD.      ASSESSMENT:  Metastatic HER-2 migel breast cancer (diffuse chest wall, back skin involvement)  She has received several lines of chemotherapy in the past and several rounds of palliative radiation   7/31/2021-CT chest abdomen pelvis showed no evidence of visceral metastasis. Continue radiation.  Currently on palliative RT. Last dose about 1-2 weeks ago. She received 6/30 treatments    Cancer related pain-worsening. 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 She denies any significant change from previous evaluation.    Lymphopenia-ALC some renal calcification 0.39     Bilateral upper extremity lymphedema- consult PT    Plan:  Continue pain management  Wound care consult  Palliative care consult  Increase Morphine ER  30mg q 8 h ->60mg PO q 12 h  Lymphedema PT  consult    09/05/21  11:01 CDT

## 2021-09-05 NOTE — CONSULTS
Asked to assess port dressing due to difficulty adhering to skin. Pt has a left sided port with a biopatch over the insertion site and covered with a large tegaderm. Edges of the dressing near the left axilla are difficult to adhere due to weeping. Pt chest, upper back, and arms very swollen, skin is very tight with multiple weeping areas. Peripheral access not possible due to lymphedema and lymph node issues. Unable to recommend better dressing at this time as most of the dressing is adhered. Wouldn't recommend frequent dressing changes unless absolutely necessary due to pt comfort and skin integrity. Recommend dressing changes every other day if needed with care taken to keep the dressing as adhered as possible in the meantime.

## 2021-09-05 NOTE — ED NOTES
Dressing on port having difficulty sticky to pt skin due to pt having severe open wounds and drainage from breast cancer and radiation. Dressing changed and large tegaderm applied. Paper tape applied around edges of tegaderm. Dressing continues to not stick well to skin. Bio patch applied. 19G 1in needle. Blood return noted.      Delicia Rivera, RN  09/05/21 [id number]       Delicia Rivera, RN  09/05/21 [id number]

## 2021-09-05 NOTE — ED PROVIDER NOTES
Emergency Medicine Provider Note    Subjective:    HISTORY OF PRESENT ILLNESS     This is a very pleasant 55 y.o. female with a past medical history of breast cancer who presents to the emergency department today with a chief complaint of uncontrolled pain.  Patient states she has basically had pain for 5 years.  Throughout this she has been undergoing radiation.  Most recent radiation was a couple of weeks ago.  She presents here with a horrible chest pain.  Is dull.  Severe.  Nonradiating.  No exacerbating or relieving factors.  Associated with bilateral upper extremity swelling.  She states this is normal for her due to lymphadenopathy.  She denies any fevers or chills.  She states her pain is superficial.          History is obtained from the patient, collateral history is obtained from her daughter at bedside.       Review of Systems: All other systems are reviewed and are negative other than noted in the HPI.    Past Medical History:  Past Medical History:   Diagnosis Date   • Breast cancer (CMS/HCC)        Allergies:  No Known Allergies    Past Surgical History:  Past Surgical History:   Procedure Laterality Date   • AXILLARY LYMPH NODE BIOPSY/EXCISION Left    • BREAST BIOPSY Left 03/31/2016   • BREAST LUMPECTOMY WITH SENTINEL NODE BIOPSY Left 10/03/2016    residual invasive carcinoma, grade 3 (0.2cm); margins negative; extensive lymphvascular space invasion; 2 sentinel lymph nodes positive (2/3)       Family History:  Family History   Problem Relation Age of Onset   • Lung cancer Mother    • Prostate cancer Father    • Colon cancer Father    • Lupus Sister    • Diabetes Sister    • Hypertension Sister        Social History:  Social History     Socioeconomic History   • Marital status:      Spouse name: Not on file   • Number of children: Not on file   • Years of education: Not on file   • Highest education level: Not on file   Tobacco Use   • Smoking status: Never Smoker   • Smokeless tobacco: Never  Used   Substance and Sexual Activity   • Alcohol use: No   • Drug use: No   • Sexual activity: Defer       Home Medications:  Prior to Admission medications    Medication Sig Start Date End Date Taking? Authorizing Provider   APPLE CIDER VINEGAR PO Take 1 tablet by mouth Daily.    Jose F Jara MD   HYDROcodone-acetaminophen (NORCO)  MG per tablet Take 1 tablet by mouth Every 6 (Six) Hours As Needed for Moderate Pain . 1/5/20   Jack De La Cruz DO   hydrOXYzine (ATARAX) 50 MG tablet Take 50 mg by mouth Every 6 (Six) Hours As Needed for Itching.    Jose F Jara MD   montelukast (SINGULAIR) 10 MG tablet Take  by mouth. 7/13/16   Jose F Jara MD   Morphine (MSIR) 15 MG tablet Take 15 mg by mouth Every 4 (Four) Hours As Needed for Severe Pain .    Jose F Jara MD   Multiple Vitamin (MULTI VITAMIN DAILY PO) Take 1 tablet by mouth Daily.    Jose F Jara MD   pantoprazole (PROTONIX) 40 MG EC tablet Take 40 mg by mouth Daily.    Jose F Jara MD   Probiotic Product (PROBIOTIC-10 PO) Take 1 tablet by mouth Daily.    Jose F Jara MD   SELENIUM ER PO Take 1 tablet by mouth Daily.    Jose F Jara MD   Zinc Sulfate 66 MG tablet Take 1 tablet by mouth Daily.    Jose F Jara MD         Objective:    PHYSICAL EXAM     Vitals:   Vitals:    09/05/21 0846   BP: 140/63   Pulse: 82   Resp:    Temp:    SpO2: 95%     GENERAL: Chronically ill-appearing, uncomfortable.  HEENT: Moist mucous membranes, oropharynx clear without lesions, exudates, thrush.   EYES: No scleral icterus, conjunctivae clear.   NECK: No cervical lymphadenopathy, no stiffness.  CARDIAC: Normal rate, regular rhythm, no murmurs, 2+ peripheral pulses in all four extremities, normal capillary refill.   PULMONARY: Normal work of breathing on room air, lungs are clear to auscultation bilaterally without wheezes, crackles, rhonchi.  ABDOMINAL: Normal bowel sounds, abdomen is soft, non-tender,  non-distended, no hepatomegaly or splenomegaly.   MUSCULOSKELETAL: Normal range of motion, no lower extremity edema.  There is bilateral upper extremity edema  NEUROLOGIC: Alert and oriented x 3, EOM grossly intact and moves all four extremities with normal strength.  SKIN: There is necrotic and burn material over the entire upper chest consistent with radiation.  PSYCHIATRIC: Mood and affect are normal.     PROCEDURES     Procedures    LAB AND RADIOLOGY RESULTS     Lab Results (last 24 hours)     Procedure Component Value Units Date/Time    CBC & Differential [[id number]]  (Abnormal) Collected: 09/05/21 0716    Specimen: Blood Updated: 09/05/21 0736    Narrative:      The following orders were created for panel order CBC & Differential.  Procedure                               Abnormality         Status                     ---------                               -----------         ------                     CBC Auto Differential[[id number]]        Abnormal            Final result                 Please view results for these tests on the individual orders.    Comprehensive Metabolic Panel [[id number]]  (Abnormal) Collected: 09/05/21 0716    Specimen: Blood Updated: 09/05/21 0759     Glucose 96 mg/dL      BUN 4 mg/dL      Creatinine 0.41 mg/dL      Sodium 139 mmol/L      Potassium 3.7 mmol/L      Chloride 103 mmol/L      CO2 28.0 mmol/L      Calcium 8.9 mg/dL      Total Protein 6.5 g/dL      Albumin 3.20 g/dL      ALT (SGPT) 5 U/L      AST (SGOT) 24 U/L      Alkaline Phosphatase 49 U/L      Total Bilirubin 0.3 mg/dL      eGFR  African Amer >150 mL/min/1.73      Globulin 3.3 gm/dL      A/G Ratio 1.0 g/dL      BUN/Creatinine Ratio 9.8     Anion Gap 8.0 mmol/L     Narrative:      GFR Normal >60  Chronic Kidney Disease <60  Kidney Failure <15      Sedimentation Rate [[id number]]  (Abnormal) Collected: 09/05/21 0716    Specimen: Blood Updated: 09/05/21 0819     Sed Rate 104 mm/hr     C-reactive Protein [[id number]]   (Abnormal) Collected: 09/05/21 0716    Specimen: Blood Updated: 09/05/21 0759     C-Reactive Protein 3.61 mg/dL     CBC Auto Differential [[id number]]  (Abnormal) Collected: 09/05/21 0716    Specimen: Blood Updated: 09/05/21 0736     WBC 2.82 10*3/mm3      RBC 3.45 10*6/mm3      Hemoglobin 9.5 g/dL      Hematocrit 31.3 %      MCV 90.7 fL      MCH 27.5 pg      MCHC 30.4 g/dL      RDW 16.1 %      RDW-SD 53.4 fl      MPV 9.0 fL      Platelets 338 10*3/mm3      Neutrophil % 68.8 %      Lymphocyte % 13.1 %      Monocyte % 15.6 %      Eosinophil % 1.1 %      Basophil % 0.7 %      Immature Grans % 0.7 %      Neutrophils, Absolute 1.94 10*3/mm3      Lymphocytes, Absolute 0.37 10*3/mm3      Monocytes, Absolute 0.44 10*3/mm3      Eosinophils, Absolute 0.03 10*3/mm3      Basophils, Absolute 0.02 10*3/mm3      Immature Grans, Absolute 0.02 10*3/mm3      nRBC 0.0 /100 WBC     COVID-19,[facility]-HOUSE,Nasal Swab No Transport Media 3-4 HR TAT - Swab, Nasal Cavity [[id number]]  (Normal) Collected: 09/05/21 0737    Specimen: Swab from Nasal Cavity Updated: 09/05/21 0859     COVID19 Not Detected    Narrative:      Fact sheet for providers: https://www.fda.gov/media/999953/download     Fact sheet for patients: https://www.fda.gov/media/656920/download    Test performed by PCR.    Consider negative results in combination with clinical observations, patient history, and epidemiological information.          XR Chest 1 View    Result Date: 9/5/2021  Narrative: HISTORY: Dyspnea  CXR: A frontal view the chest obtained  COMPARISON: 10/9/2018  FINDINGS: Left subclavian port remains in place with the tip overlying the SVC. Surgical clips of the left axilla and chest wall. Small left pleural effusion with left lower lobe consolidation. Probable right basilar atelectasis. No pneumothorax. No radiographic evidence of edema. Heart normal in size. Mild right convexity of the thoracolumbar curvature may relate to patient posturing. No acute bony  pathology identified.      Impression: 1. Small left pleural effusion with left basilar consolidation concerning for pneumonia. Probable right basilar atelectasis. This report was finalized on 09/05/2021 08:25 by Dr. Elsi Starr MD.      ED course:    Medications   sodium chloride 0.9 % flush 10 mL (has no administration in time range)   HYDROmorphone (DILAUDID) injection 1 mg (1 mg Intravenous Given 9/5/21 0704)   HYDROmorphone (DILAUDID) injection 1 mg (1 mg Intravenous Given 9/5/21 0823)          Amount and/or complexity of data reviewed:    • Clinical lab tests ordered and reviewed.  • Tests in the radiology section ordered and reviewed.  • Discuss the patient with another provider: Dr. Cárdenas      Risk of significant complications, morbidity, and/or mortality.    •  Presenting problem: high  •  Diagnostic procedures: high  •  Management options: high        MEDICAL DECISION MAKING     Patient presents with chest pain and bilateral arm swelling. Upon arrival to the Emergency Department she is in no acute distress and her vital signs are reassuring although she is very uncomfortable.  IV access is obtained.  Patient is given Dilaudid.Labs shows slightly elevated ESR and CRP, Covid testing is negative, CBC shows anemia which is slightly worse from baseline, there is also leukopenia which is consistent with baseline.  Chest x-ray shows findings concerning for pneumonia.  Patient has no fevers, chills, or cough so will defer treatment at this time to see if she develops symptoms.  However, her pain is uncontrolled after 2 doses of IV Dilaudid in addition to her home narcotic regimen.  Due to this I discussed the case with her primary care physician who has agreed to admit her.  She is in stable condition at this time.        Diagnosis:    Final diagnoses:   Arm swelling   Cancer associated pain   Intractable pain         ED Disposition:     ED Disposition     ED Disposition Condition Comment    Decision to Admit   Level of Care: Telemetry [5]   Diagnosis: Arm swelling [[id number]]   Admitting Physician: AMANDA MORALEZ [[id number]]   Attending Physician: AMANDA MORALEZ [[id number]]   Certification: I Certify That Inpatient Hospital Services Are Medically Necessary For Greater Than 2 Midnights            No follow-up provider specified.       Medication List      No changes were made to your prescriptions during this visit.              Conor Javed MD  09/05/21 0995

## 2021-09-06 NOTE — PROGRESS NOTES
Pt Name: Ayla Echeverria  MRN: [id number]  [id number]  YOB: 1965  Date of evaluation: 9/6/2021    Chief Compliant:  Continues to have significant pain and discomfort to radiation burns to chest anterior and posterior. Complains of significant bilateral swelling to upper arms, limiting mobility    HISTORY OF PRESENT ILLNESS:    Ayla Echeverria is well known to my clinic and is followed for a diagnosis of recurrent HER-2 positive/ER 1% and FL 6% invasive ductal carcinoma of the breast. She was initially diagnosed in 2016.  She had locally advanced disease.  Unfortunately, she had significant compliance issues when she was diagnosed.  Later on she developed widespread metastatic disease with predominantly skin recurrence. She has received several lines of treatment with chemotherapy agents as well as radiation to the skin.  She is currently receiving palliative radiation.  She has had significant complains of pain, ulceration of the skin lesions.  She presented to the ER department on 9/5/2021 at [facility], [place], operated by [facility] due to uncontrolled pain.     ONCOLOGIC HISTORY  Diagnosis   IDC left breast, March 2016   Grade 2. ER 1%, FL 6%, HER-2/migel IHC 3+/FISH amplified ratio 6.7, AR 95%.   01/30/2017- Biopsy-proven in breast relapse/Inflammatory breast cancer. ER negative, FL negative, HER-2/migel IHC 3+/FISH amplified ratio 4.5, Ki-67 32%.   [facility] - Revealed no clinically significant mutation. PTEN VUS.  5/7/21 Invasive breast cancer involving dermal lymphatics    Treatment summary  Neoadjuvant adjuvant chemotherapy TCH-P ×6 cycles. She declined to complete 1 year of Herceptin.   She declined radiation therapy.   10/3/2016-left lumpectomy/sentinel lymph node   01/30/2017- Biopsy-proven in breast relapse/Inflammatory breast cancer.   5/11/2017-Chemotherapy with paclitaxel/carboplatin weekly. Herceptin/Pertuzumab every 3 weeks started 05/11/2017. She had a allergic reaction to carboplatin and this was discontinued.    7/21/2017- 9/1/2017 Neuropathy grade 2 with Taxol. Therefore, switched to Taxotere/Herceptin/Pertuzumab. Discontinued due to progression of disease.   9/22/2017 through 3/2/2018- Kadycla every 3 weeks. (stopped as per patient request, neuropathy grade 1)   7/10/2018 through 11/30/2018- Kadcyla.   1/4/2019-Navelbine x 1 cycle with very poor tolerance (pain)   1/25/2018-Gemzar/Herceptin   3/15/2019- low-dose Cisplatin added to Gemzar and Herceptin regimen, regimen changed to every other week   4/16/2019-5/23/19 5040cGy palliative radiation to left clavicle/chest wall   6/22/2019- Xeloda/apatinib  12/27/19-2/11/20 3800 cGy palliative radiation to the right breast.  1/10/2020- palliative treatment with Fam-Trastuzumab- stopped Feb due to severe toxicity  8/24/20-9/23/20 2925 cGy palliative radiation left chest wall skin metastasis.  8/21/2020-Herceptin, Xeloda and Tucatinib  5/28/21- Stop Tucatinib and Xeloda due to progression in skin  7/9/21 Declined palliative radiation  8/9/21 Initiate radiation therapy with anticipated dose of 6000 cGy radiation therapy anterior chest and posterior back    Tumor target  Skin chest wall left upper chest/left breast   Skin on back and both sides    Cancer history- Left breast recurrence with Inflammatory Breast Cancer ER 8%/MA negative &HER-2 positive.  Ms Echeverria was seen in initial oncology consultation on 4/20/2017 referred for a diagnosis of a left breast recurrence with inflammatory breast cancer. She was initially treated by Dr. Peres at [facility]. Prior breast cancer history is as follows:    3/22/2016-a diagnostic mammogram for a palpable mass showed a left breast abnormal findings.   4/1/2017- ultrasound-guided core needle biopsy was consistent with invasive ductal carcinoma, grade 2. ER 1%, MA 6%, HER-2/migel IHC 3+/FISH amplified ratio 6.7., AR 95%.   She underwent 6 cycles of TCH-P from April 2016 through September 2016 with G-CSF support. She had a total of 18  weeks of trastuzumab treatment.     10/3/2016-she underwent a left lumpectomy with left sentinel lymph node revealing an invasive ductal carcinoma, grade 3. Margins clear of invasive carcinoma (1.1 cm from the lateral margin), extensive lymphovascular space invasion. 2 out of 3 sentinel lymph nodes positive for metastatic carcinoma. Final pathologic staging [id number].   She declined adjuvant radiation therapy and adjuvant Herceptin completion.                               -------------------In-breast Recurrence Jan2017---------------------------  1/30/2017- she was seen by Dr. Subhash Delaney with new complaints of erythema in the left breast. A skin punch biopsy was consistent with metastatic ductal carcinoma with extensive dermal lymphovascular invasion. ER negative, WI negative, HER-2/migel IHC 3+/FISH amplified ratio 4.5, Ki-67 32%. Foundation one showed ERBB2 amplification, AURKA amplification, TP53 and .     2/15/2017- she was again seen by Dr. Delaney and explained about her tumor recurrence. Unfortunately, she declined any further intervention at that time.     4/21/2017- she asked Dr. Delaney for a referral to us.     4/28/2017-referred to me for further evaluation. I recommended a PET scan and neoadjuvant chemotherapy with carboplatin/paclitaxel and Perjeta and Herceptin.     5/4/2017-PET/CT scan revealed abnormal metabolic activity present in multiple regions throughout the left breast and in the skin( SUV 8.9, 7.4, 7.3). In the left supraclavicular region a cluster of lymph nodes present (SUV of 3). Axillary lymph nodes with SUV of 2.8. Tonsils bilaterally with SUV 5.6 (symmetric). No abnormal metabolic activity in the intrathoracic or intra-abdominal pelvic region. No abnormal skeletal metabolic activity. I recommended weekly carboplatin/weekly Taxol/Herceptin and Pertuzumab.     5/26/2017-she had a severe allergic reaction after 2 doses of carboplatin and therefore this was discontinued. She was  continued only on Taxol weekly/Herceptin and Pertuzumab.     7/21/2017-after cycle #2 and 8 doses of Taxol she developed neuropathy related to chemotherapy and therefore chemotherapy was switched from Taxol to Taxotere.   [facility] - revealed no clinically significant [facility]. PTEN VUS.     9/1/2017-local in breast disease progression with increasing breast erythema. Treatment switched to Kadcyla.     12/27/2017- repeat re-staging CT scan of the abdomen and pelvis documented no evidence of intra-abdominal pelvic metastasis. Superior left renal cyst. Mild hepatic steatosis. CT scan of the chest documented no evidence of intrathoracic metastasis. Bone scan documented no evidence of bony metastatic disease.     3/2/2018-after 8 cycles of Kadcyla she requested treatment to be on hold. She has requested her PCP a referral to [place] for a second opinion. She has neuropathy grade 1.     5/2/2018- 2 months off treatment (Kadcyla). She has not yet been seen at [place]. Apparently, [place] is gathering her records and will see her soon. Examination of her chest wall showed recurrent disease in the left upper chest. Residual neuropathy grade 1. She could not tolerate gabapentin, and therefore has stopped it. She was not interested in any other medication for neuropathy at this time.     7/10/2018-after discussion at [facility] she was recommended to reinitiate Kadcyla.     7/13/2018-CT of chest, abdomen, pelvis showed no evidence of intra-abdominal disease. There is an enlarged right-sided level 1 lymph node that was not appreciated previously, measuring 2.5 x 1.4 cm, metastatic disease considered.     7/16/2018-PET CT scan showed diffuse uptake throughout the left breast extending to the left chest wall. Abnormal uptake within the left axilla lymph nodes and left internal mammary lymph nodes concerning for metastatic disease. Enlarged hypermetabolic right axilla lymph node  concerning for metastatic disease with SUV 8.0. Abnormal uptake within a right cervical lymph node, etiology unclear, but could be reactive.     10/26/2018 - [facility] CT scan of 12/27/2017 versus her last CT scan completed at [facility] on 7/13/2018. The CT scan identified a right axillary lymph node measuring 1.2 cm, otherwise no intrathoracic neoplastic process/metastatic disease.    10/26/2018 -CT scan of the abdomen and pelvis was [facility] at [facility] and compared to CT scan of     12/27/2017 versus her last CT scan completed at [facility] on 7/13/2018. No evidence of intra-abdominal or pelvic metastasis was identified.  December 2018-interval worsening of skin metastasis in the left breast and diffuse erythema with new nodules.    12/17/2018-skin biopsy of overlying right breast consistent invasive ductal carcinoma, high-grade. ER 20%, CA 0%, HER-2/migel 3+.    12/17/2018-right axillary FNA biopsy consistent invasive ductal carcinoma.     12/26/2018-recommended clinical trial at [facility].    1/4/2019-started on Navelbine/Herceptin, due to delays on the clinical trial at [facility]. Unfortunately, she had severe chest wall pain and generalized pain related to Navelbine.     1/25/2019-4/19/2019 she was switched to cisplatin/Gemzar/Herceptin, but had disease progression.     4/16/2019-5/23/19 5040cGy palliative radiation to left clavicle/chest wall     5/30/2019-CT chest, abdomen, pelvis and bone scan was unremarkable for right axillary adenopathy measuring 2 cm. Lymph node located in the upper chest wall under the pectoralis muscle measuring 1.2 cm. Right breast with 2 areas with masslike enhancement. No evidence of metastatic disease in the abdomen pelvis. Bone scan was unremarkable for metastatic bone disease.    June 2019-very poor tolerance to cisplatin, Gemzar/Herceptin. She was recommended Xeloda/ Lapatinib.     6/22/2019-she was recommended and started  on Xeloda 850 mg/m2 PO BID days 1 through 14 every 21 days and Lapatinib 1250 mg daily continuously.    November 2019- progression on prior Xeloda and lapatinib.    11/23/2019- recommended rechallenge with Herceptin and Navelbine vs clinical trial at [facility].    12/23/2019-CT chest abdomen pelvis showed metastatic disease in the right axillary lymph node and diffuse bilateral skin involvement of both breasts with several nodules. No evidence of pulmonary, liver or bone metastasis. CT of the head with contrast was unremarkable.    1/3/2020- the patient was unable to follow-up at [facility] for clinical trial. Therefore recommended Fam-trastuzumab deruxtecan every 3 weeks.    1/10/2020-she was started on palliative treatment with Fam-trastuzumab    12/27/19-2/11/20 3800 cGy palliative radiation to the right breast    6/5/2020- Ct Chest/Abdomen/Pelvis W Contrast No lymphadenopathy. A previously seen enlarged right axillary lymph node is now small in size. There is some stranding of the fat around the lymph node. Bilateral breast skin thickening left greater than right. Prior lumpectomy on the left with left axillary lymph node sampling. Previously seen nodularity in the right breast on CT is not visualized today.There may be minimal radiation change in the periphery of the left lung. There is no dense consolidation or effusion. No definite metastatic lung nodules. No evidence of abdominal or pelvic neoplastic process or metastatic disease. The previously seen right breast nodule is not visualized in this study. Postsurgical changes of the left breast. No significant change in the previous study. Lobulated skin thickening of the left lower lateral chest and upper abdominal wall which was not noted in the previous study. The etiology is not certain. This data be clinically correlated and further evaluated.    8/7/2020- the patient is currently awaiting for palliative radiation to the chest wall  for the skin margins. She has agreed and want to start on Herceptin, Xeloda and Tucatinib.    8/21/2020 Tumor Marker- CA 15.3- 32.19    8/24/20-9/23/20 2925 cGy palliative radiation left chest wall skin metastasis.    10/9/2020-continue Herceptin and tucatinib.    12/11/2020-she has resumed Xeloda on low-dose 500 mg p.o. twice daily 7 days on 7 days off, tucatinib and Herceptin    2/19/21 tumor markers: CA 27-29 11.8 CA 15-3 13.6 CEA 1.9    2/26/21 Ct Chest W Contrast No evidence of intrathoracic metastasis. Scarring suspected within the left upper and right middle lobes. 3. Diffuse left greater than right breast skin thickening with prominence of the trabecular breast tissue. Postoperative changes of the left breast and axilla. No pathologic axillary or intrathoracic lymphadenopathy localized.     2/26/21 Ct Abdomen Pelvis W Iv Contrast No evidence of intra-abdominal pelvic metastasis. Hepatic steatosis. Superior left renal cyst. Constipation.     2/26/21 2D echo: Mitral valve leaflets are mildly thickened with preserved leaflet mobility. Mildly thickened aortic valve leaflets with preserved leaflet mobility. Tricuspid valve is structurally normal. Mild tricuspid regurgitation with estimated RVSP of 16 mm Hg. Normal left ventricular size with preserved LV function and an estimated ejection fraction of approximately 55-60%. Normal left ventricular wall thickness. No regional wall motion abnormalities.    5/7/21 Skin lesion, right flank, punch biopsy: Invasive breast cancer involving dermal lymphatics    5/28/21 Tumor markers: CA 27-29= 21.2 CEA= 1.5 CA 15-3 =    7/9/2021-she was referred to radiation for consideration of palliative radiation to the skin lesions. However, she declined the referral and wants to try some holistic approach. She is not interested in cytotoxic therapy at this time.    07/28/21-skin lesions is significantly worse. Recommended radiation.    7/29/21 CT CHEST W CONTRAST No evidence of  intrathoracic metastasis. Similar left greater than right breast skin thickening with increased trabecular pattern of the left breast which may represent sequelae of radiation. Postoperative changes of the left breast and axilla. No pathologic axillary or internal mammary lymphadenopathy. Left subclavian port in place with tip at the atrial caval junction.    7/29/21 CT ABDOMEN PELVIS W IV CONTRAST No evidence of intra-abdominal or pelvic metastatic disease. Small stable cyst at the upper pole left kidney. Moderate constipation.   8/9/21- Initiate radiation therapy with anticipated dose of 6000 cGy radiation therapy anterior chest and posterior back        Past Medical History:   Past Medical History:   Diagnosis Date   • Breast cancer (CMS/HCC)      Past Surgical History:   Past Surgical History:   Procedure Laterality Date   • AXILLARY LYMPH NODE BIOPSY/EXCISION Left    • BREAST BIOPSY Left 03/31/2016   • BREAST LUMPECTOMY WITH SENTINEL NODE BIOPSY Left 10/03/2016    residual invasive carcinoma, grade 3 (0.2cm); margins negative; extensive lymphvascular space invasion; 2 sentinel lymph nodes positive (2/3)     Social History:   Social History     Socioeconomic History   • Marital status:      Spouse name: Not on file   • Number of children: Not on file   • Years of education: Not on file   • Highest education level: Not on file   Tobacco Use   • Smoking status: Never Smoker   • Smokeless tobacco: Never Used   Substance and Sexual Activity   • Alcohol use: No   • Drug use: No   • Sexual activity: Defer     Family History:   Family History   Problem Relation Age of Onset   • Lung cancer Mother    • Prostate cancer Father    • Colon cancer Father    • Lupus Sister    • Diabetes Sister    • Hypertension Sister        Objective      Vitals:    09/05/21 1523 09/05/21 1955 09/06/21 0006 09/06/21 0400   BP: 143/64 138/73 145/65 140/70   BP Location: Right leg Right leg Right leg Right leg   Patient Position:  Sitting Lying Lying Lying   Pulse: 91 98 90 88   Resp: 20 18 18 18   Temp: 98.5 °F (36.9 °C) 97.9 °F (36.6 °C) 97.9 °F (36.6 °C) 98 °F (36.7 °C)   TempSrc: Oral Oral Oral Oral   SpO2: 97% 97% 97% 97%   Weight:       Height:             Current Status 1/11/2017   ECOG score 0         Lab Results:    Lab Results (last 72 hours)     Procedure Component Value Units Date/Time    Comprehensive Metabolic Panel [[id number]]  (Abnormal) Collected: 09/06/21 0407    Specimen: Blood Updated: 09/06/21 0446     Glucose 97 mg/dL      BUN 4 mg/dL      Creatinine 0.40 mg/dL      Sodium 137 mmol/L      Potassium 3.8 mmol/L      Chloride 102 mmol/L      CO2 28.0 mmol/L      Calcium 8.6 mg/dL      Total Protein 6.4 g/dL      Albumin 3.20 g/dL      ALT (SGPT) <5 U/L      AST (SGOT) 21 U/L      Alkaline Phosphatase 46 U/L      Total Bilirubin 0.3 mg/dL      eGFR  African Amer >150 mL/min/1.73      Globulin 3.2 gm/dL      A/G Ratio 1.0 g/dL      BUN/Creatinine Ratio 10.0     Anion Gap 7.0 mmol/L     Narrative:      GFR Normal >60  Chronic Kidney Disease <60  Kidney Failure <15      CK [[id number]]  (Normal) Collected: 09/06/21 0407    Specimen: Blood Updated: 09/06/21 0446     Creatine Kinase 83 U/L     CBC Auto Differential [[id number]]  (Abnormal) Collected: 09/06/21 0407    Specimen: Blood Updated: 09/06/21 0420     WBC 2.88 10*3/mm3      RBC 3.30 10*6/mm3      Hemoglobin 9.6 g/dL      Hematocrit 29.9 %      MCV 90.6 fL      MCH 29.1 pg      MCHC 32.1 g/dL      RDW 16.2 %      RDW-SD 54.3 fl      MPV 8.6 fL      Platelets 284 10*3/mm3      Neutrophil % 63.9 %      Lymphocyte % 16.7 %      Monocyte % 16.7 %      Eosinophil % 1.7 %      Basophil % 0.7 %      Immature Grans % 0.3 %      Neutrophils, Absolute 1.84 10*3/mm3      Lymphocytes, Absolute 0.48 10*3/mm3      Monocytes, Absolute 0.48 10*3/mm3      Eosinophils, Absolute 0.05 10*3/mm3      Basophils, Absolute 0.02 10*3/mm3      Immature Grans, Absolute 0.01 10*3/mm3      nRBC 0.0 /100  WBC     COVID-19,[facility] IN-HOUSE,Nasal Swab No Transport Media 3-4 HR TAT - Swab, Nasal Cavity [[id number]]  (Normal) Collected: 09/05/21 0737    Specimen: Swab from Nasal Cavity Updated: 09/05/21 0859     COVID19 Not Detected    Narrative:      Fact sheet for providers: https://www.fda.gov/media/504018/download     Fact sheet for patients: https://www.fda.gov/media/771221/download    Test performed by PCR.    Consider negative results in combination with clinical observations, patient history, and epidemiological information.    Sedimentation Rate [[id number]]  (Abnormal) Collected: 09/05/21 0716    Specimen: Blood Updated: 09/05/21 0819     Sed Rate 104 mm/hr     C-reactive Protein [[id number]]  (Abnormal) Collected: 09/05/21 0716    Specimen: Blood Updated: 09/05/21 0759     C-Reactive Protein 3.61 mg/dL     Comprehensive Metabolic Panel [[id number]]  (Abnormal) Collected: 09/05/21 0716    Specimen: Blood Updated: 09/05/21 0759     Glucose 96 mg/dL      BUN 4 mg/dL      Creatinine 0.41 mg/dL      Sodium 139 mmol/L      Potassium 3.7 mmol/L      Chloride 103 mmol/L      CO2 28.0 mmol/L      Calcium 8.9 mg/dL      Total Protein 6.5 g/dL      Albumin 3.20 g/dL      ALT (SGPT) 5 U/L      AST (SGOT) 24 U/L      Alkaline Phosphatase 49 U/L      Total Bilirubin 0.3 mg/dL      eGFR  African Amer >150 mL/min/1.73      Globulin 3.3 gm/dL      A/G Ratio 1.0 g/dL      BUN/Creatinine Ratio 9.8     Anion Gap 8.0 mmol/L     Narrative:      GFR Normal >60  Chronic Kidney Disease <60  Kidney Failure <15      CBC & Differential [[id number]]  (Abnormal) Collected: 09/05/21 0716    Specimen: Blood Updated: 09/05/21 0736    Narrative:      The following orders were created for panel order CBC & Differential.  Procedure                               Abnormality         Status                     ---------                               -----------         ------                     CBC Auto Differential[[id number]]        Abnormal             Final result                 Please view results for these tests on the individual orders.    CBC Auto Differential [[id number]]  (Abnormal) Collected: 09/05/21 0716    Specimen: Blood Updated: 09/05/21 0736     WBC 2.82 10*3/mm3      RBC 3.45 10*6/mm3      Hemoglobin 9.5 g/dL      Hematocrit 31.3 %      MCV 90.7 fL      MCH 27.5 pg      MCHC 30.4 g/dL      RDW 16.1 %      RDW-SD 53.4 fl      MPV 9.0 fL      Platelets 338 10*3/mm3      Neutrophil % 68.8 %      Lymphocyte % 13.1 %      Monocyte % 15.6 %      Eosinophil % 1.1 %      Basophil % 0.7 %      Immature Grans % 0.7 %      Neutrophils, Absolute 1.94 10*3/mm3      Lymphocytes, Absolute 0.37 10*3/mm3      Monocytes, Absolute 0.44 10*3/mm3      Eosinophils, Absolute 0.03 10*3/mm3      Basophils, Absolute 0.02 10*3/mm3      Immature Grans, Absolute 0.02 10*3/mm3      nRBC 0.0 /100 WBC           Radiology Results:    Imaging Results (Last 72 Hours)     Procedure Component Value Units Date/Time    XR Chest 1 View [[id number]] Collected: 09/05/21 0824     Updated: 09/05/21 0828    Narrative:      HISTORY: Dyspnea     CXR: A frontal view the chest obtained     COMPARISON: 10/9/2018     FINDINGS: Left subclavian port remains in place with the tip overlying  the SVC. Surgical clips of the left axilla and chest wall. Small left  pleural effusion with left lower lobe consolidation. Probable right  basilar atelectasis. No pneumothorax. No radiographic evidence of edema.  Heart normal in size. Mild right convexity of the thoracolumbar  curvature may relate to patient posturing. No acute bony pathology  identified.       Impression:      1. Small left pleural effusion with left basilar consolidation  concerning for pneumonia. Probable right basilar atelectasis.  This report was finalized on 09/05/2021 08:25 by Dr. Elsi Starr MD.            Physical Exam  Constitutional:       Appearance: Normal appearance. She is well-developed.   HENT:      Head: Normocephalic and  atraumatic.      Nose: Nose normal.   Eyes:      General: Lids are normal.      Conjunctiva/sclera: Conjunctivae normal.      Pupils: Pupils are equal, round, and reactive to light.   Neck:      Trachea: Trachea normal.   Cardiovascular:      Rate and Rhythm: Normal rate and regular rhythm.      Heart sounds: Normal heart sounds.   Abdominal:      General: Bowel sounds are normal.      Palpations: Abdomen is soft.   Musculoskeletal:         General: Normal range of motion.      Cervical back: Normal range of motion and neck supple.   Skin:     General: Skin is warm and dry.      Findings: No erythema, petechiae or rash.      Nails: There is no clubbing.      Comments: Significant radiation burn to posterior and anterior chest with some scabbing posteriorly.    Neurological:      Mental Status: She is alert.      Cranial Nerves: No cranial nerve deficit.      Sensory: No sensory deficit.   Psychiatric:         Speech: Speech normal.         Behavior: Behavior normal. Behavior is cooperative.         Thought Content: Thought content normal.         Judgment: Judgment normal.         ASSESSMENT:  Metastatic HER-2 migel breast cancer (diffuse chest wall, back skin involvement)  She has received several lines of chemotherapy in the past and several rounds of palliative radiation     7/31/2021-CT chest abdomen pelvis showed no evidence of visceral metastasis. Continue radiation.  Currently on palliative RT. Last dose about 1-2 weeks ago. She received 6/30 treatments     Cancer related pain-worsening.   Morphine ER >60mg PO q 12 h (dose increased on 9/5/2021)  Norco 10/325 1 tablet every 6 hours as needed pain  Dilaudid 1 mg IV every 2 hours as needed pain  Morphine IR 15 mg p.o. 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 She denies any significant change from previous evaluation.    Lymphopenia-     Bilateral upper extremity lymphedema- 2/2 extensive radiation and  skin involvement. consult PT  US B/L Upper extremity ordered    Plan:  Continue pain management  Wound care consult  Palliative care consult  Lymphedema PT  consult  US bilateral upper extremity      Mamta Tabares, APRN    9/6/2021    07:07 CDT  Physician's attestation/substantial contribution:  I independently performed an evaluation on this patient. I have reviewed relevant medical information/data to include but not limited to medication list, relevant appropriate labs and imaging when applicable. I reviewed other physician's notes, ancillary services and nurses assessment. I have reviewed the above documentation completed by the Nurse Practitioner/Physician Assistant. Please see my additional contributions to the history of present illness, physical examination, and assessment/medical decision-making that reflect my findings and impressions. I discussed essential elements of the care plan with the NP/PA and the patient as well. I answered all the questions to the patient's satisfaction. I concur with above stated.   Subjective-continue to complain of pain.   Objective- several skins lesions covering most of the skin of the chest and back. Hyperpigmentaion secondary to prior chemotherapy    Assessment/plan (decision-making):   Advanced breast cancer Her-2 positive.Progressive disease.  She is now receiving palliative radiation.Plans for outpatient margetuximab  Overall poor prognosis.  She had recent scan that showed no evidence of visceral disease.  Most of her progression is in the skin.  Cancer related pain-significant pain.  I increased her morphine extended release to 60 mg daily.  Upper extremity lymphedema-US BILATERAL UPPER EXTREMITY TO RULE OUT DVT BUT I BELIEVE THIS IS LIKELY LYMPHEDEMA RELATED TO EXTENSIVE SKIN INVOLVEMENT AND PRIOR RADIATION

## 2021-09-06 NOTE — PLAN OF CARE
Goal Outcome Evaluation:  Plan of Care Reviewed With: patient        Progress: no change  Outcome Summary: PT screen completed. Received orders for lymphedema treatment. Pt reports that she goes to OP therapy for treatment, and missed her last appointment due to not feeling well. Therapist encouraged patient to contact OP clinic to inform them of hospitalization and to ask if there is anything she or her family can be doing to maintain progress with edema during hospital stay. Pt given OP clinic's contact information to call tomorrow. PT to sign off as there is no skilled treatment able to be provided in this setting.

## 2021-09-06 NOTE — PLAN OF CARE
Goal Outcome Evaluation:           Progress: no change  Outcome Summary: Pt continues to c/o siginificant pain. Did sleep for a good portion of the night, but woke up in a large amount of pain. Left mediport remains accessed. Safety maintained.

## 2021-09-07 ENCOUNTER — HOSPITAL ENCOUNTER (OUTPATIENT)
Dept: INFUSION THERAPY | Age: 56
End: 2021-09-07
Payer: COMMERCIAL

## 2021-09-07 PROBLEM — R07.89 CHEST WALL PAIN: Status: ACTIVE | Noted: 2021-01-01

## 2021-09-07 NOTE — CONSULTS
[facility]  INPATIENT WOUND CONSULTATION    Today's Date: 09/07/21    Patient Name: Ayla Echeverria  MRN: [id number]  CSN: [id number]  PCP: Jack Cárdenas MD  Referring Provider: Elodia Duenas APRN  Attending Provider: Jack Cárdenas MD  Length of Stay: 2    SUBJECTIVE   Chief Complaint: Radiation burns to chest, back and open lesion to under left arm    HPI: Ayla Echeverria, a 55 y.o.female, presents with a past medical history of breast cancer with widespread metastatic disease and lymphedema.  She is undergoing palliative radiation treatment and presented to the hospital due to unbearable chest wall pain.  Inpatient wound care is consulted to assist in managing the pain of the skin irritation.      Patient states her pain has increased over the past 2 weeks with a stabbing pain of the chest wall and tightness.  Her mobility has decreased due to pain with mobility of the upper half of her body.  Patient has been to outpatient wound care but unable to utilize treatments due to inability to keep dressings in place.      Visit Dx:    ICD-10-CM ICD-9-CM   1. Arm swelling  M79.89 729.81   2. Cancer associated pain  G89.3 338.3   3. Intractable pain  R52 780.96     Patient Active Problem List   Diagnosis   • Bilateral acute serous otitis media   • Malignant neoplasm involving both nipple and areola of left breast in female (CMS/HCC)   • Cellulitis of abdominal wall   • Impetigo   • Folliculitis   • Encounter for consultation   • Non-smoker   • S/P lumpectomy, left breast   • S/P lymph node biopsy   • Regional lymph node staging category N3 (CMS/HCC)   • On antineoplastic chemotherapy   • Regional lymph node metastasis present (CMS/HCC)   • History of radiation therapy   • Painful skin lesion   • Arm swelling   • Chest wall pain       History:   Past Medical History:   Diagnosis Date   • Breast cancer (CMS/HCC)      Past Surgical History:   Procedure Laterality Date   • AXILLARY  LYMPH NODE BIOPSY/EXCISION Left    • BREAST BIOPSY Left 03/31/2016   • BREAST LUMPECTOMY WITH SENTINEL NODE BIOPSY Left 10/03/2016    residual invasive carcinoma, grade 3 (0.2cm); margins negative; extensive lymphvascular space invasion; 2 sentinel lymph nodes positive (2/3)     Social History     Socioeconomic History   • Marital status:      Spouse name: Not on file   • Number of children: Not on file   • Years of education: Not on file   • Highest education level: Not on file   Tobacco Use   • Smoking status: Never Smoker   • Smokeless tobacco: Never Used   Substance and Sexual Activity   • Alcohol use: No   • Drug use: No   • Sexual activity: Defer     Family History   Problem Relation Age of Onset   • Lung cancer Mother    • Prostate cancer Father    • Colon cancer Father    • Lupus Sister    • Diabetes Sister    • Hypertension Sister        Allergies:  No Known Allergies    Medications:    Current Facility-Administered Medications:   •  cefTRIAXone (ROCEPHIN) 1 g in sodium chloride 0.9 % 100 mL IVPB-VTB, 1 g, Intravenous, Q24H, Carmine Lao PA, Stopped at 09/07/21 1213  •  dexamethasone (DECADRON) injection 4 mg, 4 mg, Intravenous, Q12H, Elodia Duenas, APRN, 4 mg at 09/07/21 1011  •  [START ON 9/8/2021] dexamethasone (DECADRON) tablet 4 mg, 4 mg, Oral, Daily With Breakfast, DuenasMyaa L, APRN  •  gabapentin (NEURONTIN) capsule 100 mg, 100 mg, Oral, TID, Mya Duenasa L, APRN, 100 mg at 09/07/21 1012  •  HYDROmorphone (DILAUDID) injection 0.5 mg, 0.5 mg, Intravenous, Q4H PRN, 0.5 mg at 09/07/21 1418 **AND** naloxone (NARCAN) injection 0.4 mg, 0.4 mg, Intravenous, Q5 Min PRN, Elodia Duenas, APRN  •  hydrOXYzine (ATARAX) tablet 50 mg, 50 mg, Oral, Q6H PRN, Jack Cárdenas MD, 50 mg at 09/07/21 0521  •  montelukast (SINGULAIR) tablet 10 mg, 10 mg, Oral, Daily, Jack Cárdenas MD  •  Morphine (MS CONTIN) 12 hr tablet 60 mg, 60 mg, Oral, Q12H, Donald Ott MD,  60 mg at 09/07/21 0819  •  Morphine (MSIR) tablet 30 mg, 30 mg, Oral, Q4H PRN, Elodia Duenas, APRN, 30 mg at 09/07/21 1046  •  naproxen (NAPROSYN) tablet 500 mg, 500 mg, Oral, BID With Meals, Elodia Duenas, APRN, 500 mg at 09/07/21 1155  •  ondansetron (ZOFRAN) injection 4 mg, 4 mg, Intravenous, Q6H PRN, Jack Cárdenas MD  •  pantoprazole (PROTONIX) EC tablet 40 mg, 40 mg, Oral, Daily, Jack Cárdenas MD, 40 mg at 09/07/21 0819  •  sennosides-docusate (PERICOLACE) 8.6-50 MG per tablet 1 tablet, 1 tablet, Oral, BID PRN, Jack Cárdenas MD, 1 tablet at 09/07/21 0926  •  sennosides-docusate (PERICOLACE) 8.6-50 MG per tablet 2 tablet, 2 tablet, Oral, Nightly, Elodia Duenas, APRN  •  Insert peripheral IV, , , Once **AND** sodium chloride 0.9 % flush 10 mL, 10 mL, Intravenous, PRN, Jack Cárdenas MD  •  sodium chloride 0.9 % flush 10 mL, 10 mL, Intravenous, Q12H, Jack Cárdenas MD, 10 mL at 09/07/21 0837  •  sodium chloride 0.9 % flush 10 mL, 10 mL, Intravenous, PRN, Jack Cárdenas MD  •  sodium chloride 0.9 % infusion, 50 mL/hr, Intravenous, Continuous, Jack Cárdenas MD, Last Rate: 50 mL/hr at 09/07/21 0631, 50 mL/hr at 09/07/21 0631    Review of Systems:  Review of Systems   Constitutional: Positive for fatigue. Negative for chills and fever.   HENT: Negative for congestion and rhinorrhea.    Respiratory: Negative for cough and shortness of breath.    Cardiovascular: Negative for chest pain and palpitations.   Gastrointestinal: Negative for diarrhea, nausea and vomiting.   Musculoskeletal: Positive for arthralgias and myalgias.   Skin: Positive for color change and wound.   Neurological: Positive for weakness. Negative for dizziness.   Psychiatric/Behavioral: Negative for agitation, behavioral problems and confusion.         OBJECTIVE     Vitals:    09/07/21 1134   BP: 116/46   Pulse: 89   Resp: 16   Temp: 98.2 °F (36.8 °C)   SpO2: 96%        PHYSICAL EXAM  Physical Exam  Vitals and nursing note reviewed.   Constitutional:       Appearance: She is overweight.      Comments: Body mass index is 27.71 kg/m².    HENT:      Head: Normocephalic and atraumatic.   Eyes:      General: Lids are normal. Gaze aligned appropriately.   Cardiovascular:      Rate and Rhythm: Normal rate and regular rhythm.   Pulmonary:      Effort: Pulmonary effort is normal. No respiratory distress.   Chest:      Chest wall: Tenderness present.      Breasts:         Right: Skin change and tenderness present.         Left: Skin change and tenderness present.       Abdominal:      General: Abdomen is flat.      Palpations: Abdomen is soft.   Musculoskeletal:      Cervical back: Normal range of motion and neck supple.   Skin:     General: Skin is warm and dry.      Findings: Burn present.      Comments: Radiation skin burns of bilateral breast and chest wall  Bilateral upper extremity lymphedema   Neurological:      Mental Status: She is alert and oriented to person, place, and time.   Psychiatric:         Attention and Perception: Attention normal.         Mood and Affect: Mood normal.         Speech: Speech normal.         Behavior: Behavior is cooperative.              Results Review:  Lab Results (last 48 hours)     Procedure Component Value Units Date/Time    Comprehensive Metabolic Panel [[id number]]  (Abnormal) Collected: 09/06/21 0407    Specimen: Blood Updated: 09/06/21 0446     Glucose 97 mg/dL      BUN 4 mg/dL      Creatinine 0.40 mg/dL      Sodium 137 mmol/L      Potassium 3.8 mmol/L      Chloride 102 mmol/L      CO2 28.0 mmol/L      Calcium 8.6 mg/dL      Total Protein 6.4 g/dL      Albumin 3.20 g/dL      ALT (SGPT) <5 U/L      AST (SGOT) 21 U/L      Alkaline Phosphatase 46 U/L      Total Bilirubin 0.3 mg/dL      eGFR  African Amer >150 mL/min/1.73      Globulin 3.2 gm/dL      A/G Ratio 1.0 g/dL      BUN/Creatinine Ratio 10.0     Anion Gap 7.0 mmol/L     Narrative:       GFR Normal >60  Chronic Kidney Disease <60  Kidney Failure <15      CK [[id number]]  (Normal) Collected: 09/06/21 0407    Specimen: Blood Updated: 09/06/21 0446     Creatine Kinase 83 U/L     CBC Auto Differential [[id number]]  (Abnormal) Collected: 09/06/21 0407    Specimen: Blood Updated: 09/06/21 0420     WBC 2.88 10*3/mm3      RBC 3.30 10*6/mm3      Hemoglobin 9.6 g/dL      Hematocrit 29.9 %      MCV 90.6 fL      MCH 29.1 pg      MCHC 32.1 g/dL      RDW 16.2 %      RDW-SD 54.3 fl      MPV 8.6 fL      Platelets 284 10*3/mm3      Neutrophil % 63.9 %      Lymphocyte % 16.7 %      Monocyte % 16.7 %      Eosinophil % 1.7 %      Basophil % 0.7 %      Immature Grans % 0.3 %      Neutrophils, Absolute 1.84 10*3/mm3      Lymphocytes, Absolute 0.48 10*3/mm3      Monocytes, Absolute 0.48 10*3/mm3      Eosinophils, Absolute 0.05 10*3/mm3      Basophils, Absolute 0.02 10*3/mm3      Immature Grans, Absolute 0.01 10*3/mm3      nRBC 0.0 /100 WBC         Imaging Results (Last 72 Hours)     Procedure Component Value Units Date/Time    XR Chest 1 View [[id number]] Collected: 09/05/21 0824     Updated: 09/05/21 0828    Narrative:      HISTORY: Dyspnea     CXR: A frontal view the chest obtained     COMPARISON: 10/9/2018     FINDINGS: Left subclavian port remains in place with the tip overlying  the SVC. Surgical clips of the left axilla and chest wall. Small left  pleural effusion with left lower lobe consolidation. Probable right  basilar atelectasis. No pneumothorax. No radiographic evidence of edema.  Heart normal in size. Mild right convexity of the thoracolumbar  curvature may relate to patient posturing. No acute bony pathology  identified.       Impression:      1. Small left pleural effusion with left basilar consolidation  concerning for pneumonia. Probable right basilar atelectasis.  This report was finalized on 09/05/2021 08:25 by Dr. Elsi Starr MD.             ASSESSMENT/PLAN       Examination and evaluation of  wound(s) was performed.    DIAGNOSIS:   Metastatic breast cancer  Cancer related pain  Bilateral upper extremity lymphedema    PLAN:     Start     Ordered    Unscheduled  Skin Care  As Needed     Comments: If calazime barrier cream does not provide comfort, may use Hydraguard barrier cream.  Please notify me if this does not help.   Question Answer Comment   Wound Locations Chest, underarm, back    Wound Care Instructions Use Calazime barrier cream to skin irritation.  Use 4-in-1 barrier cream cloths to remove cream as needed.        09/07/21 1450                  Discussed findings and treatment plan including risks, benefits, and treatment options with patient and daughter in detail. Patient agreed with treatment plan.      This document has been electronically signed by CHRIS Corbin on 9/7/2021 14:48 CDT

## 2021-09-07 NOTE — PLAN OF CARE
Goal Outcome Evaluation: Outcome Summary: NTN assessment completed, and ONS started. Will follow per protocol.

## 2021-09-07 NOTE — PROGRESS NOTES
MEDICAL ONCOLOGY PROGRESS NOTE      Pt Name: Ayla Echeverria  MRN: [id number]  [id number]  YOB: 1965  Date of evaluation: 9/7/2021   ROOM: 412      Chief Compliant:  Continues to have pain, itching from tumo/ radiation burns to chest anterior and posterior.  A little better overall.  Breathing OK.  No cough    HISTORY OF PRESENT ILLNESS:    Ayla Echeverria is well known to my clinic and is followed for a diagnosis of recurrent HER-2 positive/ER 1% and OH 6% invasive ductal carcinoma of the breast. She was initially diagnosed in 2016.  She had locally advanced disease.  Unfortunately, she had significant compliance issues when she was diagnosed.  Later on she developed widespread metastatic disease with predominantly skin recurrence. She has received several lines of treatment with chemotherapy agents as well as radiation to the skin.  She is currently receiving palliative radiation.  She has had significant complains of pain, ulceration of the skin lesions.  She presented to the ER department on 9/5/2021 at [facility] due to uncontrolled pain.     ONCOLOGIC HISTORY  Diagnosis   IDC left breast, March 2016   Grade 2. ER 1%, OH 6%, HER-2/migel IHC 3+/FISH amplified ratio 6.7, AR 95%.   01/30/2017- Biopsy-proven in breast relapse/Inflammatory breast cancer. ER negative, OH negative, HER-2/migel IHC 3+/FISH amplified ratio 4.5, Ki-67 32%.   [facility] Revealed no clinically significant mutation. PTEN VUS.  5/7/21 Invasive breast cancer involving dermal lymphatics    Treatment summary  Neoadjuvant adjuvant chemotherapy TCH-P ×6 cycles. She declined to complete 1 year of Herceptin.   She declined radiation therapy.   10/3/2016-left lumpectomy/sentinel lymph node   01/30/2017- Biopsy-proven in breast relapse/Inflammatory breast cancer.   5/11/2017-Chemotherapy with paclitaxel/carboplatin weekly. Herceptin/Pertuzumab every 3 weeks started 05/11/2017. She had a allergic reaction to carboplatin and this was  discontinued.   7/21/2017- 9/1/2017 Neuropathy grade 2 with Taxol. Therefore, switched to Taxotere/Herceptin/Pertuzumab. Discontinued due to progression of disease.   9/22/2017 through 3/2/2018- Kadycla every 3 weeks. (stopped as per patient request, neuropathy grade 1)   7/10/2018 through 11/30/2018- Kadcyla.   1/4/2019-Navelbine x 1 cycle with very poor tolerance (pain)   1/25/2018-Gemzar/Herceptin   3/15/2019- low-dose Cisplatin added to Gemzar and Herceptin regimen, regimen changed to every other week   4/16/2019-5/23/19 5040cGy palliative radiation to left clavicle/chest wall   6/22/2019- Xeloda/apatinib  12/27/19-2/11/20 3800 cGy palliative radiation to the right breast.  1/10/2020- palliative treatment with Fam-Trastuzumab- stopped Feb due to severe toxicity  8/24/20-9/23/20 2925 cGy palliative radiation left chest wall skin metastasis.  8/21/2020-Herceptin, Xeloda and Tucatinib  5/28/21- Stop Tucatinib and Xeloda due to progression in skin  7/9/21 Declined palliative radiation  8/9/21 Initiate radiation therapy with anticipated dose of 6000 cGy radiation therapy anterior chest and posterior back    Tumor target  Skin chest wall left upper chest/left breast   Skin on back and both sides    Cancer history- Left breast recurrence with Inflammatory Breast Cancer ER 8%/MN negative &HER-2 positive.  Ms Echeverria was seen in initial oncology consultation on 4/20/2017 referred for a diagnosis of a left breast recurrence with inflammatory breast cancer. She was initially treated by Dr. Peres at [facility]. Prior breast cancer history is as follows:    3/22/2016-a diagnostic mammogram for a palpable mass showed a left breast abnormal findings.   4/1/2017- ultrasound-guided core needle biopsy was consistent with invasive ductal carcinoma, grade 2. ER 1%, MN 6%, HER-2/migel IHC 3+/FISH amplified ratio 6.7., AR 95%.   She underwent 6 cycles of TCH-P from April 2016 through September 2016 with G-CSF support. She had a  total of 18 weeks of trastuzumab treatment.     10/3/2016-she underwent a left lumpectomy with left sentinel lymph node revealing an invasive ductal carcinoma, grade 3. Margins clear of invasive carcinoma (1.1 cm from the lateral margin), extensive lymphovascular space invasion. 2 out of 3 sentinel lymph nodes positive for metastatic carcinoma. Final pathologic staging [id number])M0.   She declined adjuvant radiation therapy and adjuvant Herceptin completion.                               -------------------In-breast Recurrence Jan2017---------------------------  1/30/2017- she was seen by Dr. Subhash Delaney with new complaints of erythema in the left breast. A skin punch biopsy was consistent with metastatic ductal carcinoma with extensive dermal lymphovascular invasion. ER negative, VA negative, HER-2/migel IHC 3+/FISH amplified ratio 4.5, Ki-67 32%. Foundation one showed ERBB2 amplification, AURKA amplification, TP53 and .     2/15/2017- she was again seen by Dr. Delaney and explained about her tumor recurrence. Unfortunately, she declined any further intervention at that time.     4/21/2017- she asked Dr. Delaney for a referral to us.     4/28/2017-referred to me for further evaluation. I recommended a PET scan and neoadjuvant chemotherapy with carboplatin/paclitaxel and Perjeta and Herceptin.     5/4/2017-PET/CT scan revealed abnormal metabolic activity present in multiple regions throughout the left breast and in the skin( SUV 8.9, 7.4, 7.3). In the left supraclavicular region a cluster of lymph nodes present (SUV of 3). Axillary lymph nodes with SUV of 2.8. Tonsils bilaterally with SUV 5.6 (symmetric). No abnormal metabolic activity in the intrathoracic or intra-abdominal pelvic region. No abnormal skeletal metabolic activity. I recommended weekly carboplatin/weekly Taxol/Herceptin and Pertuzumab.     5/26/2017-she had a severe allergic reaction after 2 doses of carboplatin and therefore this was  discontinued. She was continued only on Taxol weekly/Herceptin and Pertuzumab.     7/21/2017-after cycle #2 and 8 doses of Taxol she developed neuropathy related to chemotherapy and therefore chemotherapy was switched from Taxol to Taxotere.   Genetic assessment- [place] [id number] revealed no clinically significant mutation. PTEN VUS.     9/1/2017-local in breast disease progression with increasing breast erythema. Treatment switched to Kadcyla.     12/27/2017- repeat re-staging CT scan of the abdomen and pelvis documented no evidence of intra-abdominal pelvic metastasis. Superior left renal cyst. Mild hepatic steatosis. CT scan of the chest documented no evidence of intrathoracic metastasis. Bone scan documented no evidence of bony metastatic disease.     3/2/2018-after 8 cycles of Kadcyla she requested treatment to be on hold. She has requested her PCP a referral to [facility] for a second opinion. She has neuropathy grade 1.     5/2/2018- 2 months off treatment (Kadcyla). She has not yet been seen at [facility]. Apparently, [facility] is gathering her records and will see her soon. Examination of her chest wall showed recurrent disease in the left upper chest. Residual neuropathy grade 1. She could not tolerate gabapentin, and therefore has stopped it. She was not interested in any other medication for neuropathy at this time.     7/10/2018-after discussion at [facility] she was recommended to reinitiate Kadcyla.     7/13/2018-CT of chest, abdomen, pelvis showed no evidence of intra-abdominal disease. There is an enlarged right-sided level 1 lymph node that was not appreciated previously, measuring 2.5 x 1.4 cm, metastatic disease considered.     7/16/2018-PET CT scan showed diffuse uptake throughout the left breast extending to the left chest wall. Abnormal uptake within the left axilla lymph nodes and left internal mammary lymph nodes concerning for metastatic disease. Enlarged hypermetabolic right  axilla lymph node concerning for metastatic disease with SUV 8.0. Abnormal uptake within a right cervical lymph node, etiology unclear, but could be reactive.     10/26/2018 - [facility] to CT scan of 12/27/2017 versus her last CT scan completed at [facility] on 7/13/2018. The CT scan identified a right axillary lymph node measuring 1.2 cm, otherwise no intrathoracic neoplastic process/metastatic disease.    10/26/2018 -CT scan of the abdomen and pelvis was completed at [facility] and compared to CT scan of     12/27/2017 versus her last CT scan completed at [facility] on 7/13/2018. No evidence of intra-abdominal or pelvic metastasis was identified.  December 2018-interval worsening of skin metastasis in the left breast and diffuse erythema with new nodules.    12/17/2018-skin biopsy of overlying right breast consistent invasive ductal carcinoma, high-grade. ER 20%, DE 0%, HER-2/migel 3+.    12/17/2018-right axillary FNA biopsy consistent invasive ductal carcinoma.     12/26/2018-recommended clinical trial at [facility].    1/4/2019-started on Navelbine/Herceptin, due to delays on the clinical trial at [place]. Unfortunately, she had severe chest wall pain and generalized pain related to Navelbine.     1/25/2019-4/19/2019 she was switched to cisplatin/Gemzar/Herceptin, but had disease progression.     4/16/2019-5/23/19 5040cGy palliative radiation to left clavicle/chest wall     5/30/2019-CT chest, abdomen, pelvis and bone scan was unremarkable for right axillary adenopathy measuring 2 cm. Lymph node located in the upper chest wall under the pectoralis muscle measuring 1.2 cm. Right breast with 2 areas with masslike enhancement. No evidence of metastatic disease in the abdomen pelvis. Bone scan was unremarkable for metastatic bone disease.    June 2019-very poor tolerance to cisplatin, Gemzar/Herceptin. She was recommended Xeloda/ Lapatinib.     6/22/2019-she was  recommended and started on Xeloda 850 mg/m2 PO BID days 1 through 14 every 21 days and Lapatinib 1250 mg daily continuously.    November 2019- progression on prior Xeloda and lapatinib.    11/23/2019- recommended rechallenge with Herceptin and Navelbine vs clinical trial at [facility].    12/23/2019-CT chest abdomen pelvis showed metastatic disease in the right axillary lymph node and diffuse bilateral skin involvement of both breasts with several nodules. No evidence of pulmonary, liver or bone metastasis. CT of the head with contrast was unremarkable.    1/3/2020- the patient was unable to follow-up at [facility] for clinical trial. Therefore recommended Fam-trastuzumab deruxtecan every 3 weeks.    1/10/2020-she was started on palliative treatment with Fam-trastuzumab    12/27/19-2/11/20 3800 cGy palliative radiation to the right breast    6/5/2020- Ct Chest/Abdomen/Pelvis W Contrast No lymphadenopathy. A previously seen enlarged right axillary lymph node is now small in size. There is some stranding of the fat around the lymph node. Bilateral breast skin thickening left greater than right. Prior lumpectomy on the left with left axillary lymph node sampling. Previously seen nodularity in the right breast on CT is not visualized today.There may be minimal radiation change in the periphery of the left lung. There is no dense consolidation or effusion. No definite metastatic lung nodules. No evidence of abdominal or pelvic neoplastic process or metastatic disease. The previously seen right breast nodule is not visualized in this study. Postsurgical changes of the left breast. No significant change in the previous study. Lobulated skin thickening of the left lower lateral chest and upper abdominal wall which was not noted in the previous study. The etiology is not certain. This data be clinically correlated and further evaluated.    8/7/2020- the patient is currently awaiting for palliative  radiation to the chest wall for the skin margins. She has agreed and want to start on Herceptin, Xeloda and Tucatinib.    8/21/2020 Tumor Marker- CA 15.3- 32.19    8/24/20-9/23/20 2925 cGy palliative radiation left chest wall skin metastasis.    10/9/2020-continue Herceptin and tucatinib.    12/11/2020-she has resumed Xeloda on low-dose 500 mg p.o. twice daily 7 days on 7 days off, tucatinib and Herceptin    2/19/21 tumor markers: CA 27-29 11.8 CA 15-3 13.6 CEA 1.9    2/26/21 Ct Chest W Contrast No evidence of intrathoracic metastasis. Scarring suspected within the left upper and right middle lobes. 3. Diffuse left greater than right breast skin thickening with prominence of the trabecular breast tissue. Postoperative changes of the left breast and axilla. No pathologic axillary or intrathoracic lymphadenopathy localized.     2/26/21 Ct Abdomen Pelvis W Iv Contrast No evidence of intra-abdominal pelvic metastasis. Hepatic steatosis. Superior left renal cyst. Constipation.     2/26/21 2D echo: Mitral valve leaflets are mildly thickened with preserved leaflet mobility. Mildly thickened aortic valve leaflets with preserved leaflet mobility. Tricuspid valve is structurally normal. Mild tricuspid regurgitation with estimated RVSP of 16 mm Hg. Normal left ventricular size with preserved LV function and an estimated ejection fraction of approximately 55-60%. Normal left ventricular wall thickness. No regional wall motion abnormalities.    5/7/21 Skin lesion, right flank, punch biopsy: Invasive breast cancer involving dermal lymphatics    5/28/21 Tumor markers: CA 27-29= 21.2 CEA= 1.5 CA 15-3 =    7/9/2021-she was referred to radiation for consideration of palliative radiation to the skin lesions. However, she declined the referral and wants to try some holistic approach. She is not interested in cytotoxic therapy at this time.    07/28/21-skin lesions is significantly worse. Recommended radiation.    7/29/21 CT CHEST W  "CONTRAST No evidence of intrathoracic metastasis. Similar left greater than right breast skin thickening with increased trabecular pattern of the left breast which may represent sequelae of radiation. Postoperative changes of the left breast and axilla. No pathologic axillary or internal mammary lymphadenopathy. Left subclavian port in place with tip at the atrial caval junction.    7/29/21 CT ABDOMEN PELVIS W IV CONTRAST No evidence of intra-abdominal or pelvic metastatic disease. Small stable cyst at the upper pole left kidney. Moderate constipation.   8/9/21- Initiate radiation therapy with anticipated dose of 6000 cGy radiation therapy anterior chest and posterior back        Objective      Vitals:    09/06/21 0810 09/06/21 1524 09/06/21 2113 09/07/21 0331   BP: 124/61 125/58 132/66 120/60   BP Location: Right leg Right leg Right leg Left leg   Patient Position: Lying Lying Lying Lying   Pulse: 95 92 92 87   Resp: 18 18 16 16   Temp: 98.2 °F (36.8 °C) 99.1 °F (37.3 °C) 98.5 °F (36.9 °C) 98.6 °F (37 °C)   TempSrc: Oral Oral Oral Oral   SpO2: 96% 97% 93% 95%   Weight:   72.7 kg (160 lb 3.2 oz)    Height:   160 cm (62.99\")          Lab Results:    Lab Results (last 72 hours)     Procedure Component Value Units Date/Time    Comprehensive Metabolic Panel [[id number]]  (Abnormal) Collected: 09/06/21 0407    Specimen: Blood Updated: 09/06/21 0446     Glucose 97 mg/dL      BUN 4 mg/dL      Creatinine 0.40 mg/dL      Sodium 137 mmol/L      Potassium 3.8 mmol/L      Chloride 102 mmol/L      CO2 28.0 mmol/L      Calcium 8.6 mg/dL      Total Protein 6.4 g/dL      Albumin 3.20 g/dL      ALT (SGPT) <5 U/L      AST (SGOT) 21 U/L      Alkaline Phosphatase 46 U/L      Total Bilirubin 0.3 mg/dL      eGFR  African Amer >150 mL/min/1.73      Globulin 3.2 gm/dL      A/G Ratio 1.0 g/dL      BUN/Creatinine Ratio 10.0     Anion Gap 7.0 mmol/L     Narrative:      GFR Normal >60  Chronic Kidney Disease <60  Kidney Failure <15      CK " [[id number]]  (Normal) Collected: 09/06/21 0407    Specimen: Blood Updated: 09/06/21 0446     Creatine Kinase 83 U/L     CBC Auto Differential [[id number]]  (Abnormal) Collected: 09/06/21 0407    Specimen: Blood Updated: 09/06/21 0420     WBC 2.88 10*3/mm3      RBC 3.30 10*6/mm3      Hemoglobin 9.6 g/dL      Hematocrit 29.9 %      MCV 90.6 fL      MCH 29.1 pg      MCHC 32.1 g/dL      RDW 16.2 %      RDW-SD 54.3 fl      MPV 8.6 fL      Platelets 284 10*3/mm3      Neutrophil % 63.9 %      Lymphocyte % 16.7 %      Monocyte % 16.7 %      Eosinophil % 1.7 %      Basophil % 0.7 %      Immature Grans % 0.3 %      Neutrophils, Absolute 1.84 10*3/mm3      Lymphocytes, Absolute 0.48 10*3/mm3      Monocytes, Absolute 0.48 10*3/mm3      Eosinophils, Absolute 0.05 10*3/mm3      Basophils, Absolute 0.02 10*3/mm3      Immature Grans, Absolute 0.01 10*3/mm3      nRBC 0.0 /100 WBC     COVID-19,[facility] IN-HOUSE,Nasal Swab No Transport Media 3-4 HR TAT - Swab, Nasal Cavity [[id number]]  (Normal) Collected: 09/05/21 0737    Specimen: Swab from Nasal Cavity Updated: 09/05/21 0859     COVID19 Not Detected    Narrative:      Fact sheet for providers: https://www.fda.gov/media/495389/download     Fact sheet for patients: https://www.fda.gov/media/264141/download    Test performed by PCR.    Consider negative results in combination with clinical observations, patient history, and epidemiological information.    Sedimentation Rate [[id number]]  (Abnormal) Collected: 09/05/21 0716    Specimen: Blood Updated: 09/05/21 0819     Sed Rate 104 mm/hr     C-reactive Protein [[id number]]  (Abnormal) Collected: 09/05/21 0716    Specimen: Blood Updated: 09/05/21 0759     C-Reactive Protein 3.61 mg/dL     Comprehensive Metabolic Panel [[id number]]  (Abnormal) Collected: 09/05/21 0716    Specimen: Blood Updated: 09/05/21 0759     Glucose 96 mg/dL      BUN 4 mg/dL      Creatinine 0.41 mg/dL      Sodium 139 mmol/L      Potassium 3.7 mmol/L      Chloride 103  mmol/L      CO2 28.0 mmol/L      Calcium 8.9 mg/dL      Total Protein 6.5 g/dL      Albumin 3.20 g/dL      ALT (SGPT) 5 U/L      AST (SGOT) 24 U/L      Alkaline Phosphatase 49 U/L      Total Bilirubin 0.3 mg/dL      eGFR  African Amer >150 mL/min/1.73      Globulin 3.3 gm/dL      A/G Ratio 1.0 g/dL      BUN/Creatinine Ratio 9.8     Anion Gap 8.0 mmol/L     Narrative:      GFR Normal >60  Chronic Kidney Disease <60  Kidney Failure <15      CBC & Differential [[id number]]  (Abnormal) Collected: 09/05/21 0716    Specimen: Blood Updated: 09/05/21 0736    Narrative:      The following orders were created for panel order CBC & Differential.  Procedure                               Abnormality         Status                     ---------                               -----------         ------                     CBC Auto Differential[[id number]]        Abnormal            Final result                 Please view results for these tests on the individual orders.    CBC Auto Differential [[id number]]  (Abnormal) Collected: 09/05/21 0716    Specimen: Blood Updated: 09/05/21 0736     WBC 2.82 10*3/mm3      RBC 3.45 10*6/mm3      Hemoglobin 9.5 g/dL      Hematocrit 31.3 %      MCV 90.7 fL      MCH 27.5 pg      MCHC 30.4 g/dL      RDW 16.1 %      RDW-SD 53.4 fl      MPV 9.0 fL      Platelets 338 10*3/mm3      Neutrophil % 68.8 %      Lymphocyte % 13.1 %      Monocyte % 15.6 %      Eosinophil % 1.1 %      Basophil % 0.7 %      Immature Grans % 0.7 %      Neutrophils, Absolute 1.94 10*3/mm3      Lymphocytes, Absolute 0.37 10*3/mm3      Monocytes, Absolute 0.44 10*3/mm3      Eosinophils, Absolute 0.03 10*3/mm3      Basophils, Absolute 0.02 10*3/mm3      Immature Grans, Absolute 0.02 10*3/mm3      nRBC 0.0 /100 WBC           Radiology Results:    Imaging Results (Last 72 Hours)     Procedure Component Value Units Date/Time    XR Chest 1 View [[id number]] Collected: 09/05/21 0824     Updated: 09/05/21 0828    Narrative:      HISTORY:  Dyspnea     CXR: A frontal view the chest obtained     COMPARISON: 10/9/2018     FINDINGS: Left subclavian port remains in place with the tip overlying  the SVC. Surgical clips of the left axilla and chest wall. Small left  pleural effusion with left lower lobe consolidation. Probable right  basilar atelectasis. No pneumothorax. No radiographic evidence of edema.  Heart normal in size. Mild right convexity of the thoracolumbar  curvature may relate to patient posturing. No acute bony pathology  identified.       Impression:      1. Small left pleural effusion with left basilar consolidation  concerning for pneumonia. Probable right basilar atelectasis.  This report was finalized on 09/05/2021 08:25 by Dr. Elsi Starr MD.            Physical Exam  Constitutional:       Appearance: Normal appearance. She is well-developed.   HENT:      Head: Normocephalic and atraumatic.      Nose: Nose normal.   Eyes:      General: Lids are normal.      Conjunctiva/sclera: Conjunctivae normal.      Pupils: Pupils are equal, round, and reactive to light.   Neck:      Trachea: Trachea normal.   Cardiovascular:      Rate and Rhythm: Normal rate and regular rhythm.      Heart sounds: Normal heart sounds.   Pulmonary:      Breath sounds: Examination of the right-lower field reveals decreased breath sounds. Examination of the left-lower field reveals decreased breath sounds. Decreased breath sounds present.   Abdominal:      General: Bowel sounds are normal.      Palpations: Abdomen is soft.   Musculoskeletal:         General: Normal range of motion.      Cervical back: Normal range of motion and neck supple.   Skin:     General: Skin is warm and dry.      Findings: No erythema, petechiae or rash.      Nails: There is no clubbing.      Comments: Significant radiation burn to posterior and anterior chest with some scabbing posteriorly. Port left anterior CW   Neurological:      Mental Status: She is alert.      Cranial Nerves: No  cranial nerve deficit.      Sensory: No sensory deficit.   Psychiatric:         Speech: Speech normal.         Behavior: Behavior normal. Behavior is cooperative.         Thought Content: Thought content normal.         Judgment: Judgment normal.         ASSESSMENT:  Metastatic HER-2 migel breast cancer (diffuse ant and post chest wall skin involvement)  She has received several lines of chemotherapy in the past and several rounds of palliative radiation     7/31/2021-CT chest abdomen pelvis showed no evidence of visceral metastasis.     Currently on palliative RT. Last dose about 1-2 weeks ago. She received 6/30 treatments - Unable to lie down on the table for treatment     Cancer related pain-worsening.   Morphine ER 60mg PO q 12 h (dose increased on 9/5/2021)  Norco 10/325 1 tablet every 6 hours as needed pain  Dilaudid 1 mg IV every 2 hours as needed pain  Morphine IR 15 mg p.o. 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 She denies any significant change from previous evaluation.    Lymphopenia-     Normocytic Hypochromic anemia-            Bilateral upper extremity lymphedema- 2/2 extensive radiation and skin involvement. consult PT  US B/L Upper extremity ordered for today    LLL consolidation, R basilar atelectasis    pCXR 9/5/2021      IS  Rocephin 1 g IV daily      Plan:  Continue pain management  Wound care consult  Palliative care consult  Lymphedema PT  consult  US bilateral upper extremity today      RAJWINDER Villanueva    9/7/2021    07:08 CDT     Physicians attestation and contribution:    I, Barrett Moreno, personally and independently performed an evaluation on Ayla Echeverria  I have reviewed relevant medical information/data to include but not limited to the medication list, relevant appropriate lab work and imaging when applicable.  I reviewed other physician's notes, ancillary services and nurses assessments.  I have reviewed the above  documentation completed by Carmine Lao PA-C  Please see my additional addended and/or modified contributions to the history of present illness, physical examination and assessment/medical decision-making and plan that reflects my findings and impressions.  I discussed the essential elements of the care plan with Carmine Lao PA-C and the patient.  I have encouraged and answered all the questions raised to the patient's understanding and satisfaction.  I concur with the above stated.    Subjective: Awake, alert and talkative.  Still with discomfort over the anterior chest wall and breasts due to locally advanced infiltrating progressive breast cancer.    Objective:  Significant persistent locally advanced infiltrating breast cancer throughout the anterior chest wall in a T-shirt-like fashion from the shoulders all the way down to the abdomen,   Including both breasts and axilla with  bilateral upper extremity lymphedema.      Assessment/plan:  I will consult XRT to try to resume XRT at least while she was here in the hospital with palliative intent.    I will discuss with Dr. Ott the possibility of systemic palliative therapy with [facility] in the outpatient setting.        Barrett Moreno MD  9/7/2021 07:47 CDT

## 2021-09-07 NOTE — PLAN OF CARE
Goal Outcome Evaluation:           Progress: no change  Outcome Summary: pt requiring continual administration of multiple scheduled and PRN pain medictaions with very little to no relief of pain. PRN atarax as ordered for itching. Aquaphor to back for comfort. Up with assist.  Saftey maintained

## 2021-09-07 NOTE — PLAN OF CARE
Goal Outcome Evaluation:  Plan of Care Reviewed With: patient           Outcome Summary: VSS, on RA, continues to c/o of constant breast/chest pain, prn and scheduled pain meds given, pallative care APRN in to see pt and made changes to help with pain control,  BUE venous scan negative for thrombus, severe edema to BUE, pt able to feed self, use phone, call light etc, voiding well, had small bm today, softner/laxative given, wound care in to see pt, pt informed of skin cream she could use prn but declined my offer to apply any, uses pillowcase to cover skin on her back, IV abx in use, mediport drsg edges loose but site remains covered, VAT team aware

## 2021-09-07 NOTE — PAYOR COMM NOTE
"REF:  [id number]    [facility],  [phone]  OR  FAX   [phone]    Ayla Echeverria (55 y.o. Female)     Date of Birth Social Security Number Address Home Phone MRN    1965  [address] [place] [address] [phone] [id number]    [employer] Marital Status          Islam        Admission Date Admission Type Admitting Provider Attending Provider Department, Room/Bed    9/5/21 Emergency Jack Cárdenas MD [place], Thomas Waldon, MD [facility] 4B, 412/1    Discharge Date Discharge Disposition Discharge Destination                       Attending Provider: Jack Cárdenas MD    Allergies: No Known Allergies    Isolation: None   Infection: None   Code Status: CPR    Ht: 160 cm (62.99\")   Wt: 72.7 kg (160 lb 3.2 oz)    Admission Cmt: None   Principal Problem: None                Active Insurance as of 9/5/2021     Primary Coverage     Payor Plan Insurance Group Employer/Plan Group    [employer]HE[employer] BLUE CROSS AN[employer]M BLUE CROSS BLUE SHIELD PPO [id number]     Payor Plan Address Payor Plan Phone Number Payor Plan Fax Number Effective Dates    [address] [phone]  1/1/2018 - None Entered    [facility] [id number]       Subscriber Name Subscriber Birth Date Member ID       AYLA ECHEVERRIA 1965 [id number]                 Emergency Contacts      (Rel.) Home Phone Work Phone Mobile Phone    TejasDoraNicholasDanielle (Daughter) -- -- [phone]    Iman Childs (Sister) [phone] -- --    Mayela Tejeda (Friend) [phone] -- --    ELSA BARRAGAN (Daughter) -- -- [phone]            Patient Care Timeline (9/5/2021 06:15 to 9/5/2021 10:13)    9/5/2021 Event Details User   06:15 Patient arrival  Lacey Do, RN     06:15 HPI HPI (Adult)  Stated Reason for Visit: pt reports having breast cancer, she is currently doing rasiation and c/o bilateral arm swelling and increased pain   History Obtained From: patient  Medications/Treatments Prior " "to Arrival: none Lacey Do RN   06:15:27 Chief Complaints Updated Arm Swelling       Breast Cancer       Pain   Lacey Do RN   06:15:27 Trigger for Triage Start  Lacey Do RN   06:15:27 Triage Started  Lacey Do RN   06:16 Vital Signs Vital Signs  Resp: 24  Resp Rate Source: Visual  BMI (Calculated): 23  Height and Weight  Height: 160 cm (63\")  Height Method: Stated  Weight: 59 kg (130 lb)  Weight Method: Stated  Other flowsheet entries  Ideal Body Weight k.4 Lacey Do RN     06:16 Pain (Adult) Pain (Adult)  (0-10) Pain Rating: Rest: 10 Lacey Do RN     06:33 Vital Signs Vital Signs  Temp: 98.5 °F (36.9 °C)  Temp src: Oral  Heart Rate: 100  Heart Rate Source: Monitor  BP: 125/56  BP Location: Left leg  BP Method: Automatic  Patient Position: Sitting  Oxygen Therapy  SpO2: 95 %  Pulse Oximetry Type: Continuous Lacey Do RN   06:33 Triage Sepsis Screen Sepsis Screen (1=Yes, 0=No)  Documented or Suspected Infection: YesAbnormal   Documented or Suspected Infection Indications: wound infection/cellulitisAbnormal   Acutely Altered Mental Status: No  Temp <96.8 OR >100.9: No  Heart Rate >90: YesAbnormal   Respiratory Rate >20: YesAbnormal   SBP <90 : No  MAP <65: No  WBC <4 or >12 (10*3/mm3): Unknown  Bands > 10%: Unknown  Abnormals (in addition to infection question): 2  Result of current screen is:: Positive: SimpleAbnormal  Lacey Do RN     Discontinued  - Troponin Delicia Rivera RN     06:58 ED Notes Addendum Dressing on port having difficulty sticky to pt skin due to pt having severe open wounds and drainage from breast cancer and radiation. Dressing changed and large tegaderm applied. Paper tape applied around edges of tegaderm. Dressing continues to not stick well to skin. Bio patch applied. 19G 1in needle. Blood return noted.      Delicia Rivera RN  21 0732        Delicia Rivera RN  21 0734    Delicia Rivera RN     07:04 Medication Given " HYDROmorphone (DILAUDID) injection 1 mg - Dose: 1 mg ; Route: Intravenous ; Line: Single Lumen Implantable Port 09/05/21 Left Chest ; Scheduled Time: 0628 Delicia Rivera RN   07:04 Pain Medication Administered - Adult Given - HYDROmorphone (DILAUDID) injection 1 mg Delicia Rivera RN     07:27:25 Peripheral Neurovascular (Adult) Peripheral Neurovascular (Adult)  Peripheral Neurovascular WDL: WDL except  Additional Documentation: Edema (Group)  Edema  Edema: arm, left; arm, right; hand, right; hand, left  Arm, Left Edema: 4+ (Severe)  Arm, Right Edema: 4+ (Severe)  Hand, Left Edema: 4+ (Severe)  Hand, Right Edema: 4+ (Severe) Delicia Rivera RN     08:23 Medication Given HYDROmorphone (DILAUDID) injection 1 mg - Dose: 1 mg ; Route: Intravenous ; Line: Single Lumen Implantable Port 09/05/21 Left Chest ; Scheduled Time: 0814 Monique Zacarias RN   08:23 Pain Medication Administered - Adult Given - HYDROmorphone (DILAUDID) injection 1 mg Monique Zacarias RN   08:23 Data Pain  (0-10) Pain Rating: Rest: 10 Monique Zacarias RN Powell, Kaci D RN   Registered Nurse      Plan of Care   Signed   Date of Service:  09/06/21 0432   Creation Time:  09/06/21 0432            Signed             Goal Outcome Evaluation:  Progress: no change  Outcome Summary: Pt continues to c/o siginificant pain. Did sleep for a good portion of the night, but woke up in a large amount of pain. Left mediport remains accessed. Safety maintained                 Eufemia San, PT DPT   Physical Therapist   Physical Therapy   Plan of Care   Signed   Date of Service:  09/06/21 1503   Creation Time:  09/06/21 1503            Signed             Goal Outcome Evaluation:  Plan of Care Reviewed With: patient  Progress: no change  Outcome Summary: PT screen completed. Received orders for lymphedema treatment. Pt reports that she goes to OP therapy for treatment, and missed her last appointment due to not feeling well. Therapist encouraged patient to  "contact OP clinic to inform them of hospitalization and to ask if there is anything she or her family can be doing to maintain progress with edema during hospital stay. Pt given OP clinic's contact information to call tomorrow. PT to sign off as there is no skilled treatment able to be provided in this setting.              , Keyla MONTALVO RN   Registered Nurse      Plan of Care   Signed   Date of Service:  09/07/21 0616   Creation Time:  09/07/21 0616            Signed             Goal Outcome Evaluation:  Progress: no change  Outcome Summary: pt requiring continual administration of multiple scheduled and PRN pain medictaions with very little to no relief of pain. PRN atarax as ordered for itching. Aquaphor to back for comfort. Up with assist.  Talia Andrade, RN   Registered Nurse      Nursing Note   Signed   Date of Service:  09/07/21 0914   Creation Time:  09/07/21 0914            Signed             Radiation oncology called and talked with pt about coming down for txt today, pt declined due to pain when lying down on txt table.              Sunita Hernandez, RN   Registered Nurse      Nursing Note   Signed   Date of Service:  09/07/21 0917   Creation Time:  09/07/21 0917            Signed             Port dressing assessed on rounds. Large transparent dressing still intact around site area. CHG disc in place. Edges loose however due to wounds unavoidable. Wound care consult noted today. Will continue to monitor. Notify VAT if any issues with dressing                     History & Physical      Jack Cárdenas MD at 09/06/21 0824          [facility]  HISTORY AND PHYSICAL    Date of Admission: 9/5/2021  Primary Care Physician: Jack Cárdenas MD    Subjective    Chief Complaint: \"my chest is killing me\"    This is a very pleasant 55 yr old lady with hx of breast ca undergoing rad tx who presented with persistent unbearable chest wall pain. She could not even " tolerate ekg pads. She is admitted to my services.      Review of Systems   Constitutional: Positive for activity change.   HENT: Negative.    Eyes: Negative.    Respiratory: Negative.    Cardiovascular: Positive for chest pain.   Gastrointestinal: Negative.    Endocrine: Negative.    Genitourinary: Negative.    Musculoskeletal: Negative.    Skin: Negative.    Allergic/Immunologic: Negative.    Neurological: Negative.    Hematological: Negative.    Psychiatric/Behavioral: Negative.         Otherwise complete ROS reviewed and negative except as mentioned in the HPI.      Past Medical History:   Past Medical History:   Diagnosis Date   • Breast cancer (CMS/HCC)        Past Surgical History:  Past Surgical History:   Procedure Laterality Date   • AXILLARY LYMPH NODE BIOPSY/EXCISION Left    • BREAST BIOPSY Left 03/31/2016   • BREAST LUMPECTOMY WITH SENTINEL NODE BIOPSY Left 10/03/2016    residual invasive carcinoma, grade 3 (0.2cm); margins negative; extensive lymphvascular space invasion; 2 sentinel lymph nodes positive (2/3)       Social History:  reports that she has never smoked. She has never used smokeless tobacco. She reports that she does not drink alcohol and does not use drugs.    Family History: family history includes Colon cancer in her father; Diabetes in her sister; Hypertension in her sister; Lung cancer in her mother; Lupus in her sister; Prostate cancer in her father.     Allergies:  No Known Allergies    Medications:  Prior to Admission medications    Medication Sig Start Date End Date Taking? Authorizing Provider   APPLE CIDER VINEGAR PO Take 1 tablet by mouth Daily.    ProviderJose F MD   HYDROcodone-acetaminophen (NORCO)  MG per tablet Take 1 tablet by mouth Every 6 (Six) Hours As Needed for Moderate Pain . 1/5/20   Jack De La Cruz DO   hydrOXYzine (ATARAX) 50 MG tablet Take 50 mg by mouth Every 6 (Six) Hours As Needed for Itching.    ProviderJose F MD montelukast (SINGULAIR)  "10 MG tablet Take  by mouth. 7/13/16   Jose F Jara MD   Morphine (MSIR) 15 MG tablet Take 15 mg by mouth Every 4 (Four) Hours As Needed for Severe Pain .    Jose F Jara MD   Multiple Vitamin (MULTI VITAMIN DAILY PO) Take 1 tablet by mouth Daily.    Jose F Jara MD   pantoprazole (PROTONIX) 40 MG EC tablet Take 40 mg by mouth Daily.    Jose F Jara MD   Probiotic Product (PROBIOTIC-10 PO) Take 1 tablet by mouth Daily.    Jose F Jara MD   SELENIUM ER PO Take 1 tablet by mouth Daily.    Jose F Jara MD   Zinc Sulfate 66 MG tablet Take 1 tablet by mouth Daily.    Jose F Jara MD       Objective    Vital Signs: /60 (BP Location: Left leg, Patient Position: Lying)   Pulse 87   Temp 98.6 °F (37 °C) (Oral)   Resp 16   Ht 160 cm (62.99\")   Wt 72.7 kg (160 lb 3.2 oz)   SpO2 95%   BMI 28.39 kg/m²   Physical Exam  Vitals and nursing note reviewed.   Constitutional:       Appearance: Normal appearance. She is normal weight.   HENT:      Head: Normocephalic and atraumatic.      Nose: Nose normal.      Mouth/Throat:      Mouth: Mucous membranes are moist.   Eyes:      Pupils: Pupils are equal, round, and reactive to light.   Cardiovascular:      Rate and Rhythm: Normal rate and regular rhythm.      Pulses: Normal pulses.      Heart sounds: Normal heart sounds.   Pulmonary:      Effort: Pulmonary effort is normal.      Breath sounds: Normal breath sounds.   Abdominal:      General: Abdomen is flat. Bowel sounds are normal.      Palpations: Abdomen is soft.   Musculoskeletal:         General: Normal range of motion.      Cervical back: Normal range of motion.   Skin:     General: Skin is warm and dry.      Capillary Refill: Capillary refill takes less than 2 seconds.   Neurological:      General: No focal deficit present.      Mental Status: She is alert and oriented to person, place, and time. Mental status is at baseline.   Psychiatric:         Mood and " Affect: Mood normal.         Behavior: Behavior normal.         Thought Content: Thought content normal.         Judgment: Judgment normal.         Results Reviewed:  Lab Results (last 24 hours)     ** No results found for the last 24 hours. **        Imaging Results (Last 24 Hours)     ** No results found for the last 24 hours. **            Active Hospital Problems    Diagnosis    • Chest wall pain    • Arm swelling        Assessment / Plan  Increase pain meds---consult her oncologist and rad tx---see orders      Code Status: see chart     I discussed the patient's findings and my recommendations with the patient..    Estimated length of stay 3 days.    Jack Cárdenas MD   09/07/21   06:59 CDT    Electronically signed by Jack Cárdenas MD at 09/07/21 0700          Emergency Department Notes      Conor Javed MD at 09/05/21 0627          Emergency Medicine Provider Note    Subjective:    HISTORY OF PRESENT ILLNESS     This is a very pleasant 55 y.o. female with a past medical history of breast cancer who presents to the emergency department today with a chief complaint of uncontrolled pain.  Patient states she has basically had pain for 5 years.  Throughout this she has been undergoing radiation.  Most recent radiation was a couple of weeks ago.  She presents here with a horrible chest pain.  Is dull.  Severe.  Nonradiating.  No exacerbating or relieving factors.  Associated with bilateral upper extremity swelling.  She states this is normal for her due to lymphadenopathy.  She denies any fevers or chills.  She states her pain is superficial.          History is obtained from the patient, collateral history is obtained from her daughter at bedside.       Review of Systems: All other systems are reviewed and are negative other than noted in the HPI.    Past Medical History:  Past Medical History:   Diagnosis Date   • Breast cancer (CMS/HCC)        Allergies:  No Known Allergies    Past  Surgical History:  Past Surgical History:   Procedure Laterality Date   • AXILLARY LYMPH NODE BIOPSY/EXCISION Left    • BREAST BIOPSY Left 03/31/2016   • BREAST LUMPECTOMY WITH SENTINEL NODE BIOPSY Left 10/03/2016    residual invasive carcinoma, grade 3 (0.2cm); margins negative; extensive lymphvascular space invasion; 2 sentinel lymph nodes positive (2/3)       Family History:  Family History   Problem Relation Age of Onset   • Lung cancer Mother    • Prostate cancer Father    • Colon cancer Father    • Lupus Sister    • Diabetes Sister    • Hypertension Sister        Social History:  Social History     Socioeconomic History   • Marital status:      Spouse name: Not on file   • Number of children: Not on file   • Years of education: Not on file   • Highest education level: Not on file   Tobacco Use   • Smoking status: Never Smoker   • Smokeless tobacco: Never Used   Substance and Sexual Activity   • Alcohol use: No   • Drug use: No   • Sexual activity: Defer       Home Medications:  Prior to Admission medications    Medication Sig Start Date End Date Taking? Authorizing Provider   APPLE CIDER VINEGAR PO Take 1 tablet by mouth Daily.    Jose F Jara MD   HYDROcodone-acetaminophen (NORCO)  MG per tablet Take 1 tablet by mouth Every 6 (Six) Hours As Needed for Moderate Pain . 1/5/20   Jack De La Cruz DO   hydrOXYzine (ATARAX) 50 MG tablet Take 50 mg by mouth Every 6 (Six) Hours As Needed for Itching.    Jose F Jara MD   montelukast (SINGULAIR) 10 MG tablet Take  by mouth. 7/13/16   Jose F Jara MD   Morphine (MSIR) 15 MG tablet Take 15 mg by mouth Every 4 (Four) Hours As Needed for Severe Pain .    Jose F Jara MD   Multiple Vitamin (MULTI VITAMIN DAILY PO) Take 1 tablet by mouth Daily.    Jose F Jara MD   pantoprazole (PROTONIX) 40 MG EC tablet Take 40 mg by mouth Daily.    Jose F Jara MD   Probiotic Product (PROBIOTIC-10 PO) Take 1 tablet by  mouth Daily.    Jose F Jara MD   SELENIUM ER PO Take 1 tablet by mouth Daily.    Jose F Jara MD   Zinc Sulfate 66 MG tablet Take 1 tablet by mouth Daily.    Jose F Jara MD         Objective:    PHYSICAL EXAM     Vitals:   Vitals:    09/05/21 0846   BP: 140/63   Pulse: 82   Resp:    Temp:    SpO2: 95%     GENERAL: Chronically ill-appearing, uncomfortable.  HEENT: Moist mucous membranes, oropharynx clear without lesions, exudates, thrush.   EYES: No scleral icterus, conjunctivae clear.   NECK: No cervical lymphadenopathy, no stiffness.  CARDIAC: Normal rate, regular rhythm, no murmurs, 2+ peripheral pulses in all four extremities, normal capillary refill.   PULMONARY: Normal work of breathing on room air, lungs are clear to auscultation bilaterally without wheezes, crackles, rhonchi.  ABDOMINAL: Normal bowel sounds, abdomen is soft, non-tender, non-distended, no hepatomegaly or splenomegaly.   MUSCULOSKELETAL: Normal range of motion, no lower extremity edema.  There is bilateral upper extremity edema  NEUROLOGIC: Alert and oriented x 3, EOM grossly intact and moves all four extremities with normal strength.  SKIN: There is necrotic and burn material over the entire upper chest consistent with radiation.  PSYCHIATRIC: Mood and affect are normal.     PROCEDURES     Procedures    LAB AND RADIOLOGY RESULTS     Lab Results (last 24 hours)     Procedure Component Value Units Date/Time    CBC & Differential [[id number]]  (Abnormal) Collected: 09/05/21 0716    Specimen: Blood Updated: 09/05/21 0736    Narrative:      The following orders were created for panel order CBC & Differential.  Procedure                               Abnormality         Status                     ---------                               -----------         ------                     CBC Auto Differential[[id number]]        Abnormal            Final result                 Please view results for these tests on the individual  orders.    Comprehensive Metabolic Panel [[id number]]  (Abnormal) Collected: 09/05/21 0716    Specimen: Blood Updated: 09/05/21 0759     Glucose 96 mg/dL      BUN 4 mg/dL      Creatinine 0.41 mg/dL      Sodium 139 mmol/L      Potassium 3.7 mmol/L      Chloride 103 mmol/L      CO2 28.0 mmol/L      Calcium 8.9 mg/dL      Total Protein 6.5 g/dL      Albumin 3.20 g/dL      ALT (SGPT) 5 U/L      AST (SGOT) 24 U/L      Alkaline Phosphatase 49 U/L      Total Bilirubin 0.3 mg/dL      eGFR  African Amer >150 mL/min/1.73      Globulin 3.3 gm/dL      A/G Ratio 1.0 g/dL      BUN/Creatinine Ratio 9.8     Anion Gap 8.0 mmol/L     Narrative:      GFR Normal >60  Chronic Kidney Disease <60  Kidney Failure <15      Sedimentation Rate [[id number]]  (Abnormal) Collected: 09/05/21 0716    Specimen: Blood Updated: 09/05/21 0819     Sed Rate 104 mm/hr     C-reactive Protein [[id number]]  (Abnormal) Collected: 09/05/21 0716    Specimen: Blood Updated: 09/05/21 0759     C-Reactive Protein 3.61 mg/dL     CBC Auto Differential [[id number]]  (Abnormal) Collected: 09/05/21 0716    Specimen: Blood Updated: 09/05/21 0736     WBC 2.82 10*3/mm3      RBC 3.45 10*6/mm3      Hemoglobin 9.5 g/dL      Hematocrit 31.3 %      MCV 90.7 fL      MCH 27.5 pg      MCHC 30.4 g/dL      RDW 16.1 %      RDW-SD 53.4 fl      MPV 9.0 fL      Platelets 338 10*3/mm3      Neutrophil % 68.8 %      Lymphocyte % 13.1 %      Monocyte % 15.6 %      Eosinophil % 1.1 %      Basophil % 0.7 %      Immature Grans % 0.7 %      Neutrophils, Absolute 1.94 10*3/mm3      Lymphocytes, Absolute 0.37 10*3/mm3      Monocytes, Absolute 0.44 10*3/mm3      Eosinophils, Absolute 0.03 10*3/mm3      Basophils, Absolute 0.02 10*3/mm3      Immature Grans, Absolute 0.02 10*3/mm3      nRBC 0.0 /100 WBC     COVID-19,[facility] IN-HOUSE,Nasal Swab No Transport Media 3-4 HR TAT - Swab, Nasal Cavity [[id number]]  (Normal) Collected: 09/05/21 0737    Specimen: Swab from Nasal Cavity Updated: 09/05/21 0838      COVID19 Not Detected    Narrative:      Fact sheet for providers: https://www.fda.gov/media/726688/download     Fact sheet for patients: https://www.fda.gov/media/565544/download    Test performed by PCR.    Consider negative results in combination with clinical observations, patient history, and epidemiological information.          XR Chest 1 View    Result Date: 9/5/2021  Narrative: HISTORY: Dyspnea  CXR: A frontal view the chest obtained  COMPARISON: 10/9/2018  FINDINGS: Left subclavian port remains in place with the tip overlying the SVC. Surgical clips of the left axilla and chest wall. Small left pleural effusion with left lower lobe consolidation. Probable right basilar atelectasis. No pneumothorax. No radiographic evidence of edema. Heart normal in size. Mild right convexity of the thoracolumbar curvature may relate to patient posturing. No acute bony pathology identified.      Impression: 1. Small left pleural effusion with left basilar consolidation concerning for pneumonia. Probable right basilar atelectasis. This report was finalized on 09/05/2021 08:25 by Dr. Elsi Starr MD.      ED course:    Medications   sodium chloride 0.9 % flush 10 mL (has no administration in time range)   HYDROmorphone (DILAUDID) injection 1 mg (1 mg Intravenous Given 9/5/21 0704)   HYDROmorphone (DILAUDID) injection 1 mg (1 mg Intravenous Given 9/5/21 0823)          Amount and/or complexity of data reviewed:    • Clinical lab tests ordered and reviewed.  • Tests in the radiology section ordered and reviewed.  • Discuss the patient with another provider: Dr. Cárdenas      Risk of significant complications, morbidity, and/or mortality.    •  Presenting problem: high  •  Diagnostic procedures: high  •  Management options: high        MEDICAL DECISION MAKING     Patient presents with chest pain and bilateral arm swelling. Upon arrival to the Emergency Department she is in no acute distress and her vital signs are reassuring  although she is very uncomfortable.  IV access is obtained.  Patient is given Dilaudid.Labs shows slightly elevated ESR and CRP, Covid testing is negative, CBC shows anemia which is slightly worse from baseline, there is also leukopenia which is consistent with baseline.  Chest x-ray shows findings concerning for pneumonia.  Patient has no fevers, chills, or cough so will defer treatment at this time to see if she develops symptoms.  However, her pain is uncontrolled after 2 doses of IV Dilaudid in addition to her home narcotic regimen.  Due to this I discussed the case with her primary care physician who has agreed to admit her.  She is in stable condition at this time.        Diagnosis:    Final diagnoses:   Arm swelling   Cancer associated pain   Intractable pain         ED Disposition:     ED Disposition     ED Disposition Condition Comment    Decision to Admit  Level of Care: Telemetry [5]   Diagnosis: Arm swelling [218029]   Admitting Physician: AMANDA MORALEZ [[id number]]   Attending Physician: AMANDA MORALEZ [[id number]]   Certification: I Certify That Inpatient Hospital Services Are Medically Necessary For Greater Than 2 Midnights            No follow-up provider specified.       Medication List      No changes were made to your prescriptions during this visit.              Conor Javed MD  09/05/21 0913      Electronically signed by Conor Javed MD at 09/05/21 0913     Delicia Rivera, RN at 09/05/21 0658        Dressing on port having difficulty sticky to pt skin due to pt having severe open wounds and drainage from breast cancer and radiation. Dressing changed and large tegaderm applied. Paper tape applied around edges of tegaderm. Dressing continues to not stick well to skin. Bio patch applied. 19G 1in needle. Blood return noted.      Delicia Rivera, RN  09/05/21 0767       Delicia Rivera RN  09/05/21 0738      Electronically signed by Deilcia Rivera RN at  09/05/21 0734         Facility-Administered Medications as of 9/7/2021   Medication Dose Route Frequency Provider Last Rate Last Admin   • cefTRIAXone (ROCEPHIN) 1 g in sodium chloride 0.9 % 100 mL IVPB-VTB  1 g Intravenous Q24H Carmine Lao PA       • HYDROcodone-acetaminophen (NORCO)  MG per tablet 1 tablet  1 tablet Oral Q6H PRN Jack Cárdenas MD   1 tablet at 09/07/21 0017   • [COMPLETED] HYDROmorphone (DILAUDID) injection 1 mg  1 mg Intravenous Once Conor Javed MD   1 mg at 09/05/21 0704   • [COMPLETED] HYDROmorphone (DILAUDID) injection 1 mg  1 mg Intravenous Once Conor Javed MD   1 mg at 09/05/21 0823   • HYDROmorphone (DILAUDID) injection 1 mg  1 mg Intravenous Q2H PRN Jack Cárdenas MD   1 mg at 09/07/21 0837    And   • naloxone (NARCAN) injection 0.4 mg  0.4 mg Intravenous Q5 Min PRN Jack Cárdenas MD       • hydrOXYzine (ATARAX) tablet 50 mg  50 mg Oral Q6H PRN Jack Cárdenas MD   50 mg at 09/07/21 0521   • montelukast (SINGULAIR) tablet 10 mg  10 mg Oral Daily Jack Cárdenas MD       • Morphine (MS CONTIN) 12 hr tablet 60 mg  60 mg Oral Q12H Donald Ott MD   60 mg at 09/07/21 0819   • Morphine (MSIR) tablet 15 mg  15 mg Oral Q4H PRN Jack Cárdenas MD   15 mg at 09/07/21 0521   • ondansetron (ZOFRAN) injection 4 mg  4 mg Intravenous Q6H PRN Jack Cárdenas MD       • pantoprazole (PROTONIX) EC tablet 40 mg  40 mg Oral Daily Jack Cárdenas MD   40 mg at 09/07/21 0819   • sennosides-docusate (PERICOLACE) 8.6-50 MG per tablet 1 tablet  1 tablet Oral BID PRN Jack Cárdenas MD   1 tablet at 09/07/21 0926   • sodium chloride 0.9 % flush 10 mL  10 mL Intravenous PRN Jack Cárdenas MD       • sodium chloride 0.9 % flush 10 mL  10 mL Intravenous Q12H Jack Cárdenas MD   10 mL at 09/07/21 0837   • sodium chloride 0.9 % flush 10 mL  10 mL Intravenous PRN Jack Cárdenas MD        • sodium chloride 0.9 % infusion  50 mL/hr Intravenous Continuous Jack Cárdenas MD 50 mL/hr at 09/07/21 0631 50 mL/hr at 09/07/21 0631     Orders (last 72 hrs)      Start     Ordered    09/07/21 0913  Wound Ostomy Eval & Treat  Once     Comments: Has seen wound care in the outpatient setting but has not been successful in utilizing the recommended treatments. Can you assist/evaluate/treat?    09/07/21 0914    09/07/21 0910  Inpatient Radiation Oncology Consult  Once     Specialty:  Radiation Oncology  Provider:  (Not yet assigned)    09/07/21 0910    09/07/21 0830  cefTRIAXone (ROCEPHIN) 1 g in sodium chloride 0.9 % 100 mL IVPB-VTB  Every 24 Hours      09/07/21 0731    09/07/21 0731  Incentive Spirometry  Every 6 Hours While Awake      09/07/21 0731    09/06/21 1451  Discontinue Telemetry Monitoring  Once      09/06/21 1450    09/06/21 1023  US Venous Doppler Upper Extremity Bilateral (duplex)  1 Time Imaging      09/06/21 1023    09/06/21 0600  Comprehensive Metabolic Panel  Morning Draw      09/05/21 1035    09/06/21 0600  CBC Auto Differential  Morning Draw      09/05/21 1035    09/06/21 0600  CK  Morning Draw      09/05/21 1035    09/06/21 0425  sennosides-docusate (PERICOLACE) 8.6-50 MG per tablet 1 tablet  2 Times Daily PRN      09/06/21 0425    09/05/21 1230  Morphine (MS CONTIN) 12 hr tablet 60 mg  Every 12 Hours Scheduled      09/05/21 1143    09/05/21 1200  Vital Signs  Every 4 Hours      09/05/21 1035    09/05/21 1146  PT Lymphedema Therapy  Once      09/05/21 1146    09/05/21 1130  montelukast (SINGULAIR) tablet 10 mg  Daily      09/05/21 1035    09/05/21 1130  pantoprazole (PROTONIX) EC tablet 40 mg  Daily      09/05/21 1035    09/05/21 1130  sodium chloride 0.9 % flush 10 mL  Every 12 Hours Scheduled      09/05/21 1035    09/05/21 1130  sodium chloride 0.9 % infusion  Continuous      09/05/21 1035    09/05/21 1111  Inpatient Palliative Care Consult  Once     Provider:  (Not yet assigned)     09/05/21 1110    09/05/21 1103  Wound Ostomy Eval & Treat  Once      09/05/21 1103    09/05/21 1047  Inpatient Case Management  Consult  Once     Provider:  (Not yet assigned)    09/05/21 1046    09/05/21 1037  Inpatient Radiation Oncology Consult  Once     Specialty:  Radiation Oncology  Provider:  Pipo Darby III, MD    09/05/21 1036    09/05/21 1035  ondansetron (ZOFRAN) injection 4 mg  Every 6 Hours PRN      09/05/21 1035    09/05/21 1035  naloxone (NARCAN) injection 0.4 mg  Every 5 Minutes PRN      09/05/21 1035    09/05/21 1035  HYDROmorphone (DILAUDID) injection 1 mg  Every 2 Hours PRN      09/05/21 1035    09/05/21 1035  Inpatient Hematology & Oncology Consult  Once     Specialty:  Hematology and Oncology  Provider:  Donald Ott MD    09/05/21 1035    09/05/21 1034  Intake & Output  Every Shift      09/05/21 1035    09/05/21 1034  Weigh Patient  Once      09/05/21 1035    09/05/21 1034  Oxygen Therapy- Nasal Cannula; Titrate for SPO2: 90% - 95%  Continuous      09/05/21 1035    09/05/21 1034  Insert Peripheral IV  Once      09/05/21 1035    09/05/21 1034  Saline Lock & Maintain IV Access  Continuous      09/05/21 1035    09/05/21 1034  Code Status and Medical Interventions:  Continuous      09/05/21 1035    09/05/21 1034  Place Sequential Compression Device  Once      09/05/21 1035    09/05/21 1034  Maintain Sequential Compression Device  Continuous      09/05/21 1035    09/05/21 1034  Activity - Ad Delfina  Until Discontinued      09/05/21 1035    09/05/21 1034  Diet Regular  Diet Effective Now      09/05/21 1035    09/05/21 1033  sodium chloride 0.9 % flush 10 mL  As Needed      09/05/21 1035    09/05/21 1032  Morphine (MSIR) tablet 15 mg  Every 4 Hours PRN      09/05/21 1035    09/05/21 1032  hydrOXYzine (ATARAX) tablet 50 mg  Every 6 Hours PRN      09/05/21 1035    09/05/21 1032  HYDROcodone-acetaminophen (NORCO)  MG per tablet 1 tablet  Every 6 Hours PRN      09/05/21  1035    09/05/21 0818  Inpatient Admission  Once      09/05/21 0817    09/05/21 0818  Cardiac Monitoring  Once,   Status:  Canceled      09/05/21 0817    09/05/21 0814  HYDROmorphone (DILAUDID) injection 1 mg  Once      09/05/21 0812    09/05/21 0636  COVID-19,[facility] IN-HOUSE,Nasal Swab No Transport Media 3-4 HR TAT - Swab, Nasal Cavity  Once      09/05/21 0635    09/05/21 0630  Troponin  Once,   Status:  Canceled      09/05/21 0629    09/05/21 0628  HYDROmorphone (DILAUDID) injection 1 mg  Once      09/05/21 0626    09/05/21 0627  CBC & Differential  Once      09/05/21 0626    09/05/21 0627  Comprehensive Metabolic Panel  Once      09/05/21 0626    09/05/21 0627  Sedimentation Rate  Once      09/05/21 0626    09/05/21 0627  C-reactive Protein  Once      09/05/21 0626    09/05/21 0627  XR Chest 1 View  1 Time Imaging      09/05/21 0626    09/05/21 0627  Insert peripheral IV  Once      09/05/21 0626    09/05/21 0627  CBC Auto Differential  PROCEDURE ONCE      09/05/21 0626    09/05/21 0626  sodium chloride 0.9 % flush 10 mL  As Needed      09/05/21 0626    Unscheduled  Wound Care  As Needed      09/05/21 1103    Pending  Opioid Administration - Capnography (EtCO2) Monitoring  Per Order Details      Pending    Pending  Opioid Administration - Document EtCO2 Value With Each Set of Vitals & Any Change in Patient Status  Per Order Details      Pending    Pending  Opioid Administration - Notify Provider Capnography (EtCO2)  Until Discontinued      Pending    Pending  Opioid Administration - Capnography (EtCO2) Monitoring  Per Order Details      Pending    Pending  Opioid Administration - Document EtCO2 Value With Each Set of Vitals & Any Change in Patient Status  Per Order Details      Pending    Pending  Opioid Administration - Notify Provider Capnography (EtCO2)  Until Discontinued      Pending                     Physician Progress Notes (last 72 hours) (Notes from 09/04/21 0930 through 09/07/21 0930)      Josh  Barrett Sanchez MD at 09/07/21 0708          MEDICAL ONCOLOGY PROGRESS NOTE      Pt Name: Ayla Echeverria  MRN: [id number]  [id number]  YOB: 1965  Date of evaluation: 9/7/2021   ROOM: 412      Chief Compliant:  Continues to have pain, itching from tumo/ radiation burns to chest anterior and posterior.  A little better overall.  Breathing OK.  No cough    HISTORY OF PRESENT ILLNESS:    Ayla Echeverria is well known to my clinic and is followed for a diagnosis of recurrent HER-2 positive/ER 1% and MS 6% invasive ductal carcinoma of the breast. She was initially diagnosed in 2016.  She had locally advanced disease.  Unfortunately, she had significant compliance issues when she was diagnosed.  Later on she developed widespread metastatic disease with predominantly skin recurrence. She has received several lines of treatment with chemotherapy agents as well as radiation to the skin.  She is currently receiving palliative radiation.  She has had significant complains of pain, ulceration of the skin lesions.  She presented to the ER department on 9/5/2021 at [facility], [place], operated by [facility] due to uncontrolled pain.     ONCOLOGIC HISTORY  Diagnosis   IDC left breast, March 2016   Grade 2. ER 1%, MS 6%, HER-2/migel IHC 3+/FISH amplified ratio 6.7, AR 95%.   01/30/2017- Biopsy-proven in breast relapse/Inflammatory breast cancer. ER negative, MS negative, HER-2/migel IHC 3+/FISH amplified ratio 4.5, Ki-67 32%.   [facility] - Revealed no clinically significant mutation. PTEN VUS.  5/7/21 Invasive breast cancer involving dermal lymphatics    Treatment summary  Neoadjuvant adjuvant chemotherapy TCH-P ×6 cycles. She declined to complete 1 year of Herceptin.   She declined radiation therapy.   10/3/2016-left lumpectomy/sentinel lymph node   01/30/2017- Biopsy-proven in breast relapse/Inflammatory breast cancer.   5/11/2017-Chemotherapy with paclitaxel/carboplatin weekly. Herceptin/Pertuzumab every 3 weeks started 05/11/2017. She had a  allergic reaction to carboplatin and this was discontinued.   7/21/2017- 9/1/2017 Neuropathy grade 2 with Taxol. Therefore, switched to Taxotere/Herceptin/Pertuzumab. Discontinued due to progression of disease.   9/22/2017 through 3/2/2018- Kadycla every 3 weeks. (stopped as per patient request, neuropathy grade 1)   7/10/2018 through 11/30/2018- Kadcyla.   1/4/2019-Navelbine x 1 cycle with very poor tolerance (pain)   1/25/2018-Gemzar/Herceptin   3/15/2019- low-dose Cisplatin added to Gemzar and Herceptin regimen, regimen changed to every other week   4/16/2019-5/23/19 5040cGy palliative radiation to left clavicle/chest wall   6/22/2019- Xeloda/apatinib  12/27/19-2/11/20 3800 cGy palliative radiation to the right breast.  1/10/2020- palliative treatment with Fam-Trastuzumab- stopped Feb due to severe toxicity  8/24/20-9/23/20 2925 cGy palliative radiation left chest wall skin metastasis.  8/21/2020-Herceptin, Xeloda and Tucatinib  5/28/21- Stop Tucatinib and Xeloda due to progression in skin  7/9/21 Declined palliative radiation  8/9/21 Initiate radiation therapy with anticipated dose of 6000 cGy radiation therapy anterior chest and posterior back    Tumor target  Skin chest wall left upper chest/left breast   Skin on back and both sides    Cancer history- Left breast recurrence with Inflammatory Breast Cancer ER 8%/NH negative &HER-2 positive.  Ms Echeverria was seen in initial oncology consultation on 4/20/2017 referred for a diagnosis of a left breast recurrence with inflammatory breast cancer. She was initially treated by Dr. Peres at [facility]. Prior breast cancer history is as follows:    3/22/2016-a diagnostic mammogram for a palpable mass showed a left breast abnormal findings.   4/1/2017- ultrasound-guided core needle biopsy was consistent with invasive ductal carcinoma, grade 2. ER 1%, NH 6%, HER-2/migel IHC 3+/FISH amplified ratio 6.7., AR 95%.   She underwent 6 cycles of TCH-P from April 2016 through  September 2016 with G-CSF support. She had a total of 18 weeks of trastuzumab treatment.     10/3/2016-she underwent a left lumpectomy with left sentinel lymph node revealing an invasive ductal carcinoma, grade 3. Margins clear of invasive carcinoma (1.1 cm from the lateral margin), extensive lymphovascular space invasion. 2 out of 3 sentinel lymph nodes positive for metastatic carcinoma. Final pathologic staging [id number])M0.   She declined adjuvant radiation therapy and adjuvant Herceptin completion.                               -------------------In-breast Recurrence Jan2017---------------------------  1/30/2017- she was seen by Dr. Subhash Delaney with new complaints of erythema in the left breast. A skin punch biopsy was consistent with metastatic ductal carcinoma with extensive dermal lymphovascular invasion. ER negative, AL negative, HER-2/migel IHC 3+/FISH amplified ratio 4.5, Ki-67 32%. Foundation one showed ERBB2 amplification, AURKA amplification, TP53 and .     2/15/2017- she was again seen by Dr. Delaney and explained about her tumor recurrence. Unfortunately, she declined any further intervention at that time.     4/21/2017- she asked Dr. Delaney for a referral to us.     4/28/2017-referred to me for further evaluation. I recommended a PET scan and neoadjuvant chemotherapy with carboplatin/paclitaxel and Perjeta and Herceptin.     5/4/2017-PET/CT scan revealed abnormal metabolic activity present in multiple regions throughout the left breast and in the skin( SUV 8.9, 7.4, 7.3). In the left supraclavicular region a cluster of lymph nodes present (SUV of 3). Axillary lymph nodes with SUV of 2.8. Tonsils bilaterally with SUV 5.6 (symmetric). No abnormal metabolic activity in the intrathoracic or intra-abdominal pelvic region. No abnormal skeletal metabolic activity. I recommended weekly carboplatin/weekly Taxol/Herceptin and Pertuzumab.     5/26/2017-she had a severe allergic reaction after 2 doses of  carboplatin and therefore this was discontinued. She was continued only on Taxol weekly/Herceptin and Pertuzumab.     7/21/2017-after cycle #2 and 8 doses of Taxol she developed neuropathy related to chemotherapy and therefore chemotherapy was switched from Taxol to Taxotere.   Genetic assessment- [place] [id number] revealed no clinically significant mutation. PTEN VUS.     9/1/2017-local in breast disease progression with increasing breast erythema. Treatment switched to Kadcyla.     12/27/2017- repeat re-staging CT scan of the abdomen and pelvis documented no evidence of intra-abdominal pelvic metastasis. Superior left renal cyst. Mild hepatic steatosis. CT scan of the chest documented no evidence of intrathoracic metastasis. Bone scan documented no evidence of bony metastatic disease.     3/2/2018-after 8 cycles of Kadcyla she requested treatment to be on hold. She has requested her PCP a referral to [facility] for a second opinion. She has neuropathy grade 1.     5/2/2018- 2 months off treatment (Kadcyla). She has not yet been seen at [facility]. Apparently, [facility] is gathering her records and will see her soon. Examination of her chest wall showed recurrent disease in the left upper chest. Residual neuropathy grade 1. She could not tolerate gabapentin, and therefore has stopped it. She was not interested in any other medication for neuropathy at this time.     7/10/2018-after discussion at [facility] she was recommended to reinitiate Kadcyla.     7/13/2018-CT of chest, abdomen, pelvis showed no evidence of intra-abdominal disease. There is an enlarged right-sided level 1 lymph node that was not appreciated previously, measuring 2.5 x 1.4 cm, metastatic disease considered.     7/16/2018-PET CT scan showed diffuse uptake throughout the left breast extending to the left chest wall. Abnormal uptake within the left axilla lymph nodes and left internal mammary lymph nodes concerning for metastatic disease.  Enlarged hypermetabolic right axilla lymph node concerning for metastatic disease with SUV 8.0. Abnormal uptake within a right cervical lymph node, etiology unclear, but could be reactive.     10/26/2018 - CT scan of the chest was completed at [facility] and compared to CT scan of 12/27/2017 versus her last CT scan completed at [facility] on 7/13/2018. The CT scan identified a right axillary lymph node measuring 1.2 cm, otherwise no intrathoracic neoplastic process/metastatic disease.    10/26/2018 -CT scan of the abdomen and pelvis was completed at [facility] and compared to CT scan of     12/27/2017 versus her last CT scan completed at [facility] on 7/13/2018. No evidence of intra-abdominal or pelvic metastasis was identified.  December 2018-interval worsening of skin metastasis in the left breast and diffuse erythema with new nodules.    12/17/2018-skin biopsy of overlying right breast consistent invasive ductal carcinoma, high-grade. ER 20%, CO 0%, HER-2/migel 3+.    12/17/2018-right axillary FNA biopsy consistent invasive ductal carcinoma.     12/26/2018-recommended clinical trial at [facility].    1/4/2019-started on Navelbine/Herceptin, due to delays on the clinical trial at [facility]. Unfortunately, she had severe chest wall pain and generalized pain related to Navelbine.     1/25/2019-4/19/2019 she was switched to cisplatin/Gemzar/Herceptin, but had disease progression.     4/16/2019-5/23/19 5040cGy palliative radiation to left clavicle/chest wall     5/30/2019-CT chest, abdomen, pelvis and bone scan was unremarkable for right axillary adenopathy measuring 2 cm. Lymph node located in the upper chest wall under the pectoralis muscle measuring 1.2 cm. Right breast with 2 areas with masslike enhancement. No evidence of metastatic disease in the abdomen pelvis. Bone scan was unremarkable for metastatic bone disease.    June 2019-very poor tolerance to cisplatin, Gemzar/Herceptin. She was recommended Xeloda/  Lapatinib.     6/22/2019-she was recommended and started on Xeloda 850 mg/m2 PO BID days 1 through 14 every 21 days and Lapatinib 1250 mg daily continuously.    November 2019- progression on prior Xeloda and lapatinib.    11/23/2019- recommended rechallenge with Herceptin and Navelbine vs clinical trial at [facility].    12/23/2019-CT chest abdomen pelvis showed metastatic disease in the right axillary lymph node and diffuse bilateral skin involvement of both breasts with several nodules. No evidence of pulmonary, liver or bone metastasis. CT of the head with contrast was unremarkable.    1/3/2020- the patient was unable to follow-up at [facility] for clinical trial. Therefore recommended Fam-trastuzumab deruxtecan every 3 weeks.    1/10/2020-she was started on palliative treatment with Fam-trastuzumab    12/27/19-2/11/20 3800 cGy palliative radiation to the right breast    6/5/2020- Ct Chest/Abdomen/Pelvis W Contrast No lymphadenopathy. A previously seen enlarged right axillary lymph node is now small in size. There is some stranding of the fat around the lymph node. Bilateral breast skin thickening left greater than right. Prior lumpectomy on the left with left axillary lymph node sampling. Previously seen nodularity in the right breast on CT is not visualized today.There may be minimal radiation change in the periphery of the left lung. There is no dense consolidation or effusion. No definite metastatic lung nodules. No evidence of abdominal or pelvic neoplastic process or metastatic disease. The previously seen right breast nodule is not visualized in this study. Postsurgical changes of the left breast. No significant change in the previous study. Lobulated skin thickening of the left lower lateral chest and upper abdominal wall which was not noted in the previous study. The etiology is not certain. This data be clinically correlated and further evaluated.    8/7/2020- the patient is  currently awaiting for palliative radiation to the chest wall for the skin margins. She has agreed and want to start on Herceptin, Xeloda and Tucatinib.    8/21/2020 Tumor Marker- CA 15.3- 32.19    8/24/20-9/23/20 2925 cGy palliative radiation left chest wall skin metastasis.    10/9/2020-continue Herceptin and tucatinib.    12/11/2020-she has resumed Xeloda on low-dose 500 mg p.o. twice daily 7 days on 7 days off, tucatinib and Herceptin    2/19/21 tumor markers: CA 27-29 11.8 CA 15-3 13.6 CEA 1.9    2/26/21 Ct Chest W Contrast No evidence of intrathoracic metastasis. Scarring suspected within the left upper and right middle lobes. 3. Diffuse left greater than right breast skin thickening with prominence of the trabecular breast tissue. Postoperative changes of the left breast and axilla. No pathologic axillary or intrathoracic lymphadenopathy localized.     2/26/21 Ct Abdomen Pelvis W Iv Contrast No evidence of intra-abdominal pelvic metastasis. Hepatic steatosis. Superior left renal cyst. Constipation.     2/26/21 2D echo: Mitral valve leaflets are mildly thickened with preserved leaflet mobility. Mildly thickened aortic valve leaflets with preserved leaflet mobility. Tricuspid valve is structurally normal. Mild tricuspid regurgitation with estimated RVSP of 16 mm Hg. Normal left ventricular size with preserved LV function and an estimated ejection fraction of approximately 55-60%. Normal left ventricular wall thickness. No regional wall motion abnormalities.    5/7/21 Skin lesion, right flank, punch biopsy: Invasive breast cancer involving dermal lymphatics    5/28/21 Tumor markers: CA 27-29= 21.2 CEA= 1.5 CA 15-3 =    7/9/2021-she was referred to radiation for consideration of palliative radiation to the skin lesions. However, she declined the referral and wants to try some holistic approach. She is not interested in cytotoxic therapy at this time.    07/28/21-skin lesions is significantly worse. Recommended  "radiation.    7/29/21 CT CHEST W CONTRAST No evidence of intrathoracic metastasis. Similar left greater than right breast skin thickening with increased trabecular pattern of the left breast which may represent sequelae of radiation. Postoperative changes of the left breast and axilla. No pathologic axillary or internal mammary lymphadenopathy. Left subclavian port in place with tip at the atrial caval junction.    7/29/21 CT ABDOMEN PELVIS W IV CONTRAST No evidence of intra-abdominal or pelvic metastatic disease. Small stable cyst at the upper pole left kidney. Moderate constipation.   8/9/21- Initiate radiation therapy with anticipated dose of 6000 cGy radiation therapy anterior chest and posterior back        Objective      Vitals:    09/06/21 0810 09/06/21 1524 09/06/21 2113 09/07/21 0331   BP: 124/61 125/58 132/66 120/60   BP Location: Right leg Right leg Right leg Left leg   Patient Position: Lying Lying Lying Lying   Pulse: 95 92 92 87   Resp: 18 18 16 16   Temp: 98.2 °F (36.8 °C) 99.1 °F (37.3 °C) 98.5 °F (36.9 °C) 98.6 °F (37 °C)   TempSrc: Oral Oral Oral Oral   SpO2: 96% 97% 93% 95%   Weight:   72.7 kg (160 lb 3.2 oz)    Height:   160 cm (62.99\")          Lab Results:    Lab Results (last 72 hours)     Procedure Component Value Units Date/Time    Comprehensive Metabolic Panel [[id number]]  (Abnormal) Collected: 09/06/21 0407    Specimen: Blood Updated: 09/06/21 0446     Glucose 97 mg/dL      BUN 4 mg/dL      Creatinine 0.40 mg/dL      Sodium 137 mmol/L      Potassium 3.8 mmol/L      Chloride 102 mmol/L      CO2 28.0 mmol/L      Calcium 8.6 mg/dL      Total Protein 6.4 g/dL      Albumin 3.20 g/dL      ALT (SGPT) <5 U/L      AST (SGOT) 21 U/L      Alkaline Phosphatase 46 U/L      Total Bilirubin 0.3 mg/dL      eGFR  African Amer >150 mL/min/1.73      Globulin 3.2 gm/dL      A/G Ratio 1.0 g/dL      BUN/Creatinine Ratio 10.0     Anion Gap 7.0 mmol/L     Narrative:      GFR Normal >60  Chronic Kidney Disease " <60  Kidney Failure <15      CK [[id number]]  (Normal) Collected: 09/06/21 0407    Specimen: Blood Updated: 09/06/21 0446     Creatine Kinase 83 U/L     CBC Auto Differential [[id number]]  (Abnormal) Collected: 09/06/21 0407    Specimen: Blood Updated: 09/06/21 0420     WBC 2.88 10*3/mm3      RBC 3.30 10*6/mm3      Hemoglobin 9.6 g/dL      Hematocrit 29.9 %      MCV 90.6 fL      MCH 29.1 pg      MCHC 32.1 g/dL      RDW 16.2 %      RDW-SD 54.3 fl      MPV 8.6 fL      Platelets 284 10*3/mm3      Neutrophil % 63.9 %      Lymphocyte % 16.7 %      Monocyte % 16.7 %      Eosinophil % 1.7 %      Basophil % 0.7 %      Immature Grans % 0.3 %      Neutrophils, Absolute 1.84 10*3/mm3      Lymphocytes, Absolute 0.48 10*3/mm3      Monocytes, Absolute 0.48 10*3/mm3      Eosinophils, Absolute 0.05 10*3/mm3      Basophils, Absolute 0.02 10*3/mm3      Immature Grans, Absolute 0.01 10*3/mm3      nRBC 0.0 /100 WBC     COVID-19,[facility] IN-HOUSE,Nasal Swab No Transport Media 3-4 HR TAT - Swab, Nasal Cavity [[id number]]  (Normal) Collected: 09/05/21 0737    Specimen: Swab from Nasal Cavity Updated: 09/05/21 0859     COVID19 Not Detected    Narrative:      Fact sheet for providers: https://www.fda.gov/media/913514/download     Fact sheet for patients: https://www.fda.gov/media/300655/download    Test performed by PCR.    Consider negative results in combination with clinical observations, patient history, and epidemiological information.    Sedimentation Rate [[id number]]  (Abnormal) Collected: 09/05/21 0716    Specimen: Blood Updated: 09/05/21 0819     Sed Rate 104 mm/hr     C-reactive Protein [[id number]]  (Abnormal) Collected: 09/05/21 0716    Specimen: Blood Updated: 09/05/21 0759     C-Reactive Protein 3.61 mg/dL     Comprehensive Metabolic Panel [[id number]]  (Abnormal) Collected: 09/05/21 0716    Specimen: Blood Updated: 09/05/21 0759     Glucose 96 mg/dL      BUN 4 mg/dL      Creatinine 0.41 mg/dL      Sodium 139 mmol/L      Potassium  3.7 mmol/L      Chloride 103 mmol/L      CO2 28.0 mmol/L      Calcium 8.9 mg/dL      Total Protein 6.5 g/dL      Albumin 3.20 g/dL      ALT (SGPT) 5 U/L      AST (SGOT) 24 U/L      Alkaline Phosphatase 49 U/L      Total Bilirubin 0.3 mg/dL      eGFR  African Amer >150 mL/min/1.73      Globulin 3.3 gm/dL      A/G Ratio 1.0 g/dL      BUN/Creatinine Ratio 9.8     Anion Gap 8.0 mmol/L     Narrative:      GFR Normal >60  Chronic Kidney Disease <60  Kidney Failure <15      CBC & Differential [[id number]]  (Abnormal) Collected: 09/05/21 0716    Specimen: Blood Updated: 09/05/21 0736    Narrative:      The following orders were created for panel order CBC & Differential.  Procedure                               Abnormality         Status                     ---------                               -----------         ------                     CBC Auto Differential[[id number]]        Abnormal            Final result                 Please view results for these tests on the individual orders.    CBC Auto Differential [[id number]]  (Abnormal) Collected: 09/05/21 0716    Specimen: Blood Updated: 09/05/21 0736     WBC 2.82 10*3/mm3      RBC 3.45 10*6/mm3      Hemoglobin 9.5 g/dL      Hematocrit 31.3 %      MCV 90.7 fL      MCH 27.5 pg      MCHC 30.4 g/dL      RDW 16.1 %      RDW-SD 53.4 fl      MPV 9.0 fL      Platelets 338 10*3/mm3      Neutrophil % 68.8 %      Lymphocyte % 13.1 %      Monocyte % 15.6 %      Eosinophil % 1.1 %      Basophil % 0.7 %      Immature Grans % 0.7 %      Neutrophils, Absolute 1.94 10*3/mm3      Lymphocytes, Absolute 0.37 10*3/mm3      Monocytes, Absolute 0.44 10*3/mm3      Eosinophils, Absolute 0.03 10*3/mm3      Basophils, Absolute 0.02 10*3/mm3      Immature Grans, Absolute 0.02 10*3/mm3      nRBC 0.0 /100 WBC           Radiology Results:    Imaging Results (Last 72 Hours)     Procedure Component Value Units Date/Time    XR Chest 1 View [[id number]] Collected: 09/05/21 0824     Updated: 09/05/21 0828     Narrative:      HISTORY: Dyspnea     CXR: A frontal view the chest obtained     COMPARISON: 10/9/2018     FINDINGS: Left subclavian port remains in place with the tip overlying  the SVC. Surgical clips of the left axilla and chest wall. Small left  pleural effusion with left lower lobe consolidation. Probable right  basilar atelectasis. No pneumothorax. No radiographic evidence of edema.  Heart normal in size. Mild right convexity of the thoracolumbar  curvature may relate to patient posturing. No acute bony pathology  identified.       Impression:      1. Small left pleural effusion with left basilar consolidation  concerning for pneumonia. Probable right basilar atelectasis.  This report was finalized on 09/05/2021 08:25 by Dr. Elsi Starr MD.            Physical Exam  Constitutional:       Appearance: Normal appearance. She is well-developed.   HENT:      Head: Normocephalic and atraumatic.      Nose: Nose normal.   Eyes:      General: Lids are normal.      Conjunctiva/sclera: Conjunctivae normal.      Pupils: Pupils are equal, round, and reactive to light.   Neck:      Trachea: Trachea normal.   Cardiovascular:      Rate and Rhythm: Normal rate and regular rhythm.      Heart sounds: Normal heart sounds.   Pulmonary:      Breath sounds: Examination of the right-lower field reveals decreased breath sounds. Examination of the left-lower field reveals decreased breath sounds. Decreased breath sounds present.   Abdominal:      General: Bowel sounds are normal.      Palpations: Abdomen is soft.   Musculoskeletal:         General: Normal range of motion.      Cervical back: Normal range of motion and neck supple.   Skin:     General: Skin is warm and dry.      Findings: No erythema, petechiae or rash.      Nails: There is no clubbing.      Comments: Significant radiation burn to posterior and anterior chest with some scabbing posteriorly. Port left anterior CW   Neurological:      Mental Status: She is alert.       Cranial Nerves: No cranial nerve deficit.      Sensory: No sensory deficit.   Psychiatric:         Speech: Speech normal.         Behavior: Behavior normal. Behavior is cooperative.         Thought Content: Thought content normal.         Judgment: Judgment normal.         ASSESSMENT:  Metastatic HER-2 migel breast cancer (diffuse ant and post chest wall skin involvement)  She has received several lines of chemotherapy in the past and several rounds of palliative radiation     7/31/2021-CT chest abdomen pelvis showed no evidence of visceral metastasis.     Currently on palliative RT. Last dose about 1-2 weeks ago. She received 6/30 treatments - Unable to lie down on the table for treatment     Cancer related pain-worsening.   Morphine ER 60mg PO q 12 h (dose increased on 9/5/2021)  Norco 10/325 1 tablet every 6 hours as needed pain  Dilaudid 1 mg IV every 2 hours as needed pain  Morphine IR 15 mg p.o. 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 She denies any significant change from previous evaluation.    Lymphopenia-     Normocytic Hypochromic anemia-            Bilateral upper extremity lymphedema- 2/2 extensive radiation and skin involvement. consult PT  US B/L Upper extremity ordered for today    LLL consolidation, R basilar atelectasis    pCXR 9/5/2021      IS  Rocephin 1 g IV daily      Plan:  Continue pain management  Wound care consult  Palliative care consult  Lymphedema PT  consult  US bilateral upper extremity today      RAJWINDER Villanueva    9/7/2021    07:08 CDT     Physicians attestation and contribution:    I, Barrett Moreno, personally and independently performed an evaluation on Ayla Echeverria  I have reviewed relevant medical information/data to include but not limited to the medication list, relevant appropriate lab work and imaging when applicable.  I reviewed other physician's notes, ancillary services and nurses assessments.  I have  reviewed the above documentation completed by Carmine Lao PA-C  Please see my additional addended and/or modified contributions to the history of present illness, physical examination and assessment/medical decision-making and plan that reflects my findings and impressions.  I discussed the essential elements of the care plan with Carmine Lao PA-C and the patient.  I have encouraged and answered all the questions raised to the patient's understanding and satisfaction.  I concur with the above stated.    Subjective: Awake, alert and talkative.  Still with discomfort over the anterior chest wall and breasts due to locally advanced infiltrating progressive breast cancer.    Objective:  Significant persistent locally advanced infiltrating breast cancer throughout the anterior chest wall in a T-shirt-like fashion from the shoulders all the way down to the abdomen,   Including both breasts and axilla with  bilateral upper extremity lymphedema.      Assessment/plan:  I will consult XRT to try to resume XRT at least while she was here in the hospital with palliative intent.    I will discuss with Dr. Ott the possibility of systemic palliative therapy with Enhertu in the outpatient setting.        Barrett Moreno MD  9/7/2021 07:47 CDT          Electronically signed by Barrett Moreno MD at 09/07/21 0750     Donald Ott MD at 09/06/21 0706                Pt Name: Ayla Echeverria  MRN: [id number]  [id number]  YOB: 1965  Date of evaluation: 9/6/2021    Chief Compliant:  Continues to have significant pain and discomfort to radiation burns to chest anterior and posterior. Complains of significant bilateral swelling to upper arms, limiting mobility    HISTORY OF PRESENT ILLNESS:    Ayla Echeverria is well known to my clinic and is followed for a diagnosis of recurrent HER-2 positive/ER 1% and OK 6% invasive ductal carcinoma of the breast. She was initially diagnosed in 2016.  She  had locally advanced disease.  Unfortunately, she had significant compliance issues when she was diagnosed.  Later on she developed widespread metastatic disease with predominantly skin recurrence. She has received several lines of treatment with chemotherapy agents as well as radiation to the skin.  She is currently receiving palliative radiation.  She has had significant complains of pain, ulceration of the skin lesions.  She presented to the ER department on 9/5/2021 at [facility] due to uncontrolled pain.     ONCOLOGIC HISTORY  Diagnosis   IDC left breast, March 2016   Grade 2. ER 1%, FL 6%, HER-2/migel IHC 3+/FISH amplified ratio 6.7, AR 95%.   01/30/2017- Biopsy-proven in breast relapse/Inflammatory breast cancer. ER negative, FL negative, HER-2/migel IHC 3+/FISH amplified ratio 4.5, Ki-67 32%.   MyRisk - Revealed no clinically significant mutation. PTEN VUS.  5/7/21 Invasive breast cancer involving dermal lymphatics    Treatment summary  Neoadjuvant adjuvant chemotherapy TCH-P ×6 cycles. She declined to complete 1 year of Herceptin.   She declined radiation therapy.   10/3/2016-left lumpectomy/sentinel lymph node   01/30/2017- Biopsy-proven in breast relapse/Inflammatory breast cancer.   5/11/2017-Chemotherapy with paclitaxel/carboplatin weekly. Herceptin/Pertuzumab every 3 weeks started 05/11/2017. She had a allergic reaction to carboplatin and this was discontinued.   7/21/2017- 9/1/2017 Neuropathy grade 2 with Taxol. Therefore, switched to Taxotere/Herceptin/Pertuzumab. Discontinued due to progression of disease.   9/22/2017 through 3/2/2018- Kadycla every 3 weeks. (stopped as per patient request, neuropathy grade 1)   7/10/2018 through 11/30/2018- Kadcyla.   1/4/2019-Navelbine x 1 cycle with very poor tolerance (pain)   1/25/2018-Gemzar/Herceptin   3/15/2019- low-dose Cisplatin added to Gemzar and Herceptin regimen, regimen changed to every other week   4/16/2019-5/23/19 5040cGy palliative radiation to  left clavicle/chest wall   6/22/2019- Xeloda/apatinib  12/27/19-2/11/20 3800 cGy palliative radiation to the right breast.  1/10/2020- palliative treatment with Fam-Trastuzumab- stopped Feb due to severe toxicity  8/24/20-9/23/20 2925 cGy palliative radiation left chest wall skin metastasis.  8/21/2020-Herceptin, Xeloda and Tucatinib  5/28/21- Stop Tucatinib and Xeloda due to progression in skin  7/9/21 Declined palliative radiation  8/9/21 Initiate radiation therapy with anticipated dose of 6000 cGy radiation therapy anterior chest and posterior back    Tumor target  Skin chest wall left upper chest/left breast   Skin on back and both sides    Cancer history- Left breast recurrence with Inflammatory Breast Cancer ER 8%/PA negative &HER-2 positive.  Ms Echeverria was seen in initial oncology consultation on 4/20/2017 referred for a diagnosis of a left breast recurrence with inflammatory breast cancer. She was initially treated by Dr. Peres at [facility]. Prior breast cancer history is as follows:    3/22/2016-a diagnostic mammogram for a palpable mass showed a left breast abnormal findings.   4/1/2017- ultrasound-guided core needle biopsy was consistent with invasive ductal carcinoma, grade 2. ER 1%, PA 6%, HER-2/migel IHC 3+/FISH amplified ratio 6.7., AR 95%.   She underwent 6 cycles of TCH-P from April 2016 through September 2016 with G-CSF support. She had a total of 18 weeks of trastuzumab treatment.     10/3/2016-she underwent a left lumpectomy with left sentinel lymph node revealing an invasive ductal carcinoma, grade 3. Margins clear of invasive carcinoma (1.1 cm from the lateral margin), extensive lymphovascular space invasion. 2 out of 3 sentinel lymph nodes positive for metastatic carcinoma. Final pathologic staging [id number])M0.   She declined adjuvant radiation therapy and adjuvant Herceptin completion.                               -------------------In-breast Recurrence  Jan2017---------------------------  1/30/2017- she was seen by Dr. Subhash Delaney with new complaints of erythema in the left breast. A skin punch biopsy was consistent with metastatic ductal carcinoma with extensive dermal lymphovascular invasion. ER negative, RI negative, HER-2/migel IHC 3+/FISH amplified ratio 4.5, Ki-67 32%. Foundation one showed ERBB2 amplification, AURKA amplification, TP53 and .     2/15/2017- she was again seen by Dr. Delaney and explained about her tumor recurrence. Unfortunately, she declined any further intervention at that time.     4/21/2017- she asked Dr. Delaney for a referral to us.     4/28/2017-referred to me for further evaluation. I recommended a PET scan and neoadjuvant chemotherapy with carboplatin/paclitaxel and Perjeta and Herceptin.     5/4/2017-PET/CT scan revealed abnormal metabolic activity present in multiple regions throughout the left breast and in the skin( SUV 8.9, 7.4, 7.3). In the left supraclavicular region a cluster of lymph nodes present (SUV of 3). Axillary lymph nodes with SUV of 2.8. Tonsils bilaterally with SUV 5.6 (symmetric). No abnormal metabolic activity in the intrathoracic or intra-abdominal pelvic region. No abnormal skeletal metabolic activity. I recommended weekly carboplatin/weekly Taxol/Herceptin and Pertuzumab.     5/26/2017-she had a severe allergic reaction after 2 doses of carboplatin and therefore this was discontinued. She was continued only on Taxol weekly/Herceptin and Pertuzumab.     7/21/2017-after cycle #2 and 8 doses of Taxol she developed neuropathy related to chemotherapy and therefore chemotherapy was switched from Taxol to Taxotere.   [facility] no clinically significant mutation. PTEN VUS.     9/1/2017-local in breast disease progression with increasing breast erythema. Treatment switched to Kadcyla.     12/27/2017- repeat re-staging CT scan of the abdomen and pelvis documented no evidence of  intra-abdominal pelvic metastasis. Superior left renal cyst. Mild hepatic steatosis. CT scan of the chest documented no evidence of intrathoracic metastasis. Bone scan documented no evidence of bony metastatic disease.     3/2/2018-after 8 cycles of Kadcyla she requested treatment to be on hold. She has requested her PCP a referral to [facility] for a second opinion. She has neuropathy grade 1.     5/2/2018- 2 months off treatment (Kadcyla). She has not yet been seen at [facility]. Apparently, [facility] is gathering her records and will see her soon. Examination of her chest wall showed recurrent disease in the left upper chest. Residual neuropathy grade 1. She could not tolerate gabapentin, and therefore has stopped it. She was not interested in any other medication for neuropathy at this time.     7/10/2018-after discussion at [facility] she was recommended to reinitiate Kadcyla.     7/13/2018-CT of chest, abdomen, pelvis showed no evidence of intra-abdominal disease. There is an enlarged right-sided level 1 lymph node that was not appreciated previously, measuring 2.5 x 1.4 cm, metastatic disease considered.     7/16/2018-PET CT scan showed diffuse uptake throughout the left breast extending to the left chest wall. Abnormal uptake within the left axilla lymph nodes and left internal mammary lymph nodes concerning for metastatic disease. Enlarged hypermetabolic right axilla lymph node concerning for metastatic disease with SUV 8.0. Abnormal uptake within a right cervical lymph node, etiology unclear, but could be reactive.     10/26/2018 - CT scan of the chest was completed at [facility] and compared to CT scan of 12/27/2017 versus her last CT scan completed at [facility] on 7/13/2018. The CT scan identified a right axillary lymph node measuring 1.2 cm, otherwise no intrathoracic neoplastic process/metastatic disease.    10/26/2018 -CT scan of the abdomen and pelvis was completed at [facility] and compared to  CT scan of     12/27/2017 versus her last CT scan completed at [facility] on 7/13/2018. No evidence of intra-abdominal or pelvic metastasis was identified.  December 2018-interval worsening of skin metastasis in the left breast and diffuse erythema with new nodules.    12/17/2018-skin biopsy of overlying right breast consistent invasive ductal carcinoma, high-grade. ER 20%, AR 0%, HER-2/migel 3+.    12/17/2018-right axillary FNA biopsy consistent invasive ductal carcinoma.     12/26/2018-recommended clinical trial at [facility].    1/4/2019-started on Navelbine/Herceptin, due to delays on the clinical trial at [place]. Unfortunately, she had severe chest wall pain and generalized pain related to Navelbine.     1/25/2019-4/19/2019 she was switched to cisplatin/Gemzar/Herceptin, but had disease progression.     4/16/2019-5/23/19 5040cGy palliative radiation to left clavicle/chest wall     5/30/2019-CT chest, abdomen, pelvis and bone scan was unremarkable for right axillary adenopathy measuring 2 cm. Lymph node located in the upper chest wall under the pectoralis muscle measuring 1.2 cm. Right breast with 2 areas with masslike enhancement. No evidence of metastatic disease in the abdomen pelvis. Bone scan was unremarkable for metastatic bone disease.    June 2019-very poor tolerance to cisplatin, Gemzar/Herceptin. She was recommended Xeloda/ Lapatinib.     6/22/2019-she was recommended and started on Xeloda 850 mg/m2 PO BID days 1 through 14 every 21 days and Lapatinib 1250 mg daily continuously.    November 2019- progression on prior Xeloda and lapatinib.    11/23/2019- recommended rechallenge with Herceptin and Navelbine vs clinical trial at [facility].    12/23/2019-CT chest abdomen pelvis showed metastatic disease in the right axillary lymph node and diffuse bilateral skin involvement of both breasts with several nodules. No evidence of pulmonary, liver or bone metastasis. CT of the  head with contrast was unremarkable.    1/3/2020- the patient was unable to follow-up at [facility] for clinical trial. Therefore recommended Fam-trastuzumab deruxtecan every 3 weeks.    1/10/2020-she was started on palliative treatment with Fam-trastuzumab    12/27/19-2/11/20 3800 cGy palliative radiation to the right breast    6/5/2020- Ct Chest/Abdomen/Pelvis W Contrast No lymphadenopathy. A previously seen enlarged right axillary lymph node is now small in size. There is some stranding of the fat around the lymph node. Bilateral breast skin thickening left greater than right. Prior lumpectomy on the left with left axillary lymph node sampling. Previously seen nodularity in the right breast on CT is not visualized today.There may be minimal radiation change in the periphery of the left lung. There is no dense consolidation or effusion. No definite metastatic lung nodules. No evidence of abdominal or pelvic neoplastic process or metastatic disease. The previously seen right breast nodule is not visualized in this study. Postsurgical changes of the left breast. No significant change in the previous study. Lobulated skin thickening of the left lower lateral chest and upper abdominal wall which was not noted in the previous study. The etiology is not certain. This data be clinically correlated and further evaluated.    8/7/2020- the patient is currently awaiting for palliative radiation to the chest wall for the skin margins. She has agreed and want to start on Herceptin, Xeloda and Tucatinib.    8/21/2020 Tumor Marker- CA 15.3- 32.19    8/24/20-9/23/20 2925 cGy palliative radiation left chest wall skin metastasis.    10/9/2020-continue Herceptin and tucatinib.    12/11/2020-she has resumed Xeloda on low-dose 500 mg p.o. twice daily 7 days on 7 days off, tucatinib and Herceptin    2/19/21 tumor markers: CA 27-29 11.8 CA 15-3 13.6 CEA 1.9    2/26/21 Ct Chest W Contrast No evidence of intrathoracic metastasis.  Scarring suspected within the left upper and right middle lobes. 3. Diffuse left greater than right breast skin thickening with prominence of the trabecular breast tissue. Postoperative changes of the left breast and axilla. No pathologic axillary or intrathoracic lymphadenopathy localized.     2/26/21 Ct Abdomen Pelvis W Iv Contrast No evidence of intra-abdominal pelvic metastasis. Hepatic steatosis. Superior left renal cyst. Constipation.     2/26/21 2D echo: Mitral valve leaflets are mildly thickened with preserved leaflet mobility. Mildly thickened aortic valve leaflets with preserved leaflet mobility. Tricuspid valve is structurally normal. Mild tricuspid regurgitation with estimated RVSP of 16 mm Hg. Normal left ventricular size with preserved LV function and an estimated ejection fraction of approximately 55-60%. Normal left ventricular wall thickness. No regional wall motion abnormalities.    5/7/21 Skin lesion, right flank, punch biopsy: Invasive breast cancer involving dermal lymphatics    5/28/21 Tumor markers: CA 27-29= 21.2 CEA= 1.5 CA 15-3 =    7/9/2021-she was referred to radiation for consideration of palliative radiation to the skin lesions. However, she declined the referral and wants to try some holistic approach. She is not interested in cytotoxic therapy at this time.    07/28/21-skin lesions is significantly worse. Recommended radiation.    7/29/21 CT CHEST W CONTRAST No evidence of intrathoracic metastasis. Similar left greater than right breast skin thickening with increased trabecular pattern of the left breast which may represent sequelae of radiation. Postoperative changes of the left breast and axilla. No pathologic axillary or internal mammary lymphadenopathy. Left subclavian port in place with tip at the atrial caval junction.    7/29/21 CT ABDOMEN PELVIS W IV CONTRAST No evidence of intra-abdominal or pelvic metastatic disease. Small stable cyst at the upper pole left kidney. Moderate  constipation.   8/9/21- Initiate radiation therapy with anticipated dose of 6000 cGy radiation therapy anterior chest and posterior back        Past Medical History:   Past Medical History:   Diagnosis Date   • Breast cancer (CMS/HCC)      Past Surgical History:   Past Surgical History:   Procedure Laterality Date   • AXILLARY LYMPH NODE BIOPSY/EXCISION Left    • BREAST BIOPSY Left 03/31/2016   • BREAST LUMPECTOMY WITH SENTINEL NODE BIOPSY Left 10/03/2016    residual invasive carcinoma, grade 3 (0.2cm); margins negative; extensive lymphvascular space invasion; 2 sentinel lymph nodes positive (2/3)     Social History:   Social History     Socioeconomic History   • Marital status:      Spouse name: Not on file   • Number of children: Not on file   • Years of education: Not on file   • Highest education level: Not on file   Tobacco Use   • Smoking status: Never Smoker   • Smokeless tobacco: Never Used   Substance and Sexual Activity   • Alcohol use: No   • Drug use: No   • Sexual activity: Defer     Family History:   Family History   Problem Relation Age of Onset   • Lung cancer Mother    • Prostate cancer Father    • Colon cancer Father    • Lupus Sister    • Diabetes Sister    • Hypertension Sister        Objective      Vitals:    09/05/21 1523 09/05/21 1955 09/06/21 0006 09/06/21 0400   BP: 143/64 138/73 145/65 140/70   BP Location: Right leg Right leg Right leg Right leg   Patient Position: Sitting Lying Lying Lying   Pulse: 91 98 90 88   Resp: 20 18 18 18   Temp: 98.5 °F (36.9 °C) 97.9 °F (36.6 °C) 97.9 °F (36.6 °C) 98 °F (36.7 °C)   TempSrc: Oral Oral Oral Oral   SpO2: 97% 97% 97% 97%   Weight:       Height:             Current Status 1/11/2017   ECOG score 0         Lab Results:    Lab Results (last 72 hours)     Procedure Component Value Units Date/Time    Comprehensive Metabolic Panel [[id number]]  (Abnormal) Collected: 09/06/21 0407    Specimen: Blood Updated: 09/06/21 0446     Glucose 97 mg/dL       BUN 4 mg/dL      Creatinine 0.40 mg/dL      Sodium 137 mmol/L      Potassium 3.8 mmol/L      Chloride 102 mmol/L      CO2 28.0 mmol/L      Calcium 8.6 mg/dL      Total Protein 6.4 g/dL      Albumin 3.20 g/dL      ALT (SGPT) <5 U/L      AST (SGOT) 21 U/L      Alkaline Phosphatase 46 U/L      Total Bilirubin 0.3 mg/dL      eGFR  African Amer >150 mL/min/1.73      Globulin 3.2 gm/dL      A/G Ratio 1.0 g/dL      BUN/Creatinine Ratio 10.0     Anion Gap 7.0 mmol/L     Narrative:      GFR Normal >60  Chronic Kidney Disease <60  Kidney Failure <15      CK [[id number]]  (Normal) Collected: 09/06/21 0407    Specimen: Blood Updated: 09/06/21 0446     Creatine Kinase 83 U/L     CBC Auto Differential [[id number]]  (Abnormal) Collected: 09/06/21 0407    Specimen: Blood Updated: 09/06/21 0420     WBC 2.88 10*3/mm3      RBC 3.30 10*6/mm3      Hemoglobin 9.6 g/dL      Hematocrit 29.9 %      MCV 90.6 fL      MCH 29.1 pg      MCHC 32.1 g/dL      RDW 16.2 %      RDW-SD 54.3 fl      MPV 8.6 fL      Platelets 284 10*3/mm3      Neutrophil % 63.9 %      Lymphocyte % 16.7 %      Monocyte % 16.7 %      Eosinophil % 1.7 %      Basophil % 0.7 %      Immature Grans % 0.3 %      Neutrophils, Absolute 1.84 10*3/mm3      Lymphocytes, Absolute 0.48 10*3/mm3      Monocytes, Absolute 0.48 10*3/mm3      Eosinophils, Absolute 0.05 10*3/mm3      Basophils, Absolute 0.02 10*3/mm3      Immature Grans, Absolute 0.01 10*3/mm3      nRBC 0.0 /100 WBC     COVID-19,[facility] IN-HOUSE,Nasal Swab No Transport Media 3-4 HR TAT - Swab, Nasal Cavity [[id number]]  (Normal) Collected: 09/05/21 0737    Specimen: Swab from Nasal Cavity Updated: 09/05/21 0859     COVID19 Not Detected    Narrative:      Fact sheet for providers: https://www.fda.gov/media/939196/download     Fact sheet for patients: https://www.fda.gov/media/947117/download    Test performed by PCR.    Consider negative results in combination with clinical observations, patient history, and epidemiological  information.    Sedimentation Rate [[id number]]  (Abnormal) Collected: 09/05/21 0716    Specimen: Blood Updated: 09/05/21 0819     Sed Rate 104 mm/hr     C-reactive Protein [[id number]]  (Abnormal) Collected: 09/05/21 0716    Specimen: Blood Updated: 09/05/21 0759     C-Reactive Protein 3.61 mg/dL     Comprehensive Metabolic Panel [[id number]]  (Abnormal) Collected: 09/05/21 0716    Specimen: Blood Updated: 09/05/21 0759     Glucose 96 mg/dL      BUN 4 mg/dL      Creatinine 0.41 mg/dL      Sodium 139 mmol/L      Potassium 3.7 mmol/L      Chloride 103 mmol/L      CO2 28.0 mmol/L      Calcium 8.9 mg/dL      Total Protein 6.5 g/dL      Albumin 3.20 g/dL      ALT (SGPT) 5 U/L      AST (SGOT) 24 U/L      Alkaline Phosphatase 49 U/L      Total Bilirubin 0.3 mg/dL      eGFR  African Amer >150 mL/min/1.73      Globulin 3.3 gm/dL      A/G Ratio 1.0 g/dL      BUN/Creatinine Ratio 9.8     Anion Gap 8.0 mmol/L     Narrative:      GFR Normal >60  Chronic Kidney Disease <60  Kidney Failure <15      CBC & Differential [[id number]]  (Abnormal) Collected: 09/05/21 0716    Specimen: Blood Updated: 09/05/21 0736    Narrative:      The following orders were created for panel order CBC & Differential.  Procedure                               Abnormality         Status                     ---------                               -----------         ------                     CBC Auto Differential[[id number]]        Abnormal            Final result                 Please view results for these tests on the individual orders.    CBC Auto Differential [[id number]]  (Abnormal) Collected: 09/05/21 0716    Specimen: Blood Updated: 09/05/21 0736     WBC 2.82 10*3/mm3      RBC 3.45 10*6/mm3      Hemoglobin 9.5 g/dL      Hematocrit 31.3 %      MCV 90.7 fL      MCH 27.5 pg      MCHC 30.4 g/dL      RDW 16.1 %      RDW-SD 53.4 fl      MPV 9.0 fL      Platelets 338 10*3/mm3      Neutrophil % 68.8 %      Lymphocyte % 13.1 %      Monocyte % 15.6 %       Eosinophil % 1.1 %      Basophil % 0.7 %      Immature Grans % 0.7 %      Neutrophils, Absolute 1.94 10*3/mm3      Lymphocytes, Absolute 0.37 10*3/mm3      Monocytes, Absolute 0.44 10*3/mm3      Eosinophils, Absolute 0.03 10*3/mm3      Basophils, Absolute 0.02 10*3/mm3      Immature Grans, Absolute 0.02 10*3/mm3      nRBC 0.0 /100 WBC           Radiology Results:    Imaging Results (Last 72 Hours)     Procedure Component Value Units Date/Time    XR Chest 1 View [[id number]] Collected: 09/05/21 0824     Updated: 09/05/21 0828    Narrative:      HISTORY: Dyspnea     CXR: A frontal view the chest obtained     COMPARISON: 10/9/2018     FINDINGS: Left subclavian port remains in place with the tip overlying  the SVC. Surgical clips of the left axilla and chest wall. Small left  pleural effusion with left lower lobe consolidation. Probable right  basilar atelectasis. No pneumothorax. No radiographic evidence of edema.  Heart normal in size. Mild right convexity of the thoracolumbar  curvature may relate to patient posturing. No acute bony pathology  identified.       Impression:      1. Small left pleural effusion with left basilar consolidation  concerning for pneumonia. Probable right basilar atelectasis.  This report was finalized on 09/05/2021 08:25 by Dr. Elsi Starr MD.            Physical Exam  Constitutional:       Appearance: Normal appearance. She is well-developed.   HENT:      Head: Normocephalic and atraumatic.      Nose: Nose normal.   Eyes:      General: Lids are normal.      Conjunctiva/sclera: Conjunctivae normal.      Pupils: Pupils are equal, round, and reactive to light.   Neck:      Trachea: Trachea normal.   Cardiovascular:      Rate and Rhythm: Normal rate and regular rhythm.      Heart sounds: Normal heart sounds.   Abdominal:      General: Bowel sounds are normal.      Palpations: Abdomen is soft.   Musculoskeletal:         General: Normal range of motion.      Cervical back: Normal range of  motion and neck supple.   Skin:     General: Skin is warm and dry.      Findings: No erythema, petechiae or rash.      Nails: There is no clubbing.      Comments: Significant radiation burn to posterior and anterior chest with some scabbing posteriorly.    Neurological:      Mental Status: She is alert.      Cranial Nerves: No cranial nerve deficit.      Sensory: No sensory deficit.   Psychiatric:         Speech: Speech normal.         Behavior: Behavior normal. Behavior is cooperative.         Thought Content: Thought content normal.         Judgment: Judgment normal.         ASSESSMENT:  Metastatic HER-2 migel breast cancer (diffuse chest wall, back skin involvement)  She has received several lines of chemotherapy in the past and several rounds of palliative radiation     7/31/2021-CT chest abdomen pelvis showed no evidence of visceral metastasis. Continue radiation.  Currently on palliative RT. Last dose about 1-2 weeks ago. She received 6/30 treatments     Cancer related pain-worsening.   Morphine ER >60mg PO q 12 h (dose increased on 9/5/2021)  Norco 10/325 1 tablet every 6 hours as needed pain  Dilaudid 1 mg IV every 2 hours as needed pain  Morphine IR 15 mg p.o. 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 She denies any significant change from previous evaluation.    Lymphopenia-     Bilateral upper extremity lymphedema- 2/2 extensive radiation and skin involvement. consult PT  US B/L Upper extremity ordered    Plan:  Continue pain management  Wound care consult  Palliative care consult  Lymphedema PT  consult  US bilateral upper extremity      Mamta Tabares, APRN    9/6/2021    07:07 CDT  Physician's attestation/substantial contribution:  I independently performed an evaluation on this patient. I have reviewed relevant medical information/data to include but not limited to medication list, relevant appropriate labs and imaging when applicable. I  reviewed other physician's notes, ancillary services and nurses assessment. I have reviewed the above documentation completed by the Nurse Practitioner/Physician Assistant. Please see my additional contributions to the history of present illness, physical examination, and assessment/medical decision-making that reflect my findings and impressions. I discussed essential elements of the care plan with the NP/PA and the patient as well. I answered all the questions to the patient's satisfaction. I concur with above stated.   Subjective-continue to complain of pain.   Objective- several skins lesions covering most of the skin of the chest and back. Hyperpigmentaion secondary to prior chemotherapy    Assessment/plan (decision-making):   Advanced breast cancer Her-2 positive.Progressive disease.  She is now receiving palliative radiation.Plans for outpatient margetuximab  Overall poor prognosis.  She had recent scan that showed no evidence of visceral disease.  Most of her progression is in the skin.  Cancer related pain-significant pain.  I increased her morphine extended release to 60 mg daily.  Upper extremity lymphedema-US BILATERAL UPPER EXTREMITY TO RULE OUT DVT BUT I BELIEVE THIS IS LIKELY LYMPHEDEMA RELATED TO EXTENSIVE SKIN INVOLVEMENT AND PRIOR RADIATION              Electronically signed by Donald Ott MD at 09/06/21 1023          Consult Notes (last 72 hours) (Notes from 09/04/21 0930 through 09/07/21 0930)      Donald Ott MD at 09/05/21 1101      Consult Orders    1. Inpatient Hematology & Oncology Consult [[id number]] ordered by Jack Cárdenas MD at 09/05/21 1035               MEDICAL ONCOLOGY CONSULTATION    Pt Name: Ayla Echeverria  MRN: [id number]  YOB: 1965  Date of evaluation: 9/5/2021    REASON FOR CONSULTATION:    REQUESTING PHYSICIAN:    History Obtained From:    patient, electronic medical record    HISTORY OF PRESENT ILLNESS:   Ayla Echeverria is well known to  my clinic.  She has a diagnosis of invasive ductal carcinoma the left breast.  She was initially diagnosed in 2016.  She had locally advanced disease.  Unfortunately, she had significant compliance issues when she was diagnosed.  Later on she developed widespread metastatic disease with predominantly skin recurrence. She has received several lines of treatment with chemotherapy agents as well as radiation to the skin.  She is currently receiving palliative radiation.  She has had significant complains of pain, ulceration of the skin lesions.  She presents today ER department on 9/5/2021 at  due to uncontrolled pain.      Prior cancer history  Diagnosis   IDC left breast, March 2016   Grade 2. ER 1%, ID 6%, HER-2/migel IHC 3+/FISH amplified ratio 6.7, AR 95%.   01/30/2017- Biopsy-proven in breast relapse/Inflammatory breast cancer. ER negative, ID negative, HER-2/migel IHC 3+/FISH amplified ratio 4.5, Ki-67 32%.   [facility] - Revealed no clinically significant mutation. PTEN VUS.  5/7/21 Invasive breast cancer involving dermal lymphatics    Treatment summary  Neoadjuvant adjuvant chemotherapy TCH-P ×6 cycles. She declined to complete 1 year of Herceptin.   She declined radiation therapy.   10/3/2016-left lumpectomy/sentinel lymph node   01/30/2017- Biopsy-proven in breast relapse/Inflammatory breast cancer.   5/11/2017-Chemotherapy with paclitaxel/carboplatin weekly. Herceptin/Pertuzumab every 3 weeks started 05/11/2017. She had a allergic reaction to carboplatin and this was discontinued.   7/21/2017- 9/1/2017 Neuropathy grade 2 with Taxol. Therefore, switched to Taxotere/Herceptin/Pertuzumab. Discontinued due to progression of disease.   9/22/2017 through 3/2/2018- Kadycla every 3 weeks. (stopped as per patient request, neuropathy grade 1)   7/10/2018 through 11/30/2018- Kadcyla.   1/4/2019-Navelbine x 1 cycle with very poor tolerance (pain)   1/25/2018-Gemzar/Herceptin   3/15/2019- low-dose Cisplatin  added to Gemzar and Herceptin regimen, regimen changed to every other week   4/16/2019-5/23/19 5040cGy palliative radiation to left clavicle/chest wall   6/22/2019- Xeloda/apatinib  12/27/19-2/11/20 3800 cGy palliative radiation to the right breast.  1/10/2020- palliative treatment with Fam-Trastuzumab- stopped Feb due to severe toxicity  8/24/20-9/23/20 2925 cGy palliative radiation left chest wall skin metastasis.  8/21/2020-Herceptin, Xeloda and Tucatinib  5/28/21- Stop Tucatinib and Xeloda due to progression in skin  7/9/21 Declined palliative radiation  8/9/21 Initiate radiation therapy with anticipated dose of 6000 cGy radiation therapy anterior chest and posterior back  Tumor target  Skin chest wall left upper chest/left breast   Skin on back and both sides    Interval history  Ayla is a pleasant 55 years old female who has been diagnosed with recurrent HER-2 positive/ER 1% and MA 6% invasive ductal carcinoma of the breast. Her pattern of recurrence is diffuse skin involvement of the upper chest bilaterally and back on the left. She received several lines of chemotherapy in the past. She has had very poor tolerance to chemotherapy overall. She was last treated with radiation to the skin lesions and subsequently received treatment with tucatinib, Xeloda and Herceptin up to 5/28/2021 due to disease progression. She was referred again for consideration of palliative radiation that worked very well for her in the past. However, Ayla returned today with complaints of severe pain. She has received 2 treatments with radiation so far. She could not get treatment this morning to significant pain and could not lay down. She also had CT scans performed that were reviewed by me today.    Cancer history- Left breast recurrence with Inflammatory Breast Cancer ER 8%/MA negative &HER-2 positive.  Ms Echeverria was seen in initial oncology consultation on 4/20/2017 referred for a diagnosis of a left breast recurrence with  inflammatory breast cancer. She was initially treated by Dr. Peres at [facility], [place], operated by [facility]. Prior breast cancer history is as follows:  3/22/2016-a diagnostic mammogram for a palpable mass showed a left breast abnormal findings.   4/1/2017- ultrasound-guided core needle biopsy was consistent with invasive ductal carcinoma, grade 2. ER 1%, KY 6%, HER-2/migel IHC 3+/FISH amplified ratio 6.7., AR 95%.   She underwent 6 cycles of TCH-P from April 2016 through September 2016 with G-CSF support. She had a total of 18 weeks of trastuzumab treatment.   10/3/2016-she underwent a left lumpectomy with left sentinel lymph node revealing an invasive ductal carcinoma, grade 3. Margins clear of invasive carcinoma (1.1 cm from the lateral margin), extensive lymphovascular space invasion. 2 out of 3 sentinel lymph nodes positive for metastatic carcinoma. Final pathologic staging [id number])M0.   She declined adjuvant radiation therapy and adjuvant Herceptin completion.   -------------------In-breast Recurrence Jan2017---------------------------  1/30/2017- she was seen by Dr. Subhash Delaney with new complaints of erythema in the left breast. A skin punch biopsy was consistent with metastatic ductal carcinoma with extensive dermal lymphovascular invasion. ER negative, KY negative, HER-2/migel IHC 3+/FISH amplified ratio 4.5, Ki-67 32%. Foundation one showed ERBB2 amplification, AURKA amplification, TP53 and .   2/15/2017- she was again seen by Dr. Delaney and explained about her tumor recurrence. Unfortunately, she declined any further intervention at that time.   4/21/2017- she asked Dr. Delaney for a referral to us.   4/28/2017-referred to me for further evaluation. I recommended a PET scan and neoadjuvant chemotherapy with carboplatin/paclitaxel and Perjeta and Herceptin.   5/4/2017-PET/CT scan revealed abnormal metabolic activity present in multiple regions throughout the left breast and in the skin( SUV 8.9, 7.4, 7.3). In the left  supraclavicular region a cluster of lymph nodes present (SUV of 3). Axillary lymph nodes with SUV of 2.8. Tonsils bilaterally with SUV 5.6 (symmetric). No abnormal metabolic activity in the intrathoracic or intra-abdominal pelvic region. No abnormal skeletal metabolic activity. I recommended weekly carboplatin/weekly Taxol/Herceptin and Pertuzumab.   5/26/2017-she had a severe allergic reaction after 2 doses of carboplatin and therefore this was discontinued. She was continued only on Taxol weekly/Herceptin and Pertuzumab.   7/21/2017-after cycle #2 and 8 doses of Taxol she developed neuropathy related to chemotherapy and therefore chemotherapy was switched from Taxol to Taxotere.   Genetic assessment- [place] [id number] revealed no clinically significant mutation. PTEN VUS.   9/1/2017-local in breast disease progression with increasing breast erythema. Treatment switched to Kadcyla.   12/27/2017- repeat re-staging CT scan of the abdomen and pelvis documented no evidence of intra-abdominal pelvic metastasis. Superior left renal cyst. Mild hepatic steatosis. CT scan of the chest documented no evidence of intrathoracic metastasis. Bone scan documented no evidence of bony metastatic disease.   3/2/2018-after 8 cycles of Kadcyla she requested treatment to be on hold. She has requested her PCP a referral to [facility] for a second opinion. She has neuropathy grade 1.   5/2/2018- 2 months off treatment (Kadcyla). She has not yet been seen at [facility]. Apparently, [facility] is gathering her records and will see her soon. Examination of her chest wall showed recurrent disease in the left upper chest. Residual neuropathy grade 1. She could not tolerate gabapentin, and therefore has stopped it. She was not interested in any other medication for neuropathy at this time.   7/10/2018-after discussion at [facility] she was recommended to reinitiate Kadcyla.   7/13/2018-CT of chest, abdomen, pelvis showed no evidence of  intra-abdominal disease. There is an enlarged right-sided level 1 lymph node that was not appreciated previously, measuring 2.5 x 1.4 cm, metastatic disease considered.   7/16/2018-PET CT scan showed diffuse uptake throughout the left breast extending to the left chest wall. Abnormal uptake within the left axilla lymph nodes and left internal mammary lymph nodes concerning for metastatic disease. Enlarged hypermetabolic right axilla lymph node concerning for metastatic disease with SUV 8.0. Abnormal uptake within a right cervical lymph node, etiology unclear, but could be reactive.   10/26/2018 - CT scan of the chest was completed at [facility] and compared to CT scan of 12/27/2017 versus her last CT scan completed at [facility] on 7/13/2018. The CT scan identified a right axillary lymph node measuring 1.2 cm, otherwise no intrathoracic neoplastic process/metastatic disease.  10/26/2018 -CT scan of the abdomen and pelvis was completed at [facility] and compared to CT scan of 12/27/2017 versus her last CT scan completed at [facility] on 7/13/2018. No evidence of intra-abdominal or pelvic metastasis was identified.  December 2018-interval worsening of skin metastasis in the left breast and diffuse erythema with new nodules.  12/17/2018-skin biopsy of overlying right breast consistent invasive ductal carcinoma, high-grade. ER 20%, SD 0%, HER-2/migel 3+.  12/17/2018-right axillary FNA biopsy consistent invasive ductal carcinoma.   12/26/2018-recommended clinical trial at [facility].  1/4/2019-started on Navelbine/Herceptin, due to delays on the clinical trial at [facility]. Unfortunately, she had severe chest wall pain and generalized pain related to Navelbine.   1/25/2019-4/19/2019 she was switched to cisplatin/Gemzar/Herceptin, but had disease progression.   4/16/2019-5/23/19 5040cGy palliative radiation to left clavicle/chest wall   5/30/2019-CT chest, abdomen, pelvis and bone scan was unremarkable for right axillary  adenopathy measuring 2 cm. Lymph node located in the upper chest wall under the pectoralis muscle measuring 1.2 cm. Right breast with 2 areas with masslike enhancement. No evidence of metastatic disease in the abdomen pelvis. Bone scan was unremarkable for metastatic bone disease.  June 2019-very poor tolerance to cisplatin, Gemzar/Herceptin. She was recommended Xeloda/ Lapatinib.   6/22/2019-she was recommended and started on Xeloda 850 mg/m2 PO BID days 1 through 14 every 21 days and Lapatinib 1250 mg daily continuously.  November 2019- progression on prior Xeloda and lapatinib.  11/23/2019- recommended rechallenge with Herceptin and Navelbine vs clinical trial at [facility].  12/23/2019-CT chest abdomen pelvis showed metastatic disease in the right axillary lymph node and diffuse bilateral skin involvement of both breasts with several nodules. No evidence of pulmonary, liver or bone metastasis. CT of the head with contrast was unremarkable.  1/3/2020- the patient was unable to follow-up at [facility] for clinical trial. Therefore recommended Fam-trastuzumab deruxtecan every 3 weeks.  1/10/2020-she was started on palliative treatment with Fam-trastuzumab  12/27/19-2/11/20 3800 cGy palliative radiation to the right breast  6/5/2020- Ct Chest/Abdomen/Pelvis W Contrast No lymphadenopathy. A previously seen enlarged right axillary lymph node is now small in size. There is some stranding of the fat around the lymph node. Bilateral breast skin thickening left greater than right. Prior lumpectomy on the left with left axillary lymph node sampling. Previously seen nodularity in the right breast on CT is not visualized today.There may be minimal radiation change in the periphery of the left lung. There is no dense consolidation or effusion. No definite metastatic lung nodules. No evidence of abdominal or pelvic neoplastic process or metastatic disease. The previously seen right breast nodule is not  visualized in this study. Postsurgical changes of the left breast. No significant change in the previous study. Lobulated skin thickening of the left lower lateral chest and upper abdominal wall which was not noted in the previous study. The etiology is not certain. This data be clinically correlated and further evaluated.  8/7/2020- the patient is currently awaiting for palliative radiation to the chest wall for the skin margins. She has agreed and want to start on Herceptin, Xeloda and Tucatinib.  8/21/2020 Tumor Marker- CA 15.3- 32.19  8/24/20-9/23/20 2925 cGy palliative radiation left chest wall skin metastasis.  10/9/2020-continue Herceptin and tucatinib.  12/11/2020-she has resumed Xeloda on low-dose 500 mg p.o. twice daily 7 days on 7 days off, tucatinib and Herceptin  2/19/21 tumor markers: CA 27-29 11.8 CA 15-3 13.6 CEA 1.9  2/26/21 Ct Chest W Contrast No evidence of intrathoracic metastasis. Scarring suspected within the left upper and right middle lobes. 3. Diffuse left greater than right breast skin thickening with prominence of the trabecular breast tissue. Postoperative changes of the left breast and axilla. No pathologic axillary or intrathoracic lymphadenopathy localized.   2/26/21 Ct Abdomen Pelvis W Iv Contrast No evidence of intra-abdominal pelvic metastasis. Hepatic steatosis. Superior left renal cyst. Constipation.   2/26/21 2D echo: Mitral valve leaflets are mildly thickened with preserved leaflet mobility. Mildly thickened aortic valve leaflets with preserved leaflet mobility. Tricuspid valve is structurally normal. Mild tricuspid regurgitation with estimated RVSP of 16 mm Hg. Normal left ventricular size with preserved LV function and an estimated ejection fraction of approximately 55-60%. Normal left ventricular wall thickness. No regional wall motion abnormalities.  5/7/21 Skin lesion, right flank, punch biopsy: Invasive breast cancer involving dermal lymphatics  5/28/21 Tumor markers: CA  27-29= 21.2 CEA= 1.5 CA 15-3 =  7/9/2021-she was referred to radiation for consideration of palliative radiation to the skin lesions. However, she declined the referral and wants to try some holistic approach. She is not interested in cytotoxic therapy at this time.  07/28/21-skin lesions is significantly worse. Recommended radiation.  7/29/21 CT CHEST W CONTRAST No evidence of intrathoracic metastasis. Similar left greater than right breast skin thickening with increased trabecular pattern of the left breast which may represent sequelae of radiation. Postoperative changes of the left breast and axilla. No pathologic axillary or internal mammary lymphadenopathy. Left subclavian port in place with tip at the atrial caval junction.  7/29/21 CT ABDOMEN PELVIS W IV CONTRAST No evidence of intra-abdominal or pelvic metastatic disease. Small stable cyst at the upper pole left kidney. Moderate constipation.   8/9/21- Initiate radiation therapy with anticipated dose of 6000 cGy radiation therapy anterior chest and posterior back           Past Medical History:    Past Medical History:   Diagnosis Date   • Breast cancer (CMS/HCC)        Past Surgical History:    Past Surgical History:   Procedure Laterality Date   • AXILLARY LYMPH NODE BIOPSY/EXCISION Left    • BREAST BIOPSY Left 03/31/2016   • BREAST LUMPECTOMY WITH SENTINEL NODE BIOPSY Left 10/03/2016    residual invasive carcinoma, grade 3 (0.2cm); margins negative; extensive lymphvascular space invasion; 2 sentinel lymph nodes positive (2/3)       Social History:    Social History     Socioeconomic History   • Marital status:      Spouse name: Not on file   • Number of children: Not on file   • Years of education: Not on file   • Highest education level: Not on file   Tobacco Use   • Smoking status: Never Smoker   • Smokeless tobacco: Never Used   Substance and Sexual Activity   • Alcohol use: No   • Drug use: No   • Sexual activity: Defer       Family History:    Family History   Problem Relation Age of Onset   • Lung cancer Mother    • Prostate cancer Father    • Colon cancer Father    • Lupus Sister    • Diabetes Sister    • Hypertension Sister        Current Hospital Medications:      Current Facility-Administered Medications:   •  HYDROcodone-acetaminophen (NORCO)  MG per tablet 1 tablet, 1 tablet, Oral, Q6H PRN, Jack Cárdenas MD  •  HYDROmorphone (DILAUDID) injection 1 mg, 1 mg, Intravenous, Q2H PRN, 1 mg at 09/05/21 1051 **AND** naloxone (NARCAN) injection 0.4 mg, 0.4 mg, Intravenous, Q5 Min PRN, Jack Cárdenas MD  •  hydrOXYzine (ATARAX) tablet 50 mg, 50 mg, Oral, Q6H PRN, Jack Cárdenas MD  •  montelukast (SINGULAIR) tablet 10 mg, 10 mg, Oral, Daily, Jack Cárdenas MD  •  Morphine (MSIR) tablet 15 mg, 15 mg, Oral, Q4H PRN, Jack Cárdenas MD  •  ondansetron (ZOFRAN) injection 4 mg, 4 mg, Intravenous, Q6H PRN, Jack Cárdenas MD  •  pantoprazole (PROTONIX) EC tablet 40 mg, 40 mg, Oral, Daily, Jack Cárdenas MD  •  Insert peripheral IV, , , Once **AND** sodium chloride 0.9 % flush 10 mL, 10 mL, Intravenous, PRN, Jack Cárdenas MD  •  sodium chloride 0.9 % flush 10 mL, 10 mL, Intravenous, Q12H, Jack Cárdenas MD  •  sodium chloride 0.9 % flush 10 mL, 10 mL, Intravenous, PRN, Jack Cárdenas MD  •  sodium chloride 0.9 % infusion, 50 mL/hr, Intravenous, Continuous, Jack Cárdenas MD      Allergies: No Known Allergies      Subjective   REVIEW OF SYSTEMS:   CONSTITUTIONAL: no fever, no night sweats, fatigue;  HEENT: no blurring of vision, no double vision, no hearing difficulty, no tinnitus, no ulceration, no dysplasia, no epistaxis;  LUNGS: no cough, no hemoptysis, no wheeze,  no shortness of breath;  CARDIOVASCULAR: chest pain, no palpitation, no chest pain, no shortness of breath;  GI: no abdominal pain, no nausea, no vomiting, no diarrhea, no constipation;  STIVEN: no dysuria, no  "hematuria, no frequency or urgency, no nephrolithiasis;  MUSCULOSKELETAL: no joint pain, bilateral upper extremity swelling, no stiffness;  ENDOCRINE: no polyuria, no polydipsia, no cold or heat intolerance;  HEMATOLOGY: no easy bruising or bleeding, no history of clotting disorder;  DERMATOLOGY: skin lesions, no eczema, no pruritus;  PSYCHIATRY: no depression, no anxiety, no panic attacks, no suicidal ideation, no homicidal ideation;  NEUROLOGY: no syncope, no seizures, no numbness or tingling of hands, no numbness or tingling of feet, no paresis;    Objective   /70 (BP Location: Left leg, Patient Position: Sitting)   Pulse 85   Temp 98.9 °F (37.2 °C) (Oral)   Resp 26   Ht 160 cm (63\")   Wt 59 kg (130 lb)   SpO2 96%   BMI 23.03 kg/m²     PHYSICAL EXAM:  CONSTITUTIONAL: Alert, appropriate, no acute distress, chronically ill appearing  EYES: Non icteric, EOM intact, pupils equal round   ENT: Mucus membranes moist, no oral pharyngeal lesions, external inspection of ears and nose are normal  NECK: Supple, no masses.  No palpable thyroid mass  CHEST/LUNGS: CTA bilaterally, normal respiratory effort   CARDIOVASCULAR: RRR, no murmurs.  No lower extremity edema  ABDOMEN: soft non-tender, active bowel sounds, no HSM.  No palpable masses  EXTREMITIES: bilateral upper extremities lymphedema, warm, limited ROM upper extremity due to lymphedema. No focal weakness.  SKIN:skin discoloration,  diffuse skin involvement by breast cancer upper chest/back  LYMPH: No cervical, clavicular, axillary, or inguinal lymphadenopathy  NEUROLOGIC: follows commands, non focal   PSYCH: flat mood    LABORATORY RESULTS REVIEWED BY ME:    Lab Results   Component Value Date     09/05/2021    K 3.7 09/05/2021     09/05/2021    CO2 28.0 09/05/2021    BUN 4 (L) 09/05/2021    CREATININE 0.41 (L) 09/05/2021    GLUCOSE 96 09/05/2021    CALCIUM 8.9 09/05/2021    BILITOT 0.3 09/05/2021    ALKPHOS 49 09/05/2021    AST 24 09/05/2021    " ALT 5 09/05/2021    AGRATIO 1.0 09/05/2021    GLOB 3.3 09/05/2021       Lab Results   Component Value Date    INR 0.97 01/05/2020    INR 1.04 01/10/2019    INR 0.90 (L) 05/26/2016    PROTIME 13.2 01/05/2020    PROTIME 13 01/10/2019    PROTIME 12.5 05/26/2016       RADIOLOGY STUDIES REPORT/REVIEWED AND INTERPRETED BY ME:  XR Chest 1 View    Result Date: 9/5/2021  Narrative: HISTORY: Dyspnea  CXR: A frontal view the chest obtained  COMPARISON: 10/9/2018  FINDINGS: Left subclavian port remains in place with the tip overlying the SVC. Surgical clips of the left axilla and chest wall. Small left pleural effusion with left lower lobe consolidation. Probable right basilar atelectasis. No pneumothorax. No radiographic evidence of edema. Heart normal in size. Mild right convexity of the thoracolumbar curvature may relate to patient posturing. No acute bony pathology identified.      Impression: 1. Small left pleural effusion with left basilar consolidation concerning for pneumonia. Probable right basilar atelectasis. This report was finalized on 09/05/2021 08:25 by Dr. Elsi Starr MD.      ASSESSMENT:  Metastatic HER-2 migel breast cancer (diffuse chest wall, back skin involvement)  She has received several lines of chemotherapy in the past and several rounds of palliative radiation   7/31/2021-CT chest abdomen pelvis showed no evidence of visceral metastasis. Continue radiation.  Currently on palliative RT. Last dose about 1-2 weeks ago. She received 6/30 treatments    Cancer related pain-worsening. 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 She denies any significant change from previous evaluation.    Lymphopenia-ALC some renal calcification 0.39     Bilateral upper extremity lymphedema- consult PT    Plan:  Continue pain management  Wound care consult  Palliative care consult  Increase Morphine ER  30mg q 8 h  ->60mg PO q 12 h  Lymphedema PT  consult    09/05/21  11:01 CDT    Electronically signed by Donald Ott MD at 09/05/21 4107

## 2021-09-07 NOTE — H&P
"[facility]  HISTORY AND PHYSICAL    Date of Admission: 9/5/2021  Primary Care Physician: Jack Cárdenas MD    Subjective    Chief Complaint: \"my chest is killing me\"    This is a very pleasant 55 yr old lady with hx of breast ca undergoing rad tx who presented with persistent unbearable chest wall pain. She could not even tolerate ekg pads. She is admitted to my services.      Review of Systems   Constitutional: Positive for activity change.   HENT: Negative.    Eyes: Negative.    Respiratory: Negative.    Cardiovascular: Positive for chest pain.   Gastrointestinal: Negative.    Endocrine: Negative.    Genitourinary: Negative.    Musculoskeletal: Negative.    Skin: Negative.    Allergic/Immunologic: Negative.    Neurological: Negative.    Hematological: Negative.    Psychiatric/Behavioral: Negative.         Otherwise complete ROS reviewed and negative except as mentioned in the HPI.      Past Medical History:   Past Medical History:   Diagnosis Date   • Breast cancer (CMS/HCC)        Past Surgical History:  Past Surgical History:   Procedure Laterality Date   • AXILLARY LYMPH NODE BIOPSY/EXCISION Left    • BREAST BIOPSY Left 03/31/2016   • BREAST LUMPECTOMY WITH SENTINEL NODE BIOPSY Left 10/03/2016    residual invasive carcinoma, grade 3 (0.2cm); margins negative; extensive lymphvascular space invasion; 2 sentinel lymph nodes positive (2/3)       Social History:  reports that she has never smoked. She has never used smokeless tobacco. She reports that she does not drink alcohol and does not use drugs.    Family History: family history includes Colon cancer in her father; Diabetes in her sister; Hypertension in her sister; Lung cancer in her mother; Lupus in her sister; Prostate cancer in her father.     Allergies:  No Known Allergies    Medications:  Prior to Admission medications    Medication Sig Start Date End Date Taking? Authorizing Provider   APPLE CIDER VINEGAR PO Take 1 tablet by mouth " "Daily.    Jose F Jara MD   HYDROcodone-acetaminophen (NORCO)  MG per tablet Take 1 tablet by mouth Every 6 (Six) Hours As Needed for Moderate Pain . 1/5/20   Jack De La Cruz DO   hydrOXYzine (ATARAX) 50 MG tablet Take 50 mg by mouth Every 6 (Six) Hours As Needed for Itching.    Jose F Jara MD   montelukast (SINGULAIR) 10 MG tablet Take  by mouth. 7/13/16   Jose F Jara MD   Morphine (MSIR) 15 MG tablet Take 15 mg by mouth Every 4 (Four) Hours As Needed for Severe Pain .    Jose F Jara MD   Multiple Vitamin (MULTI VITAMIN DAILY PO) Take 1 tablet by mouth Daily.    Jose F Jara MD   pantoprazole (PROTONIX) 40 MG EC tablet Take 40 mg by mouth Daily.    Jose F Jara MD   Probiotic Product (PROBIOTIC-10 PO) Take 1 tablet by mouth Daily.    Jose F Jara MD   SELENIUM ER PO Take 1 tablet by mouth Daily.    Jose F Jara MD   Zinc Sulfate 66 MG tablet Take 1 tablet by mouth Daily.    Jose F Jara MD       Objective    Vital Signs: /60 (BP Location: Left leg, Patient Position: Lying)   Pulse 87   Temp 98.6 °F (37 °C) (Oral)   Resp 16   Ht 160 cm (62.99\")   Wt 72.7 kg (160 lb 3.2 oz)   SpO2 95%   BMI 28.39 kg/m²   Physical Exam  Vitals and nursing note reviewed.   Constitutional:       Appearance: Normal appearance. She is normal weight.   HENT:      Head: Normocephalic and atraumatic.      Nose: Nose normal.      Mouth/Throat:      Mouth: Mucous membranes are moist.   Eyes:      Pupils: Pupils are equal, round, and reactive to light.   Cardiovascular:      Rate and Rhythm: Normal rate and regular rhythm.      Pulses: Normal pulses.      Heart sounds: Normal heart sounds.   Pulmonary:      Effort: Pulmonary effort is normal.      Breath sounds: Normal breath sounds.   Abdominal:      General: Abdomen is flat. Bowel sounds are normal.      Palpations: Abdomen is soft.   Musculoskeletal:         General: Normal range of " motion.      Cervical back: Normal range of motion.   Skin:     General: Skin is warm and dry.      Capillary Refill: Capillary refill takes less than 2 seconds.   Neurological:      General: No focal deficit present.      Mental Status: She is alert and oriented to person, place, and time. Mental status is at baseline.   Psychiatric:         Mood and Affect: Mood normal.         Behavior: Behavior normal.         Thought Content: Thought content normal.         Judgment: Judgment normal.         Results Reviewed:  Lab Results (last 24 hours)     ** No results found for the last 24 hours. **        Imaging Results (Last 24 Hours)     ** No results found for the last 24 hours. **            Active Hospital Problems    Diagnosis    • Chest wall pain    • Arm swelling        Assessment / Plan  Increase pain meds---consult her oncologist and rad tx---see orders      Code Status: see chart     I discussed the patient's findings and my recommendations with the patient..    Estimated length of stay 3 days.    Jack Cárdenas MD   09/07/21   06:59 CDT

## 2021-09-07 NOTE — NURSING NOTE
Port dressing assessed on rounds. Large transparent dressing still intact around site area. CHG disc in place. Edges loose however due to wounds unavoidable. Wound care consult noted today. Will continue to monitor. Notify VAT if any issues with dressing.
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
## 2021-09-07 NOTE — NURSING NOTE
Radiation oncology called and talked with pt about coming down for txt today, pt declined due to pain when lying down on txt table.

## 2021-09-08 NOTE — PROGRESS NOTES
"MEDICAL ONCOLOGY PROGRESS NOTE      Pt Name: Ayla Echeverria  MRN: [id number]  [id number]  YOB: 1965  Date of evaluation: 9/8/2021   ROOM: 412      Chief Compliant: Did not tolerate gabapentin yesterday medications are being adjusted per palliative care.  She had significant \"heat\" with Decadron injection.  Breathing okay this morning.  She did not go down for radiation therapy reevaluation yesterday-she still feels as if she cannot lie down on the table.      HISTORY OF PRESENT ILLNESS:    Ayla Echeverria is well known to my clinic and is followed for a diagnosis of recurrent HER-2 positive/ER 1% and CT 6% invasive ductal carcinoma of the breast. She was initially diagnosed in 2016.  She had locally advanced disease.  Unfortunately, she had significant compliance issues when she was diagnosed.  Later on she developed widespread metastatic disease with predominantly skin recurrence. She has received several lines of treatment with chemotherapy agents as well as radiation to the skin.  She is currently receiving palliative radiation.  She has had significant complains of pain, ulceration of the skin lesions.  She presented to the ER department on 9/5/2021 at [facility] due to uncontrolled pain.     ONCOLOGIC HISTORY  Diagnosis   IDC left breast, March 2016   Grade 2. ER 1%, CT 6%, HER-2/migel IHC 3+/FISH amplified ratio 6.7, AR 95%.   01/30/2017- Biopsy-proven in breast relapse/Inflammatory breast cancer. ER negative, CT negative, HER-2/migel IHC 3+/FISH amplified ratio 4.5, Ki-67 32%.   [facility] Reve[facility] no clinically significant [facility]. PTEN VUS.  5/7/21 Invasive breast cancer involving dermal lymphatics    Treatment summary  Neoadjuvant adjuvant chemotherapy TCH-P ×6 cycles. She declined to complete 1 year of Herceptin.   She declined radiation therapy.   10/3/2016-left lumpectomy/sentinel lymph node   01/30/2017- Biopsy-proven in breast relapse/Inflammatory breast cancer.   5/11/2017-Chemotherapy " with paclitaxel/carboplatin weekly. Herceptin/Pertuzumab every 3 weeks started 05/11/2017. She had a allergic reaction to carboplatin and this was discontinued.   7/21/2017- 9/1/2017 Neuropathy grade 2 with Taxol. Therefore, switched to Taxotere/Herceptin/Pertuzumab. Discontinued due to progression of disease.   9/22/2017 through 3/2/2018- Kadycla every 3 weeks. (stopped as per patient request, neuropathy grade 1)   7/10/2018 through 11/30/2018- Kadcyla.   1/4/2019-Navelbine x 1 cycle with very poor tolerance (pain)   1/25/2018-Gemzar/Herceptin   3/15/2019- low-dose Cisplatin added to Gemzar and Herceptin regimen, regimen changed to every other week   4/16/2019-5/23/19 5040cGy palliative radiation to left clavicle/chest wall   6/22/2019- Xeloda/apatinib  12/27/19-2/11/20 3800 cGy palliative radiation to the right breast.  1/10/2020- palliative treatment with Fam-Trastuzumab- stopped Feb due to severe toxicity  8/24/20-9/23/20 2925 cGy palliative radiation left chest wall skin metastasis.  8/21/2020-Herceptin, Xeloda and Tucatinib  5/28/21- Stop Tucatinib and Xeloda due to progression in skin  7/9/21 Declined palliative radiation  8/9/21 Initiate radiation therapy with anticipated dose of 6000 cGy radiation therapy anterior chest and posterior back    Tumor target  Skin chest wall left upper chest/left breast   Skin on back and both sides    Cancer history- Left breast recurrence with Inflammatory Breast Cancer ER 8%/OR negative &HER-2 positive.  Ms Echeverria was seen in initial oncology consultation on 4/20/2017 referred for a diagnosis of a left breast recurrence with inflammatory breast cancer. She was initially treated by Dr. Peres at [facility]. Prior breast cancer history is as follows:    3/22/2016-a diagnostic mammogram for a palpable mass showed a left breast abnormal findings.   4/1/2017- ultrasound-guided core needle biopsy was consistent with invasive ductal carcinoma, grade 2. ER 1%, OR 6%, HER-2/migel  IHC 3+/FISH amplified ratio 6.7., AR 95%.   She underwent 6 cycles of TCH-P from April 2016 through September 2016 with G-CSF support. She had a total of 18 weeks of trastuzumab treatment.     10/3/2016-she underwent a left lumpectomy with left sentinel lymph node revealing an invasive ductal carcinoma, grade 3. Margins clear of invasive carcinoma (1.1 cm from the lateral margin), extensive lymphovascular space invasion. 2 out of 3 sentinel lymph nodes positive for metastatic carcinoma. Final pathologic staging [id number])M0.   She declined adjuvant radiation therapy and adjuvant Herceptin completion.                               -------------------In-breast Recurrence Jan2017---------------------------  1/30/2017- she was seen by Dr. Subhash Delaney with new complaints of erythema in the left breast. A skin punch biopsy was consistent with metastatic ductal carcinoma with extensive dermal lymphovascular invasion. ER negative, MS negative, HER-2/migel IHC 3+/FISH amplified ratio 4.5, Ki-67 32%. Foundation one showed ERBB2 amplification, AURKA amplification, TP53 and .     2/15/2017- she was again seen by Dr. Delaney and explained about her tumor recurrence. Unfortunately, she declined any further intervention at that time.     4/21/2017- she asked Dr. Delaney for a referral to us.     4/28/2017-referred to me for further evaluation. I recommended a PET scan and neoadjuvant chemotherapy with carboplatin/paclitaxel and Perjeta and Herceptin.     5/4/2017-PET/CT scan revealed abnormal metabolic activity present in multiple regions throughout the left breast and in the skin( SUV 8.9, 7.4, 7.3). In the left supraclavicular region a cluster of lymph nodes present (SUV of 3). Axillary lymph nodes with SUV of 2.8. Tonsils bilaterally with SUV 5.6 (symmetric). No abnormal metabolic activity in the intrathoracic or intra-abdominal pelvic region. No abnormal skeletal metabolic activity. I recommended weekly carboplatin/weekly  Taxol/Herceptin and Pertuzumab.     5/26/2017-she had a severe allergic reaction after 2 doses of carboplatin and therefore this was discontinued. She was continued only on Taxol weekly/Herceptin and Pertuzumab.     7/21/2017-after cycle #2 and 8 doses of Taxol she developed neuropathy related to chemotherapy and therefore chemotherapy was switched from Taxol to Taxotere.   Genetic assessment- [facility] -25 revealed no clinically significant mutation. PTEN VUS.     9/1/2017-local in breast disease progression with increasing breast erythema. Treatment switched to Kadcyla.     12/27/2017- repeat re-staging CT scan of the abdomen and pelvis documented no evidence of intra-abdominal pelvic metastasis. Superior left renal cyst. Mild hepatic steatosis. CT scan of the chest documented no evidence of intrathoracic metastasis. Bone scan documented no evidence of bony metastatic disease.     3/2/2018-after 8 cycles of Kadcyla she requested treatment to be on hold. She has requested her PCP a referral to [facility] for a second opinion. She has neuropathy grade 1.     5/2/2018- 2 months off treatment (Kadcyla). She has not yet been seen at [facility]. Apparently, [facility] is gathering her records and will see her soon. Examination of her chest wall showed recurrent disease in the left upper chest. Residual neuropathy grade 1. She could not tolerate gabapentin, and therefore has stopped it. She was not interested in any other medication for neuropathy at this time.     7/10/2018-after discussion at [facility] she was recommended to reinitiate Kadcyla.     7/13/2018-CT of chest, abdomen, pelvis showed no evidence of intra-abdominal disease. There is an enlarged right-sided level 1 lymph node that was not appreciated previously, measuring 2.5 x 1.4 cm, metastatic disease considered.     7/16/2018-PET CT scan showed diffuse uptake throughout the left breast extending to the left chest wall. Abnormal uptake within  the left axilla lymph nodes and left internal mammary lymph nodes concerning for metastatic disease. Enlarged hypermetabolic right axilla lymph node concerning for metastatic disease with SUV 8.0. Abnormal uptake within a right cervical lymph node, etiology unclear, but could be reactive.     10/26/2018 - CT scan of the chest was completed at [facility] and compared to CT scan of 12/27/2017 versus her last CT scan completed at [facility] on 7/13/2018. The CT scan identified a right axillary lymph node measuring 1.2 cm, otherwise no intrathoracic neoplastic process/metastatic disease.    10/26/2018 -CT scan of the abdomen and pelvis was completed at [facility] and compared to CT scan of     12/27/2017 versus her last CT scan completed at [facility] on 7/13/2018. No evidence of intra-abdominal or pelvic metastasis was identified.  December 2018-interval worsening of skin metastasis in the left breast and diffuse erythema with new nodules.    12/17/2018-skin biopsy of overlying right breast consistent invasive ductal carcinoma, high-grade. ER 20%, SD 0%, HER-2/migel 3+.    12/17/2018-right axillary FNA biopsy consistent invasive ductal carcinoma.     12/26/2018-recommended clinical trial at [facility].    1/4/2019-started on Navelbine/Herceptin, due to delays on the clinical trial at [place]. Unfortunately, she had severe chest wall pain and generalized pain related to Navelbine.     1/25/2019-4/19/2019 she was switched to cisplatin/Gemzar/Herceptin, but had disease progression.     4/16/2019-5/23/19 5040cGy palliative radiation to left clavicle/chest wall     5/30/2019-CT chest, abdomen, pelvis and bone scan was unremarkable for right axillary adenopathy measuring 2 cm. Lymph node located in the upper chest wall under the pectoralis muscle measuring 1.2 cm. Right breast with 2 areas with masslike enhancement. No evidence of metastatic disease in the abdomen pelvis. Bone scan was unremarkable for metastatic bone  disease.    June 2019-very poor tolerance to cisplatin, Gemzar/Herceptin. She was recommended Xeloda/ Lapatinib.     6/22/2019-she was recommended and started on Xeloda 850 mg/m2 PO BID days 1 through 14 every 21 days and Lapatinib 1250 mg daily continuously.    November 2019- progression on prior Xeloda and lapatinib.    11/23/2019- recommended rechallenge with Herceptin and Navelbine vs clinical trial at [facility].    12/23/2019-CT chest abdomen pelvis showed metastatic disease in the right axillary lymph node and diffuse bilateral skin involvement of both breasts with several nodules. No evidence of pulmonary, liver or bone metastasis. CT of the head with contrast was unremarkable.    1/3/2020- the patient was unable to follow-up at [facility] for clinical trial. Therefore recommended Fam-trastuzumab deruxtecan every 3 weeks.    1/10/2020-she was started on palliative treatment with Fam-trastuzumab    12/27/19-2/11/20 3800 cGy palliative radiation to the right breast    6/5/2020- Ct Chest/Abdomen/Pelvis W Contrast No lymphadenopathy. A previously seen enlarged right axillary lymph node is now small in size. There is some stranding of the fat around the lymph node. Bilateral breast skin thickening left greater than right. Prior lumpectomy on the left with left axillary lymph node sampling. Previously seen nodularity in the right breast on CT is not visualized today.There may be minimal radiation change in the periphery of the left lung. There is no dense consolidation or effusion. No definite metastatic lung nodules. No evidence of abdominal or pelvic neoplastic process or metastatic disease. The previously seen right breast nodule is not visualized in this study. Postsurgical changes of the left breast. No significant change in the previous study. Lobulated skin thickening of the left lower lateral chest and upper abdominal wall which was not noted in the previous study. The etiology is  not certain. This data be clinically correlated and further evaluated.    8/7/2020- the patient is currently awaiting for palliative radiation to the chest wall for the skin margins. She has agreed and want to start on Herceptin, Xeloda and Tucatinib.    8/21/2020 Tumor Marker- CA 15.3- 32.19    8/24/20-9/23/20 2925 cGy palliative radiation left chest wall skin metastasis.    10/9/2020-continue Herceptin and tucatinib.    12/11/2020-she has resumed Xeloda on low-dose 500 mg p.o. twice daily 7 days on 7 days off, tucatinib and Herceptin    2/19/21 tumor markers: CA 27-29 11.8 CA 15-3 13.6 CEA 1.9    2/26/21 Ct Chest W Contrast No evidence of intrathoracic metastasis. Scarring suspected within the left upper and right middle lobes. 3. Diffuse left greater than right breast skin thickening with prominence of the trabecular breast tissue. Postoperative changes of the left breast and axilla. No pathologic axillary or intrathoracic lymphadenopathy localized.     2/26/21 Ct Abdomen Pelvis W Iv Contrast No evidence of intra-abdominal pelvic metastasis. Hepatic steatosis. Superior left renal cyst. Constipation.     2/26/21 2D echo: Mitral valve leaflets are mildly thickened with preserved leaflet mobility. Mildly thickened aortic valve leaflets with preserved leaflet mobility. Tricuspid valve is structurally normal. Mild tricuspid regurgitation with estimated RVSP of 16 mm Hg. Normal left ventricular size with preserved LV function and an estimated ejection fraction of approximately 55-60%. Normal left ventricular wall thickness. No regional wall motion abnormalities.    5/7/21 Skin lesion, right flank, punch biopsy: Invasive breast cancer involving dermal lymphatics    5/28/21 Tumor markers: CA 27-29= 21.2 CEA= 1.5 CA 15-3 =    7/9/2021-she was referred to radiation for consideration of palliative radiation to the skin lesions. However, she declined the referral and wants to try some holistic approach. She is not interested  "in cytotoxic therapy at this time.    07/28/21-skin lesions is significantly worse. Recommended radiation.    7/29/21 CT CHEST W CONTRAST No evidence of intrathoracic metastasis. Similar left greater than right breast skin thickening with increased trabecular pattern of the left breast which may represent sequelae of radiation. Postoperative changes of the left breast and axilla. No pathologic axillary or internal mammary lymphadenopathy. Left subclavian port in place with tip at the atrial caval junction.    7/29/21 CT ABDOMEN PELVIS W IV CONTRAST No evidence of intra-abdominal or pelvic metastatic disease. Small stable cyst at the upper pole left kidney. Moderate constipation.   8/9/21- Initiate radiation therapy with anticipated dose of 6000 cGy radiation therapy anterior chest and posterior back        Objective      Vitals:    09/07/21 1836 09/07/21 2102 09/07/21 2348 09/08/21 0401   BP: 133/73 146/70 148/69 147/68   BP Location: Right leg Right leg Right leg Right leg   Patient Position: Lying Lying Lying Lying   Pulse:  82 77 72   Resp:  14 16 16   Temp:  97.9 °F (36.6 °C)  97.5 °F (36.4 °C)   TempSrc:  Axillary Axillary Oral   SpO2:  94% 95% 96%   Weight:  68 kg (150 lb)     Height:  160 cm (62.99\")           Lab Results:    Lab Results (last 72 hours)     Procedure Component Value Units Date/Time    Comprehensive Metabolic Panel [[id number]]  (Abnormal) Collected: 09/06/21 0407    Specimen: Blood Updated: 09/06/21 0446     Glucose 97 mg/dL      BUN 4 mg/dL      Creatinine 0.40 mg/dL      Sodium 137 mmol/L      Potassium 3.8 mmol/L      Chloride 102 mmol/L      CO2 28.0 mmol/L      Calcium 8.6 mg/dL      Total Protein 6.4 g/dL      Albumin 3.20 g/dL      ALT (SGPT) <5 U/L      AST (SGOT) 21 U/L      Alkaline Phosphatase 46 U/L      Total Bilirubin 0.3 mg/dL      eGFR  African Amer >150 mL/min/1.73      Globulin 3.2 gm/dL      A/G Ratio 1.0 g/dL      BUN/Creatinine Ratio 10.0     Anion Gap 7.0 mmol/L     " Narrative:      GFR Normal >60  Chronic Kidney Disease <60  Kidney Failure <15      CK [[id number]]  (Normal) Collected: 09/06/21 0407    Specimen: Blood Updated: 09/06/21 0446     Creatine Kinase 83 U/L     CBC Auto Differential [[id number]]  (Abnormal) Collected: 09/06/21 0407    Specimen: Blood Updated: 09/06/21 0420     WBC 2.88 10*3/mm3      RBC 3.30 10*6/mm3      Hemoglobin 9.6 g/dL      Hematocrit 29.9 %      MCV 90.6 fL      MCH 29.1 pg      MCHC 32.1 g/dL      RDW 16.2 %      RDW-SD 54.3 fl      MPV 8.6 fL      Platelets 284 10*3/mm3      Neutrophil % 63.9 %      Lymphocyte % 16.7 %      Monocyte % 16.7 %      Eosinophil % 1.7 %      Basophil % 0.7 %      Immature Grans % 0.3 %      Neutrophils, Absolute 1.84 10*3/mm3      Lymphocytes, Absolute 0.48 10*3/mm3      Monocytes, Absolute 0.48 10*3/mm3      Eosinophils, Absolute 0.05 10*3/mm3      Basophils, Absolute 0.02 10*3/mm3      Immature Grans, Absolute 0.01 10*3/mm3      nRBC 0.0 /100 WBC     COVID-19,[facility] IN-HOUSE,Nasal Swab No Transport Media 3-4 HR TAT - Swab, Nasal Cavity [[id number]]  (Normal) Collected: 09/05/21 0737    Specimen: Swab from Nasal Cavity Updated: 09/05/21 0859     COVID19 Not Detected    Narrative:      Fact sheet for providers: https://www.fda.gov/media/597747/download     Fact sheet for patients: https://www.fda.gov/media/544857/download    Test performed by PCR.    Consider negative results in combination with clinical observations, patient history, and epidemiological information.    Sedimentation Rate [[id number]]  (Abnormal) Collected: 09/05/21 0716    Specimen: Blood Updated: 09/05/21 0819     Sed Rate 104 mm/hr     C-reactive Protein [[id number]]  (Abnormal) Collected: 09/05/21 0716    Specimen: Blood Updated: 09/05/21 0759     C-Reactive Protein 3.61 mg/dL     Comprehensive Metabolic Panel [[id number]]  (Abnormal) Collected: 09/05/21 0716    Specimen: Blood Updated: 09/05/21 0759     Glucose 96 mg/dL      BUN 4 mg/dL       Creatinine 0.41 mg/dL      Sodium 139 mmol/L      Potassium 3.7 mmol/L      Chloride 103 mmol/L      CO2 28.0 mmol/L      Calcium 8.9 mg/dL      Total Protein 6.5 g/dL      Albumin 3.20 g/dL      ALT (SGPT) 5 U/L      AST (SGOT) 24 U/L      Alkaline Phosphatase 49 U/L      Total Bilirubin 0.3 mg/dL      eGFR  African Amer >150 mL/min/1.73      Globulin 3.3 gm/dL      A/G Ratio 1.0 g/dL      BUN/Creatinine Ratio 9.8     Anion Gap 8.0 mmol/L     Narrative:      GFR Normal >60  Chronic Kidney Disease <60  Kidney Failure <15      CBC & Differential [[id number]]  (Abnormal) Collected: 09/05/21 0716    Specimen: Blood Updated: 09/05/21 0736    Narrative:      The following orders were created for panel order CBC & Differential.  Procedure                               Abnormality         Status                     ---------                               -----------         ------                     CBC Auto Differential[[id number]]        Abnormal            Final result                 Please view results for these tests on the individual orders.    CBC Auto Differential [[id number]]  (Abnormal) Collected: 09/05/21 0716    Specimen: Blood Updated: 09/05/21 0736     WBC 2.82 10*3/mm3      RBC 3.45 10*6/mm3      Hemoglobin 9.5 g/dL      Hematocrit 31.3 %      MCV 90.7 fL      MCH 27.5 pg      MCHC 30.4 g/dL      RDW 16.1 %      RDW-SD 53.4 fl      MPV 9.0 fL      Platelets 338 10*3/mm3      Neutrophil % 68.8 %      Lymphocyte % 13.1 %      Monocyte % 15.6 %      Eosinophil % 1.1 %      Basophil % 0.7 %      Immature Grans % 0.7 %      Neutrophils, Absolute 1.94 10*3/mm3      Lymphocytes, Absolute 0.37 10*3/mm3      Monocytes, Absolute 0.44 10*3/mm3      Eosinophils, Absolute 0.03 10*3/mm3      Basophils, Absolute 0.02 10*3/mm3      Immature Grans, Absolute 0.02 10*3/mm3      nRBC 0.0 /100 WBC           Radiology Results:    Imaging Results (Last 72 Hours)     Procedure Component Value Units Date/Time    US Venous Doppler  Upper Extremity Bilateral (duplex) [[id number]] Resulted: 09/07/21 1455     Updated: 09/07/21 1534            Physical Exam  Constitutional:       Appearance: Normal appearance. She is well-developed.   HENT:      Head: Normocephalic and atraumatic.      Nose: Nose normal.   Eyes:      General: Lids are normal.      Conjunctiva/sclera: Conjunctivae normal.      Pupils: Pupils are equal, round, and reactive to light.   Neck:      Trachea: Trachea normal.   Cardiovascular:      Rate and Rhythm: Normal rate and regular rhythm.      Heart sounds: Normal heart sounds.   Pulmonary:      Breath sounds: Examination of the right-lower field reveals decreased breath sounds. Examination of the left-lower field reveals decreased breath sounds. Decreased breath sounds present.   Abdominal:      General: Bowel sounds are normal.      Palpations: Abdomen is soft.   Musculoskeletal:         General: Normal range of motion.      Cervical back: Normal range of motion and neck supple.   Skin:     General: Skin is warm and dry.      Findings: No erythema, petechiae or rash.      Nails: There is no clubbing.      Comments: Significant radiation burn to posterior and anterior chest with some scabbing posteriorly. Port left anterior CW   Neurological:      Mental Status: She is alert.      Cranial Nerves: No cranial nerve deficit.      Sensory: No sensory deficit.   Psychiatric:         Speech: Speech normal.         Behavior: Behavior normal. Behavior is cooperative.         Thought Content: Thought content normal.         Judgment: Judgment normal.         ASSESSMENT:  Metastatic HER-2 migel breast cancer (diffuse ant and post chest wall skin involvement)  She has received several lines of chemotherapy in the past and several rounds of palliative radiation     7/31/2021-CT chest abdomen pelvis showed no evidence of visceral metastasis.     Currently on palliative RT. Last dose about 1-2 weeks ago. She received 6/30 treatments - Unable to  lie down on the table for treatment.  XRT consulted to see her while here for reevaluation     Cancer related pain-worsening.   Morphine ER 60mg PO q 12 h (dose increased on 9/5/2021)  Norco 10/325 1 tablet every 6 hours as needed pain  Dilaudid 1 mg IV every 2 hours as needed pain  Morphine IR 15 mg p.o. 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 She denies any significant change from previous evaluation.    Lymphopenia-     Normocytic Hypochromic anemia-            Bilateral upper extremity lymphedema- 2/2 extensive radiation and skin involvement. consult PT      Noninvasive venous studies BUE 9/7/2021 was limited due to the patient's pain and radiation burns.  No thrombus visualized however bilateral jugular veins, subclavian vein, left axillary veins not visualized    LLL consolidation, R basilar atelectasis    pCXR 9/5/2021      IS  Rocephin 1 g IV daily      Plan:  Wound care following  Palliative care -- Continue pain management  Lymphedema PT       RAJWINDER Villanueva    9/8/2021    07:13 CDT       Physicians attestation and contribution:    I, Barrett Moreno, personally and independently performed an evaluation on Ayla Echeverria  I have reviewed relevant medical information/data to include but not limited to the medication list, relevant appropriate lab work and imaging when applicable.  I reviewed other physician's notes, ancillary services and nurses assessments.  I have reviewed the above documentation completed by Carmine Lao PA-C  Please see my additional addended and/or modified contributions to the history of present illness, physical examination and assessment/medical decision-making and plan that reflects my findings and impressions.  I discussed the essential elements of the care plan with Carmine Lao PA-C and the patient.  I have encouraged and answered all the questions raised to the patient's understanding and satisfaction.  I  "concur with the above stated.    Subjective:  Did not tolerate gabapentin yesterday medications are being adjusted per palliative care.  She had significant \"heat\" with Decadron injection.  Breathing okay this morning.  She did not go down for radiation therapy reevaluation yesterday-she still feels as if she cannot lie down on the table.  I discussed all of this with her at length today and she told me that she would be willing to \"give it a try \".    Objective: Good clear breath sounds bilaterally in upper and lower lung fields anteriorly and posteriorly.  Significant infiltrative breast cancer on the chest wall and XRT changes of hyperpigmentation    Assessment/plan:  Wound care following  Palliative care -- Continue pain management  Lymphedema PT   Consider XRT if she can tolerate lying down on the table.  Enhertu would be a systemic consideration if she wants to receive treatment as an outpatient having failed other treatments.    Barrett Moreno MD  9/8/2021 15:57 CDT          "

## 2021-09-08 NOTE — PROGRESS NOTES
"CC:\"im still hurting\"    Review of Systems   Constitutional: Positive for activity change and fatigue.   HENT: Negative.    Eyes: Negative.    Respiratory: Positive for chest tightness.    Cardiovascular: Positive for chest pain.   Gastrointestinal: Negative.    Endocrine: Negative.    Genitourinary: Negative.    Musculoskeletal: Negative.    Skin: Negative.    Allergic/Immunologic: Negative.    Neurological: Negative.    Hematological: Negative.    Psychiatric/Behavioral: Negative.      Temp:  [97.5 °F (36.4 °C)-98.3 °F (36.8 °C)] 97.5 °F (36.4 °C)  Heart Rate:  [72-89] 72  Resp:  [14-16] 16  BP: (116-171)/(46-94) 147/68  I/O last 3 completed shifts:  In: -   Out: 1000 [Urine:1000]  I/O this shift:  In: -   Out: 1000 [Urine:1000]    Physical Exam  Vitals and nursing note reviewed.   Constitutional:       Appearance: Normal appearance.   HENT:      Head: Normocephalic and atraumatic.      Nose: Nose normal.   Eyes:      Extraocular Movements: Extraocular movements intact.      Pupils: Pupils are equal, round, and reactive to light.   Cardiovascular:      Rate and Rhythm: Normal rate and regular rhythm.      Pulses: Normal pulses.      Heart sounds: Normal heart sounds.   Pulmonary:      Effort: Pulmonary effort is normal.      Breath sounds: Normal breath sounds.   Abdominal:      General: Abdomen is flat. Bowel sounds are normal.      Palpations: Abdomen is soft.   Musculoskeletal:         General: Normal range of motion.      Cervical back: Normal range of motion and neck supple.   Skin:     General: Skin is warm and dry.      Capillary Refill: Capillary refill takes less than 2 seconds.   Neurological:      General: No focal deficit present.      Mental Status: She is alert and oriented to person, place, and time. Mental status is at baseline.   Psychiatric:         Mood and Affect: Mood normal.         Behavior: Behavior normal.         Thought Content: Thought content normal.         Judgment: Judgment normal. "           Arm swelling    Chest wall pain    Appreciate help of palliative care and oncology--striving toward pain control

## 2021-09-08 NOTE — PLAN OF CARE
Goal Outcome Evaluation:  Plan of Care Reviewed With: patient           Outcome Summary: Pain management improving today.  Patient understands the pain med plan; Moriphine-MSIR q 4h in addition to the scheduled Morphine-MSCONTIN BID.  Dilaudid to be used only if MSIR does not relieve the break-through pain.  BP elevated after ambulation today.  Pt was not able to tolerate radiation tx today.  Improved mobility today compared to yesterday.  Appetite was fair.  Calazime and hydroguard cream applied to back only this AM.  No tele. Safety maintained.

## 2021-09-08 NOTE — PLAN OF CARE
Goal Outcome Evaluation:  Plan of Care Reviewed With: patient           Outcome Summary: pt complained of severe pain this shift. VSS. no tele. MD aware of pain. IV dilaudid increased. Pain better controled with higher dosage. Alert and oriented. No falls. Bedside commode. CHG done. Safety maintained.

## 2021-09-08 NOTE — CASE MANAGEMENT/SOCIAL WORK
Discharge Planning Assessment  [facility]     Patient Name: Ayla Echeverria  MRN: [id number]  Today's Date: 9/8/2021    Admit Date: 9/5/2021    Discharge Needs Assessment     Row Name 09/08/21 120       Living Environment    Lives With  alone    Current Living Arrangements  home/apartment/condo    Primary Care Provided by  self    Family Caregiver if Needed  child(latonia), adult;sibling(s)       Transition Planning    Transportation Anticipated  family or friend will provide       Discharge Needs Assessment    Equipment Currently Used at Home  shower chair    Concerns to be Addressed  adjustment to diagnosis/illness    Anticipated Changes Related to Illness  inability to care for self;inability to work    Outpatient/Agency/Support Group Needs  homecare agency    Discharge Facility/Level of Care Needs  home with home health        Discharge Plan     Row Name 09/08/21 1212       Plan    Plan  Spoke with patient in room. Lives alone at home.  Has family members, daughters and siblings that can assist as needed and can provide transportation home at discharge. Currently has insurance but expressed concern about being able to keep her job/ Ins. Has DME(shower chair) and denies other needs at this time. Would like home health  and has chosen [facility] from provider list. SW to follow for orders and set up prior to discharge.        Continued Care and Services - Admitted Since 9/5/2021    Coordination has not been started for this encounter.         Demographic Summary    No documentation.       Functional Status    No documentation.       Psychosocial    No documentation.       Abuse/Neglect    No documentation.       Legal    No documentation.       Substance Abuse    No documentation.       Patient Forms    No documentation.           Merlina A Fletcher, RN
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
## 2021-09-08 NOTE — PROGRESS NOTES
This patient was brought down to the radiation therapy department today and attempt to resume chest wall radiation for local recurrence of her breast cancer.    Unfortunately, she was not able to tolerate lying on the table to receive additional radiation treatment.  She is also not returned as an outpatient to continue her external beam radiation treatments for palliation of local recurrence in her chest wall.    Overall she is a very poor prognosis and it is unlikely will be able to obtain significant relief or palliation of symptoms from her extensive chest wall recurrence of breast cancer on both anterior lateral and posterior chest wall.  This is essentially circumferential in nature with very extensive dermal lymphatic tumor spread.

## 2021-09-09 NOTE — PAYOR COMM NOTE
"9/9/21 [facility] [phone]  [phone]    CLINICAL UPDATE FOR CONT STAY.          Ayla Echeverria (55 y.o. Female)     Date of Birth Social Security Number Address Home Phone MRN    1965  [facility] [place] [address] [phone] [id number]    Sh[place] Marital Status          Mosque        Admission Date Admission Type Admitting Provider Attending Provider Department, Room/Bed    9/5/21 Emergency Jack Cárdenas MD [place], Thomas Waldon, MD [address], 412/1    Discharge Date Discharge Disposition Discharge Destination                       Attending Provider: Jack Cárdenas MD    Allergies: No Known Allergies    Isolation: None   Infection: None   Code Status: CPR    Ht: 160 cm (62.99\")   Wt: 70.9 kg (156 lb 6.4 oz)    Admission Cmt: None   Principal Problem: None                Active Insurance as of 9/5/2021     Primary Coverage     Payor Plan Insurance Group Employer/Plan Group    [employer] BLUE CROSS BLUE SHIELD PPO [id number]     Payor Plan Address Payor Plan Phone Number Payor Plan Fax Number Effective Dates    [address] [phone]  1/1/2018 - None Entered    [place] [address]       Subscriber Name Subscriber Birth Date Member ID       AYLA ECHEVERRIA 1965 [id number]                 Emergency Contacts      (Rel.) Home Phone Work Phone Mobile Phone    GabrielDanielle (Daughter) -- -- [phone]    Iman Childs (Sister) [phone] -- --    Mayela Tejeda (Friend) [phone] -- --    ELSA BARRAGAN (Daughter) -- -- [phone]        [facility] Encounter Date/Time: 9/5/2021 0617   Hospital Account: [id number]    MRN: [id number]   Patient:  Ayla Echeverria   Contact Serial #: [id number]   SSN:          ENCOUNTER             Patient Class: Inpatient   Unit: [address] Service: Medicine     Bed: 412/1   Admitting Provider: Jack Cárdenas" MD   Referring Physician:     Attending Provider: Jack Cárdenas MD   Adm Diagnosis: Arm swelling [M79.89]               PATIENT          Name: Ayla Echeverria : 1965 (55 yrs)   Address: [address] Sex: Female   City: [place] [address]   County: [facility]   Marital Status:  Ethnicity: NOT                                                                              Race: BLACK   Primary Care Provider: Jack Cárdenas, * Patients Phone: Home Phone: [phone]  Work Phone: [phone]  Mobile Phone: [phone]   EMERGENCY CONTACT   Contact Name Legal Guardian? Relationship to Patient Home Phone Work Phone   1. Danielle Rios  2. Iman Childs      Daughter  Sister    [phone]           GUARANTOR            Guarantor: Ayla Echeverria     : 1965   Address: [address] Sex: Female     [place] [address]     Relation to Patient: Self       Home Phone: [phone]   Guarantor ID: [id number]       Work Phone: [phone]   GUARANTOR EMPLOYER   Employer: [employer]         Status: FULL TIME   COVERAGE          PRIMARY INSURANCE   Payor: ANTHEM BLUE CROSS Plan: ANTHEM BLUE CROSS BLUE SHIELD PPO   Group Number: [id number] Insurance Type: INDEMNITY   Subscriber Name: AYLA ECHEVERRIA Subscriber : 1965   Subscriber ID: [id number] Coverage Address: [facility] [id number]  [place] [address]   Pat. Rel. to Subscriber: Self Coverage Phone: [phone]   SECONDARY INSURANCE   Payor: N/A Plan: N/A   Group Number:   Insurance Type:     Subscriber Name:   Subscriber :     Subscriber ID:   Coverage Address:     Pat. Rel. to Subscriber:   Coverage Phone:        Contact Serial # ([id number])  `       2021    Chart ID ([id number])             Department Encounter #   2021  6:17 AM  Pad 4b [id number]   Barrett Moreno MD   Physician   Oncology   Progress Notes      Signed   Date of Service:  21 0708    Creation Time:  09/09/21 0708            Signed        Expand AllCollapse All      Show:Clear all  [x]Manual[x]Template[x]Copied    Added by:  [x]Carmine Lao PA[x]Barrett Moreno MD    []Nas for details  MEDICAL ONCOLOGY PROGRESS NOTE        Pt Name: Ayla Echeverria  MRN: [id number]  [id number]  YOB: 1965  Date of evaluation: 9/9/2021   ROOM: 412        Chief Compliant: Pain medication adjustments is continuing.  Most of her pain is in the evening.     HISTORY OF PRESENT ILLNESS:    Ayla Echeverria is well known to my clinic and is followed for a diagnosis of recurrent HER-2 positive/ER 1% and WI 6% invasive ductal carcinoma of the breast. She was initially diagnosed in 2016.  She had locally advanced disease.  Unfortunately, she had significant compliance issues when she was diagnosed.  Later on she developed widespread metastatic disease with predominantly skin recurrence. She has received several lines of treatment with chemotherapy agents as well as radiation to the skin.  She is currently receiving palliative radiation.  She has had significant complains of pain, ulceration of the skin lesions.  She presented to the ER department on 9/5/2021 at [facility] due to uncontrolled pain.     ONCOLOGIC HISTORY  Diagnosis   IDC left breast, March 2016   Grade 2. ER 1%, WI 6%, HER-2/migel IHC 3+/FISH amplified ratio 6.7, AR 95%.   01/30/2017- Biopsy-proven in breast relapse/Inflammatory breast cancer. ER negative, WI negative, HER-2/migel IHC 3+/FISH amplified ratio 4.5, Ki-67 32%.   [facility] - Revealed no clinically significant mutation. PTEN VUS.  5/7/21 Invasive breast cancer involving dermal lymphatics               Procedure Component Value Units Date/Time      US Venous Doppler Upper Extremity Bilateral (duplex) [[id number]] Collected: 09/08/21 1626       Updated: 09/08/21 1631     Narrative:       History: Pain and swelling        Impression:       Impression: There is no evidence of  deep venous thrombosis or  superficial thrombophlebitis of bilateral upper extremities. This is a  limited exam.     Comments: Bilateral upper extremity venous duplex exam was performed  using color Doppler flow, Doppler wave form analysis, and grayscale  imaging, with and without compression. There is no evidence of deep  venous thrombosis of the axillary, brachial, radial, or ulnar veins in  the right upper extremity. There is also no evidence of deep venous  thrombosis in the brachial, radial, and ulnar veins in the left upper  extremity. There is no thrombus identified in the cephalic or basilic  veins bilaterally.          This report was finalized on 09/08/2021 16:28 by Dr. Luis Oh MD.                Physical Exam  Constitutional:       Appearance: Normal appearance. She is well-developed.   HENT:      Head: Normocephalic and atraumatic.      Nose: Nose normal.   Eyes:      General: Lids are normal.      Conjunctiva/sclera: Conjunctivae normal.      Pupils: Pupils are equal, round, and reactive to light.   Neck:      Trachea: Trachea normal.   Cardiovascular:      Rate and Rhythm: Normal rate and regular rhythm.      Heart sounds: Normal heart sounds.   Pulmonary:      Breath sounds: Examination of the right-lower field reveals decreased breath sounds. Examination of the left-lower field reveals decreased breath sounds. Decreased breath sounds present.   Abdominal:      General: Bowel sounds are normal.      Palpations: Abdomen is soft.   Musculoskeletal:         General: Normal range of motion.      Cervical back: Normal range of motion and neck supple.   Skin:     General: Skin is warm and dry.      Findings: No erythema, petechiae or rash.      Nails: There is no clubbing.      Comments: Significant radiation burn to posterior and anterior chest with some scabbing posteriorly. Port left anterior CW   Neurological:      Mental Status: She is alert.      Cranial Nerves: No cranial nerve deficit.       Sensory: No sensory deficit.   Psychiatric:         Speech: Speech normal.         Behavior: Behavior normal. Behavior is cooperative.         Thought Content: Thought content normal.         Judgment: Judgment normal.            ASSESSMENT:  #Metastatic HER-2 migel breast cancer (diffuse ant and post chest wall skin involvement)  She has received several lines of chemotherapy in the past and several rounds of palliative radiation      7/31/2021-CT chest abdomen pelvis showed no evidence of visceral metastasis.      Currently on palliative RT. Last dose about 1-2 weeks ago. She received 6/30 treatments - Unable to lie down on the table for treatment.      Dr. Darby did reevaluate her again on 9/8/2021 and unfortunately she is still unable to lie down and tolerate treatment     Enhertu would be a systemic consideration if she wants to receive treatment as an outpatient having failed other treatments.        #Cancer related pain-palliative care helping with pain control  Morphine ER 60mg PO q 12 h (dose increased on 9/5/2021)  Naprosyn 500 mg p.o. twice daily  Dexamethasone 4 mg p.o. daily  Dilaudid 1 mg IV every 2 hours as needed pain  Morphine IR 30 mg p.o. 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 She denies any significant change from previous evaluation.    #Lymphopenia-        #Normocytic Hypochromic anemia-          HGB stable     #Bilateral upper extremity lymphedema- 2/2 extensive radiation and skin involvement. consult PT        Noninvasive venous studies BUE 9/7/2021 was limited due to the patient's pain and radiation burns.  No thrombus visualized however bilateral jugular veins, subclavian vein, left axillary veins not visualized     #LLL consolidation, R basilar atelectasis     pCXR 9/5/2021       IS  Rocephin 1 g IV daily       Plan:  Wound care following  Palliative care -- Continue pain management  Lymphedema PT          RAJWINDER Villanueva     9/9/2021     07:08 CDT      Physicians attestation and contribution:     I, Barrett Moreno, personally and independently performed an evaluation on Ayla Echeverria  I have reviewed relevant medical information/data to include but not limited to the medication list, relevant appropriate lab work and imaging when applicable.  I reviewed other physician's notes, ancillary services and nurses assessments.  I have reviewed the above documentation completed by Carmine Lao PA-C  Please see my additional addended and/or modified contributions to the history of present illness, physical examination and assessment/medical decision-making and plan that reflects my findings and impressions.  I discussed the essential elements of the care plan with Carmine Lao PA-C and the patient.  I have encouraged and answered all the questions raised to the patient's understanding and satisfaction.  I concur with the above stated.     Subjective: Pain control stable, adequate.     Objective: No change in examination.     Assessment/plan:  Unable to tolerate lying down for XRT.  Dr. Pipo Darby's note reviewed.  Palliative care ongoing.     Discussed with Dr. Ott.  Ayla could not tolerate Enhertu (Fam-trastuzumab) and this was discontinued after only ~1 month of treatment in February 2020.     At this juncture, there is no reasonable expectation of response to systemic therapy having been exposed to the most active agents and either failed or could not tolerate them.     Continue palliative care, hospice is a consideration.  I will need to discuss this further with her.     Barrett Moreno MD  9/9/2021 08:22 CDT                            ED to Hosp-Admission (Current) on 9/5/2021     ED to Hosp-Admission (Current) on 9/5/2021        (No Diff)  Order: [id number]  Status:  Final result   Visible to patient:  Yes (MyChart) Next appt:  Today at 11:00 AM in Physical Therapy (Jaycee Mohan PTA,  CLT-EVANGELINA)  Specimen Information: Blood         0 Result Notes  Component   Ref Range & Units 05:21   Raj[place]inder/[place] 3 d ago   [place]aj[place]inder/[place] 4 d ago   [place] 3 wk ago   [place] 1 mo ago   [place]   WBC   3.40 - 10.80 10*3/mm3 6.70  2.88Low   2.82Low   4.51 R  3.19Low  R    RBC   3.77 - 5.28 10*6/mm3 3.58Low   3.30Low   3.45Low   4.41 R  4.01 R    Hemoglobin   12.0 - 15.9 g/dL 10.1Low   9.6Low   9.5Low   12.8 R  11.5 R    Hematocrit   34.0 - 46.6 % 32.5Low   29.9Low   31.3Low   41.5 R  37.6 R    MCV   79.0 - 97.0 fL 90.8  90.6  90.7  94.1 R  93.8 R    MCH   26.6 - 33.0 pg 28.2  29.1  27.5  29.0 R  28.7 R    MCHC   31.5 - 35.7 g/dL 31.1Low   32.1  30.4Low   30.8Low  R  30.6Low  R    RDW   12.3 - 15.4 % 16.4High   16.2High   16.1High   16.6High  R  16.1High  R    RDW-SD   37.0 - 54.0 fl 54.4High   54.3High   53.4      MPV   6.0 - 12.0 fL 9.6                    Contains abnormal data Basic Metabolic Panel  Order: [id number]  Status:  Final result   Visible to patient:  No (scheduled for 9/9/2021  6:16 AM) Next appt:  Today at 11:00 AM in Physical Therapy (Jaycee Mohan PTA, CLT-EVANGELINA)  Specimen Information: Blood         0 Result Notes  Component   Ref Range & Units 05:21   (9/9/21)   [place]winder/[place] 3 d ago   (9/6/21)   [place]winder/[place] 4 d ago   (9/5/21)   [place]winder/[place] 4 mo ago   (5/7/21)   [place] 4 mo ago   (4/16/21)   [place]winder/[place]   Glucose   65 - 99 mg/dL 108High   97  96  100 R  113High  R    BUN   6 - 20 mg/dL 8  4Low   4Low   12 R  12 R    Creatinine   0.57 - 1.00 mg/dL 0.44Low   0.40Low   0.41Low   0.9 R  0.7 R    Sodium   136 - 145 mmol/L 139                  Time  Temp  Pulse  Resp  BP  Device (Oxygen Therapy)  SpO2   09/09/21 0746  97.4 (36.3)  69  18  143/69  room air  100   09/09/21 0357  97.9 (36.6)  80  17  122/72  room air  97   09/08/21 2353  98.2 (36.8)  77  17  147/69  room air  98   09/08/21 2027  97.8 (36.6)  73  18  152/68   room air  97   BP: Violeta  "RN notified at 09/08/21 2027 09/08                   6:17 AM [facility] 4b [id number]      Jack Cárdenas MD   Physician   Family Medicine   Progress Notes      Signed   Date of Service:  09/08/21 1724   Creation Time:  09/09/21 0804            Signed             Show:Clear all  [x]Manual[x]Template[]Copied    Added by:  [x]Jack Cárdenas MD    []Hover for details  CC:\"im hurting\"     Review of Systems   Constitutional: Positive for activity change.   HENT: Negative.    Eyes: Negative.    Respiratory: Negative.    Cardiovascular: Positive for chest pain.   Gastrointestinal: Negative.    Endocrine: Negative.    Genitourinary: Negative.    Musculoskeletal: Negative.    Skin: Negative.    Allergic/Immunologic: Negative.    Neurological: Negative.    Hematological: Negative.    Psychiatric/Behavioral: Negative.  Negative for agitation.      Temp:  [97.4 °F (36.3 °C)-98.6 °F (37 °C)] 97.4 °F (36.3 °C)  Heart Rate:  [69-88] 69  Resp:  [17-18] 18  BP: (105-158)/(62-77) 143/69  I/O last 3 completed shifts:  In: 360 [P.O.:360]  Out: 1750 [Urine:1750]  I/O this shift:  In: -   Out: 700 [Urine:700]                             Current Facility-Administered Medications   Medication Dose Route Frequency Provider Last Rate Last Admin   • cefTRIAXone (ROCEPHIN) 1 g in sodium chloride 0.9 % 100 mL IVPB-VTB  1 g Intravenous Q24H Carmine Lao  mL/hr at 09/09/21 0829 1 g at 09/09/21 0829   • dexamethasone (DECADRON) tablet 4 mg  4 mg Oral Daily With Breakfast Elodia Duenas APRN   4 mg at 09/09/21 0821   • gabapentin (NEURONTIN) capsule 100 mg  100 mg Oral TID Elodia Duenas APRN   100 mg at 09/09/21 0822   • HYDROmorphone (DILAUDID) injection 1 mg  1 mg Intravenous Q4H PRN Jack Cárdenas MD   1 mg at 09/09/21 0238    And   • naloxone (NARCAN) injection 0.4 mg  0.4 mg Intravenous Q5 Min PRN Jack Cárdenas MD       • hydrOXYzine (ATARAX) tablet 50 mg  50 mg Oral Q6H PRN Jack Cárdenas " MD Asif   50 mg at 09/09/21 0120   • montelukast (SINGULAIR) tablet 10 mg  10 mg Oral Daily Jack Cárdenas MD   10 mg at 09/09/21 0821   • Morphine (MS CONTIN) 12 hr tablet 60 mg  60 mg Oral Q12H Donald Ott MD   60 mg at 09/09/21 0920   • Morphine (MSIR) tablet 30 mg  30 mg Oral Q4H PRN Elodia Duenas APRN   30 mg at 09/09/21 0511   • naproxen (NAPROSYN) tablet 500 mg  500 mg Oral BID With Meals Elodia Duenas APRN   500 mg at 09/09/21 0821   • ondansetron (ZOFRAN) injection 4 mg  4 mg Intravenous Q6H PRN Jack Cárdenas MD       • pantoprazole (PROTONIX) EC tablet 40 mg  40 mg Oral Daily Jack Cárdenas MD   40 mg at 09/09/21 0822   • sennosides-docusate (PERICOLACE) 8.6-50 MG per tablet 1 tablet  1 tablet Oral BID PRN Jack Cárdenas MD   1 tablet at 09/08/21 1110   • sennosides-docusate (PERICOLACE) 8.6-50 MG per tablet 2 tablet  2 tablet Oral Nightly Elodia Duenas APRN   2 tablet at 09/08/21 2118   • sodium chloride 0.9 % flush 10 mL  10 mL Intravenous PRN Jack Cárdenas MD       • sodium chloride 0.9 % flush 10 mL  10 mL Intravenous Q12H Jack Cárdenas MD   10 mL at 09/09/21 0822   • sodium chloride 0.9 % flush 10 mL  10 mL Intravenous PRN aJck Cárdenas MD       • sodium chloride 0.9 % infusion  50 mL/hr Intravenous Continuous Jack Cárdenas MD 50 mL/hr at 09/08/21 2237 50 mL/hr at 09/08/21 2237

## 2021-09-09 NOTE — PLAN OF CARE
Goal Outcome Evaluation:  Plan of Care Reviewed With: patient           Outcome Summary: pt complained of severe pain this shift. VSS. no tele. MD aware of pain. Pain medication given as indicated. No alls. Alert and oriented. Cream applied to back and arms.safety maintained.

## 2021-09-09 NOTE — CONSULTS
Pt port dressing changed as the peeling from the axillary side is very close to the insertion site. Pt skin is scarred and rough, making it difficult for adhesive dressings to stick. Site cleansed with CHG and allowed to dry, new dressing placed. Window dressing from port kit used. There is a large tegaderm in the room in case this dressing begins to peel pff as well. RN made aware to observe frequently.
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
## 2021-09-09 NOTE — PROGRESS NOTES
MEDICAL ONCOLOGY PROGRESS NOTE      Pt Name: Ayla Echeverria  MRN: [id number]  [id number]  YOB: 1965  Date of evaluation: 9/9/2021   ROOM: 412      Chief Compliant: Pain medication adjustments is continuing.  Most of her pain is in the evening.    HISTORY OF PRESENT ILLNESS:    Ayla Echeverria is well known to my clinic and is followed for a diagnosis of recurrent HER-2 positive/ER 1% and SC 6% invasive ductal carcinoma of the breast. She was initially diagnosed in 2016.  She had locally advanced disease.  Unfortunately, she had significant compliance issues when she was diagnosed.  Later on she developed widespread metastatic disease with predominantly skin recurrence. She has received several lines of treatment with chemotherapy agents as well as radiation to the skin.  She is currently receiving palliative radiation.  She has had significant complains of pain, ulceration of the skin lesions.  She presented to the ER department on 9/5/2021 at [facility] due to uncontrolled pain.     ONCOLOGIC HISTORY  Diagnosis   IDC left breast, March 2016   Grade 2. ER 1%, SC 6%, HER-2/migel IHC 3+/FISH amplified ratio 6.7, AR 95%.   01/30/2017- Biopsy-proven in breast relapse/Inflammatory breast cancer. ER negative, SC negative, HER-2/migel IHC 3+/FISH amplified ratio 4.5, Ki-67 32%.   [facility] Reve[facility] no clinically significant [facility]. PTEN VUS.  5/7/21 Invasive breast cancer involving dermal lymphatics    Treatment summary  Neoadjuvant adjuvant chemotherapy TCH-P ×6 cycles. She declined to complete 1 year of Herceptin.   She declined radiation therapy.   10/3/2016-left lumpectomy/sentinel lymph node   01/30/2017- Biopsy-proven in breast relapse/Inflammatory breast cancer.   5/11/2017-Chemotherapy with paclitaxel/carboplatin weekly. Herceptin/Pertuzumab every 3 weeks started 05/11/2017. She had a allergic reaction to carboplatin and this was discontinued.   7/21/2017- 9/1/2017 Neuropathy grade 2 with Taxol.  Therefore, switched to Taxotere/Herceptin/Pertuzumab. Discontinued due to progression of disease.   9/22/2017 through 3/2/2018- Kadycla every 3 weeks. (stopped as per patient request, neuropathy grade 1)   7/10/2018 through 11/30/2018- Kadcyla.   1/4/2019-Navelbine x 1 cycle with very poor tolerance (pain)   1/25/2018-Gemzar/Herceptin   3/15/2019- low-dose Cisplatin added to Gemzar and Herceptin regimen, regimen changed to every other week   4/16/2019-5/23/19 5040cGy palliative radiation to left clavicle/chest wall   6/22/2019- Xeloda/Lapatinib  12/27/19-2/11/20 3800 cGy palliative radiation to the right breast.  1/10/2020- palliative treatment with Fam-Trastuzumab- stopped Feb due to severe toxicity  8/24/20-9/23/20 2925 cGy palliative radiation left chest wall skin metastasis.  8/21/2020-Herceptin, Xeloda and Tucatinib  5/28/21- Stop Tucatinib and Xeloda due to progression in skin  7/9/21 Declined palliative radiation  8/9/21 Initiate radiation therapy with anticipated dose of 6000 cGy radiation therapy anterior chest and posterior back    Tumor target  Skin chest wall left upper chest/left breast   Skin on back and both sides    Cancer history- Left breast recurrence with Inflammatory Breast Cancer ER 8%/TN negative &HER-2 positive.  Ms Echeverria was seen in initial oncology consultation on 4/20/2017 referred for a diagnosis of a left breast recurrence with inflammatory breast cancer. She was initially treated by Dr. Peres at [facility] Prior breast cancer history is as follows:    3/22/2016-a diagnostic mammogram for a palpable mass showed a left breast abnormal findings.   4/1/2017- ultrasound-guided core needle biopsy was consistent with invasive ductal carcinoma, grade 2. ER 1%, TN 6%, HER-2/migel IHC 3+/FISH amplified ratio 6.7., AR 95%.   She underwent 6 cycles of TCH-P from April 2016 through September 2016 with G-CSF support. She had a total of 18 weeks of trastuzumab treatment.     10/3/2016-she  underwent a left lumpectomy with left sentinel lymph node revealing an invasive ductal carcinoma, grade 3. Margins clear of invasive carcinoma (1.1 cm from the lateral margin), extensive lymphovascular space invasion. 2 out of 3 sentinel lymph nodes positive for metastatic carcinoma. Final pathologic staging [id number])[id number].   She declined adjuvant radiation therapy and adjuvant Herceptin completion.                               -------------------In-breast Recurrence Jan2017---------------------------  1/30/2017- she was seen by Dr. Subhash Delaney with new complaints of erythema in the left breast. A skin punch biopsy was consistent with metastatic ductal carcinoma with extensive dermal lymphovascular invasion. ER negative, MN negative, HER-2/migel IHC 3+/FISH amplified ratio 4.5, Ki-67 32%. Foundation one showed ERBB2 amplification, AURKA amplification, TP53 and .     2/15/2017- she was again seen by Dr. Delaney and explained about her tumor recurrence. Unfortunately, she declined any further intervention at that time.     4/21/2017- she asked Dr. Delaney for a referral to us.     4/28/2017-referred to me for further evaluation. I recommended a PET scan and neoadjuvant chemotherapy with carboplatin/paclitaxel and Perjeta and Herceptin.     5/4/2017-PET/CT scan revealed abnormal metabolic activity present in multiple regions throughout the left breast and in the skin( SUV 8.9, 7.4, 7.3). In the left supraclavicular region a cluster of lymph nodes present (SUV of 3). Axillary lymph nodes with SUV of 2.8. Tonsils bilaterally with SUV 5.6 (symmetric). No abnormal metabolic activity in the intrathoracic or intra-abdominal pelvic region. No abnormal skeletal metabolic activity. I recommended weekly carboplatin/weekly Taxol/Herceptin and Pertuzumab.     5/26/2017-she had a severe allergic reaction after 2 doses of carboplatin and therefore this was discontinued. She was continued only on Taxol weekly/Herceptin and  Pertuzumab.     7/21/2017-after cycle #2 and 8 doses of Taxol she developed neuropathy related to chemotherapy and therefore chemotherapy was switched from Taxol to Taxotere.   Genetic assessment- [facility] - revealed no clinically significant mutation. PTEN VUS.     9/1/2017-local in breast disease progression with increasing breast erythema. Treatment switched to Kadcyla.     12/27/2017- repeat re-staging CT scan of the abdomen and pelvis documented no evidence of intra-abdominal pelvic metastasis. Superior left renal cyst. Mild hepatic steatosis. CT scan of the chest documented no evidence of intrathoracic metastasis. Bone scan documented no evidence of bony metastatic disease.     3/2/2018-after 8 cycles of Kadcyla she requested treatment to be on hold. She has requested her PCP a referral to [facility] for a second opinion. She has neuropathy grade 1.     5/2/2018- 2 months off treatment (Kadcyla). She has not yet been seen at [facility]. Apparently, [facility] is gathering her records and will see her soon. Examination of her chest wall showed recurrent disease in the left upper chest. Residual neuropathy grade 1. She could not tolerate gabapentin, and therefore has stopped it. She was not interested in any other medication for neuropathy at this time.     7/10/2018-after discussion at [facility] she was recommended to reinitiate Kadcyla.     7/13/2018-CT of chest, abdomen, pelvis showed no evidence of intra-abdominal disease. There is an enlarged right-sided level 1 lymph node that was not appreciated previously, measuring 2.5 x 1.4 cm, metastatic disease considered.     7/16/2018-PET CT scan showed diffuse uptake throughout the left breast extending to the left chest wall. Abnormal uptake within the left axilla lymph nodes and left internal mammary lymph nodes concerning for metastatic disease. Enlarged hypermetabolic right axilla lymph node concerning for metastatic disease with SUV 8.0.  Abnormal uptake within a right cervical lymph node, etiology unclear, but could be reactive.     10/26/2018 - CT scan of the chest was completed at [facility] and compared to CT scan of 12/27/2017 versus her last CT scan completed at [facility] on 7/13/2018. The CT scan identified a right axillary lymph node measuring 1.2 cm, otherwise no intrathoracic neoplastic process/metastatic disease.    10/26/2018 -CT scan of the abdomen and pelvis was completed at [facility] and compared to CT scan of     12/27/2017 versus her last CT scan completed at [facility] on 7/13/2018. No evidence of intra-abdominal or pelvic metastasis was identified.  December 2018-interval worsening of skin metastasis in the left breast and diffuse erythema with new nodules.    12/17/2018-skin biopsy of overlying right breast consistent invasive ductal carcinoma, high-grade. ER 20%, MD 0%, HER-2/migel 3+.    12/17/2018-right axillary FNA biopsy consistent invasive ductal carcinoma.     12/26/2018-recommended clinical trial at [facility].    1/4/2019-started on Navelbine/Herceptin, due to delays on the clinical trial at [place]. Unfortunately, she had severe chest wall pain and generalized pain related to Navelbine.     1/25/2019-4/19/2019 she was switched to cisplatin/Gemzar/Herceptin, but had disease progression.     4/16/2019-5/23/19 5040cGy palliative radiation to left clavicle/chest wall     5/30/2019-CT chest, abdomen, pelvis and bone scan was unremarkable for right axillary adenopathy measuring 2 cm. Lymph node located in the upper chest wall under the pectoralis muscle measuring 1.2 cm. Right breast with 2 areas with masslike enhancement. No evidence of metastatic disease in the abdomen pelvis. Bone scan was unremarkable for metastatic bone disease.    June 2019-very poor tolerance to cisplatin, Gemzar/Herceptin. She was recommended Xeloda/ Lapatinib.     6/22/2019-she was recommended and started on Xeloda 850 mg/m2 PO BID days 1 through 14  every 21 days and Lapatinib 1250 mg daily continuously.    November 2019- progression on prior Xeloda and lapatinib.    11/23/2019- recommended rechallenge with Herceptin and Navelbine vs clinical trial at [facility].    12/23/2019-CT chest abdomen pelvis showed metastatic disease in the right axillary lymph node and diffuse bilateral skin involvement of both breasts with several nodules. No evidence of pulmonary, liver or bone metastasis. CT of the head with contrast was unremarkable.    1/3/2020- the patient was unable to follow-up at [facility] for clinical trial. Therefore recommended Fam-trastuzumab deruxtecan every 3 weeks.    1/10/2020-she was started on palliative treatment with Fam-trastuzumab    12/27/19-2/11/20 3800 cGy palliative radiation to the right breast    6/5/2020- Ct Chest/Abdomen/Pelvis W Contrast No lymphadenopathy. A previously seen enlarged right axillary lymph node is now small in size. There is some stranding of the fat around the lymph node. Bilateral breast skin thickening left greater than right. Prior lumpectomy on the left with left axillary lymph node sampling. Previously seen nodularity in the right breast on CT is not visualized today.There may be minimal radiation change in the periphery of the left lung. There is no dense consolidation or effusion. No definite metastatic lung nodules. No evidence of abdominal or pelvic neoplastic process or metastatic disease. The previously seen right breast nodule is not visualized in this study. Postsurgical changes of the left breast. No significant change in the previous study. Lobulated skin thickening of the left lower lateral chest and upper abdominal wall which was not noted in the previous study. The etiology is not certain. This data be clinically correlated and further evaluated.    8/7/2020- the patient is currently awaiting for palliative radiation to the chest wall for the skin margins. She has agreed and want  to start on Herceptin, Xeloda and Tucatinib.    8/21/2020 Tumor Marker- CA 15.3- 32.19    8/24/20-9/23/20 2925 cGy palliative radiation left chest wall skin metastasis.    10/9/2020-continue Herceptin and tucatinib.    12/11/2020-she has resumed Xeloda on low-dose 500 mg p.o. twice daily 7 days on 7 days off, tucatinib and Herceptin    2/19/21 tumor markers: CA 27-29 11.8 CA 15-3 13.6 CEA 1.9    2/26/21 Ct Chest W Contrast No evidence of intrathoracic metastasis. Scarring suspected within the left upper and right middle lobes. 3. Diffuse left greater than right breast skin thickening with prominence of the trabecular breast tissue. Postoperative changes of the left breast and axilla. No pathologic axillary or intrathoracic lymphadenopathy localized.     2/26/21 Ct Abdomen Pelvis W Iv Contrast No evidence of intra-abdominal pelvic metastasis. Hepatic steatosis. Superior left renal cyst. Constipation.     2/26/21 2D echo: Mitral valve leaflets are mildly thickened with preserved leaflet mobility. Mildly thickened aortic valve leaflets with preserved leaflet mobility. Tricuspid valve is structurally normal. Mild tricuspid regurgitation with estimated RVSP of 16 mm Hg. Normal left ventricular size with preserved LV function and an estimated ejection fraction of approximately 55-60%. Normal left ventricular wall thickness. No regional wall motion abnormalities.    5/7/21 Skin lesion, right flank, punch biopsy: Invasive breast cancer involving dermal lymphatics    5/28/21 Tumor markers: CA 27-29= 21.2 CEA= 1.5 CA 15-3 =    7/9/2021-she was referred to radiation for consideration of palliative radiation to the skin lesions. However, she declined the referral and wants to try some holistic approach. She is not interested in cytotoxic therapy at this time.    07/28/21-skin lesions is significantly worse. Recommended radiation.    7/29/21 CT CHEST W CONTRAST No evidence of intrathoracic metastasis. Similar left greater than  right breast skin thickening with increased trabecular pattern of the left breast which may represent sequelae of radiation. Postoperative changes of the left breast and axilla. No pathologic axillary or internal mammary lymphadenopathy. Left subclavian port in place with tip at the atrial caval junction.    7/29/21 CT ABDOMEN PELVIS W IV CONTRAST No evidence of intra-abdominal or pelvic metastatic disease. Small stable cyst at the upper pole left kidney. Moderate constipation.   8/9/21- Initiate radiation therapy with anticipated dose of 6000 cGy radiation therapy anterior chest and posterior back        Objective      Vitals:    09/08/21 1517 09/08/21 2027 09/08/21 2353 09/09/21 0357   BP: 158/62 152/68  Comment: USAMA Mcdaniel notified 147/69 122/72   BP Location: Right leg Right arm Left leg Left leg   Patient Position: Sitting Lying Lying Lying   Pulse: 75 73 77 80   Resp: 18 18 17 17   Temp: 98.2 °F (36.8 °C) 97.8 °F (36.6 °C) 98.2 °F (36.8 °C) 97.9 °F (36.6 °C)   TempSrc: Oral Oral Oral Oral   SpO2:  97% 98% 97%   Weight:  70.9 kg (156 lb 6.4 oz)     Height:             Lab Results:    Lab Results (last 72 hours)     Procedure Component Value Units Date/Time    Comprehensive Metabolic Panel [[id number]]  (Abnormal) Collected: 09/06/21 0407    Specimen: Blood Updated: 09/06/21 0446     Glucose 97 mg/dL      BUN 4 mg/dL      Creatinine 0.40 mg/dL      Sodium 137 mmol/L      Potassium 3.8 mmol/L      Chloride 102 mmol/L      CO2 28.0 mmol/L      Calcium 8.6 mg/dL      Total Protein 6.4 g/dL      Albumin 3.20 g/dL      ALT (SGPT) <5 U/L      AST (SGOT) 21 U/L      Alkaline Phosphatase 46 U/L      Total Bilirubin 0.3 mg/dL      eGFR  African Amer >150 mL/min/1.73      Globulin 3.2 gm/dL      A/G Ratio 1.0 g/dL      BUN/Creatinine Ratio 10.0     Anion Gap 7.0 mmol/L     Narrative:      GFR Normal >60  Chronic Kidney Disease <60  Kidney Failure <15      CK [[id number]]  (Normal) Collected: 09/06/21 0407    Specimen: Blood  Updated: 09/06/21 0446     Creatine Kinase 83 U/L     CBC Auto Differential [[id number]]  (Abnormal) Collected: 09/06/21 0407    Specimen: Blood Updated: 09/06/21 0420     WBC 2.88 10*3/mm3      RBC 3.30 10*6/mm3      Hemoglobin 9.6 g/dL      Hematocrit 29.9 %      MCV 90.6 fL      MCH 29.1 pg      MCHC 32.1 g/dL      RDW 16.2 %      RDW-SD 54.3 fl      MPV 8.6 fL      Platelets 284 10*3/mm3      Neutrophil % 63.9 %      Lymphocyte % 16.7 %      Monocyte % 16.7 %      Eosinophil % 1.7 %      Basophil % 0.7 %      Immature Grans % 0.3 %      Neutrophils, Absolute 1.84 10*3/mm3      Lymphocytes, Absolute 0.48 10*3/mm3      Monocytes, Absolute 0.48 10*3/mm3      Eosinophils, Absolute 0.05 10*3/mm3      Basophils, Absolute 0.02 10*3/mm3      Immature Grans, Absolute 0.01 10*3/mm3      nRBC 0.0 /100 WBC     COVID-19,[facility] IN-HOUSE,Nasal Swab No Transport Media 3-4 HR TAT - Swab, Nasal Cavity [[id number]]  (Normal) Collected: 09/05/21 0737    Specimen: Swab from Nasal Cavity Updated: 09/05/21 0859     COVID19 Not Detected    Narrative:      Fact sheet for providers: https://www.fda.gov/media/154576/download     Fact sheet for patients: https://www.fda.gov/media/979346/download    Test performed by PCR.    Consider negative results in combination with clinical observations, patient history, and epidemiological information.    Sedimentation Rate [[id number]]  (Abnormal) Collected: 09/05/21 0716    Specimen: Blood Updated: 09/05/21 0819     Sed Rate 104 mm/hr     C-reactive Protein [[id number]]  (Abnormal) Collected: 09/05/21 0716    Specimen: Blood Updated: 09/05/21 0759     C-Reactive Protein 3.61 mg/dL     Comprehensive Metabolic Panel [[id number]]  (Abnormal) Collected: 09/05/21 0716    Specimen: Blood Updated: 09/05/21 0759     Glucose 96 mg/dL      BUN 4 mg/dL      Creatinine 0.41 mg/dL      Sodium 139 mmol/L      Potassium 3.7 mmol/L      Chloride 103 mmol/L      CO2 28.0 mmol/L      Calcium 8.9 mg/dL      Total Protein  6.5 g/dL      Albumin 3.20 g/dL      ALT (SGPT) 5 U/L      AST (SGOT) 24 U/L      Alkaline Phosphatase 49 U/L      Total Bilirubin 0.3 mg/dL      eGFR  African Amer >150 mL/min/1.73      Globulin 3.3 gm/dL      A/G Ratio 1.0 g/dL      BUN/Creatinine Ratio 9.8     Anion Gap 8.0 mmol/L     Narrative:      GFR Normal >60  Chronic Kidney Disease <60  Kidney Failure <15      CBC & Differential [[id number]]  (Abnormal) Collected: 09/05/21 0716    Specimen: Blood Updated: 09/05/21 0736    Narrative:      The following orders were created for panel order CBC & Differential.  Procedure                               Abnormality         Status                     ---------                               -----------         ------                     CBC Auto Differential[[id number]]        Abnormal            Final result                 Please view results for these tests on the individual orders.    CBC Auto Differential [[id number]]  (Abnormal) Collected: 09/05/21 0716    Specimen: Blood Updated: 09/05/21 0736     WBC 2.82 10*3/mm3      RBC 3.45 10*6/mm3      Hemoglobin 9.5 g/dL      Hematocrit 31.3 %      MCV 90.7 fL      MCH 27.5 pg      MCHC 30.4 g/dL      RDW 16.1 %      RDW-SD 53.4 fl      MPV 9.0 fL      Platelets 338 10*3/mm3      Neutrophil % 68.8 %      Lymphocyte % 13.1 %      Monocyte % 15.6 %      Eosinophil % 1.1 %      Basophil % 0.7 %      Immature Grans % 0.7 %      Neutrophils, Absolute 1.94 10*3/mm3      Lymphocytes, Absolute 0.37 10*3/mm3      Monocytes, Absolute 0.44 10*3/mm3      Eosinophils, Absolute 0.03 10*3/mm3      Basophils, Absolute 0.02 10*3/mm3      Immature Grans, Absolute 0.02 10*3/mm3      nRBC 0.0 /100 WBC           Radiology Results:    Imaging Results (Last 72 Hours)     Procedure Component Value Units Date/Time    US Venous Doppler Upper Extremity Bilateral (duplex) [[id number]] Collected: 09/08/21 1626     Updated: 09/08/21 1631    Narrative:      History: Pain and swelling       Impression:       Impression: There is no evidence of deep venous thrombosis or  superficial thrombophlebitis of bilateral upper extremities. This is a  limited exam.     Comments: Bilateral upper extremity venous duplex exam was performed  using color Doppler flow, Doppler wave form analysis, and grayscale  imaging, with and without compression. There is no evidence of deep  venous thrombosis of the axillary, brachial, radial, or ulnar veins in  the right upper extremity. There is also no evidence of deep venous  thrombosis in the brachial, radial, and ulnar veins in the left upper  extremity. There is no thrombus identified in the cephalic or basilic  veins bilaterally.          This report was finalized on 09/08/2021 16:28 by Dr. Luis Oh MD.            Physical Exam  Constitutional:       Appearance: Normal appearance. She is well-developed.   HENT:      Head: Normocephalic and atraumatic.      Nose: Nose normal.   Eyes:      General: Lids are normal.      Conjunctiva/sclera: Conjunctivae normal.      Pupils: Pupils are equal, round, and reactive to light.   Neck:      Trachea: Trachea normal.   Cardiovascular:      Rate and Rhythm: Normal rate and regular rhythm.      Heart sounds: Normal heart sounds.   Pulmonary:      Breath sounds: Examination of the right-lower field reveals decreased breath sounds. Examination of the left-lower field reveals decreased breath sounds. Decreased breath sounds present.   Abdominal:      General: Bowel sounds are normal.      Palpations: Abdomen is soft.   Musculoskeletal:         General: Normal range of motion.      Cervical back: Normal range of motion and neck supple.   Skin:     General: Skin is warm and dry.      Findings: No erythema, petechiae or rash.      Nails: There is no clubbing.      Comments: Significant radiation burn to posterior and anterior chest with some scabbing posteriorly. Port left anterior CW   Neurological:      Mental Status: She is alert.      Cranial  Nerves: No cranial nerve deficit.      Sensory: No sensory deficit.   Psychiatric:         Speech: Speech normal.         Behavior: Behavior normal. Behavior is cooperative.         Thought Content: Thought content normal.         Judgment: Judgment normal.         ASSESSMENT:  #Metastatic HER-2 migel breast cancer (diffuse ant and post chest wall skin involvement)  She has received several lines of chemotherapy in the past and several rounds of palliative radiation     7/31/2021-CT chest abdomen pelvis showed no evidence of visceral metastasis.     Currently on palliative RT. Last dose about 1-2 weeks ago. She received 6/30 treatments - Unable to lie down on the table for treatment.     Dr. Darby did reevaluate her again on 9/8/2021 and unfortunately she is still unable to lie down and tolerate treatment    Enhertu would be a systemic consideration if she wants to receive treatment as an outpatient having failed other treatments.       #Cancer related pain-palliative care helping with pain control  Morphine ER 60mg PO q 12 h (dose increased on 9/5/2021)  Naprosyn 500 mg p.o. twice daily  Dexamethasone 4 mg p.o. daily  Dilaudid 1 mg IV every 2 hours as needed pain  Morphine IR 30 mg p.o. 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 She denies any significant change from previous evaluation.    #Lymphopenia-       #Normocytic Hypochromic anemia-        HGB stable    #Bilateral upper extremity lymphedema- 2/2 extensive radiation and skin involvement. consult PT      Noninvasive venous studies BUE 9/7/2021 was limited due to the patient's pain and radiation burns.  No thrombus visualized however bilateral jugular veins, subclavian vein, left axillary veins not visualized    #LLL consolidation, R basilar atelectasis    pCXR 9/5/2021      IS  Rocephin 1 g IV daily      Plan:  Wound care following  Palliative care -- Continue pain  management  Lymphedema PT       RAJWINDER Villanueva    9/9/2021    07:08 CDT     Physicians attestation and contribution:    I, Barrett Moreno, personally and independently performed an evaluation on Ayla Echeverria  I have reviewed relevant medical information/data to include but not limited to the medication list, relevant appropriate lab work and imaging when applicable.  I reviewed other physician's notes, ancillary services and nurses assessments.  I have reviewed the above documentation completed by Carmine Lao PA-C  Please see my additional addended and/or modified contributions to the history of present illness, physical examination and assessment/medical decision-making and plan that reflects my findings and impressions.  I discussed the essential elements of the care plan with Carmine Lao PA-C and the patient.  I have encouraged and answered all the questions raised to the patient's understanding and satisfaction.  I concur with the above stated.    Subjective: Pain control stable, adequate.    Objective: No change in examination.    Assessment/plan:  Unable to tolerate lying down for XRT.  Dr. Pipo Darby's note reviewed.  Palliative care ongoing.    Discussed with Dr. Ott.  Ayla could not tolerate Enhertu (Fam-trastuzumab) and this was discontinued after only ~1 month of treatment in February 2020.    At this juncture, there is no reasonable expectation of response to systemic therapy having been exposed to the most active agents and either failed or could not tolerate them.    Continue palliative care, hospice is a consideration.  I will need to discuss this further with her.    Barrett Moreno MD  9/9/2021 08:22 CDT

## 2021-09-09 NOTE — PROGRESS NOTES
"CC:\"im hurting\"    Review of Systems   Constitutional: Positive for activity change.   HENT: Negative.    Eyes: Negative.    Respiratory: Negative.    Cardiovascular: Positive for chest pain.   Gastrointestinal: Negative.    Endocrine: Negative.    Genitourinary: Negative.    Musculoskeletal: Negative.    Skin: Negative.    Allergic/Immunologic: Negative.    Neurological: Negative.    Hematological: Negative.    Psychiatric/Behavioral: Negative.  Negative for agitation.     Temp:  [97.4 °F (36.3 °C)-98.6 °F (37 °C)] 97.4 °F (36.3 °C)  Heart Rate:  [69-88] 69  Resp:  [17-18] 18  BP: (105-158)/(62-77) 143/69  I/O last 3 completed shifts:  In: 360 [P.O.:360]  Out: 1750 [Urine:1750]  I/O this shift:  In: -   Out: 700 [Urine:700]    Physical Exam  Vitals and nursing note reviewed.   Constitutional:       Appearance: Normal appearance.   HENT:      Head: Normocephalic.      Nose: Nose normal.   Eyes:      Extraocular Movements: Extraocular movements intact.      Pupils: Pupils are equal, round, and reactive to light.   Cardiovascular:      Rate and Rhythm: Normal rate and regular rhythm.      Pulses: Normal pulses.      Heart sounds: Normal heart sounds.   Pulmonary:      Effort: Pulmonary effort is normal.   Abdominal:      General: Abdomen is flat. Bowel sounds are normal.      Palpations: Abdomen is soft.   Musculoskeletal:         General: Normal range of motion.      Cervical back: Normal range of motion and neck supple.   Skin:     General: Skin is warm.      Capillary Refill: Capillary refill takes less than 2 seconds.   Neurological:      General: No focal deficit present.      Mental Status: She is alert. Mental status is at baseline.   Psychiatric:         Mood and Affect: Mood normal.         Behavior: Behavior normal.         Thought Content: Thought content normal.         Judgment: Judgment normal.           Arm swelling    Chest wall pain    Appreciate help of all consultants is getting this lady more " comfortable

## 2021-09-09 NOTE — PLAN OF CARE
Problem: Adult Inpatient Plan of Care  Goal: Plan of Care Review  Outcome: Ongoing, Progressing  Flowsheets (Taken 9/9/2021 4630)  Progress: improving  Plan of Care Reviewed With: patient  Outcome Summary: Pt pain seems to be improving today. PRN MSIR given only twice along with scheduled MSCONTIN, pt seems to be tolerating well. Shower given this afternoon and pt sat in chair for a few hours. Now resting in bed between care. Dressing to port changed per VAT team. Will update MD as needed

## 2021-09-10 ENCOUNTER — TELEPHONE (OUTPATIENT)
Dept: INFUSION THERAPY | Age: 56
End: 2021-09-10

## 2021-09-10 NOTE — PROGRESS NOTES
MEDICAL ONCOLOGY PROGRESS NOTE      Pt Name: Ayla Echeverria  MRN: [id number]  [id number]  YOB: 1965  Date of evaluation: 9/10/2021   ROOM: 412      Chief Compliant: Pain medication adjustments is continuing.  Most of her pain is in the evening.    HISTORY OF PRESENT ILLNESS:    Ayla Echeverria is well known to my clinic and is followed for a diagnosis of recurrent HER-2 positive/ER 1% and OH 6% invasive ductal carcinoma of the breast. She was initially diagnosed in 2016.  She had locally advanced disease.  Unfortunately, she had significant compliance issues when she was diagnosed.  Later on she developed widespread metastatic disease with predominantly skin recurrence. She has received several lines of treatment with chemotherapy agents as well as radiation to the skin.  She is currently receiving palliative radiation.  She has had significant complains of pain, ulceration of the skin lesions.  She presented to the ER department on 9/5/2021 at [facility] due to uncontrolled pain.     ONCOLOGIC HISTORY  Diagnosis   IDC left breast, March 2016   Grade 2. ER 1%, OH 6%, HER-2/migel IHC 3+/FISH amplified ratio 6.7, AR 95%.   01/30/2017- Biopsy-proven in breast relapse/Inflammatory breast cancer. ER negative, OH negative, HER-2/migel IHC 3+/FISH amplified ratio 4.5, Ki-67 32%.   [facility] Reve[facility] no clinically significant [facility]. PTEN VUS.  5/7/21 Invasive breast cancer involving dermal lymphatics    Treatment summary  Neoadjuvant adjuvant chemotherapy TCH-P ×6 cycles. She declined to complete 1 year of Herceptin.   She declined radiation therapy.   10/3/2016-left lumpectomy/sentinel lymph node   01/30/2017- Biopsy-proven in breast relapse/Inflammatory breast cancer.   5/11/2017-Chemotherapy with paclitaxel/carboplatin weekly. Herceptin/Pertuzumab every 3 weeks started 05/11/2017. She had a allergic reaction to carboplatin and this was discontinued.   7/21/2017- 9/1/2017 Neuropathy grade 2 with Taxol.  Therefore, switched to Taxotere/Herceptin/Pertuzumab. Discontinued due to progression of disease.   9/22/2017 through 3/2/2018- Kadycla every 3 weeks. (stopped as per patient request, neuropathy grade 1)   7/10/2018 through 11/30/2018- Kadcyla.   1/4/2019-Navelbine x 1 cycle with very poor tolerance (pain)   1/25/2018-Gemzar/Herceptin   3/15/2019- low-dose Cisplatin added to Gemzar and Herceptin regimen, regimen changed to every other week   4/16/2019-5/23/19 5040cGy palliative radiation to left clavicle/chest wall   6/22/2019- Xeloda/Lapatinib  12/27/19-2/11/20 3800 cGy palliative radiation to the right breast.  1/10/2020- palliative treatment with Fam-Trastuzumab- stopped Feb due to severe toxicity  8/24/20-9/23/20 2925 cGy palliative radiation left chest wall skin metastasis.  8/21/2020-Herceptin, Xeloda and Tucatinib  5/28/21- Stop Tucatinib and Xeloda due to progression in skin  7/9/21 Declined palliative radiation  8/9/21 Initiate radiation therapy with anticipated dose of 6000 cGy radiation therapy anterior chest and posterior back    Tumor target  Skin chest wall left upper chest/left breast   Skin on back and both sides    Cancer history- Left breast recurrence with Inflammatory Breast Cancer ER 8%/NJ negative &HER-2 positive.  Ms Echeverria was seen in initial oncology consultation on 4/20/2017 referred for a diagnosis of a left breast recurrence with inflammatory breast cancer. She was initially treated by Dr. Peres at [facility]. Prior breast cancer history is as follows:    3/22/2016-a diagnostic mammogram for a palpable mass showed a left breast abnormal findings.   4/1/2017- ultrasound-guided core needle biopsy was consistent with invasive ductal carcinoma, grade 2. ER 1%, NJ 6%, HER-2/migel IHC 3+/FISH amplified ratio 6.7., AR 95%.   She underwent 6 cycles of TCH-P from April 2016 through September 2016 with G-CSF support. She had a total of 18 weeks of trastuzumab treatment.     10/3/2016-she  underwent a left lumpectomy with left sentinel lymph node revealing an invasive ductal carcinoma, grade 3. Margins clear of invasive carcinoma (1.1 cm from the lateral margin), extensive lymphovascular space invasion. 2 out of 3 sentinel lymph nodes positive for metastatic carcinoma. Final pathologic staging [id number])M0.   She declined adjuvant radiation therapy and adjuvant Herceptin completion.                               -------------------In-breast Recurrence Jan2017---------------------------  1/30/2017- she was seen by Dr. Subhash Delaney with new complaints of erythema in the left breast. A skin punch biopsy was consistent with metastatic ductal carcinoma with extensive dermal lymphovascular invasion. ER negative, UT negative, HER-2/migel IHC 3+/FISH amplified ratio 4.5, Ki-67 32%. Foundation one showed ERBB2 amplification, AURKA amplification, TP53 and .     2/15/2017- she was again seen by Dr. Delaney and explained about her tumor recurrence. Unfortunately, she declined any further intervention at that time.     4/21/2017- she asked Dr. Delaney for a referral to us.     4/28/2017-referred to me for further evaluation. I recommended a PET scan and neoadjuvant chemotherapy with carboplatin/paclitaxel and Perjeta and Herceptin.     5/4/2017-PET/CT scan revealed abnormal metabolic activity present in multiple regions throughout the left breast and in the skin( SUV 8.9, 7.4, 7.3). In the left supraclavicular region a cluster of lymph nodes present (SUV of 3). Axillary lymph nodes with SUV of 2.8. Tonsils bilaterally with SUV 5.6 (symmetric). No abnormal metabolic activity in the intrathoracic or intra-abdominal pelvic region. No abnormal skeletal metabolic activity. I recommended weekly carboplatin/weekly Taxol/Herceptin and Pertuzumab.     5/26/2017-she had a severe allergic reaction after 2 doses of carboplatin and therefore this was discontinued. She was continued only on Taxol weekly/Herceptin and  Pertuzumab.     7/21/2017-after cycle #2 and 8 doses of Taxol she developed neuropathy related to chemotherapy and therefore chemotherapy was switched from Taxol to Taxotere.   Genetic assessment- [facility] - revealed no clinically significant mutation. PTEN VUS.     9/1/2017-local in breast disease progression with increasing breast erythema. Treatment switched to Kadcyla.     12/27/2017- repeat re-staging CT scan of the abdomen and pelvis documented no evidence of intra-abdominal pelvic metastasis. Superior left renal cyst. Mild hepatic steatosis. CT scan of the chest documented no evidence of intrathoracic metastasis. Bone scan documented no evidence of bony metastatic disease.     3/2/2018-after 8 cycles of Kadcyla she requested treatment to be on hold. She has requested her PCP a referral to [facility] for a second opinion. She has neuropathy grade 1.     5/2/2018- 2 months off treatment (Kadcyla). She has not yet been seen at [facility]. Apparently, [facility] is gathering her records and will see her soon. Examination of her chest wall showed recurrent disease in the left upper chest. Residual neuropathy grade 1. She could not tolerate gabapentin, and therefore has stopped it. She was not interested in any other medication for neuropathy at this time.     7/10/2018-after discussion at [facility] she was recommended to reinitiate Kadcyla.     7/13/2018-CT of chest, abdomen, pelvis showed no evidence of intra-abdominal disease. There is an enlarged right-sided level 1 lymph node that was not appreciated previously, measuring 2.5 x 1.4 cm, metastatic disease considered.     7/16/2018-PET CT scan showed diffuse uptake throughout the left breast extending to the left chest wall. Abnormal uptake within the left axilla lymph nodes and left internal mammary lymph nodes concerning for metastatic disease. Enlarged hypermetabolic right axilla lymph node concerning for metastatic disease with SUV 8.0.  Abnormal uptake within a right cervical lymph node, etiology unclear, but could be reactive.     10/26/2018 - CT scan of the chest was completed at [facility] and compared to CT scan of 12/27/2017 versus her last CT scan completed at [facility] on 7/13/2018. The CT scan identified a right axillary lymph node measuring 1.2 cm, otherwise no intrathoracic neoplastic process/metastatic disease.    10/26/2018 -CT scan of the abdomen and pelvis was completed at [facility] and compared to CT scan of     12/27/2017 versus her last CT scan completed at [facility] on 7/13/2018. No evidence of intra-abdominal or pelvic metastasis was identified.  December 2018-interval worsening of skin metastasis in the left breast and diffuse erythema with new nodules.    12/17/2018-skin biopsy of overlying right breast consistent invasive ductal carcinoma, high-grade. ER 20%, MD 0%, HER-2/migel 3+.    12/17/2018-right axillary FNA biopsy consistent invasive ductal carcinoma.     12/26/2018-recommended clinical trial at [facility].    1/4/2019-started on Navelbine/Herceptin, due to delays on the clinical trial at [facility]. Unfortunately, she had severe chest wall pain and generalized pain related to Navelbine.     1/25/2019-4/19/2019 she was switched to cisplatin/Gemzar/Herceptin, but had disease progression.     4/16/2019-5/23/19 5040cGy palliative radiation to left clavicle/chest wall     5/30/2019-CT chest, abdomen, pelvis and bone scan was unremarkable for right axillary adenopathy measuring 2 cm. Lymph node located in the upper chest wall under the pectoralis muscle measuring 1.2 cm. Right breast with 2 areas with masslike enhancement. No evidence of metastatic disease in the abdomen pelvis. Bone scan was unremarkable for metastatic bone disease.    June 2019-very poor tolerance to cisplatin, Gemzar/Herceptin. She was recommended Xeloda/ Lapatinib.     6/22/2019-she was recommended and started on Xeloda 850 mg/m2 PO BID days 1 through 14  every 21 days and Lapatinib 1250 mg daily continuously.    November 2019- progression on prior Xeloda and lapatinib.    11/23/2019- recommended rechallenge with Herceptin and Navelbine vs clinical trial at [facility].    12/23/2019-CT chest abdomen pelvis showed metastatic disease in the right axillary lymph node and diffuse bilateral skin involvement of both breasts with several nodules. No evidence of pulmonary, liver or bone metastasis. CT of the head with contrast was unremarkable.    1/3/2020- the patient was unable to follow-up at [facility] for clinical trial. Therefore recommended Fam-trastuzumab deruxtecan every 3 weeks.    1/10/2020-she was started on palliative treatment with Fam-trastuzumab    12/27/19-2/11/20 3800 cGy palliative radiation to the right breast    6/5/2020- Ct Chest/Abdomen/Pelvis W Contrast No lymphadenopathy. A previously seen enlarged right axillary lymph node is now small in size. There is some stranding of the fat around the lymph node. Bilateral breast skin thickening left greater than right. Prior lumpectomy on the left with left axillary lymph node sampling. Previously seen nodularity in the right breast on CT is not visualized today.There may be minimal radiation change in the periphery of the left lung. There is no dense consolidation or effusion. No definite metastatic lung nodules. No evidence of abdominal or pelvic neoplastic process or metastatic disease. The previously seen right breast nodule is not visualized in this study. Postsurgical changes of the left breast. No significant change in the previous study. Lobulated skin thickening of the left lower lateral chest and upper abdominal wall which was not noted in the previous study. The etiology is not certain. This data be clinically correlated and further evaluated.    8/7/2020- the patient is currently awaiting for palliative radiation to the chest wall for the skin margins. She has agreed and want  to start on Herceptin, Xeloda and Tucatinib.    8/21/2020 Tumor Marker- CA 15.3- 32.19    8/24/20-9/23/20 2925 cGy palliative radiation left chest wall skin metastasis.    10/9/2020-continue Herceptin and tucatinib.    12/11/2020-she has resumed Xeloda on low-dose 500 mg p.o. twice daily 7 days on 7 days off, tucatinib and Herceptin    2/19/21 tumor markers: CA 27-29 11.8 CA 15-3 13.6 CEA 1.9    2/26/21 Ct Chest W Contrast No evidence of intrathoracic metastasis. Scarring suspected within the left upper and right middle lobes. 3. Diffuse left greater than right breast skin thickening with prominence of the trabecular breast tissue. Postoperative changes of the left breast and axilla. No pathologic axillary or intrathoracic lymphadenopathy localized.     2/26/21 Ct Abdomen Pelvis W Iv Contrast No evidence of intra-abdominal pelvic metastasis. Hepatic steatosis. Superior left renal cyst. Constipation.     2/26/21 2D echo: Mitral valve leaflets are mildly thickened with preserved leaflet mobility. Mildly thickened aortic valve leaflets with preserved leaflet mobility. Tricuspid valve is structurally normal. Mild tricuspid regurgitation with estimated RVSP of 16 mm Hg. Normal left ventricular size with preserved LV function and an estimated ejection fraction of approximately 55-60%. Normal left ventricular wall thickness. No regional wall motion abnormalities.    5/7/21 Skin lesion, right flank, punch biopsy: Invasive breast cancer involving dermal lymphatics    5/28/21 Tumor markers: CA 27-29= 21.2 CEA= 1.5 CA 15-3 =    7/9/2021-she was referred to radiation for consideration of palliative radiation to the skin lesions. However, she declined the referral and wants to try some holistic approach. She is not interested in cytotoxic therapy at this time.    07/28/21-skin lesions is significantly worse. Recommended radiation.    7/29/21 CT CHEST W CONTRAST No evidence of intrathoracic metastasis. Similar left greater than  right breast skin thickening with increased trabecular pattern of the left breast which may represent sequelae of radiation. Postoperative changes of the left breast and axilla. No pathologic axillary or internal mammary lymphadenopathy. Left subclavian port in place with tip at the atrial caval junction.    7/29/21 CT ABDOMEN PELVIS W IV CONTRAST No evidence of intra-abdominal or pelvic metastatic disease. Small stable cyst at the upper pole left kidney. Moderate constipation.   8/9/21- Initiate radiation therapy with anticipated dose of 6000 cGy radiation therapy anterior chest and posterior back        Objective      Vitals:    09/09/21 1213 09/09/21 1511 09/09/21 2144 09/09/21 2349   BP: 133/66 164/64  Comment: Rn notified 150/72 140/62   BP Location: Right leg Right leg Right leg Left leg   Patient Position: Sitting Lying Lying Lying   Pulse: 72 65 71 63   Resp: 16 18 18 16   Temp: 97.6 °F (36.4 °C) 97.8 °F (36.6 °C) 97.9 °F (36.6 °C) 97.6 °F (36.4 °C)   TempSrc: Oral Oral Oral Oral   SpO2: 98% 95% 95% 96%   Weight:       Height:             Lab Results:    Lab Results (last 72 hours)     Procedure Component Value Units Date/Time    Comprehensive Metabolic Panel [[id number]]  (Abnormal) Collected: 09/06/21 0407    Specimen: Blood Updated: 09/06/21 0446     Glucose 97 mg/dL      BUN 4 mg/dL      Creatinine 0.40 mg/dL      Sodium 137 mmol/L      Potassium 3.8 mmol/L      Chloride 102 mmol/L      CO2 28.0 mmol/L      Calcium 8.6 mg/dL      Total Protein 6.4 g/dL      Albumin 3.20 g/dL      ALT (SGPT) <5 U/L      AST (SGOT) 21 U/L      Alkaline Phosphatase 46 U/L      Total Bilirubin 0.3 mg/dL      eGFR  African Amer >150 mL/min/1.73      Globulin 3.2 gm/dL      A/G Ratio 1.0 g/dL      BUN/Creatinine Ratio 10.0     Anion Gap 7.0 mmol/L     Narrative:      GFR Normal >60  Chronic Kidney Disease <60  Kidney Failure <15      CK [[id number]]  (Normal) Collected: 09/06/21 0407    Specimen: Blood Updated: 09/06/21 0446      Creatine Kinase 83 U/L     CBC Auto Differential [[id number]]  (Abnormal) Collected: 09/06/21 0407    Specimen: Blood Updated: 09/06/21 0420     WBC 2.88 10*3/mm3      RBC 3.30 10*6/mm3      Hemoglobin 9.6 g/dL      Hematocrit 29.9 %      MCV 90.6 fL      MCH 29.1 pg      MCHC 32.1 g/dL      RDW 16.2 %      RDW-SD 54.3 fl      MPV 8.6 fL      Platelets 284 10*3/mm3      Neutrophil % 63.9 %      Lymphocyte % 16.7 %      Monocyte % 16.7 %      Eosinophil % 1.7 %      Basophil % 0.7 %      Immature Grans % 0.3 %      Neutrophils, Absolute 1.84 10*3/mm3      Lymphocytes, Absolute 0.48 10*3/mm3      Monocytes, Absolute 0.48 10*3/mm3      Eosinophils, Absolute 0.05 10*3/mm3      Basophils, Absolute 0.02 10*3/mm3      Immature Grans, Absolute 0.01 10*3/mm3      nRBC 0.0 /100 WBC     COVID-19,[facility] IN-HOUSE,Nasal Swab No Transport Media 3-4 HR TAT - Swab, Nasal Cavity [[id number]]  (Normal) Collected: 09/05/21 0737    Specimen: Swab from Nasal Cavity Updated: 09/05/21 0859     COVID19 Not Detected    Narrative:      Fact sheet for providers: https://www.fda.gov/media/933332/download     Fact sheet for patients: https://www.fda.gov/media/271670/download    Test performed by PCR.    Consider negative results in combination with clinical observations, patient history, and epidemiological information.    Sedimentation Rate [[id number]]  (Abnormal) Collected: 09/05/21 0716    Specimen: Blood Updated: 09/05/21 0819     Sed Rate 104 mm/hr     C-reactive Protein [[id number]]  (Abnormal) Collected: 09/05/21 0716    Specimen: Blood Updated: 09/05/21 0759     C-Reactive Protein 3.61 mg/dL     Comprehensive Metabolic Panel [[id number]]  (Abnormal) Collected: 09/05/21 0716    Specimen: Blood Updated: 09/05/21 0759     Glucose 96 mg/dL      BUN 4 mg/dL      Creatinine 0.41 mg/dL      Sodium 139 mmol/L      Potassium 3.7 mmol/L      Chloride 103 mmol/L      CO2 28.0 mmol/L      Calcium 8.9 mg/dL      Total Protein 6.5 g/dL      Albumin 3.20  g/dL      ALT (SGPT) 5 U/L      AST (SGOT) 24 U/L      Alkaline Phosphatase 49 U/L      Total Bilirubin 0.3 mg/dL      eGFR  African Amer >150 mL/min/1.73      Globulin 3.3 gm/dL      A/G Ratio 1.0 g/dL      BUN/Creatinine Ratio 9.8     Anion Gap 8.0 mmol/L     Narrative:      GFR Normal >60  Chronic Kidney Disease <60  Kidney Failure <15      CBC & Differential [[id number]]  (Abnormal) Collected: 09/05/21 0716    Specimen: Blood Updated: 09/05/21 0736    Narrative:      The following orders were created for panel order CBC & Differential.  Procedure                               Abnormality         Status                     ---------                               -----------         ------                     CBC Auto Differential[[id number]]        Abnormal            Final result                 Please view results for these tests on the individual orders.    CBC Auto Differential [[id number]]  (Abnormal) Collected: 09/05/21 0716    Specimen: Blood Updated: 09/05/21 0736     WBC 2.82 10*3/mm3      RBC 3.45 10*6/mm3      Hemoglobin 9.5 g/dL      Hematocrit 31.3 %      MCV 90.7 fL      MCH 27.5 pg      MCHC 30.4 g/dL      RDW 16.1 %      RDW-SD 53.4 fl      MPV 9.0 fL      Platelets 338 10*3/mm3      Neutrophil % 68.8 %      Lymphocyte % 13.1 %      Monocyte % 15.6 %      Eosinophil % 1.1 %      Basophil % 0.7 %      Immature Grans % 0.7 %      Neutrophils, Absolute 1.94 10*3/mm3      Lymphocytes, Absolute 0.37 10*3/mm3      Monocytes, Absolute 0.44 10*3/mm3      Eosinophils, Absolute 0.03 10*3/mm3      Basophils, Absolute 0.02 10*3/mm3      Immature Grans, Absolute 0.02 10*3/mm3      nRBC 0.0 /100 WBC           Radiology Results:    Imaging Results (Last 72 Hours)     Procedure Component Value Units Date/Time    US Venous Doppler Upper Extremity Bilateral (duplex) [[id number]] Collected: 09/08/21 1626     Updated: 09/08/21 1631    Narrative:      History: Pain and swelling       Impression:      Impression: There is  no evidence of deep venous thrombosis or  superficial thrombophlebitis of bilateral upper extremities. This is a  limited exam.     Comments: Bilateral upper extremity venous duplex exam was performed  using color Doppler flow, Doppler wave form analysis, and grayscale  imaging, with and without compression. There is no evidence of deep  venous thrombosis of the axillary, brachial, radial, or ulnar veins in  the right upper extremity. There is also no evidence of deep venous  thrombosis in the brachial, radial, and ulnar veins in the left upper  extremity. There is no thrombus identified in the cephalic or basilic  veins bilaterally.          This report was finalized on 09/08/2021 16:28 by Dr. Luis Oh MD.            Physical Exam  Constitutional:       Appearance: Normal appearance. She is well-developed.   HENT:      Head: Normocephalic and atraumatic.      Nose: Nose normal.   Eyes:      General: Lids are normal.      Conjunctiva/sclera: Conjunctivae normal.      Pupils: Pupils are equal, round, and reactive to light.   Neck:      Trachea: Trachea normal.   Cardiovascular:      Rate and Rhythm: Normal rate and regular rhythm.      Heart sounds: Normal heart sounds.   Pulmonary:      Breath sounds: Examination of the right-lower field reveals decreased breath sounds. Examination of the left-lower field reveals decreased breath sounds. Decreased breath sounds present.   Abdominal:      General: Bowel sounds are normal.      Palpations: Abdomen is soft.   Musculoskeletal:         General: Normal range of motion.      Cervical back: Normal range of motion and neck supple.   Skin:     General: Skin is warm and dry.      Findings: No erythema, petechiae or rash.      Nails: There is no clubbing.      Comments: Significant radiation burn to posterior and anterior chest with some scabbing posteriorly. Port left anterior CW   Neurological:      Mental Status: She is alert.      Cranial Nerves: No cranial nerve  deficit.      Sensory: No sensory deficit.   Psychiatric:         Speech: Speech normal.         Behavior: Behavior normal. Behavior is cooperative.         Thought Content: Thought content normal.         Judgment: Judgment normal.         ASSESSMENT:  #Metastatic HER-2 migel breast cancer (diffuse ant and post chest wall skin involvement)  She has received several lines of chemotherapy in the past and several rounds of palliative radiation     7/31/2021-CT chest abdomen pelvis showed no evidence of visceral metastasis.     Currently on palliative RT. Last dose about 1-2 weeks ago. She received 6/30 treatments - Unable to lie down on the table for treatment.     Dr. Darby did reevaluate her again on 9/8/2021 and unfortunately she is still unable to lie down and tolerate treatment       #Cancer related pain-palliative care helping with pain control  Morphine ER 60mg PO q 12 h (dose increased on 9/5/2021)  Naprosyn 500 mg p.o. twice daily  Dexamethasone 4 mg p.o. daily  Dilaudid 1 mg IV every 2 hours as needed pain  Morphine IR 30 mg p.o. 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 She denies any significant change from previous evaluation.    #Lymphopenia-       #Normocytic Hypochromic anemia-        HGB stable    #Bilateral upper extremity lymphedema- 2/2 extensive radiation and skin involvement. consult PT      Noninvasive venous studies BUE 9/7/2021 was limited due to the patient's pain and radiation burns.  No thrombus visualized however bilateral jugular veins, subclavian vein, left axillary veins not visualized    #LLL consolidation, R basilar atelectasis    pCXR 9/5/2021      IS  Rocephin 1 g IV daily      Plan:  Wound care following  Palliative care -- Continue pain management  Lymphedema PT       RAJWINDER Villanueva    9/10/2021    07:19 CDT       Physicians attestation and contribution:    Barrett WILKINS, personally  and independently performed an evaluation on Ayla Echeverria  I have reviewed relevant medical information/data to include but not limited to the medication list, relevant appropriate lab work and imaging when applicable.  I reviewed other physician's notes, ancillary services and nurses assessments.  I have reviewed the above documentation completed by Carmine Lao PA-C  Please see my additional addended and/or modified contributions to the history of present illness, physical examination and assessment/medical decision-making and plan that reflects my findings and impressions.  I discussed the essential elements of the care plan with Carmine Lao PA-C and the patient.  I have encouraged and answered all the questions raised to the patient's understanding and satisfaction.  I concur with the above stated.    Subjective:  Pain medication adjustments ongoing per palliative care.    Pain interrupting her sleep at night.    Objective:  Exam relatively stable with significant locally advanced widely metastatic breast cancer in a circumferential fashion on the surface/skin of her upper torso.    Assessment/plan:  Unable to tolerate lying down for XRT.  Dr. Pipo Darby's note reviewed.  Palliative care ongoing adjusting narcotic analgesics.  Rocephin ongoing for left lower lobe consolidation/pneumonia.     Discussed with Dr. Ott.  Ayla could not tolerate Enhertu (Fam-trastuzumab) and this was discontinued after only ~1 month of treatment in February 2020.     At this juncture, there is no reasonable expectation of response to systemic therapy having been exposed to the most active agents and either failed or could not tolerate them.    I discussed systemic therapy with Ayla today.  She is not agreeable to entertain receiving further systemic therapy.  She states it is just like a roller coaster and declines further systemic therapy.  She states that she is just leaving it in the Lord's hands.  She continues to  pray for a miracle.     Continue palliative care, hospice is a consideration.    I had an extensive discussion with Ayla regarding hospice today regarding hospice.  She had multiple questions and observations to make.  She is agreeable to discussing and consider hospice.  I discussed the possibility of nursing home due to the strain that she says this is causing on her children.  She absolutely declines going to a nursing home.  She lives in [facility].     I discussed all of the above as well with her nurse Yasmine Fowler RN who will place the order and discuss with hospice this morning.   Anticipation as to possibly go home today, hopefully with hospice.    She is agreeable for an appointment to be made for her to see Dr. Ott in about a month.    Barrett Moreno MD  9/10/2021 09:26 CDT

## 2021-09-10 NOTE — DISCHARGE PLACEMENT REQUEST
"Call Back: Ana Mariadionisio Moyer, [phone]      Ayla Echeverria (55 y.o. Female)     Date of Birth Social Security Number Address Home Phone MRN    1965  [address] [place] [address] [phone] [id number]    [employer] Mar[employer] Status          Adventism        Admission Date Admission Type Admitting Provider Attending Provider Department, Room/Bed    9/5/21 Emergency Jack Cárdenas MD [place], Thomas Waldon, MD [facility] 4B, 412/1    Discharge Date Discharge Disposition Discharge Destination                       Attending Provider: Jack Cárdenas MD    Allergies: No Known Allergies    Isolation: None   Infection: None   Code Status: CPR    Ht: 160 cm (62.99\")   Wt: 70.9 kg (156 lb 6.4 oz)    Admission Cmt: None   Principal Problem: None                Active Insurance as of 9/5/2021     Primary Coverage     Payor Plan Insurance Group Employer/Plan Group    ANTHEM BLUE CROSS ANTHEM BLUE CROSS BLUE SHIELD PPO [id number]     Payor Plan Address Payor Plan Phone Number Payor Plan Fax Number Effective Dates    [address] [phone]  1/1/2018 - None Entered    [facility] [id number]       Subscriber Name Subscriber Birth Date Member ID       AYLA ECHEVERRIA 1965 [id number]                 Emergency Contacts      (Rel.) Home Phone Work Phone Mobile Phone    GabrielKimoDanielle (Daughter) -- -- [phone]    AmadeoIman (Sister) [phone] -- --    Mayela Tejeda (Friend) [phone] -- --    ELSA BARRAGAN (Daughter) -- -- [phone]            Insurance Information                ANTHEM BLUE CROSS/ANTHEM BLUE CROSS BLUE SHIELD PPO Phone: [phone]    Subscriber: Ayla Echeverria Subscriber#: [id number]    Group#: [id number] Precert#:              History & Physical      Jack Cárdenas MD at 09/06/21 0824          [facility]  HISTORY AND PHYSICAL    Date of Admission: 9/5/2021  Primary Care Physician: Jcak Cárdenas, " "MD    Subjective    Chief Complaint: \"my chest is killing me\"    This is a very pleasant 55 yr old lady with hx of breast ca undergoing rad tx who presented with persistent unbearable chest wall pain. She could not even tolerate ekg pads. She is admitted to my services.      Review of Systems   Constitutional: Positive for activity change.   HENT: Negative.    Eyes: Negative.    Respiratory: Negative.    Cardiovascular: Positive for chest pain.   Gastrointestinal: Negative.    Endocrine: Negative.    Genitourinary: Negative.    Musculoskeletal: Negative.    Skin: Negative.    Allergic/Immunologic: Negative.    Neurological: Negative.    Hematological: Negative.    Psychiatric/Behavioral: Negative.         Otherwise complete ROS reviewed and negative except as mentioned in the HPI.      Past Medical History:   Past Medical History:   Diagnosis Date   • Breast cancer (CMS/HCC)        Past Surgical History:  Past Surgical History:   Procedure Laterality Date   • AXILLARY LYMPH NODE BIOPSY/EXCISION Left    • BREAST BIOPSY Left 03/31/2016   • BREAST LUMPECTOMY WITH SENTINEL NODE BIOPSY Left 10/03/2016    residual invasive carcinoma, grade 3 (0.2cm); margins negative; extensive lymphvascular space invasion; 2 sentinel lymph nodes positive (2/3)       Social History:  reports that she has never smoked. She has never used smokeless tobacco. She reports that she does not drink alcohol and does not use drugs.    Family History: family history includes Colon cancer in her father; Diabetes in her sister; Hypertension in her sister; Lung cancer in her mother; Lupus in her sister; Prostate cancer in her father.     Allergies:  No Known Allergies    Medications:  Prior to Admission medications    Medication Sig Start Date End Date Taking? Authorizing Provider   APPLE CIDER VINEGAR PO Take 1 tablet by mouth Daily.    Provider, MD Jose F   HYDROcodone-acetaminophen (NORCO)  MG per tablet Take 1 tablet by mouth Every 6 " "(Six) Hours As Needed for Moderate Pain . 1/5/20   Jack De La Cruz DO   hydrOXYzine (ATARAX) 50 MG tablet Take 50 mg by mouth Every 6 (Six) Hours As Needed for Itching.    Jose F aJra MD   montelukast (SINGULAIR) 10 MG tablet Take  by mouth. 7/13/16   Jose F Jara MD   Morphine (MSIR) 15 MG tablet Take 15 mg by mouth Every 4 (Four) Hours As Needed for Severe Pain .    Jose F Jara MD   Multiple Vitamin (MULTI VITAMIN DAILY PO) Take 1 tablet by mouth Daily.    Jose F Jara MD   pantoprazole (PROTONIX) 40 MG EC tablet Take 40 mg by mouth Daily.    Jose F Jara MD   Probiotic Product (PROBIOTIC-10 PO) Take 1 tablet by mouth Daily.    Jose F Jara MD   SELENIUM ER PO Take 1 tablet by mouth Daily.    Jose F Jara MD   Zinc Sulfate 66 MG tablet Take 1 tablet by mouth Daily.    Jose F Jara MD       Objective    Vital Signs: /60 (BP Location: Left leg, Patient Position: Lying)   Pulse 87   Temp 98.6 °F (37 °C) (Oral)   Resp 16   Ht 160 cm (62.99\")   Wt 72.7 kg (160 lb 3.2 oz)   SpO2 95%   BMI 28.39 kg/m²   Physical Exam  Vitals and nursing note reviewed.   Constitutional:       Appearance: Normal appearance. She is normal weight.   HENT:      Head: Normocephalic and atraumatic.      Nose: Nose normal.      Mouth/Throat:      Mouth: Mucous membranes are moist.   Eyes:      Pupils: Pupils are equal, round, and reactive to light.   Cardiovascular:      Rate and Rhythm: Normal rate and regular rhythm.      Pulses: Normal pulses.      Heart sounds: Normal heart sounds.   Pulmonary:      Effort: Pulmonary effort is normal.      Breath sounds: Normal breath sounds.   Abdominal:      General: Abdomen is flat. Bowel sounds are normal.      Palpations: Abdomen is soft.   Musculoskeletal:         General: Normal range of motion.      Cervical back: Normal range of motion.   Skin:     General: Skin is warm and dry.      Capillary Refill: Capillary " refill takes less than 2 seconds.   Neurological:      General: No focal deficit present.      Mental Status: She is alert and oriented to person, place, and time. Mental status is at baseline.   Psychiatric:         Mood and Affect: Mood normal.         Behavior: Behavior normal.         Thought Content: Thought content normal.         Judgment: Judgment normal.         Results Reviewed:  Lab Results (last 24 hours)     ** No results found for the last 24 hours. **        Imaging Results (Last 24 Hours)     ** No results found for the last 24 hours. **            Active Hospital Problems    Diagnosis    • Chest wall pain    • Arm swelling        Assessment / Plan  Increase pain meds---consult her oncologist and rad tx---see orders      Code Status: see chart     I discussed the patient's findings and my recommendations with the patient..    Estimated length of stay 3 days.    Jack Cárdenas MD   09/07/21   06:59 CDT    Electronically signed by Jack Cárdenas MD at 09/07/21 0700       Prior to Admission Medications     Prescriptions Last Dose Informant Patient Reported? Taking?    APPLE CIDER VINEGAR PO  Self Yes Yes    Take 1 tablet by mouth Daily.    HYDROcodone-acetaminophen (NORCO)  MG per tablet  Pharmacy No Yes    Take 1 tablet by mouth Every 6 (Six) Hours As Needed for Moderate Pain .    Morphine (MS CONTIN) 30 MG 12 hr tablet  Pharmacy Yes Yes    Take 30 mg by mouth Every 8 (Eight) Hours.          Current Facility-Administered Medications   Medication Dose Route Frequency Provider Last Rate Last Admin   • cefTRIAXone (ROCEPHIN) 1 g in sodium chloride 0.9 % 100 mL IVPB-VTB  1 g Intravenous Q24H Carmine Lao  mL/hr at 09/10/21 0844 1 g at 09/10/21 0844   • dexamethasone (DECADRON) tablet 4 mg  4 mg Oral Daily With Breakfast Elodia Duenas APRN   4 mg at 09/10/21 0843   • gabapentin (NEURONTIN) capsule 100 mg  100 mg Oral TID Elodia Duenas APRN   100 mg at 09/10/21 0844    • HYDROmorphone (DILAUDID) injection 1 mg  1 mg Intravenous Q4H PRN Jack Cárdenas MD   1 mg at 09/09/21 0238    And   • naloxone (NARCAN) injection 0.4 mg  0.4 mg Intravenous Q5 Min PRN Jack Cárdenas MD       • hydrOXYzine (ATARAX) tablet 50 mg  50 mg Oral Q6H PRN Jack Cárdenas MD   50 mg at 09/10/21 0148   • montelukast (SINGULAIR) tablet 10 mg  10 mg Oral Daily Jack Cárdenas MD   10 mg at 09/10/21 0843   • Morphine (MS CONTIN) 12 hr tablet 60 mg  60 mg Oral Q8H Elodia Duenas APRN   60 mg at 09/10/21 0553   • Morphine (MSIR) tablet 30 mg  30 mg Oral Q4H PRN Elodia Duenas APRN   30 mg at 09/10/21 0932   • naproxen (NAPROSYN) tablet 500 mg  500 mg Oral BID With Meals Elodia Duenas APRN   500 mg at 09/10/21 0844   • ondansetron (ZOFRAN) injection 4 mg  4 mg Intravenous Q6H PRN Jack Cárdenas MD       • pantoprazole (PROTONIX) EC tablet 40 mg  40 mg Oral Daily Jack Cárdenas MD   40 mg at 09/10/21 0843   • sennosides-docusate (PERICOLACE) 8.6-50 MG per tablet 1 tablet  1 tablet Oral BID PRN Jack Cárdenas MD   1 tablet at 09/08/21 1110   • sennosides-docusate (PERICOLACE) 8.6-50 MG per tablet 2 tablet  2 tablet Oral Nightly Elodia Duenas APRN   2 tablet at 09/09/21 2137   • sodium chloride 0.9 % flush 10 mL  10 mL Intravenous PRN Jack Cárdenas MD       • sodium chloride 0.9 % flush 10 mL  10 mL Intravenous Q12H Jack Cárdenas MD   10 mL at 09/10/21 0844   • sodium chloride 0.9 % flush 10 mL  10 mL Intravenous PRN Jack Cárdenas MD                 Physician Progress Notes (most recent note)      Barrett Moreno MD at 09/10/21 0718          MEDICAL ONCOLOGY PROGRESS NOTE      Pt Name: Ayla Echeverria  MRN: [id number]  [id number]  YOB: 1965  Date of evaluation: 9/10/2021   ROOM: 412      Chief Compliant: Pain medication adjustments is continuing.  Most of her pain is in the  evening.    HISTORY OF PRESENT ILLNESS:    Ayla Echeverria is well known to my clinic and is followed for a diagnosis of recurrent HER-2 positive/ER 1% and MS 6% invasive ductal carcinoma of the breast. She was initially diagnosed in 2016.  She had locally advanced disease.  Unfortunately, she had significant compliance issues when she was diagnosed.  Later on she developed widespread metastatic disease with predominantly skin recurrence. She has received several lines of treatment with chemotherapy agents as well as radiation to the skin.  She is currently receiving palliative radiation.  She has had significant complains of pain, ulceration of the skin lesions.  She presented to the ER department on 9/5/2021 at [facility] due to uncontrolled pain.     ONCOLOGIC HISTORY  Diagnosis   IDC left breast, March 2016   Grade 2. ER 1%, MS 6%, HER-2/migel IHC 3+/FISH amplified ratio 6.7, AR 95%.   01/30/2017- Biopsy-proven in breast relapse/Inflammatory breast cancer. ER negative, MS negative, HER-2/migel IHC 3+/FISH amplified ratio 4.5, Ki-67 32%.   [facility] - Revealed no clinically significant mutation. PTEN VUS.  5/7/21 Invasive breast cancer involving dermal lymphatics    Treatment summary  Neoadjuvant adjuvant chemotherapy TCH-P ×6 cycles. She declined to complete 1 year of Herceptin.   She declined radiation therapy.   10/3/2016-left lumpectomy/sentinel lymph node   01/30/2017- Biopsy-proven in breast relapse/Inflammatory breast cancer.   5/11/2017-Chemotherapy with paclitaxel/carboplatin weekly. Herceptin/Pertuzumab every 3 weeks started 05/11/2017. She had a allergic reaction to carboplatin and this was discontinued.   7/21/2017- 9/1/2017 Neuropathy grade 2 with Taxol. Therefore, switched to Taxotere/Herceptin/Pertuzumab. Discontinued due to progression of disease.   9/22/2017 through 3/2/2018- Kadycla every 3 weeks. (stopped as per patient request, neuropathy grade 1)   7/10/2018 through 11/30/2018- Kadcyla.    1/4/2019-Navelbine x 1 cycle with very poor tolerance (pain)   1/25/2018-Gemzar/Herceptin   3/15/2019- low-dose Cisplatin added to Gemzar and Herceptin regimen, regimen changed to every other week   4/16/2019-5/23/19 5040cGy palliative radiation to left clavicle/chest wall   6/22/2019- Xeloda/Lapatinib  12/27/19-2/11/20 3800 cGy palliative radiation to the right breast.  1/10/2020- palliative treatment with Fam-Trastuzumab- stopped Feb due to severe toxicity  8/24/20-9/23/20 2925 cGy palliative radiation left chest wall skin metastasis.  8/21/2020-Herceptin, Xeloda and Tucatinib  5/28/21- Stop Tucatinib and Xeloda due to progression in skin  7/9/21 Declined palliative radiation  8/9/21 Initiate radiation therapy with anticipated dose of 6000 cGy radiation therapy anterior chest and posterior back    Tumor target  Skin chest wall left upper chest/left breast   Skin on back and both sides    Cancer history- Left breast recurrence with Inflammatory Breast Cancer ER 8%/MS negative &HER-2 positive.  Ms Echeverria was seen in initial oncology consultation on 4/20/2017 referred for a diagnosis of a left breast recurrence with inflammatory breast cancer. She was initially treated by Dr. Peres at [facility]. Prior breast cancer history is as follows:    3/22/2016-a diagnostic mammogram for a palpable mass showed a left breast abnormal findings.   4/1/2017- ultrasound-guided core needle biopsy was consistent with invasive ductal carcinoma, grade 2. ER 1%, MS 6%, HER-2/migel IHC 3+/FISH amplified ratio 6.7., AR 95%.   She underwent 6 cycles of TCH-P from April 2016 through September 2016 with G-CSF support. She had a total of 18 weeks of trastuzumab treatment.     10/3/2016-she underwent a left lumpectomy with left sentinel lymph node revealing an invasive ductal carcinoma, grade 3. Margins clear of invasive carcinoma (1.1 cm from the lateral margin), extensive lymphovascular space invasion. 2 out of 3 sentinel lymph nodes  positive for metastatic carcinoma. Final pathologic staging [id number])[id number].   She declined adjuvant radiation therapy and adjuvant Herceptin completion.                               -------------------In-breast Recurrence Jan2017---------------------------  1/30/2017- she was seen by Dr. Subhash Delaney with new complaints of erythema in the left breast. A skin punch biopsy was consistent with metastatic ductal carcinoma with extensive dermal lymphovascular invasion. ER negative, ID negative, HER-2/migel IHC 3+/FISH amplified ratio 4.5, Ki-67 32%. Foundation one showed ERBB2 amplification, AURKA amplification, TP53 and .     2/15/2017- she was again seen by Dr. Delaney and explained about her tumor recurrence. Unfortunately, she declined any further intervention at that time.     4/21/2017- she asked Dr. Delaney for a referral to us.     4/28/2017-referred to me for further evaluation. I recommended a PET scan and neoadjuvant chemotherapy with carboplatin/paclitaxel and Perjeta and Herceptin.     5/4/2017-PET/CT scan revealed abnormal metabolic activity present in multiple regions throughout the left breast and in the skin( SUV 8.9, 7.4, 7.3). In the left supraclavicular region a cluster of lymph nodes present (SUV of 3). Axillary lymph nodes with SUV of 2.8. Tonsils bilaterally with SUV 5.6 (symmetric). No abnormal metabolic activity in the intrathoracic or intra-abdominal pelvic region. No abnormal skeletal metabolic activity. I recommended weekly carboplatin/weekly Taxol/Herceptin and Pertuzumab.     5/26/2017-she had a severe allergic reaction after 2 doses of carboplatin and therefore this was discontinued. She was continued only on Taxol weekly/Herceptin and Pertuzumab.     7/21/2017-after cycle #2 and 8 doses of Taxol she developed neuropathy related to chemotherapy and therefore chemotherapy was switched from Taxol to Taxotere.   Genetic assessment- [place] [id number] revealed no clinically significant mutation.  PTEN VUS.     9/1/2017-local in breast disease progression with increasing breast erythema. Treatment switched to Kadcyla.     12/27/2017- repeat re-staging CT scan of the abdomen and pelvis documented no evidence of intra-abdominal pelvic metastasis. Superior left renal cyst. Mild hepatic steatosis. CT scan of the chest documented no evidence of intrathoracic metastasis. Bone scan documented no evidence of bony metastatic disease.     3/2/2018-after 8 cycles of Kadcyla she requested treatment to be on hold. She has requested her PCP a referral to [facility] for a second opinion. She has neuropathy grade 1.     5/2/2018- 2 months off treatment (Kadcyla). She has not yet been seen at [facility]. Apparently, [facility] is gathering her records and will see her soon. Examination of her chest wall showed recurrent disease in the left upper chest. Residual neuropathy grade 1. She could not tolerate gabapentin, and therefore has stopped it. She was not interested in any other medication for neuropathy at this time.     7/10/2018-after discussion at [facility] she was recommended to reinitiate Kadcyla.     7/13/2018-CT of chest, abdomen, pelvis showed no evidence of intra-abdominal disease. There is an enlarged right-sided level 1 lymph node that was not appreciated previously, measuring 2.5 x 1.4 cm, metastatic disease considered.     7/16/2018-PET CT scan showed diffuse uptake throughout the left breast extending to the left chest wall. Abnormal uptake within the left axilla lymph nodes and left internal mammary lymph nodes concerning for metastatic disease. Enlarged hypermetabolic right axilla lymph node concerning for metastatic disease with SUV 8.0. Abnormal uptake within a right cervical lymph node, etiology unclear, but could be reactive.     10/26/2018 - CT scan of the chest was completed at [facility] and compared to CT scan of 12/27/2017 versus her last CT scan completed at [facility] on 7/13/2018. The CT  scan identified a right axillary lymph node measuring 1.2 cm, otherwise no intrathoracic neoplastic process/metastatic disease.    10/26/2018 -CT scan of the abdomen and pelvis was completed at [facility] and compared to CT scan of     12/27/2017 versus her last CT scan completed at [facility] on 7/13/2018. No evidence of intra-abdominal or pelvic metastasis was identified.  December 2018-interval worsening of skin metastasis in the left breast and diffuse erythema with new nodules.    12/17/2018-skin biopsy of overlying right breast consistent invasive ductal carcinoma, high-grade. ER 20%, NJ 0%, HER-2/migel 3+.    12/17/2018-right axillary FNA biopsy consistent invasive ductal carcinoma.     12/26/2018-recommended clinical trial at [facility].    1/4/2019-started on Navelbine/Herceptin, due to delays on the clinical trial at [place]. Unfortunately, she had severe chest wall pain and generalized pain related to Navelbine.     1/25/2019-4/19/2019 she was switched to cisplatin/Gemzar/Herceptin, but had disease progression.     4/16/2019-5/23/19 5040cGy palliative radiation to left clavicle/chest wall     5/30/2019-CT chest, abdomen, pelvis and bone scan was unremarkable for right axillary adenopathy measuring 2 cm. Lymph node located in the upper chest wall under the pectoralis muscle measuring 1.2 cm. Right breast with 2 areas with masslike enhancement. No evidence of metastatic disease in the abdomen pelvis. Bone scan was unremarkable for metastatic bone disease.    June 2019-very poor tolerance to cisplatin, Gemzar/Herceptin. She was recommended Xeloda/ Lapatinib.     6/22/2019-she was recommended and started on Xeloda 850 mg/m2 PO BID days 1 through 14 every 21 days and Lapatinib 1250 mg daily continuously.    November 2019- progression on prior Xeloda and lapatinib.    11/23/2019- recommended rechallenge with Herceptin and Navelbine vs clinical trial at [facility].    12/23/2019-CT  chest abdomen pelvis showed metastatic disease in the right axillary lymph node and diffuse bilateral skin involvement of both breasts with several nodules. No evidence of pulmonary, liver or bone metastasis. CT of the head with contrast was unremarkable.    1/3/2020- the patient was unable to follow-up at [facility] for clinical trial. Therefore recommended Fam-trastuzumab deruxtecan every 3 weeks.    1/10/2020-she was started on palliative treatment with Fam-trastuzumab    12/27/19-2/11/20 3800 cGy palliative radiation to the right breast    6/5/2020- Ct Chest/Abdomen/Pelvis W Contrast No lymphadenopathy. A previously seen enlarged right axillary lymph node is now small in size. There is some stranding of the fat around the lymph node. Bilateral breast skin thickening left greater than right. Prior lumpectomy on the left with left axillary lymph node sampling. Previously seen nodularity in the right breast on CT is not visualized today.There may be minimal radiation change in the periphery of the left lung. There is no dense consolidation or effusion. No definite metastatic lung nodules. No evidence of abdominal or pelvic neoplastic process or metastatic disease. The previously seen right breast nodule is not visualized in this study. Postsurgical changes of the left breast. No significant change in the previous study. Lobulated skin thickening of the left lower lateral chest and upper abdominal wall which was not noted in the previous study. The etiology is not certain. This data be clinically correlated and further evaluated.    8/7/2020- the patient is currently awaiting for palliative radiation to the chest wall for the skin margins. She has agreed and want to start on Herceptin, Xeloda and Tucatinib.    8/21/2020 Tumor Marker- CA 15.3- 32.19    8/24/20-9/23/20 2925 cGy palliative radiation left chest wall skin metastasis.    10/9/2020-continue Herceptin and tucatinib.    12/11/2020-she has resumed Xeloda on  low-dose 500 mg p.o. twice daily 7 days on 7 days off, tucatinib and Herceptin    2/19/21 tumor markers: CA 27-29 11.8 CA 15-3 13.6 CEA 1.9    2/26/21 Ct Chest W Contrast No evidence of intrathoracic metastasis. Scarring suspected within the left upper and right middle lobes. 3. Diffuse left greater than right breast skin thickening with prominence of the trabecular breast tissue. Postoperative changes of the left breast and axilla. No pathologic axillary or intrathoracic lymphadenopathy localized.     2/26/21 Ct Abdomen Pelvis W Iv Contrast No evidence of intra-abdominal pelvic metastasis. Hepatic steatosis. Superior left renal cyst. Constipation.     2/26/21 2D echo: Mitral valve leaflets are mildly thickened with preserved leaflet mobility. Mildly thickened aortic valve leaflets with preserved leaflet mobility. Tricuspid valve is structurally normal. Mild tricuspid regurgitation with estimated RVSP of 16 mm Hg. Normal left ventricular size with preserved LV function and an estimated ejection fraction of approximately 55-60%. Normal left ventricular wall thickness. No regional wall motion abnormalities.    5/7/21 Skin lesion, right flank, punch biopsy: Invasive breast cancer involving dermal lymphatics    5/28/21 Tumor markers: CA 27-29= 21.2 CEA= 1.5 CA 15-3 =    7/9/2021-she was referred to radiation for consideration of palliative radiation to the skin lesions. However, she declined the referral and wants to try some holistic approach. She is not interested in cytotoxic therapy at this time.    07/28/21-skin lesions is significantly worse. Recommended radiation.    7/29/21 CT CHEST W CONTRAST No evidence of intrathoracic metastasis. Similar left greater than right breast skin thickening with increased trabecular pattern of the left breast which may represent sequelae of radiation. Postoperative changes of the left breast and axilla. No pathologic axillary or internal mammary lymphadenopathy. Left subclavian  port in place with tip at the atrial caval junction.    7/29/21 CT ABDOMEN PELVIS W IV CONTRAST No evidence of intra-abdominal or pelvic metastatic disease. Small stable cyst at the upper pole left kidney. Moderate constipation.   8/9/21- Initiate radiation therapy with anticipated dose of 6000 cGy radiation therapy anterior chest and posterior back        Objective      Vitals:    09/09/21 1213 09/09/21 1511 09/09/21 2144 09/09/21 2349   BP: 133/66 164/64  Comment: Rn notified 150/72 140/62   BP Location: Right leg Right leg Right leg Left leg   Patient Position: Sitting Lying Lying Lying   Pulse: 72 65 71 63   Resp: 16 18 18 16   Temp: 97.6 °F (36.4 °C) 97.8 °F (36.6 °C) 97.9 °F (36.6 °C) 97.6 °F (36.4 °C)   TempSrc: Oral Oral Oral Oral   SpO2: 98% 95% 95% 96%   Weight:       Height:             Lab Results:    Lab Results (last 72 hours)     Procedure Component Value Units Date/Time    Comprehensive Metabolic Panel [[id number]]  (Abnormal) Collected: 09/06/21 0407    Specimen: Blood Updated: 09/06/21 0446     Glucose 97 mg/dL      BUN 4 mg/dL      Creatinine 0.40 mg/dL      Sodium 137 mmol/L      Potassium 3.8 mmol/L      Chloride 102 mmol/L      CO2 28.0 mmol/L      Calcium 8.6 mg/dL      Total Protein 6.4 g/dL      Albumin 3.20 g/dL      ALT (SGPT) <5 U/L      AST (SGOT) 21 U/L      Alkaline Phosphatase 46 U/L      Total Bilirubin 0.3 mg/dL      eGFR  African Amer >150 mL/min/1.73      Globulin 3.2 gm/dL      A/G Ratio 1.0 g/dL      BUN/Creatinine Ratio 10.0     Anion Gap 7.0 mmol/L     Narrative:      GFR Normal >60  Chronic Kidney Disease <60  Kidney Failure <15      CK [[id number]]  (Normal) Collected: 09/06/21 0407    Specimen: Blood Updated: 09/06/21 0446     Creatine Kinase 83 U/L     CBC Auto Differential [[id number]]  (Abnormal) Collected: 09/06/21 0407    Specimen: Blood Updated: 09/06/21 0420     WBC 2.88 10*3/mm3      RBC 3.30 10*6/mm3      Hemoglobin 9.6 g/dL      Hematocrit 29.9 %      MCV 90.6 fL       MCH 29.1 pg      MCHC 32.1 g/dL      RDW 16.2 %      RDW-SD 54.3 fl      MPV 8.6 fL      Platelets 284 10*3/mm3      Neutrophil % 63.9 %      Lymphocyte % 16.7 %      Monocyte % 16.7 %      Eosinophil % 1.7 %      Basophil % 0.7 %      Immature Grans % 0.3 %      Neutrophils, Absolute 1.84 10*3/mm3      Lymphocytes, Absolute 0.48 10*3/mm3      Monocytes, Absolute 0.48 10*3/mm3      Eosinophils, Absolute 0.05 10*3/mm3      Basophils, Absolute 0.02 10*3/mm3      Immature Grans, Absolute 0.01 10*3/mm3      nRBC 0.0 /100 WBC     COVID-19,[facility]-HOU[facility],Nasal Swab No Transport Media 3-4 HR TAT - Swab, Nasal Cavity [[id number]]  (Normal) Collected: 09/05/21 0737    Specimen: Swab from Nasal Cavity Updated: 09/05/21 0859     COVID19 Not Detected    Narrative:      Fact sheet for providers: https://www.fda.gov/media/983672/download     Fact sheet for patients: https://www.fda.gov/media/621630/download    Test performed by PCR.    Consider negative results in combination with clinical observations, patient history, and epidemiological information.    Sedimentation Rate [[id number]]  (Abnormal) Collected: 09/05/21 0716    Specimen: Blood Updated: 09/05/21 0819     Sed Rate 104 mm/hr     C-reactive Protein [[id number]]  (Abnormal) Collected: 09/05/21 0716    Specimen: Blood Updated: 09/05/21 0759     C-Reactive Protein 3.61 mg/dL     Comprehensive Metabolic Panel [[id number]]  (Abnormal) Collected: 09/05/21 0716    Specimen: Blood Updated: 09/05/21 0759     Glucose 96 mg/dL      BUN 4 mg/dL      Creatinine 0.41 mg/dL      Sodium 139 mmol/L      Potassium 3.7 mmol/L      Chloride 103 mmol/L      CO2 28.0 mmol/L      Calcium 8.9 mg/dL      Total Protein 6.5 g/dL      Albumin 3.20 g/dL      ALT (SGPT) 5 U/L      AST (SGOT) 24 U/L      Alkaline Phosphatase 49 U/L      Total Bilirubin 0.3 mg/dL      eGFR  African Amer >150 mL/min/1.73      Globulin 3.3 gm/dL      A/G Ratio 1.0 g/dL      BUN/Creatinine Ratio 9.8     Anion Gap 8.0  mmol/L     Narrative:      GFR Normal >60  Chronic Kidney Disease <60  Kidney Failure <15      CBC & Differential [[id number]]  (Abnormal) Collected: 09/05/21 0716    Specimen: Blood Updated: 09/05/21 0736    Narrative:      The following orders were created for panel order CBC & Differential.  Procedure                               Abnormality         Status                     ---------                               -----------         ------                     CBC Auto Differential[[id number]]        Abnormal            Final result                 Please view results for these tests on the individual orders.    CBC Auto Differential [[id number]]  (Abnormal) Collected: 09/05/21 0716    Specimen: Blood Updated: 09/05/21 0736     WBC 2.82 10*3/mm3      RBC 3.45 10*6/mm3      Hemoglobin 9.5 g/dL      Hematocrit 31.3 %      MCV 90.7 fL      MCH 27.5 pg      MCHC 30.4 g/dL      RDW 16.1 %      RDW-SD 53.4 fl      MPV 9.0 fL      Platelets 338 10*3/mm3      Neutrophil % 68.8 %      Lymphocyte % 13.1 %      Monocyte % 15.6 %      Eosinophil % 1.1 %      Basophil % 0.7 %      Immature Grans % 0.7 %      Neutrophils, Absolute 1.94 10*3/mm3      Lymphocytes, Absolute 0.37 10*3/mm3      Monocytes, Absolute 0.44 10*3/mm3      Eosinophils, Absolute 0.03 10*3/mm3      Basophils, Absolute 0.02 10*3/mm3      Immature Grans, Absolute 0.02 10*3/mm3      nRBC 0.0 /100 WBC           Radiology Results:    Imaging Results (Last 72 Hours)     Procedure Component Value Units Date/Time    US Venous Doppler Upper Extremity Bilateral (duplex) [[id number]] Collected: 09/08/21 1626     Updated: 09/08/21 1631    Narrative:      History: Pain and swelling       Impression:      Impression: There is no evidence of deep venous thrombosis or  superficial thrombophlebitis of bilateral upper extremities. This is a  limited exam.     Comments: Bilateral upper extremity venous duplex exam was performed  using color Doppler flow, Doppler wave form  analysis, and grayscale  imaging, with and without compression. There is no evidence of deep  venous thrombosis of the axillary, brachial, radial, or ulnar veins in  the right upper extremity. There is also no evidence of deep venous  thrombosis in the brachial, radial, and ulnar veins in the left upper  extremity. There is no thrombus identified in the cephalic or basilic  veins bilaterally.          This report was finalized on 09/08/2021 16:28 by Dr. Luis Oh MD.            Physical Exam  Constitutional:       Appearance: Normal appearance. She is well-developed.   HENT:      Head: Normocephalic and atraumatic.      Nose: Nose normal.   Eyes:      General: Lids are normal.      Conjunctiva/sclera: Conjunctivae normal.      Pupils: Pupils are equal, round, and reactive to light.   Neck:      Trachea: Trachea normal.   Cardiovascular:      Rate and Rhythm: Normal rate and regular rhythm.      Heart sounds: Normal heart sounds.   Pulmonary:      Breath sounds: Examination of the right-lower field reveals decreased breath sounds. Examination of the left-lower field reveals decreased breath sounds. Decreased breath sounds present.   Abdominal:      General: Bowel sounds are normal.      Palpations: Abdomen is soft.   Musculoskeletal:         General: Normal range of motion.      Cervical back: Normal range of motion and neck supple.   Skin:     General: Skin is warm and dry.      Findings: No erythema, petechiae or rash.      Nails: There is no clubbing.      Comments: Significant radiation burn to posterior and anterior chest with some scabbing posteriorly. Port left anterior CW   Neurological:      Mental Status: She is alert.      Cranial Nerves: No cranial nerve deficit.      Sensory: No sensory deficit.   Psychiatric:         Speech: Speech normal.         Behavior: Behavior normal. Behavior is cooperative.         Thought Content: Thought content normal.         Judgment: Judgment normal.          ASSESSMENT:  #Metastatic HER-2 migel breast cancer (diffuse ant and post chest wall skin involvement)  She has received several lines of chemotherapy in the past and several rounds of palliative radiation     7/31/2021-CT chest abdomen pelvis showed no evidence of visceral metastasis.     Currently on palliative RT. Last dose about 1-2 weeks ago. She received 6/30 treatments - Unable to lie down on the table for treatment.     Dr. Darby did reevaluate her again on 9/8/2021 and unfortunately she is still unable to lie down and tolerate treatment       #Cancer related pain-palliative care helping with pain control  Morphine ER 60mg PO q 12 h (dose increased on 9/5/2021)  Naprosyn 500 mg p.o. twice daily  Dexamethasone 4 mg p.o. daily  Dilaudid 1 mg IV every 2 hours as needed pain  Morphine IR 30 mg p.o. 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 She denies any significant change from previous evaluation.    #Lymphopenia-       #Normocytic Hypochromic anemia-        HGB stable    #Bilateral upper extremity lymphedema- 2/2 extensive radiation and skin involvement. consult PT      Noninvasive venous studies BUE 9/7/2021 was limited due to the patient's pain and radiation burns.  No thrombus visualized however bilateral jugular veins, subclavian vein, left axillary veins not visualized    #LLL consolidation, R basilar atelectasis    pCXR 9/5/2021      IS  Rocephin 1 g IV daily      Plan:  Wound care following  Palliative care -- Continue pain management  Lymphedema PT       RAJWINDER Villanueva    9/10/2021    07:19 CDT       Physicians attestation and contribution:    I, Barrett Moreno, personally and independently performed an evaluation on Ayla Echeverria  I have reviewed relevant medical information/data to include but not limited to the medication list, relevant appropriate lab work and imaging when applicable.  I reviewed  other physician's notes, ancillary services and nurses assessments.  I have reviewed the above documentation completed by Carmine Lao PA-C  Please see my additional addended and/or modified contributions to the history of present illness, physical examination and assessment/medical decision-making and plan that reflects my findings and impressions.  I discussed the essential elements of the care plan with Carmine Lao PA-C and the patient.  I have encouraged and answered all the questions raised to the patient's understanding and satisfaction.  I concur with the above stated.    Subjective:  Pain medication adjustments ongoing per palliative care.    Pain interrupting her sleep at night.    Objective:  Exam relatively stable with significant locally advanced widely metastatic breast cancer in a circumferential fashion on the surface/skin of her upper torso.    Assessment/plan:  Unable to tolerate lying down for XRT.  Dr. Pipo Darby's note reviewed.  Palliative care ongoing adjusting narcotic analgesics.  Rocephin ongoing for left lower lobe consolidation/pneumonia.     Discussed with Dr. Ott.  Ayla could not tolerate Enhertu (Fam-trastuzumab) and this was discontinued after only ~1 month of treatment in February 2020.     At this juncture, there is no reasonable expectation of response to systemic therapy having been exposed to the most active agents and either failed or could not tolerate them.    I discussed systemic therapy with Ayla today.  She is not agreeable to entertain receiving further systemic therapy.  She states it is just like a roller coaster and declines further systemic therapy.  She states that she is just leaving it in the Lord's hands.  She continues to pray for a miracle.     Continue palliative care, hospice is a consideration.    I had an extensive discussion with Ayla regarding hospice today regarding hospice.  She had multiple questions and observations to make.  She is agreeable  to discussing and consider hospice.  I discussed the possibility of nursing home due to the strain that she says this is causing on her children.  She absolutely declines going to a nursing home.  She lives in [facility].     I discussed all of the above as well with her nurse Yasmine Fowler RN who will place the order and discuss with hospice this morning.   Anticipation as to possibly go home today, hopefully with hospice.    She is agreeable for an appointment to be made for her to see Dr. Ott in about a month.    Barrett Moreno MD  9/10/2021 09:26 CDT            Electronically signed by Barrett Moreno MD at 09/10/21 1006          Consult Notes (most recent note)      Elodia Duenas APRN at 09/07/21 0808      Consult Orders    1. Inpatient Palliative Care Consult [[id number]] ordered by Donald Ott MD at 09/05/21 1110               Palliative Care Initial Consult   Attending Physician: Jack Cárdenas MD  Referring Provider: Horace Ott MD/Jack Cárdenas MD    Reason for Referral: assistance with clarification of goals of care and pain  Family/Support: children x 4    Code Status and Medical Interventions:   Ordered at: 09/05/21 1035     Code Status:    CPR     Medical Interventions (Level of Support Prior to Arrest):    Full     Goals of Care: TBD.    HPI:   55 y.o. female with past medical history significant for invasive ductal carcinoma the left breast-2016 with widespread metastatic disease, received multiple lines of chemotherapy and radiation, sees Dr. Ott. Patient [facility] related to cancer related pain secondary to ulcerations of skin lesions and palliative radiation treatment.  Opiate changes on admission per oncology from [facility] 30 mg every 8 to 60 mg every 12.  Lymphedema treatment inpatient and also sees in the outpatient setting.  Plans to continue palliative radiation in the inpatient  setting.  Chest x-ray reveals small left pleural effusion with left basilar consolidation concerning for pneumonia. Palliative Care Spoke With: patient Marcialiffen decline in quality of life and functional status given complications cancer treatments and progression of disease. She has also been evaluated by wound care clinic on the outpatient setting but has not been able to successfully utilize recommended treatments due to inability to keep dressings in place.  Verbalizes her pain has become significantly intolerable over the last few weeks.  Current pain level 7/10, stabbing to the chest, prickling of the skin, extreme tightness of the skin itself.  She has difficulties laying flat or on her chest during palliative radiation treatments and was unable to complete scheduled treatment this morning.  She would like to identify a better level of pain control with out the associated lethargy.  She is also experiencing quite a bit of mobility issues with her left lower extremity due to inflammation.  Due to the Palliative Care Topics Discussed: palliative care, goals of care, care options, resuscitation status and discharge options we will establish an advance care plan.   Advance Care Planning   Advance Care Planning Discussion: Care conference with patient.  We explored current hospitalization, progression of disease, and ongoing previous cancer related treatments.  Verbalizes she has not been able to tolerate previous cancer treatments well. She is hopeful for a treatment offered that will be tolerable.  We explored current medical priorities including CODE STATUS and medical interventions.  She remains undecided with regard to care initiatives including CPR, but does state that she would not wish to remain on life support measures long-term.  She denies having any conversation with her children with regard to her medical priorities, though they are currently assisting her with completion of an advanced directive.  She  remains undecided with regard to her healthcare surrogate.  She has 4 adult children of which currently would act as next of kin decision makers if needed.  Again, I have encouraged patient to have continuing conversation with her family to ensure they are able to make decisions on her behalf if needed in a manner to honor her own medical wishes. Verbalizes understanding.    Review of Systems   Constitutional: Positive for malaise/fatigue.   Cardiovascular: Negative for dyspnea on exertion.   Respiratory: Negative for shortness of breath.    Hematologic/Lymphatic: Negative for bleeding problem.   Skin: Positive for poor wound healing, skin cancer and suspicious lesions.        Radiation burns to chest and back.  Left upper extremity axillary lesion.   Musculoskeletal: Positive for muscle weakness.   Gastrointestinal: Negative for nausea.   Genitourinary: Negative for dysuria.   Neurological: Positive for weakness.   Psychiatric/Behavioral: The patient is not nervous/anxious.      1- Pain Assessment  Nonverbal Indicators of Pain: nonverbal indicators absent  Pain Description: aching    Past Medical History:   Diagnosis Date   • Breast cancer (CMS/HCC)      Past Surgical History:   Procedure Laterality Date   • AXILLARY LYMPH NODE BIOPSY/EXCISION Left    • BREAST BIOPSY Left 03/31/2016   • BREAST LUMPECTOMY WITH SENTINEL NODE BIOPSY Left 10/03/2016    residual invasive carcinoma, grade 3 (0.2cm); margins negative; extensive lymphvascular space invasion; 2 sentinel lymph nodes positive (2/3)     Social History     Socioeconomic History   • Marital status:      Spouse name: Not on file   • Number of children: Not on file   • Years of education: Not on file   • Highest education level: Not on file   Tobacco Use   • Smoking status: Never Smoker   • Smokeless tobacco: Never Used   Substance and Sexual Activity   • Alcohol use: No   • Drug use: No   • Sexual activity: Defer       Current Facility-Administered  Medications   Medication Dose Route Frequency Provider Last Rate Last Admin   • cefTRIAXone (ROCEPHIN) 1 g in sodium chloride 0.9 % 100 mL IVPB-VTB  1 g Intravenous Q24H Carmine Lao PA       • HYDROcodone-acetaminophen (NORCO)  MG per tablet 1 tablet  1 tablet Oral Q6H PRN Jack Cárdenas MD   1 tablet at 09/07/21 0017   • HYDROmorphone (DILAUDID) injection 1 mg  1 mg Intravenous Q2H PRN Jack Cárdenas MD   1 mg at 09/07/21 0631    And   • naloxone (NARCAN) injection 0.4 mg  0.4 mg Intravenous Q5 Min PRN Jack Cárdenas MD       • hydrOXYzine (ATARAX) tablet 50 mg  50 mg Oral Q6H PRN Jack Cárdenas MD   50 mg at 09/07/21 0521   • montelukast (SINGULAIR) tablet 10 mg  10 mg Oral Daily Jack Cárdenas MD       • Morphine (MS CONTIN) 12 hr tablet 60 mg  60 mg Oral Q12H Donald Ott MD   60 mg at 09/06/21 2054   • Morphine (MSIR) tablet 15 mg  15 mg Oral Q4H PRN Jack Cárdenas MD   15 mg at 09/07/21 0521   • ondansetron (ZOFRAN) injection 4 mg  4 mg Intravenous Q6H PRN Jack Cárdenas MD       • pantoprazole (PROTONIX) EC tablet 40 mg  40 mg Oral Daily Jack Cárdenas MD       • sennosides-docusate (PERICOLACE) 8.6-50 MG per tablet 1 tablet  1 tablet Oral BID PRN Jack Cárdenas MD   1 tablet at 09/06/21 1428   • sodium chloride 0.9 % flush 10 mL  10 mL Intravenous PRN Jack Cárdenas MD       • sodium chloride 0.9 % flush 10 mL  10 mL Intravenous Q12H Jack Cárdenas MD       • sodium chloride 0.9 % flush 10 mL  10 mL Intravenous PRN Jack Cárdenas MD       • sodium chloride 0.9 % infusion  50 mL/hr Intravenous Continuous Jack Cárdenas MD 50 mL/hr at 09/07/21 0631 50 mL/hr at 09/07/21 0631     sodium chloride, 50 mL/hr, Last Rate: 50 mL/hr (09/07/21 0631)      HYDROcodone-acetaminophen  •  HYDROmorphone **AND** naloxone  •  hydrOXYzine  •  Morphine  •  ondansetron  •  senna-docusate sodium  •  Insert  "peripheral IV **AND** sodium chloride  •  sodium chloride    No Known Allergies  Current medication reviewed for route, type, dose and frequency and are current per MAR at time of dictation.      Intake/Output Summary (Last 24 hours) at 9/7/2021 0808  Last data filed at 9/6/2021 1842  Gross per 24 hour   Intake 600 ml   Output 400 ml   Net 200 ml       Physical Exam:    Diagnostics: Reviewed  /60 (BP Location: Left leg, Patient Position: Lying)   Pulse 87   Temp 98.6 °F (37 °C) (Oral)   Resp 16   Ht 160 cm (62.99\")   Wt 72.7 kg (160 lb 3.2 oz)   SpO2 95%   BMI 28.39 kg/m²     Vitals and nursing note reviewed.   Constitutional:       Appearance: Ill-appearing and chronically ill-appearing.   Eyes:      General: Lids are normal.      Pupils: Pupils are equal, round, and reactive to light.   HENT:      Head: Normocephalic.   Lymphadenopathy:        Upper Body:      Left upper body: Axillary adenopathy present.   Pulmonary:      Effort: Pulmonary effort is normal.   Chest:      Comments: Significant radiation burns in a T-shirt fashion to chest and back.  Pigmentation changes.  Lesion to left axillary with drainage.  Cardiovascular:      Normal rate.      Comments: Bilateral lower extremity edema and tightness  Edema:     Peripheral edema present.  Abdominal:      Palpations: Abdomen is soft.   Musculoskeletal:      Cervical back: Neck supple.      Comments: Significant lymphedema bilateral upper extremities Skin:     General: Skin is warm.      Comments: Skin changes to chest, breast, back as previously documented due to radiation burns and cancer lesions.   Genitourinary:     Comments: No reported dysuria  Neurological:      Comments: Somnolent   Psychiatric:      Comments: Calm, slow response but appropriate secondary to accelerated opiate use     Patient status: Disease state: Controlled with current treatments.  Functional status: Palliative Performance Scale Score: Performance 40% based on the " following measures: Ambulation: Mainly in bed, Activity and Evidence of Disease: Unable to do any work, extensive evidence of disease, Self-Care: Mainly assistance required,  Intake: Normal or reduced, LOC: Full, drowsy or confusion   ECOG Status(3) Capable of limited self-care, confined to bed or chair > 50% of waking hours.  Nutritional status: Albumin 3.20. Body mass index is 28.39 kg/m².         Family support: The patient receives support from her children..  Advance Directives: Advance Directive Status: Patient does not have advance directive   POA/Healthcare surrogate-children x4-next of kin.    Impression/Problem List:    1.  Metastatic HER-2 migel breast cancer, mets diffuse chest wall, back, and skin  2.  Cancer related pain  3.  Peripheral neuropathy secondary to chemotherapy  4.  Lymphopenia  5.  Bilateral upper extremity lymphedema    Recommendations/Plan:  1. plan: Goals of care include CODE STATUS CPR/full interventions.    2.  Palliative care encounter  -Overall poor prognosis  -Continue full aggressive care interventions at this juncture.  Patient that she would not wish to remain on life support measures long-term.  I have encouraged further conversations with family to ensure ongoing conversation with regard to medical priorities.    3.  Cancer related pain  -Received 375 mg oral morphine equivalent over the last 24 hours.  Recent opiate changes on admission. Feels that her pain is finally getting to a tolerable level though this increases also leading to increased somnolence.  -will continue MSER to 60 mg every 12 hours as this was just increased on admission. Would like to monitor adjuvant therapies for effective loss of treatment prior to increasing to every 8 hours. See below  -Will increase MSIR to 30 mg every 4 hours as needed and will try to wean Dilaudid needs down to every 4 hours for breakthrough pain needs not controlled with MSIR.  -will d/c hydrocodone and simplify opiates  -add  gabapentin 100 mg every 8 hours as pain adjuvant for neuropathic pain  -add decadron 4 mg IV every 12 x2 doses then daily 4 mg tab x 5 days as pain adjuvant for inflammation  -add naproxen 500 mg twice daily with meals as pain adjuvant for inflammation  -Will continue Protonix for PPI prophylaxis  -Add senna S2 tabs nightly for bowel prophylaxis and patient on chronic opioid therapy  -Will have wound nurse evaluate/treat to assist with wound care as this has not been very successful in outpatient setting.    4.  Pruritus  -Atarax per attending  -We will add Decadron as above as this should also act as adjuvant.    Thank you for this consult and allowing us to participate in patient's plan of care. Palliative Care Team will continue to follow patient.     Time spent: 99 minutes spent reviewing medical and medication records, assessing and examining patient, discussing with family, answering questions, providing some guidance about a plan and documentation of care, and coordinating care with other healthcare members, with > 50% time spent face to face.   20 minutes spent on advance care planning.    CHRIS Elmore  9/7/2021                Electronically signed by Elodia Duenas APRN at 09/07/21 6698

## 2021-09-10 NOTE — PROGRESS NOTES
Continued Stay Note  REKHA Ley     Patient Name: Ayla Echeverria  MRN: [id number]  Today's Date: 9/10/2021    Admit Date: 9/5/2021    Discharge Plan     Row Name 09/10/21 1524       Plan    Plan  [facility]    Plan Comments  Deann with [facility] has spoken to patient's daughters re hospice services. Patient/family told Deann they are not sure if they want to proceed with hospice but Deann is going to follow up with the family this afternoon and tomorrow. SS to follow for definite arrangements.             LM Medley

## 2021-09-10 NOTE — PROGRESS NOTES
Continued Stay Note   Jil     Patient Name: Ayla Echeverria  MRN: [id number]  Today's Date: 9/10/2021    Admit Date: 9/5/2021    Discharge Plan     Row Name 09/10/21 1027       Plan    Plan  Referral to [facility]    Plan Comments  Received orders for hospice. Patient does agree to hospice services. BRIAN called [facility] at [phone] and left a message for intake. BRIAN faxed referral to [facility] at [phone]. Will follow for arrangements to be made and will update chart.                  RICHARD MedleyW
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
## 2021-09-10 NOTE — PLAN OF CARE
Problem: Adult Inpatient Plan of Care  Goal: Plan of Care Review  Outcome: Ongoing, Progressing  Flowsheets (Taken 9/10/2021 1705)  Progress: improving  Plan of Care Reviewed With: patient  Outcome Summary: PRN morphine given twice as well as scheduled morphine. Hospice consult placed today, pt agreeable to hospice. Spoke with hospice nurse this evening, who spoke with family who is unsure about decision to go home with hospice. Pt hopeful to d/c home in the morning. VSS, will update MD as needed

## 2021-09-10 NOTE — PLAN OF CARE
Goal Outcome Evaluation:              Outcome Summary: Pt given scheduled morphine, as well as PRN MSIR twice. PRN atarax also given for pain. Wraps on arm changed. Tegaderm added to port dressing to maintain seal. Safety maintained.

## 2021-09-10 NOTE — TELEPHONE ENCOUNTER
Nabeel Pennington (daughter) [phone] would like to talk with Dr. Mavis Johnston. Her mother Natalee Quiroz is inpatient at [facility].  Urgent request.    Lisha Freedman.  Paola Cary

## 2021-09-10 NOTE — PROGRESS NOTES
Continued Stay Note   Jil     Patient Name: Ayla Echeverria  MRN: [id number]  Today's Date: 9/10/2021    Admit Date: 9/5/2021    Discharge Plan     Row Name 09/10/21 1136       Plan    Plan  [facility]    Plan Comments  SW received call back from Deann with [facility]. Deann says that she is going to speak to patient/family and will call SW back re when arrangements will be complete.    Row Name 09/10/21 1024       Plan    Plan  Referral to [facility]    Plan Comments  Received orders for hospice. Patient does agree to hospice services. BRIAN called [facility] at [phone] and left a message for intake. BRIAN faxed referral to [facility] at [phone]. Will follow for arrangements to be made and will update chart.             RICHARD MedleyW

## 2021-09-10 NOTE — TELEPHONE ENCOUNTER
I asked patient if she wanted to come down and get her radiation treatment. She said she was hurting too much to lay on the table. She said that she will call us if she wants a treatment.

## 2021-09-10 NOTE — PROGRESS NOTES
"CC:\"im better today\"--she feels her pain is better controlled today    Review of Systems   Constitutional: Positive for activity change.   HENT: Negative.    Eyes: Negative.    Respiratory: Negative.    Cardiovascular: Positive for chest pain.   Gastrointestinal: Negative.    Endocrine: Negative.    Genitourinary: Negative.    Musculoskeletal: Negative.    Skin: Negative.    Allergic/Immunologic: Negative.    Neurological: Negative.    Hematological: Negative.    Psychiatric/Behavioral: Negative.      Temp:  [97.4 °F (36.3 °C)-97.9 °F (36.6 °C)] 97.6 °F (36.4 °C)  Heart Rate:  [63-72] 63  Resp:  [16-18] 16  BP: (133-164)/(62-72) 140/62  I/O last 3 completed shifts:  In: 720 [P.O.:720]  Out: 2300 [Urine:2300]  No intake/output data recorded.    Physical Exam  Vitals and nursing note reviewed.   Constitutional:       Appearance: Normal appearance. She is normal weight.   HENT:      Head: Normocephalic.      Nose: Nose normal.      Mouth/Throat:      Mouth: Mucous membranes are moist.   Eyes:      Extraocular Movements: Extraocular movements intact.      Pupils: Pupils are equal, round, and reactive to light.   Cardiovascular:      Rate and Rhythm: Normal rate and regular rhythm.      Pulses: Normal pulses.      Heart sounds: Normal heart sounds.   Pulmonary:      Effort: Pulmonary effort is normal.      Breath sounds: Normal breath sounds.   Abdominal:      General: Abdomen is flat. Bowel sounds are normal.      Palpations: Abdomen is soft.   Musculoskeletal:         General: Normal range of motion.      Cervical back: Normal range of motion and neck supple.   Skin:     General: Skin is warm and dry.      Capillary Refill: Capillary refill takes less than 2 seconds.   Neurological:      General: No focal deficit present.      Mental Status: She is alert and oriented to person, place, and time. Mental status is at baseline.   Psychiatric:         Mood and Affect: Mood normal.         Behavior: Behavior normal.         " Thought Content: Thought content normal.         Judgment: Judgment normal.           Arm swelling    Chest wall pain  she does appear more comfortable this afternon---will observe for now

## 2021-09-11 NOTE — OUTREACH NOTE
Prep Survey      Responses   [facility] facility patient discharged from?  [facility]   Is LACE score < 7 ?  No   Emergency Room discharge w/ pulse ox?  No   Eligibility  Not Eligible   What are the reasons patient is not eligible?  Hospice/Pallative Care   Does the patient have one of the following disease processes/diagnoses(primary or secondary)?  Other   Prep survey completed?  Yes          Jessica Block RN

## 2021-09-11 NOTE — PLAN OF CARE
Problem: Adult Inpatient Plan of Care  Goal: Plan of Care Review  Outcome: Ongoing, Progressing  Flowsheets  Taken 9/11/2021 0736 by Magnolia Barraza, RN  Progress: improving  Outcome Summary: Pt A&Ox4. VSS. Pt c/o pain, PRN & scheduled PO pain medicine given with some relief. Elevating arms off the bed. CHG bath completed. L port dressing CDI. Up x standby assist. Safety maintained. Possible D/C with hospice today.  Taken 9/10/2021 1705 by Duyen Fowler, RN  Plan of Care Reviewed With: patient

## 2021-09-11 NOTE — CASE MANAGEMENT/SOCIAL WORK
Continued Stay Note   Jil     Patient Name: Ayla Echeverria  MRN: [id number]  Today's Date: 9/11/2021    Admit Date: 9/5/2021    Discharge Plan     Row Name 09/11/21 0926       Plan    Final Discharge Disposition Code  50 - home with hospice    Final Note  Pt is able to be discharged home with [facility] today per Tina, hermila. Dr Cárdenas is aware. [facility] will be in home to admit at 4PM and requests pt does not get discharge until after lunch to make sure she is not without meds for quite some time today. SW will follow for any other discharge needs that may arise.        Discharge Codes    No documentation.             Rachael Rai

## 2021-09-11 NOTE — PROGRESS NOTES
"MEDICAL ONCOLOGY PROGRESS NOTE      Pt Name: Ayla Echeverria  MRN: [id number]  [id number]  YOB: 1965  Date of evaluation: 9/11/2021   ROOM: 412      Chief Compliant: \"body is getting stiff\" still itching. Anticipates home with hospice    HISTORY OF PRESENT ILLNESS:    Ayla Echeverria is well known to my clinic and is followed for a diagnosis of recurrent HER-2 positive/ER 1% and HI 6% invasive ductal carcinoma of the breast. She was initially diagnosed in 2016.  She had locally advanced disease.  Unfortunately, she had significant compliance issues when she was diagnosed.  Later on she developed widespread metastatic disease with predominantly skin recurrence. She has received several lines of treatment with chemotherapy agents as well as radiation to the skin.  She is currently receiving palliative radiation.  She has had significant complains of pain, ulceration of the skin lesions.  She presented to the ER department on 9/5/2021 at [facility] due to uncontrolled pain.     ONCOLOGIC HISTORY  Diagnosis   IDC left breast, March 2016   Grade 2. ER 1%, HI 6%, HER-2/migel IHC 3+/FISH amplified ratio 6.7, AR 95%.   01/30/2017- Biopsy-proven in breast relapse/Inflammatory breast cancer. ER negative, HI negative, HER-2/migel IHC 3+/FISH amplified ratio 4.5, Ki-67 32%.   [facility] - Revealed no clinically significant mutation. PTEN VUS.  5/7/21 Invasive breast cancer involving dermal lymphatics    Treatment summary  Neoadjuvant adjuvant chemotherapy TCH-P ×6 cycles. She declined to complete 1 year of Herceptin.   She declined radiation therapy.   10/3/2016-left lumpectomy/sentinel lymph node   01/30/2017- Biopsy-proven in breast relapse/Inflammatory breast cancer.   5/11/2017-Chemotherapy with paclitaxel/carboplatin weekly. Herceptin/Pertuzumab every 3 weeks started 05/11/2017. She had a allergic reaction to carboplatin and this was discontinued.   7/21/2017- 9/1/2017 Neuropathy grade 2 with Taxol. Therefore, " switched to Taxotere/Herceptin/Pertuzumab. Discontinued due to progression of disease.   9/22/2017 through 3/2/2018- Kadycla every 3 weeks. (stopped as per patient request, neuropathy grade 1)   7/10/2018 through 11/30/2018- Kadcyla.   1/4/2019-Navelbine x 1 cycle with very poor tolerance (pain)   1/25/2018-Gemzar/Herceptin   3/15/2019- low-dose Cisplatin added to Gemzar and Herceptin regimen, regimen changed to every other week   4/16/2019-5/23/19 5040cGy palliative radiation to left clavicle/chest wall   6/22/2019- Xeloda/Lapatinib  12/27/19-2/11/20 3800 cGy palliative radiation to the right breast.  1/10/2020- palliative treatment with Fam-Trastuzumab- stopped Feb due to severe toxicity  8/24/20-9/23/20 2925 cGy palliative radiation left chest wall skin metastasis.  8/21/2020-Herceptin, Xeloda and Tucatinib  5/28/21- Stop Tucatinib and Xeloda due to progression in skin  7/9/21 Declined palliative radiation  8/9/21 Initiate radiation therapy with anticipated dose of 6000 cGy radiation therapy anterior chest and posterior back    Tumor target  Skin chest wall left upper chest/left breast   Skin on back and both sides    Cancer history- Left breast recurrence with Inflammatory Breast Cancer ER 8%/OH negative &HER-2 positive.  Ms Echeverria was seen in initial oncology consultation on 4/20/2017 referred for a diagnosis of a left breast recurrence with inflammatory breast cancer. She was initially treated by Dr. Peres at [facility]. Prior breast cancer history is as follows:    3/22/2016-a diagnostic mammogram for a palpable mass showed a left breast abnormal findings.   4/1/2017- ultrasound-guided core needle biopsy was consistent with invasive ductal carcinoma, grade 2. ER 1%, OH 6%, HER-2/migel IHC 3+/FISH amplified ratio 6.7., AR 95%.   She underwent 6 cycles of TCH-P from April 2016 through September 2016 with G-CSF support. She had a total of 18 weeks of trastuzumab treatment.     10/3/2016-she underwent a left  lumpectomy with left sentinel lymph node revealing an invasive ductal carcinoma, grade 3. Margins clear of invasive carcinoma (1.1 cm from the lateral margin), extensive lymphovascular space invasion. 2 out of 3 sentinel lymph nodes positive for metastatic carcinoma. Final pathologic staging [id number])M0.   She declined adjuvant radiation therapy and adjuvant Herceptin completion.                               -------------------In-breast Recurrence Jan2017---------------------------  1/30/2017- she was seen by Dr. Subhash Delaney with new complaints of erythema in the left breast. A skin punch biopsy was consistent with metastatic ductal carcinoma with extensive dermal lymphovascular invasion. ER negative, PA negative, HER-2/migel IHC 3+/FISH amplified ratio 4.5, Ki-67 32%. Foundation one showed ERBB2 amplification, AURKA amplification, TP53 and .     2/15/2017- she was again seen by Dr. Delaney and explained about her tumor recurrence. Unfortunately, she declined any further intervention at that time.     4/21/2017- she asked Dr. Delaney for a referral to us.     4/28/2017-referred to me for further evaluation. I recommended a PET scan and neoadjuvant chemotherapy with carboplatin/paclitaxel and Perjeta and Herceptin.     5/4/2017-PET/CT scan revealed abnormal metabolic activity present in multiple regions throughout the left breast and in the skin( SUV 8.9, 7.4, 7.3). In the left supraclavicular region a cluster of lymph nodes present (SUV of 3). Axillary lymph nodes with SUV of 2.8. Tonsils bilaterally with SUV 5.6 (symmetric). No abnormal metabolic activity in the intrathoracic or intra-abdominal pelvic region. No abnormal skeletal metabolic activity. I recommended weekly carboplatin/weekly Taxol/Herceptin and Pertuzumab.     5/26/2017-she had a severe allergic reaction after 2 doses of carboplatin and therefore this was discontinued. She was continued only on Taxol weekly/Herceptin and Pertuzumab.      7/21/2017-after cycle #2 and 8 doses of Taxol she developed neuropathy related to chemotherapy and therefore chemotherapy was switched from Taxol to Taxotere.   Genetic assessment- [facility] -25 revealed no clinically significant mutation. PTEN VUS.     9/1/2017-local in breast disease progression with increasing breast erythema. Treatment switched to Kadcyla.     12/27/2017- repeat re-staging CT scan of the abdomen and pelvis documented no evidence of intra-abdominal pelvic metastasis. Superior left renal cyst. Mild hepatic steatosis. CT scan of the chest documented no evidence of intrathoracic metastasis. Bone scan documented no evidence of bony metastatic disease.     3/2/2018-after 8 cycles of Kadcyla she requested treatment to be on hold. She has requested her PCP a referral to [facility] for a second opinion. She has neuropathy grade 1.     5/2/2018- 2 months off treatment (Kadcyla). She has not yet been seen at [facility]. Apparently, [facility] is gathering her records and will see her soon. Examination of her chest wall showed recurrent disease in the left upper chest. Residual neuropathy grade 1. She could not tolerate gabapentin, and therefore has stopped it. She was not interested in any other medication for neuropathy at this time.     7/10/2018-after discussion at [facility] she was recommended to reinitiate Kadcyla.     7/13/2018-CT of chest, abdomen, pelvis showed no evidence of intra-abdominal disease. There is an enlarged right-sided level 1 lymph node that was not appreciated previously, measuring 2.5 x 1.4 cm, metastatic disease considered.     7/16/2018-PET CT scan showed diffuse uptake throughout the left breast extending to the left chest wall. Abnormal uptake within the left axilla lymph nodes and left internal mammary lymph nodes concerning for metastatic disease. Enlarged hypermetabolic right axilla lymph node concerning for metastatic disease with SUV 8.0. Abnormal uptake  within a right cervical lymph node, etiology unclear, but could be reactive.     10/26/2018 - CT scan of the chest was completed at [facility] and compared to CT scan of 12/27/2017 versus her last CT scan completed at [facility] on 7/13/2018. The CT scan identified a right axillary lymph node measuring 1.2 cm, otherwise no intrathoracic neoplastic process/metastatic disease.    10/26/2018 -CT scan of the abdomen and pelvis was completed at [facility] and compared to CT scan of     12/27/2017 versus her last CT scan completed at [facility] on 7/13/2018. No evidence of intra-abdominal or pelvic metastasis was identified.  December 2018-interval worsening of skin metastasis in the left breast and diffuse erythema with new nodules.    12/17/2018-skin biopsy of overlying right breast consistent invasive ductal carcinoma, high-grade. ER 20%, TX 0%, HER-2/migel 3+.    12/17/2018-right axillary FNA biopsy consistent invasive ductal carcinoma.     12/26/2018-recommended clinical trial at [facility].    1/4/2019-started on Navelbine/Herceptin, due to delays on the clinical trial at [place]. Unfortunately, she had severe chest wall pain and generalized pain related to Navelbine.     1/25/2019-4/19/2019 she was switched to cisplatin/Gemzar/Herceptin, but had disease progression.     4/16/2019-5/23/19 5040cGy palliative radiation to left clavicle/chest wall     5/30/2019-CT chest, abdomen, pelvis and bone scan was unremarkable for right axillary adenopathy measuring 2 cm. Lymph node located in the upper chest wall under the pectoralis muscle measuring 1.2 cm. Right breast with 2 areas with masslike enhancement. No evidence of metastatic disease in the abdomen pelvis. Bone scan was unremarkable for metastatic bone disease.    June 2019-very poor tolerance to cisplatin, Gemzar/Herceptin. She was recommended Xeloda/ Lapatinib.     6/22/2019-she was recommended and started on Xeloda 850 mg/m2 PO BID days 1 through 14 every 21 days and  Lapatinib 1250 mg daily continuously.    November 2019- progression on prior Xeloda and lapatinib.    11/23/2019- recommended rechallenge with Herceptin and Navelbine vs clinical trial at [facility].    12/23/2019-CT chest abdomen pelvis showed metastatic disease in the right axillary lymph node and diffuse bilateral skin involvement of both breasts with several nodules. No evidence of pulmonary, liver or bone metastasis. CT of the head with contrast was unremarkable.    1/3/2020- the patient was unable to follow-up at [facility] for clinical trial. Therefore recommended Fam-trastuzumab deruxtecan every 3 weeks.    1/10/2020-she was started on palliative treatment with Fam-trastuzumab    12/27/19-2/11/20 3800 cGy palliative radiation to the right breast    6/5/2020- Ct Chest/Abdomen/Pelvis W Contrast No lymphadenopathy. A previously seen enlarged right axillary lymph node is now small in size. There is some stranding of the fat around the lymph node. Bilateral breast skin thickening left greater than right. Prior lumpectomy on the left with left axillary lymph node sampling. Previously seen nodularity in the right breast on CT is not visualized today.There may be minimal radiation change in the periphery of the left lung. There is no dense consolidation or effusion. No definite metastatic lung nodules. No evidence of abdominal or pelvic neoplastic process or metastatic disease. The previously seen right breast nodule is not visualized in this study. Postsurgical changes of the left breast. No significant change in the previous study. Lobulated skin thickening of the left lower lateral chest and upper abdominal wall which was not noted in the previous study. The etiology is not certain. This data be clinically correlated and further evaluated.    8/7/2020- the patient is currently awaiting for palliative radiation to the chest wall for the skin margins. She has agreed and want to start on  Herceptin, Xeloda and Tucatinib.    8/21/2020 Tumor Marker- CA 15.3- 32.19    8/24/20-9/23/20 2925 cGy palliative radiation left chest wall skin metastasis.    10/9/2020-continue Herceptin and tucatinib.    12/11/2020-she has resumed Xeloda on low-dose 500 mg p.o. twice daily 7 days on 7 days off, tucatinib and Herceptin    2/19/21 tumor markers: CA 27-29 11.8 CA 15-3 13.6 CEA 1.9    2/26/21 Ct Chest W Contrast No evidence of intrathoracic metastasis. Scarring suspected within the left upper and right middle lobes. 3. Diffuse left greater than right breast skin thickening with prominence of the trabecular breast tissue. Postoperative changes of the left breast and axilla. No pathologic axillary or intrathoracic lymphadenopathy localized.     2/26/21 Ct Abdomen Pelvis W Iv Contrast No evidence of intra-abdominal pelvic metastasis. Hepatic steatosis. Superior left renal cyst. Constipation.     2/26/21 2D echo: Mitral valve leaflets are mildly thickened with preserved leaflet mobility. Mildly thickened aortic valve leaflets with preserved leaflet mobility. Tricuspid valve is structurally normal. Mild tricuspid regurgitation with estimated RVSP of 16 mm Hg. Normal left ventricular size with preserved LV function and an estimated ejection fraction of approximately 55-60%. Normal left ventricular wall thickness. No regional wall motion abnormalities.    5/7/21 Skin lesion, right flank, punch biopsy: Invasive breast cancer involving dermal lymphatics    5/28/21 Tumor markers: CA 27-29= 21.2 CEA= 1.5 CA 15-3 =    7/9/2021-she was referred to radiation for consideration of palliative radiation to the skin lesions. However, she declined the referral and wants to try some holistic approach. She is not interested in cytotoxic therapy at this time.    07/28/21-skin lesions is significantly worse. Recommended radiation.    7/29/21 CT CHEST W CONTRAST No evidence of intrathoracic metastasis. Similar left greater than right breast  skin thickening with increased trabecular pattern of the left breast which may represent sequelae of radiation. Postoperative changes of the left breast and axilla. No pathologic axillary or internal mammary lymphadenopathy. Left subclavian port in place with tip at the atrial caval junction.    7/29/21 CT ABDOMEN PELVIS W IV CONTRAST No evidence of intra-abdominal or pelvic metastatic disease. Small stable cyst at the upper pole left kidney. Moderate constipation.   8/9/21- Initiate radiation therapy with anticipated dose of 6000 cGy radiation therapy anterior chest and posterior back        Objective      Vitals:    09/10/21 1100 09/10/21 1638 09/10/21 2210 09/11/21 0513   BP: 125/52 136/59 145/66 133/64   BP Location: Left leg Right leg Right leg Right leg   Patient Position: Sitting Sitting Sitting Sitting   Pulse: 69 65 61 74   Resp: 16 18 18 18   Temp: 97.6 °F (36.4 °C) 98 °F (36.7 °C) 97.4 °F (36.3 °C) 97.6 °F (36.4 °C)   TempSrc: Oral Oral Oral Oral   SpO2: 96% 96% 96% 92%   Weight:       Height:             Lab Results:    Lab Results (last 72 hours)     Procedure Component Value Units Date/Time    Comprehensive Metabolic Panel [[id number]]  (Abnormal) Collected: 09/06/21 0407    Specimen: Blood Updated: 09/06/21 0446     Glucose 97 mg/dL      BUN 4 mg/dL      Creatinine 0.40 mg/dL      Sodium 137 mmol/L      Potassium 3.8 mmol/L      Chloride 102 mmol/L      CO2 28.0 mmol/L      Calcium 8.6 mg/dL      Total Protein 6.4 g/dL      Albumin 3.20 g/dL      ALT (SGPT) <5 U/L      AST (SGOT) 21 U/L      Alkaline Phosphatase 46 U/L      Total Bilirubin 0.3 mg/dL      eGFR  African Amer >150 mL/min/1.73      Globulin 3.2 gm/dL      A/G Ratio 1.0 g/dL      BUN/Creatinine Ratio 10.0     Anion Gap 7.0 mmol/L     Narrative:      GFR Normal >60  Chronic Kidney Disease <60  Kidney Failure <15      CK [[id number]]  (Normal) Collected: 09/06/21 0407    Specimen: Blood Updated: 09/06/21 0446     Creatine Kinase 83 U/L     CBC  Auto Differential [[id number]]  (Abnormal) Collected: 09/06/21 0407    Specimen: Blood Updated: 09/06/21 0420     WBC 2.88 10*3/mm3      RBC 3.30 10*6/mm3      Hemoglobin 9.6 g/dL      Hematocrit 29.9 %      MCV 90.6 fL      MCH 29.1 pg      MCHC 32.1 g/dL      RDW 16.2 %      RDW-SD 54.3 fl      MPV 8.6 fL      Platelets 284 10*3/mm3      Neutrophil % 63.9 %      Lymphocyte % 16.7 %      Monocyte % 16.7 %      Eosinophil % 1.7 %      Basophil % 0.7 %      Immature Grans % 0.3 %      Neutrophils, Absolute 1.84 10*3/mm3      Lymphocytes, Absolute 0.48 10*3/mm3      Monocytes, Absolute 0.48 10*3/mm3      Eosinophils, Absolute 0.05 10*3/mm3      Basophils, Absolute 0.02 10*3/mm3      Immature Grans, Absolute 0.01 10*3/mm3      nRBC 0.0 /100 WBC     COVID-19,[facility] IN-HOUSE,Nasal Swab No Transport Media 3-4 HR TAT - Swab, Nasal Cavity [[id number]]  (Normal) Collected: 09/05/21 0737    Specimen: Swab from Nasal Cavity Updated: 09/05/21 0859     COVID19 Not Detected    Narrative:      Fact sheet for providers: https://www.fda.gov/media/702984/download     Fact sheet for patients: https://www.fda.gov/media/184126/download    Test performed by PCR.    Consider negative results in combination with clinical observations, patient history, and epidemiological information.    Sedimentation Rate [[id number]]  (Abnormal) Collected: 09/05/21 0716    Specimen: Blood Updated: 09/05/21 0819     Sed Rate 104 mm/hr     C-reactive Protein [[id number]]  (Abnormal) Collected: 09/05/21 0716    Specimen: Blood Updated: 09/05/21 0759     C-Reactive Protein 3.61 mg/dL     Comprehensive Metabolic Panel [[id number]]  (Abnormal) Collected: 09/05/21 0716    Specimen: Blood Updated: 09/05/21 0759     Glucose 96 mg/dL      BUN 4 mg/dL      Creatinine 0.41 mg/dL      Sodium 139 mmol/L      Potassium 3.7 mmol/L      Chloride 103 mmol/L      CO2 28.0 mmol/L      Calcium 8.9 mg/dL      Total Protein 6.5 g/dL      Albumin 3.20 g/dL      ALT (SGPT) 5 U/L       AST (SGOT) 24 U/L      Alkaline Phosphatase 49 U/L      Total Bilirubin 0.3 mg/dL      eGFR  African Amer >150 mL/min/1.73      Globulin 3.3 gm/dL      A/G Ratio 1.0 g/dL      BUN/Creatinine Ratio 9.8     Anion Gap 8.0 mmol/L     Narrative:      GFR Normal >60  Chronic Kidney Disease <60  Kidney Failure <15      CBC & Differential [[id number]]  (Abnormal) Collected: 09/05/21 0716    Specimen: Blood Updated: 09/05/21 0736    Narrative:      The following orders were created for panel order CBC & Differential.  Procedure                               Abnormality         Status                     ---------                               -----------         ------                     CBC Auto Differential[[id number]]        Abnormal            Final result                 Please view results for these tests on the individual orders.    CBC Auto Differential [[id number]]  (Abnormal) Collected: 09/05/21 0716    Specimen: Blood Updated: 09/05/21 0736     WBC 2.82 10*3/mm3      RBC 3.45 10*6/mm3      Hemoglobin 9.5 g/dL      Hematocrit 31.3 %      MCV 90.7 fL      MCH 27.5 pg      MCHC 30.4 g/dL      RDW 16.1 %      RDW-SD 53.4 fl      MPV 9.0 fL      Platelets 338 10*3/mm3      Neutrophil % 68.8 %      Lymphocyte % 13.1 %      Monocyte % 15.6 %      Eosinophil % 1.1 %      Basophil % 0.7 %      Immature Grans % 0.7 %      Neutrophils, Absolute 1.94 10*3/mm3      Lymphocytes, Absolute 0.37 10*3/mm3      Monocytes, Absolute 0.44 10*3/mm3      Eosinophils, Absolute 0.03 10*3/mm3      Basophils, Absolute 0.02 10*3/mm3      Immature Grans, Absolute 0.02 10*3/mm3      nRBC 0.0 /100 WBC           Radiology Results:    Imaging Results (Last 72 Hours)     Procedure Component Value Units Date/Time    US Venous Doppler Upper Extremity Bilateral (duplex) [[id number]] Collected: 09/08/21 1626     Updated: 09/08/21 1631    Narrative:      History: Pain and swelling       Impression:      Impression: There is no evidence of deep venous  thrombosis or  superficial thrombophlebitis of bilateral upper extremities. This is a  limited exam.     Comments: Bilateral upper extremity venous duplex exam was performed  using color Doppler flow, Doppler wave form analysis, and grayscale  imaging, with and without compression. There is no evidence of deep  venous thrombosis of the axillary, brachial, radial, or ulnar veins in  the right upper extremity. There is also no evidence of deep venous  thrombosis in the brachial, radial, and ulnar veins in the left upper  extremity. There is no thrombus identified in the cephalic or basilic  veins bilaterally.          This report was finalized on 09/08/2021 16:28 by Dr. Luis Oh MD.            Physical Exam  Constitutional:       Appearance: She is well-developed.      Comments: Appears chronically ill   HENT:      Head: Normocephalic and atraumatic.      Nose: Nose normal.   Eyes:      General: Lids are normal.         Right eye: No discharge.         Left eye: No discharge.      Conjunctiva/sclera: Conjunctivae normal.   Neck:      Trachea: Trachea normal.   Cardiovascular:      Rate and Rhythm: Normal rate and regular rhythm.   Pulmonary:      Effort: Pulmonary effort is normal. No respiratory distress.      Breath sounds: Normal breath sounds. No wheezing or rales.   Abdominal:      General: Bowel sounds are normal.      Palpations: Abdomen is soft.   Genitourinary:     Comments: Exam deferred  Musculoskeletal:         General: Normal range of motion.      Cervical back: Normal range of motion and neck supple.   Skin:     General: Skin is warm and dry.      Findings: No erythema, petechiae or rash.      Nails: There is no clubbing.      Comments: Significant radiation burn (skin thickening and hyperpigmentation) to anterior and posterior chest, including breasts, with some scabbing posteriorly. Port left anterior CW   Neurological:      Mental Status: She is alert and oriented to person, place, and time.       Cranial Nerves: No cranial nerve deficit.      Sensory: No sensory deficit.   Psychiatric:         Speech: Speech normal.         Behavior: Behavior normal. Behavior is cooperative.         Thought Content: Thought content normal.         Judgment: Judgment normal.       ASSESSMENT:  #Metastatic HER-2 migel breast cancer (diffuse ant and post chest wall skin involvement)  She has received several lines of chemotherapy in the past and several rounds of palliative radiation     7/31/2021-CT chest abdomen pelvis showed no evidence of visceral metastasis.     Currently on palliative RT. Last dose about 1-2 weeks ago. She received 6/30 treatments - Unable to lie down on the table for treatment.     Dr. Darby did reevaluate her again on 9/8/2021 and unfortunately she is still unable to lie down and tolerate treatment    #Cancer related pain-palliative care helping with pain control  Morphine ER 60mg PO q 12 h (dose increased on 9/5/2021)  Naprosyn 500 mg p.o. twice daily  Dexamethasone 4 mg p.o. daily  Dilaudid 1 mg IV every 2 hours as needed pain  Morphine IR 30 mg p.o. 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 She denies any significant change from previous evaluation.    #Lymphopenia-       #Normocytic Hypochromic anemia, stable      #Bilateral upper extremity lymphedema- secondary to extensive radiation and skin involvement.   PT following    Noninvasive venous studies BUE 9/7/2021 was limited due to the patient's pain and radiation burns.  No thrombus visualized however bilateral jugular veins, subclavian vein, left axillary veins not visualized    #LLL consolidation, R basilar atelectasis    pCXR 9/5/2021      Incentive spirometry  Rocephin 1 g IV daily      Plan:  Wound care following  Lymphedema PT   Palliative care -- Continue pain management  disposition home with hospice  Follow-up apt with Dr. Ott on 10/8/2021 at  12:00    CHRIS Wadsworth    9/11/2021    07:16 CDT     Physicians attestation and contribution:    I, Barrett Moreno, personally and independently performed an evaluation on Ayla Echeverria  I have reviewed relevant medical information/data to include but not limited to the medication list, relevant appropriate lab work and imaging when applicable.  I reviewed other physician's notes, ancillary services and nurses assessments.  I have reviewed the above documentation completed by Radha PEREZ  Please see my additional addended and/or modified contributions to the history of present illness, physical examination and assessment/medical decision-making and plan that reflects my findings and impressions.  I discussed the essential elements of the care plan with Radha PEREZ and the patient.  I have encouraged and answered all the questions raised to the patient's understanding and satisfaction.  I concur with the above stated.    Subjective: Awake alert and talkative, very interactive.  Pain seems to be under better control although she is still having itching problems.    Objective: Examination is stable today, again with no changes    Assessment/plan:  Wound care following  Lymphedema PT   Palliative care -- Continue pain management  disposition home with hospice  Follow-up apt with Dr. Ott on 10/8/2021 at 12:00      Barrett Moreno MD  9/11/2021 07:47 CDT

## 2021-09-11 NOTE — PROGRESS NOTES
"  CC:\"im doing better\"--she feels her pain is improved  Review of Systems   Constitutional: Negative.    HENT: Negative.    Eyes: Negative.    Respiratory: Negative.    Cardiovascular: Positive for chest pain.   Gastrointestinal: Negative.    Endocrine: Negative.    Genitourinary: Negative.    Musculoskeletal: Negative.    Skin: Negative.    Allergic/Immunologic: Negative.    Neurological: Negative.    Hematological: Negative.    Psychiatric/Behavioral: Negative.      Temp:  [97.4 °F (36.3 °C)-98 °F (36.7 °C)] 97.6 °F (36.4 °C)  Heart Rate:  [61-74] 74  Resp:  [16-18] 18  BP: (125-145)/(52-66) 133/64  I/O last 3 completed shifts:  In: 960 [P.O.:960]  Out: 3000 [Urine:3000]  No intake/output data recorded.    Physical Exam  Vitals and nursing note reviewed.   Constitutional:       Appearance: Normal appearance. She is normal weight.   HENT:      Head: Normocephalic and atraumatic.      Nose: Nose normal.      Mouth/Throat:      Mouth: Mucous membranes are dry.      Pharynx: Oropharynx is clear.   Eyes:      Extraocular Movements: Extraocular movements intact.      Conjunctiva/sclera: Conjunctivae normal.      Pupils: Pupils are equal, round, and reactive to light.   Cardiovascular:      Rate and Rhythm: Normal rate and regular rhythm.      Pulses: Normal pulses.      Heart sounds: Normal heart sounds.   Pulmonary:      Effort: Pulmonary effort is normal.      Breath sounds: Normal breath sounds.   Abdominal:      General: Abdomen is flat. Bowel sounds are normal.      Palpations: Abdomen is soft.   Musculoskeletal:         General: Normal range of motion.      Cervical back: Normal range of motion and neck supple.   Skin:     General: Skin is warm and dry.      Capillary Refill: Capillary refill takes less than 2 seconds.   Neurological:      General: No focal deficit present.      Mental Status: She is alert and oriented to person, place, and time. Mental status is at baseline.   Psychiatric:         Mood and " Affect: Mood normal.         Behavior: Behavior normal.         Thought Content: Thought content normal.         Judgment: Judgment normal.           Arm swelling    Chest wall pain  she tells me she and her daughters are meeting with hospice tomorrow

## 2021-09-11 NOTE — DISCHARGE PLACEMENT REQUEST
"Ayla Echeverria (55 y.o. Female)     Date of Birth Social Security Number Address Home Phone MRN    1965  [id number] [place] [address] [phone] [id number]    Restoration[employer] Marital Status          Tenriism        Admission Date Admission Type Admitting Provider Attending Provider Department, Room/Bed    9/5/21 Emergency Jack Cárdenas MD [place], Thomas Waldon, MD [facility] 4B, 412/1    Discharge Date Discharge Disposition Discharge Destination         Home or Self Care              Attending Provider: Jack Cárdenas MD    Allergies: No Known Allergies    Isolation: None   Infection: None   Code Status: CPR    Ht: 160 cm (62.99\")   Wt: 70.9 kg (156 lb 6.4 oz)    Admission Cmt: None   Principal Problem: None                Active Insurance as of 9/5/2021     Primary Coverage     Payor Plan Insurance Group Employer/Plan Group    ANTHEM BLUE CROSS ANTHEM BLUE CROSS BLUE SHIELD PPO [id number]     Payor Plan Address Payor Plan Phone Number Payor Plan Fax Number Effective Dates    [address] [phone]  1/1/2018 - None Entered    [facility] [id number]       Subscriber Name Subscriber Birth Date Member ID       AYLA ECHEVERRIA 1965 [id number]                 Emergency Contacts      (Rel.) Home Phone Work Phone Mobile Phone    Danielle Rios (Daughter) -- -- [phone]    AmadeoIman beverly (Sister) [phone] -- --    Mayela Tejeda (Friend) [phone] -- --    ELSA BARRAGAN (Daughter) -- -- [phone]               History & Physical      Jack Cárdenas MD at 09/06/21 0824          [facility]  HISTORY AND PHYSICAL    Date of Admission: 9/5/2021  Primary Care Physician: Jack Cárdenas MD    Subjective    Chief Complaint: \"my chest is killing me\"    This is a very pleasant 55 yr old lady with hx of breast ca undergoing rad tx who presented with persistent unbearable chest wall pain. She could not even tolerate ekg pads. " She is admitted to my services.      Review of Systems   Constitutional: Positive for activity change.   HENT: Negative.    Eyes: Negative.    Respiratory: Negative.    Cardiovascular: Positive for chest pain.   Gastrointestinal: Negative.    Endocrine: Negative.    Genitourinary: Negative.    Musculoskeletal: Negative.    Skin: Negative.    Allergic/Immunologic: Negative.    Neurological: Negative.    Hematological: Negative.    Psychiatric/Behavioral: Negative.         Otherwise complete ROS reviewed and negative except as mentioned in the HPI.      Past Medical History:   Past Medical History:   Diagnosis Date   • Breast cancer (CMS/HCC)        Past Surgical History:  Past Surgical History:   Procedure Laterality Date   • AXILLARY LYMPH NODE BIOPSY/EXCISION Left    • BREAST BIOPSY Left 03/31/2016   • BREAST LUMPECTOMY WITH SENTINEL NODE BIOPSY Left 10/03/2016    residual invasive carcinoma, grade 3 (0.2cm); margins negative; extensive lymphvascular space invasion; 2 sentinel lymph nodes positive (2/3)       Social History:  reports that she has never smoked. She has never used smokeless tobacco. She reports that she does not drink alcohol and does not use drugs.    Family History: family history includes Colon cancer in her father; Diabetes in her sister; Hypertension in her sister; Lung cancer in her mother; Lupus in her sister; Prostate cancer in her father.     Allergies:  No Known Allergies    Medications:  Prior to Admission medications    Medication Sig Start Date End Date Taking? Authorizing Provider   APPLE CIDER VINEGAR PO Take 1 tablet by mouth Daily.    ProviderJose F MD   HYDROcodone-acetaminophen (NORCO)  MG per tablet Take 1 tablet by mouth Every 6 (Six) Hours As Needed for Moderate Pain . 1/5/20   Jack De La Cruz DO   hydrOXYzine (ATARAX) 50 MG tablet Take 50 mg by mouth Every 6 (Six) Hours As Needed for Itching.    Jose F Jara MD   montelukast (SINGULAIR) 10 MG tablet Take   "by mouth. 7/13/16   Jose F Jara MD   Morphine (MSIR) 15 MG tablet Take 15 mg by mouth Every 4 (Four) Hours As Needed for Severe Pain .    Jose F Jara MD   Multiple Vitamin (MULTI VITAMIN DAILY PO) Take 1 tablet by mouth Daily.    Jose F Jara MD   pantoprazole (PROTONIX) 40 MG EC tablet Take 40 mg by mouth Daily.    Jose F Jara MD   Probiotic Product (PROBIOTIC-10 PO) Take 1 tablet by mouth Daily.    Jose F Jara MD   SELENIUM ER PO Take 1 tablet by mouth Daily.    Jose F Jara MD   Zinc Sulfate 66 MG tablet Take 1 tablet by mouth Daily.    Jose F Jara MD       Objective    Vital Signs: /60 (BP Location: Left leg, Patient Position: Lying)   Pulse 87   Temp 98.6 °F (37 °C) (Oral)   Resp 16   Ht 160 cm (62.99\")   Wt 72.7 kg (160 lb 3.2 oz)   SpO2 95%   BMI 28.39 kg/m²   Physical Exam  Vitals and nursing note reviewed.   Constitutional:       Appearance: Normal appearance. She is normal weight.   HENT:      Head: Normocephalic and atraumatic.      Nose: Nose normal.      Mouth/Throat:      Mouth: Mucous membranes are moist.   Eyes:      Pupils: Pupils are equal, round, and reactive to light.   Cardiovascular:      Rate and Rhythm: Normal rate and regular rhythm.      Pulses: Normal pulses.      Heart sounds: Normal heart sounds.   Pulmonary:      Effort: Pulmonary effort is normal.      Breath sounds: Normal breath sounds.   Abdominal:      General: Abdomen is flat. Bowel sounds are normal.      Palpations: Abdomen is soft.   Musculoskeletal:         General: Normal range of motion.      Cervical back: Normal range of motion.   Skin:     General: Skin is warm and dry.      Capillary Refill: Capillary refill takes less than 2 seconds.   Neurological:      General: No focal deficit present.      Mental Status: She is alert and oriented to person, place, and time. Mental status is at baseline.   Psychiatric:         Mood and Affect: Mood " "normal.         Behavior: Behavior normal.         Thought Content: Thought content normal.         Judgment: Judgment normal.         Results Reviewed:  Lab Results (last 24 hours)     ** No results found for the last 24 hours. **        Imaging Results (Last 24 Hours)     ** No results found for the last 24 hours. **            Active Hospital Problems    Diagnosis    • Chest wall pain    • Arm swelling        Assessment / Plan  Increase pain meds---consult her oncologist and rad tx---see orders      Code Status: see chart     I discussed the patient's findings and my recommendations with the patient..    Estimated length of stay 3 days.    Jack Cárdenas MD   09/07/21   06:59 CDT    Electronically signed by Jack Cárdenas MD at 09/07/21 0700          Physician Progress Notes (most recent note)      Barrett Moreno MD at 09/11/21 0716          MEDICAL ONCOLOGY PROGRESS NOTE      Pt Name: Ayla Echeverria  MRN: [id number]  [id number]  YOB: 1965  Date of evaluation: 9/11/2021   ROOM: 412      Chief Compliant: \"body is getting stiff\" still itching. Anticipates home with hospice    HISTORY OF PRESENT ILLNESS:    Ayla Echeverria is well known to my clinic and is followed for a diagnosis of recurrent HER-2 positive/ER 1% and FL 6% invasive ductal carcinoma of the breast. She was initially diagnosed in 2016.  She had locally advanced disease.  Unfortunately, she had significant compliance issues when she was diagnosed.  Later on she developed widespread metastatic disease with predominantly skin recurrence. She has received several lines of treatment with chemotherapy agents as well as radiation to the skin.  She is currently receiving palliative radiation.  She has had significant complains of pain, ulceration of the skin lesions.  She presented to the ER department on 9/5/2021 at [facility] due to uncontrolled pain.     ONCOLOGIC HISTORY  Diagnosis   IDC left breast, March 2016 "   Grade 2. ER 1%, DC 6%, HER-2/migel IHC 3+/FISH amplified ratio 6.7, AR 95%.   01/30/2017- Biopsy-proven in breast relapse/Inflammatory breast cancer. ER negative, DC negative, HER-2/migel IHC 3+/FISH amplified ratio 4.5, Ki-67 32%.   MyRisk - Revealed no clinically significant mutation. PTEN VUS.  5/7/21 Invasive breast cancer involving dermal lymphatics    Treatment summary  Neoadjuvant adjuvant chemotherapy TCH-P ×6 cycles. She declined to complete 1 year of Herceptin.   She declined radiation therapy.   10/3/2016-left lumpectomy/sentinel lymph node   01/30/2017- Biopsy-proven in breast relapse/Inflammatory breast cancer.   5/11/2017-Chemotherapy with paclitaxel/carboplatin weekly. Herceptin/Pertuzumab every 3 weeks started 05/11/2017. She had a allergic reaction to carboplatin and this was discontinued.   7/21/2017- 9/1/2017 Neuropathy grade 2 with Taxol. Therefore, switched to Taxotere/Herceptin/Pertuzumab. Discontinued due to progression of disease.   9/22/2017 through 3/2/2018- Kadycla every 3 weeks. (stopped as per patient request, neuropathy grade 1)   7/10/2018 through 11/30/2018- Kadcyla.   1/4/2019-Navelbine x 1 cycle with very poor tolerance (pain)   1/25/2018-Gemzar/Herceptin   3/15/2019- low-dose Cisplatin added to Gemzar and Herceptin regimen, regimen changed to every other week   4/16/2019-5/23/19 5040cGy palliative radiation to left clavicle/chest wall   6/22/2019- Xeloda/Lapatinib  12/27/19-2/11/20 3800 cGy palliative radiation to the right breast.  1/10/2020- palliative treatment with Fam-Trastuzumab- stopped Feb due to severe toxicity  8/24/20-9/23/20 2925 cGy palliative radiation left chest wall skin metastasis.  8/21/2020-Herceptin, Xeloda and Tucatinib  5/28/21- Stop Tucatinib and Xeloda due to progression in skin  7/9/21 Declined palliative radiation  8/9/21 Initiate radiation therapy with anticipated dose of 6000 cGy radiation therapy anterior chest and posterior back    Tumor target  Skin  chest wall left upper chest/left breast   Skin on back and both sides    Cancer history- Left breast recurrence with Inflammatory Breast Cancer ER 8%/CT negative &HER-2 positive.  Ms Echeverria was seen in initial oncology consultation on 4/20/2017 referred for a diagnosis of a left breast recurrence with inflammatory breast cancer. She was initially treated by Dr. Peres at [facility], [place]. Prior breast cancer history is as follows:    3/22/2016-a diagnostic mammogram for a palpable mass showed a left breast abnormal findings.   4/1/2017- ultrasound-guided core needle biopsy was consistent with invasive ductal carcinoma, grade 2. ER 1%, CT 6%, HER-2/migel IHC 3+/FISH amplified ratio 6.7., AR 95%.   She underwent 6 cycles of TCH-P from April 2016 through September 2016 with G-CSF support. She had a total of 18 weeks of trastuzumab treatment.     10/3/2016-she underwent a left lumpectomy with left sentinel lymph node revealing an invasive ductal carcinoma, grade 3. Margins clear of invasive carcinoma (1.1 cm from the lateral margin), extensive lymphovascular space invasion. 2 out of 3 sentinel lymph nodes positive for metastatic carcinoma. Final pathologic staging [id number])[id number].   She declined adjuvant radiation therapy and adjuvant Herceptin completion.                               -------------------In-breast Recurrence Jan2017---------------------------  1/30/2017- she was seen by Dr. Subhash Delaney with new complaints of erythema in the left breast. A skin punch biopsy was consistent with metastatic ductal carcinoma with extensive dermal lymphovascular invasion. ER negative, CT negative, HER-2/migel IHC 3+/FISH amplified ratio 4.5, Ki-67 32%. Foundation one showed ERBB2 amplification, AURKA amplification, TP53 and .     2/15/2017- she was again seen by Dr. Delaney and explained about her tumor recurrence. Unfortunately, she declined any further intervention at that time.     4/21/2017- she asked Dr. Delaney for a  referral to us.     4/28/2017-referred to me for further evaluation. I recommended a PET scan and neoadjuvant chemotherapy with carboplatin/paclitaxel and Perjeta and Herceptin.     5/4/2017-PET/CT scan revealed abnormal metabolic activity present in multiple regions throughout the left breast and in the skin( SUV 8.9, 7.4, 7.3). In the left supraclavicular region a cluster of lymph nodes present (SUV of 3). Axillary lymph nodes with SUV of 2.8. Tonsils bilaterally with SUV 5.6 (symmetric). No abnormal metabolic activity in the intrathoracic or intra-abdominal pelvic region. No abnormal skeletal metabolic activity. I recommended weekly carboplatin/weekly Taxol/Herceptin and Pertuzumab.     5/26/2017-she had a severe allergic reaction after 2 doses of carboplatin and therefore this was discontinued. She was continued only on Taxol weekly/Herceptin and Pertuzumab.     7/21/2017-after cycle #2 and 8 doses of Taxol she developed neuropathy related to chemotherapy and therefore chemotherapy was switched from Taxol to Taxotere.   Genetic assessment- [place] [id number] revealed no clinically significant mutation. PTEN VUS.     9/1/2017-local in breast disease progression with increasing breast erythema. Treatment switched to Kadcyla.     12/27/2017- repeat re-staging CT scan of the abdomen and pelvis documented no evidence of intra-abdominal pelvic metastasis. Superior left renal cyst. Mild hepatic steatosis. CT scan of the chest documented no evidence of intrathoracic metastasis. Bone scan documented no evidence of bony metastatic disease.     3/2/2018-after 8 cycles of Kadcyla she requested treatment to be on hold. She has requested her PCP a referral to [facility] for a second opinion. She has neuropathy grade 1.     5/2/2018- 2 months off treatment (Kadcyla). She has not yet been seen at [facility]. Apparently, [facility] is gathering her records and will see her soon. Examination of her chest wall showed recurrent disease in  the left upper chest. Residual neuropathy grade 1. She could not tolerate gabapentin, and therefore has stopped it. She was not interested in any other medication for neuropathy at this time.     7/10/2018-after discussion at [facility] she was recommended to reinitiate Kadcyla.     7/13/2018-CT of chest, abdomen, pelvis showed no evidence of intra-abdominal disease. There is an enlarged right-sided level 1 lymph node that was not appreciated previously, measuring 2.5 x 1.4 cm, metastatic disease considered.     7/16/2018-PET CT scan showed diffuse uptake throughout the left breast extending to the left chest wall. Abnormal uptake within the left axilla lymph nodes and left internal mammary lymph nodes concerning for metastatic disease. Enlarged hypermetabolic right axilla lymph node concerning for metastatic disease with SUV 8.0. Abnormal uptake within a right cervical lymph node, etiology unclear, but could be reactive.     10/26/2018 - CT scan of the chest was completed at [facility] and compared to CT scan of 12/27/2017 versus her last CT scan completed at [facility] on 7/13/2018. The CT scan identified a right axillary lymph node measuring 1.2 cm, otherwise no intrathoracic neoplastic process/metastatic disease.    10/26/2018 -CT scan of the abdomen and pelvis was completed at [facility] and compared to CT scan of     12/27/2017 versus her last CT scan completed at [facility] on 7/13/2018. No evidence of intra-abdominal or pelvic metastasis was identified.  December 2018-interval worsening of skin metastasis in the left breast and diffuse erythema with new nodules.    12/17/2018-skin biopsy of overlying right breast consistent invasive ductal carcinoma, high-grade. ER 20%, MT 0%, HER-2/migel 3+.    12/17/2018-right axillary FNA biopsy consistent invasive ductal carcinoma.     12/26/2018-recommended clinical trial at [facility].    1/4/2019-started on Navelbine/Herceptin, due to delays on the clinical  trial at [place]. Unfortunately, she had severe chest wall pain and generalized pain related to Navelbine.     1/25/2019-4/19/2019 she was switched to cisplatin/Gemzar/Herceptin, but had disease progression.     4/16/2019-5/23/19 5040cGy palliative radiation to left clavicle/chest wall     5/30/2019-CT chest, abdomen, pelvis and bone scan was unremarkable for right axillary adenopathy measuring 2 cm. Lymph node located in the upper chest wall under the pectoralis muscle measuring 1.2 cm. Right breast with 2 areas with masslike enhancement. No evidence of metastatic disease in the abdomen pelvis. Bone scan was unremarkable for metastatic bone disease.    June 2019-very poor tolerance to cisplatin, Gemzar/Herceptin. She was recommended Xeloda/ Lapatinib.     6/22/2019-she was recommended and started on Xeloda 850 mg/m2 PO BID days 1 through 14 every 21 days and Lapatinib 1250 mg daily continuously.    November 2019- progression on prior Xeloda and lapatinib.    11/23/2019- recommended rechallenge with Herceptin and Navelbine vs clinical trial at [facility].    12/23/2019-CT chest abdomen pelvis showed metastatic disease in the right axillary lymph node and diffuse bilateral skin involvement of both breasts with several nodules. No evidence of pulmonary, liver or bone metastasis. CT of the head with contrast was unremarkable.    1/3/2020- the patient was unable to follow-up at [facility] for clinical trial. Therefore recommended Fam-trastuzumab deruxtecan every 3 weeks.    1/10/2020-she was started on palliative treatment with Fam-trastuzumab    12/27/19-2/11/20 3800 cGy palliative radiation to the right breast    6/5/2020- Ct Chest/Abdomen/Pelvis W Contrast No lymphadenopathy. A previously seen enlarged right axillary lymph node is now small in size. There is some stranding of the fat around the lymph node. Bilateral breast skin thickening left greater than right. Prior lumpectomy on the left  with left axillary lymph node sampling. Previously seen nodularity in the right breast on CT is not visualized today.There may be minimal radiation change in the periphery of the left lung. There is no dense consolidation or effusion. No definite metastatic lung nodules. No evidence of abdominal or pelvic neoplastic process or metastatic disease. The previously seen right breast nodule is not visualized in this study. Postsurgical changes of the left breast. No significant change in the previous study. Lobulated skin thickening of the left lower lateral chest and upper abdominal wall which was not noted in the previous study. The etiology is not certain. This data be clinically correlated and further evaluated.    8/7/2020- the patient is currently awaiting for palliative radiation to the chest wall for the skin margins. She has agreed and want to start on Herceptin, Xeloda and Tucatinib.    8/21/2020 Tumor Marker- CA 15.3- 32.19    8/24/20-9/23/20 2925 cGy palliative radiation left chest wall skin metastasis.    10/9/2020-continue Herceptin and tucatinib.    12/11/2020-she has resumed Xeloda on low-dose 500 mg p.o. twice daily 7 days on 7 days off, tucatinib and Herceptin    2/19/21 tumor markers: CA 27-29 11.8 CA 15-3 13.6 CEA 1.9    2/26/21 Ct Chest W Contrast No evidence of intrathoracic metastasis. Scarring suspected within the left upper and right middle lobes. 3. Diffuse left greater than right breast skin thickening with prominence of the trabecular breast tissue. Postoperative changes of the left breast and axilla. No pathologic axillary or intrathoracic lymphadenopathy localized.     2/26/21 Ct Abdomen Pelvis W Iv Contrast No evidence of intra-abdominal pelvic metastasis. Hepatic steatosis. Superior left renal cyst. Constipation.     2/26/21 2D echo: Mitral valve leaflets are mildly thickened with preserved leaflet mobility. Mildly thickened aortic valve leaflets with preserved leaflet mobility. Tricuspid  valve is structurally normal. Mild tricuspid regurgitation with estimated RVSP of 16 mm Hg. Normal left ventricular size with preserved LV function and an estimated ejection fraction of approximately 55-60%. Normal left ventricular wall thickness. No regional wall motion abnormalities.    5/7/21 Skin lesion, right flank, punch biopsy: Invasive breast cancer involving dermal lymphatics    5/28/21 Tumor markers: CA 27-29= 21.2 CEA= 1.5 CA 15-3 =    7/9/2021-she was referred to radiation for consideration of palliative radiation to the skin lesions. However, she declined the referral and wants to try some holistic approach. She is not interested in cytotoxic therapy at this time.    07/28/21-skin lesions is significantly worse. Recommended radiation.    7/29/21 CT CHEST W CONTRAST No evidence of intrathoracic metastasis. Similar left greater than right breast skin thickening with increased trabecular pattern of the left breast which may represent sequelae of radiation. Postoperative changes of the left breast and axilla. No pathologic axillary or internal mammary lymphadenopathy. Left subclavian port in place with tip at the atrial caval junction.    7/29/21 CT ABDOMEN PELVIS W IV CONTRAST No evidence of intra-abdominal or pelvic metastatic disease. Small stable cyst at the upper pole left kidney. Moderate constipation.   8/9/21- Initiate radiation therapy with anticipated dose of 6000 cGy radiation therapy anterior chest and posterior back        Objective      Vitals:    09/10/21 1100 09/10/21 1638 09/10/21 2210 09/11/21 0513   BP: 125/52 136/59 145/66 133/64   BP Location: Left leg Right leg Right leg Right leg   Patient Position: Sitting Sitting Sitting Sitting   Pulse: 69 65 61 74   Resp: 16 18 18 18   Temp: 97.6 °F (36.4 °C) 98 °F (36.7 °C) 97.4 °F (36.3 °C) 97.6 °F (36.4 °C)   TempSrc: Oral Oral Oral Oral   SpO2: 96% 96% 96% 92%   Weight:       Height:             Lab Results:    Lab Results (last 72 hours)      Procedure Component Value Units Date/Time    Comprehensive Metabolic Panel [[id number]]  (Abnormal) Collected: 09/06/21 0407    Specimen: Blood Updated: 09/06/21 0446     Glucose 97 mg/dL      BUN 4 mg/dL      Creatinine 0.40 mg/dL      Sodium 137 mmol/L      Potassium 3.8 mmol/L      Chloride 102 mmol/L      CO2 28.0 mmol/L      Calcium 8.6 mg/dL      Total Protein 6.4 g/dL      Albumin 3.20 g/dL      ALT (SGPT) <5 U/L      AST (SGOT) 21 U/L      Alkaline Phosphatase 46 U/L      Total Bilirubin 0.3 mg/dL      eGFR  African Amer >150 mL/min/1.73      Globulin 3.2 gm/dL      A/G Ratio 1.0 g/dL      BUN/Creatinine Ratio 10.0     Anion Gap 7.0 mmol/L     Narrative:      GFR Normal >60  Chronic Kidney Disease <60  Kidney Failure <15      CK [[id number]]  (Normal) Collected: 09/06/21 0407    Specimen: Blood Updated: 09/06/21 0446     Creatine Kinase 83 U/L     CBC Auto Differential [[id number]]  (Abnormal) Collected: 09/06/21 0407    Specimen: Blood Updated: 09/06/21 0420     WBC 2.88 10*3/mm3      RBC 3.30 10*6/mm3      Hemoglobin 9.6 g/dL      Hematocrit 29.9 %      MCV 90.6 fL      MCH 29.1 pg      MCHC 32.1 g/dL      RDW 16.2 %      RDW-SD 54.3 fl      MPV 8.6 fL      Platelets 284 10*3/mm3      Neutrophil % 63.9 %      Lymphocyte % 16.7 %      Monocyte % 16.7 %      Eosinophil % 1.7 %      Basophil % 0.7 %      Immature Grans % 0.3 %      Neutrophils, Absolute 1.84 10*3/mm3      Lymphocytes, Absolute 0.48 10*3/mm3      Monocytes, Absolute 0.48 10*3/mm3      Eosinophils, Absolute 0.05 10*3/mm3      Basophils, Absolute 0.02 10*3/mm3      Immature Grans, Absolute 0.01 10*3/mm3      nRBC 0.0 /100 WBC     COVID-19,[facility] IN-HOUSE,Nasal Swab No Transport Media 3-4 HR TAT - Swab, Nasal Cavity [[id number]]  (Normal) Collected: 09/05/21 0737    Specimen: Swab from Nasal Cavity Updated: 09/05/21 0859     COVID19 Not Detected    Narrative:      Fact sheet for providers: https://www.fda.gov/media/415148/download     Fact sheet  for patients: https://www.[place].gov/media/391144/download    Test performed by PCR.    Consider negative results in combination with clinical observations, patient history, and epidemiological information.    Sedimentation Rate [[id number]]  (Abnormal) Collected: 09/05/21 0716    Specimen: Blood Updated: 09/05/21 0819     Sed Rate 104 mm/hr     C-reactive Protein [[id number]]  (Abnormal) Collected: 09/05/21 0716    Specimen: Blood Updated: 09/05/21 0759     C-Reactive Protein 3.61 mg/dL     Comprehensive Metabolic Panel [[id number]]  (Abnormal) Collected: 09/05/21 0716    Specimen: Blood Updated: 09/05/21 0759     Glucose 96 mg/dL      BUN 4 mg/dL      Creatinine 0.41 mg/dL      Sodium 139 mmol/L      Potassium 3.7 mmol/L      Chloride 103 mmol/L      CO2 28.0 mmol/L      Calcium 8.9 mg/dL      Total Protein 6.5 g/dL      Albumin 3.20 g/dL      ALT (SGPT) 5 U/L      AST (SGOT) 24 U/L      Alkaline Phosphatase 49 U/L      Total Bilirubin 0.3 mg/dL      eGFR  African Amer >150 mL/min/1.73      Globulin 3.3 gm/dL      A/G Ratio 1.0 g/dL      BUN/Creatinine Ratio 9.8     Anion Gap 8.0 mmol/L     Narrative:      GFR Normal >60  Chronic Kidney Disease <60  Kidney Failure <15      CBC & Differential [[id number]]  (Abnormal) Collected: 09/05/21 0716    Specimen: Blood Updated: 09/05/21 0736    Narrative:      The following orders were created for panel order CBC & Differential.  Procedure                               Abnormality         Status                     ---------                               -----------         ------                     CBC Auto Differential[[id number]]        Abnormal            Final result                 Please view results for these tests on the individual orders.    CBC Auto Differential [[id number]]  (Abnormal) Collected: 09/05/21 0716    Specimen: Blood Updated: 09/05/21 0736     WBC 2.82 10*3/mm3      RBC 3.45 10*6/mm3      Hemoglobin 9.5 g/dL      Hematocrit 31.3 %      MCV 90.7 fL      MCH  27.5 pg      MCHC 30.4 g/dL      RDW 16.1 %      RDW-SD 53.4 fl      MPV 9.0 fL      Platelets 338 10*3/mm3      Neutrophil % 68.8 %      Lymphocyte % 13.1 %      Monocyte % 15.6 %      Eosinophil % 1.1 %      Basophil % 0.7 %      Immature Grans % 0.7 %      Neutrophils, Absolute 1.94 10*3/mm3      Lymphocytes, Absolute 0.37 10*3/mm3      Monocytes, Absolute 0.44 10*3/mm3      Eosinophils, Absolute 0.03 10*3/mm3      Basophils, Absolute 0.02 10*3/mm3      Immature Grans, Absolute 0.02 10*3/mm3      nRBC 0.0 /100 WBC           Radiology Results:    Imaging Results (Last 72 Hours)     Procedure Component Value Units Date/Time    US Venous Doppler Upper Extremity Bilateral (duplex) [[id number]] Collected: 09/08/21 1626     Updated: 09/08/21 1631    Narrative:      History: Pain and swelling       Impression:      Impression: There is no evidence of deep venous thrombosis or  superficial thrombophlebitis of bilateral upper extremities. This is a  limited exam.     Comments: Bilateral upper extremity venous duplex exam was performed  using color Doppler flow, Doppler wave form analysis, and grayscale  imaging, with and without compression. There is no evidence of deep  venous thrombosis of the axillary, brachial, radial, or ulnar veins in  the right upper extremity. There is also no evidence of deep venous  thrombosis in the brachial, radial, and ulnar veins in the left upper  extremity. There is no thrombus identified in the cephalic or basilic  veins bilaterally.          This report was finalized on 09/08/2021 16:28 by Dr. Luis Oh MD.            Physical Exam  Constitutional:       Appearance: She is well-developed.      Comments: Appears chronically ill   HENT:      Head: Normocephalic and atraumatic.      Nose: Nose normal.   Eyes:      General: Lids are normal.         Right eye: No discharge.         Left eye: No discharge.      Conjunctiva/sclera: Conjunctivae normal.   Neck:      Trachea: Trachea  normal.   Cardiovascular:      Rate and Rhythm: Normal rate and regular rhythm.   Pulmonary:      Effort: Pulmonary effort is normal. No respiratory distress.      Breath sounds: Normal breath sounds. No wheezing or rales.   Abdominal:      General: Bowel sounds are normal.      Palpations: Abdomen is soft.   Genitourinary:     Comments: Exam deferred  Musculoskeletal:         General: Normal range of motion.      Cervical back: Normal range of motion and neck supple.   Skin:     General: Skin is warm and dry.      Findings: No erythema, petechiae or rash.      Nails: There is no clubbing.      Comments: Significant radiation burn (skin thickening and hyperpigmentation) to anterior and posterior chest, including breasts, with some scabbing posteriorly. Port left anterior CW   Neurological:      Mental Status: She is alert and oriented to person, place, and time.      Cranial Nerves: No cranial nerve deficit.      Sensory: No sensory deficit.   Psychiatric:         Speech: Speech normal.         Behavior: Behavior normal. Behavior is cooperative.         Thought Content: Thought content normal.         Judgment: Judgment normal.       ASSESSMENT:  #Metastatic HER-2 migel breast cancer (diffuse ant and post chest wall skin involvement)  She has received several lines of chemotherapy in the past and several rounds of palliative radiation     7/31/2021-CT chest abdomen pelvis showed no evidence of visceral metastasis.     Currently on palliative RT. Last dose about 1-2 weeks ago. She received 6/30 treatments - Unable to lie down on the table for treatment.     Dr. Darby did reevaluate her again on 9/8/2021 and unfortunately she is still unable to lie down and tolerate treatment    #Cancer related pain-palliative care helping with pain control  Morphine ER 60mg PO q 12 h (dose increased on 9/5/2021)  Naprosyn 500 mg p.o. twice daily  Dexamethasone 4 mg p.o. daily  Dilaudid 1 mg IV every 2 hours as needed pain  Morphine IR  30 mg p.o. 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 She denies any significant change from previous evaluation.    #Lymphopenia-       #Normocytic Hypochromic anemia, stable      #Bilateral upper extremity lymphedema- secondary to extensive radiation and skin involvement.   PT following    Noninvasive venous studies BUE 9/7/2021 was limited due to the patient's pain and radiation burns.  No thrombus visualized however bilateral jugular veins, subclavian vein, left axillary veins not visualized    #LLL consolidation, R basilar atelectasis    pCXR 9/5/2021      Incentive spirometry  Rocephin 1 g IV daily      Plan:  Wound care following  Lymphedema PT   Palliative care -- Continue pain management  disposition home with hospice  Follow-up apt with Dr. Ott on 10/8/2021 at 12:00    CHRIS Wadsworth    9/11/2021    07:16 CDT     Physicians attestation and contribution:    I, Barrett Moreno, personally and independently performed an evaluation on Ayla Echeverria  I have reviewed relevant medical information/data to include but not limited to the medication list, relevant appropriate lab work and imaging when applicable.  I reviewed other physician's notes, ancillary services and nurses assessments.  I have reviewed the above documentation completed by Radha PEREZ  Please see my additional addended and/or modified contributions to the history of present illness, physical examination and assessment/medical decision-making and plan that reflects my findings and impressions.  I discussed the essential elements of the care plan with Radha PEREZ and the patient.  I have encouraged and answered all the questions raised to the patient's understanding and satisfaction.  I concur with the above stated.    Subjective: Awake alert and talkative, very interactive.  Pain seems to be under better control although  she is still having itching problems.    Objective: Examination is stable today, again with no changes    Assessment/plan:  Wound care following  Lymphedema PT   Palliative care -- Continue pain management  disposition home with hospice  Follow-up apt with Dr. Ott on 10/8/2021 at 12:00      Barrett Moreno MD  9/11/2021 07:47 CDT                  Electronically signed by Barrett Moreno MD at 09/11/21 0748          Consult Notes (most recent note)      Elodia Duenas APRN at 09/07/21 0808      Consult Orders    1. Inpatient Palliative Care Consult [[id number]] ordered by Donald Ott MD at 09/05/21 1110               Palliative Care Initial Consult   Attending Physician: Jack Cárdenas MD  Referring Provider: Horace Ott MD/Jack Cárdenas MD    Reason for Referral: assistance with clarification of goals of care and pain  Family/Support: children x 4    Code Status and Medical Interventions:   Ordered at: 09/05/21 1035     Code Status:    CPR     Medical Interventions (Level of Support Prior to Arrest):    Full     Goals of Care: TBD.    HPI:   55 y.o. female with past medical history significant for invasive ductal carcinoma the left breast-2016 with widespread metastatic disease, received multiple lines of chemotherapy and radiation, sees Dr. Ott. Patient presented to [facility] on [facility]/2021 related to cancer related pain secondary to ulcerations of skin lesions and palliative radiation treatment.  Opiate changes on admission per oncology from [facility] ER 30 mg every 8 to 60 mg every 12.  Lymphedema treatment inpatient and also sees in the outpatient setting.  Plans to continue palliative radiation in the inpatient setting.  Chest x-ray reveals small left pleural effusion with left basilar consolidation concerning for pneumonia. Palliative Care Spoke With: patient Marcialiffen decline in quality of life and functional status given complications cancer  treatments and progression of disease. She has also been evaluated by wound care clinic on the outpatient setting but has not been able to successfully utilize recommended treatments due to inability to keep dressings in place.  Verbalizes her pain has become significantly intolerable over the last few weeks.  Current pain level 7/10, stabbing to the chest, prickling of the skin, extreme tightness of the skin itself.  She has difficulties laying flat or on her chest during palliative radiation treatments and was unable to complete scheduled treatment this morning.  She would like to identify a better level of pain control with out the associated lethargy.  She is also experiencing quite a bit of mobility issues with her left lower extremity due to inflammation.  Due to the Palliative Care Topics Discussed: palliative care, goals of care, care options, resuscitation status and discharge options we will establish an advance care plan.   Advance Care Planning   Advance Care Planning Discussion: Care conference with patient.  We explored current hospitalization, progression of disease, and ongoing previous cancer related treatments.  Verbalizes she has not been able to tolerate previous cancer treatments well. She is hopeful for a treatment offered that will be tolerable.  We explored current medical priorities including CODE STATUS and medical interventions.  She remains undecided with regard to care initiatives including CPR, but does state that she would not wish to remain on life support measures long-term.  She denies having any conversation with her children with regard to her medical priorities, though they are currently assisting her with completion of an advanced directive.  She remains undecided with regard to her healthcare surrogate.  She has 4 adult children of which currently would act as next of kin decision makers if needed.  Again, I have encouraged patient to have continuing conversation with her  family to ensure they are able to make decisions on her behalf if needed in a manner to honor her own medical wishes. Verbalizes understanding.    Review of Systems   Constitutional: Positive for malaise/fatigue.   Cardiovascular: Negative for dyspnea on exertion.   Respiratory: Negative for shortness of breath.    Hematologic/Lymphatic: Negative for bleeding problem.   Skin: Positive for poor wound healing, skin cancer and suspicious lesions.        Radiation burns to chest and back.  Left upper extremity axillary lesion.   Musculoskeletal: Positive for muscle weakness.   Gastrointestinal: Negative for nausea.   Genitourinary: Negative for dysuria.   Neurological: Positive for weakness.   Psychiatric/Behavioral: The patient is not nervous/anxious.      1- Pain Assessment  Nonverbal Indicators of Pain: nonverbal indicators absent  Pain Description: aching    Past Medical History:   Diagnosis Date   • Breast cancer (CMS/HCC)      Past Surgical History:   Procedure Laterality Date   • AXILLARY LYMPH NODE BIOPSY/EXCISION Left    • BREAST BIOPSY Left 03/31/2016   • BREAST LUMPECTOMY WITH SENTINEL NODE BIOPSY Left 10/03/2016    residual invasive carcinoma, grade 3 (0.2cm); margins negative; extensive lymphvascular space invasion; 2 sentinel lymph nodes positive (2/3)     Social History     Socioeconomic History   • Marital status:      Spouse name: Not on file   • Number of children: Not on file   • Years of education: Not on file   • Highest education level: Not on file   Tobacco Use   • Smoking status: Never Smoker   • Smokeless tobacco: Never Used   Substance and Sexual Activity   • Alcohol use: No   • Drug use: No   • Sexual activity: Defer       Current Facility-Administered Medications   Medication Dose Route Frequency Provider Last Rate Last Admin   • cefTRIAXone (ROCEPHIN) 1 g in sodium chloride 0.9 % 100 mL IVPB-VTB  1 g Intravenous Q24H Carmine Lao PA       • HYDROcodone-acetaminophen (NORCO)   MG per tablet 1 tablet  1 tablet Oral Q6H PRN Jack Cárdenas MD   1 tablet at 09/07/21 0017   • HYDROmorphone (DILAUDID) injection 1 mg  1 mg Intravenous Q2H PRN Jack Cárdenas MD   1 mg at 09/07/21 0631    And   • naloxone (NARCAN) injection 0.4 mg  0.4 mg Intravenous Q5 Min PRN Jack Cárdenas MD       • hydrOXYzine (ATARAX) tablet 50 mg  50 mg Oral Q6H PRN Jack Cárdenas MD   50 mg at 09/07/21 0521   • montelukast (SINGULAIR) tablet 10 mg  10 mg Oral Daily Jack Cárdenas MD       • Morphine (MS CONTIN) 12 hr tablet 60 mg  60 mg Oral Q12H Donald Ott MD   60 mg at 09/06/21 2054   • Morphine (MSIR) tablet 15 mg  15 mg Oral Q4H PRN Jack Cárdenas MD   15 mg at 09/07/21 0521   • ondansetron (ZOFRAN) injection 4 mg  4 mg Intravenous Q6H PRN Jack Cárdenas MD       • pantoprazole (PROTONIX) EC tablet 40 mg  40 mg Oral Daily Jack Cárdenas MD       • sennosides-docusate (PERICOLACE) 8.6-50 MG per tablet 1 tablet  1 tablet Oral BID PRN Jack Cárdenas MD   1 tablet at 09/06/21 1428   • sodium chloride 0.9 % flush 10 mL  10 mL Intravenous PRN Jack Cárdenas MD       • sodium chloride 0.9 % flush 10 mL  10 mL Intravenous Q12H Jack Cárdenas MD       • sodium chloride 0.9 % flush 10 mL  10 mL Intravenous PRN Jack Cárdenas MD       • sodium chloride 0.9 % infusion  50 mL/hr Intravenous Continuous Jack Cárdenas MD 50 mL/hr at 09/07/21 0631 50 mL/hr at 09/07/21 0631     sodium chloride, 50 mL/hr, Last Rate: 50 mL/hr (09/07/21 0631)      HYDROcodone-acetaminophen  •  HYDROmorphone **AND** naloxone  •  hydrOXYzine  •  Morphine  •  ondansetron  •  senna-docusate sodium  •  Insert peripheral IV **AND** sodium chloride  •  sodium chloride    No Known Allergies  Current medication reviewed for route, type, dose and frequency and are current per MAR at time of dictation.      Intake/Output Summary (Last 24 hours) at  "9/7/2021 0808  Last data filed at 9/6/2021 1842  Gross per 24 hour   Intake 600 ml   Output 400 ml   Net 200 ml       Physical Exam:    Diagnostics: Reviewed  /60 (BP Location: Left leg, Patient Position: Lying)   Pulse 87   Temp 98.6 °F (37 °C) (Oral)   Resp 16   Ht 160 cm (62.99\")   Wt 72.7 kg (160 lb 3.2 oz)   SpO2 95%   BMI 28.39 kg/m²     Vitals and nursing note reviewed.   Constitutional:       Appearance: Ill-appearing and chronically ill-appearing.   Eyes:      General: Lids are normal.      Pupils: Pupils are equal, round, and reactive to light.   HENT:      Head: Normocephalic.   Lymphadenopathy:        Upper Body:      Left upper body: Axillary adenopathy present.   Pulmonary:      Effort: Pulmonary effort is normal.   Chest:      Comments: Significant radiation burns in a T-shirt fashion to chest and back.  Pigmentation changes.  Lesion to left axillary with drainage.  Cardiovascular:      Normal rate.      Comments: Bilateral lower extremity edema and tightness  Edema:     Peripheral edema present.  Abdominal:      Palpations: Abdomen is soft.   Musculoskeletal:      Cervical back: Neck supple.      Comments: Significant lymphedema bilateral upper extremities Skin:     General: Skin is warm.      Comments: Skin changes to chest, breast, back as previously documented due to radiation burns and cancer lesions.   Genitourinary:     Comments: No reported dysuria  Neurological:      Comments: Somnolent   Psychiatric:      Comments: Calm, slow response but appropriate secondary to accelerated opiate use     Patient status: Disease state: Controlled with current treatments.  Functional status: Palliative Performance Scale Score: Performance 40% based on the following measures: Ambulation: Mainly in bed, Activity and Evidence of Disease: Unable to do any work, extensive evidence of disease, Self-Care: Mainly assistance required,  Intake: Normal or reduced, LOC: Full, drowsy or confusion   ECOG " Status(3) Capable of limited self-care, confined to bed or chair > 50% of waking hours.  Nutritional status: Albumin 3.20. Body mass index is 28.39 kg/m².         Family support: The patient receives support from her children..  Advance Directives: Advance Directive Status: Patient does not have advance directive   POA/Healthcare surrogate-children x4-next of kin.    Impression/Problem List:    1.  Metastatic HER-2 migel breast cancer, mets diffuse chest wall, back, and skin  2.  Cancer related pain  3.  Peripheral neuropathy secondary to chemotherapy  4.  Lymphopenia  5.  Bilateral upper extremity lymphedema    Recommendations/Plan:  1. plan: Goals of care include CODE STATUS CPR/full interventions.    2.  Palliative care encounter  -Overall poor prognosis  -Continue full aggressive care interventions at this juncture.  Patient that she would not wish to remain on life support measures long-term.  I have encouraged further conversations with family to ensure ongoing conversation with regard to medical priorities.    3.  Cancer related pain  -Received 375 mg oral morphine equivalent over the last 24 hours.  Recent opiate changes on admission. Feels that her pain is finally getting to a tolerable level though this increases also leading to increased somnolence.  -will continue MSER to 60 mg every 12 hours as this was just increased on admission. Would like to monitor adjuvant therapies for effective loss of treatment prior to increasing to every 8 hours. See below  -Will increase MSIR to 30 mg every 4 hours as needed and will try to wean Dilaudid needs down to every 4 hours for breakthrough pain needs not controlled with MSIR.  -will d/c hydrocodone and simplify opiates  -add gabapentin 100 mg every 8 hours as pain adjuvant for neuropathic pain  -add decadron 4 mg IV every 12 x2 doses then daily 4 mg tab x 5 days as pain adjuvant for inflammation  -add naproxen 500 mg twice daily with meals as pain adjuvant for  inflammation  -Will continue Protonix for PPI prophylaxis  -Add senna S2 tabs nightly for bowel prophylaxis and patient on chronic opioid therapy  -Will have wound nurse evaluate/treat to assist with wound care as this has not been very successful in outpatient setting.    4.  Pruritus  -Atarax per attending  -We will add Decadron as above as this should also act as adjuvant.    Thank you for this consult and allowing us to participate in patient's plan of care. Palliative Care Team will continue to follow patient.     Time spent: 99 minutes spent reviewing medical and medication records, assessing and examining patient, discussing with family, answering questions, providing some guidance about a plan and documentation of care, and coordinating care with other healthcare members, with > 50% time spent face to face.   20 minutes spent on advance care planning.    CHRIS Elmore  9/7/2021                Electronically signed by Elodia Duenas APRN at 09/07/21 0909          Physical Therapy Notes (most recent note)      Eufemia San, PT DPT at 09/06/21 1503  Version 1 of 1       Goal Outcome Evaluation:  Plan of Care Reviewed With: patient        Progress: no change  Outcome Summary: PT screen completed. Received orders for lymphedema treatment. Pt reports that she goes to OP therapy for treatment, and missed her last appointment due to not feeling well. Therapist encouraged patient to contact OP clinic to inform them of hospitalization and to ask if there is anything she or her family can be doing to maintain progress with edema during hospital stay. Pt given OP clinic's contact information to call tomorrow. PT to sign off as there is no skilled treatment able to be provided in this setting.    Electronically signed by Eufemia San PT DPT at 09/06/21 1503       Occupational Therapy Notes (most recent note)    No notes exist for this encounter.         Discharge Summary    No notes of this type exist  for this encounter.       [address] [address]  Phone:  [phone]  Fax:  [phone]        Patient: ROOM: [facility]   Ayla Echeverria MRN:  [id number]   [address]   [place] [address] :  1965  SSN:    Phone: [phone] Sex:  F   PCP: Jack Cárdenas                 Emergency Contact Information      Name Relation Home Work Mobile     Danielle Rios Daughter     [phone]     Iman Childs Sister [phone]         Mayela Tejeda Friend [phone]         ELSA BARRAGAN Daughter     [phone]          [employer] # SUBSCRIBER ID   Primary:    MYLENE BRYANT [id number]   Admitting Diagnosis: Arm swelling [M79.89]  Order Date:  Sep 10, 2021         Inpatient Hospice / [facility] Consult       (Order ID: [id number])     Diagnosis:         Priority:  Routine Expected Date:   Expiration Date:        Interval:   Count:    Reason for Consult? pt would like to discuss before d/c  Notify [facility] of order. Who did you speak with? Spoke with Ana Maria Moyer          Verbal Order Mode: Verbal with readback   Authorizing Provider: Barrett Moreno MD  Authorizing Provider's NPI: [id number]     Order Entered By: Duyen Fowler RN 9/10/2021  9:53 AM     Electronically signed by: Barrett Moreno MD 2021  6:51 AM

## 2021-09-12 PROBLEM — R52 INTRACTABLE PAIN: Status: ACTIVE | Noted: 2019-12-05

## 2021-09-12 NOTE — PLAN OF CARE
Problem: Adult Inpatient Plan of Care  Goal: Plan of Care Review  Outcome: Ongoing, Not Progressing  Goal: Patient-Specific Goal (Individualized)  Outcome: Ongoing, Not Progressing  Goal: Absence of Hospital-Acquired Illness or Injury  Outcome: Ongoing, Not Progressing  Intervention: Identify and Manage Fall Risk  Recent Flowsheet Documentation  Taken 9/12/2021 1030 by Darrell Estrada RN  Safety Promotion/Fall Prevention: safety round/check completed  Intervention: Prevent and Manage VTE (venous thromboembolism) Risk  Recent Flowsheet Documentation  Taken 9/12/2021 1030 by Darrell Estrada RN  VTE Prevention/Management:   bilateral   sequential compression devices on  Intervention: Prevent Infection  Recent Flowsheet Documentation  Taken 9/12/2021 1030 by Darrell Estrada RN  Infection Prevention: hand hygiene promoted  Goal: Optimal Comfort and Wellbeing  Outcome: Ongoing, Not Progressing  Intervention: Provide Person-Centered Care  Recent Flowsheet Documentation  Taken 9/12/2021 1030 by Darrell Estrada RN  Trust Relationship/Rapport: care explained  Goal: Readiness for Transition of Care  Outcome: Ongoing, Not Progressing     Problem: Coping Ineffective (Oncology Care)  Goal: Effective Coping  Outcome: Ongoing, Not Progressing     Problem: Fatigue (Oncology Care)  Goal: Improved Activity Tolerance  Outcome: Ongoing, Not Progressing  Intervention: Promote Energy Conservation  Recent Flowsheet Documentation  Taken 9/12/2021 1030 by Darrell Estrada RN  Activity Management: activity adjusted per tolerance     Problem: Oral Intake Altered (Oncology Care)  Goal: Optimal Oral Intake  Outcome: Ongoing, Not Progressing     Problem: Oral Mucositis (Oncology Care)  Goal: Improved Oral Mucous Membrane Integrity  Outcome: Ongoing, Not Progressing     Problem: Pain Acute (Oncology Care)  Goal: Optimal Pain Control  Outcome: Ongoing, Not Progressing  Intervention: Develop Pain Management Plan  Recent Flowsheet  Documentation  Taken 9/12/2021 1030 by Darrell Estrada RN  Pain Management Interventions: see MAR     Problem: Pain Chronic (Persistent)  Goal: Acceptable Pain Control and Functional Ability  Outcome: Ongoing, Not Progressing  Intervention: Develop Pain Management Plan  Recent Flowsheet Documentation  Taken 9/12/2021 1030 by Darrell Estrada RN  Pain Management Interventions: see MAR  Intervention: Manage Persistent Pain  Recent Flowsheet Documentation  Taken 9/12/2021 1030 by Darrell Estrada RN  Medication Review/Management: medications reviewed   Goal Outcome Evaluation:      Patient wishes pain is under control so she could go home, Denies need and want for hospice at this time.  Patient voices need for therapy to reduce pain. Refuses to sit, is pacing throughout room mumbling to herself. Uses call light appropriately.

## 2021-09-12 NOTE — PLAN OF CARE
Goal Outcome Evaluation:  Plan of Care Reviewed With: patient        Progress: no change  Outcome Summary: Received pt from ER this shift with reports of uncontrolled pain r/t breast cancer. A&Ox4, VSS. Home medications reordered, pain medicine given as ordered with some relief reported per pt. BUE edematous, chest and arms tender to touch. Some swelling noted to BLE as well. Upper extremeties elevated. Every attempt made to ensure pt comfort. Family at bedside. Safety maintained, will continue to monitor.

## 2021-09-12 NOTE — H&P
"[facility]  HISTORY AND PHYSICAL    Date of Admission: 9/11/2021  Primary Care Physician: Jack Cárdenas MD    Subjective    Chief Complaint: \"I am hurting bad\"    This is a 55 yr old lady with recent hospitalization due to chest wall pain from breast cancer with subsequent rad damage to the skin. She was discharged to home with hospice  But upon admission she changed her mind and declined hospice.We tried to send in scrpts for her pain but the only pharmacy open locally could not fill her scripts due to staff decrease due to covid 19 according to our nursing staff. Therefore she returned to the hospital for pain control.      Review of Systems   Constitutional: Positive for activity change.   HENT: Negative.    Eyes: Negative.    Respiratory: Negative.    Cardiovascular: Positive for chest pain.   Gastrointestinal: Negative.    Endocrine: Negative.    Genitourinary: Negative.    Musculoskeletal: Positive for back pain.   Skin: Negative.    Allergic/Immunologic: Negative.    Neurological: Positive for weakness.   Hematological: Negative.    Psychiatric/Behavioral: Positive for sleep disturbance. The patient is nervous/anxious.         Otherwise complete ROS reviewed and negative except as mentioned in the HPI.      Past Medical History:   Past Medical History:   Diagnosis Date   • Breast cancer (CMS/HCC)        Past Surgical History:  Past Surgical History:   Procedure Laterality Date   • AXILLARY LYMPH NODE BIOPSY/EXCISION Left    • BREAST BIOPSY Left 03/31/2016   • BREAST LUMPECTOMY WITH SENTINEL NODE BIOPSY Left 10/03/2016    residual invasive carcinoma, grade 3 (0.2cm); margins negative; extensive lymphvascular space invasion; 2 sentinel lymph nodes positive (2/3)       Social History:  reports that she has never smoked. She has never used smokeless tobacco. She reports that she does not drink alcohol and does not use drugs.    Family History: family history includes Colon cancer in her " "father; Diabetes in her sister; Hypertension in her sister; Lung cancer in her mother; Lupus in her sister; Prostate cancer in her father.     Allergies:  No Known Allergies    Medications:  Prior to Admission medications    Medication Sig Start Date End Date Taking? Authorizing Provider   APPLE CIDER VINEGAR PO Take 1 tablet by mouth Daily.    Provider, MD Jose F   Biotene Dry Mouth Moisturizing (BIOTENE) solution Apply 1 spray to the mouth or throat 5 (Five) Times a Day. 9/11/21   Jack Cárdenas MD   dexamethasone (DECADRON) 4 MG tablet Take 1 tablet by mouth Daily With Breakfast. 9/12/21   Jack Cárdenas MD   gabapentin (NEURONTIN) 100 MG capsule Take 1 capsule by mouth 3 (Three) Times a Day. 9/11/21   Jack Cárdenas MD   montelukast (SINGULAIR) 10 MG tablet Take 1 tablet by mouth Daily. 9/12/21   Jack Cárdenas MD   Morphine (MS CONTIN) 60 MG 12 hr tablet Take 1 tablet by mouth Every 8 (Eight) Hours. 9/11/21   Jack Cárdenas MD   Morphine (MSIR) 15 MG tablet Take 2 tablets by mouth Every 4 (Four) Hours As Needed for Severe Pain . 9/11/21   Jack Cárdenas MD   naproxen (NAPROSYN) 500 MG tablet Take 1 tablet by mouth 2 (Two) Times a Day With Meals. 9/11/21   Jack Cárdenas MD   sennosides-docusate (PERICOLACE) 8.6-50 MG per tablet Take 1 tablet by mouth 2 (Two) Times a Day As Needed for Constipation. 9/11/21   Jack Cárdenas MD       Objective    Vital Signs: /56 (BP Location: Left leg, Patient Position: Lying)   Pulse 68   Temp 97.8 °F (36.6 °C) (Oral)   Resp 15   Ht 160 cm (63\")   Wt 77.4 kg (170 lb 9.6 oz)   LMP  (LMP Unknown)   SpO2 97%   BMI 30.22 kg/m²   Physical Exam  Vitals and nursing note reviewed.   Constitutional:       Appearance: Normal appearance. She is normal weight.   HENT:      Head: Normocephalic and atraumatic.      Right Ear: Ear canal normal.      Left Ear: Ear canal normal.      Nose: Nose normal.      " Mouth/Throat:      Mouth: Mucous membranes are dry.      Pharynx: Oropharynx is clear.   Eyes:      Extraocular Movements: Extraocular movements intact.      Conjunctiva/sclera: Conjunctivae normal.      Pupils: Pupils are equal, round, and reactive to light.   Cardiovascular:      Rate and Rhythm: Normal rate and regular rhythm.      Pulses: Normal pulses.   Pulmonary:      Effort: Pulmonary effort is normal.      Breath sounds: Normal breath sounds.   Abdominal:      General: Abdomen is flat. Bowel sounds are normal.      Palpations: Abdomen is soft.   Musculoskeletal:         General: Normal range of motion.      Cervical back: Normal range of motion and neck supple.   Skin:     General: Skin is warm and dry.      Capillary Refill: Capillary refill takes less than 2 seconds.   Neurological:      General: No focal deficit present.      Mental Status: She is alert and oriented to person, place, and time. Mental status is at baseline.   Psychiatric:         Mood and Affect: Mood normal.         Behavior: Behavior normal.         Thought Content: Thought content normal.         Judgment: Judgment normal.       Results Reviewed:  Lab Results (last 24 hours)     Procedure Component Value Units Date/Time    COVID PRE-OP / PRE-PROCEDURE SCREENING ORDER (NO ISOLATION) - Swab, Nasal Cavity [[id number]]  (Normal) Collected: 09/11/21 2259    Specimen: Swab from Nasal Cavity Updated: 09/12/21 0008    Narrative:      The following orders were created for panel order COVID PRE-OP / PRE-PROCEDURE SCREENING ORDER (NO ISOLATION) - Swab, Nasal Cavity.  Procedure                               Abnormality         Status                     ---------                               -----------         ------                     COVID-19,[facility]...[[id number]]  Normal              Final result                 Please view results for these tests on the individual orders.    COVID-19,[facility] IN-HOUSE,Nasal Swab No Transport Media  3-4 HR TAT - Swab, Nasal Cavity [[id number]]  (Normal) Collected: 09/11/21 2259    Specimen: Swab from Nasal Cavity Updated: 09/12/21 0008     COVID19 Not Detected    Narrative:      Fact sheet for providers: https://www.fda.gov/media/417176/download     Fact sheet for patients: https://www.fda.gov/media/317647/download    Test performed by PCR.    Consider negative results in combination with clinical observations, patient history, and epidemiological information.        Imaging Results (Last 24 Hours)     ** No results found for the last 24 hours. **            Active Hospital Problems    Diagnosis    • Chest wall pain    • Intractable pain    • History of radiation therapy    • Regional lymph node metastasis present (CMS/HCC)        Assessment / Plan  Will try to help again with pain--she is agreeable to hospice care now--I have discusssed with       Code Status: full code     I discussed the patient's findings and my recommendations with the patient and her daughter.    Estimated length of stay 24hrs.    Jack Cárdenas MD   09/12/21   11:24 CDT

## 2021-09-13 NOTE — PLAN OF CARE
Goal Outcome Evaluation:      PATIENT IS ALERT AND ORIENTED, HAS COMPLAINT OF PAIN TO UPPER TORSO FRONT AND BACK R/T SKIN DAMAGE FROM RADIATION THERAPY PER PATIENT. SKIN IS UNREMARKABLE EXCEPT FOR UPPER CHEST/BREASTS AND BACK, UNDER ARMS AND FLANK AREAS HAVE CRUSTY SCABBED AND SCARRED SKIN. GROSS LYMPHEDEMA TO BOTH ARMS, SLIGHTLY WORSE ON THE LEFT. LOWER EXTREMITIES ALSO HAVE SOME EDEMA, PATIENT IS ABLE TO AMBULATE INDEPENDENTLY AND JUST NEEDS ASSIST GETTING BACK TO CHAIR/BED.  CALL LIGHT IS WITHIN REACH AND PATIENT DEMONSTRATES USE. MOVES ALL EXTREMITIES, WITH SOME DIFFICULTY MOVING UPPER EXTREMITIES, POOR FINE MOTOR OF THE FINGERS AND HANDS. PATIENT EATING WELL WITHOUT DIFFICULTY. SAFETY IS MAINTAINED WITH HOURLY ROUNDING.

## 2021-09-13 NOTE — CONSULTS
"Palliative Care Initial Consult   Attending Physician: Jack Cárdenas MD  Referring Provider: Jack Vicente MD    Reason for Referral: assistance with clarification of goals of care and hospice referral or discussion  Family/Support: Children x4    Code Status and Medical Interventions:   Ordered at: 09/13/21 0626     Code Status:    CPR     Medical Interventions (Level of Support Prior to Arrest):    Full     Goals of Care: TBD.    HPI:   55 y.o. female with past medical history significant for  invasive ductal carcinoma the left breast-2016 with widespread metastatic disease, received multiple lines of chemotherapy and radiation, sees Dr. Ott. Patient presented to [facility] on 9/5/2021 related to cancer related pain secondary to ulcerations of skin lesions and palliative radiation treatment.  We obtained a therapeutic pain management regimen during hospitalization and patient was discharged home with hospice services on 9/11.  However, according to chart review patient was not able to obtain pain medications at local pharmacy secondary to staffing issues and rehospitalized secondary to missed opiate dosing.  She received 255 mg oral morphine equivalent over the last 24 hours in the form of MS ER and MS IR.  Of particular note, breakthrough dosing of opiates has been decreased by half since discharged on 911.  She states current pain seems reasonably controlled though her pruritus continues to be severe.Advance Care Planning   Advance Care Planning Discussion: Now adamantly refuses option of hospice, despite limited treatment options/intolerance of treatments. States she needs some rehab... Discussed concerns of safe discharge, decreased mobility, and very limited caregiver support. \"I have 4 children, but they have their own lives and very overwhelmed with my current situation and I don't blame them\".  She also states she has multiple siblings though she has had challenges with " "securing adequate support in the home setting.  States she may be agreeable to SNF short term for rehab in the [place] area \"if my insurance would cover. Against placement in the [place], [place] area. Relayed to SW.       Review of Systems   Constitutional: Positive for malaise/fatigue.   Cardiovascular: Negative for dyspnea on exertion.   Respiratory: Negative for shortness of breath.    Hematologic/Lymphatic: Negative for bleeding problem.   Skin: Positive for poor wound healing, skin cancer and suspicious lesions.        Radiation burns to chest and back.  Left upper extremity axillary lesion.   Musculoskeletal: Positive for muscle weakness.   Gastrointestinal: Negative for nausea.   Genitourinary: Negative for dysuria.   Neurological: Positive for weakness.   Psychiatric/Behavioral: The patient is not nervous/anxious.      1- Pain Assessment  Nonverbal Indicators of Pain: anxious, moaning, rocking  Pain Location: chest  Pain Description: constant, aching, throbbing    Past Medical History:   Diagnosis Date   • Breast cancer (CMS/HCC)      Past Surgical History:   Procedure Laterality Date   • AXILLARY LYMPH NODE BIOPSY/EXCISION Left    • BREAST BIOPSY Left 03/31/2016   • BREAST LUMPECTOMY WITH SENTINEL NODE BIOPSY Left 10/03/2016    residual invasive carcinoma, grade 3 (0.2cm); margins negative; extensive lymphvascular space invasion; 2 sentinel lymph nodes positive (2/3)     Social History     Socioeconomic History   • Marital status:      Spouse name: Not on file   • Number of children: Not on file   • Years of education: Not on file   • Highest education level: Not on file   Tobacco Use   • Smoking status: Never Smoker   • Smokeless tobacco: Never Used   Substance and Sexual Activity   • Alcohol use: No   • Drug use: No   • Sexual activity: Defer       Current Facility-Administered Medications   Medication Dose Route Frequency Provider Last Rate Last Admin   • Biotene Dry Mouth Moisturizing (BIOTENE) 1 " "spray  1 spray Mouth/Throat 5x Daily Jack Cárdenas MD   1 spray at 09/13/21 0643   • dexamethasone (DECADRON) tablet 4 mg  4 mg Oral Daily With Breakfast Jack Cárdenas MD   4 mg at 09/13/21 0901   • gabapentin (NEURONTIN) capsule 100 mg  100 mg Oral TID Jack Cárdenas MD   100 mg at 09/13/21 0901   • montelukast (SINGULAIR) tablet 10 mg  10 mg Oral Nightly Jack Cárdenas MD   10 mg at 09/12/21 2004   • Morphine (MS CONTIN) 12 hr tablet 60 mg  60 mg Oral Q8H Jack Cárdenas MD   60 mg at 09/13/21 0642   • Morphine (MSIR) tablet 15 mg  15 mg Oral Q4H PRN Jack Cárdenas MD   15 mg at 09/13/21 0424   • naproxen (NAPROSYN) tablet 500 mg  500 mg Oral BID With Meals Jack Cárdenas MD   500 mg at 09/13/21 0901   • sennosides-docusate (PERICOLACE) 8.6-50 MG per tablet 1 tablet  1 tablet Oral BID PRN Jack Cárdenas MD            Morphine  •  senna-docusate sodium    No Known Allergies  Current medication reviewed for route, type, dose and frequency and are current per MAR at time of dictation.    No intake or output data in the 24 hours ending 09/13/21 1040    Physical Exam:    Diagnostics: Reviewed  /66 (BP Location: Right leg, Patient Position: Lying)   Pulse 63   Temp 97.8 °F (36.6 °C) (Oral)   Resp 18   Ht 160 cm (63\")   Wt 77.4 kg (170 lb 9.6 oz)   LMP  (LMP Unknown)   SpO2 96%   BMI 30.22 kg/m²     Vitals and nursing note reviewed.   Constitutional:       Appearance: Ill-appearing and chronically ill-appearing.   Eyes:      General: Lids are normal.      Pupils: Pupils are equal, round, and reactive to light.   HENT:      Head: Normocephalic.   Lymphadenopathy:         Upper Body:      Left upper body: Axillary adenopathy present.   Pulmonary:      Effort: Pulmonary effort is normal.   Chest:      Comments: Significant radiation burns in a T-shirt fashion to chest and back.  Pigmentation changes.  Lesion to left axillary with " drainage.  Cardiovascular:      Normal rate.      Comments: Bilateral lower extremity edema and tightness  Edema:     Peripheral edema present.  Abdominal:      Palpations: Abdomen is soft.   Musculoskeletal:      Cervical back: Neck supple.      Comments: Significant lymphedema bilateral upper extremities   Skin:     General: Skin is warm.      Comments: Skin changes to chest, breast, back as previously documented due to radiation burns and cancer lesions.   Genitourinary:     Comments: No reported dysuria  Neurological:      Comments: Alert  Psychiatric:      Comments: Calm     Patient status: Disease state: Controlled with current treatments.  Functional status: Palliative Performance Scale Score: Performance 40% based on the following measures: Ambulation: Mainly in bed, Activity and Evidence of Disease: Unable to do any work, extensive evidence of disease, Self-Care: Mainly assistance required,  Intake: Normal or reduced, LOC: Full, drowsy or confusion   ECOG Status(3) Capable of limited self-care, confined to bed or chair > 50% of waking hours.  Nutritional status: Albumin 3.20. Body mass index is 28.39 kg/m².      Family support: The patient receives support from her children..  Advance Directives: Advance Directive Status: Patient does not have advance directive   POA/Healthcare surrogate-children x4-next of kin.     Impression/Problem List:     1.  Metastatic HER-2 migel breast cancer, mets diffuse chest wall, back, and skin  2.  Cancer related pain  3.  Peripheral neuropathy secondary to chemotherapy  4.  Lymphopenia  5.  Bilateral upper extremity lymphedema     Recommendations/Plan:  1. plan: Goals of care include CODE STATUS CPR/full interventions.     2.  Palliative care encounter  -Overall poor prognosis  9/9-Continue full aggressive care interventions at this juncture.  Patient verbalizes that she would not wish to remain on life support measures long-term.  I have encouraged further conversations with  "family to ensure ongoing conversation with regard to medical priorities.  9/13-Now adamantly refuses option of hospice, despite limited treatment options/intolerance of treatments. States she needs some rehab... Discussed concerns of safe discharge, decreased mobility, and very limited caregiver support. \"I have 4 children, but they have their own lives and very overwhelmed with my current situation and I don't blame them\".  She also states she has multiple siblings though she has had challenges with securing adequate support in the home setting.  States she may be agreeable to SNF short term for rehab in the [place] area \"if my insurance would cover. Against placement in the [place], [place] area. Relayed to SW.      3.  Cancer related pain  -Controlled  -Received 255 mg oral morphine equivalent over the last 24 hours.  -continue scheduled MSER 60 mg from every 8 hours   -MSIR was decrease from 30 mg to 15 mg every 4 hours since last seen on 9/10 and with readmission. States fairly well controlled with current dosing. Will continue for now.  -Increase gabapentin from 100 mg every 8 hours to 300 mg twice daily as pain adjuvant for neuropathic pain and pruritus  -Continue Decadron 4 mg tab. May benefit from long-term if goal is symptom management/hospice.  -Continue naproxen 500 mg twice daily with meals as pain adjuvant for inflammation  -add pepcid for gastric ulcer protection/prophylaxis may also provide benefit as H2 blocker  -Rotate senna S2 tabs nightly for bowel prophylaxis in patient on chronic opioid therapy  -Wound APRN previously following.  -States she is not able to tolerate palliative radiation.  According to radiation oncology note unlikely will be able to obtain significant relief from palliative radiation.  -According to oncology note no expectation of response to systemic therapy due to failed or inability to tolerate aggressive therapies.     4.  Pruritus  -Restart Atarax as needed  -Continue " Decadron as above as this should also act as adjuvant.  -Scheduled wound care Q shift.  -Add hydrocortisone cream every 8 hours. Discussed with wound APRN.  -Continue calazime barrier cream and hydroguard per previous wound care recommendations. Nursing order placed.  -pepcid as above.  -increase gabapentin as above as adjuvant.     5.  Xerostomia  -Continue Biotene      Thank you for this consult and allowing us to participate in patient's plan of care. Palliative Care Team will continue to follow patient.     Time spent: 95 minutes spent reviewing medical and medication records, assessing and examining patient, discussing with family, answering questions, providing some guidance about a plan and documentation of care, and coordinating care with other healthcare members, with > 50% time spent face to face.   25 minutes spent on advance care planning.    Elodia Duenas, APRN  9/13/2021

## 2021-09-13 NOTE — CASE MANAGEMENT/SOCIAL WORK
Continued Stay Note  [facility]     Patient Name: Ayla Echeverria  MRN: [id number]  Today's Date: 9/13/2021    Admit Date: 9/11/2021    Discharge Plan     Row Name 09/13/21 1633       Plan    Plan Comments  Pt no longer wishes to pursue hospice services. Pt is requesting to go to a short term rehab prior to going home. Pt states she prefers [facility]. Pt provided with choices for [facility]. Pt prefers [facility]. Referral was sent to admissions with [facility].        Discharge Codes    No documentation.             CARRIE Neil

## 2021-09-13 NOTE — PLAN OF CARE
Goal Outcome Evaluation:  Plan of Care Reviewed With: patient           Outcome Summary: VSS. No change in neuro status. C/o pain to chest, bue. Pt has scheduled and prn meds, given and effective for a time. Assist w/up to br. Pt wanted ace wrap off lue. Lesions to upper body jose f. SCD in place. Safety maintained.

## 2021-09-13 NOTE — THERAPY EVALUATION
Patient Name: Ayla Echeverria  : 1965    MRN: [id number]                              Today's Date: 2021       Admit Date: 2021    Visit Dx:     ICD-10-CM ICD-9-CM   1. Decreased activities of daily living (ADL)  Z78.9 V49.89     Patient Active Problem List   Diagnosis   • Bilateral acute serous otitis media   • Malignant neoplasm involving both nipple and areola of left breast in female (CMS/HCC)   • Cellulitis of abdominal wall   • Impetigo   • Folliculitis   • Encounter for consultation   • Non-smoker   • S/P lumpectomy, left breast   • S/P lymph node biopsy   • Regional lymph node staging category N3 (CMS/HCC)   • On antineoplastic chemotherapy   • Regional lymph node metastasis present (CMS/HCC)   • History of radiation therapy   • Intractable pain   • Arm swelling   • Chest wall pain     Past Medical History:   Diagnosis Date   • Breast cancer (CMS/HCC)      Past Surgical History:   Procedure Laterality Date   • AXILLARY LYMPH NODE BIOPSY/EXCISION Left    • BREAST BIOPSY Left 2016   • BREAST LUMPECTOMY WITH SENTINEL NODE BIOPSY Left 10/03/2016    residual invasive carcinoma, grade 3 (0.2cm); margins negative; extensive lymphvascular space invasion; 2 sentinel lymph nodes positive (2/3)     General Information     Row Name 21 1353 21 4257       OT Time and Intention    Document Type  --  -SCHUYLER (tracie) MO (colin) SCHUYLER (c)  evaluation Pt is here due to R breast pain. She lives home alone. Diagnoses: Intractable pain. [facility] Breast cancer  -SCHUYLER (r) MO (colin) SCHUYLER (c)    Mode of Treatment  --  -SCHUYLER (r) MO (colin) SCHUYLER (c)  occupational therapy  -SCHUYLER (r) MO (t) SCHUYLER (c)    Row Name 21 1353 21 2659       General Information    Patient Profile Reviewed  --  yes  -SCHUYLER (r) MO (t) SCHUYLER (c)    Prior Level of Function  --  -SCHUYLER (r) MO (t) SCHUYLER (c)  max assist:;ADL's;home management;cooking;cleaning;driving;shopping;independent:;bed mobility  -SCHUYLER (r) MO (t) SCHUYLER (c)    Existing Precautions/Restrictions  --  fall   -JJ (r) MO (t) JJ (c)    Barriers to Rehab  --  medically complex;previous functional deficit  -JJ (r) MO (t) JJ (c)    Row [facility] 09/13/21 1339          Occupational Profile    Environmental Supports and Barriers (Occupational Profile)  tub shower  -JJ (r) MO (t) JJ (c)     Row Name 09/13/21 [facility] 09/13/21 1339       Living Environment    Lives With  --  -JJ (r) MO (t) JJ (c)  alone  -JJ (r) MO (t) JJ (c)    Row Name 09/13/21 [facility] 09/13/21 1339       Home Main Entrance    Number of Stairs, Main Entrance  --  -JJ (r) MO (t) JJ (c)  three  -JJ (r) MO (t) JJ (c)    Stair Railings, Main Entrance  --  -JJ (r) MO (t) JJ (c)  none  -JJ (r) MO (t) JJ (c)    Row Name 09/13/21 [facility] 09/13/21 1339       Stairs Within Home, Primary    Number of Stairs, Within Home, Primary  --  -JJ (r) MO (t) JJ (c)  none  -JJ (r) MO (t) JJ (c)    Row Name 09/13/21 [facility] 09/13/21 1339       Cognition    Orientation Status (Cognition)  --  -JJ (r) MO (t) JJ (c)  oriented x 4  -JJ (r) MO (t) JJ (c)    Row Name 09/13/21 [facility]9          Safety Issues, Functional Mobility    Safety Issues Affecting Function (Mobility)  at risk behavior observed;awareness of need for assistance;impulsivity;insight into deficits/self-awareness;safety precaution awareness;safety precautions follow-through/compliance  -JJ (r) MO (t) JJ (c)     Impairments Affecting Function (Mobility)  balance;pain;range of motion (ROM);strength  -JJ (r) MO (t) JJ (c)       User Key  (r) = Recorded By, (t) = Taken By, (c) = Cosigned By    Initials Name Provider Type    Elsi Marin, OTR/L Occupational Therapist    Placido Linda, OT Student OT Student          Mobility/ADL's     Row Name 09/13/21 1339          Bed Mobility    Comment (Bed Mobility)  Pt was sitting EOB pre-eval  -SCHUYLER (r) MO (t) SCHUYLER (c)     Row Name 09/13/21 1334          Transfers    Transfers  sit-stand transfer  -SCHUYLER (r) MO (t) SCHUYLER (c)     Sit-Stand [place] (Transfers)  contact guard CGA due to safety.  "Anticipated LOF I  -JJ (r) MO (t) JJ (c)     Row Name 09/13/21 1339          Functional Mobility    Functional Mobility- Ind. Level  contact guard assist;1 person She required CGA due to safety  -JJ (r) MO (t) JJ (c)     Functional Mobility- Comment  Pt had a wide base of support. She stated, \"this is how I always walk.\"  -JJ (r) MO (t) JJ (c)     Row Name 09/13/21 1339          Activities of Daily Living    BADL Assessment/Intervention  upper body dressing;lower body dressing  -JJ (r) MO (t) JJ (c)     Row Name 09/13/21 1339          Upper Body Dressing Assessment/Training    [place] Level (Upper Body Dressing)  upper body dressing skills;doff;don;front opening garment;maximum assist (25% patient effort)  -JJ (r) MO (t) JJ (c)     Position (Upper Body Dressing)  edge of bed sitting  -JJ (r) MO (t) JJ (c)     Row Name 09/13/21 1334          Lower Body Dressing Assessment/Training    [place] Level (Lower Body Dressing)  lower body dressing skills;doff;don;socks;maximum assist (25% patient effort)  -JJ (r) MO (t) JJ (c)     Position (Lower Body Dressing)  edge of bed sitting  -JJ (r) MO (t) JJ (c)       User Key  (r) = Recorded By, (t) = Taken By, (c) = Cosigned By    Initials Name Provider Type    Elsi Marin, OTR/L Occupational Therapist    Placido Linda OT Student OT Student        Obj/Interventions     Row Name 09/13/21 1338          Sensory Assessment (Somatosensory)    Sensory Assessment (Somatosensory)  UE sensation intact;LE sensation intact;left UE;left LE;right LE;right UE  -JJ (r) MO (t) JJ (c)     Left UE Sensory Assessment  light touch awareness;proprioception;light touch localization  -JJ (r) MO (t) JJ (c)     Right UE Sensory Assessment  light touch awareness;proprioception;light touch localization  -JJ (r) MO (t) JJ (c)     Left LE Sensory Assessment  light touch awareness;light touch localization;proprioception  -JJ (r) MO (t) SCHUYLER (c)     Right LE Sensory Assessment  light " touch awareness;light touch localization;proprioception  -SCHUYLER (r) MO (t) SCHUYLER (c)     Sensory Subjective Reports  numbness;tingling  -SCHUYLER (r) MO (t) SCHUYLER (c)     Row Name 09/13/21 6626          Sensory Interventions    Comment, Sensory Intervention  Pt reported numbness and tingling in B UE & LE finger tips. She stated that it causes no issues with her sensory.  -SCHUYLER (r) MO (t) SCHUYLER (c)     Row Name 09/13/21 9892          Range of Motion Comprehensive    General Range of Motion  upper extremity range of motion deficits identified  -SCHUYLER (r) MO (t) RODERICKJ (c)     Comment, General Range of Motion  Pt demonstrates decrease in should ROM due to radiation from chemotherapy. She demonstrates decreased ROM in L UE due to pain and edema. L UE ROM flexion 50%, L supination 45% intact, L wrist flexion 30%, decreased  in L hand due to pain and edema. L UE ROM shoulder flexion 35%, shoulder abduction, 35%. R UE ROM WFL except shoulder flexion 35%, shoulder abduction, 35% due to pain. B LE ROM WFL.  -SCHUYLER (r) MO (t) SCHUYLER (c)     Row Name 09/13/21 0218          Strength Comprehensive (MMT)    General Manual Muscle Testing (MMT) Assessment  upper extremity strength deficits identified  -SCHUYLER (r) MO (t) RODERICKJ (c)     Comment, General Manual Muscle Testing (MMT) Assessment  B LE MMT 4/5.R UE MMT 4-/5 except shoulder flexion, shoulder abduction 2+/5. L UE MMT shoulder flexion and shoulder abduction 2+/5. Elbow flexion and extension unassessed due to pain and edema.  -SCHUYLER (r) MO (t) SCHUYLER (c)     Row Name 09/13/21 5956          Upper Extremity (Manual Muscle Testing)    Upper Extremity: Manual Muscle Testing (MMT)  left shoulder strength deficit;right shoulder strength deficit;left elbow/forearm strength deficit;left wrist strength deficit  -SCHUYLER (r) MO (t) JJ (c)     Row Name 09/13/21 6147          Motor Skills    Motor Skills  coordination  -SCHUYLER (r) MO (t) RODERICKJ (c)     Coordination  WFL  -SCHUYLER (r) MO (t) SCHUYLER (c)     Row Name 09/13/21 6364          Balance     Balance Assessment  sitting static balance;sitting dynamic balance;sit to stand dynamic balance;standing static balance;standing dynamic balance  -JJ (r) MO (t) JJ (c)     Static Sitting Balance  WFL;unsupported;sitting, edge of bed  -JJ (r) MO (t) JJ (c)     Dynamic Sitting Balance  WFL;unsupported;sitting, edge of bed  -JJ (r) MO (t) JJ (c)     Sit to Stand Dynamic Balance  WFL;supported;standing  -JJ (r) MO (t) JJ (c)     Static Standing Balance  WFL;standing;supported  -JJ (r) MO (t) JJ (c)     Dynamic Standing Balance  mild impairment;supported;standing  -JJ (r) MO (t) JJ (c)       User Key  (r) = Recorded By, (t) = Taken By, (c) = Cosigned By    Initials Name Provider Type    Elsi Marin, OTR/L Occupational Therapist    Placido Linda, OT Student OT Student        Goals/Plan     Row Name 09/13/21 1339          Bathing Goal 1 (OT)    Activity/Device (Bathing Goal 1, OT)  bathing skills, all  -JJ (r) MO (t) JJ (c)     [place] Level/Cues Needed (Bathing Goal 1, OT)  moderate assist (50-74% patient effort)  -JJ (r) MO (t) JJ (c)     Time Frame (Bathing Goal 1, OT)  long term goal (LTG);by discharge  -JJ (r) MO (t) JJ (c)     Progress/Outcomes (Bathing Goal 1, OT)  goal ongoing  -JJ (r) MO (t) JJ (c)     Row Name 09/13/21 1339          Dressing Goal 1 (OT)    Activity/Device (Dressing Goal 1, OT)  upper body dressing  -JJ (r) MO (t) JJ (c)     [place]/Cues Needed (Dressing Goal 1, OT)  moderate assist (50-74% patient effort)  -JJ (r) MO (t) JJ (c)     Time Frame (Dressing Goal 1, OT)  long term goal (LTG);by discharge  -JJ (r) MO (t) JJ (c)     Progress/Outcome (Dressing Goal 1, OT)  goal ongoing  -JJ (r) MO (t) SCHUYLER (melodie)     Row Name 09/13/21 1456          Therapy Assessment/Plan (OT)    Planned Therapy Interventions (OT)  activity tolerance training;BADL retraining;edema control/reduction;functional balance retraining;occupation/activity based interventions;passive  ROM/stretching;patient/caregiver education/training;ROM/therapeutic exercise;strengthening exercise;transfer/mobility retraining  -SCHUYLER (r) MO (t) SCHUYLER (c)       User Key  (r) = Recorded By, (t) = Taken By, (c) = Cosigned By    Initials Name Provider Type    Elsi Marin, OTR/L Occupational Therapist    Placido Linda, OT Student OT Student        Clinical Impression     Row Name 09/13/21 5138          Pain Assessment    Additional Documentation  Pain Scale: Numbers Pre/Post-Treatment (Group)  -SCHUYLER (r) MO (t) SCHUYLER (c)     Row Name 09/13/21 8925          Pain Scale: Numbers Pre/Post-Treatment    Pretreatment Pain Rating  6/10  -SCHUYLER (r) MO (t) SCHUYLER (c)     Posttreatment Pain Rating  6/10  -SCHUYLER (r) MO (t) SCHUYLER (c)     Pain Location - Orientation  generalized  -SCHUYLER (r) MO (t) SCHUYLER (c)     Pain Location  chest  -SCHUYLER (r) MO (t) SCHUYLER (c)     Pain Intervention(s)  Repositioned;Ambulation/increased activity;Rest;Nursing Notified  -SCHUYLER (r) MO (t) SCHUYLER (c)     Row Name 09/13/21 4250          Plan of Care Review    Plan of Care Reviewed With  patient  -SCHUYLER (r) MO (t) SCHUYLER (c)     Progress  no change  -SCHUYLER (r) MO (t) SCHUYLER (c)     Outcome Summary  OT eval completed today. A&Ox4. Pt c/o 6/10 pre-eval in her chest. Pt is here due to R breast pain. Diagnoses: Intractable pain. Pt was sitting EOB prior to eval. She required max A to don/doff socks & gown due to pain and edema. Pt required CGA to sit<>stand and mobility due to safety. L UE ROM deficits flexion, supination, wrist flexion, shoulder flexion, and shoulder abduction. R UE ROM WFL except shoulder flexion and shoulder abduction due to pain. B LE ROM WFL. B LE MMT 4/5. R UE MMT 4-/5 except shoulder flexion, shoulder abduction 2+/5. L UE MMT shoulder flexion and shoulder abduction 2+/5. Elbow flexion and extension unassessed due to pain and edema. Her sensory is in intact. She would benefit from skilled OT services to address these deficits. OT recommends SNF upon d/c.  -SCHUYLER (r) MO (t) SCHUYLER  (c)     Row Name 09/13/21 1339          Therapy Assessment/Plan (OT)    Rehab Potential (OT)  good, to achieve stated therapy goals  -JJ (r) MO (t) JJ (c)     Criteria for Skilled Therapeutic Interventions Met (OT)  yes;meets criteria;skilled treatment is necessary  -JJ (r) MO (t) JJ (c)     Therapy Frequency (OT)  5 times/wk  -JJ (r) MO (t) JJ (c)     Predicted Duration of Therapy Intervention (OT)  until d/c  -JJ (r) MO (t) JJ (c)     Row Name 09/13/21 1339          Therapy Plan Review/Discharge Plan (OT)    Anticipated Discharge Disposition (OT)  [facility] nursing facility  -JJ (r) MO (t) JJ (c)     Row Name 09/13/21 1339          Vital Signs    Pre Patient Position  Sitting  -JJ (r) MO (t) JJ (c)     Intra Patient Position  Standing  -JJ (r) MO (t) JJ (c)     Post Patient Position  Sitting  -JJ (r) MO (t) JJ (c)     Row Name 09/13/21 1339          Positioning and Restraints    Pre-Treatment Position  in bed  -JJ (r) MO (t) JJ (c)     Post Treatment Position  bed  -JJ (r) MO (t) JJ (c)     In Bed  notified nsg;sitting EOB;call light within reach;encouraged to call for assist;with nsg;side rails up x3  -JJ (r) MO (t) JJ (c)       User Key  (r) = Recorded By, (t) = Taken By, (c) = Cosigned By    Initials Name Provider Type    Elsi Marin, OTR/L Occupational Therapist    Placido Linda, OT Student OT Student        Outcome Measures     Row Name 09/13/21 1339          How much help from another is currently needed...    Putting on and taking off regular lower body clothing?  1  -JJ (r) MO (t) JJ (c)     Bathing (including washing, rinsing, and drying)  2  -JJ (r) MO (t) JJ (c)     Toileting (which includes using toilet bed pan or urinal)  2  -JJ (r) MO (t) JJ (c)     Putting on and taking off regular upper body clothing  2  -SCHUYLER (r) MO (t) SCHUYLER (c)     Taking care of personal grooming (such as brushing teeth)  2  -SCHUYLER (r) MO (t) SCHUYLER (c)     Eating meals  3  -SCHUYLER (r) MO (t) SCHUYLER (c)     AM-PAC 6 Clicks Score (OT)   12  -SCHUYLER (r) MO (t)     Row Name 09/13/21 1339          Functional Assessment    Outcome Measure Options  AM-PAC 6 Clicks Daily Activity (OT)  -SCHUYLER (r) MO (t) JRODERICK (c)       User Key  (r) = Recorded By, (t) = Taken By, (c) = Cosigned By    Initials Name Provider Type     Elsi Sosa, OTR/L Occupational Therapist    Placido Linda OT Student OT Student          Occupational Therapy Education                 Title: PT OT SLP Therapies (In Progress)     Topic: Occupational Therapy (In Progress)     Point: ADL training (Done)     Description:   Instruct learner(s) on proper safety adaptation and remediation techniques during self care or transfers.   Instruct in proper use of assistive devices.              Learning Progress Summary           Patient Acceptance, E, VU by  at 9/13/2021 1455                   Point: Home exercise program (Not Started)     Description:   Instruct learner(s) on appropriate technique for monitoring, assisting and/or progressing therapeutic exercises/activities.              Learner Progress:  Not documented in this visit.          Point: Precautions (Done)     Description:   Instruct learner(s) on prescribed precautions during self-care and functional transfers.              Learning Progress Summary           Patient Acceptance, E, VU by MO at 9/13/2021 1455                   Point: Body mechanics (Done)     Description:   Instruct learner(s) on proper positioning and spine alignment during self-care, functional mobility activities and/or exercises.              Learning Progress Summary           Patient Acceptance, E, VU by  at 9/13/2021 1455                               User Key     Initials Effective Dates Name Provider Type [facility] 08/04/21 -  Placido Penny OT Student OT Student OT              OT Recommendation and Plan  Planned Therapy Interventions (OT): activity tolerance training, BADL retraining, edema control/reduction, functional balance  retraining, occupation/activity based interventions, passive ROM/stretching, patient/caregiver education/training, ROM/therapeutic exercise, strengthening exercise, transfer/mobility retraining  Therapy Frequency (OT): 5 times/wk  Plan of Care Review  Plan of Care Reviewed With: patient  Progress: no change  Outcome Summary: OT eval completed today. A&Ox4. Pt c/o 6/10 pre-eval in her chest. Pt is here due to R breast pain. Diagnoses: Intractable pain. Pt was sitting EOB prior to eval. She required max A to don/doff socks & gown due to pain and edema. Pt required CGA to sit<>stand and mobility due to safety. L UE ROM deficits flexion, supination, wrist flexion, shoulder flexion, and shoulder abduction. R UE ROM WFL except shoulder flexion and shoulder abduction due to pain. B LE ROM WFL. B LE MMT 4/5. R UE MMT 4-/5 except shoulder flexion, shoulder abduction 2+/5. L UE MMT shoulder flexion and shoulder abduction 2+/5. Elbow flexion and extension unassessed due to pain and edema. Her sensory is in intact. She would benefit from skilled OT services to address these deficits. OT recommends SNF upon d/c.     Time Calculation:   Time Calculation- OT     Row Name 09/13/21 1339             Time Calculation- OT    OT Start Time  1339 15 min chart review  -SCHUYLER (r) MO (t) SCHUYLER (c)      OT Stop Time  1410  -SCHUYLER (r) MO (t) SCHUYLER (c)      OT Time Calculation (min)  31 min  -SCHUYLER (tracie) MO (t)      OT Received On  09/13/21  -SCHUYLER (r) MO (t) SCHUYLER (c)      OT Goal Re-Cert Due Date  09/23/21  -SCHUYLER (r) MO (t) SCHUYLER (c)        User Key  (r) = Recorded By, (t) = Taken By, (c) = Cosigned By    Initials Name Provider Type    Elsi Marin, OTR/L Occupational Therapist    Placido Linda, OT Student OT Student                 Placido Penny, MINGO Student  9/13/2021

## 2021-09-13 NOTE — PAYOR COMM NOTE
"DC HOME 9-11-21  [id number]  FA X   [phone]    Ayla Echeverria (55 y.o. Female)     Date of Birth Social Security Number Address Home Phone MRN    1965  [address] [place] [address] [phone] [id number]    [employer] Marital Status          Congregation        Admission Date Admission Type Admitting Provider Attending Provider Department, Room/Bed    9/5/21 Emergency MeliaJack MD  [facility] 4B, 412/1    Discharge Date Discharge Disposition Discharge Destination        9/11/2021 Home or Self Care              Attending Provider: (none)   Allergies: No Known Allergies    Isolation: None   Infection: None   Code Status: CPR    Ht: 160 cm (62.99\")   Wt: 70.9 kg (156 lb 6.4 oz)    Admission Cmt: None   Principal Problem: None                Active Insurance as of 9/5/2021     Primary Coverage     Payor Plan Insurance Group Employer/Plan Group    ANTHEM BLUE CROSS ANTHEM BLUE CROSS BLUE SHIELD PPO [id number]     Payor Plan Address Payor Plan Phone Number Payor Plan Fax Number Effective Dates    [address] [phone]  1/1/2018 - None Entered    [place] [address]       Subscriber Name Subscriber Birth Date Member ID       AYLA ECHEVERRIA 1965 [id number]                 Emergency Contacts      (Rel.) Home Phone Work Phone Mobile Phone    GabrielKimoDanielle (Daughter) -- -- [phone]    AmadeoKiyaIman (Sister) [phone] -- --    aMyela Tejeda (Friend) [phone] -- --    ELSA BARRAGAN (Daughter) -- -- [phone]               Physician Progress Notes (last 72 hours) (Notes from 09/10/21 0916 through 09/13/21 0916)      Barrett Moreno MD at 09/11/21 0716          MEDICAL ONCOLOGY PROGRESS NOTE      Pt Name: Ayla Echeverria  MRN: [id number]  [id number]  YOB: 1965  Date of evaluation: 9/11/2021   ROOM: 412      Chief Compliant: \"body is getting stiff\" still itching. Anticipates home with hospice    HISTORY OF PRESENT " ILLNESS:    Ayla Echeverria is well known to my clinic and is followed for a diagnosis of recurrent HER-2 positive/ER 1% and OK 6% invasive ductal carcinoma of the breast. She was initially diagnosed in 2016.  She had locally advanced disease.  Unfortunately, she had significant compliance issues when she was diagnosed.  Later on she developed widespread metastatic disease with predominantly skin recurrence. She has received several lines of treatment with chemotherapy agents as well as radiation to the skin.  She is currently receiving palliative radiation.  She has had significant complains of pain, ulceration of the skin lesions.  She presented to the ER department on 9/5/2021 at [facility] due to uncontrolled pain.     ONCOLOGIC HISTORY  Diagnosis   IDC left breast, March 2016   Grade 2. ER 1%, OK 6%, HER-2/migel IHC 3+/FISH amplified ratio 6.7, AR 95%.   01/30/2017- Biopsy-proven in breast relapse/Inflammatory breast cancer. ER negative, OK negative, HER-2/migel IHC 3+/FISH amplified ratio 4.5, Ki-67 32%.   MyRisk - Revealed no clinically significant mutation. PTEN VUS.  5/7/21 Invasive breast cancer involving dermal lymphatics    Treatment summary  Neoadjuvant adjuvant chemotherapy TCH-P ×6 cycles. She declined to complete 1 year of Herceptin.   She declined radiation therapy.   10/3/2016-left lumpectomy/sentinel lymph node   01/30/2017- Biopsy-proven in breast relapse/Inflammatory breast cancer.   5/11/2017-Chemotherapy with paclitaxel/carboplatin weekly. Herceptin/Pertuzumab every 3 weeks started 05/11/2017. She had a allergic reaction to carboplatin and this was discontinued.   7/21/2017- 9/1/2017 Neuropathy grade 2 with Taxol. Therefore, switched to Taxotere/Herceptin/Pertuzumab. Discontinued due to progression of disease.   9/22/2017 through 3/2/2018- Kadycla every 3 weeks. (stopped as per patient request, neuropathy grade 1)   7/10/2018 through 11/30/2018- Kadcyla.   1/4/2019-Navelbine x 1 cycle with  very poor tolerance (pain)   1/25/2018-Gemzar/Herceptin   3/15/2019- low-dose Cisplatin added to Gemzar and Herceptin regimen, regimen changed to every other week   4/16/2019-5/23/19 5040cGy palliative radiation to left clavicle/chest wall   6/22/2019- Xeloda/Lapatinib  12/27/19-2/11/20 3800 cGy palliative radiation to the right breast.  1/10/2020- palliative treatment with Fam-Trastuzumab- stopped Feb due to severe toxicity  8/24/20-9/23/20 2925 cGy palliative radiation left chest wall skin metastasis.  8/21/2020-Herceptin, Xeloda and Tucatinib  5/28/21- Stop Tucatinib and Xeloda due to progression in skin  7/9/21 Declined palliative radiation  8/9/21 Initiate radiation therapy with anticipated dose of 6000 cGy radiation therapy anterior chest and posterior back    Tumor target  Skin chest wall left upper chest/left breast   Skin on back and both sides    Cancer history- Left breast recurrence with Inflammatory Breast Cancer ER 8%/ND negative &HER-2 positive.  Ms Echeverria was seen in initial oncology consultation on 4/20/2017 referred for a diagnosis of a left breast recurrence with inflammatory breast cancer. She was initially treated by Dr. Peres at [facility]. Prior breast cancer history is as follows:    3/22/2016-a diagnostic mammogram for a palpable mass showed a left breast abnormal findings.   4/1/2017- ultrasound-guided core needle biopsy was consistent with invasive ductal carcinoma, grade 2. ER 1%, ND 6%, HER-2/migel IHC 3+/FISH amplified ratio 6.7., AR 95%.   She underwent 6 cycles of TCH-P from April 2016 through September 2016 with G-CSF support. She had a total of 18 weeks of trastuzumab treatment.     10/3/2016-she underwent a left lumpectomy with left sentinel lymph node revealing an invasive ductal carcinoma, grade 3. Margins clear of invasive carcinoma (1.1 cm from the lateral margin), extensive lymphovascular space invasion. 2 out of 3 sentinel lymph nodes positive for metastatic carcinoma.  Final pathologic staging [id number])[id number].   She declined adjuvant radiation therapy and adjuvant Herceptin completion.                               -------------------In-breast Recurrence Jan2017---------------------------  1/30/2017- she was seen by Dr. Subhash Delaney with new complaints of erythema in the left breast. A skin punch biopsy was consistent with metastatic ductal carcinoma with extensive dermal lymphovascular invasion. ER negative, DE negative, HER-2/migel IHC 3+/FISH amplified ratio 4.5, Ki-67 32%. Foundation one showed ERBB2 amplification, AURKA amplification, TP53 and .     2/15/2017- she was again seen by Dr. Delaney and explained about her tumor recurrence. Unfortunately, she declined any further intervention at that time.     4/21/2017- she asked Dr. Delaney for a referral to us.     4/28/2017-referred to me for further evaluation. I recommended a PET scan and neoadjuvant chemotherapy with carboplatin/paclitaxel and Perjeta and Herceptin.     5/4/2017-PET/CT scan revealed abnormal metabolic activity present in multiple regions throughout the left breast and in the skin( SUV 8.9, 7.4, 7.3). In the left supraclavicular region a cluster of lymph nodes present (SUV of 3). Axillary lymph nodes with SUV of 2.8. Tonsils bilaterally with SUV 5.6 (symmetric). No abnormal metabolic activity in the intrathoracic or intra-abdominal pelvic region. No abnormal skeletal metabolic activity. I recommended weekly carboplatin/weekly Taxol/Herceptin and Pertuzumab.     5/26/2017-she had a severe allergic reaction after 2 doses of carboplatin and therefore this was discontinued. She was continued only on Taxol weekly/Herceptin and Pertuzumab.     7/21/2017-after cycle #2 and 8 doses of Taxol she developed neuropathy related to chemotherapy and therefore chemotherapy was switched from Taxol to Taxotere.   Genetic assessment- [place] [id number] revealed no clinically significant mutation. PTEN VUS.     9/1/2017-local in breast  disease progression with increasing breast erythema. Treatment switched to Kadcyla.     12/27/2017- repeat re-staging CT scan of the abdomen and pelvis documented no evidence of intra-abdominal pelvic metastasis. Superior left renal cyst. Mild hepatic steatosis. CT scan of the chest documented no evidence of intrathoracic metastasis. Bone scan documented no evidence of bony metastatic disease.     3/2/2018-after 8 cycles of Kadcyla she requested treatment to be on hold. She has requested her PCP a referral to [facility] for a second opinion. She has neuropathy grade 1.     5/2/2018- 2 months off treatment (Kadcyla). She has not yet been seen at [facility]. Apparently, [facility] is gathering her records and will see her soon. Examination of her chest wall showed recurrent disease in the left upper chest. Residual neuropathy grade 1. She could not tolerate gabapentin, and therefore has stopped it. She was not interested in any other medication for neuropathy at this time.     7/10/2018-after discussion at [facility] she was recommended to reinitiate Kadcyla.     7/13/2018-CT of chest, abdomen, pelvis showed no evidence of intra-abdominal disease. There is an enlarged right-sided level 1 lymph node that was not appreciated previously, measuring 2.5 x 1.4 cm, metastatic disease considered.     7/16/2018-PET CT scan showed diffuse uptake throughout the left breast extending to the left chest wall. Abnormal uptake within the left axilla lymph nodes and left internal mammary lymph nodes concerning for metastatic disease. Enlarged hypermetabolic right axilla lymph node concerning for metastatic disease with SUV 8.0. Abnormal uptake within a right cervical lymph node, etiology unclear, but could be reactive.     10/26/2018 - CT scan of the chest was completed at [facility] and compared to CT scan of 12/27/2017 versus her last CT scan completed at [facility] on 7/13/2018. The CT scan identified a right axillary lymph  node measuring 1.2 cm, otherwise no intrathoracic neoplastic process/metastatic disease.    10/26/2018 -CT scan of the abdomen and pelvis was completed at [facility] and compared to CT scan of     12/27/2017 versus her last CT scan completed at [facility] on 7/13/2018. No evidence of intra-abdominal or pelvic metastasis was identified.  December 2018-interval worsening of skin metastasis in the left breast and diffuse erythema with new nodules.    12/17/2018-skin biopsy of overlying right breast consistent invasive ductal carcinoma, high-grade. ER 20%, CT 0%, HER-2/migel 3+.    12/17/2018-right axillary FNA biopsy consistent invasive ductal carcinoma.     12/26/2018-recommended clinical trial at [facility].    1/4/2019-started on Navelbine/Herceptin, due to delays on the clinical trial at [facility]. Unfortunately, she had severe chest wall pain and generalized pain related to Navelbine.     1/25/2019-4/19/2019 she was switched to cisplatin/Gemzar/Herceptin, but had disease progression.     4/16/2019-5/23/19 5040cGy palliative radiation to left clavicle/chest wall     5/30/2019-CT chest, abdomen, pelvis and bone scan was unremarkable for right axillary adenopathy measuring 2 cm. Lymph node located in the upper chest wall under the pectoralis muscle measuring 1.2 cm. Right breast with 2 areas with masslike enhancement. No evidence of metastatic disease in the abdomen pelvis. Bone scan was unremarkable for metastatic bone disease.    June 2019-very poor tolerance to cisplatin, Gemzar/Herceptin. She was recommended Xeloda/ Lapatinib.     6/22/2019-she was recommended and started on Xeloda 850 mg/m2 PO BID days 1 through 14 every 21 days and Lapatinib 1250 mg daily continuously.    November 2019- progression on prior Xeloda and lapatinib.    11/23/2019- recommended rechallenge with Herceptin and Navelbine vs clinical trial at [facility].    12/23/2019-CT chest abdomen pelvis showed metastatic  disease in the right axillary lymph node and diffuse bilateral skin involvement of both breasts with several nodules. No evidence of pulmonary, liver or bone metastasis. CT of the head with contrast was unremarkable.    1/3/2020- the patient was unable to follow-up at [facility] for clinical trial. Therefore recommended Fam-trastuzumab deruxtecan every 3 weeks.    1/10/2020-she was started on palliative treatment with Fam-trastuzumab    12/27/19-2/11/20 3800 cGy palliative radiation to the right breast    6/5/2020- Ct Chest/Abdomen/Pelvis W Contrast No lymphadenopathy. A previously seen enlarged right axillary lymph node is now small in size. There is some stranding of the fat around the lymph node. Bilateral breast skin thickening left greater than right. Prior lumpectomy on the left with left axillary lymph node sampling. Previously seen nodularity in the right breast on CT is not visualized today.There may be minimal radiation change in the periphery of the left lung. There is no dense consolidation or effusion. No definite metastatic lung nodules. No evidence of abdominal or pelvic neoplastic process or metastatic disease. The previously seen right breast nodule is not visualized in this study. Postsurgical changes of the left breast. No significant change in the previous study. Lobulated skin thickening of the left lower lateral chest and upper abdominal wall which was not noted in the previous study. The etiology is not certain. This data be clinically correlated and further evaluated.    8/7/2020- the patient is currently awaiting for palliative radiation to the chest wall for the skin margins. She has agreed and want to start on Herceptin, Xeloda and Tucatinib.    8/21/2020 Tumor Marker- CA 15.3- 32.19    8/24/20-9/23/20 2925 cGy palliative radiation left chest wall skin metastasis.    10/9/2020-continue Herceptin and tucatinib.    12/11/2020-she has resumed Xeloda on low-dose 500 mg p.o. twice daily 7  days on 7 days off, tucatinib and Herceptin    2/19/21 tumor markers: CA 27-29 11.8 CA 15-3 13.6 CEA 1.9    2/26/21 Ct Chest W Contrast No evidence of intrathoracic metastasis. Scarring suspected within the left upper and right middle lobes. 3. Diffuse left greater than right breast skin thickening with prominence of the trabecular breast tissue. Postoperative changes of the left breast and axilla. No pathologic axillary or intrathoracic lymphadenopathy localized.     2/26/21 Ct Abdomen Pelvis W Iv Contrast No evidence of intra-abdominal pelvic metastasis. Hepatic steatosis. Superior left renal cyst. Constipation.     2/26/21 2D echo: Mitral valve leaflets are mildly thickened with preserved leaflet mobility. Mildly thickened aortic valve leaflets with preserved leaflet mobility. Tricuspid valve is structurally normal. Mild tricuspid regurgitation with estimated RVSP of 16 mm Hg. Normal left ventricular size with preserved LV function and an estimated ejection fraction of approximately 55-60%. Normal left ventricular wall thickness. No regional wall motion abnormalities.    5/7/21 Skin lesion, right flank, punch biopsy: Invasive breast cancer involving dermal lymphatics    5/28/21 Tumor markers: CA 27-29= 21.2 CEA= 1.5 CA 15-3 =    7/9/2021-she was referred to radiation for consideration of palliative radiation to the skin lesions. However, she declined the referral and wants to try some holistic approach. She is not interested in cytotoxic therapy at this time.    07/28/21-skin lesions is significantly worse. Recommended radiation.    7/29/21 CT CHEST W CONTRAST No evidence of intrathoracic metastasis. Similar left greater than right breast skin thickening with increased trabecular pattern of the left breast which may represent sequelae of radiation. Postoperative changes of the left breast and axilla. No pathologic axillary or internal mammary lymphadenopathy. Left subclavian port in place with tip at the atrial  caval junction.    7/29/21 CT ABDOMEN PELVIS W IV CONTRAST No evidence of intra-abdominal or pelvic metastatic disease. Small stable cyst at the upper pole left kidney. Moderate constipation.   8/9/21- Initiate radiation therapy with anticipated dose of 6000 cGy radiation therapy anterior chest and posterior back        Objective      Vitals:    09/10/21 1100 09/10/21 1638 09/10/21 2210 09/11/21 0513   BP: 125/52 136/59 145/66 133/64   BP Location: Left leg Right leg Right leg Right leg   Patient Position: Sitting Sitting Sitting Sitting   Pulse: 69 65 61 74   Resp: 16 18 18 18   Temp: 97.6 °F (36.4 °C) 98 °F (36.7 °C) 97.4 °F (36.3 °C) 97.6 °F (36.4 °C)   TempSrc: Oral Oral Oral Oral   SpO2: 96% 96% 96% 92%   Weight:       Height:             Lab Results:    Lab Results (last 72 hours)     Procedure Component Value Units Date/Time    Comprehensive Metabolic Panel [[id number]]  (Abnormal) Collected: 09/06/21 0407    Specimen: Blood Updated: 09/06/21 0446     Glucose 97 mg/dL      BUN 4 mg/dL      Creatinine 0.40 mg/dL      Sodium 137 mmol/L      Potassium 3.8 mmol/L      Chloride 102 mmol/L      CO2 28.0 mmol/L      Calcium 8.6 mg/dL      Total Protein 6.4 g/dL      Albumin 3.20 g/dL      ALT (SGPT) <5 U/L      AST (SGOT) 21 U/L      Alkaline Phosphatase 46 U/L      Total Bilirubin 0.3 mg/dL      eGFR  African Amer >150 mL/min/1.73      Globulin 3.2 gm/dL      A/G Ratio 1.0 g/dL      BUN/Creatinine Ratio 10.0     Anion Gap 7.0 mmol/L     Narrative:      GFR Normal >60  Chronic Kidney Disease <60  Kidney Failure <15      CK [[id number]]  (Normal) Collected: 09/06/21 0407    Specimen: Blood Updated: 09/06/21 0446     Creatine Kinase 83 U/L     CBC Auto Differential [[id number]]  (Abnormal) Collected: 09/06/21 0407    Specimen: Blood Updated: 09/06/21 0420     WBC 2.88 10*3/mm3      RBC 3.30 10*6/mm3      Hemoglobin 9.6 g/dL      Hematocrit 29.9 %      MCV 90.6 fL      MCH 29.1 pg      MCHC 32.1 g/dL      RDW 16.2 %       RDW-SD 54.3 fl      MPV 8.6 fL      Platelets 284 10*3/mm3      Neutrophil % 63.9 %      Lymphocyte % 16.7 %      Monocyte % 16.7 %      Eosinophil % 1.7 %      Basophil % 0.7 %      Immature Grans % 0.3 %      Neutrophils, Absolute 1.84 10*3/mm3      Lymphocytes, Absolute 0.48 10*3/mm3      Monocytes, Absolute 0.48 10*3/mm3      Eosinophils, Absolute 0.05 10*3/mm3      Basophils, Absolute 0.02 10*3/mm3      Immature Grans, Absolute 0.01 10*3/mm3      nRBC 0.0 /100 WBC     COVID-19,[facility] IN-HOUSE,Nasal Swab No Transport Media 3-4 HR TAT - Swab, Nasal Cavity [[id number]]  (Normal) Collected: 09/05/21 0737    Specimen: Swab from Nasal Cavity Updated: 09/05/21 0859     COVID19 Not Detected    Narrative:      Fact sheet for providers: https://www.fda.gov/media/428983/download     Fact sheet for patients: https://www.fda.gov/media/234682/download    Test performed by PCR.    Consider negative results in combination with clinical observations, patient history, and epidemiological information.    Sedimentation Rate [[id number]]  (Abnormal) Collected: 09/05/21 0716    Specimen: Blood Updated: 09/05/21 0819     Sed Rate 104 mm/hr     C-reactive Protein [[id number]]  (Abnormal) Collected: 09/05/21 0716    Specimen: Blood Updated: 09/05/21 0759     C-Reactive Protein 3.61 mg/dL     Comprehensive Metabolic Panel [[id number]]  (Abnormal) Collected: 09/05/21 0716    Specimen: Blood Updated: 09/05/21 0759     Glucose 96 mg/dL      BUN 4 mg/dL      Creatinine 0.41 mg/dL      Sodium 139 mmol/L      Potassium 3.7 mmol/L      Chloride 103 mmol/L      CO2 28.0 mmol/L      Calcium 8.9 mg/dL      Total Protein 6.5 g/dL      Albumin 3.20 g/dL      ALT (SGPT) 5 U/L      AST (SGOT) 24 U/L      Alkaline Phosphatase 49 U/L      Total Bilirubin 0.3 mg/dL      eGFR  African Amer >150 mL/min/1.73      Globulin 3.3 gm/dL      A/G Ratio 1.0 g/dL      BUN/Creatinine Ratio 9.8     Anion Gap 8.0 mmol/L     Narrative:      GFR Normal >60  Chronic  Kidney Disease <60  Kidney Failure <15      CBC & Differential [[id number]]  (Abnormal) Collected: 09/05/21 0716    Specimen: Blood Updated: 09/05/21 0736    Narrative:      The following orders were created for panel order CBC & Differential.  Procedure                               Abnormality         Status                     ---------                               -----------         ------                     CBC Auto Differential[[id number]]        Abnormal            Final result                 Please view results for these tests on the individual orders.    CBC Auto Differential [[id number]]  (Abnormal) Collected: 09/05/21 0716    Specimen: Blood Updated: 09/05/21 0736     WBC 2.82 10*3/mm3      RBC 3.45 10*6/mm3      Hemoglobin 9.5 g/dL      Hematocrit 31.3 %      MCV 90.7 fL      MCH 27.5 pg      MCHC 30.4 g/dL      RDW 16.1 %      RDW-SD 53.4 fl      MPV 9.0 fL      Platelets 338 10*3/mm3      Neutrophil % 68.8 %      Lymphocyte % 13.1 %      Monocyte % 15.6 %      Eosinophil % 1.1 %      Basophil % 0.7 %      Immature Grans % 0.7 %      Neutrophils, Absolute 1.94 10*3/mm3      Lymphocytes, Absolute 0.37 10*3/mm3      Monocytes, Absolute 0.44 10*3/mm3      Eosinophils, Absolute 0.03 10*3/mm3      Basophils, Absolute 0.02 10*3/mm3      Immature Grans, Absolute 0.02 10*3/mm3      nRBC 0.0 /100 WBC           Radiology Results:    Imaging Results (Last 72 Hours)     Procedure Component Value Units Date/Time    US Venous Doppler Upper Extremity Bilateral (duplex) [[id number]] Collected: 09/08/21 1626     Updated: 09/08/21 1631    Narrative:      History: Pain and swelling       Impression:      Impression: There is no evidence of deep venous thrombosis or  superficial thrombophlebitis of bilateral upper extremities. This is a  limited exam.     Comments: Bilateral upper extremity venous duplex exam was performed  using color Doppler flow, Doppler wave form analysis, and grayscale  imaging, with and without  compression. There is no evidence of deep  venous thrombosis of the axillary, brachial, radial, or ulnar veins in  the right upper extremity. There is also no evidence of deep venous  thrombosis in the brachial, radial, and ulnar veins in the left upper  extremity. There is no thrombus identified in the cephalic or basilic  veins bilaterally.          This report was finalized on 09/08/2021 16:28 by Dr. Luis Oh MD.            Physical Exam  Constitutional:       Appearance: She is well-developed.      Comments: Appears chronically ill   HENT:      Head: Normocephalic and atraumatic.      Nose: Nose normal.   Eyes:      General: Lids are normal.         Right eye: No discharge.         Left eye: No discharge.      Conjunctiva/sclera: Conjunctivae normal.   Neck:      Trachea: Trachea normal.   Cardiovascular:      Rate and Rhythm: Normal rate and regular rhythm.   Pulmonary:      Effort: Pulmonary effort is normal. No respiratory distress.      Breath sounds: Normal breath sounds. No wheezing or rales.   Abdominal:      General: Bowel sounds are normal.      Palpations: Abdomen is soft.   Genitourinary:     Comments: Exam deferred  Musculoskeletal:         General: Normal range of motion.      Cervical back: Normal range of motion and neck supple.   Skin:     General: Skin is warm and dry.      Findings: No erythema, petechiae or rash.      Nails: There is no clubbing.      Comments: Significant radiation burn (skin thickening and hyperpigmentation) to anterior and posterior chest, including breasts, with some scabbing posteriorly. Port left anterior CW   Neurological:      Mental Status: She is alert and oriented to person, place, and time.      Cranial Nerves: No cranial nerve deficit.      Sensory: No sensory deficit.   Psychiatric:         Speech: Speech normal.         Behavior: Behavior normal. Behavior is cooperative.         Thought Content: Thought content normal.         Judgment: Judgment normal.        ASSESSMENT:  #Metastatic HER-2 migel breast cancer (diffuse ant and post chest wall skin involvement)  She has received several lines of chemotherapy in the past and several rounds of palliative radiation     7/31/2021-CT chest abdomen pelvis showed no evidence of visceral metastasis.     Currently on palliative RT. Last dose about 1-2 weeks ago. She received 6/30 treatments - Unable to lie down on the table for treatment.     Dr. Darby did reevaluate her again on 9/8/2021 and unfortunately she is still unable to lie down and tolerate treatment    #Cancer related pain-palliative care helping with pain control  Morphine ER 60mg PO q 12 h (dose increased on 9/5/2021)  Naprosyn 500 mg p.o.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

## 2021-09-13 NOTE — PLAN OF CARE
Goal Outcome Evaluation:  Plan of Care Reviewed With: patient        Progress: no change  Outcome Summary: OT eval completed today. A&Ox4. Pt c/o 6/10 pre-eval in her chest. Pt is here due to R breast pain. Diagnoses: Intractable pain. Pt was sitting EOB prior to eval. She required max A to don/doff socks & gown due to pain and edema. Pt required CGA to sit<>stand and mobility due to safety. L UE ROM deficits flexion, supination, wrist flexion, shoulder flexion, and shoulder abduction. R UE ROM WFL except shoulder flexion and shoulder abduction due to pain. B LE ROM WFL. B LE MMT 4/5. R UE MMT 4-/5 except shoulder flexion, shoulder abduction 2+/5. L UE MMT shoulder flexion and shoulder abduction 2+/5. Elbow flexion and extension unassessed due to pain and edema. Her sensory is in intact. She would benefit from skilled OT services to address these deficits. OT recommends SNF upon d/c.

## 2021-09-14 NOTE — PLAN OF CARE
Goal Outcome Evaluation:  Plan of Care Reviewed With: patient           Outcome Summary: A/O. VSS. Pain med for pain control. No acute distress noted. Resting at present.

## 2021-09-14 NOTE — PROGRESS NOTES
"CC:\"i want to go to rehab in [place]\"--    Review of Systems   Constitutional: Positive for activity change and fatigue.   HENT: Negative.    Eyes: Negative.    Cardiovascular: Positive for chest pain.   Gastrointestinal: Negative.    Endocrine: Negative.    Genitourinary: Negative.    Musculoskeletal: Negative.    Skin: Negative.    Allergic/Immunologic: Negative.    Neurological: Negative.    Hematological: Negative.    Psychiatric/Behavioral: Negative.      Temp:  [97.8 °F (36.6 °C)-98.5 °F (36.9 °C)] 98.5 °F (36.9 °C)  Heart Rate:  [63-77] 64  Resp:  [18-20] 20  BP: (153-156)/(66-74) 156/74  No intake/output data recorded.  No intake/output data recorded.    Physical Exam  Vitals and nursing note reviewed.   Constitutional:       Appearance: Normal appearance.   HENT:      Head: Normocephalic and atraumatic.      Nose: Nose normal.      Mouth/Throat:      Mouth: Mucous membranes are moist.   Eyes:      Pupils: Pupils are equal, round, and reactive to light.   Cardiovascular:      Rate and Rhythm: Normal rate.      Pulses: Normal pulses.      Heart sounds: Normal heart sounds.   Pulmonary:      Effort: Pulmonary effort is normal.      Breath sounds: Normal breath sounds.   Abdominal:      General: Abdomen is flat. Bowel sounds are normal.      Palpations: Abdomen is soft.   Musculoskeletal:         General: Normal range of motion.      Cervical back: Normal range of motion and neck supple.   Skin:     General: Skin is warm and dry.      Capillary Refill: Capillary refill takes less than 2 seconds.   Neurological:      General: No focal deficit present.      Mental Status: She is alert and oriented to person, place, and time. Mental status is at baseline.   Psychiatric:         Mood and Affect: Mood normal.           Regional lymph node metastasis present (CMS/HCC)    History of radiation therapy    Intractable pain    Chest wall pain    Awaiting [facility] decision---continue pain control  "

## 2021-09-14 NOTE — PLAN OF CARE
Goal Outcome Evaluation:  Plan of Care Reviewed With: other (see comments) (RN)      Progress: no change  Outcome Summary: Ntn assessment completed. Oral intake 50% of meals today per nursing. Regular diet. Boost Plus added TID. Cont to follow for plan of care.

## 2021-09-14 NOTE — THERAPY DISCHARGE NOTE
Patient Name: Ayla Echeverria  : 1965    MRN: [id number]                              Today's Date: 2021       Admit Date: 2021    Visit Dx:     ICD-10-CM ICD-9-CM   1. Decreased activities of daily living (ADL)  Z78.9 V49.89   2. Impaired mobility  Z74.09 799.89     Patient Active Problem List   Diagnosis   • Bilateral acute serous otitis media   • Malignant neoplasm involving both nipple and areola of left breast in female (CMS/HCC)   • Cellulitis of abdominal wall   • Impetigo   • Folliculitis   • Encounter for consultation   • Non-smoker   • S/P lumpectomy, left breast   • S/P lymph node biopsy   • Regional lymph node staging category N3 (CMS/HCC)   • On antineoplastic chemotherapy   • Regional lymph node metastasis present (CMS/HCC)   • History of radiation therapy   • Intractable pain   • Arm swelling   • Chest wall pain     Past Medical History:   Diagnosis Date   • Breast cancer (CMS/HCC)      Past Surgical History:   Procedure Laterality Date   • AXILLARY LYMPH NODE BIOPSY/EXCISION Left    • BREAST BIOPSY Left 2016   • BREAST LUMPECTOMY WITH SENTINEL NODE BIOPSY Left 10/03/2016    residual invasive carcinoma, grade 3 (0.2cm); margins negative; extensive lymphvascular space invasion; 2 sentinel lymph nodes positive (2/3)     General Information     Row Name 21 1038          Physical Therapy Time and Intention    Document Type  evaluation CC: Pain secondary to cancer History: Ductal carcinoma of L Breast, Radiation/Chemo HPI: Cancer related pain, Peripheral neuropathy  -RAY (r) PH (t) RAY (c)     Mode of Treatment  physical therapy  -RAY (r) PH (t) RAY (c)     Row Name 21 1038          General Information    Patient Profile Reviewed  yes  -RAY (r) PH (t) RAY (c)     Prior Level of Function  independent:;gait;transfer;bed mobility;using stairs;all household mobility  -RAY (r) PH (t) RAY (c)     Existing Precautions/Restrictions  no known precautions/restrictions  -RAY (r) PH (t) RAY  (c)     Barriers to Rehab  medically complex  -RAY (r) PH (t) RAY (c)     Row Name 09/14/21 1038          Living Environment    Lives With  alone  -RAY (r) PH (t) RAY (c)     Row Name 09/14/21 1038          Home Main Entrance    Number of Stairs, Main Entrance  three  -RAY (r) PH (t) RAY (c)     Stair Railings, Main Entrance  none  -RAY (r) PH (t) RAY (c)     Row Name 09/14/21 1038          Stairs Within Home, Primary    Number of Stairs, Within Home, Primary  none  -RAY (r) PH (t) RAY (c)     Stair Railings, Within Home, Primary  none  -RAY (r) PH (t) RAY (c)     Row Name 09/14/21 1038          Cognition    Orientation Status (Cognition)  oriented x 4  -RAY (r) PH (t) RAY (c)     Row Name 09/14/21 1038          Safety Issues, Functional Mobility    Safety Issues Affecting Function (Mobility)  judgment  -RAY (r) PH (t) RAY (c)     Impairments Affecting Function (Mobility)  pain  -RAY (r) PH (t) RAY (c)       User Key  (r) = Recorded By, (t) = Taken By, (c) = Cosigned By    Initials Name Provider Type    Jorge Aviles, PT DPT Physical Therapist    PH Maricruz Goff, PT Student PT Student        Mobility     Row Name 09/14/21 1038          Bed Mobility    Comment (Bed Mobility)  Found sitting in chair  -RAY (r) PH (t) RAY (c)     Row Name 09/14/21 1038          Bed-Chair Transfer    Bed-Chair [place] (Transfers)  standby assist  -RAY (r) PH (t) RAY (c)     Row Name 09/14/21 1038          Sit-Stand Transfer    Sit-Stand [place] (Transfers)  standby assist  -RAY (r) PH (t) RAY (c)     Row Name 09/14/21 1038          Gait/Stairs (Locomotion)    [place] Level (Gait)  standby assist;contact guard  -RAY (r) PH (t) RAY (c)     Assistive Device (Gait)  walker, front-wheeled  -RAY (r) PH (t) RAY (c)     Distance in Feet (Gait)  350' without rw; 200' with rw  -RAY (r) PH (t) RAY (c)     Deviations/Abnormal Patterns (Gait)  base of support, narrow;evangelina decreased  -RAY (r) PH (t) RAY (c)     Left Sided Gait Deviations  heel strike  decreased  -RAY (r) PH (t) RAY (c)     Comment (Gait/Stairs)  Pt able to walk up to 400'. Pt given a RW to have support for edematous arms.  -RAY (r) PH (t) RAY (c)       User Key  (r) = Recorded By, (t) = Taken By, (c) = Cosigned By    Initials Name Provider Type    Jorge Aviles PT DPT Physical Therapist    Maricruz Burns, PT Student PT Student        Obj/Interventions     Row Name 09/14/21 1038          Range of Motion Comprehensive    General Range of Motion  bilateral lower extremity ROM WFL  -RAY (r) PH (t) RAY (c)     Row Name 09/14/21 1038          Strength Comprehensive (MMT)    Comment, General Manual Muscle Testing (MMT) Assessment  B LE Strength 5/5  -RAY (r) PH (t) RAY (c)     Row Name 09/14/21 1038          Balance    Balance Assessment  sitting dynamic balance;standing dynamic balance  -RAY (r) PH (t) RAY (c)     Dynamic Sitting Balance  unsupported;sitting, edge of bed;WNL  -RAY (r) PH (t) RAY (c)     Dynamic Standing Balance  WNL;standing;supported  -RAY (r) PH (t) RAY (c)     Comment, Balance  Pt able to reach across midline with adequate weight shift, retrieves object from floor with supervision, accepts maximal perturbations in all directions  -RAY (r) PH (t) RAY (c)     Row Name 09/14/21 1038          Sensory Assessment (Somatosensory)    Sensory Assessment (Somatosensory)  LE sensation intact  -RAY (r) PH (t) RAY (c)       User Key  (r) = Recorded By, (t) = Taken By, (c) = Cosigned By    Initials Name Provider Type    Jorge Aviles, PT DPT Physical Therapist    Maricruz Burns, PT Student PT Student        Goals/Plan    No documentation.       Clinical Impression     Row Name 09/14/21 1038          Pain    Additional Documentation  Pain Scale: Numbers Pre/Post-Treatment (Group)  -RAY (r) PH (t) RAY (c)     Row Name 09/14/21 1038          Pain Scale: Numbers Pre/Post-Treatment    Pretreatment Pain Rating  8/10  -RAY (r) PH (t) RAY (c)     Posttreatment Pain Rating  8/10  -RAY (r) PH (t) RAY (c)      Pain Location - Side  Bilateral  -RAY (r) PH (t) RAY (c)     Pain Location - Orientation  anterior  -RAY (r) PH (t) RAY (c)     Pain Location  chest;extremity;abdomen  -RAY (r) PH (t) RAY (c)     Pain Intervention(s)  Medication (See MAR);Repositioned;Ambulation/increased activity;Rest  -RAY (r) JEREMÍAS (t) RAY (c)     Row Name 09/14/21 1038          Plan of Care Review    Plan of Care Reviewed With  patient  -RAY (r) JEREMÍAS (t) RAY (c)     Outcome Summary  The patient is a 56 y/o female admitted on 9/11 with complaints of severe pain. She was previously in the hospital for pain secondary to breast cancer. The patient was discharged with hospice, which she refused. The patient was unable to get her medications through her local pharmacy, so she was admitted back to the hospital for further care. The patient has active ductal carcinoma of the L breast. She is receiving radiation and chemotherapy. The patient was evaluated in her room for PT. She states she lives alone in a home with 3 steps to enter. Her daughter assists her with bathing/dressing. She was independent in mobility prior to this admission. She has extremeley edematous upper extremeties with severe pain. She has 5/5 LE strength and intact sensation despite having neuropathy. The patient performs sit/stand with SBA. She transfers from chair to bed with SBA, and she walks with CGA/SBA. The patient initially walked 350' with no assistive device and CGA/SBA. She had good balance, but presented with slight gait defecits due to her UE. She has to hold them up during gait due to swelling. The patient was given a RW to support her UE for gait. The patient walked 200' with increased heel strike and decreased lateral weight shift. The patient does not qualify for skilled physical therapy at this time. She would benefit from home health assistance at discharge for ADLs.  -RAY (r) JEREMÍAS (t) RAY (c)     Row Name 09/14/21 1038          Therapy Assessment/Plan (PT)    Criteria for Skilled  Interventions Met (PT)  no  -RAY (r) PH (t) RAY (c)     Row Name 09/14/21 1038          Vital Signs    O2 Delivery Pre Treatment  room air  -RAY (r) PH (t) RAY (c)     O2 Delivery Intra Treatment  room air  -RAY (r) PH (t) RAY (c)     O2 Delivery Post Treatment  room air  -RAY (r) PH (t) RAY (c)     Pre Patient Position  Supine  -RAY (r) PH (t) RAY (c)     Intra Patient Position  Standing  -RAY (r) PH (t) RAY (c)     Post Patient Position  Sitting  -RAY (r) PH (t) RAY (c)     Row Name 09/14/21 1038          Positioning and Restraints    Pre-Treatment Position  in bed  -RAY (r) PH (t) RAY (c)     Post Treatment Position  chair  -RAY (r) PH (t) RAY (c)     In Chair  notified nsg;reclined;call light within reach;encouraged to call for assist;RUE elevated;LUE elevated  -RAY (r) PH (t) RAY (c)       User Key  (r) = Recorded By, (t) = Taken By, (c) = Cosigned By    Initials Name Provider Type    Jorge Aviles, PT DPT Physical Therapist    Maricruz Burns, PT Student PT Student        Outcome Measures     Row Name 09/14/21 1038          How much help from another person do you currently need...    Turning from your back to your side while in flat bed without using bedrails?  4  -RAY (r) PH (t) RAY (c)     Moving from lying on back to sitting on the side of a flat bed without bedrails?  4  -RAY (r) PH (t) RAY (c)     Moving to and from a bed to a chair (including a wheelchair)?  4  -RAY (r) PH (t) RAY (c)     Standing up from a chair using your arms (e.g., wheelchair, bedside chair)?  4  -RAY (r) PH (t) RAY (c)     Climbing 3-5 steps with a railing?  3  -RAY (r) PH (t) RAY (c)     To walk in hospital room?  4  -RAY (r) PH (t) RAY (c)     AM-PAC 6 Clicks Score (PT)  23  -RAY (r) PH (t)     Row Name 09/14/21 1038          Functional Assessment    Outcome Measure Options  AM-PAC 6 Clicks Basic Mobility (PT)  -RAY (r) PH (t) RAY (c)       User Key  (r) = Recorded By, (t) = Taken By, (c) = Cosigned By    Initials Name Provider Type    Jorge Aviles  A, PT DPT Physical Therapist    Maricruz Burns, PT Student PT Student        Physical Therapy Education                 Title: PT OT SLP Therapies (In Progress)     Topic: Physical Therapy (Resolved)     Point: Precautions (Resolved)     Learning Progress Summary           Patient Acceptance, E, VU by  at 9/14/2021 2401    Comment: Pt educated on safety with mobility while in the hallways. Pt educated on gait abnormalities. Education provided on AD use while in the room for safety.                               User Key     Initials Effective Dates Name Provider Type Discipline     08/04/21 -  Maricruz Goff, PT Student PT Student PT              PT Recommendation and Plan     Plan of Care Reviewed With: patient  Outcome Summary: The patient is a 56 y/o female admitted on 9/11 with complaints of severe pain. She was previously in the hospital for pain secondary to breast cancer. The patient was discharged with hospice, which she refused. The patient was unable to get her medications through her local pharmacy, so she was admitted back to the hospital for further care. The patient has active ductal carcinoma of the L breast. She is receiving radiation and chemotherapy. The patient was evaluated in her room for PT. She states she lives alone in a home with 3 steps to enter. Her daughter assists her with bathing/dressing. She was independent in mobility prior to this admission. She has extremeley edematous upper extremeties with severe pain. She has 5/5 LE strength and intact sensation despite having neuropathy. The patient performs sit/stand with SBA. She transfers from chair to bed with SBA, and she walks with CGA/SBA. The patient initially walked 350' with no assistive device and CGA/SBA. She had good balance, but presented with slight gait defecits due to her UE. She has to hold them up during gait due to swelling. The patient was given a RW to support her UE for gait. The patient walked 200' with increased  heel strike and decreased lateral weight shift. The patient does not qualify for skilled physical therapy at this time. She would benefit from home health assistance at discharge for ADLs.     Time Calculation:   PT Charges     Row Name 09/14/21 1038             Time Calculation    Start Time  1038  -RAY (r) PH (t) RAY (c)      Stop Time  1132  -RAY      Time Calculation (min)  54 min  -RAY (r) PH (t)      PT Received On  09/14/21  -RAY (r) PH (t) RAY (c)        User Key  (r) = Recorded By, (t) = Taken By, (c) = Cosigned By    Initials Name Provider Type    Jorge Aviles, PT DPT Physical Therapist    Maricruz Burns, PT Student PT Student            PT G-Codes  Outcome Measure Options: AM-PAC 6 Clicks Basic Mobility (PT)  AM-PAC 6 Clicks Score (PT): 23  AM-PAC 6 Clicks Score (OT): 12    PT Discharge Summary  Anticipated Discharge Disposition (PT): home with home health    Marciruz Goff PT Student  9/14/2021

## 2021-09-14 NOTE — CASE MANAGEMENT/SOCIAL WORK
Linda called from [facility] to provide update on decision issue.  Pt saying her insurance may be discontinued by the end of the month.  She is saying she was on FMLA and lost job.  Since pt lives in [place] she would have to reside in [place] for 30 days before eligible for [place] medicaid.  They also are unsure whether pt would qualify for [place] medicaid at this point as she may not need enough assistance to qualify.  Called and spoke with pt and she does not want placement in [place] and prefers [place].  Asked her to reach out to employer and/or insurance to get clarification on how much longer she will have her ins. Told her this SW would reach out to her again this afternoon. Will follow.     1428- Nena BUTLER, in room with pt and at this point pt is not ready for hospice and prefers returning home with [facility] . Note pt was unable to tolerate radiation tx today. Her main concern is having pain management/meds being given at home to manage pain symptoms.  Will follow.

## 2021-09-14 NOTE — PLAN OF CARE
"  Problem: Palliative Care  Goal: Enhanced Quality of Life  Outcome: Ongoing, Progressing  Intervention: Optimize Psychosocial Wellbeing  Flowsheets (Taken 9/14/2021 7473)  Supportive Measures:   active listening utilized   decision-making supported   self-care encouraged     Patient was awake and alert. Pain has decreased since last dose of medication. She is itching a considerable amount and requested cream be applied. Nursing notified. Discussed plans moving forward. She feels hospice would be \"giving up\" and has stated she is wanting to return home with HH. Discussed support in the home. She stated she has a neighbor next door that she is certain will check on her. Discussed speaking with her children again as she is needing support now more than ever. She stated her daughter does not want her to pursue hospice at this time due to not receiving therapy. She feels like they will be more supportive in her care at home. She is concerned about pain management at discharge and what that will entail. Discussed speaking with PCP. She has not seen physician yet and will speak with him regarding her concerns.     Will continue to follow and provide support.    CADEN Melvin  9/14/2021    "

## 2021-09-14 NOTE — PROGRESS NOTES
Palliative Care Daily Progress Note   Chart review    Code Status:   Code Status and Medical Interventions:   Ordered at: 09/13/21 0626     Code Status:    CPR     Medical Interventions (Level of Support Prior to Arrest):    Full      Advanced Directives: Advance Directive Status: Patient does not have advance directive   Goals of Care: Ongoing.     S: Medical record reviewed. Events noted.  Continued decline in opiate needs.  Received 225 mg (255 mg day prior) oral morphine equivalent over the last 24 hours in the form of MS ER and three doses of MS IR.  Evaluated by therapy and patient does not qualify for skilled physical therapy at this time, recommend home health assistance at discharge for ADLs.       Pain Assessment  Preferred Pain Scale: number (Numeric Rating Pain Scale)  Nonverbal Indicators of Pain: anxious  Pain Location: back, chest  Pain Description: constant, aching    O:   No intake or output data in the 24 hours ending 09/14/21 1410    Diagnostics: Reviewed    Current Facility-Administered Medications   Medication Dose Route Frequency Provider Last Rate Last Admin   • Biotene Dry Mouth Moisturizing (BIOTENE) 1 spray  1 spray Mouth/Throat 5x Daily aJck Cárdenas MD   1 spray at 09/14/21 1303   • dexamethasone (DECADRON) tablet 4 mg  4 mg Oral Daily With Breakfast Jack Cárdenas MD   4 mg at 09/14/21 0831   • famotidine (PEPCID) tablet 20 mg  20 mg Oral BID AC Elodia Duenas APRN   20 mg at 09/14/21 0831   • gabapentin (NEURONTIN) capsule 300 mg  300 mg Oral Q12H Elodia Duenas APRN   300 mg at 09/14/21 0831   • hydrocortisone 1 % ointment 1 application  1 application Topical Q8H Jack Cárdenas MD   1 application at 09/14/21 0649   • hydrOXYzine (ATARAX) tablet 50 mg  50 mg Oral Q6H PRN Elodia Duenas APRN       • montelukast (SINGULAIR) tablet 10 mg  10 mg Oral Nightly Jakc Cárdenas MD   10 mg at 09/13/21 2124   • Morphine (MS CONTIN) 12 hr tablet 60  "mg  60 mg Oral Q8H Jack Cárdenas MD   60 mg at 09/14/21 0648   • Morphine (MSIR) tablet 15 mg  15 mg Oral Q4H PRN Jack Cárdenas MD   15 mg at 09/14/21 1303   • naproxen (NAPROSYN) tablet 500 mg  500 mg Oral BID With Meals Jack Cárdenas MD   500 mg at 09/14/21 0831   • sennosides-docusate (PERICOLACE) 8.6-50 MG per tablet 2 tablet  2 tablet Oral Nightly DuenasElodia zabalaCHRIS   2 tablet at 09/13/21 2123        hydrOXYzine  •  Morphine    A:    /67 (BP Location: Left leg, Patient Position: Lying)   Pulse 62   Temp 98.3 °F (36.8 °C) (Oral)   Resp 16   Ht 160 cm (63\")   Wt 77.4 kg (170 lb 9.6 oz)   LMP  (LMP Unknown)   SpO2 97%   BMI 30.22 kg/m²        Patient status: Disease state: Controlled with current treatments.  Functional status: Palliative Performance Scale Score: Performance 40% based on the following measures: Ambulation: Mainly in bed, Activity and Evidence of Disease: Unable to do any work, extensive evidence of disease, Self-Care: Mainly assistance required,  Intake: Normal or reduced, LOC: Full, drowsy or confusion   ECOG Status(3) Capable of limited self-care, confined to bed or chair > 50% of waking hours.  Nutritional status: Albumin 3.20. Body mass index is 28.39 kg/m².      Family support: The patient receives support from her children..  Advance Directives: Advance Directive Status: Patient does not have advance directive   POA/Healthcare surrogate-children x4-next of kin.     Impression/Problem List:     1.  Metastatic HER-2 migel breast cancer, mets diffuse chest wall, back, and skin  2.  Cancer related pain  3.  Peripheral neuropathy secondary to chemotherapy  4.  Lymphopenia  5.  Bilateral upper extremity lymphedema     Recommendations/Plan:  1. plan: Goals of care include CODE STATUS CPR/full interventions.     2.  Palliative care encounter  -Overall poor prognosis  9/9-Continue full aggressive care interventions at this juncture.  Patient verbalizes " that she would not wish to remain on life support measures long-term.  I have encouraged further conversations with family to ensure ongoing conversation with regard to medical priorities.  -Plan to discharge home with hospice services.  Plans to identify consistent caregivers so that she may discharge home safely.    3.  Cancer related pain  -Improving  -Received  225 mg (255 mg day prior) oral morphine equivalent over the last 24 hours.   -continue MSER 60 mg from every 8 hours.   -continue MSIR 15 mg every 4 hours as needed  -Continue gabapentin 300 mg twice daily as pain adjuvant for neuropathic pain and pruritus  -Continue Decadron 4 mg tab #7/10. May benefit from long-term if goal is symptom management/hospice.  -Continue naproxen 500 mg twice daily with meals as pain adjuvant for inflammation  -continue pepcid for gastric ulcer protection/prophylaxis may also provide benefit as H2 blocker  -Continue senna S2 tabs nightly for bowel prophylaxis in patient on chronic opioid therapy  -Wound APRN previously following.  -States she is not able to tolerate palliative radiation.  According to radiation oncology note unlikely will be able to obtain significant relief from palliative radiation.  -According to oncology note no expectation of response to systemic therapy due to failed or inability to tolerate aggressive therapies.     4.  Pruritus  -Continue Atarax as needed  -Continue Decadron as above as this should also act as adjuvant.  -Scheduled wound care Q shift.  -Continue hydrocortisone cream every 8 hours. -Continue calazime barrier cream and hydroguard per previous wound care recommendations.   -pepcid as above.  -Gabapentin as above as adjuvant.     5.  Xerostomia  -Continue Biotene        Thank you for allowing us to participate in patient's plan of care. Palliative Care Team will continue to follow patient.     Time spent: 30 minutes spent reviewing medical and medication records, assessing and examining  patient, discussing with family, answering questions, providing some guidance about a plan and documentation of care, and coordinating care with other healthcare members, with > 50% time spent face to face.     Elodia Duenas, APRN  9/14/2021

## 2021-09-14 NOTE — PLAN OF CARE
Goal Outcome Evaluation:  Plan of Care Reviewed With: (P) patient           Outcome Summary: (P) The patient is a 56 y/o female admitted on 9/11 with complaints of severe pain. She was previously in the hospital for pain secondary to breast cancer. The patient was discharged with hospice, which she refused. The patient was unable to get her medications through her local pharmacy, so she was admitted back to the hospital for further care. The patient has active ductal carcinoma of the L breast. She is receiving radiation and chemotherapy. The patient was evaluated in her room for PT. She states she lives alone in a home with 3 steps to enter. Her daughter assists her with bathing/dressing. She was independent in mobility prior to this admission. She has extremeley edematous upper extremeties with severe pain. She has 5/5 LE strength and intact sensation despite having neuropathy. The patient performs sit/stand with SBA. She transfers from chair to bed with SBA, and she walks with CGA/SBA. The patient initially walked 350' with no assistive device and CGA/SBA. She had good balance, but presented with slight gait defecits due to her UE. She has to hold them up during gait due to swelling. The patient was given a RW to support her UE for gait. The patient walked 200' with increased heel strike and decreased lateral weight shift. The patient does not qualify for skilled physical therapy at this time. She would benefit from home health assistance at discharge for ADLs.

## 2021-09-14 NOTE — PLAN OF CARE
Goal Outcome Evaluation:  Plan of Care Reviewed With: patient           Outcome Summary: Patient is alert and oriented times 4, assist of 1 to get OOB walks in room with just stand by assistance, complaints of pain to chest and back, medicated per orders, tearful at times, VSS, safety maintained, will continue to monitor.

## 2021-09-15 NOTE — PLAN OF CARE
Goal Outcome Evaluation:  Plan of Care Reviewed With: patient           Outcome Summary: Patient is alert and oriented x4, continues to get pain medications for pain control, up with assist of one, VSS safety maintained. will continue to monitor.

## 2021-09-15 NOTE — DISCHARGE PLACEMENT REQUEST
"  Regine Navarro [phone]  Ayla Echeverria (55 y.o. Female)     Date of Birth Social Security Number Address Home Phone MRN    1965  [address] [place] [address] [phone] [id number]    M[employer]onism Marital Status          Yarsani        Admission Date Admission Type Admitting Provider Attending Provider Department, Room/Bed    9/11/21 Urgent Jack Cárdenas MD [place], Thomas Waldon, MD [facility] 3A, 353/1    Discharge Date Discharge Disposition Discharge Destination                       Attending Provider: Jack Cárdenas MD    Allergies: No Known Allergies    Isolation: None   Infection: None   Code Status: CPR    Ht: 160 cm (63\")   Wt: 77.4 kg (170 lb 9.6 oz)    Admission Cmt: None   Principal Problem: None                Active Insurance as of 9/11/2021     Primary Coverage     Payor Plan Insurance Group Employer/Plan Group    [employer] BLUE CROSS AN[employer]M BLUE CROSS BLUE SHIELD PPO [id number]     Payor Plan Address Payor Plan Phone Number Payor Plan Fax Number Effective Dates    [address] [phone]  1/1/2018 - None Entered    [facility] [id number]       Subscriber Name Subscriber Birth Date Member ID       AYLA ECHEVERRIA 1965 [id number]                 Emergency Contacts      (Rel.) Home Phone Work Phone Mobile Phone    Danielle Rios (Daughter) -- -- [phone]    AmadeoIman (Sister) [phone] -- --    Mayela Tejeda (Friend) [phone] -- --    ELSA BARRAGAN (Daughter) -- -- [phone]               History & Physical      Jack Cárdenas MD at 09/12/21 1118          [facility]  HISTORY AND PHYSICAL    Date of Admission: 9/11/2021  Primary Care Physician: Jack Cárdenas MD    Subjective    Chief Complaint: \"I am hurting bad\"    This is a 55 yr old lady with recent hospitalization due to chest wall pain from breast cancer with subsequent rad damage to the skin. She was discharged to home with " hospice  But upon admission she changed her mind and declined hospice.We tried to send in scrpts for her pain but the only pharmacy open locally could not fill her scripts due to staff decrease due to covid 19 according to our nursing staff. Therefore she returned to the hospital for pain control.      Review of Systems   Constitutional: Positive for activity change.   HENT: Negative.    Eyes: Negative.    Respiratory: Negative.    Cardiovascular: Positive for chest pain.   Gastrointestinal: Negative.    Endocrine: Negative.    Genitourinary: Negative.    Musculoskeletal: Positive for back pain.   Skin: Negative.    Allergic/Immunologic: Negative.    Neurological: Positive for weakness.   Hematological: Negative.    Psychiatric/Behavioral: Positive for sleep disturbance. The patient is nervous/anxious.         Otherwise complete ROS reviewed and negative except as mentioned in the HPI.      Past Medical History:   Past Medical History:   Diagnosis Date   • Breast cancer (CMS/HCC)        Past Surgical History:  Past Surgical History:   Procedure Laterality Date   • AXILLARY LYMPH NODE BIOPSY/EXCISION Left    • BREAST BIOPSY Left 03/31/2016   • BREAST LUMPECTOMY WITH SENTINEL NODE BIOPSY Left 10/03/2016    residual invasive carcinoma, grade 3 (0.2cm); margins negative; extensive lymphvascular space invasion; 2 sentinel lymph nodes positive (2/3)       Social History:  reports that she has never smoked. She has never used smokeless tobacco. She reports that she does not drink alcohol and does not use drugs.    Family History: family history includes Colon cancer in her father; Diabetes in her sister; Hypertension in her sister; Lung cancer in her mother; Lupus in her sister; Prostate cancer in her father.     Allergies:  No Known Allergies    Medications:  Prior to Admission medications    Medication Sig Start Date End Date Taking? Authorizing Provider   APPLE CIDER VINEGAR PO Take 1 tablet by mouth Daily.    Provider,  "MD Jose F   Biotene Dry Mouth Moisturizing (BIOTENE) solution Apply 1 spray to the mouth or throat 5 (Five) Times a Day. 9/11/21   Jack Cárdenas MD   dexamethasone (DECADRON) 4 MG tablet Take 1 tablet by mouth Daily With Breakfast. 9/12/21   Jack Cárdenas MD   gabapentin (NEURONTIN) 100 MG capsule Take 1 capsule by mouth 3 (Three) Times a Day. 9/11/21   Jack Cárdenas MD   montelukast (SINGULAIR) 10 MG tablet Take 1 tablet by mouth Daily. 9/12/21   Jack Cárdenas MD   Morphine (MS CONTIN) 60 MG 12 hr tablet Take 1 tablet by mouth Every 8 (Eight) Hours. 9/11/21   Jack Cárdenas MD   Morphine (MSIR) 15 MG tablet Take 2 tablets by mouth Every 4 (Four) Hours As Needed for Severe Pain . 9/11/21   Jack Cárdenas MD   naproxen (NAPROSYN) 500 MG tablet Take 1 tablet by mouth 2 (Two) Times a Day With Meals. 9/11/21   Jack Cárdenas MD   sennosides-docusate (PERICOLACE) 8.6-50 MG per tablet Take 1 tablet by mouth 2 (Two) Times a Day As Needed for Constipation. 9/11/21   Jack Cárdenas MD       Objective    Vital Signs: /56 (BP Location: Left leg, Patient Position: Lying)   Pulse 68   Temp 97.8 °F (36.6 °C) (Oral)   Resp 15   Ht 160 cm (63\")   Wt 77.4 kg (170 lb 9.6 oz)   LMP  (LMP Unknown)   SpO2 97%   BMI 30.22 kg/m²   Physical Exam  Vitals and nursing note reviewed.   Constitutional:       Appearance: Normal appearance. She is normal weight.   HENT:      Head: Normocephalic and atraumatic.      Right Ear: Ear canal normal.      Left Ear: Ear canal normal.      Nose: Nose normal.      Mouth/Throat:      Mouth: Mucous membranes are dry.      Pharynx: Oropharynx is clear.   Eyes:      Extraocular Movements: Extraocular movements intact.      Conjunctiva/sclera: Conjunctivae normal.      Pupils: Pupils are equal, round, and reactive to light.   Cardiovascular:      Rate and Rhythm: Normal rate and regular rhythm.      Pulses: Normal pulses. "   Pulmonary:      Effort: Pulmonary effort is normal.      Breath sounds: Normal breath sounds.   Abdominal:      General: Abdomen is flat. Bowel sounds are normal.      Palpations: Abdomen is soft.   Musculoskeletal:         General: Normal range of motion.      Cervical back: Normal range of motion and neck supple.   Skin:     General: Skin is warm and dry.      Capillary Refill: Capillary refill takes less than 2 seconds.   Neurological:      General: No focal deficit present.      Mental Status: She is alert and oriented to person, place, and time. Mental status is at baseline.   Psychiatric:         Mood and Affect: Mood normal.         Behavior: Behavior normal.         Thought Content: Thought content normal.         Judgment: Judgment normal.       Results Reviewed:  Lab Results (last 24 hours)     Procedure Component Value Units Date/Time    COVID PRE-OP / PRE-PROCEDURE SCREENING ORDER (NO ISOLATION) - Swab, Nasal Cavity [[id number]]  (Normal) Collected: 09/11/21 2259    Specimen: Swab from Nasal Cavity Updated: 09/12/21 0008    Narrative:      The following orders were created for panel order COVID PRE-OP / PRE-PROCEDURE SCREENING ORDER (NO ISOLATION) - Swab, Nasal Cavity.  Procedure                               Abnormality         Status                     ---------                               -----------         ------                     COVID-19,[facility] IN-FIT[facility]...[[id number]]  Normal              Final result                 Please view results for these tests on the individual orders.    COVID-19,[facility] IN-HOUSE,Nasal Swab No Transport Media 3-4 HR TAT - Swab, Nasal Cavity [[id number]]  (Normal) Collected: 09/11/21 2259    Specimen: Swab from Nasal Cavity Updated: 09/12/21 0008     COVID19 Not Detected    Narrative:      Fact sheet for providers: https://www.fda.gov/media/072666/download     Fact sheet for patients: https://www.fda.gov/media/303252/download    Test performed by PCR.    Consider  negative results in combination with clinical observations, patient history, and epidemiological information.        Imaging Results (Last 24 Hours)     ** No results found for the last 24 hours. **            Active Hospital Problems    Diagnosis    • Chest wall pain    • Intractable pain    • History of radiation therapy    • Regional lymph node metastasis present (CMS/HCC)        Assessment / Plan  Will try to help again with pain--she is agreeable to hospice care now--I have discusssed with       Code Status: full code     I discussed the patient's findings and my recommendations with the patient and her daughter.    Estimated length of stay 24hrs.    Jack Cárdenas MD   09/12/21   11:24 CDT    Electronically signed by Jack Cárdenas MD at 09/12/21 1125         Current Facility-Administered Medications   Medication Dose Route Frequency Provider Last Rate Last Admin   • Biotene Dry Mouth Moisturizing (BIOTENE) 1 spray  1 spray Mouth/Throat 5x Daily Jack Cárdenas MD   1 spray at 09/15/21 0900   • dexamethasone (DECADRON) tablet 4 mg  4 mg Oral Daily With Breakfast Jack Cárdenas MD   4 mg at 09/15/21 0857   • famotidine (PEPCID) tablet 20 mg  20 mg Oral BID AC Elodia Duenas APRN   20 mg at 09/15/21 0857   • gabapentin (NEURONTIN) capsule 300 mg  300 mg Oral Q12H Eldoia Duenas APRN   300 mg at 09/15/21 0857   • hydrocortisone 1 % ointment 1 application  1 application Topical Q8H Jack Cárdenas MD   1 application at 09/15/21 0651   • hydrOXYzine (ATARAX) tablet 50 mg  50 mg Oral Q6H PRN Elodia Duenas APRN       • montelukast (SINGULAIR) tablet 10 mg  10 mg Oral Nightly Jack Cárdenas MD   10 mg at 09/14/21 2131   • Morphine (MS CONTIN) 12 hr tablet 60 mg  60 mg Oral Q8H Jack Cárdenas MD   60 mg at 09/15/21 0650   • Morphine (MSIR) tablet 22.5 mg  22.5 mg Oral Q4H PRN Jack Cárdenas MD   22.5 mg at 09/15/21 0859   •  "naproxen (NAPROSYN) tablet 500 mg  500 mg Oral BID With Meals Jack Cárdenas MD   500 mg at 09/15/21 0857   • sennosides-docusate (PERICOLACE) 8.6-50 MG per tablet 2 tablet  2 tablet Oral Nightly Elodia Duenas APRN   2 tablet at 09/14/21 2131     Operative/Procedure Notes (last 24 hours) (Notes from 09/14/21 0939 through 09/15/21 0939)    No notes of this type exist for this encounter.            Physician Progress Notes (last 24 hours) (Notes from 09/14/21 0939 through 09/15/21 0939)      Jack Cárdenas MD at 09/14/21 1704        CC:\"im in pain--can we increase my prn pain meds?\" d/w patient and her daughter plans    Review of Systems   Constitutional: Positive for activity change.   HENT: Negative for congestion.    Eyes: Negative.    Respiratory: Negative.    Cardiovascular: Positive for chest pain.   Gastrointestinal: Negative.    Endocrine: Negative.    Genitourinary: Negative.    Musculoskeletal: Negative.    Skin: Positive for wound.   Allergic/Immunologic: Negative.    Neurological: Negative.    Hematological: Negative.    Psychiatric/Behavioral: Positive for dysphoric mood.     Temp:  [97.5 °F (36.4 °C)-98.3 °F (36.8 °C)] 97.5 °F (36.4 °C)  Heart Rate:  [61-72] 61  Resp:  [16] 16  BP: (142-148)/(64-72) 148/64  I/O last 3 completed shifts:  In: 240 [P.O.:240]  Out: -   No intake/output data recorded.    Physical Exam  Vitals and nursing note reviewed.   Constitutional:       Appearance: Normal appearance. She is normal weight.   HENT:      Head: Normocephalic.      Right Ear: Ear canal normal.      Nose: Nose normal.      Mouth/Throat:      Mouth: Mucous membranes are moist.   Eyes:      Pupils: Pupils are equal, round, and reactive to light.   Cardiovascular:      Rate and Rhythm: Normal rate and regular rhythm.      Pulses: Normal pulses.      Heart sounds: Normal heart sounds.   Pulmonary:      Effort: Pulmonary effort is normal.      Breath sounds: Normal breath sounds. "   Abdominal:      General: Abdomen is flat. Bowel sounds are normal.   Musculoskeletal:         General: Normal range of motion.      Cervical back: Normal range of motion.   Skin:     General: Skin is warm.      Capillary Refill: Capillary refill takes less than 2 seconds.   Neurological:      General: No focal deficit present.      Mental Status: She is alert. Mental status is at baseline.   Psychiatric:         Mood and Affect: Mood normal.           Regional lymph node metastasis present (CMS/HCC)    History of radiation therapy    Intractable pain    Chest wall pain    I dont think its practical for her to return home due to her meds and her lack or activity and lack of having anyone in the home to assist adls--she is agreeable for placement in [place] ([place], [place], [place] but not [place]--her and her daughter in agreement--will ask soc services to see if they can arrange    Electronically signed by Jack Cárdenas MD at 09/15/21 0707     Elodia Duenas APRN at 09/14/21 1410          Palliative Care Daily Progress Note   Chart review    Code Status:   Code Status and Medical Interventions:   Ordered at: 09/13/21 0626     Code Status:    CPR     Medical Interventions (Level of Support Prior to Arrest):    Full      Advanced Directives: Advance Directive Status: Patient does not have advance directive   Goals of Care: Ongoing.     S: Medical record reviewed. Events noted.  Continued decline in opiate needs.  Received 225 mg (255 mg day prior) oral morphine equivalent over the last 24 hours in the form of MS ER and three doses of MS IR.  Evaluated by therapy and patient does not qualify for skilled physical therapy at this time, recommend home health assistance at discharge for ADLs.       Pain Assessment  Preferred Pain Scale: number (Numeric Rating Pain Scale)  Nonverbal Indicators of Pain: anxious  Pain Location: back, chest  Pain Description: constant, aching    O:   No intake or  "output data in the 24 hours ending 09/14/21 1410    Diagnostics: Reviewed    Current Facility-Administered Medications   Medication Dose Route Frequency Provider Last Rate Last Admin   • Biotene Dry Mouth Moisturizing (BIOTENE) 1 spray  1 spray Mouth/Throat 5x Daily Jack Cárdenas MD   1 spray at 09/14/21 1303   • dexamethasone (DECADRON) tablet 4 mg  4 mg Oral Daily With Breakfast Jack Cárdenas MD   4 mg at 09/14/21 0831   • famotidine (PEPCID) tablet 20 mg  20 mg Oral BID AC Elodia Duenas APRN   20 mg at 09/14/21 0831   • gabapentin (NEURONTIN) capsule 300 mg  300 mg Oral Q12H Elodia Duenas APRN   300 mg at 09/14/21 0831   • hydrocortisone 1 % ointment 1 application  1 application Topical Q8H Jack Cárdenas MD   1 application at 09/14/21 0649   • hydrOXYzine (ATARAX) tablet 50 mg  50 mg Oral Q6H PRN Elodia Duenas APRN       • montelukast (SINGULAIR) tablet 10 mg  10 mg Oral Nightly Jack Cárdenas MD   10 mg at 09/13/21 2124   • Morphine (MS CONTIN) 12 hr tablet 60 mg  60 mg Oral Q8H Jack Cárdenas MD   60 mg at 09/14/21 0648   • Morphine (MSIR) tablet 15 mg  15 mg Oral Q4H PRN Jack Cárdenas MD   15 mg at 09/14/21 1303   • naproxen (NAPROSYN) tablet 500 mg  500 mg Oral BID With Meals Jack Cárdenas MD   500 mg at 09/14/21 0831   • sennosides-docusate (PERICOLACE) 8.6-50 MG per tablet 2 tablet  2 tablet Oral Nightly Elodia Duenas APRN   2 tablet at 09/13/21 2123        hydrOXYzine  •  Morphine    A:    /67 (BP Location: Left leg, Patient Position: Lying)   Pulse 62   Temp 98.3 °F (36.8 °C) (Oral)   Resp 16   Ht 160 cm (63\")   Wt 77.4 kg (170 lb 9.6 oz)   LMP  (LMP Unknown)   SpO2 97%   BMI 30.22 kg/m²        Patient status: Disease state: Controlled with current treatments.  Functional status: Palliative Performance Scale Score: Performance 40% based on the following measures: Ambulation: Mainly in bed, Activity and " Evidence of Disease: Unable to do any work, extensive evidence of disease, Self-Care: Mainly assistance required,  Intake: Normal or reduced, LOC: Full, drowsy or confusion   ECOG Status(3) Capable of limited self-care, confined to bed or chair > 50% of waking hours.  Nutritional status: Albumin 3.20. Body mass index is 28.39 kg/m².      Family support: The patient receives support from her children..  Advance Directives: Advance Directive Status: Patient does not have advance directive   POA/Healthcare surrogate-children x4-next of kin.     Impression/Problem List:     1.  Metastatic HER-2 migel breast cancer, mets diffuse chest wall, back, and skin  2.  Cancer related pain  3.  Peripheral neuropathy secondary to chemotherapy  4.  Lymphopenia  5.  Bilateral upper extremity lymphedema     Recommendations/Plan:  1. plan: Goals of care include CODE STATUS CPR/full interventions.     2.  Palliative care encounter  -Overall poor prognosis  9/9-Continue full aggressive care interventions at this juncture.  Patient verbalizes that she would not wish to remain on life support measures long-term.  I have encouraged further conversations with family to ensure ongoing conversation with regard to medical priorities.  -Plan to discharge home with hospice services.  Plans to identify consistent caregivers so that she may discharge home safely.    3.  Cancer related pain  -Improving  -Received  225 mg (255 mg day prior) oral morphine equivalent over the last 24 hours.   -continue MSER 60 mg from every 8 hours.   -continue MSIR 15 mg every 4 hours as needed  -Continue gabapentin 300 mg twice daily as pain adjuvant for neuropathic pain and pruritus  -Continue Decadron 4 mg tab #7/10. May benefit from long-term if goal is symptom management/hospice.  -Continue naproxen 500 mg twice daily with meals as pain adjuvant for inflammation  -continue pepcid for gastric ulcer protection/prophylaxis may also provide benefit as H2  blocker  -Continue senna S2 tabs nightly for bowel prophylaxis in patient on chronic opioid therapy  -Wound APRN previously following.  -States she is not able to tolerate palliative radiation.  According to radiation oncology note unlikely will be able to obtain significant relief from palliative radiation.  -According to oncology note no expectation of response to systemic therapy due to failed or inability to tolerate aggressive therapies.     4.  Pruritus  -Continue Atarax as needed  -Continue Decadron as above as this should also act as adjuvant.  -Scheduled wound care Q shift.  -Continue hydrocortisone cream every 8 hours. -Continue calazime barrier cream and hydroguard per previous wound care recommendations.   -pepcid as above.  -Gabapentin as above as adjuvant.     5.  Xerostomia  -Continue Biotene        Thank you for allowing us to participate in patient's plan of care. Palliative Care Team will continue to follow patient.     Time spent: 30 minutes spent reviewing medical and medication records, assessing and examining patient, discussing with family, answering questions, providing some guidance about a plan and documentation of care, and coordinating care with other healthcare members, with > 50% time spent face to face.     CHRIS Elmore  9/14/2021    Electronically signed by Elodia Duenas APRN at 09/14/21 1421       Consult Notes (last 24 hours) (Notes from 09/14/21 0939 through 09/15/21 0939)    No notes of this type exist for this encounter.            Nutrition Notes (last 24 hours) (Notes from 09/14/21 0939 through 09/15/21 0939)      Iram Baig at 09/14/21 1803        Goal Outcome Evaluation:  Plan of Care Reviewed With: other (see comments) (RN)      Progress: no change  Outcome Summary: Ntn assessment completed. Oral intake 50% of meals today per nursing. Regular diet. Boost Plus added TID. Cont to follow for plan of care.    Electronically signed by Iram Baig at  09/14/21 1804          Physical Therapy Notes (last 24 hours) (Notes from 09/14/21 0939 through 09/15/21 0939)      Maricruz Goff PT Student at 09/14/21 1305  Version 1 of 1    Attestation signed by Jorge Mejia PT DPT at 09/14/21 1432    I reviewed the documentation and agree.                   Goal Outcome Evaluation:  Plan of Care Reviewed With: (P) patient           Outcome Summary: (P) The patient is a 56 y/o female admitted on 9/11 with complaints of severe pain. She was previously in the hospital for pain secondary to breast cancer. The patient was discharged with hospice, which she refused. The patient was unable to get her medications through her local pharmacy, so she was admitted back to the hospital for further care. The patient has active ductal carcinoma of the L breast. She is receiving radiation and chemotherapy. The patient was evaluated in her room for PT. She states she lives alone in a home with 3 steps to enter. Her daughter assists her with bathing/dressing. She was independent in mobility prior to this admission. She has extremeley edematous upper extremeties with severe pain. She has 5/5 LE strength and intact sensation despite having neuropathy. The patient performs sit/stand with SBA. She transfers from chair to bed with SBA, and she walks with CGA/SBA. The patient initially walked 350' with no assistive device and CGA/SBA. She had good balance, but presented with slight gait defecits due to her UE. She has to hold them up during gait due to swelling. The patient was given a RW to support her UE for gait. The patient walked 200' with increased heel strike and decreased lateral weight shift. The patient does not qualify for skilled physical therapy at this time. She would benefit from home health assistance at discharge for ADLs.    Electronically signed by Jorge Mejia, PT DPT at 09/14/21 1432     Maricruz Goff PT Student at 09/14/21 1515  Version 1 of 1    Attestation signed  by Jorge Mejia, PT DPT at 21 1515    I reviewed the documentation and agree.                     Patient Name: Ayla Echeverria  : 1965    MRN: [id number]                              Today's Date: 2021       Admit Date: 2021    Visit Dx:     ICD-10-CM ICD-9-CM   1. Decreased activities of daily living (ADL)  Z78.9 V49.89   2. Impaired mobility  Z74.09 799.89     Patient Active Problem List   Diagnosis   • Bilateral acute serous otitis media   • Malignant neoplasm involving both nipple and areola of left breast in female (CMS/HCC)   • Cellulitis of abdominal wall   • Impetigo   • Folliculitis   • Encounter for consultation   • Non-smoker   • S/P lumpectomy, left breast   • S/P lymph node biopsy   • Regional lymph node staging category N3 (CMS/HCC)   • On antineoplastic chemotherapy   • Regional lymph node metastasis present (CMS/HCC)   • History of radiation therapy   • Intractable pain   • Arm swelling   • Chest wall pain     Past Medical History:   Diagnosis Date   • Breast cancer (CMS/HCC)      Past Surgical History:   Procedure Laterality Date   • AXILLARY LYMPH NODE BIOPSY/EXCISION Left    • BREAST BIOPSY Left 2016   • BREAST LUMPECTOMY WITH SENTINEL NODE BIOPSY Left 10/03/2016    residual invasive carcinoma, grade 3 (0.2cm); margins negative; extensive lymphvascular space invasion; 2 sentinel lymph nodes positive (2/3)     General Information     Row Name 21 1038          Physical Therapy Time and Intention    Document Type  evaluation CC: Pain secondary to cancer History: Ductal carcinoma of L Breast, Radiation/Chemo HPI: Cancer related pain, Peripheral neuropathy  -RAY (r) PH (t) RAY (c)     Mode of Treatment  physical therapy  -RAY (r) PH (t) RAY (c)     Row Name 21 1038          General Information    Patient Profile Reviewed  yes  -RAY (r) PH (t) RAY (c)     Prior Level of Function  independent:;gait;transfer;bed mobility;using stairs;all household mobility  -RAY  (r) PH (t) RAY (c)     Existing Precautions/Restrictions  no known precautions/restrictions  -RAY (r) PH (t) RAY (c)     Barriers to Rehab  medically complex  -RAY (r) PH (t) RAY (c)     Row Name 09/14/21 1038          Living Environment    Lives With  alone  -RAY (r) PH (t) RAY (c)     Row Name 09/14/21 1038          Home Main Entrance    Number of Stairs, Main Entrance  three  -RAY (r) PH (t) RAY (c)     Stair Railings, Main Entrance  none  -RAY (r) PH (t) RAY (c)     Row Name 09/14/21 1038          Stairs Within Home, Primary    Number of Stairs, Within Home, Primary  none  -RAY (r) PH (t) RAY (c)     Stair Railings, Within Home, Primary  none  -RAY (r) PH (t) RAY (c)     Row Name 09/14/21 1038          Cognition    Orientation Status (Cognition)  oriented x 4  -RAY (r) PH (t) RAY (c)     Row Name 09/14/21 1038          Safety Issues, Functional Mobility    Safety Issues Affecting Function (Mobility)  judgment  -RAY (r) PH (t) RAY (c)     Impairments Affecting Function (Mobility)  pain  -RAY (r) PH (t) RAY (c)       User Key  (r) = Recorded By, (t) = Taken By, (c) = Cosigned By    Initials Name Provider Type    Jorge Aviles, PT DPT Physical Therapist    PH Maricruz Goff, PT Student PT Student        Mobility     Row Name 09/14/21 1038          Bed Mobility    Comment (Bed Mobility)  Found sitting in chair  -RAY (r) PH (t) RAY (c)     Row Name 09/14/21 1038          Bed-Chair Transfer    Bed-Chair [place] (Transfers)  standby assist  -RAY (r) PH (t) RAY (c)     Row Name 09/14/21 1038          Sit-Stand Transfer    Sit-Stand [place] (Transfers)  standby assist  -RAY (r) PH (t) RAY (c)     Row Name 09/14/21 1038          Gait/Stairs (Locomotion)    [place] Level (Gait)  standby assist;contact guard  -RAY (r) PH (t) RAY (c)     Assistive Device (Gait)  walker, front-wheeled  -RAY (r) PH (t) RAY (c)     Distance in Feet (Gait)  350' without rw; 200' with rw  -RAY (r) PH (t) RAY (c)     Deviations/Abnormal Patterns (Gait)  base  of support, narrow;evangelina decreased  -RAY (r) PH (t) RAY (c)     Left Sided Gait Deviations  heel strike decreased  -RAY (r) PH (t) RAY (c)     Comment (Gait/Stairs)  Pt able to walk up to 400'. Pt given a RW to have support for edematous arms.  -RAY (r) PH (t) RAY (c)       User Key  (r) = Recorded By, (t) = Taken By, (c) = Cosigned By    Initials Name Provider Type    Jorge Aviles, PT DPT Physical Therapist    Maricruz Burns, PT Student PT Student        Obj/Interventions     Row Name 09/14/21 1038          Range of Motion Comprehensive    General Range of Motion  bilateral lower extremity ROM WFL  -RAY (r) PH (t) RAY (c)     Row Name 09/14/21 1038          Strength Comprehensive (MMT)    Comment, General Manual Muscle Testing (MMT) Assessment  B LE Strength 5/5  -RAY (r) PH (t) RAY (c)     Row Name 09/14/21 1038          Balance    Balance Assessment  sitting dynamic balance;standing dynamic balance  -RAY (r) PH (t) RAY (c)     Dynamic Sitting Balance  unsupported;sitting, edge of bed;WNL  -RAY (r) PH (t) RAY (c)     Dynamic Standing Balance  WNL;standing;supported  -RAY (r) PH (t) RAY (c)     Comment, Balance  Pt able to reach across midline with adequate weight shift, retrieves object from floor with supervision, accepts maximal perturbations in all directions  -RAY (r) PH (t) RAY (c)     Row Name 09/14/21 1038          Sensory Assessment (Somatosensory)    Sensory Assessment (Somatosensory)  LE sensation intact  -RAY (r) PH (t) RAY (c)       User Key  (r) = Recorded By, (t) = Taken By, (c) = Cosigned By    Initials Name Provider Type    Jorge Aviles, PT DPT Physical Therapist    Maricruz Burns, PT Student PT Student        Goals/Plan    No documentation.       Clinical Impression     Row Name 09/14/21 1038          Pain    Additional Documentation  Pain Scale: Numbers Pre/Post-Treatment (Group)  -RAY (r) PH (t) RAY (c)     Row Name 09/14/21 1038          Pain Scale: Numbers Pre/Post-Treatment    Pretreatment  Pain Rating  8/10  -RAY (r) PH (t) RAY (c)     Posttreatment Pain Rating  8/10  -RAY (r) PH (t) RAY (c)     Pain Location - Side  Bilateral  -RAY (r) PH (t) RAY (c)     Pain Location - Orientation  anterior  -RAY (r) PH (t) RAY (c)     Pain Location  chest;extremity;abdomen  -RAY (r) PH (t) RAY (c)     Pain Intervention(s)  Medication (See MAR);Repositioned;Ambulation/increased activity;Rest  -RAY (r) PH (t) RAY (c)     Row Name 09/14/21 1038          Plan of Care Review    Plan of Care Reviewed With  patient  -RAY (r) PH (t) RAY (c)     Outcome Summary  The patient is a 56 y/o female admitted on 9/11 with complaints of severe pain. She was previously in the hospital for pain secondary to breast cancer. The patient was discharged with hospice, which she refused. The patient was unable to get her medications through her local pharmacy, so she was admitted back to the hospital for further care. The patient has active ductal carcinoma of the L breast. She is receiving radiation and chemotherapy. The patient was evaluated in her room for PT. She states she lives alone in a home with 3 steps to enter. Her daughter assists her with bathing/dressing. She was independent in mobility prior to this admission. She has extremeley edematous upper extremeties with severe pain. She has 5/5 LE strength and intact sensation despite having neuropathy. The patient performs sit/stand with SBA. She transfers from chair to bed with SBA, and she walks with CGA/SBA. The patient initially walked 350' with no assistive device and CGA/SBA. She had good balance, but presented with slight gait defecits due to her UE. She has to hold them up during gait due to swelling. The patient was given a RW to support her UE for gait. The patient walked 200' with increased heel strike and decreased lateral weight shift. The patient does not qualify for skilled physical therapy at this time. She would benefit from home health assistance at discharge for ADLs.  -RAY (r) PH  (t) RAY (c)     Row Name 09/14/21 1038          Therapy Assessment/Plan (PT)    Criteria for Skilled Interventions Met (PT)  no  -RAY (r) PH (t) RAY (c)     Row Name 09/14/21 1038          Vital Signs    O2 Delivery Pre Treatment  room air  -RAY (r) PH (t) RAY (c)     O2 Delivery Intra Treatment  room air  -RAY (r) PH (t) RAY (c)     O2 Delivery Post Treatment  room air  -RAY (r) PH (t) RAY (c)     Pre Patient Position  Supine  -RAY (r) PH (t) RAY (c)     Intra Patient Position  Standing  -RAY (r) PH (t) RAY (c)     Post Patient Position  Sitting  -RAY (r) PH (t) RAY (c)     Row Name 09/14/21 1038          Positioning and Restraints    Pre-Treatment Position  in bed  -RAY (r) PH (t) RAY (c)     Post Treatment Position  chair  -RAY (r) PH (t) RAY (c)     In Chair  notified nsg;reclined;call light within reach;encouraged to call for assist;RUE elevated;LUE elevated  -RAY (r) PH (t) RAY (c)       User Key  (r) = Recorded By, (t) = Taken By, (c) = Cosigned By    Initials Name Provider Type    Jorge Aviles, PT DPT Physical Therapist    Maricruz Burns, PT Student PT Student        Outcome Measures     Row Name 09/14/21 1038          How much help from another person do you currently need...    Turning from your back to your side while in flat bed without using bedrails?  4  -RAY (r) PH (t) RAY (c)     Moving from lying on back to sitting on the side of a flat bed without bedrails?  4  -RAY (r) PH (t) RAY (c)     Moving to and from a bed to a chair (including a wheelchair)?  4  -RAY (r) PH (t) RAY (c)     Standing up from a chair using your arms (e.g., wheelchair, bedside chair)?  4  -RAY (r) PH (t) RAY (c)     Climbing 3-5 steps with a railing?  3  -RAY (r) PH (t) RAY (c)     To walk in hospital room?  4  -RAY (r) PH (t) RAY (c)     AM-PAC 6 Clicks Score (PT)  23  -RAY (r) PH (t)     Row Name 09/14/21 [facility]          Functional Assessment    Outcome Measure Options  AM-PAC 6 Clicks Basic Mobility (PT)  -RAY (r) PH (t) RAY (c)       User Key  (r) =  Recorded By, (t) = Taken By, (c) = Cosigned By    Initials Name Provider Type    Jorge Aviles, PT DPT Physical Therapist    Maricruz Burns, PT Student PT Student        Physical Therapy Education                 Title: PT OT SLP Therapies (In Progress)     Topic: Physical Therapy (Resolved)     Point: Precautions (Resolved)     Learning Progress Summary           Patient Acceptance, E, VU by  at 9/14/2021 3015    Comment: Pt educated on safety with mobility while in the hallways. Pt educated on gait abnormalities. Education provided on AD use while in the room for safety.                               User Key     Initials Effective Dates Name Provider Type Discipline     08/04/21 -  Maricruz Goff, PT Student PT Student PT              PT Recommendation and Plan     Plan of Care Reviewed With: patient  Outcome Summary: The patient is a 54 y/o female admitted on 9/11 with complaints of severe pain. She was previously in the hospital for pain secondary to breast cancer. The patient was discharged with hospice, which she refused. The patient was unable to get her medications through her local pharmacy, so she was admitted back to the hospital for further care. The patient has active ductal carcinoma of the L breast. She is receiving radiation and chemotherapy. The patient was evaluated in her room for PT. She states she lives alone in a home with 3 steps to enter. Her daughter assists her with bathing/dressing. She was independent in mobility prior to this admission. She has extremeley edematous upper extremeties with severe pain. She has 5/5 LE strength and intact sensation despite having neuropathy. The patient performs sit/stand with SBA. She transfers from chair to bed with SBA, and she walks with CGA/SBA. The patient initially walked 350' with no assistive device and CGA/SBA. She had good balance, but presented with slight gait defecits due to her UE. She has to hold them up during gait due to  swelling. The patient was given a RW to support her UE for gait. The patient walked 200' with increased heel strike and decreased lateral weight shift. The patient does not qualify for skilled physical therapy at this time. She would benefit from home health assistance at discharge for ADLs.     Time Calculation:   PT Charges     Row Name 09/14/21 1038             Time Calculation    Start Time  1038  -RAY (r) PH (t) RAY (c)      Stop Time  1132  -RAY      Time Calculation (min)  54 min  -RAY (r) PH (t)      PT Received On  09/14/21  -RAY (r) PH (t) RAY (c)        User Key  (r) = Recorded By, (t) = Taken By, (c) = Cosigned By    Initials Name Provider Type    Jorge Aviles, PT DPT Physical Therapist    Maricruz Burns, PT Student PT Student            PT G-Codes  Outcome Measure Options: AM-PAC 6 Clicks Basic Mobility (PT)  AM-PAC 6 Clicks Score (PT): 23  AM-PAC 6 Clicks Score (OT): 12    PT Discharge Summary  Anticipated Discharge Disposition (PT): home with home health    Maricruz Goff PT Student  9/14/2021         Electronically signed by Jorge Mejia PT DPT at 09/14/21 1515       Occupational Therapy Notes (last 24 hours) (Notes from 09/14/21 0939 through 09/15/21 0939)    No notes exist for this encounter.         Speech Language Pathology Notes (last 24 hours) (Notes from 09/14/21 0939 through 09/15/21 0939)    No notes exist for this encounter.         Respiratory Therapy Notes (last 24 hours) (Notes from 09/14/21 0939 through 09/15/21 0939)    No notes exist for this encounter.

## 2021-09-15 NOTE — CASE MANAGEMENT/SOCIAL WORK
Continued Stay Note  REKHA Ley     Patient Name: Ayla Echeverria  MRN: [id number]  Today's Date: 9/15/2021    Admit Date: 9/11/2021    Discharge Plan     Row Name 09/15/21 0935       Plan    Plan  SNF    Patient/Family in Agreement with Plan  yes    Plan Comments  Spoke with pt to reassess d/c plan.  She is not real keen on being placed but agreeable to see if she could get bed offer in [place], she does not want [facility].  She still prefers going home with  but hopefully she will decide on placement if bed offered. Will follow.        Discharge Codes    No documentation.             CHAKA Mccullough

## 2021-09-15 NOTE — PROGRESS NOTES
"CC:\"im in pain--can we increase my prn pain meds?\" d/w patient and her daughter plans    Review of Systems   Constitutional: Positive for activity change.   HENT: Negative for congestion.    Eyes: Negative.    Respiratory: Negative.    Cardiovascular: Positive for chest pain.   Gastrointestinal: Negative.    Endocrine: Negative.    Genitourinary: Negative.    Musculoskeletal: Negative.    Skin: Positive for wound.   Allergic/Immunologic: Negative.    Neurological: Negative.    Hematological: Negative.    Psychiatric/Behavioral: Positive for dysphoric mood.     Temp:  [97.5 °F (36.4 °C)-98.3 °F (36.8 °C)] 97.5 °F (36.4 °C)  Heart Rate:  [61-72] 61  Resp:  [16] 16  BP: (142-148)/(64-72) 148/64  I/O last 3 completed shifts:  In: 240 [P.O.:240]  Out: -   No intake/output data recorded.    Physical Exam  Vitals and nursing note reviewed.   Constitutional:       Appearance: Normal appearance. She is normal weight.   HENT:      Head: Normocephalic.      Right Ear: Ear canal normal.      Nose: Nose normal.      Mouth/Throat:      Mouth: Mucous membranes are moist.   Eyes:      Pupils: Pupils are equal, round, and reactive to light.   Cardiovascular:      Rate and Rhythm: Normal rate and regular rhythm.      Pulses: Normal pulses.      Heart sounds: Normal heart sounds.   Pulmonary:      Effort: Pulmonary effort is normal.      Breath sounds: Normal breath sounds.   Abdominal:      General: Abdomen is flat. Bowel sounds are normal.   Musculoskeletal:         General: Normal range of motion.      Cervical back: Normal range of motion.   Skin:     General: Skin is warm.      Capillary Refill: Capillary refill takes less than 2 seconds.   Neurological:      General: No focal deficit present.      Mental Status: She is alert. Mental status is at baseline.   Psychiatric:         Mood and Affect: Mood normal.           Regional lymph node metastasis present (CMS/HCC)    History of radiation therapy    Intractable pain    Chest " wall pain    I dont think its practical for her to return home due to her meds and her lack or activity and lack of having anyone in the home to assist adls--she is agreeable for placement in [place] ([facility], [facility], etc) but not [place]--her and her daughter in agreement--will ask soc services to see if they can arrange
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
## 2021-09-15 NOTE — PLAN OF CARE
"  Problem: Palliative Care  Goal: Enhanced Quality of Life  Outcome: Ongoing, Progressing  Intervention: Optimize Psychosocial Wellbeing  Flowsheets (Taken 9/15/2021 1031)  Supportive Measures: active listening utilized  Family/Support System Care:   support provided   caregiver stress acknowledged    Patient was awake and oriented. She stated that she feels the increased PRN morphine dosage was decreasing her pain. She feels her itching has decreased. She stated she wanted to leave the hospital so she could get \"up and moving around\". She feels this is increasing her swelling. Discussed her conversation with physician regarding placement. She would like to go home, but is willing to try placement.     Spoke with patient's daughter, Steffi. She stated that she is unable to find caregivers for her mother and \"is stressed out with the thought of her going home\". She is starting new work and is unable to provide continually care. She feels she could probably get coverage during the day, but not at night. Daughter checked with insurance regarding a caregiver agency and stated patient would more than likely not qualify for assistance due to mobility. Per daughter, agency more than likely would not have overnight caregivers as well. She is hopeful that a facility will accept her. She is continuing to seek support if patient were to go home. She is supportive of patient's wishes to not pursue hospice at this time.     Will continue to follow and provide support.    CADNE Melvin  9/15/2021       "

## 2021-09-16 NOTE — CASE MANAGEMENT/SOCIAL WORK
Continued Stay Note   [facility]     Patient Name: Ayal Echeverria  MRN: [id number]  Today's Date: 9/16/2021    Admit Date: 9/11/2021    Discharge Plan     Row Name 09/16/21 1427       Plan    Plan  LIZETTE spoke with Steffi Lazaro, daughter, she says [place] but has not been sent the card yet. Registration notified [place].  Brent,  of [facility], says they are still waiting on [place] [employer] to see if admission will be approved. LIZETTE faxed letter from [employer] administration that waiver for precert was in affect until 9/20/2021. Patient can't be dismissed until the insurance issue is finalized.        Discharge Codes    No documentation.             Sandra Foster RN

## 2021-09-16 NOTE — PLAN OF CARE
Goal Outcome Evaluation:  Plan of Care Reviewed With: patient           Outcome Summary: Alert and oriented x4, continues to complain of severe pain to back and chest medicated per MD orders assist to get up stand by with ambulationm has radiation burns with scabs and open areas to chest and back, CHG bath completed, voids, SCD's, VSS, safety maintained will continue to monitor

## 2021-09-16 NOTE — CASE MANAGEMENT/SOCIAL WORK
Continued Stay Note   [facility]     Patient Name: Ayla Echeverria  MRN: [id number]  Today's Date: 9/16/2021    Admit Date: 9/11/2021    Discharge Plan     Row Name 09/16/21 1029       Plan    Plan  [facility]    Patient/Family in Agreement with Plan  yes    Final Discharge Disposition Code  03 - skilled nursing facility (SNF)    Final Note  Pt has bed acceptance for today at [facility].  Pt will be admitted at SNF level care.  Pt aware and will be reaching out to family to be on standby to transport via private vehicle.    [facility] [phone]  Fax [phone]        Discharge Codes    No documentation.             CHAKA Mccullough

## 2021-09-16 NOTE — PROGRESS NOTES
"CC:\"im still hurting\"    Review of Systems   Constitutional: Positive for activity change and fatigue.   HENT: Negative.    Eyes: Negative.    Respiratory: Negative.    Cardiovascular: Positive for chest pain.   Gastrointestinal: Negative.    Endocrine: Negative.    Genitourinary: Negative.    Skin: Positive for wound.   Allergic/Immunologic: Negative.    Neurological: Negative.    Hematological: Negative.    Psychiatric/Behavioral: Negative.      Temp:  [97 °F (36.1 °C)-98.3 °F (36.8 °C)] 98.2 °F (36.8 °C)  Heart Rate:  [60-67] 63  Resp:  [16-18] 16  BP: (102-149)/(53-81) 149/79  I/O last 3 completed shifts:  In: 480 [P.O.:480]  Out: -   No intake/output data recorded.    Physical Exam  Vitals and nursing note reviewed.   Constitutional:       Appearance: Normal appearance.   HENT:      Head: Normocephalic.      Nose: Nose normal.      Mouth/Throat:      Mouth: Mucous membranes are moist.   Eyes:      Pupils: Pupils are equal, round, and reactive to light.   Cardiovascular:      Rate and Rhythm: Normal rate.      Pulses: Normal pulses.   Pulmonary:      Effort: Pulmonary effort is normal.   Abdominal:      General: Abdomen is flat.   Musculoskeletal:         General: Normal range of motion.      Cervical back: Normal range of motion.   Skin:     Capillary Refill: Capillary refill takes less than 2 seconds.   Neurological:      General: No focal deficit present.      Mental Status: She is alert.   Psychiatric:         Mood and Affect: Mood normal.           Regional lymph node metastasis present (CMS/HCC)    History of radiation therapy    Intractable pain    Chest wall pain    She is still in agreement with placement in [place] (other then [facility]) to help with adls, and lynphedema    "

## 2021-09-16 NOTE — PLAN OF CARE
Goal Outcome Evaluation:  Plan of Care Reviewed With: patient        Progress: no change  Outcome Summary: Pt is independent with ambulation and transfers. Pt is limited in her ADLs due edema in BUE. Pt stood for TB bathing task in shower but required min A when bathing UB and mod/max A for LB. Pt was also min A for LB dressing. Pt encouraged to sit with BUE elevated and encouraged to move when able. Pt is able to complete self feeding while BUE are supported and positioned prior to meal. Continue OT POC

## 2021-09-16 NOTE — PLAN OF CARE
Goal Outcome Evaluation:  Plan of Care Reviewed With: patient        Progress: no change  Outcome Summary: A&OX4. VSS. C/o pain scheduled and PRN pain med given with some relief. Assist x stand-by or independently in room or hallway. Voiding w/o difficulty. Possible d/c to rehab facility soon. SCD's for VTE prevention. On room air, no neuro changes noted. Safety maintained and continue to monitor.

## 2021-09-16 NOTE — PLAN OF CARE
Problem: Palliative Care  Goal: Enhanced Quality of Life  Outcome: Ongoing, Progressing  Intervention: Optimize Psychosocial Wellbeing  Flowsheets (Taken 9/16/2021 1140)  Supportive Measures:  • active listening utilized  • positive reinforcement provided  • self-care encouraged    Patient is awake and oriented. She was sitting up in chair. She stated that she felt better than she has in a while. She feels her pain is controlled and her itching has decreased as well. Patient's daughter called and we were able to discuss SNF. Pt stated she did not know if she wanted to go now because it would be an hour away. Discussed possible decline at home and rehospitalization. She was agreeable at the end of the conversation as she realizes she will have 24/7 care and medication management. Daughter encouraging patient as well. Daughter is aware that she will be private transport to facility.     Will continue to follow until discharge.     CADEN Melvin  9/16/2021

## 2021-09-16 NOTE — THERAPY TREATMENT NOTE
Acute Care - Occupational Therapy Treatment Note  [facility]     Patient Name: Ayla Echeverria  : 1965  MRN: [id number]  Today's Date: 2021             Admit Date: 2021       ICD-10-CM ICD-9-CM   1. Decreased activities of daily living (ADL)  Z78.9 V49.89   2. Impaired mobility  Z74.09 799.89     Patient Active Problem List   Diagnosis   • Bilateral acute serous otitis media   • Malignant neoplasm involving both nipple and areola of left breast in female (CMS/HCC)   • Cellulitis of abdominal wall   • Impetigo   • Folliculitis   • Encounter for consultation   • Non-smoker   • S/P lumpectomy, left breast   • S/P lymph node biopsy   • Regional lymph node staging category N3 (CMS/HCC)   • On antineoplastic chemotherapy   • Regional lymph node metastasis present (CMS/HCC)   • History of radiation therapy   • Intractable pain   • Arm swelling   • Chest wall pain     Past Medical History:   Diagnosis Date   • Breast cancer (CMS/HCC)      Past Surgical History:   Procedure Laterality Date   • AXILLARY LYMPH NODE BIOPSY/EXCISION Left    • BREAST BIOPSY Left 2016   • BREAST LUMPECTOMY WITH SENTINEL NODE BIOPSY Left 10/03/2016    residual invasive carcinoma, grade 3 (0.2cm); margins negative; extensive lymphvascular space invasion; 2 sentinel lymph nodes positive (2/3)            OT ASSESSMENT FLOWSHEET (last 12 hours)      OT Evaluation and Treatment     Row Name 21 1000 21 0833                OT Time and Intention    Subjective Information  complains of anxious  -TS  --       Document Type  therapy note (daily note)  -TS  --  -TS       Mode of Treatment  occupational therapy  -TS  --  -TS       Patient Effort  good  -TS  --       Comment  BUE edema  -TS  --          General Information    Existing Precautions/Restrictions  no known precautions/restrictions  -TS  --          Cognition    Personal Safety Interventions  fall prevention program maintained;nonskid shoes/slippers when out of  bed  -TS  --          Pain Scale: Numbers Pre/Post-Treatment    Pretreatment Pain Rating  2/10  -TS  --       Posttreatment Pain Rating  3/10  -TS  --       Pain Location - Orientation  generalized  -TS  --       Pain Location  chest;back  -TS  --       Pain Intervention(s)  Repositioned  -TS  --          Bed Mobility    Bed Mobility  bed mobility (all) activities  -TS  --       All Activities, [place] (Bed Mobility)  modified independence  -TS  --       Assistive Device (Bed Mobility)  head of bed elevated  -TS  --          Functional Mobility    Functional Mobility- Ind. Level  independent  -TS  --          Transfer Assessment/Treatment    Transfers  sit-stand transfer;stand-sit transfer;shower transfer  -TS  --          Transfers    Sit-Stand [place] (Transfers)  independent  -TS  --       Stand-Sit [place] (Transfers)  independent  -TS  --       [place] Level (Shower Transfer)  independent  -TS  --       Assistive Device (Shower Transfer)  shower chair  -TS  --          Shower Transfer    Type (Shower Transfer)  sit-stand;stand-sit  -TS  --          Activities of Daily Living    BADL Assessment/Intervention  upper body dressing;lower body dressing;bathing;grooming;feeding  -TS  --          Bathing Assessment/Intervention    [place] Level (Bathing)  upper body;minimum assist (75% patient effort);lower body;moderate assist (50% patient effort)  -TS  --       Assistive Devices (Bathing)  hand-held shower spray hose  -TS  --       Position (Bathing)  unsupported standing  -TS  --          Upper Body Dressing Assessment/Training    [place] Level (Upper Body Dressing)  don;pull-over garment;moderate assist (50% patient effort)  -TS  --       Position (Upper Body Dressing)  unsupported standing  -TS  --          Lower Body Dressing Assessment/Training    [place] Level (Lower Body Dressing)  don;pants/bottoms;set up;minimum assist (75% patient effort)  -TS  --       Position (Lower  Body Dressing)  unsupported standing  -TS  --          Grooming Assessment/Training    [place] Level (Grooming)  hair care, combing/brushing;maximum assist (25% patient effort)  -TS  --       Position (Grooming)  unsupported sitting  -TS  --          Self-Feeding Assessment/Training    [place] Level (Feeding)  set up  -TS  --       Position (Self-Feeding)  unsupported sitting  -TS  --          Plan of Care Review    Plan of Care Reviewed With  patient  -TS  --       Progress  no change  -TS  --       Outcome Summary  Pt is independent with ambulation and transfers. Pt is limited in her ADLs due edema in BUE. Pt stood for TB bathing task in shower but required min A when bathing UB and mod/max A for LB. Pt was also min A for LB dressing. Pt encouraged to sit with BUE elevated and encouraged to move when able. Pt is able to complete self feeding while BUE are supported and positioned prior to meal. Continue OT POC   -TS  --          Positioning and Restraints    Pre-Treatment Position  in bed  -TS  --       Post Treatment Position  chair  -TS  --       In Chair  reclined;call light within reach;encouraged to call for assist;RUE elevated;LUE elevated  -TS  --         User Key  (r) = Recorded By, (t) = Taken By, (c) = Cosigned By    Initials Name Effective Dates    TS Kyra Ruiz COTA 06/16/21 -            Occupational Therapy Education                 Title: PT OT SLP Therapies (In Progress)     Topic: Occupational Therapy (In Progress)     Point: ADL training (Done)     Description:   Instruct learner(s) on proper safety adaptation and remediation techniques during self care or transfers.   Instruct in proper use of assistive devices.              Learning Progress Summary           Patient Acceptance, E, VU by MO at 9/13/2021 4096                   Point: Home exercise program (Not Started)     Description:   Instruct learner(s) on appropriate technique for monitoring, assisting and/or progressing  therapeutic exercises/activities.              Learner Progress:  Not documented in this visit.          Point: Precautions (Done)     Description:   Instruct learner(s) on prescribed precautions during self-care and functional transfers.              Learning Progress Summary           Patient Acceptance, E, VU by MO at 9/13/2021 1455                   Point: Body mechanics (Done)     Description:   Instruct learner(s) on proper positioning and spine alignment during self-care, functional mobility activities and/or exercises.              Learning Progress Summary           Patient Acceptance, E, VU by MO at 9/13/2021 1455                               User Key     Initials Effective Dates Name Provider Type Discipline    MO 08/04/21 -  Placido Penny OT Student OT Student OT                  OT Recommendation and Plan     Plan of Care Review  Plan of Care Reviewed With: patient  Progress: no change  Outcome Summary: Pt is independent with ambulation and transfers. Pt is limited in her ADLs due edema in BUE. Pt stood for TB bathing task in shower but required min A when bathing UB and mod/max A for LB. Pt was also min A for LB dressing. Pt encouraged to sit with BUE elevated and encouraged to move when able. Pt is able to complete self feeding while BUE are supported and positioned prior to meal. Continue OT POC   Plan of Care Reviewed With: patient  Outcome Summary: Pt is independent with ambulation and transfers. Pt is limited in her ADLs due edema in BUE. Pt stood for TB bathing task in shower but required min A when bathing UB and mod/max A for LB. Pt was also min A for LB dressing. Pt encouraged to sit with BUE elevated and encouraged to move when able. Pt is able to complete self feeding while BUE are supported and positioned prior to meal. Continue OT POC     Outcome Measures     Row Name 09/16/21 1500             How much help from another is currently needed...    Putting on and taking off regular lower  body clothing?  2  -TS      Bathing (including washing, rinsing, and drying)  2  -TS      Toileting (which includes using toilet bed pan or urinal)  2  -TS      Putting on and taking off regular upper body clothing  3  -TS      Taking care of personal grooming (such as brushing teeth)  3  -TS      Eating meals  3  -TS      AM-PAC 6 Clicks Score (OT)  15  -TS        User Key  (r) = Recorded By, (t) = Taken By, (c) = Cosigned By    Initials Name Provider Type    Kyra Cross COTA Occupational Therapy Assistant          Time Calculation:   Time Calculation- OT     Row Name 09/16/21 1520             Time Calculation- OT    OT Start Time  1000  -TS      OT Stop Time  1108  -TS      OT Time Calculation (min)  68 min  -TS      Total Timed Code Minutes- OT  68 minute(s)  -TS      OT Received On  09/16/21  -TS         Timed Charges    41498 - OT Self Care/Mgmt Minutes  68  -TS         Total Minutes    Timed Charges Total Minutes  68  -TS       Total Minutes  68  -TS        User Key  (r) = Recorded By, (t) = Taken By, (c) = Cosigned By    Initials Name Provider Type    Kyra Cross COTA Occupational Therapy Assistant        Therapy Charges for Today     Code Description Service Date Service Provider Modifiers Qty    [id number] HC OT SELF CARE/MGMT/TRAIN EA 15 MIN 9/16/2021 Kyra Ruiz COTA GO 5               Kyra CHAVIRA. FRANCES Ruiz  9/16/2021

## 2021-09-17 NOTE — CASE MANAGEMENT/SOCIAL WORK
Continued Stay Note  [facility]     Patient Name: Ayla Echeverria  MRN: [id number]  Today's Date: 9/17/2021    Admit Date: 9/11/2021    Discharge Plan     Row Name 09/17/21 0835       Plan    Plan  Received call from [facility] , [facility], who stated patient was approved and could admit today. MD/BRIAN/Nursing notified. Pending normal Covid. Family also notified and can transport. Steffi should be here today.        Discharge Codes    No documentation.             Sandra Foster RN

## 2021-09-17 NOTE — DISCHARGE PLACEMENT REQUEST
"  Regine Navarro [phone]  Ayla Echeverria (55 y.o. Female)     Date of Birth Social Security Number Address Home Phone MRN    1965  [address] [place] [address] [phone] [id number]    L[employer]-day Marital Status          Scientologist        Admission Date Admission Type Admitting Provider Attending Provider Department, Room/Bed    9/11/21 Urgent Jcak Cárdenas MD [place], Thomas Waldon, MD [facility] 3A, 353/1    Discharge Date Discharge Disposition Discharge Destination         Home or Self Care              Attending Provider: Jack Cárdenas MD    Allergies: No Known Allergies    Isolation: None   Infection: COVID Screen (preop/placement) (09/17/21)   Code Status: CPR    Ht: 160 cm (63\")   Wt: 77.4 kg (170 lb 9.6 oz)    Admission Cmt: None   Principal Problem: None                Active Insurance as of 9/11/2021     Primary Coverage     Payor Plan Insurance Group Employer/Plan Group    [employer] PPO [id number]     Payor Plan Address Payor Plan Phone Number Payor Plan Fax Number Effective Dates    [address] [phone]  1/1/2018 - None Entered    [facility] [id number]       Subscriber Name Subscriber Birth Date Member ID       AYLA ECHEVERRIA 1965 [id number]                 Emergency Contacts      (Rel.) Home Phone Work Phone Mobile Phone    Danielle Rios (Daughter) -- -- [phone]    Iman Childs (Sister) [phone] -- --    Mayela Tejeda (Friend) [phone] -- --    ELSA BARRAGAN (Daughter) -- -- [phone]               Discharge Summary      Jack Cárdenas MD at 09/17/21 0837          [facility]   DISCHARGE SUMMARY       Date of Admission: 9/11/2021  Date of Discharge:  9/17/2021  Primary Care Physician: Jack Cárdenas MD    Presenting Problem/History of Present Illness:  Intractable pain [R52]     Final Discharge Diagnoses:  Active Hospital Problems    " "Diagnosis    • Chest wall pain    • Intractable pain    • History of radiation therapy    • Regional lymph node metastasis present (CMS/HCC)        Consults: see previous admission    Procedures Performed: none    Pertinent Test Results: see chart    Chief Complaint on Day of Discharge: none    History of Present Illness on Day of Discharge: none     Hospital Course:  The patient is a 55 y.o. female who presented to [facility] with persistent chest wall pain in the context of receiving rad tx for breast cancer with lymph node metastasis. She has been bothered with extensive pain and lymphedema and to this the family felt she would benefit from admission to rehab and was accepted. I think she would benefit from therapy at least 4-5 times per week. .      Condition on Discharge:  stable    Physical Exam on Discharge:  /69 (BP Location: Left leg, Patient Position: Sitting)   Pulse 69   Temp 97.6 °F (36.4 °C) (Oral)   Resp 16   Ht 160 cm (63\")   Wt 77.4 kg (170 lb 9.6 oz)   LMP  (LMP Unknown)   SpO2 96%   BMI 30.22 kg/m²   Physical Exam  Vitals and nursing note reviewed.   Neurological:      Sensory: Sensory deficit: nursing facility in [facility].         Discharge Disposition:  To NH    Discharge Medications:     Discharge Medications      New Medications      Instructions Start Date   famotidine 20 MG tablet  Commonly known as: PEPCID   20 mg, Oral, 2 Times Daily Before Meals      hydrocortisone 1 % ointment   1 application, Topical, Every 8 Hours Scheduled      hydrOXYzine 50 MG tablet  Commonly known as: ATARAX   50 mg, Oral, Every 6 Hours PRN         Changes to Medications      Instructions Start Date   gabapentin 300 MG capsule  Commonly known as: NEURONTIN  What changed:   · medication strength  · how much to take  · when to take this   300 mg, Oral, Every 12 Hours Scheduled      Morphine 60 MG 12 hr tablet  Commonly known as: MS CONTIN  What changed:   · Another medication " with the same name was added. Make sure you understand how and when to take each.  · Another medication with the same name was removed. Continue taking this medication, and follow the directions you see here.   60 mg, Oral, Every 8 Hours Scheduled      Morphine 60 MG 12 hr tablet  Commonly known as: MS CONTIN  What changed: You were already taking a medication with the same name, and this prescription was added. Make sure you understand how and when to take each.  Replaces: Morphine 15 MG tablet   60 mg, Oral, Every 8 Hours Scheduled      Morphine 15 MG tablet  Commonly known as: MSIR  What changed: You were already taking a medication with the same name, and this prescription was added. Make sure you understand how and when to take each.   20mg q 4hrs as needed         Continue These Medications      Instructions Start Date   APPLE CIDER VINEGAR PO   1 tablet, Oral, Daily      Biotene Dry Mouth Moisturizing solution   1 spray, Mouth/Throat, 5 Times Daily      dexamethasone 4 MG tablet  Commonly known as: DECADRON   4 mg, Oral, Daily With Breakfast      montelukast 10 MG tablet  Commonly known as: SINGULAIR   10 mg, Oral, Daily      naproxen 500 MG tablet  Commonly known as: NAPROSYN   500 mg, Oral, 2 Times Daily With Meals      sennosides-docusate 8.6-50 MG per tablet  Commonly known as: PERICOLACE   1 tablet, Oral, 2 Times Daily PRN         Stop These Medications    Morphine 15 MG tablet  Commonly known as: MSIR  Replaced by: Morphine 60 MG 12 hr tablet  You also have another medication with the same name that you need to continue taking as instructed.            Discharge Diet:   As toleratted  Activity at Discharge:   With assistance and will need pt/ot with attention to lymphedema  Discharge Care Plan/Instructions: see chart    Follow-up Appointments:   No future appointments.    Test Results Pending at Discharge: none    Jack Cárdenas MD  09/17/21  08:37 CDT    Time: 8:41am            Electronically  signed by Jack Cárdenas MD at 09/17/21 0865

## 2021-09-17 NOTE — OUTREACH NOTE
Prep Survey      Responses   [facility] facility patient discharged from?  [facility]   Is LACE score < 7 ?  No   Emergency Room discharge w/ pulse ox?  No   Eligibility  Not Eligible   What are the reasons patient is not eligible?  [facility]   Does the patient have one of the following disease processes/diagnoses(primary or secondary)?  Other   Prep survey completed?  Yes          Jud Quinonez RN

## 2021-09-17 NOTE — PLAN OF CARE
Goal Outcome Evaluation:         Patient discharged at this time, daughter is here to drive her to [place] rehab in [place].  Patient given pain pill prior  to d/c per md order for pain control on ride to rehab.  Patient gathered all belongings and these were discharged with her. Taken via wheelchair to the discharge area.

## 2021-09-17 NOTE — DISCHARGE SUMMARY
"[facility]   DISCHARGE SUMMARY       Date of Admission: 9/11/2021  Date of Discharge:  9/17/2021  Primary Care Physician: Jack Cárdenas MD    Presenting Problem/History of Present Illness:  Intractable pain [R52]     Final Discharge Diagnoses:  Active Hospital Problems    Diagnosis    • Chest wall pain    • Intractable pain    • History of radiation therapy    • Regional lymph node metastasis present (CMS/HCC)        Consults: see previous admission    Procedures Performed: none    Pertinent Test Results: see chart    Chief Complaint on Day of Discharge: none    History of Present Illness on Day of Discharge: none     Hospital Course:  The patient is a 55 y.o. female who presented to [facility] with persistent chest wall pain in the context of receiving rad tx for breast cancer with lymph node metastasis. She has been bothered with extensive pain and lymphedema and to this the family felt she would benefit from admission to rehab and was accepted. I think she would benefit from therapy at least 4-5 times per week. .      Condition on Discharge:  stable    Physical Exam on Discharge:  /69 (BP Location: Left leg, Patient Position: Sitting)   Pulse 69   Temp 97.6 °F (36.4 °C) (Oral)   Resp 16   Ht 160 cm (63\")   Wt 77.4 kg (170 lb 9.6 oz)   LMP  (LMP Unknown)   SpO2 96%   BMI 30.22 kg/m²   Physical Exam  Vitals and nursing note reviewed.   Neurological:      Sensory: Sensory deficit: nursing facility in [facility].         Discharge Disposition:  To NH    Discharge Medications:     Discharge Medications      New Medications      Instructions Start Date   famotidine 20 MG tablet  Commonly known as: PEPCID   20 mg, Oral, 2 Times Daily Before Meals      hydrocortisone 1 % ointment   1 application, Topical, Every 8 Hours Scheduled      hydrOXYzine 50 MG tablet  Commonly known as: ATARAX   50 mg, Oral, Every 6 Hours PRN         Changes to Medications      Instructions " Start Date   gabapentin 300 MG capsule  Commonly known as: NEURONTIN  What changed:   · medication strength  · how much to take  · when to take this   300 mg, Oral, Every 12 Hours Scheduled      Morphine 60 MG 12 hr tablet  Commonly known as: MS CONTIN  What changed:   · Another medication with the same name was added. Make sure you understand how and when to take each.  · Another medication with the same name was removed. Continue taking this medication, and follow the directions you see here.   60 mg, Oral, Every 8 Hours Scheduled      Morphine 60 MG 12 hr tablet  Commonly known as: MS CONTIN  What changed: You were already taking a medication with the same name, and this prescription was added. Make sure you understand how and when to take each.  Replaces: Morphine 15 MG tablet   60 mg, Oral, Every 8 Hours Scheduled      Morphine 15 MG tablet  Commonly known as: MSIR  What changed: You were already taking a medication with the same name, and this prescription was added. Make sure you understand how and when to take each.   20mg q 4hrs as needed         Continue These Medications      Instructions Start Date   APPLE CIDER VINEGAR PO   1 tablet, Oral, Daily      Biotene Dry Mouth Moisturizing solution   1 spray, Mouth/Throat, 5 Times Daily      dexamethasone 4 MG tablet  Commonly known as: DECADRON   4 mg, Oral, Daily With Breakfast      montelukast 10 MG tablet  Commonly known as: SINGULAIR   10 mg, Oral, Daily      naproxen 500 MG tablet  Commonly known as: NAPROSYN   500 mg, Oral, 2 Times Daily With Meals      sennosides-docusate 8.6-50 MG per tablet  Commonly known as: PERICOLACE   1 tablet, Oral, 2 Times Daily PRN         Stop These Medications    Morphine 15 MG tablet  Commonly known as: MSIR  Replaced by: Morphine 60 MG 12 hr tablet  You also have another medication with the same name that you need to continue taking as instructed.            Discharge Diet:   As toleratted  Activity at Discharge:   With  assistance and will need pt/ot with attention to lymphedema  Discharge Care Plan/Instructions: see chart    Follow-up Appointments:   No future appointments.    Test Results Pending at Discharge: none    Jack Cárdenas MD  09/17/21  08:37 CDT    Time: 8:41am

## 2021-09-17 NOTE — NURSING NOTE
Report called to Nayely at the rehab center, she states no questions, my phone given to her for anything further.

## 2021-09-17 NOTE — PROGRESS NOTES
"  CC:\"i guess im ready\"--waiting to hear from facility regarding insur issues  Review of Systems   Constitutional: Positive for activity change.   HENT: Negative.    Eyes: Negative.    Respiratory: Negative.    Cardiovascular: Positive for chest pain.   Gastrointestinal: Negative.    Endocrine: Negative.    Genitourinary: Negative.    Musculoskeletal: Negative.    Skin: Negative.    Allergic/Immunologic: Negative.    Neurological: Negative.    Hematological: Negative.    Psychiatric/Behavioral: Negative.      Temp:  [97.8 °F (36.6 °C)-98.4 °F (36.9 °C)] 98.4 °F (36.9 °C)  Heart Rate:  [57-72] 57  Resp:  [18-20] 18  BP: (131-156)/(53-93) 144/62  I/O last 3 completed shifts:  In: 840 [P.O.:840]  Out: -   No intake/output data recorded.    Physical Exam  Vitals and nursing note reviewed.   Constitutional:       Appearance: Normal appearance.   HENT:      Head: Normocephalic.      Nose: Nose normal.   Eyes:      Extraocular Movements: Extraocular movements intact.      Pupils: Pupils are equal, round, and reactive to light.   Cardiovascular:      Rate and Rhythm: Normal rate and regular rhythm.      Pulses: Normal pulses.      Heart sounds: Normal heart sounds.   Pulmonary:      Effort: Pulmonary effort is normal.      Breath sounds: Normal breath sounds.   Abdominal:      General: Abdomen is flat. Bowel sounds are normal.      Palpations: Abdomen is soft.   Musculoskeletal:         General: Normal range of motion.      Cervical back: Normal range of motion and neck supple.   Skin:     General: Skin is warm.      Capillary Refill: Capillary refill takes less than 2 seconds.   Neurological:      General: No focal deficit present.      Mental Status: She is alert.   Psychiatric:         Mood and Affect: Mood normal.           Regional lymph node metastasis present (CMS/HCC)    History of radiation therapy    Intractable pain    Chest wall pain      Awaiting decision from nh  "

## 2021-09-17 NOTE — PLAN OF CARE
Goal Outcome Evaluation:  Plan of Care Reviewed With: patient        Progress: no change  Outcome Summary: VSS. No change in neuro status. Continue to c/o chest/bue pain, sched and prn pain meds given w/creams applied per orders. Up w/sba, ad phuong when family in room. SCD when in bed. up in chair for part of shift. Safety maintained.

## 2021-09-18 NOTE — TELEPHONE ENCOUNTER
Caller is at a Nursing Home in [place], [place] wanting me to call Dr. Cárdenas to refill her MS Contin 60 Mgm.  It is after 10 PM - doubt there is a pharmacy open to deliver this at this time of night and also discharge summary states this medication has been continued.  Advised patient that the nursing home personnel are the ones to take care of pain medication needs.      Reason for Disposition  • [1] Caller has NON-URGENT medicine question about med that PCP prescribed AND [2] triager unable to answer question    Additional Information  • Negative: [1] Intentional drug overdose AND [2] suicidal thoughts or ideas  • Negative: Drug overdose and triager unable to answer question  • Negative: Caller requesting information unrelated to medicine  • Negative: Caller requesting information about COVID-19 Vaccine  • Negative: Caller requesting information about Emergency Contraception  • Negative: Caller requesting information about Combined Birth Control Pills  • Negative: Caller requesting information about Progestin Birth Control Pills  • Negative: Caller requesting information about Post-Op pain or medicines  • Negative: Caller requesting a prescription antibiotic (such as Penicillin) for Strep throat and has a positive culture result  • Negative: Caller requesting a prescription anti-viral med (such as Tamiflu) and has influenza (flu)  symptoms  • Negative: Immunization reaction suspected  • Negative: Rash while taking a medicine or within 3 days of stopping it  • Negative: [1] Asthma and [2] having symptoms of asthma (cough, wheezing, etc.)  • Negative: [1] Symptom of illness (e.g., headache, abdominal pain, earache, vomiting) AND [2] more than mild  • Negative: Breastfeeding questions about mother's medicines and diet  • Negative: MORE THAN A DOUBLE DOSE of a prescription or over-the-counter (OTC) drug  • Negative: [1] DOUBLE DOSE (an extra dose or lesser amount) of prescription drug AND [2] any symptoms (e.g.,  "dizziness, nausea, pain, sleepiness)  • Negative: [1] DOUBLE DOSE (an extra dose or lesser amount) of over-the-counter (OTC) drug AND [2] any symptoms (e.g., dizziness, nausea, pain, sleepiness)  • Negative: Took another person's prescription drug  • Negative: [1] DOUBLE DOSE (an extra dose or lesser amount) of prescription drug AND [2] NO symptoms (Exception: a double dose of antibiotics)  • Negative: Diabetes drug error or overdose (e.g., took wrong type of insulin or took extra dose)  • Negative: [1] Prescription refill request for ESSENTIAL medicine (i.e., likelihood of harm to patient if not taken) AND [2] triager unable to refill per department policy  • Negative: [1] Prescription not at pharmacy AND [2] was prescribed by PCP recently (Exception: triager has access to EMR and prescription is recorded there. Go to Home Care and confirm for pharmacy.)  • Negative: [1] Pharmacy calling with prescription question AND [2] triager unable to answer question  • Negative: [1] Caller has URGENT medicine question about med that PCP or specialist prescribed AND [2] triager unable to answer question  • Negative: Medicine patch causing local rash or itching  • Negative: [1] Caller has medicine question about med NOT prescribed by PCP AND [2] triager unable to answer question (e.g., compatibility with other med, storage)  • Negative: Prescription request for new medicine (not a refill)  • Negative: Prescription refill request for a CONTROLLED substance (e.g., narcotics, ADHD medicines)  • Negative: [1] Prescription refill request for NON-ESSENTIAL medicine (i.e., no harm to patient if med not taken) AND [2] triager unable to refill per department policy    Answer Assessment - Initial Assessment Questions  1. NAME of MEDICATION: \"What medicine are you calling about?\"      MS Contin 60 mgm    2. QUESTION: \"What is your question?\" (e.g., medication refill, side effect)      She is wanting me to call Dr. Cárdenas to get him to " "prescribe it.  However per Epic it has been ordered for her.    3. PRESCRIBING HCP: \"Who prescribed it?\" Reason: if prescribed by specialist, call should be referred to that group.      Jack Cárdenas MD    4. SYMPTOMS: \"Do you have any symptoms?\"      Chest wall pain.    5. SEVERITY: If symptoms are present, ask \"Are they mild, moderate or severe?\"      Severe   6. PREGNANCY:  \"Is there any chance that you are pregnant?\" \"When was your last menstrual period?\"      No.    Protocols used: MEDICATION QUESTION CALL-ADULT-AH      "

## 2021-09-18 NOTE — THERAPY DISCHARGE NOTE
Acute Care - Occupational Therapy Discharge Summary  [facility]     Patient Name: Ayla Echeverria  : 1965  MRN: [id number]    Today's Date: 2021                 Admit Date: 2021        OT Recommendation and Plan    Visit Dx:    ICD-10-CM ICD-9-CM   1. Chest wall pain  R07.89 786.52   2. Decreased activities of daily living (ADL)  Z78.9 V49.89   3. Impaired mobility  Z74.09 799.89   4. Regional lymph node metastasis present (CMS/HCC)  C77.9 196.9               OT Rehab Goals     Row Name 21 1100             Bathing Goal 1 (OT)    Activity/Device (Bathing Goal 1, OT)  bathing skills, all  -AC      [place] Level/Cues Needed (Bathing Goal 1, OT)  moderate assist (50-74% patient effort)  -AC      Time Frame (Bathing Goal 1, OT)  long term goal (LTG);by discharge  -AC      Progress/Outcomes (Bathing Goal 1, OT)  goal not met  -AC         Dressing Goal 1 (OT)    Activity/Device (Dressing Goal 1, OT)  upper body dressing  -AC      [place]/Cues Needed (Dressing Goal 1, OT)  moderate assist (50-74% patient effort)  -AC      Time Frame (Dressing Goal 1, OT)  long term goal (LTG);by discharge  -AC      Progress/Outcome (Dressing Goal 1, OT)  goal not met  -AC        User Key  (r) = Recorded By, (t) = Taken By, (c) = Cosigned By    Initials Name Provider Type Discipline    AC Michael Estrada, OTR/L, CNT Occupational Therapist OT          Outcome Measures     Row Name 21 1500             How much help from another is currently needed...    Putting on and taking off regular lower body clothing?  2  -TS      Bathing (including washing, rinsing, and drying)  2  -TS      Toileting (which includes using toilet bed pan or urinal)  2  -TS      Putting on and taking off regular upper body clothing  3  -TS      Taking care of personal grooming (such as brushing teeth)  3  -TS      Eating meals  3  -TS      AM-PAC 6 Clicks Score (OT)  15  -TS        User Key  (r) = Recorded By, (t) = Taken By, (c)  = Cosigned By    Initials Name Provider Type    Kyra Cross COTA Occupational Therapy Assistant          Timed Therapy Charges  Total Units: 5    Charges  Total Units: 5    Procedure Name Documented Minutes Units Code    HC OT SELF CARE/MGMT/TRAIN EA 15 MIN 68  5    60165 (CPT®)               Documented Minutes  Total Minutes: 68    Therapy Provided Minutes    15779 - OT Self Care/Mgmt Minutes 68                    OT Discharge Summary  Anticipated Discharge Disposition (OT): skilled nursing facility  Reason for Discharge: Discharge from facility  Outcomes Achieved: Refer to plan of care for updates on goals achieved  Discharge Destination: SNF      Michael Estrada, OTR/L, CNT  9/18/2021

## 2021-09-19 PROBLEM — R52 PAIN AND TENDERNESS: Status: ACTIVE | Noted: 2021-01-01

## 2021-09-19 NOTE — PLAN OF CARE
"Goal Outcome Evaluation:  Plan of Care Reviewed With: patient        Progress: no change  Outcome Summary: Pt a&ox4. Pt cont to c/o pain. Cream applied to radiation burns.  This AM pt continuously stating \"Lord Canela help me.\" Pt also asked for help getting to the bathroom this AM, once pt was in bathroom she became very upset about her diagnosis and started crying. Pt then began shouting prayer and specifically words to satan. Pt was shouting so loud several different staff members alerted to room. Pt stated she would let us know when she was done using the restroom. Once back to bed pt asked to be left alone by staff and refused to wear her  or sit back in bed. MD was notified of pts need for more prn pain med and anxiety medication. IV ativan was given and good relief noted, pt has been resting in bed since. Pt does get up with SBA to bathroom. SCDs for VTE. Port CDI. Safety maintained.  "

## 2021-09-19 NOTE — ED PROVIDER NOTES
"Subjective   States that she was recently in a rehab facility that she was not making any progress in.  States that she left there as she was unable to  any pain medication and came here for further evaluation.  States that she is just \"miserable.\"  She states that she feels itchy all over and just cannot feel comfortable.  States that she has a lot of scar tissue from radiation for breast cancer.  States that she has noticed a lot of swelling in her hands and feet that has gotten worse over the past 3 weeks.  States that she has not been able to get her pain under control with the oral morphine that she takes at home.  States that she is being evaluated for hospice and that she probably can get in on Monday but she is not been able to get her pain under control.  Denies any other new symptoms including new chest pain, shortness of breath, abdominal pain, hematuria, hematochezia, or melena.          Review of Systems   All other systems reviewed and are negative.      Past Medical History:   Diagnosis Date   • Breast cancer (CMS/HCC)        No Known Allergies    Past Surgical History:   Procedure Laterality Date   • AXILLARY LYMPH NODE BIOPSY/EXCISION Left    • BREAST BIOPSY Left 03/31/2016   • BREAST LUMPECTOMY WITH SENTINEL NODE BIOPSY Left 10/03/2016    residual invasive carcinoma, grade 3 (0.2cm); margins negative; extensive lymphvascular space invasion; 2 sentinel lymph nodes positive (2/3)       Family History   Problem Relation Age of Onset   • Lung cancer Mother    • Prostate cancer Father    • Colon cancer Father    • Lupus Sister    • Diabetes Sister    • Hypertension Sister        Social History     Socioeconomic History   • Marital status:      Spouse name: Not on file   • Number of children: Not on file   • Years of education: Not on file   • Highest education level: Not on file   Tobacco Use   • Smoking status: Never Smoker   • Smokeless tobacco: Never Used   Substance and Sexual " Activity   • Alcohol use: No   • Drug use: No   • Sexual activity: Defer           Objective   Physical Exam  Vitals and nursing note reviewed.   Constitutional:       General: She is in acute distress.      Appearance: She is not toxic-appearing or diaphoretic.   HENT:      Head: Normocephalic and atraumatic.      Nose: Nose normal.   Eyes:      General:         Right eye: No discharge.         Left eye: No discharge.      Extraocular Movements: Extraocular movements intact.      Conjunctiva/sclera: Conjunctivae normal.   Cardiovascular:      Rate and Rhythm: Normal rate.   Pulmonary:      Effort: Pulmonary effort is normal. No respiratory distress.      Breath sounds: No stridor. No rhonchi.   Abdominal:      General: Abdomen is flat.   Musculoskeletal:         General: Swelling and tenderness present. Normal range of motion.      Cervical back: No rigidity.      Right lower leg: Edema present.      Left lower leg: Edema present.      Comments: Diffuse anasarca and pitting edema to upper extremities, lower extremities.    Skin:     General: Skin is warm.      Capillary Refill: Capillary refill takes 2 to 3 seconds.      Findings: Lesion present.      Comments: Extensive scar tissue and fibrotic changes over anterior chest.   Neurological:      General: No focal deficit present.      Mental Status: She is alert and oriented to person, place, and time.      Cranial Nerves: No cranial nerve deficit.      Sensory: No sensory deficit.      Motor: No weakness.   Psychiatric:         Mood and Affect: Mood normal.         Behavior: Behavior normal.         Thought Content: Thought content normal.         Judgment: Judgment normal.         Procedures           ED Course                                           MDM  Number of Diagnoses or Management Options  Pain and tenderness  Diagnosis management comments: Patient arrived hemodynamically stable and was afebrile.  Given her history and physical examination plan to obtain  a basic labs including a CBC, CMP and administer pain medication.  Patient takes a large amount of morphine at home and so administered 20 mg of Norco and Benadryl.  Patient was improved slightly after this medication administration.  She is still having moderate amount of pain.  She will likely require IV pain control and further treatment for her intractable pain.    I reassessed the patient and discussed the findings of the work up so far. I explained my impression of the workup to her and addressed all of her questions regarding the emergency department evaluation, diagnosis, and treatment plan in plain and simple language that she was able to understand.     I told her that the next step in treatment would be admission to the hospital for further workup and care. She voiced agreement with the plan of care so far and had no further questions. I told her that there is always some diagnostic uncertainty in the ER and that her work up, current physical exam, and even her current presentation may not always reveal other underlying conditions. I also went over the fact that her condition may change or worsen while being in the hospital. I told her that they may even find more things that require treatment or follow-up. She expressed understanding and agreed that there are reasonable limitations with the practice of emergency medicine.    The hospitalist service was consulted for evaluation and admission. The hospitalist service assumed primary care of the patient and admitted the patient in stable condition.         Amount and/or Complexity of Data Reviewed  Clinical lab tests: ordered  Tests in the radiology section of CPT®: ordered    Risk of Complications, Morbidity, and/or Mortality  Presenting problems: moderate  Diagnostic procedures: moderate  Management options: moderate        Final diagnoses:   Pain and tenderness       ED Disposition  ED Disposition     ED Disposition Condition Comment    Decision to Admit   Level of Care: Med/Surg [1]   Diagnosis: Pain and tenderness [[id number]]   Admitting Physician: AMANDA MORALEZ [[id number]]   Attending Physician: AMANDA MORALEZ [[id number]]            No follow-up provider specified.       Medication List      No changes were made to your prescriptions during this visit.          Klaudia Olivarez MD  09/19/21 0538

## 2021-09-20 NOTE — PAYOR COMM NOTE
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 and Evidence of Disease: Unable to do any work, extensive evidence of disease, Self-Care: Mainly assistance required,  Intake: Normal or reduced, LOC: Full, drowsy or confusion   ECOG Status(3) Capable of limited self-care, confined to bed or chair > 50% of waking hours.  Nutritional status: Albumin 3.20. Body mass index is 28.39 kg/m².      Family support: The patient receives support from her children..  Advance Directives: Advance Directive Status: Patient does not have advance directive   POA/Healthcare surrogate-children x4-next of kin.     Impression/Problem List:     1.  Metastatic HER-2 migel breast cancer, mets diffuse chest wall, back, and skin  2.  Cancer related pain  3.  Peripheral neuropathy secondary to chemotherapy  4.  " Lymphopenia  5.  Bilateral upper extremity lymphedema     Recommendations/Plan:  1. plan: Goals of care include CODE STATUS CPR/full interventions.     2.  Palliative care encounter  -Overall poor prognosis  9/9-Again against discharge to nursing facility and states that she would like to continue to receive lymphedema treatments and now planning on home with home health. States her reason for declining hospice is related to inability to complete treatments as above. \"We will just have to figure out how they can get me to my treatments\". I have asked SW to reach out to hospice to see if possible to complete lymphedema treatments for symptom management with hospice services.  -Previously planned to discharge to  given patients self-care challenges secondary to significant lymphedema and high rate of opiate use secondary to cancer related pain.  Family not able to provide caregiver support in the home.  Patient not financially able to hire private pay caregivers.  She has declined hospice services.     3.  Cancer related pain   -Charge nurse to obtain MAR from SNF. Would recommend to continue a consistent pain management regimen between facilities to decrease rehospitalizations. Discussed possibility of pain management in the outpatient setting if PCP not agreeable to management of high-dose opiates and declines hospice.   -Received  253 mg oral morphine equivalent over the last 24 hours.   -continue MSER 60 mg from every 8 hours.   -restart MSIR 22.5 mg every 4 hours as needed. Dosing per previous hospitalization.  -Continue gabapentin 300 mg twice daily as pain adjuvant for neuropathic pain and pruritus  -Continue Decadron 4 mg tab. May benefit from long-term if goal is symptom management/hospice.  -Continue naproxen 500 mg twice daily with meals as pain adjuvant for inflammation  -continue pepcid for gastric ulcer protection/prophylaxis may also provide benefit as H2 blocker  -Restart senna S2 tabs nightly for " bowel prophylaxis in patient on chronic opioid therapy  -Wound APRN previously following.  -States she is not able to tolerate palliative radiation.  According to radiation oncology note unlikely will be able to obtain significant relief from palliative radiation.  -According to oncology note no expectation of response to systemic therapy due to failed or inability to tolerate aggressive therapies.     4.  Pruritus  -Continue Atarax as needed  -Continue Decadron as above as this should also act as adjuvant.  -Scheduled wound care Q shift.  -Continue hydrocortisone cream every 8 hours. -Restart calazime barrier cream and hydroguard per previous wound care recommendations.   -pepcid as above.  -Gabapentin as above as adjuvant.     5. Anxiety  -will utilize atarax as needed.  -Ativan per attending as needed.    6.  Xerostomia  -Biotene as needed      Thank you for this consult and allowing us to participate in patient's plan of care. Palliative Care Team will continue to follow patient.     Time spent:95 minutes spent reviewing medical and medication records, assessing and examining patient, discussing with family, answering questions, providing some guidance about a plan and documentation of care, and coordinating care with other healthcare members, with > 50% time spent face to face.   20 minutes spent on advance care planning.    CHRIS Elmore  9/20/2021                Electronically signed by Elodia Duenas APRN at 09/20/21 9667

## 2021-09-20 NOTE — DISCHARGE SUMMARY
"[facility]   DISCHARGE SUMMARY       Date of Admission: 9/5/2021  Date of Discharge:  9/11/2021  Primary Care Physician: Jack Cárdenas MD    Presenting Problem/History of Present Illness:  Arm swelling [M79.89]     Final Discharge Diagnoses:  Active Hospital Problems    Diagnosis    • Chest wall pain    • Arm swelling        Consults: heme/onc, palliative care    Procedures Performed: see chart    Pertinent Test Results: see chart    Chief Complaint on Day of Discharge: none    History of Present Illness on Day of Discharge: none     Hospital Course:  The patient is a 55 y.o. female who presented to [facility] with persistent chest wall pain and lymphedema . She was seen by heme/onc and she tells he no further tx was offered to her and she declines further rad therapy due to her discomfort. She was seen by palliative care aNd her pain was managed and she was agreeable to hospice care..      Condition on Discharge:  stable    Physical Exam on Discharge:  /76 (BP Location: Right leg, Patient Position: Lying)   Pulse 77   Temp 97.6 °F (36.4 °C) (Oral)   Resp 18   Ht 160 cm (62.99\")   Wt 70.9 kg (156 lb 6.4 oz)   SpO2 95%   BMI 27.71 kg/m²   Physical Exam  Vitals and nursing note reviewed.   Cardiovascular:      Rate and Rhythm: Normal rate.      Pulses: Normal pulses.   Musculoskeletal:      Cervical back: Normal range of motion.         Discharge Disposition:  Home or Self Care    Discharge Medications:     Discharge Medications      New Medications      Instructions Start Date   Biotene Dry Mouth Moisturizing solution   1 spray, Mouth/Throat, 5 Times Daily      dexamethasone 4 MG tablet  Commonly known as: DECADRON   4 mg, Oral, Daily With Breakfast      naproxen 500 MG tablet  Commonly known as: NAPROSYN   500 mg, Oral, 2 Times Daily With Meals      sennosides-docusate 8.6-50 MG per tablet  Commonly known as: PERICOLACE   1 tablet, Oral, 2 Times Daily PRN         Changes " to Medications      Instructions Start Date   montelukast 10 MG tablet  Commonly known as: MARIELOSULAIR  What changed:   · how much to take  · when to take this   10 mg, Oral, Daily      Morphine 60 MG 12 hr tablet  Commonly known as: MS CONTIN  What changed:   · medication strength  · how much to take  · Another medication with the same name was removed. Continue taking this medication, and follow the directions you see here.   60 mg, Oral, Every 8 Hours Scheduled         Continue These Medications      Instructions Start Date   APPLE CIDER VINEGAR PO   1 tablet, Oral, Daily         Stop These Medications    HYDROcodone-acetaminophen  MG per tablet  Commonly known as: NORCO            Discharge Diet: as toleratd    Activity at Discharge: as tolerated    Discharge Care Plan/Instructions: as per hospice care    Follow-up Appointments:   No future appointments.    Test Results Pending at Discharge: none    Jack Cárdenas MD  09/20/21  06:49 CDT    Time: 6:52am
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
## 2021-09-20 NOTE — H&P
"[facility]  HISTORY AND PHYSICAL    Date of Admission: 9/19/2021  Primary Care Physician: Jack Cárdenas MD    Subjective    Chief Complaint: \"I just dont know\"    This is a 55 yr old black lady recently admitted to [facility] for chest wall pain due to breast ca and rad therapy. She is aware no other tx is available to her and declines further rad therapy. She was discharged to [facility] rehab but family brought her back to the ed . I am uncertain why. She indicated she could not get her pain meds but I spoke to both the local pharmacist and to the nursing staff there and they both indicated she did receive them. In any case she was not satisfied and presented back to the er and the attending in the ed felt she needed to be admittted again. We recall she has declined hospice care twice.       Review of Systems   Constitutional: Positive for activity change and fatigue.   HENT: Negative.    Respiratory: Negative.    Cardiovascular: Positive for chest pain.   Gastrointestinal: Negative.    Endocrine: Negative.    Genitourinary: Negative.    Musculoskeletal: Negative.    Skin: Negative.    Allergic/Immunologic: Negative.    Neurological: Negative.    Hematological: Negative.    Psychiatric/Behavioral: Negative.         Otherwise complete ROS reviewed and negative except as mentioned in the HPI.      Past Medical History:   Past Medical History:   Diagnosis Date   • Breast cancer (CMS/HCC)        Past Surgical History:  Past Surgical History:   Procedure Laterality Date   • AXILLARY LYMPH NODE BIOPSY/EXCISION Left    • BREAST BIOPSY Left 03/31/2016   • BREAST LUMPECTOMY WITH SENTINEL NODE BIOPSY Left 10/03/2016    residual invasive carcinoma, grade 3 (0.2cm); margins negative; extensive lymphvascular space invasion; 2 sentinel lymph nodes positive (2/3)       Social History:  reports that she has never smoked. She has never used smokeless tobacco. She reports that she does not drink alcohol " and does not use drugs.    Family History: family history includes Colon cancer in her father; Diabetes in her sister; Hypertension in her sister; Lung cancer in her mother; Lupus in her sister; Prostate cancer in her father.     Allergies:  No Known Allergies    Medications:  Prior to Admission medications    Medication Sig Start Date End Date Taking? Authorizing Provider   HYDROcodone-acetaminophen (NORCO)  MG per tablet Take 1 tablet by mouth Every 6 (Six) Hours As Needed for Moderate Pain .   Yes ProviderJose F MD   hydrocortisone 1 % ointment Apply 1 application topically to the appropriate area as directed Every 8 (Eight) Hours. 9/17/21  Yes Jack Cárdenas MD   Morphine (MS CONTIN) 60 MG 12 hr tablet Take 1 tablet by mouth Every 8 (Eight) Hours. 9/11/21  Yes Jack Cárdenas MD   APPLE CIDER VINEGAR PO Take 1 tablet by mouth Daily.    Provider, MD Jose F   Biotene Dry Mouth Moisturizing (BIOTENE) solution Apply 1 spray to the mouth or throat 5 (Five) Times a Day. 9/11/21   Jack Cárdenas MD   dexamethasone (DECADRON) 4 MG tablet Take 1 tablet by mouth Daily With Breakfast. 9/12/21   Jack Cárdenas MD   famotidine (PEPCID) 20 MG tablet Take 1 tablet by mouth 2 (Two) Times a Day Before Meals. 9/17/21   Jack Cárdenas MD   gabapentin (NEURONTIN) 300 MG capsule Take 1 capsule by mouth Every 12 (Twelve) Hours. 9/17/21   Jack Cárdenas MD   hydrOXYzine (ATARAX) 50 MG tablet Take 1 tablet by mouth Every 6 (Six) Hours As Needed for Itching. 9/17/21   Jack Cárdenas MD   montelukast (SINGULAIR) 10 MG tablet Take 1 tablet by mouth Daily. 9/12/21   Jack Cárdenas MD   Morphine (MS CONTIN) 60 MG 12 hr tablet Take 1 tablet by mouth Every 8 (Eight) Hours for 4 doses. 9/17/21 9/19/21  Jack Cárdenas MD   Morphine (MSIR) 15 MG tablet Take 1 tablet by mouth Every 4 (Four) Hours As Needed for Severe Pain . 9/17/21   Jack Cárdenas MD  "  naproxen (NAPROSYN) 500 MG tablet Take 1 tablet by mouth 2 (Two) Times a Day With Meals. 9/11/21   Jack Cárdenas MD   sennosides-docusate (PERICOLACE) 8.6-50 MG per tablet Take 1 tablet by mouth 2 (Two) Times a Day As Needed for Constipation. 9/11/21   Jack Cárdenas MD       Objective    Vital Signs: /66 (BP Location: Left leg, Patient Position: Sitting)   Pulse 67   Temp 97.8 °F (36.6 °C) (Oral)   Resp 18   Ht 160 cm (63\")   Wt 76.2 kg (167 lb 14.4 oz)   LMP  (LMP Unknown)   SpO2 99%   BMI 29.74 kg/m²   Physical Exam  Vitals and nursing note reviewed.   Constitutional:       Appearance: Normal appearance.   HENT:      Head: Normocephalic and atraumatic.      Nose: Nose normal.      Mouth/Throat:      Mouth: Mucous membranes are moist.   Cardiovascular:      Rate and Rhythm: Normal rate and regular rhythm.      Pulses: Normal pulses.      Heart sounds: Normal heart sounds.   Pulmonary:      Effort: Pulmonary effort is normal.      Breath sounds: Normal breath sounds.   Abdominal:      General: Abdomen is flat. Bowel sounds are normal.      Palpations: Abdomen is soft.   Musculoskeletal:         General: Normal range of motion.      Cervical back: Normal range of motion and neck supple.   Skin:     General: Skin is warm and dry.      Capillary Refill: Capillary refill takes less than 2 seconds.   Neurological:      General: No focal deficit present.      Mental Status: She is alert and oriented to person, place, and time. Mental status is at baseline.   Psychiatric:         Mood and Affect: Mood normal.         Behavior: Behavior normal.         Thought Content: Thought content normal.         Judgment: Judgment normal.           Results Reviewed:  Lab Results (last 24 hours)     ** No results found for the last 24 hours. **        Imaging Results (Last 24 Hours)     ** No results found for the last 24 hours. **            Active Hospital Problems    Diagnosis    • Pain and " tenderness    • Chest wall pain    • Intractable pain    • Regional lymph node metastasis present (CMS/HCC)    • Malignant neoplasm involving both nipple and areola of left breast in female (CMS/HCC)        Assessment / Plan  Will try to get her more comfortable, will ask palliative care to re-evaulate. Will also ask  to see againi for dsicharge planning      Code Status: full code     I discussed the patient's findings and my recommendations with the patient..    Estimated length of stay unknown.    Jack Cárdenas MD   09/20/21   06:31 CDT

## 2021-09-20 NOTE — PLAN OF CARE
Goal Outcome Evaluation:  Plan of Care Reviewed With: patient        Progress: improving   Pt alert and oriented x4. VSS.C/o pain relieved with scheduled and PRN meds. Anxiety relieved with PRN med. WONG. PPP. SCDS for VTE.  refused. Tolerating regular diet. Skin tender and fragile. Chest and back radiation burns from cancer treatment. Topical meds used with relief. Voiding via bathroom. Standby assist. Last BM today. Port dressing CDI. Capped. Daughter was at bedside this morning. Call light within reach. Safety maintained.

## 2021-09-20 NOTE — CASE MANAGEMENT/SOCIAL WORK
Continued Stay Note  REKHA Ley     Patient Name: Ayla Echeverria  MRN: [id number]  Today's Date: 9/20/2021    Admit Date: 9/19/2021    Discharge Plan     Row Name 09/20/21 1506       Plan    Plan Comments  Spoke to pt and dtr regarding dc plans. Pt plans to dc home with family assistance. Dtr requested  check to see if pt can get HH and go outpt for Lymphedema treatment. Checked with [facility] and [facility] and pt cannot receive HH services as well as outpt services. Dtr informed and states they will hold off on HH for now. Dtr plans for pt to go outpt from Lymphedema therapy. Will update Physician.        Discharge Codes    No documentation.             CARRIE Neil

## 2021-09-20 NOTE — CASE MANAGEMENT/SOCIAL WORK
Discharge Planning Assessment   [place]     Patient Name: Ayla Echeverria  MRN: [id number]  Today's Date: 9/20/2021    Admit Date: 9/19/2021    Discharge Needs Assessment     Row Name 09/20/21 1131       Living Environment    Lives With  alone    Current Living Arrangements  home/apartment/condo    Primary Care Provided by  self;child(latonia)    Provides Primary Care For  no one    Family Caregiver if Needed  child(latonia), adult    Quality of Family Relationships  helpful;involved    Able to Return to Prior Arrangements  other (see comments)       Resource/Environmental Concerns    Resource/Environmental Concerns  none    Transportation Concerns  car, none       Transition Planning    Patient/Family Anticipates Transition to  home with family;home with help/services    Patient/Family Anticipated Services at Transition  hospice care;home health care    Transportation Anticipated  family or friend will provide       Discharge Needs Assessment    Readmission Within the Last 30 Days  previous discharge plan unsuccessful    Equipment Currently Used at Home  walker, rolling    Concerns to be Addressed  discharge planning;adjustment to diagnosis/illness    Anticipated Changes Related to Illness  none    Outpatient/Agency/Support Group Needs  home hospice;homecare agency    Discharge Facility/Level of Care Needs  home with home health;hospice facility    Discharge Coordination/Progress  Pt recently dc'd to [facility] on 9-17-21 but left the facility AMA on 9-18-21 per admissions. Pt went home from facility with family on 9-18 and has now been readmitted. Palliative Care has been consulted to see and plans to reach out to family today. Will follow for decision on home with hospice (if pt/family agreeable).        Discharge Plan    No documentation.       Continued Care and Services - Admitted Since 9/19/2021    Coordination has not been started for this encounter.     Selected Continued Care - Prior Encounters Includes  selections from prior encounters from 6/21/2021 to 9/20/2021    Discharged on 9/17/2021 Admission date: 9/11/2021 - Discharge disposition: Home or Self Care    Destination     Service Provider Selected Services Address Phone Fax Patient Preferred    [facility]  Skilled Nursing [address] [place] [id number] [phone] --                Discharged on 9/11/2021 Admission date: 9/5/2021 - Discharge disposition: Hospice/Home    Home Medical Care     Service Provider Selected Services Address Phone Fax Patient Preferred    [facility]  Home Hospice [address], [facility] [address] [phone] --                      Demographic Summary    No documentation.       Functional Status    No documentation.       Psychosocial    No documentation.       Abuse/Neglect    No documentation.       Legal    No documentation.       Substance Abuse    No documentation.       Patient Forms    No documentation.           Jennifer Rivera MSW

## 2021-09-21 ENCOUNTER — HOSPITAL ENCOUNTER (OUTPATIENT)
Dept: WOUND CARE | Age: 56
Discharge: HOME | End: 2021-09-21

## 2021-09-21 NOTE — OUTREACH NOTE
Prep Survey      Responses   [facility] patient discharged from?  [place]   Is LACE score < 7 ?  No   Emergency Room discharge w/ pulse ox?  No   Eligibility  [facility]   Date of Admission  09/19/21   Date of Discharge  09/21/21   Discharge Disposition  Home or Self Care   Discharge diagnosis  Chest wall pain,   invasive ductal carcinoma the left breast with widespread metastatic disease   Does the patient have one of the following disease processes/diagnoses(primary or secondary)?  Other   Does the patient have Home health ordered?  No   Is there a DME ordered?  No   Prep survey completed?  Yes          Desiree Cornelius RN

## 2021-09-21 NOTE — CASE MANAGEMENT/SOCIAL WORK
Continued Stay Note   [place]     Patient Name: Ayla Echeverria  MRN: [id number]  Today's Date: 9/21/2021    Admit Date: 9/19/2021    Discharge Plan     Row Name 09/21/21 1408       Plan    Plan Comments  Pt is ready for dc today. Dtr states pt uses [facility] and they close at 6pm. Dtr states she will need to leave [facility] and make it to [facility] before 6pm to  pt meds. Pt has an cintia scheduled for Lymph treatment on Sept 28th at 10am at [facility] Out[facility]. Pt dtr is aware of scheduled cintia. No other dc needs per pt/dtr. Sent message to Physician to inform.    Final Discharge Disposition Code  01 - home or self-care        Discharge Codes    No documentation.             CARRIE Neil

## 2021-09-21 NOTE — PROGRESS NOTES
"CC\"i want to go home\"--she and her daughter have decided to go home and do NOT WANT HOME HEALTH BUT DO WANT LYMPHEDEMA TX AS OUTPATIENT    Review of Systems   Constitutional: Positive for activity change and fatigue.   HENT: Negative.    Eyes: Negative.    Respiratory: Negative.    Cardiovascular: Negative.    Endocrine: Negative.    Genitourinary: Negative.    Musculoskeletal: Negative.    Skin: Negative.    Allergic/Immunologic: Negative.    Neurological: Negative.    Hematological: Negative.    Psychiatric/Behavioral: Positive for dysphoric mood.     Temp:  [97.9 °F (36.6 °C)-98.5 °F (36.9 °C)] 98 °F (36.7 °C)  Heart Rate:  [70-84] 76  Resp:  [16-18] 18  BP: (120-141)/(52-67) 141/67  I/O last 3 completed shifts:  In: -   Out: 900 [Urine:900]  No intake/output data recorded.    Physical Exam  Vitals and nursing note reviewed.   Constitutional:       Appearance: Normal appearance.   HENT:      Head: Normocephalic and atraumatic.      Nose: Nose normal.      Mouth/Throat:      Mouth: Mucous membranes are moist.   Eyes:      Extraocular Movements: Extraocular movements intact.      Pupils: Pupils are equal, round, and reactive to light.   Cardiovascular:      Rate and Rhythm: Normal rate and regular rhythm.      Pulses: Normal pulses.   Pulmonary:      Effort: Pulmonary effort is normal.      Breath sounds: Normal breath sounds.   Abdominal:      General: Abdomen is flat. Bowel sounds are normal.      Palpations: Abdomen is soft.   Musculoskeletal:         General: Normal range of motion.      Cervical back: Normal range of motion and neck supple.   Skin:     General: Skin is warm and dry.      Capillary Refill: Capillary refill takes less than 2 seconds.   Neurological:      General: No focal deficit present.      Mental Status: She is alert and oriented to person, place, and time. Mental status is at baseline.   Psychiatric:         Mood and Affect: Mood normal.         Behavior: Behavior normal.         Thought " Content: Thought content normal.         Judgment: Judgment normal.           Malignant neoplasm involving both nipple and areola of left breast in female (CMS/HCC)    Regional lymph node metastasis present (CMS/HCC)    Intractable pain    Chest wall pain    Pain and tenderness  WILL make arragements for disharge tomorrow with outpatient lymphedema tx --d/w pateint and her daughter

## 2021-09-21 NOTE — PLAN OF CARE
Goal Outcome Evaluation:  Plan of Care Reviewed With: patient, daughter        Progress: no change  Outcome Summary: Plans in place for Pt to DC home with family. A&Ox4. Up ad phuong. SCDs. PPP. Denies n/t. C/o of pain with PRN and scheduled pain meds given. Call light in reach. Safety maintained. Will cont to monitor. Frequently seeks out staff. Port abhilash CDI. Will de access prior to D/C.

## 2021-09-21 NOTE — PLAN OF CARE
Problem: Palliative Care  Goal: Enhanced Quality of Life  Outcome: Ongoing, Progressing  Intervention: Optimize Psychosocial Wellbeing  Flowsheets (Taken 9/21/2021 1156)  Supportive Measures:   active listening utilized   positive reinforcement provided    Patient was awake and alert. She was sitting on the edge of her bed. She stated that she was having issues with itching and felt it was related to a different  with one her generic drugs. She stated the cream does not work. She feels her pain is at a manageable at this time. She is eager to go home. She stated that she is going home with home health and her daughter is to pick her up. She called her daughter as well to verify the discharge plan. She is concerned that her children will be overwhelmed caring for her, but feels they will manage.    Will continue to follow and provide support.    CADEN Melvin  9/21/2021

## 2021-09-21 NOTE — PLAN OF CARE
Goal Outcome Evaluation:            Outcome Summary: Aox4.  VSS. Room air. Pt has been uncooperative with care at times this shift, refusing to wear . Pt c/o pain, PRN's administered with relief per pt. Pt frequently requesting pain medication this shift, when entering pts room pt has been asleep snoring. Pt has had a difficult time finding a comfortable position in either the chair or bed this shift, multiple pillows in bed for support. Port to L chest is CDI and capped. Cream applied to radiation burns that are present on anterior and posterior chest. Pt up with standby assist, voiding. Edema noted throughout BUE and BLE. SCD's on. Frequently in pts room to assist in repositioning. CHG bath to be done this shift due to port. Safety maintained. Call bell within reach. No s/s of distress noted. Will continue with plan of care.

## 2021-09-22 NOTE — OUTREACH NOTE
Call Center TCM Note      Responses   [facility] patient discharged from?  [place]   Does the patient have one of the following disease processes/diagnoses(primary or secondary)?  Other   TCM attempt successful?  Yes   Discharge diagnosis  Chest wall pain,   invasive ductal carcinoma the left breast with widespread metastatic disease   Meds reviewed with patient/caregiver?  Yes   Is the patient having any side effects they believe may be caused by any medication additions or changes?  No   Does the patient have all medications ordered at discharge?  Yes   Is the patient taking all medications as directed (includes completed medication regime)?  Yes   Medication comments  Pt states Morphine prescribed is not enough to control pain. Pt states she has spoken with PCP Dr Cárdenas and he has given her ok to take in shorter time incriments.    Does the patient have a primary care provider?   Yes   Comments regarding PCP  TCM FWP with PCP Dr Cárdenas has been sched for 09/29/2021.    Has the patient kept scheduled appointments due by today?  N/A   Has home health visited the patient within 72 hours of discharge?  N/A   Psychosocial issues?  No   Did the patient receive a copy of their discharge instructions?  Yes   Nursing interventions  Reviewed instructions with patient   What is the patient's perception of their health status since discharge?  Same   Is the patient/caregiver able to teach back signs and symptoms related to disease process for when to call PCP?  Yes   Is the patient/caregiver able to teach back signs and symptoms related to disease process for when to call 911?  Yes   Is the patient/caregiver able to teach back the hierarchy of who to call/visit for symptoms/problems? PCP, Specialist, Home health nurse, Urgent Care, ED, 911  Yes   If the patient is a current smoker, are they able to teach back resources for cessation?  Not a smoker   TCM call completed?  Yes   Wrap up additional comments  Pt doing  fair. Pt states Morphine prescribed is not enough to control pain. Pt states she has spoken with PCP Dr Cárdenas and he has given her ok to take in shorter time incriments. No other questions at this time. TCM FWP with PCP Dr Cárdenas has been sched for 09/29/2021.           Dorene Mclean MA    9/22/2021, 13:43 CDT

## 2021-09-22 NOTE — TELEPHONE ENCOUNTER
Caller: JENSEN BARRAGAN    Relationship: Emergency Contact    Best call back number: [phone]    What is the best time to reach you: ANYTIME    Who are you requesting to speak with (clinical staff, provider,  specific staff member): PROVIDER AND OR CLINICAL        What was the call regarding: PATIENTS DAUGHTER CALLED IN REQUESTING A CALL BACK TO DISCUSS GETTING PATIENT SET UP WITH HOSPICE     Do you require a callback: YES

## 2021-09-28 NOTE — PROGRESS NOTES
PhysicalTherapy Re-Evaluation    Patient:           Ayla Echeverria            : 1965  Today's Date: 2021  Referring practitioner: Gloria Hendricks*  Date of Initial Visit:       Type: THERAPY  Noted: 10/15/2020  Patient seen for 49 sessions    Visit Diagnoses:    ICD-10-CM ICD-9-CM   1. Lymphedema  I89.0 457.1   2. Regional lymph node metastasis present (HCC)  C77.9 196.9   3. Malignant neoplasm involving both nipple and areola of left breast in female, estrogen receptor positive (HCC)  C50.012 174.0    Z17.0 V86.0       SUBJECTIVE       Subjective:  Patient states she is not able to tolerate the wraps very long and the gauntlet is not fitting anymore since the left hand is more swollen.  She went to wound care and they did give her Tubigrip which she was able to wear.  Wound care also suggested a basic lymphedema pump.     OBJECTIVE     Objective   Lymphedema     Row Name 21 1000             Subjective Pain    Able to rate subjective pain?  yes  -HR      Pre-Treatment Pain Level  5  -HR         Subjective Comments    Subjective Comments  She states that she is girma up in the air about hospice. She is on different pain meds that are helping more.   -HR         Lymphedema Assessment    Lymphedema Classification  secondary;stage 3 (Lymphostatic Elephantiasis) Malignant lymphedema  -HR      Lymphedema Surgery Comments  L lumpectomy with SNB   -HR      Lymph Nodes Removed #  1  -HR      Chemo Received  yes  -HR      Chemo Treatments #/Timeframe  Oncologist reports there are no further options for chemotherapy  -HR      Radiation Therapy Received  yes  -HR      Adverse Radiation Reactions/Complication  Unable to continue palliative radiation due to pain  -HR      Infections or Cellulitis?  yes  -HR      Infection/Cellulitis Treatment  She is currently being seen at wound care. No infection that I am aware of. Open areas from cancer in axilla  -HR         BUE Circumferential (cm)     Measurement Location 7  upper arm  -HR      Left 7  39 cm  -HR      LUE Circumferential Total  39 cm  -HR         Manual Lymphatic Drainage    Manual Lymphatic Drainage  initial sequence;extremity treatment  -HR      Initial Sequence  short neck;abdomen;diaphragmatic breathing  -HR      Short Neck  palpable masses throughout the neck lymph nodes  -HR      Abdomen  superficial  -HR      Abdomen Comment  masses palpable throughout abdomen  -HR      Diaphragmatic Breathing  X8  -HR      Opened Regional Lymph Nodes  inguinal  -HR      Inguinal  left  -HR      Axillo-Inguinal  -- too tender  -HR      Extremity Treatment  MLD to full limb  -HR      MLD to Full Limb  LUE  -HR      Extremity Treatment Focus On  LUE  -HR      Manual Lymphatic Drainage Comments  Answered questions about lymphatic drainage she continues to ask.   -HR      Manual Therapy  60  -HR         Compression/Skin Care    Compression/Skin Care  skin care;wrapping location;bandaging  -HR      Wrapping Location  upper extremity  -HR      Wrapping Location UE  left:;fingers to axilla  -HR      Bandage Layers  cotton liner;padding/fluff layer;short-stretch bandages (comment size/quantity)  -HR      Bandaging Technique  circumferential/spiral;light compression  -HR      Remove Bandages  She has been tolerating about 4 hours at a time.   -HR      Compression/Skin Care Comments  Gave daughter information about edemawear to try as an option that would be more comfortable and easier to manage than the wraps that she is putting on and taking off 3 times a day  -HR        User Key  (r) = Recorded By, (t) = Taken By, (c) = Cosigned By    Initials Name Provider Type    HR Keyla Bennett, PT, DPT, CLT-EVANGELINA Physical Therapist           Therapy Education/Self Care [id number]   Details: Have family re-wrap the L UE as needed.     [employer] A/S Code: N/A   Given Compression   Progress: New   Who provided to: Patent   Level of understanding Verbalized    Timed Minutes       Goals                                          Progress Note due by 10/28/21                                                      Recert due by 12/28/21   LTG by: 6 weeks Comments Date Status   1. Patient's family will be able to follow recommendations for lymphedema maintenance at home between outpatient visits.  9/28 New   2. Patient will have a reduction in pain with lymphedema treatment.  9/28 New   3. Patient will have no s/s infection in the lymphedema affected tissues with treatment.  9/28 New                                                          Total Timed Treatment:     60   mins  Total Time of Visit:            60  mins         ASSESSMENT/PLAN     Assessment/Plan:       ASSESSMENT:  It isn't clear what the status is right now with hospice taking over her care. She is not willing to have hospice if she can't come to her outpatient lymphedema treatments. I told her an her daughter to speak with the  or whomever is handling her referral for hospice about trying to get approval for at least some visits for lymphedema treatment as a palliative, pain controlling modality. Her pain is severe and needs to be controlled.  I am not setting any goals for improvement at this time. Our goal is to keep her as comfortable as possible. If she gets relief from massage, we can continue. She can only lie in 1 position for a few minutes at a time and has to be allowed to move around and readjust when the pain gets too bad.     PLAN: Will continue using CDT as palliative care modality treatment.     Keyla Bennett, PT, DPT, CLT-EVANGELINA  Physical Therapy Assistant

## 2021-09-29 NOTE — OUTREACH NOTE
Medical Week 2 Survey      Responses   [facility] patient discharged from?  [place]   Does the patient have one of the following disease processes/diagnoses(primary or secondary)?  Other   Week 2 attempt successful?  Yes   Call start time  1547   Discharge diagnosis  Chest wall pain,   invasive ductal carcinoma the left breast with widespread metastatic disease   Call end time  1551   Is patient permission given to speak with other caregiver?  No   Meds reviewed with patient/caregiver?  Yes   Is the patient having any side effects they believe may be caused by any medication additions or changes?  No   Does the patient have all medications ordered at discharge?  Yes   Is the patient taking all medications as directed (includes completed medication regime)?  Yes   Medication comments  She states the Morphine is helping the pain.    Does the patient have a primary care provider?   Yes   Does the patient have an appointment with their PCP within 7 days of discharge?  Greater than 7 days   Comments regarding PCP  She missed her appt accidently over slept she will reschedule.    What is preventing the patient from scheduling follow up appointments within 7 days of discharge?  Earlier appointment not available   Nursing Interventions  Educated patient on importance of making appointment [She will reschedule the appt. ]   Has the patient kept scheduled appointments due by today?  No   What is preventing the patient from keeping their appointments?  -- [She over slept she will reschedule appt. ]   Did the patient receive a copy of their discharge instructions?  Yes   Nursing interventions  Reviewed instructions with patient   What is the patient's perception of their health status since discharge?  Same [She states the pain is managable. ]   Is the patient/caregiver able to teach back signs and symptoms related to disease process for when to call PCP?  Yes   Is the patient/caregiver able to teach back signs and  symptoms related to disease process for when to call 911?  Yes   Is the patient/caregiver able to teach back the hierarchy of who to call/visit for symptoms/problems? PCP, Specialist, Home health nurse, Urgent Care, ED, 911  Yes   If the patient is a current smoker, are they able to teach back resources for cessation?  Not a smoker   Week 2 Call Completed?  Yes          Mamta Cardenas RN

## 2021-09-30 ENCOUNTER — HOSPITAL ENCOUNTER (OUTPATIENT)
Dept: WOUND CARE | Age: 56
Discharge: HOME | End: 2021-09-30
Payer: COMMERCIAL

## 2021-09-30 VITALS
TEMPERATURE: 98 F | BODY MASS INDEX: 23.39 KG/M2 | WEIGHT: 132 LBS | RESPIRATION RATE: 20 BRPM | HEIGHT: 63 IN | HEART RATE: 88 BPM

## 2021-09-30 DIAGNOSIS — L98.492 CHEST WALL ULCER, WITH FAT LAYER EXPOSED (HCC): Primary | ICD-10-CM

## 2021-09-30 DIAGNOSIS — C50.919 HER2-POSITIVE CARCINOMA OF BREAST (HCC): ICD-10-CM

## 2021-09-30 DIAGNOSIS — C50.412 MALIGNANT NEOPLASM OF UPPER-OUTER QUADRANT OF LEFT BREAST IN FEMALE, ESTROGEN RECEPTOR POSITIVE (HCC): ICD-10-CM

## 2021-09-30 DIAGNOSIS — I89.0 LYMPHEDEMA OF BOTH UPPER EXTREMITIES: ICD-10-CM

## 2021-09-30 DIAGNOSIS — Z17.0 MALIGNANT NEOPLASM OF UPPER-OUTER QUADRANT OF LEFT BREAST IN FEMALE, ESTROGEN RECEPTOR POSITIVE (HCC): ICD-10-CM

## 2021-09-30 DIAGNOSIS — C77.3 SECONDARY MALIGNANT NEOPLASM OF AXILLARY LYMPH NODES (HCC): ICD-10-CM

## 2021-09-30 PROCEDURE — 99212 OFFICE O/P EST SF 10 MIN: CPT | Performed by: NURSE PRACTITIONER

## 2021-09-30 PROCEDURE — 99213 OFFICE O/P EST LOW 20 MIN: CPT

## 2021-09-30 RX ORDER — GABAPENTIN 300 MG/1
300 CAPSULE ORAL EVERY 12 HOURS SCHEDULED
COMMUNITY
Start: 2021-09-21

## 2021-09-30 RX ORDER — NAPROXEN 500 MG/1
500 TABLET ORAL 2 TIMES DAILY WITH MEALS
COMMUNITY
Start: 2021-09-21

## 2021-09-30 RX ORDER — MONTELUKAST SODIUM 10 MG/1
10 TABLET ORAL DAILY
COMMUNITY
Start: 2021-09-21

## 2021-09-30 RX ORDER — SALIVA STIMULANT COMB. NO.3
1 SPRAY, NON-AEROSOL (ML) MUCOUS MEMBRANE
COMMUNITY
Start: 2021-09-21

## 2021-09-30 RX ORDER — MORPHINE SULFATE 60 MG/1
60 TABLET, FILM COATED, EXTENDED RELEASE ORAL
COMMUNITY
Start: 2021-09-21

## 2021-09-30 RX ORDER — DEXAMETHASONE 4 MG/1
4 TABLET ORAL
COMMUNITY
Start: 2021-09-21

## 2021-09-30 RX ORDER — HYDROXYZINE 50 MG/1
50 TABLET, FILM COATED ORAL EVERY 6 HOURS PRN
COMMUNITY
Start: 2021-09-21

## 2021-09-30 RX ORDER — MORPHINE SULFATE 15 MG/1
15 TABLET ORAL EVERY 4 HOURS PRN
COMMUNITY
Start: 2021-09-21

## 2021-09-30 RX ORDER — FERROUS SULFATE 325(65) MG
TABLET ORAL
COMMUNITY

## 2021-09-30 RX ORDER — MORPHINE SULFATE 15 MG/1
TABLET, FILM COATED, EXTENDED RELEASE ORAL
COMMUNITY
Start: 2021-07-28

## 2021-09-30 RX ORDER — FAMOTIDINE 20 MG/1
20 TABLET, FILM COATED ORAL
COMMUNITY
Start: 2021-09-21

## 2021-09-30 RX ORDER — AMOXICILLIN 250 MG
1 CAPSULE ORAL 2 TIMES DAILY PRN
COMMUNITY
Start: 2021-09-21

## 2021-09-30 ASSESSMENT — PAIN DESCRIPTION - LOCATION: LOCATION: CHEST

## 2021-09-30 ASSESSMENT — PAIN SCALES - GENERAL: PAINLEVEL_OUTOF10: 6

## 2021-09-30 ASSESSMENT — PAIN DESCRIPTION - PAIN TYPE: TYPE: CHRONIC PAIN

## 2021-09-30 NOTE — PROGRESS NOTES
[address]   Progress Note and Procedure Note      Loretta Chilel  MEDICAL RECORD NUMBER:  [id number]  AGE: 54 y.o. GENDER: female  : 1965  EPISODE DATE:  2021    Subjective:     Chief Complaint   Patient presents with    Wound Check     left chest axilla radiation burn-patient has stopped radiation due to discomfort         HISTORY of PRESENT ILLNESS HPI     Loretta Chilel is a 54 y.o. female who presents today for wound/ulcer evaluation.    History of Wound Context:left chest radiation burn follow up eval/treat  Ulcer Identification:  Ulcer Type: necrosis radiation  Contributing Factors: radation burn    Wound: N/A        PAST MEDICAL HISTORY        Diagnosis Date    Cancer [facility])     breast    GERD (gastroesophageal reflux disease)        PAST SURGICAL HISTORY    Past Surgical History:   Procedure Laterality Date    BREAST LUMPECTOMY Left 10/4/2016    LUMPECTOMY WITH SENTINAL NODE BIOPSY AND INTRA OP US GUIDED NEEDLE LOCALIZATION performed by Soraya Smith MD at [address] [place] [address]      biopsy left breast    COLONOSCOPY      INSERTION / REMOVAL / REPLACEMENT VENOUS ACCESS CATHETER N/A 2016    SINGLE LUMEN PORT INSERTION performed by Soraya Smith MD at [address] [facility] [address] DIAGNOSTIC BONE MARROW BIOPSIES Right 2018    BONE MARROW ASPIRATION BIOPSY performed by Kirsten Russell MD at [address] [facility] [address] HISTORY    Family History   Problem Relation Age of Onset    Lung Cancer Father     Colon Cancer Father     Prostate Cancer Father     Hypertension Mother        SOCIAL HISTORY    Social History     Tobacco Use    Smoking status: Never Smoker    Smokeless tobacco: Never Used   Vaping Use    Vaping Use: Never used   Substance Use Topics    Alcohol use: No     Alcohol/week: 0.0 standard drinks    Drug use: No       ALLERGIES    Allergies   Allergen Reactions    Carboplatin     Platinum-Containing Compounds        MEDICATIONS    Current Outpatient Medications on File Prior to Encounter   Medication Sig Dispense Refill    Artificial Saliva (BIOTENE DRY MOUTH MOISTURIZING) SOLN liquid Take 1 spray by mouth 5 times daily      dexamethasone (DECADRON) 4 MG tablet Take 4 mg by mouth daily (with breakfast)      famotidine (PEPCID) 20 MG tablet Take 20 mg by mouth 2 times daily (before meals)      gabapentin (NEURONTIN) 300 MG capsule Take 300 mg by mouth every 12 hours.  hydrOXYzine (ATARAX) 50 MG tablet Take 50 mg by mouth every 6 hours as needed      montelukast (SINGULAIR) 10 MG tablet Take 10 mg by mouth daily      morphine (MS CONTIN) 60 MG extended release tablet Take 60 mg by mouth.  morphine (MSIR) 15 MG tablet Take 15 mg by mouth every 4 hours as needed.  naproxen (NAPROSYN) 500 MG tablet Take 500 mg by mouth 2 times daily (with meals)      senna-docusate (PERICOLACE) 8.6-50 MG per tablet Take 1 tablet by mouth 2 times daily as needed      ferrous sulfate (IRON 325) 325 (65 Fe) MG tablet Take by mouth      morphine (MS CONTIN) 15 MG extended release tablet       naloxone (NARCAN) 4 MG/0.1ML LIQD nasal spray 1 spray by Nasal route as needed for Opioid Reversal 1 each 1    ondansetron (ZOFRAN) 8 MG tablet Take 1 tablet by mouth every 8 hours as needed for Nausea or Vomiting 30 tablet 5     No current facility-administered medications on file prior to encounter. REVIEW OF SYSTEMS    Pertinent items are noted in HPI.     Objective:      Pulse 88   Temp 98 °F (36.7 °C) (Temporal)   Resp 20   Ht 5' 3\" (1.6 m)   Wt 132 lb (59.9 kg)   LMP 02/29/2016 (Approximate)   BMI 23.38 kg/m²     Wt Readings from Last 3 Encounters:   09/30/21 132 lb (59.9 kg)   08/30/21 132 lb (59.9 kg)   08/16/21 139 lb 3.2 oz (63.1 kg)       PHYSICAL EXAM    General Appearance: alert and oriented to person, place and time, well developed and well- nourished, in no acute distress  Skin: warm and dry, no rash or erythema  Head: normocephalic and atraumatic  Eyes: pupils equal, round, and reactive to light, extraocular eye movements intact, conjunctivae normal  ENT: tympanic membrane, external ear and ear canal normal bilaterally, nose without deformity, nasal mucosa and turbinates normal without polyps  Neck: supple and non-tender without mass, no thyromegaly or thyroid nodules, no cervical lymphadenopathy  Pulmonary/Chest: clear to auscultation bilaterally- no wheezes, rales or rhonchi, normal air movement, no respiratory distress  Musculoskeletal: normal range of motion, no joint swelling, deformity or tenderness  Neurologic: reflexes normal and symmetric, no cranial nerve deficit, gait, coordination and speech normal  Extremities: lymphedema noted generalized       Assessment:      Patient Active Problem List   Diagnosis Code    Breast cancer of upper-outer quadrant of left female breast ([address] C50.412    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    HER2-positive carcinoma of breast ([address] C50.919    Breast wound, left, subsequent encounter S21.002D    Encounter for central line care Z45.2    Chest wall ulcer, with fat layer exposed ([address] L98.492    Lymphedema of both upper extremities I89.0                 Wound 08/30/21 Breast Lateral;Left wound 1- left breast radiation (Active)   Wound Image    09/30/21 1107   Wound Etiology Other 09/30/21 1107   Dressing Status Old drainage noted 09/30/21 1107   Wound Cleansed Not Cleansed 09/30/21 1107   Dressing/Treatment Interdry Ag/wicking fabric with Ag 09/30/21 1111   Wound Length (cm) 5 cm 09/30/21 1107   Wound Width (cm) 0.2 cm 09/30/21 1107   Wound Depth (cm) 0.1 cm 09/30/21 1107   Wound Surface Area (cm^2) 1 cm^2 09/30/21 1107   Change in Wound Size % (l*w) 97.96 09/30/21 1107   Wound Volume (cm^3) 0.1 cm^3 09/30/21 1107   Wound Healing % 98 09/30/21 1107   Wound Assessment Slough;Pink/red 09/30/21 1107   Drainage Amount Scant 09/30/21 1107   Drainage Description Serosanguinous for pain related to her excessive swelling. She has gotten on increased pain medications that appear to be helping. She is continuing to be going to lymphedema clinic for therapy, she is using a 3 layer wrap and has used 20-30mmHG compression stockings on her arms however due to the breakdown of skin on her chest/axilla area she had to stop wearing that. Her daughter performs excellent skin care and lymphatic massage. She has been using exercise and elevation as tolerated with limited control of her swelling and discomfort. She is an excellent candidate for lymphedema pumps and without more aggressive control of her swelling her tissue will worsen. Prognosis is poor, I already see areas breaking down and becoming reddened with her current lymphedema control without the pumps. I do not request Ms. Alex to continue to come to the clinic due to the palliative nature of her wound however I encourage her to continue lymphedema therapy. Discharge [address] and Hyperbaric Oxygen Therapy   Physician Orders and Discharge Instructions  [address] [facility]  [address]  Telephone: [phone]    NAME:  Shabana Cerda:  1965  MEDICAL RECORD NUMBER:  [id number]  DATE:  9/30/2021    Discharge condition: Stable    Discharge to: Home    Left via:Private automobile    Accompanied by:  child    ECF/HHA: [facility]    Dressing Orders:  Place interdry and aquaphor to the axilla raw areas     [address] [facility] follow up visit __as needed___________________________  (Please note your next appointment above and if you are unable to keep, kindly give a 24 hour notice.  Thank you.)          If you experience any of the following, please call the [address] [place] [address] Road during business hours:    * Increase in Pain  * Temperature over 101  * Increase in drainage from your wound  * Drainage with a foul odor  * Bleeding  * Increase in swelling  * Need for compression bandage changes due to slippage, breakthrough drainage. If you need medical attention outside of the business hours of the [address] [place] [address] Road please contact your PCP or go to the nearest emergency room.           Electronically signed by MILLICENT Garcia CNP on 9/30/2021 at 11:15 AM

## 2021-10-05 NOTE — PROGRESS NOTES
PhysicalTherapy Treatment Note    Patient:           Ayla Echeverria            : 1965  Today's Date: 10/5/2021  Referring practitioner: Gloria Hendricks*  Date of Initial Visit:       Type: THERAPY  Noted: 10/15/2020  Patient seen for 50 sessions    Visit Diagnoses:    ICD-10-CM ICD-9-CM   1. Lymphedema  I89.0 457.1   2. Regional lymph node metastasis present (HCC)  C77.9 196.9   3. Malignant neoplasm involving both nipple and areola of left breast in female, estrogen receptor positive (HCC)  C50.012 174.0    Z17.0 V86.0   4. Decreased ROM of left shoulder  M25.612 719.51   5. At risk for lymphedema  Z91.89 V49.89   6. Decreased ROM of right shoulder  M25.611 719.51       SUBJECTIVE       Subjective:  Her daughter is wrapping her arms, she is walking around the house, she is using dumbbells in her arms.  She is trying to stay out of the bed and if she is in the bed she is there for no more than 2 hours. She is no longer seeing wound care as they felt there was nothing else they could do.  The lymphedema pump was denied by insurance, she is going to try to get a hospital bed.     810  Arms, chest, and back    OBJECTIVE     Objective   Lymphedema     Row Name 10/05/21 1530             Subjective Pain    Able to rate subjective pain?  yes  -AL      Pre-Treatment Pain Level  8  -AL         Manual Lymphatic Drainage    Manual Lymphatic Drainage  initial sequence;opened regional lymph nodes;opened anastamoses;extremity treatment  -AL      Initial Sequence  short neck;abdomen;diaphragmatic breathing  -AL      Abdomen  superficial  -AL      Diaphragmatic Breathing  x 8 with very superficial pressure  -AL      Opened Regional Lymph Nodes  --  -AL      Inguinal  --  -AL      Opened Anastamoses  -- Sent fluid to each SCF  -AL      Axillo-Inguinal  --  -AL      Extremity Treatment  extremity treatment focus on;simple/brief MLD;MLD to proximal limb only  -AL      MLD to Full Limb  L UE and R UE  -AL      Manual  Lymphatic Drainage Comments  When sitting in the bed do not sit at 90 degress flexion at the hips. Do not sit with feet in the dependent position for more than 10 min.  Work on LE ROM exercises.  Discussed importance of not overloading the lymph system by doing too much MLD.   -AL      Manual Therapy  55  -AL         Compression/Skin Care    Compression/Skin Care Comments  She did not bring her wraps today, her family have been wrapping the arms at home as needed.   -AL        User Key  (r) = Recorded By, (t) = Taken By, (c) = Cosigned By    Initials Name Provider Type    Jaycee Farris, PTA, LISAT-EVANGELINA Physical Therapy Assistant           Therapy Education/Self Care [address]   Details: Have family re-wrap the L UE as needed.     txtr Code: N/A   Given Compression   Progress: New   Who provided to: Patent   Level of understanding Verbalized    Timed Minutes      Goals                                          Progress Note due by 10/28/21                                                      Recert due by 12/28/21   LTG by: 6 weeks Comments Date Status   1. Patient's family will be able to follow recommendations for lymphedema maintenance at home between outpatient visits. Educated during treatment 10/5/21 Ongoing   2. Patient will have a reduction in pain with lymphedema treatment.  9/28 New   3. Patient will have no s/s infection in the lymphedema affected tissues with treatment.  9/28 New                                                          Total Timed Treatment:     55   mins  Total Time of Visit:            55  mins         ASSESSMENT/PLAN     Assessment/Plan:       ASSESSMENT:  Patient was able to stay in one position much longer today than she has in past treatments.  When I was working on the MLD to the abdomen she could feel the fluid moving to her arms.  Stressed to patient and her daughter the importance of not overloading the lymph system, patient needs to let her family know if they need to work on  a different area or stop if she is uncomfortable.  Patient wanted to know if she could have the legs and arms worked on at the same session but I do believe this would be too much for her lymph system due to the cancer blocking the flow of the fluid.     PLAN: Will continue using CDT as palliative care modality treatment.     Jaycee Mohan PTA, T-EVANGELINA  Physical Therapy Assistant

## 2021-10-06 NOTE — OUTREACH NOTE
"Medical Week 3 Survey      Responses   [facility] patient discharged from?  [place]   Does the patient have one of the following disease processes/diagnoses(primary or secondary)?  Other   Week 3 attempt successful?  Yes   Call start time  1557   Call end time  1559   Discharge diagnosis  Chest wall pain,   invasive ductal carcinoma the left breast with widespread metastatic disease   Is the patient taking all medications as directed (includes completed medication regime)?  Yes   Does the patient have a primary care provider?   Yes   Has the patient kept scheduled appointments due by today?  No   What is preventing the patient from keeping their appointments?  -- [States she missed appt and still needs to reschedule]   Nursing Interventions  Advised to reschedule appointment   Psychosocial issues?  No   What is the patient's perception of their health status since discharge?  Returned to baseline/stable   Is the patient/caregiver able to teach back signs and symptoms related to disease process for when to call PCP?  Yes   Is the patient/caregiver able to teach back signs and symptoms related to disease process for when to call 911?  Yes   Is the patient/caregiver able to teach back the hierarchy of who to call/visit for symptoms/problems? PCP, Specialist, Home health nurse, Urgent Care, ED, 911  Yes   If the patient is a current smoker, are they able to teach back resources for cessation?  Not a smoker   Additional teach back comments  States \"I'm taking it one day at a time\"   Week 3 Call Completed?  Yes   Wrap up additional comments  Denies any questions or needs at this time          Kinza Owens LPN  "

## 2021-10-07 NOTE — PROGRESS NOTES
PhysicalTherapy Treatment Note    Patient:           Ayla Echeverria            : 1965  Today's Date: 10/7/2021  Referring practitioner: Gloria Hendricks*  Date of Initial Visit:       Type: THERAPY  Noted: 10/15/2020  Patient seen for 51 sessions    Visit Diagnoses:    ICD-10-CM ICD-9-CM   1. Lymphedema  I89.0 457.1   2. Regional lymph node metastasis present (HCC)  C77.9 196.9   3. Malignant neoplasm involving both nipple and areola of left breast in female, estrogen receptor positive (HCC)  C50.012 174.0    Z17.0 V86.0   4. Decreased ROM of left shoulder  M25.612 719.51   5. At risk for lymphedema  Z91.89 V49.89   6. Decreased ROM of right shoulder  M25.611 719.51       SUBJECTIVE       Subjective:  Her daughter wrapped the L arm and she was able to wear the wraps for 2 hours, she had a lot of drainage from the L armpit.  She kept her feet up and stayed out of 90 degrees hip flexion and her legs are less swollen. A hospital bed has been ordered and will be at her home tomorrow.  Her daughter states she has let hospice know her mom is coming here for treatment.     6/10  Arms, chest, and back with pain meds    OBJECTIVE     Objective   Lymphedema     Row Name 10/07/21 1435 10/07/21 1400          Subjective Pain    Able to rate subjective pain?  yes  -AL  --  -AL     Pre-Treatment Pain Level  6  -AL  --  -AL        Manual Lymphatic Drainage    Manual Lymphatic Drainage  initial sequence;opened regional lymph nodes;opened anastamoses;extremity treatment  -AL  --     Initial Sequence  short neck;abdomen;diaphragmatic breathing  -AL  --     Abdomen  superficial  -AL  --     Diaphragmatic Breathing  x 8 with very superficial pressure  -AL  --     Opened Anastamoses  -- Sent fluid to each SCF  -AL  --     Extremity Treatment  extremity treatment focus on;simple/brief MLD;MLD to proximal limb only  -AL  --     MLD to Full Limb  L UE and R UE  -AL  --     Manual Therapy  40  -AL  --       User Key  (r) =  Recorded By, (t) = Taken By, (c) = Cosigned By    Initials Name Provider Type    Jaycee Farris PTA, CLT-LANA Physical Therapy Assistant           Therapy Education/Self Care [id number]   Details: Have family re-wrap the L UE as needed.     [employer] Code: N/A   Given Compression   Progress: New   Who provided to: Patent   Level of understanding Verbalized    Timed Minutes      Goals                                          Progress Note due by 10/28/21                                                      Recert due by 12/28/21   LTG by: 6 weeks Comments Date Status   1. Patient's family will be able to follow recommendations for lymphedema maintenance at home between outpatient visits. Educated during treatment 10/5/21 Ongoing   2. Patient will have a reduction in pain with lymphedema treatment.  9/28 New   3. Patient will have no s/s infection in the lymphedema affected tissues with treatment.  9/28 New                                                          Total Timed Treatment:     55   mins  Total Time of Visit:            55  mins         ASSESSMENT/PLAN     Assessment/Plan:       ASSESSMENT:  Patient's LE's are less swollen today with the right leg more swollen than the L.  She is not sitting as long with her legs in the dependent position at home.  Today she was able to tolerate 40 min of treatment then was too uncomfortable to continue.  She does have a tingle sensation in arms and back when doing the MLD, she has pain in her back from staying in one position too long.     PLAN: Will continue using CDT as palliative care modality treatment.     Jaycee Mohan PTA, CLT-LANA  Physical Therapy Assistant

## 2021-10-15 NOTE — PROGRESS NOTES
PhysicalTherapy Treatment Note    Patient:           Ayla Echeverria            : 1965  Today's Date: 10/15/2021  Referring practitioner: Gloria Hendricks*  Date of Initial Visit:       Type: THERAPY  Noted: 10/15/2020  Patient seen for 52 sessions    Visit Diagnoses:    ICD-10-CM ICD-9-CM   1. Lymphedema  I89.0 457.1   2. Regional lymph node metastasis present (HCC)  C77.9 196.9   3. Malignant neoplasm involving both nipple and areola of left breast in female, estrogen receptor positive (HCC)  C50.012 174.0    Z17.0 V86.0   4. Decreased ROM of left shoulder  M25.612 719.51   5. At risk for lymphedema  Z91.89 V49.89   6. Decreased ROM of right shoulder  M25.611 719.51       SUBJECTIVE       Subjective:  She is elevating the legs and they are much better.  She is having her family soak towels in warm water and Epson salt and this is helping with the swelling. She continues to have drainage from the open wound on the chest, she states she has had more drainage the past couple of weeks.      Pain:  7/10  Arms, chest, and back with pain meds    OBJECTIVE     Objective    Lymphedema     Row Name 10/15/21 1420             Subjective Pain    Able to rate subjective pain? yes  -AL      Pre-Treatment Pain Level 7  -AL              Manual Lymphatic Drainage    Manual Lymphatic Drainage initial sequence; opened regional lymph nodes; opened anastamoses; extremity treatment  -AL      Initial Sequence short neck; abdomen; diaphragmatic breathing  -AL      Abdomen superficial  -AL      Diaphragmatic Breathing x 9 with superficial abdominals  -AL      Opened Anastamoses --  Sent fluid to each SCF  -AL      Extremity Treatment extremity treatment focus on; simple/brief MLD; MLD to proximal limb only  -AL      MLD to Full Limb L UE and R UE  -AL      Manual Therapy 55  -AL              Compression/Skin Care    Compression/Skin Care Comments Try wrapping the L UE after the wram towels soaked in Epson salt.  -AL             User Key  (r) = Recorded By, (t) = Taken By, (c) = Cosigned By    Initials Name Provider Type    Jaycee Farris PTA, CLT-LANA Physical Therapy Assistant                 Therapy Education/Self Care [id number]   Details: Have family re-wrap the L UE as needed, try wrapping the arms after the warm towels are removed.    "[employer] Code: N/A   Given Compression   Progress: New   Who provided to: Patent   Level of understanding Verbalized    Timed Minutes      Goals                                          Progress Note due by 10/28/21                                                      Recert due by 12/28/21   LTG by: 6 weeks Comments Date Status   1. Patient's family will be able to follow recommendations for lymphedema maintenance at home between outpatient visits. Educated during treatment 10/5/21 Ongoing   2. Patient will have a reduction in pain with lymphedema treatment. Arms are still swollen, the legs have reduced in size 10/15/21 Ongoing   3. Patient will have no s/s infection in the lymphedema affected tissues with treatment. She has open areas L chest 10/15/21 Ongoing                                                          Total Timed Treatment:     55   mins  Total Time of Visit:            55  mins         ASSESSMENT/PLAN     Assessment/Plan:       ASSESSMENT:  Patient has less swelling in both LE's as compared to last week.  She is wrapping in towels that have been soaked in warm water with epsom salt.  She feels like this is helping, I do want her to try to wrap her arm after she is finished with the warm towels to see if this helps move the fluid out more.     PLAN: Will continue using CDT as palliative care modality treatment.     Jaycee Mohan PTA, CLT-LANA  Physical Therapy Assistant

## 2021-10-18 NOTE — OUTREACH NOTE
Medical Week 4 Survey      Responses   [facility] [place] patient discharged from? [facility]   Does the patient have one of the following disease processes/diagnoses(primary or secondary)? Other   Week 4 attempt successful? No          Mamta Cardenas RN

## 2021-10-20 ENCOUNTER — TELEPHONE (OUTPATIENT)
Dept: HEMATOLOGY | Age: 56
End: 2021-10-20

## 2021-11-16 NOTE — PROGRESS NOTES
I spoke with Dr. Ott today.  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    We will see her today for treatment options.   Bactrim Counseling:  I discussed with the patient the risks of sulfa antibiotics including but not limited to GI upset, allergic reaction, drug rash, diarrhea, dizziness, photosensitivity, and yeast infections.  Rarely, more serious reactions can occur including but not limited to aplastic anemia, agranulocytosis, methemoglobinemia, blood dyscrasias, liver or kidney failure, lung infiltrates or desquamative/blistering drug rashes.

## 2025-05-03 NOTE — PLAN OF CARE
Problem: Fall Injury Risk  Goal: Absence of Fall and Fall-Related Injury  Outcome: Ongoing, Not Progressing  Intervention: Promote Injury-Free Environment  Recent Flowsheet Documentation  Taken 9/5/2021 1022 by Jagruti Castillo RN  Safety Promotion/Fall Prevention: safety round/check completed     Problem: Skin Injury Risk Increased  Goal: Skin Health and Integrity  Outcome: Ongoing, Not Progressing     Problem: Adult Inpatient Plan of Care  Goal: Plan of Care Review  Outcome: Ongoing, Not Progressing  Flowsheets (Taken 9/5/2021 1106)  Progress: no change  Plan of Care Reviewed With: patient  Outcome Summary: recieved from ED, see previous notes about port site, consults in place, pain medication given per MD order, vss, will continue to monitor  Goal: Patient-Specific Goal (Individualized)  Outcome: Ongoing, Not Progressing  Goal: Absence of Hospital-Acquired Illness or Injury  Outcome: Ongoing, Not Progressing  Intervention: Identify and Manage Fall Risk  Recent Flowsheet Documentation  Taken 9/5/2021 1022 by Jagruti Castillo RN  Safety Promotion/Fall Prevention: safety round/check completed  Intervention: Prevent Skin Injury  Recent Flowsheet Documentation  Taken 9/5/2021 1022 by Jagruti Castillo RN  Body Position: position changed independently  Goal: Optimal Comfort and Wellbeing  Outcome: Ongoing, Not Progressing  Goal: Readiness for Transition of Care  Outcome: Ongoing, Not Progressing  Intervention: Mutually Develop Transition Plan  Recent Flowsheet Documentation  Taken 9/5/2021 1000 by Jagruti Castillo RN  Equipment Currently Used at Home: shower chair  Transportation Anticipated: family or friend will provide  Transportation Concerns: car, none  Patient/Family Anticipated Services at Transition:      hospice care   home health care  Patient/Family Anticipates Transition to: home with family   Goal Outcome Evaluation:  Plan of Care Reviewed With: patient        Progress: no  change  Outcome Summary: recieved from ED, see previous notes about port site, consults in place, pain medication given per MD order, vss, will continue to monitor   Hyperglycemic 300 on admission --> hypoglycemic after hyperkalemia cocktail- BHB = 0 not DKA   - Home regimen: Glipizide 5mg AM and 10mg in the PM - hold   - A1c: 7.8%  - ISS for now  - monitor FS, goal 110-180

## (undated) DEVICE — AIRLIFE™ NASAL OXYGEN CANNULA CURVED, NONFLARED TIP, WITH 7' FEET (2.1 M) CRUSH RESISTANT TUBING, OVER-THE-EAR STYLE: Brand: AIRLIFE™